# Patient Record
Sex: MALE | Race: WHITE | NOT HISPANIC OR LATINO | Employment: OTHER | URBAN - NONMETROPOLITAN AREA
[De-identification: names, ages, dates, MRNs, and addresses within clinical notes are randomized per-mention and may not be internally consistent; named-entity substitution may affect disease eponyms.]

---

## 2017-01-02 ENCOUNTER — TELEPHONE (OUTPATIENT)
Dept: EMERGENCY DEPT | Facility: HOSPITAL | Age: 62
End: 2017-01-02

## 2017-01-02 ENCOUNTER — OFFICE VISIT (OUTPATIENT)
Dept: RETAIL CLINIC | Facility: CLINIC | Age: 62
End: 2017-01-02

## 2017-01-02 VITALS
HEIGHT: 71 IN | OXYGEN SATURATION: 97 % | DIASTOLIC BLOOD PRESSURE: 74 MMHG | TEMPERATURE: 98 F | HEART RATE: 112 BPM | RESPIRATION RATE: 16 BRPM | SYSTOLIC BLOOD PRESSURE: 118 MMHG | BODY MASS INDEX: 29.9 KG/M2 | WEIGHT: 213.6 LBS

## 2017-01-02 DIAGNOSIS — J06.9 ACUTE URI: Primary | ICD-10-CM

## 2017-01-02 LAB
EXPIRATION DATE: NORMAL
EXPIRATION DATE: NORMAL
FLUAV AG NPH QL: NEGATIVE
FLUBV AG NPH QL: NEGATIVE
INTERNAL CONTROL: NORMAL
INTERNAL CONTROL: NORMAL
Lab: NORMAL
Lab: NORMAL
S PYO AG THROAT QL: NEGATIVE

## 2017-01-02 PROCEDURE — 99203 OFFICE O/P NEW LOW 30 MIN: CPT | Performed by: NURSE PRACTITIONER

## 2017-01-02 PROCEDURE — 87804 INFLUENZA ASSAY W/OPTIC: CPT | Performed by: NURSE PRACTITIONER

## 2017-01-02 PROCEDURE — 87880 STREP A ASSAY W/OPTIC: CPT | Performed by: NURSE PRACTITIONER

## 2017-01-02 RX ORDER — MELOXICAM 15 MG/1
1 TABLET ORAL DAILY
COMMUNITY

## 2017-01-02 RX ORDER — LEVOTHYROXINE SODIUM 0.05 MG/1
1 TABLET ORAL DAILY
COMMUNITY

## 2017-01-02 RX ORDER — INSULIN GLARGINE 100 [IU]/ML
70 INJECTION, SOLUTION SUBCUTANEOUS 2 TIMES DAILY
COMMUNITY
End: 2018-03-07

## 2017-01-02 RX ORDER — FLUTICASONE PROPIONATE 50 MCG
SPRAY, SUSPENSION (ML) NASAL
Qty: 1 BOTTLE | Refills: 0 | Status: SHIPPED | OUTPATIENT
Start: 2017-01-02

## 2017-01-02 RX ORDER — CARBAMAZEPINE 200 MG/1
1 TABLET ORAL DAILY
COMMUNITY
End: 2017-03-24 | Stop reason: SDUPTHER

## 2017-01-02 NOTE — MR AVS SNAPSHOT
Supa Mcclelland   1/2/2017 11:15 AM   Office Visit    Dept Phone:  [phone]   Encounter #:  [id number]    Provider:  NURSE/[facility] [place]   Department:  [facility]                Your Full Care Plan              Today's Medication Changes          These changes are accurate as of: 1/2/17 12:55 PM.  If you have any questions, ask your nurse or doctor.               New Medication(s)Ordered:     fluticasone 50 MCG/ACT nasal spray   Commonly known as:  FLONASE   1 spray each nostril twice a day for three days, then daily.         Stop taking medication(s)listed here:     ciprofloxacin 500 MG tablet   Commonly known as:  CIPRO                Where to Get Your Medications      These medications were sent to [facility] [address] [place] [address] - [address] [place] [address] - [phone] [facility] [phone] [address] [address]     Phone:  [phone]     fluticasone 50 MCG/ACT nasal spray                  Your Updated Medication List          This list is accurate as of: 1/2/17 12:55 PM.  Always use your most recent med list.                * carBAMazepine 200 MG tablet   Commonly known as:  TEGRETOL   Take 1 tablet by mouth Daily.       * carBAMazepine 200 MG tablet   Commonly known as:  TEGretol       citalopram 20 MG tablet   Commonly known as:  CeleXA       famotidine 20 MG tablet   Commonly known as:  PEPCID       fexofenadine 180 MG tablet   Commonly known as:  ALLEGRA       fluticasone 50 MCG/ACT nasal spray   Commonly known as:  FLONASE   1 spray each nostril twice a day for three days, then daily.       gabapentin 800 MG tablet   Commonly known as:  NEURONTIN   Take 1 tablet by mouth 2 (Two) Times a Day.       hydrochlorothiazide 12.5 MG tablet   Commonly known as:  HYDRODIURIL       insulin glargine 100 UNIT/ML injection   Commonly known as:  LANTUS       JANUMET  MG per tablet   Generic drug:  sitaGLIPtin-metFORMIN       levothyroxine 50 MCG tablet    "  Commonly known as:  SYNTHROID, LEVOTHROID       meloxicam 15 MG tablet   Commonly known as:  MOBIC       metFORMIN 500 MG tablet   Commonly known as:  GLUCOPHAGE       metoprolol tartrate 50 MG tablet   Commonly known as:  LOPRESSOR       nitrofurantoin (macrocrystal-monohydrate) 100 MG capsule   Commonly known as:  MACROBID   Take 1 capsule by mouth 2 (Two) Times a Day for 7 days.       rOPINIRole 1 MG tablet   Commonly known as:  REQUIP   Take 1 tablet by mouth Every Night. Take 1 hour before bedtime.       * Notice:  This list has 2 medication(s) that are the same as other medications prescribed for you. Read the directions carefully, and ask your doctor or other care provider to review them with you.            We Performed the Following     POC Influenza A / B     POC Rapid Strep A       You Were Diagnosed With        Codes Comments    Acute URI    -  Primary ICD-10-CM: J06.9  ICD-9-CM: 465.9       Instructions    Upper Respiratory Infection, Adult  Most upper respiratory infections (URIs) are a viral infection of the air passages leading to the lungs. A URI affects the nose, throat, and upper air passages. The most common type of URI is nasopharyngitis and is typically referred to as \"the common cold.\"  URIs run their course and usually go away on their own. Most of the time, a URI does not require medical attention, but sometimes a bacterial infection in the upper airways can follow a viral infection. This is called a secondary infection. Sinus and middle ear infections are common types of secondary upper respiratory infections.  Bacterial pneumonia can also complicate a URI. A URI can worsen asthma and chronic obstructive pulmonary disease (COPD). Sometimes, these complications can require emergency medical care and may be life threatening.   CAUSES  Almost all URIs are caused by viruses. A virus is a type of germ and can spread from one person to another.   RISKS FACTORS  You may be at risk for a URI if: "   · You smoke.    · You have chronic heart or lung disease.  · You have a weakened defense (immune) system.    · You are very young or very old.    · You have nasal allergies or asthma.  · You work in crowded or poorly ventilated areas.  · You work in health care facilities or schools.  SIGNS AND SYMPTOMS   Symptoms typically develop 2-3 days after you come in contact with a cold virus. Most viral URIs last 7-10 days. However, viral URIs from the influenza virus (flu virus) can last 14-18 days and are typically more severe. Symptoms may include:   · Runny or stuffy (congested) nose.    · Sneezing.    · Cough.    · Sore throat.    · Headache.    · Fatigue.    · Fever.    · Loss of appetite.    · Pain in your forehead, behind your eyes, and over your cheekbones (sinus pain).  · Muscle aches.    DIAGNOSIS   Your health care provider may diagnose a URI by:  · Physical exam.  · Tests to check that your symptoms are not due to another condition such as:  ¨ Strep throat.  ¨ Sinusitis.  ¨ Pneumonia.  ¨ Asthma.  TREATMENT   A URI goes away on its own with time. It cannot be cured with medicines, but medicines may be prescribed or recommended to relieve symptoms. Medicines may help:  · Reduce your fever.  · Reduce your cough.  · Relieve nasal congestion.  HOME CARE INSTRUCTIONS   · Take medicines only as directed by your health care provider.    · Gargle warm saltwater or take cough drops to comfort your throat as directed by your health care provider.  · Use a warm mist humidifier or inhale steam from a shower to increase air moisture. This may make it easier to breathe.  · Drink enough fluid to keep your urine clear or pale yellow.    · Eat soups and other clear broths and maintain good nutrition.    · Rest as needed.    · Return to work when your temperature has returned to normal or as your health care provider advises. You may need to stay home longer to avoid infecting others. You can also use a face mask and careful  hand washing to prevent spread of the virus.  · Increase the usage of your inhaler if you have asthma.    · Do not use any tobacco products, including cigarettes, chewing tobacco, or electronic cigarettes. If you need help quitting, ask your health care provider.  PREVENTION   The best way to protect yourself from getting a cold is to practice good hygiene.   · Avoid oral or hand contact with people with cold symptoms.    · Wash your hands often if contact occurs.    There is no clear evidence that vitamin C, vitamin E, echinacea, or exercise reduces the chance of developing a cold. However, it is always recommended to get plenty of rest, exercise, and practice good nutrition.   SEEK MEDICAL CARE IF:   · You are getting worse rather than better.    · Your symptoms are not controlled by medicine.    · You have chills.  · You have worsening shortness of breath.  · You have brown or red mucus.  · You have yellow or brown nasal discharge.  · You have pain in your face, especially when you bend forward.  · You have a fever.  · You have swollen neck glands.  · You have pain while swallowing.  · You have white areas in the back of your throat.  SEEK IMMEDIATE MEDICAL CARE IF:   · You have severe or persistent:    Headache.    Ear pain.    Sinus pain.    Chest pain.  · You have chronic lung disease and any of the following:    Wheezing.    Prolonged cough.    Coughing up blood.    A change in your usual mucus.  · You have a stiff neck.  · You have changes in your:    Vision.    Hearing.    Thinking.    Mood.  MAKE SURE YOU:   · Understand these instructions.  · Will watch your condition.  · Will get help right away if you are not doing well or get worse.     This information is not intended to replace advice given to you by your health care provider. Make sure you discuss any questions you have with your health care provider.     Document Released: 06/13/2002 Document Revised: 05/03/2016 Document Reviewed:  03/25/2015  [employer] Interactive Patient Education ©2016 [employer]      Fluticasone nasal spray  What is this medicine?  FLUTICASONE (floo TIK a sone) is a corticosteroid. This medicine is used to treat the symptoms of allergies like sneezing, itchy red eyes, and itchy, runny, or stuffy nose.  This medicine may be used for other purposes; ask your health care provider or pharmacist if you have questions.  What should I tell my health care provider before I take this medicine?  They need to know if you have any of these conditions:  -infection, like tuberculosis, herpes, or fungal infection  -recent surgery on nose or sinuses  -taking corticosteroid by mouth  -an unusual or allergic reaction to fluticasone, steroids, other medicines, foods, dyes, or preservatives  -pregnant or trying to get pregnant  -breast-feeding  How should I use this medicine?  This medicine is for use in the nose. Follow the directions on your product or prescription label. This medicine works best if used at regular intervals. Do not use more often than directed. Make sure that you are using your nasal spray correctly. After 6 months of daily use without a prescription, talk to your doctor or health care professional before using it for a longer time. Ask your doctor or health care professional if you have any questions.  Talk to your pediatrician regarding the use of this medicine in children. Special care may be needed. This medicine has been used in children as young as 2 years. After two months of daily use without a prescription in a child, talk to your pediatrician before using it for a longer time.  Overdosage: If you think you have taken too much of this medicine contact a poison control center or emergency room at once.  NOTE: This medicine is only for you. Do not share this medicine with others.  What if I miss a dose?  If you miss a dose, use it as soon as you remember. If it is almost time for your next dose, use only that dose  and continue with your regular schedule. Do not use double or extra doses.  What may interact with this medicine?  -ketoconazole  -metyrapone  -some medicines for HIV  -vaccines  This list may not describe all possible interactions. Give your health care provider a list of all the medicines, herbs, non-prescription drugs, or dietary supplements you use. Also tell them if you smoke, drink alcohol, or use illegal drugs. Some items may interact with your medicine.  What should I watch for while using this medicine?  Visit your doctor or health care professional for regular checks on your progress. Some symptoms may improve within 12 hours after starting use. Check with your doctor or health care professional if there is no improvement in your condition after 3 weeks of use.  Do not come in contact with people who have chickenpox or the measles while you are taking this medicine. If you do, call your doctor right away.  What side effects may I notice from receiving this medicine?  Side effects that you should report to your doctor or health care professional as soon as possible:  -allergic reactions like skin rash, itching or hives, swelling of the face, lips, or tongue  -changes in vision  -flu-like symptoms  -white patches or sores in the mouth or nose  Side effects that usually do not require medical attention (report to your doctor or health care professional if they continue or are bothersome):  -burning or irritation inside the nose or throat  -cough  -headache  -nosebleed  -unusual taste or smell  This list may not describe all possible side effects. Call your doctor for medical advice about side effects. You may report side effects to FDA at 8-219-FDA-6366.  Where should I keep my medicine?  Keep out of the reach of children.  Store at room temperature between 15 and 30 degrees C (59 and 86 degrees F). Throw away any unused medicine after the expiration date.  NOTE: This sheet is a summary. It may not cover all  possible information. If you have questions about this medicine, talk to your doctor, pharmacist, or health care provider.     © 2016, [employer]. (2015 09:07:53)      Your strep test and flu test was negative. Increase fluids and rest. Use Flonase as discussed. Please follow up with your primary care provider tomorrow.     Please check with [facility] regarding which antibiotic you should be taking considering your allergies.  You verbalized understanding.      If your symptoms worsen please go immediately to ER.        Patient Instructions History      Upcoming Appointments     Visit Type Date Time Department    NEW PATIENT 2017 11:15 AM [facility] [place]    FOLLOW UP 2017  1:10 PM [facility]    FOLLOW UP 2017 11:20 AM [facility]      MyChart Signup     [facility] allows you to send messages to your doctor, view your test results, renew your prescriptions, schedule appointments, and more. To sign up, go to [employer] and click on the Sign Up Now link in the New User? box. Enter your [facility] Activation Code exactly as it appears below along with the last four digits of your Social Security Number and your Date of Birth () to complete the sign-up process. If you do not sign up before the expiration date, you must request a new code.    [facility] Activation Code: [email or web address]  Expires: 2017  9:54 AM    If you have questions, you can email [email or web address] or call [phone] to talk to our [facility] staff. Remember, [facility] is NOT to be used for urgent needs. For medical emergencies, dial 911.               Other Info from Your Visit           Your Appointments     2017  1:10 PM CST   Follow Up with Abdelrahman Vides MD   [facility] (--)    [address] [place] [id number] [phone]           Arrive 15 minutes prior to appointment.            2017 11:20  "AM CDT   Follow Up with ZACARIAS Alonzo   [facility]UR[facility] (--)    [address]  [facility] [address] [phone]           Arrive 15 minutes prior to appointment.              Allergies     Atacand [Candesartan Cilexetil]      Biaxin [Clarithromycin]      Cefzil [Cefprozil]      Levaquin [Levofloxacin]      Penicillins      Zestril [Lisinopril]        Reason for Visit     Generalized Body Aches     Nasal Congestion Since Friday night    Vomiting This morning    Fever Intermittently     Sore Throat     Ear Fullness           Vital Signs     Blood Pressure Pulse Temperature Respirations Height Weight    118/74 (BP Location: Right arm, Patient Position: Sitting, Cuff Size: Adult) 112 98 °F (36.7 °C) (Oral) 16 71\" (180.3 cm) 213 lb 9.6 oz (96.9 kg)    Oxygen Saturation Body Mass Index Smoking Status             97% 29.79 kg/m2 Never Smoker         Problems and Diagnoses Noted     Acute upper respiratory infection    -  Primary      Results     POC Rapid Strep A      Component Value Standard Range & Units    Rapid Strep A Screen Negative Negative, VALID, INVALID, Not Performed    Internal Control Passed Passed    Lot Number [id number]     Expiration Date 2018/6                 POC Influenza A / B      Component Value Standard Range & Units    Rapid Influenza A Ag negative     Rapid Influenza B Ag negative     Internal Control Passed Passed    Lot Number [id number]     Expiration Date 2017-08-31                     "

## 2017-01-02 NOTE — TELEPHONE ENCOUNTER
----- Message from GREY Becerra sent at 1/2/2017  9:06 AM CST -----  Please call Keflex 500mg PO BID x 7 days.May stop Macrodantin. He will need to have his urine rechecked by his PCP after completion of ABX. Thanks

## 2017-01-02 NOTE — PROGRESS NOTES
Chief Complaint   Patient presents with   • Generalized Body Aches   • Nasal Congestion     Since Friday night   • Vomiting     This morning   • Fever     Intermittently    • Sore Throat   • Ear Fullness     Subjective   Supa Mcclelland is a 61 y.o. male who presents to the clinic today with complaints of generalized body aches, nasal congestion, intermittent fever and sore throat which started Friday night. He also complains of ear fullness.  He was recently seen in the ER and treated for a UTI.  He has been taking Allegra D.  He has also tried some cough medication and Tylenol. He has been drinking more juice.  His temperature has been around 100.  He took one of his wife's Zofran this morning.     He reports his fasting blood sugar has been around 160.     ER records including labs, xrays reviewed. He was placed on Macrobid for UTI, but it is being changed to Keflex.     The following portions of the patient's history were reviewed and updated as appropriate: allergies, past family history, past medical history, past social history, past surgical history and problem list.      Current Outpatient Prescriptions:   •  carBAMazepine (TEGRETOL) 200 MG tablet, Take 1 tablet by mouth Daily., Disp: 30 tablet, Rfl: 6  •  citalopram (CeleXA) 20 MG tablet, Take 20 mg by mouth Daily., Disp: , Rfl:   •  famotidine (PEPCID) 20 MG tablet, Take 20 mg by mouth 2 (Two) Times a Day., Disp: , Rfl:   •  fexofenadine (ALLEGRA) 180 MG tablet, Take 180 mg by mouth As Needed., Disp: , Rfl:   •  gabapentin (NEURONTIN) 800 MG tablet, Take 1 tablet by mouth 2 (Two) Times a Day., Disp: 60 tablet, Rfl: 6  •  hydrochlorothiazide (HYDRODIURIL) 12.5 MG tablet, Take 12.5 mg by mouth Daily., Disp: , Rfl:   •  metoprolol tartrate (LOPRESSOR) 50 MG tablet, Take 50 mg by mouth 2 (Two) Times a Day., Disp: , Rfl:   •  nitrofurantoin, macrocrystal-monohydrate, (MACROBID) 100 MG capsule, Take 1 capsule by mouth 2 (Two) Times a Day for 7 days., Disp: 14  capsule, Rfl: 0  •  rOPINIRole (REQUIP) 1 MG tablet, Take 1 tablet by mouth Every Night. Take 1 hour before bedtime., Disp: 30 tablet, Rfl: 6  •  carBAMazepine (TEGretol) 200 MG tablet, Take 1 tablet by mouth Daily., Disp: , Rfl:   •  insulin glargine (LANTUS) 100 UNIT/ML injection, Inject 60 Units under the skin 2 (Two) Times a Day., Disp: , Rfl:   •  levothyroxine (SYNTHROID, LEVOTHROID) 50 MCG tablet, Take 1 tablet by mouth Daily., Disp: , Rfl:   •  meloxicam (MOBIC) 15 MG tablet, Take 1 tablet by mouth Daily., Disp: , Rfl:   •  metFORMIN (GLUCOPHAGE) 500 MG tablet, Take 1 tablet by mouth 2 (Two) Times a Day., Disp: , Rfl:   •  sitaGLIPtin-metFORMIN (JANUMET)  MG per tablet, Take 1 tablet by mouth 2 (Two) Times a Day With Meals., Disp: , Rfl:     Allergies:  Atacand [candesartan cilexetil]; Biaxin [clarithromycin]; Cefzil [cefprozil]; Levaquin [levofloxacin]; Penicillins; and Zestril [lisinopril]     Past Medical History   Diagnosis Date   • Depression    • Diabetes mellitus    • Disease of thyroid gland      HYPOTHYROIDISM   • GERD (gastroesophageal reflux disease)    • Hyperlipidemia    • Hypertension    • Nephrolithiasis      Past Surgical History   Procedure Laterality Date   • Shoulder surgery     • Kidney stone surgery       Family History   Problem Relation Age of Onset   • Heart disease Mother    • Diabetes Mother    • Alzheimer's disease Father    • Heart disease Father    • Diabetes Sister    • Heart disease Brother    • Diabetes Sister    • Diabetes Sister      Social History   Substance Use Topics   • Smoking status: Never Smoker   • Smokeless tobacco: Never Used   • Alcohol use No       Review of Systems  Review of Systems   Constitutional: Positive for chills and fever.   HENT: Positive for congestion, ear pain, postnasal drip, sinus pressure and sore throat. Negative for sneezing.    Respiratory: Positive for cough.    Gastrointestinal: Positive for vomiting (once this morning after  "coughing, all phlegm). Negative for abdominal pain and diarrhea.   Genitourinary: Negative for dysuria, flank pain, frequency, hematuria and urgency.   Musculoskeletal: Positive for arthralgias.   Neurological: Positive for headaches (frontal).       Objective   Visit Vitals   • /74 (BP Location: Right arm, Patient Position: Sitting, Cuff Size: Adult)   • Pulse 112   • Temp 98 °F (36.7 °C) (Oral)   • Resp 16   • Ht 71\" (180.3 cm)   • Wt 213 lb 9.6 oz (96.9 kg)   • SpO2 97%   • BMI 29.79 kg/m2     Last 2 weights    01/02/17  1204   Weight: 213 lb 9.6 oz (96.9 kg)       Physical Exam   Constitutional: Vital signs are normal. He appears well-developed and well-nourished. He is cooperative. No distress.   HENT:   Head: Normocephalic and atraumatic.   Right Ear: Tympanic membrane, external ear and ear canal normal.   Left Ear: Tympanic membrane, external ear and ear canal normal.   Nose: Mucosal edema (minimal) present. Right sinus exhibits no maxillary sinus tenderness and no frontal sinus tenderness. Left sinus exhibits no maxillary sinus tenderness and no frontal sinus tenderness.   Mouth/Throat: Uvula is midline. Posterior oropharyngeal erythema present. Tonsils are 1+ on the right. Tonsils are 1+ on the left. No tonsillar exudate.   Eyes: Conjunctivae, EOM and lids are normal. Pupils are equal, round, and reactive to light.   Neck: Trachea normal and normal range of motion. Neck supple.   Cardiovascular: Normal rate, regular rhythm, S1 normal, S2 normal and normal heart sounds.    Pulmonary/Chest: Effort normal and breath sounds normal. No respiratory distress. He has no decreased breath sounds. He has no wheezes. He has no rhonchi. He has no rales.   Abdominal: Soft. Bowel sounds are normal. There is generalized tenderness (reports unchanged over the last days, same tenderness he had in ER).   Lymphadenopathy:     He has cervical adenopathy (soft right cervical node palpable).   Neurological: He is alert. " Coordination and gait normal.   Skin: Skin is warm and intact.   Psychiatric: He has a normal mood and affect. His behavior is normal.       Assessment/Plan     Supa was seen today for generalized body aches, nasal congestion, vomiting, fever, sore throat and ear fullness.    Diagnoses and all orders for this visit:    Acute URI  -     POC Rapid Strep A -negative  -     POC Influenza A / B -negative    Other orders  -     fluticasone (FLONASE) 50 MCG/ACT nasal spray; 1 spray each nostril twice a day for three days, then daily.        Your strep test and flu test was negative. Increase fluids and rest. Use Flonase as discussed. Please follow up with your primary care provider tomorrow.     Please check with [facility] regarding which antibiotic you should be taking for your UTI considering your allergies.  You verbalized understanding.    If your symptoms worsen please go immediately to ER.

## 2017-01-02 NOTE — PATIENT INSTRUCTIONS
"Upper Respiratory Infection, Adult  Most upper respiratory infections (URIs) are a viral infection of the air passages leading to the lungs. A URI affects the nose, throat, and upper air passages. The most common type of URI is nasopharyngitis and is typically referred to as \"the common cold.\"  URIs run their course and usually go away on their own. Most of the time, a URI does not require medical attention, but sometimes a bacterial infection in the upper airways can follow a viral infection. This is called a secondary infection. Sinus and middle ear infections are common types of secondary upper respiratory infections.  Bacterial pneumonia can also complicate a URI. A URI can worsen asthma and chronic obstructive pulmonary disease (COPD). Sometimes, these complications can require emergency medical care and may be life threatening.   CAUSES  Almost all URIs are caused by viruses. A virus is a type of germ and can spread from one person to another.   RISKS FACTORS  You may be at risk for a URI if:   · You smoke.    · You have chronic heart or lung disease.  · You have a weakened defense (immune) system.    · You are very young or very old.    · You have nasal allergies or asthma.  · You work in crowded or poorly ventilated areas.  · You work in health care facilities or schools.  SIGNS AND SYMPTOMS   Symptoms typically develop 2-3 days after you come in contact with a cold virus. Most viral URIs last 7-10 days. However, viral URIs from the influenza virus (flu virus) can last 14-18 days and are typically more severe. Symptoms may include:   · Runny or stuffy (congested) nose.    · Sneezing.    · Cough.    · Sore throat.    · Headache.    · Fatigue.    · Fever.    · Loss of appetite.    · Pain in your forehead, behind your eyes, and over your cheekbones (sinus pain).  · Muscle aches.    DIAGNOSIS   Your health care provider may diagnose a URI by:  · Physical exam.  · Tests to check that your symptoms are not due to " another condition such as:  ¨ Strep throat.  ¨ Sinusitis.  ¨ Pneumonia.  ¨ Asthma.  TREATMENT   A URI goes away on its own with time. It cannot be cured with medicines, but medicines may be prescribed or recommended to relieve symptoms. Medicines may help:  · Reduce your fever.  · Reduce your cough.  · Relieve nasal congestion.  HOME CARE INSTRUCTIONS   · Take medicines only as directed by your health care provider.    · Gargle warm saltwater or take cough drops to comfort your throat as directed by your health care provider.  · Use a warm mist humidifier or inhale steam from a shower to increase air moisture. This may make it easier to breathe.  · Drink enough fluid to keep your urine clear or pale yellow.    · Eat soups and other clear broths and maintain good nutrition.    · Rest as needed.    · Return to work when your temperature has returned to normal or as your health care provider advises. You may need to stay home longer to avoid infecting others. You can also use a face mask and careful hand washing to prevent spread of the virus.  · Increase the usage of your inhaler if you have asthma.    · Do not use any tobacco products, including cigarettes, chewing tobacco, or electronic cigarettes. If you need help quitting, ask your health care provider.  PREVENTION   The best way to protect yourself from getting a cold is to practice good hygiene.   · Avoid oral or hand contact with people with cold symptoms.    · Wash your hands often if contact occurs.    There is no clear evidence that vitamin C, vitamin E, echinacea, or exercise reduces the chance of developing a cold. However, it is always recommended to get plenty of rest, exercise, and practice good nutrition.   SEEK MEDICAL CARE IF:   · You are getting worse rather than better.    · Your symptoms are not controlled by medicine.    · You have chills.  · You have worsening shortness of breath.  · You have brown or red mucus.  · You have yellow or brown nasal  discharge.  · You have pain in your face, especially when you bend forward.  · You have a fever.  · You have swollen neck glands.  · You have pain while swallowing.  · You have white areas in the back of your throat.  SEEK IMMEDIATE MEDICAL CARE IF:   · You have severe or persistent:    Headache.    Ear pain.    Sinus pain.    Chest pain.  · You have chronic lung disease and any of the following:    Wheezing.    Prolonged cough.    Coughing up blood.    A change in your usual mucus.  · You have a stiff neck.  · You have changes in your:    Vision.    Hearing.    Thinking.    Mood.  MAKE SURE YOU:   · Understand these instructions.  · Will watch your condition.  · Will get help right away if you are not doing well or get worse.     This information is not intended to replace advice given to you by your health care provider. Make sure you discuss any questions you have with your health care provider.     Document Released: 06/13/2002 Document Revised: 05/03/2016 Document Reviewed: 03/25/2015  [employer] Interactive Patient Education ©2016 [employer]      Fluticasone nasal spray  What is this medicine?  FLUTICASONE (floo TIK a sone) is a corticosteroid. This medicine is used to treat the symptoms of allergies like sneezing, itchy red eyes, and itchy, runny, or stuffy nose.  This medicine may be used for other purposes; ask your health care provider or pharmacist if you have questions.  What should I tell my health care provider before I take this medicine?  They need to know if you have any of these conditions:  -infection, like tuberculosis, herpes, or fungal infection  -recent surgery on nose or sinuses  -taking corticosteroid by mouth  -an unusual or allergic reaction to fluticasone, steroids, other medicines, foods, dyes, or preservatives  -pregnant or trying to get pregnant  -breast-feeding  How should I use this medicine?  This medicine is for use in the nose. Follow the directions on your product or  prescription label. This medicine works best if used at regular intervals. Do not use more often than directed. Make sure that you are using your nasal spray correctly. After 6 months of daily use without a prescription, talk to your doctor or health care professional before using it for a longer time. Ask your doctor or health care professional if you have any questions.  Talk to your pediatrician regarding the use of this medicine in children. Special care may be needed. This medicine has been used in children as young as 2 years. After two months of daily use without a prescription in a child, talk to your pediatrician before using it for a longer time.  Overdosage: If you think you have taken too much of this medicine contact a poison control center or emergency room at once.  NOTE: This medicine is only for you. Do not share this medicine with others.  What if I miss a dose?  If you miss a dose, use it as soon as you remember. If it is almost time for your next dose, use only that dose and continue with your regular schedule. Do not use double or extra doses.  What may interact with this medicine?  -ketoconazole  -metyrapone  -some medicines for HIV  -vaccines  This list may not describe all possible interactions. Give your health care provider a list of all the medicines, herbs, non-prescription drugs, or dietary supplements you use. Also tell them if you smoke, drink alcohol, or use illegal drugs. Some items may interact with your medicine.  What should I watch for while using this medicine?  Visit your doctor or health care professional for regular checks on your progress. Some symptoms may improve within 12 hours after starting use. Check with your doctor or health care professional if there is no improvement in your condition after 3 weeks of use.  Do not come in contact with people who have chickenpox or the measles while you are taking this medicine. If you do, call your doctor right away.  What side  effects may I notice from receiving this medicine?  Side effects that you should report to your doctor or health care professional as soon as possible:  -allergic reactions like skin rash, itching or hives, swelling of the face, lips, or tongue  -changes in vision  -flu-like symptoms  -white patches or sores in the mouth or nose  Side effects that usually do not require medical attention (report to your doctor or health care professional if they continue or are bothersome):  -burning or irritation inside the nose or throat  -cough  -headache  -nosebleed  -unusual taste or smell  This list may not describe all possible side effects. Call your doctor for medical advice about side effects. You may report side effects to FDA at 3-932-FDA-4377.  Where should I keep my medicine?  Keep out of the reach of children.  Store at room temperature between 15 and 30 degrees C (59 and 86 degrees F). Throw away any unused medicine after the expiration date.  NOTE: This sheet is a summary. It may not cover all possible information. If you have questions about this medicine, talk to your doctor, pharmacist, or health care provider.     © 2016, Elsevier/[employer]. (2015-05-13 09:07:53)      Your strep test and flu test was negative. Increase fluids and rest. Use Flonase as discussed. Please follow up with your primary care provider tomorrow.     Please check with [facility] regarding which antibiotic you should be taking considering your allergies.  You verbalized understanding.      If your symptoms worsen please go immediately to ER.

## 2017-01-25 ENCOUNTER — RESULTS ENCOUNTER (OUTPATIENT)
Dept: UROLOGY | Facility: CLINIC | Age: 62
End: 2017-01-25

## 2017-01-25 DIAGNOSIS — N20.0 KIDNEY STONE: ICD-10-CM

## 2017-02-07 LAB
CALCIUM SERPL-MCNC: 9.7 MG/DL (ref 8.4–10.4)
PTH-INTACT SERPL-MCNC: 18 PG/ML (ref 15–65)
URATE SERPL-MCNC: 5.7 MG/DL (ref 3.5–8.5)

## 2017-02-09 ENCOUNTER — OFFICE VISIT (OUTPATIENT)
Dept: UROLOGY | Facility: CLINIC | Age: 62
End: 2017-02-09

## 2017-02-09 VITALS
BODY MASS INDEX: 30.46 KG/M2 | DIASTOLIC BLOOD PRESSURE: 90 MMHG | SYSTOLIC BLOOD PRESSURE: 150 MMHG | TEMPERATURE: 97.1 F | HEIGHT: 71 IN | WEIGHT: 217.6 LBS

## 2017-02-09 DIAGNOSIS — N30.01 ACUTE CYSTITIS WITH HEMATURIA: ICD-10-CM

## 2017-02-09 DIAGNOSIS — N40.0 BENIGN NODULAR PROSTATIC HYPERPLASIA WITHOUT LOWER URINARY TRACT SYMPTOMS: ICD-10-CM

## 2017-02-09 DIAGNOSIS — N20.0 KIDNEY STONE: Primary | ICD-10-CM

## 2017-02-09 LAB
BILIRUB BLD-MCNC: NEGATIVE MG/DL
CLARITY, POC: CLEAR
COLOR UR: YELLOW
GLUCOSE UR STRIP-MCNC: NEGATIVE MG/DL
KETONES UR QL: NEGATIVE
LEUKOCYTE EST, POC: ABNORMAL
NITRITE UR-MCNC: NEGATIVE MG/ML
PH UR: 5 [PH] (ref 5–8)
PROT UR STRIP-MCNC: NEGATIVE MG/DL
RBC # UR STRIP: ABNORMAL /UL
SP GR UR: 1.02 (ref 1–1.03)
UROBILINOGEN UR QL: NORMAL

## 2017-02-09 PROCEDURE — 99214 OFFICE O/P EST MOD 30 MIN: CPT | Performed by: UROLOGY

## 2017-02-09 PROCEDURE — 81003 URINALYSIS AUTO W/O SCOPE: CPT | Performed by: UROLOGY

## 2017-02-09 RX ORDER — POTASSIUM CITRATE 10 MEQ/1
10 TABLET, EXTENDED RELEASE ORAL
Qty: 90 TABLET | Refills: 11 | Status: SHIPPED | OUTPATIENT
Start: 2017-02-09

## 2017-02-09 NOTE — PROGRESS NOTES
Subjective    Mr. Mcclelland is 61 y.o. male    Chief Complaint: Recurrent Nephrolithiasis    History of Present Illness     Recurrent Nephrolithiasis  Patient is her for recurrent nephrolithiasis. Location of stone is right renal. Stones were found for workup of abdominal pain. Pt is currently Asymptomatic. Pt. Has had stone disease for severalyear(s). Risk factors for stone disease include none identified. Previous management of stone disease was Ureteroscopy. Stone analysis was Uric Acid. Metabolic workup revealed low pH, hyperucosuria. Current treatment regimen includes none. Associated symptoms include none.     The following portions of the patient's history were reviewed and updated as appropriate: allergies, current medications, past family history, past medical history, past social history, past surgical history and problem list.    Review of Systems   Constitutional: Negative for chills and fever.   Gastrointestinal: Negative for abdominal pain, anal bleeding and blood in stool.   Genitourinary: Negative for difficulty urinating, flank pain, frequency, hematuria, penile pain, penile swelling and urgency.         Current Outpatient Prescriptions:   •  carBAMazepine (TEGRETOL) 200 MG tablet, Take 1 tablet by mouth Daily., Disp: 30 tablet, Rfl: 6  •  carBAMazepine (TEGretol) 200 MG tablet, Take 1 tablet by mouth Daily., Disp: , Rfl:   •  citalopram (CeleXA) 20 MG tablet, Take 20 mg by mouth Daily., Disp: , Rfl:   •  famotidine (PEPCID) 20 MG tablet, Take 20 mg by mouth 2 (Two) Times a Day., Disp: , Rfl:   •  fexofenadine (ALLEGRA) 180 MG tablet, Take 180 mg by mouth As Needed., Disp: , Rfl:   •  fluticasone (FLONASE) 50 MCG/ACT nasal spray, 1 spray each nostril twice a day for three days, then daily., Disp: 1 bottle, Rfl: 0  •  gabapentin (NEURONTIN) 800 MG tablet, Take 1 tablet by mouth 2 (Two) Times a Day., Disp: 60 tablet, Rfl: 6  •  hydrochlorothiazide (HYDRODIURIL) 12.5 MG tablet, Take 12.5 mg by mouth  Daily., Disp: , Rfl:   •  insulin glargine (LANTUS) 100 UNIT/ML injection, Inject 60 Units under the skin 2 (Two) Times a Day., Disp: , Rfl:   •  levothyroxine (SYNTHROID, LEVOTHROID) 50 MCG tablet, Take 1 tablet by mouth Daily., Disp: , Rfl:   •  meloxicam (MOBIC) 15 MG tablet, Take 1 tablet by mouth Daily., Disp: , Rfl:   •  metFORMIN (GLUCOPHAGE) 500 MG tablet, Take 1 tablet by mouth 2 (Two) Times a Day., Disp: , Rfl:   •  metoprolol tartrate (LOPRESSOR) 50 MG tablet, Take 50 mg by mouth 2 (Two) Times a Day., Disp: , Rfl:   •  potassium citrate (UROCIT-K) 10 MEQ (1080 MG) CR tablet, Take 1 tablet by mouth 3 (Three) Times a Day With Meals., Disp: 90 tablet, Rfl: 11  •  rOPINIRole (REQUIP) 1 MG tablet, Take 1 tablet by mouth Every Night. Take 1 hour before bedtime., Disp: 30 tablet, Rfl: 6  •  sitaGLIPtin-metFORMIN (JANUMET)  MG per tablet, Take 1 tablet by mouth 2 (Two) Times a Day With Meals., Disp: , Rfl:     Past Medical History   Diagnosis Date   • Depression    • Diabetes mellitus    • Disease of thyroid gland      HYPOTHYROIDISM   • GERD (gastroesophageal reflux disease)    • Hyperlipidemia    • Hypertension    • Nephrolithiasis        Past Surgical History   Procedure Laterality Date   • Shoulder surgery     • Kidney stone surgery         Social History     Social History   • Marital status:      Spouse name: N/A   • Number of children: N/A   • Years of education: N/A     Social History Main Topics   • Smoking status: Never Smoker   • Smokeless tobacco: Never Used   • Alcohol use No   • Drug use: No   • Sexual activity: Defer     Other Topics Concern   • None     Social History Narrative       Family History   Problem Relation Age of Onset   • Heart disease Mother    • Diabetes Mother    • Alzheimer's disease Father    • Heart disease Father    • Diabetes Sister    • Heart disease Brother    • Diabetes Sister    • Diabetes Sister        Objective    Visit Vitals   • /90   • Temp 97.1 °F  "(36.2 °C)   • Ht 71\" (180.3 cm)   • Wt 217 lb 9.6 oz (98.7 kg)   • BMI 30.35 kg/m2       Physical Exam   Constitutional: He is oriented to person, place, and time. He appears well-developed and well-nourished. No distress.   Pulmonary/Chest: Effort normal.   Abdominal: Soft. He exhibits no distension and no mass. There is no tenderness. There is no rebound and no guarding. No hernia.   Neurological: He is alert and oriented to person, place, and time.   Skin: Skin is warm and dry. He is not diaphoretic.   Psychiatric: He has a normal mood and affect.   Vitals reviewed.          Results for orders placed or performed in visit on 02/09/17   POC Urinalysis Dipstick, Automated   Result Value Ref Range    Color Yellow Yellow, Straw, Dark Yellow, Octavia    Clarity, UA Clear Clear    Glucose, UA Negative Negative, 1000 mg/dL (3+) mg/dL    Bilirubin Negative Negative    Ketones, UA Negative Negative    Specific Gravity  1.025 1.005 - 1.030    Blood, UA Trace (A) Negative    pH, Urine 5.0 5.0 - 8.0    Protein, POC Negative Negative mg/dL    Urobilinogen, UA Normal Normal    Leukocytes Trace (A) Negative    Nitrite, UA Negative Negative       CT independent review  The CT scan of the abdomen/pelvis done without contrast is available for me to review.  Treatment recommendations require an independent review.  First I scanned the liver, spleen, and bowel pattern.  The retroperitoneum including the major vessels and lymphatic packages are briefly reviewed.  This film as been reviewed by the radiologist to determine any non urologic abnormalities that are present.  The kidneys are closely inspected for size, symmetry, contour, parenchymal thickness, perinephric reaction, presence of calcifications, and intrarenal dilation of the collecting system.  The ureters are inspected for their course, caliber, and any calcifications.  The bladder is inspected for its thickness, size, and presence of any calcifications.  This scan " shows:    The right kidney appears normal on this non-contrasted CT scan.  The renal parenchymal is norml in thickness.  There are no solid masses or cysts.  There is no hydronephrosis.  There are no stones.      The left kidney appears normal on this non-contrasted CT scan.  The renal parenchymal is norml in thickness.  There are no solid masses or cysts.  There is no hydronephrosis.  There are no stones.      The bladder appears normal on this non-contrasted CT scan.  The bladder appears normal in thickness.  There no masses or stones seen on this exam.      Assessment and Plan    Supa was seen today for flank pain.    Diagnoses and all orders for this visit:    Kidney stone  -     POC Urinalysis Dipstick, Automated  -     potassium citrate (UROCIT-K) 10 MEQ (1080 MG) CR tablet; Take 1 tablet by mouth 3 (Three) Times a Day With Meals.  -     PSA; Future    Acute cystitis with hematuria    Benign nodular prostatic hyperplasia without lower urinary tract symptoms          Patient was recently seen in emergency room with a strep UTI.  His first one.  I am going to hold off on any sort of evaluation unless he has another one.  I reviewed his CT scans from that visit with the findings mentioned above.  Also reviewed his metabolic workup including a 24-hour urine on placement on Urocit-K.  He will follow-up in one year we will check a PSA.  He has no stones on his CT scan and therefore not get any imaging in a year.

## 2017-03-09 ENCOUNTER — OFFICE VISIT (OUTPATIENT)
Dept: GASTROENTEROLOGY | Facility: CLINIC | Age: 62
End: 2017-03-09

## 2017-03-09 VITALS
RESPIRATION RATE: 18 BRPM | SYSTOLIC BLOOD PRESSURE: 142 MMHG | DIASTOLIC BLOOD PRESSURE: 90 MMHG | WEIGHT: 215 LBS | OXYGEN SATURATION: 96 % | HEART RATE: 91 BPM | HEIGHT: 71 IN | BODY MASS INDEX: 30.1 KG/M2

## 2017-03-09 DIAGNOSIS — R10.11 RIGHT UPPER QUADRANT ABDOMINAL PAIN: Primary | ICD-10-CM

## 2017-03-09 PROCEDURE — 99213 OFFICE O/P EST LOW 20 MIN: CPT | Performed by: NURSE PRACTITIONER

## 2017-03-09 RX ORDER — LORATADINE 10 MG/1
10 TABLET ORAL DAILY
COMMUNITY

## 2017-03-09 RX ORDER — CARBAMAZEPINE 200 MG/1
TABLET ORAL
COMMUNITY
End: 2017-03-24 | Stop reason: SDUPTHER

## 2017-03-09 NOTE — PROGRESS NOTES
Chief Complaint   Patient presents with   • Abdominal Pain     right side of abdomin       Subjective     HPI     Right sided abdominal pain that has been present since Dec 2016.  In Dec 2016 he was dx with kidney stone.  Pain occasionally felt in LUQ.  Pain described as sharp.  No alleviating factors.  Application of pressure can cause increase in pain.   No N/V.  Weight described as stable.  No BRBPR or melena stools.  No family hx or personal hx of colon polyps.  No heartburn or indigestions.  He has frequent difficulty with diarrhea.    Last CScope by Dr Sanchez Dec 2016, no abnormalities according to pt  Last EGD apx 4 yr ago, hx esophageal dilation    Past Medical History   Diagnosis Date   • Depression    • Diabetes mellitus    • Disease of thyroid gland      HYPOTHYROIDISM   • GERD (gastroesophageal reflux disease)    • Hyperlipidemia    • Hypertension    • Nephrolithiasis        Past Surgical History   Procedure Laterality Date   • Shoulder surgery     • Kidney stone surgery         Outpatient Prescriptions Marked as Taking for the 3/9/17 encounter (Office Visit) with GREY Almaraz   Medication Sig Dispense Refill   • citalopram (CeleXA) 20 MG tablet Take 20 mg by mouth Daily.     • famotidine (PEPCID) 20 MG tablet Take 20 mg by mouth 2 (Two) Times a Day.     • gabapentin (NEURONTIN) 800 MG tablet Take 1 tablet by mouth 2 (Two) Times a Day. 60 tablet 6   • hydrochlorothiazide (HYDRODIURIL) 12.5 MG tablet Take 12.5 mg by mouth Daily.     • insulin glargine (LANTUS) 100 UNIT/ML injection Inject 60 Units under the skin 2 (Two) Times a Day.     • levothyroxine (SYNTHROID, LEVOTHROID) 50 MCG tablet Take 1 tablet by mouth Daily.     • meloxicam (MOBIC) 15 MG tablet Take 1 tablet by mouth Daily.     • metFORMIN (GLUCOPHAGE) 500 MG tablet Take 1 tablet by mouth 2 (Two) Times a Day.     • potassium citrate (UROCIT-K) 10 MEQ (1080 MG) CR tablet Take 1 tablet by mouth 3 (Three) Times a Day With Meals. 90  tablet 11       Allergies   Allergen Reactions   • Atacand [Candesartan Cilexetil]    • Biaxin [Clarithromycin]    • Cefzil [Cefprozil]    • Levaquin [Levofloxacin]    • Penicillins    • Zestril [Lisinopril]        Social History     Social History   • Marital status:      Spouse name: N/A   • Number of children: N/A   • Years of education: N/A     Occupational History   • Not on file.     Social History Main Topics   • Smoking status: Never Smoker   • Smokeless tobacco: Never Used   • Alcohol use No   • Drug use: No   • Sexual activity: Defer     Other Topics Concern   • Not on file     Social History Narrative       Family History   Problem Relation Age of Onset   • Heart disease Mother    • Diabetes Mother    • Alzheimer's disease Father    • Heart disease Father    • Diabetes Sister    • Heart disease Brother    • Diabetes Sister    • Diabetes Sister        Review of Systems   Constitutional: Negative for fatigue, fever and unexpected weight change.   HENT: Negative for hearing loss, sore throat and voice change.    Eyes: Negative for visual disturbance.   Respiratory: Negative for cough, shortness of breath and wheezing.    Cardiovascular: Negative for chest pain and palpitations.   Gastrointestinal: Negative for abdominal pain, blood in stool and vomiting.   Endocrine: Negative for polydipsia and polyuria.   Genitourinary: Negative for difficulty urinating, dysuria, hematuria and urgency.   Musculoskeletal: Negative for joint swelling and myalgias.   Skin: Negative for color change, rash and wound.   Neurological: Negative for dizziness, tremors, seizures and syncope.   Hematological: Does not bruise/bleed easily.   Psychiatric/Behavioral: Negative for agitation and confusion. The patient is not nervous/anxious.        Objective     Vitals:    03/09/17 0921   BP: 142/90   BP Location: Left arm   Patient Position: Sitting   Cuff Size: Adult   Pulse: 91   Resp: 18   SpO2: 96%   Weight: 215 lb (97.5 kg)  "  Height: 71\" (180.3 cm)     Body mass index is 29.99 kg/(m^2).    Physical Exam   Constitutional: He is oriented to person, place, and time. He appears well-developed and well-nourished.   HENT:   Head: Normocephalic and atraumatic.   Eyes:   Pink, Nonicteric   Neck:   Global Assessment- supple. No JVD or lymphadenopathy   Cardiovascular: Normal rate, regular rhythm and normal heart sounds.  Exam reveals no gallop and no friction rub.    No murmur heard.  Pulmonary/Chest: Effort normal and breath sounds normal. No respiratory distress. He has no wheezes. He has no rales.   Inspection: Movements-Symmetrical   Abdominal: Soft. Bowel sounds are normal. He exhibits no distension and no mass. There is tenderness (RUQ). There is no rebound and no guarding.   Neurological: He is alert and oriented to person, place, and time.   General Exam-Deemed a reliable historian, able to converse without difficulty and Able to move all extremities without difficulty       Imaging Results (most recent)     None          Assessment/Plan     Supa was seen today for abdominal pain.    Diagnoses and all orders for this visit:    Right upper quadrant abdominal pain  -     Case request      ESOPHAGOGASTRODUODENOSCOPY WITH ANESTHESIA (N/A)    There are no Patient Instructions on file for this visit.  The risk of the endoscopy were discussed in detail.  We discussed the risk of perforation (one out of 7162-2143, riskier with dilation), bleeding (one out of 500), and the rare risks of infection, adverse reaction to anesthesia, respiratory failure, cardiac failure including MI and adverse reaction to medications, etc.  We discussed consequences that could occur if a risk were to develop such as the need for hospitalization, blood transfusion, surgical intervention, medications, pain and disability and death.  Alternatives include not doing anything, or pursuing an UGI series which only offers a diagnosis with potential less accuracy compared " to egd.  The patient verbalizes understanding and agrees to proceed.      All risks, benefits, alternatives, and indications of colonoscopy procedure have been discussed with the patient. Risks to include perforation of the colon requiring possible surgery or colostomy, risk of bleeding from biopsies or removal of colon tissue, possibility of missing a colon polyp or cancer, or adverse drug reaction.  Benefits to include the diagnosis and management of disease of the colon and rectum. Alternatives to include barium enema, radiographic evaluation, lab testing or no intervention. Pt verbalizes understanding and agrees to proceed with procedure.

## 2017-03-24 DIAGNOSIS — G25.81 RESTLESS LEGS SYNDROME (RLS): ICD-10-CM

## 2017-03-24 DIAGNOSIS — E11.42 DIABETIC POLYNEUROPATHY ASSOCIATED WITH TYPE 2 DIABETES MELLITUS (HCC): ICD-10-CM

## 2017-03-24 RX ORDER — CARBAMAZEPINE 200 MG/1
200 TABLET ORAL DAILY
Qty: 30 TABLET | Refills: 5 | Status: SHIPPED | OUTPATIENT
Start: 2017-03-24 | End: 2017-06-12 | Stop reason: SDUPTHER

## 2017-04-03 ENCOUNTER — ANESTHESIA EVENT (OUTPATIENT)
Dept: GASTROENTEROLOGY | Facility: HOSPITAL | Age: 62
End: 2017-04-03

## 2017-04-04 ENCOUNTER — ANESTHESIA (OUTPATIENT)
Dept: GASTROENTEROLOGY | Facility: HOSPITAL | Age: 62
End: 2017-04-04

## 2017-04-04 ENCOUNTER — HOSPITAL ENCOUNTER (OUTPATIENT)
Facility: HOSPITAL | Age: 62
Setting detail: HOSPITAL OUTPATIENT SURGERY
Discharge: HOME | End: 2017-04-04
Attending: INTERNAL MEDICINE | Admitting: INTERNAL MEDICINE

## 2017-04-04 VITALS
HEIGHT: 71 IN | RESPIRATION RATE: 18 BRPM | HEART RATE: 71 BPM | DIASTOLIC BLOOD PRESSURE: 79 MMHG | BODY MASS INDEX: 29.68 KG/M2 | OXYGEN SATURATION: 99 % | TEMPERATURE: 97.9 F | WEIGHT: 212 LBS | SYSTOLIC BLOOD PRESSURE: 150 MMHG

## 2017-04-04 DIAGNOSIS — R10.11 RIGHT UPPER QUADRANT ABDOMINAL PAIN: ICD-10-CM

## 2017-04-04 LAB — GLUCOSE BLDC GLUCOMTR-MCNC: 135 MG/DL (ref 70–130)

## 2017-04-04 PROCEDURE — 43239 EGD BIOPSY SINGLE/MULTIPLE: CPT | Performed by: INTERNAL MEDICINE

## 2017-04-04 PROCEDURE — 87081 CULTURE SCREEN ONLY: CPT | Performed by: INTERNAL MEDICINE

## 2017-04-04 PROCEDURE — 25010000002 PROPOFOL 10 MG/ML EMULSION: Performed by: NURSE ANESTHETIST, CERTIFIED REGISTERED

## 2017-04-04 PROCEDURE — 82962 GLUCOSE BLOOD TEST: CPT

## 2017-04-04 RX ORDER — PROPOFOL 10 MG/ML
VIAL (ML) INTRAVENOUS AS NEEDED
Status: DISCONTINUED | OUTPATIENT
Start: 2017-04-04 | End: 2017-04-04 | Stop reason: SURG

## 2017-04-04 RX ORDER — SODIUM CHLORIDE 9 MG/ML
100 INJECTION, SOLUTION INTRAVENOUS CONTINUOUS
Status: DISCONTINUED | OUTPATIENT
Start: 2017-04-04 | End: 2017-04-04 | Stop reason: HOSPADM

## 2017-04-04 RX ORDER — LIDOCAINE HYDROCHLORIDE 20 MG/ML
INJECTION, SOLUTION INFILTRATION; PERINEURAL AS NEEDED
Status: DISCONTINUED | OUTPATIENT
Start: 2017-04-04 | End: 2017-04-04 | Stop reason: SURG

## 2017-04-04 RX ORDER — SODIUM CHLORIDE 0.9 % (FLUSH) 0.9 %
1-10 SYRINGE (ML) INJECTION AS NEEDED
Status: DISCONTINUED | OUTPATIENT
Start: 2017-04-04 | End: 2017-04-04 | Stop reason: HOSPADM

## 2017-04-04 RX ORDER — METOPROLOL TARTRATE 5 MG/5ML
1 INJECTION INTRAVENOUS ONCE
Status: DISCONTINUED | OUTPATIENT
Start: 2017-04-04 | End: 2017-04-04 | Stop reason: HOSPADM

## 2017-04-04 RX ORDER — METOPROLOL TARTRATE 5 MG/5ML
INJECTION INTRAVENOUS AS NEEDED
Status: DISCONTINUED | OUTPATIENT
Start: 2017-04-04 | End: 2017-04-04 | Stop reason: SURG

## 2017-04-04 RX ADMIN — PROPOFOL 130 MG: 10 INJECTION, EMULSION INTRAVENOUS at 09:22

## 2017-04-04 RX ADMIN — LIDOCAINE HYDROCHLORIDE 60 MG: 20 INJECTION, SOLUTION INFILTRATION; PERINEURAL at 09:22

## 2017-04-04 RX ADMIN — SODIUM CHLORIDE 100 ML/HR: 9 INJECTION, SOLUTION INTRAVENOUS at 07:43

## 2017-04-04 RX ADMIN — LIDOCAINE HYDROCHLORIDE 0.5 ML: 10 INJECTION, SOLUTION EPIDURAL; INFILTRATION; INTRACAUDAL; PERINEURAL at 07:44

## 2017-04-04 RX ADMIN — METOPROLOL TARTRATE 1 MG: 5 INJECTION, SOLUTION INTRAVENOUS at 09:16

## 2017-04-04 NOTE — PLAN OF CARE
Problem: Patient Care Overview (Adult)  Goal: Plan of Care Review    04/04/17 0927   Outcome Evaluation   Outcome Summary/Follow up Plan tolerated procedure well   Patient Care Overview   Progress improving         Problem: GI Endoscopy (Adult)  Goal: Signs and Symptoms of Listed Potential Problems Will be Absent or Manageable (GI Endoscopy)  Outcome: Ongoing (interventions implemented as appropriate)    04/04/17 0927   GI Endoscopy   Problems Present (GI Endoscopy) none

## 2017-04-04 NOTE — H&P (VIEW-ONLY)
Chief Complaint   Patient presents with   • Abdominal Pain     right side of abdomin       Subjective     HPI     Right sided abdominal pain that has been present since Dec 2016.  In Dec 2016 he was dx with kidney stone.  Pain occasionally felt in LUQ.  Pain described as sharp.  No alleviating factors.  Application of pressure can cause increase in pain.   No N/V.  Weight described as stable.  No BRBPR or melena stools.  No family hx or personal hx of colon polyps.  No heartburn or indigestions.  He has frequent difficulty with diarrhea.    Last CScope by Dr Sanchez Dec 2016, no abnormalities according to pt  Last EGD apx 4 yr ago, hx esophageal dilation    Past Medical History   Diagnosis Date   • Depression    • Diabetes mellitus    • Disease of thyroid gland      HYPOTHYROIDISM   • GERD (gastroesophageal reflux disease)    • Hyperlipidemia    • Hypertension    • Nephrolithiasis        Past Surgical History   Procedure Laterality Date   • Shoulder surgery     • Kidney stone surgery         Outpatient Prescriptions Marked as Taking for the 3/9/17 encounter (Office Visit) with GREY Almaraz   Medication Sig Dispense Refill   • citalopram (CeleXA) 20 MG tablet Take 20 mg by mouth Daily.     • famotidine (PEPCID) 20 MG tablet Take 20 mg by mouth 2 (Two) Times a Day.     • gabapentin (NEURONTIN) 800 MG tablet Take 1 tablet by mouth 2 (Two) Times a Day. 60 tablet 6   • hydrochlorothiazide (HYDRODIURIL) 12.5 MG tablet Take 12.5 mg by mouth Daily.     • insulin glargine (LANTUS) 100 UNIT/ML injection Inject 60 Units under the skin 2 (Two) Times a Day.     • levothyroxine (SYNTHROID, LEVOTHROID) 50 MCG tablet Take 1 tablet by mouth Daily.     • meloxicam (MOBIC) 15 MG tablet Take 1 tablet by mouth Daily.     • metFORMIN (GLUCOPHAGE) 500 MG tablet Take 1 tablet by mouth 2 (Two) Times a Day.     • potassium citrate (UROCIT-K) 10 MEQ (1080 MG) CR tablet Take 1 tablet by mouth 3 (Three) Times a Day With Meals. 90  tablet 11       Allergies   Allergen Reactions   • Atacand [Candesartan Cilexetil]    • Biaxin [Clarithromycin]    • Cefzil [Cefprozil]    • Levaquin [Levofloxacin]    • Penicillins    • Zestril [Lisinopril]        Social History     Social History   • Marital status:      Spouse name: N/A   • Number of children: N/A   • Years of education: N/A     Occupational History   • Not on file.     Social History Main Topics   • Smoking status: Never Smoker   • Smokeless tobacco: Never Used   • Alcohol use No   • Drug use: No   • Sexual activity: Defer     Other Topics Concern   • Not on file     Social History Narrative       Family History   Problem Relation Age of Onset   • Heart disease Mother    • Diabetes Mother    • Alzheimer's disease Father    • Heart disease Father    • Diabetes Sister    • Heart disease Brother    • Diabetes Sister    • Diabetes Sister        Review of Systems   Constitutional: Negative for fatigue, fever and unexpected weight change.   HENT: Negative for hearing loss, sore throat and voice change.    Eyes: Negative for visual disturbance.   Respiratory: Negative for cough, shortness of breath and wheezing.    Cardiovascular: Negative for chest pain and palpitations.   Gastrointestinal: Negative for abdominal pain, blood in stool and vomiting.   Endocrine: Negative for polydipsia and polyuria.   Genitourinary: Negative for difficulty urinating, dysuria, hematuria and urgency.   Musculoskeletal: Negative for joint swelling and myalgias.   Skin: Negative for color change, rash and wound.   Neurological: Negative for dizziness, tremors, seizures and syncope.   Hematological: Does not bruise/bleed easily.   Psychiatric/Behavioral: Negative for agitation and confusion. The patient is not nervous/anxious.        Objective     Vitals:    03/09/17 0921   BP: 142/90   BP Location: Left arm   Patient Position: Sitting   Cuff Size: Adult   Pulse: 91   Resp: 18   SpO2: 96%   Weight: 215 lb (97.5 kg)  "  Height: 71\" (180.3 cm)     Body mass index is 29.99 kg/(m^2).    Physical Exam   Constitutional: He is oriented to person, place, and time. He appears well-developed and well-nourished.   HENT:   Head: Normocephalic and atraumatic.   Eyes:   Pink, Nonicteric   Neck:   Global Assessment- supple. No JVD or lymphadenopathy   Cardiovascular: Normal rate, regular rhythm and normal heart sounds.  Exam reveals no gallop and no friction rub.    No murmur heard.  Pulmonary/Chest: Effort normal and breath sounds normal. No respiratory distress. He has no wheezes. He has no rales.   Inspection: Movements-Symmetrical   Abdominal: Soft. Bowel sounds are normal. He exhibits no distension and no mass. There is tenderness (RUQ). There is no rebound and no guarding.   Neurological: He is alert and oriented to person, place, and time.   General Exam-Deemed a reliable historian, able to converse without difficulty and Able to move all extremities without difficulty       Imaging Results (most recent)     None          Assessment/Plan     Supa was seen today for abdominal pain.    Diagnoses and all orders for this visit:    Right upper quadrant abdominal pain  -     Case request      ESOPHAGOGASTRODUODENOSCOPY WITH ANESTHESIA (N/A)    There are no Patient Instructions on file for this visit.  The risk of the endoscopy were discussed in detail.  We discussed the risk of perforation (one out of 1412-0996, riskier with dilation), bleeding (one out of 500), and the rare risks of infection, adverse reaction to anesthesia, respiratory failure, cardiac failure including MI and adverse reaction to medications, etc.  We discussed consequences that could occur if a risk were to develop such as the need for hospitalization, blood transfusion, surgical intervention, medications, pain and disability and death.  Alternatives include not doing anything, or pursuing an UGI series which only offers a diagnosis with potential less accuracy compared " to egd.  The patient verbalizes understanding and agrees to proceed.      All risks, benefits, alternatives, and indications of colonoscopy procedure have been discussed with the patient. Risks to include perforation of the colon requiring possible surgery or colostomy, risk of bleeding from biopsies or removal of colon tissue, possibility of missing a colon polyp or cancer, or adverse drug reaction.  Benefits to include the diagnosis and management of disease of the colon and rectum. Alternatives to include barium enema, radiographic evaluation, lab testing or no intervention. Pt verbalizes understanding and agrees to proceed with procedure.

## 2017-04-04 NOTE — ANESTHESIA PREPROCEDURE EVALUATION
Anesthesia Evaluation     Patient summary reviewed and Nursing notes reviewed   NPO Status: > 8 hours   Airway   Mallampati: III  TM distance: >3 FB  Neck ROM: full  no difficulty expected  Dental      Pulmonary - negative pulmonary ROS and normal exam   Cardiovascular - normal exam  Exercise tolerance: good (4-7 METS)    Patient on routine beta blocker and Beta blocker not taken-may be given intraoperatively  Rhythm: regular  Rate: normal    (+) hypertension well controlled,       Neuro/Psych  (+) psychiatric history,    GI/Hepatic/Renal/Endo    (+)  GERD poorly controlled, chronic renal disease stones, diabetes mellitus type 1 well controlled,     Musculoskeletal (-) negative ROS    Abdominal    Substance History      OB/GYN          Other                                  Anesthesia Plan    ASA 2     MAC     intravenous induction   Anesthetic plan and risks discussed with patient.

## 2017-04-04 NOTE — ANESTHESIA POSTPROCEDURE EVALUATION
Patient: Supa Mcclelland    Procedure Summary     Date Anesthesia Start Anesthesia Stop Room / Location    04/04/17 0914 0927  [facility] / [facility]       Procedure Diagnosis Surgeon Provider    ESOPHAGOGASTRODUODENOSCOPY WITH ANESTHESIA (N/A Esophagus) Right upper quadrant abdominal pain  (Right upper quadrant abdominal pain [R10.11]) DO Aneta Brown CRNA          Anesthesia Type: MAC  Last vitals  BP      Temp      Pulse     Resp      SpO2        Post Anesthesia Care and Evaluation    Patient location during evaluation: PACU  Patient participation: complete - patient participated  Level of consciousness: sleepy but conscious  Pain score: 0  Pain management: satisfactory to patient  Airway patency: patent  Anesthetic complications: No anesthetic complications  PONV Status: none  Cardiovascular status: acceptable  Respiratory status: acceptable  Hydration status: acceptable

## 2017-04-04 NOTE — PLAN OF CARE
Problem: Patient Care Overview (Adult)  Goal: Plan of Care Review  Outcome: Outcome(s) achieved Date Met:  04/04/17 04/04/17 0943   Outcome Evaluation   Outcome Summary/Follow up Plan discharge criteria met   Patient Care Overview   Progress improving   Coping/Psychosocial Response Interventions   Plan Of Care Reviewed With patient;spouse

## 2017-04-04 NOTE — PLAN OF CARE
Problem: GI Endoscopy (Adult)  Goal: Signs and Symptoms of Listed Potential Problems Will be Absent or Manageable (GI Endoscopy)  Outcome: Outcome(s) achieved Date Met:  04/04/17 04/04/17 0958   GI Endoscopy   Problems Assessed (GI Endoscopy) all   Problems Present (GI Endoscopy) none

## 2017-04-05 LAB — UREASE TISS QL: POSITIVE

## 2017-04-11 ENCOUNTER — TELEPHONE (OUTPATIENT)
Dept: GASTROENTEROLOGY | Facility: CLINIC | Age: 62
End: 2017-04-11

## 2017-04-11 RX ORDER — DOXYCYCLINE HYCLATE 100 MG/1
100 TABLET, DELAYED RELEASE ORAL 2 TIMES DAILY
Qty: 20 TABLET | Refills: 0 | Status: SHIPPED | OUTPATIENT
Start: 2017-04-11 | End: 2017-04-21

## 2017-04-11 RX ORDER — AMOXICILLIN 500 MG/1
1000 TABLET, FILM COATED ORAL 2 TIMES DAILY
Qty: 56 TABLET | Refills: 0 | Status: CANCELLED | OUTPATIENT
Start: 2017-04-11 | End: 2017-04-25

## 2017-04-11 RX ORDER — CLARITHROMYCIN 500 MG/1
500 TABLET, COATED ORAL 2 TIMES DAILY
Qty: 28 TABLET | Refills: 0 | Status: CANCELLED | OUTPATIENT
Start: 2017-04-11 | End: 2017-04-25

## 2017-04-11 RX ORDER — OMEPRAZOLE 20 MG/1
20 CAPSULE, DELAYED RELEASE ORAL 2 TIMES DAILY
Qty: 28 CAPSULE | Refills: 0 | Status: CANCELLED | OUTPATIENT
Start: 2017-04-11 | End: 2017-04-25

## 2017-04-11 RX ORDER — METRONIDAZOLE 500 MG/1
500 TABLET ORAL DAILY
Qty: 14 TABLET | Refills: 0 | Status: CANCELLED | OUTPATIENT
Start: 2017-04-11 | End: 2017-04-25

## 2017-04-11 RX ORDER — METRONIDAZOLE 250 MG/1
250 TABLET ORAL 4 TIMES DAILY
Qty: 40 TABLET | Refills: 0 | Status: SHIPPED | OUTPATIENT
Start: 2017-04-11 | End: 2017-06-12

## 2017-04-11 RX ORDER — OMEPRAZOLE 20 MG/1
20 CAPSULE, DELAYED RELEASE ORAL 2 TIMES DAILY
Qty: 60 CAPSULE | Refills: 0 | Status: SHIPPED | OUTPATIENT
Start: 2017-04-11 | End: 2017-04-25

## 2017-04-11 NOTE — TELEPHONE ENCOUNTER
Attempt to call pt regarding pos h pylori  Message left, will mail letter    Medications sent to pharmacy with addition of pepto bismol four times daily

## 2017-04-11 NOTE — TELEPHONE ENCOUNTER
Mario Alberto wife called returning call  i told her we were calling about pos. h-pylori and i gave her information and told her we had sent meds to pharmacy.

## 2017-05-16 ENCOUNTER — TELEPHONE (OUTPATIENT)
Dept: GASTROENTEROLOGY | Facility: CLINIC | Age: 62
End: 2017-05-16

## 2017-06-12 ENCOUNTER — OFFICE VISIT (OUTPATIENT)
Dept: NEUROLOGY | Facility: CLINIC | Age: 62
End: 2017-06-12

## 2017-06-12 VITALS
HEART RATE: 76 BPM | DIASTOLIC BLOOD PRESSURE: 88 MMHG | HEIGHT: 71 IN | SYSTOLIC BLOOD PRESSURE: 138 MMHG | BODY MASS INDEX: 30.1 KG/M2 | WEIGHT: 215 LBS

## 2017-06-12 DIAGNOSIS — E11.42 DIABETIC POLYNEUROPATHY ASSOCIATED WITH TYPE 2 DIABETES MELLITUS (HCC): ICD-10-CM

## 2017-06-12 DIAGNOSIS — G25.81 RESTLESS LEGS SYNDROME (RLS): ICD-10-CM

## 2017-06-12 PROCEDURE — 99213 OFFICE O/P EST LOW 20 MIN: CPT | Performed by: PHYSICIAN ASSISTANT

## 2017-06-12 RX ORDER — CARBAMAZEPINE 200 MG/1
200 TABLET ORAL DAILY
Qty: 30 TABLET | Refills: 5 | Status: SHIPPED | OUTPATIENT
Start: 2017-06-12 | End: 2017-12-11 | Stop reason: SDUPTHER

## 2017-06-12 NOTE — PROGRESS NOTES
Subjective   Supa Mcclelland is a 61 y.o. male is here today for follow-up.    HPI Comments: Painful peripheral neuropathy symptoms are somewhat helped by his present regimen.  He has had recent issues with recurrent nephrolithiasis is not requiring significant intervention.  His lower extremity symptoms at night are somewhat improved including his RLS type symptoms.    Peripheral Neuropathy   This is a chronic problem. The current episode started more than 1 year ago. The problem occurs daily. The problem has been unchanged. Associated symptoms include numbness and weakness. Pertinent negatives include no chills, fatigue, fever, headaches or neck pain. Associated symptoms comments: Bilateral lower extremity neuropathic pain. The symptoms are aggravated by exertion, stress, walking and standing. Treatments tried: Pharmacotherapy, control underlying disease, neuropathic pain creams. The treatment provided moderate relief.       The following portions of the patient's history were reviewed and updated as appropriate: allergies, current medications, past family history, past medical history, past social history, past surgical history and problem list.    Review of Systems   Constitutional: Negative for chills, fatigue and fever.   HENT: Negative.    Eyes: Negative.    Respiratory: Negative.    Cardiovascular: Negative.    Gastrointestinal: Negative.    Endocrine: Negative.    Genitourinary: Negative.    Musculoskeletal: Positive for back pain. Negative for gait problem and neck pain.   Skin: Negative.  Negative for wound.   Allergic/Immunologic: Negative.    Neurological: Positive for weakness and numbness. Negative for headaches.        Bilateral lower shooting neuropathic pain   Hematological: Negative.    Psychiatric/Behavioral: Negative.        Objective   Physical Exam   Constitutional: He is oriented to person, place, and time. Vital signs are normal. He appears well-developed and well-nourished. No distress.    HENT:   Head: Normocephalic and atraumatic.   Right Ear: Tympanic membrane, external ear and ear canal normal. Decreased hearing is noted.   Left Ear: Tympanic membrane, external ear and ear canal normal. Decreased hearing is noted.   Mouth/Throat: Uvula is midline and oropharynx is clear and moist.   Eyes: Conjunctivae, EOM and lids are normal. Pupils are equal, round, and reactive to light. No scleral icterus.   Neck: Normal range of motion and phonation normal. No spinous process tenderness and no muscular tenderness present. Carotid bruit is not present. Normal range of motion present. No thyroid mass and no thyromegaly present.   Cardiovascular: Normal rate, regular rhythm, S1 normal, S2 normal and normal heart sounds.    No murmur heard.  Pulmonary/Chest: Effort normal and breath sounds normal. No stridor. No respiratory distress. He has no wheezes. He has no rales.   Musculoskeletal: Normal range of motion. He exhibits no edema or tenderness.        Lumbar back: Normal.   Lymphadenopathy:     He has no cervical adenopathy.   Neurological: He is alert and oriented to person, place, and time. He has normal strength. He displays no atrophy and no tremor. A sensory deficit is present. No cranial nerve deficit. He exhibits normal muscle tone. He displays a negative Romberg sign. Coordination and gait normal. GCS eye subscore is 4. GCS verbal subscore is 5. GCS motor subscore is 6.   Reflex Scores:       Tricep reflexes are 2+ on the right side and 2+ on the left side.       Bicep reflexes are 2+ on the right side and 2+ on the left side.       Brachioradialis reflexes are 2+ on the right side and 2+ on the left side.       Patellar reflexes are 1+ on the right side and 1+ on the left side.       Achilles reflexes are 1+ on the right side and 1+ on the left side.  Diminished peripheral sensation in the lower extremities consistent with diabetic peripheral neuropathy   Skin: Skin is warm and dry. No ecchymosis, no  lesion and no rash noted. No cyanosis. Nails show no clubbing.   Psychiatric: He has a normal mood and affect. His speech is normal and behavior is normal. Judgment and thought content normal. He is not actively hallucinating. Cognition and memory are normal. He is attentive.   Nursing note and vitals reviewed.        Assessment/Plan   Supa was seen today for neurologic problem.    Diagnoses and all orders for this visit:    Diabetic polyneuropathy associated with type 2 diabetes mellitus  -     carBAMazepine (TEGRETOL) 200 MG tablet; Take 1 tablet by mouth Daily.    Restless legs syndrome (RLS)  -     carBAMazepine (TEGRETOL) 200 MG tablet; Take 1 tablet by mouth Daily.    The patient is doing well with current medication regimen.    The patient is had lab work done at his family doctor recently.    10 minutes of 15 minute outpatient visit spent in counseling and coordination of care.          EMR Dragon transcription disclaimer:  Much of this encounter note is an electronic transcription/translation of spoken language to printed text.  The electronic translation of spoken language may permit erroneous, or at times, nonsensical words or phrases to be inadvertently transcribed.  The author has reviewed the note for such errors, however some may still exist.

## 2017-07-31 DIAGNOSIS — E11.42 DIABETIC POLYNEUROPATHY ASSOCIATED WITH TYPE 2 DIABETES MELLITUS (HCC): ICD-10-CM

## 2017-07-31 DIAGNOSIS — G25.81 RESTLESS LEGS SYNDROME (RLS): ICD-10-CM

## 2017-07-31 RX ORDER — GABAPENTIN 800 MG/1
TABLET ORAL 2 TIMES DAILY
Qty: 60 TABLET | Refills: 5 | Status: CANCELLED | OUTPATIENT
Start: 2017-07-31

## 2017-08-02 DIAGNOSIS — G25.81 RESTLESS LEGS SYNDROME (RLS): ICD-10-CM

## 2017-08-02 DIAGNOSIS — E11.42 DIABETIC POLYNEUROPATHY ASSOCIATED WITH TYPE 2 DIABETES MELLITUS (HCC): ICD-10-CM

## 2017-08-02 RX ORDER — GABAPENTIN 800 MG/1
800 TABLET ORAL 2 TIMES DAILY
Qty: 60 TABLET | Refills: 1 | OUTPATIENT
Start: 2017-08-02 | End: 2017-09-29 | Stop reason: SDUPTHER

## 2017-08-18 ENCOUNTER — OFFICE VISIT (OUTPATIENT)
Dept: UROLOGY | Facility: CLINIC | Age: 62
End: 2017-08-18

## 2017-08-18 VITALS
DIASTOLIC BLOOD PRESSURE: 86 MMHG | HEART RATE: 98 BPM | WEIGHT: 218 LBS | SYSTOLIC BLOOD PRESSURE: 134 MMHG | BODY MASS INDEX: 30.52 KG/M2 | TEMPERATURE: 97.5 F | HEIGHT: 71 IN

## 2017-08-18 DIAGNOSIS — N20.0 KIDNEY STONE: Primary | ICD-10-CM

## 2017-08-18 DIAGNOSIS — N40.0 BENIGN NODULAR PROSTATIC HYPERPLASIA WITHOUT LOWER URINARY TRACT SYMPTOMS: ICD-10-CM

## 2017-08-18 DIAGNOSIS — N52.9 IMPOTENCE OF ORGANIC ORIGIN: ICD-10-CM

## 2017-08-18 PROCEDURE — 99214 OFFICE O/P EST MOD 30 MIN: CPT | Performed by: UROLOGY

## 2017-08-18 RX ORDER — TADALAFIL 20 MG/1
20 TABLET ORAL DAILY PRN
Qty: 10 TABLET | Refills: 11 | Status: SHIPPED | OUTPATIENT
Start: 2017-08-18 | End: 2018-01-03

## 2017-08-18 NOTE — PROGRESS NOTES
Subjective    Mr. Mcclelland is 62 y.o. male    Chief Complaint: Recurrent Nephrolithiasis    History of Present Illness     Recurrent Nephrolithiasis  Patient is her for recurrent nephrolithiasis. Location of stone is right renal. Stones were found for workup of abdominal pain. Pt is currently Asymptomatic. Pt. Has had stone disease for severalyear(s). Risk factors for stone disease include none identified. Previous management of stone disease was Ureteroscopy. Stone analysis was Uric Acid. Metabolic workup revealed low pH, hyperucosuria. Current treatment regimen includes none. Associated symptoms include none.      Benign Prostatic Hypertrophy  Patient complains of lower urinary tract symptoms. He reports nocturia two times a night. He denies frequency, incomplete emptying, intermittency, straining, urgency and weak stream. Patient states symptoms are of mild severity. Onset of symptoms was several years ago and was gradual in onset. His AUA Symptom Score is, 2/35.He reports a history of no complicating symptoms. He denies flank pain, gross hematuria, kidney stones and recurrent UTI.  Patient has tried Watchful waiting with improvement. Last PSA was na .          Erectile Dysfunction  Patient complains of erectile dysfunction. Onset of dysfunction was 6 months ago and was gradual in onset.  Patient states the nature of difficulty is both attaining and maintaining erection. Full erections occur never. Partial erections occur never. Libido is not affected. Risk factors for ED include cardiovascular disease and diabetes mellitus. Patient denies history of pelvic radiation.  Previous treatment of ED includes none.        The following portions of the patient's history were reviewed and updated as appropriate: allergies, current medications, past family history, past medical history, past social history, past surgical history and problem list.    Review of Systems   Constitutional: Negative for chills and fever.    Gastrointestinal: Negative for abdominal pain, anal bleeding and blood in stool.   Genitourinary: Negative for difficulty urinating, dysuria, flank pain, frequency, hematuria and urgency.         Current Outpatient Prescriptions:   •  carBAMazepine (TEGRETOL) 200 MG tablet, Take 1 tablet by mouth Daily., Disp: 30 tablet, Rfl: 5  •  citalopram (CeleXA) 20 MG tablet, Take 20 mg by mouth Daily., Disp: , Rfl:   •  famotidine (PEPCID) 20 MG tablet, Take 20 mg by mouth 2 (Two) Times a Day., Disp: , Rfl:   •  fexofenadine (ALLEGRA) 180 MG tablet, Take 180 mg by mouth As Needed., Disp: , Rfl:   •  fluticasone (FLONASE) 50 MCG/ACT nasal spray, 1 spray each nostril twice a day for three days, then daily., Disp: 1 bottle, Rfl: 0  •  gabapentin (NEURONTIN) 800 MG tablet, Take 1 tablet by mouth 2 (Two) Times a Day., Disp: 60 tablet, Rfl: 1  •  hydrochlorothiazide (HYDRODIURIL) 12.5 MG tablet, Take 12.5 mg by mouth Daily., Disp: , Rfl:   •  insulin glargine (LANTUS) 100 UNIT/ML injection, Inject 70 Units under the skin 2 (Two) Times a Day., Disp: , Rfl:   •  levothyroxine (SYNTHROID, LEVOTHROID) 50 MCG tablet, Take 1 tablet by mouth Daily., Disp: , Rfl:   •  loratadine (CLARITIN) 10 MG tablet, Take 10 mg by mouth Daily., Disp: , Rfl:   •  meloxicam (MOBIC) 15 MG tablet, Take 1 tablet by mouth Daily., Disp: , Rfl:   •  metoprolol tartrate (LOPRESSOR) 50 MG tablet, Take 50 mg by mouth 2 (Two) Times a Day., Disp: , Rfl:   •  omeprazole (priLOSEC) 20 MG capsule, Take 20 mg by mouth 2 (Two) Times a Day., Disp: , Rfl:   •  potassium citrate (UROCIT-K) 10 MEQ (1080 MG) CR tablet, Take 1 tablet by mouth 3 (Three) Times a Day With Meals., Disp: 90 tablet, Rfl: 11  •  rOPINIRole (REQUIP) 1 MG tablet, Take 1 tablet by mouth Every Night. Take 1 hour before bedtime., Disp: 30 tablet, Rfl: 6  •  tadalafil (CIALIS) 20 MG tablet, Take 1 tablet by mouth Daily As Needed for erectile dysfunction., Disp: 10 tablet, Rfl: 11    Past Medical History:  "  Diagnosis Date   • Depression    • Diabetes mellitus    • Disease of thyroid gland     HYPOTHYROIDISM   • GERD (gastroesophageal reflux disease)    • Hyperlipidemia    • Hypertension    • Nephrolithiasis        Past Surgical History:   Procedure Laterality Date   • ENDOSCOPY N/A 4/4/2017    Procedure: ESOPHAGOGASTRODUODENOSCOPY WITH ANESTHESIA;  Surgeon: Rambo Padilla DO;  Location: [facility] ENDOSCOPY;  Service:    • KIDNEY STONE SURGERY     • SHOULDER SURGERY         Social History     Social History   • Marital status:      Spouse name: N/A   • Number of children: N/A   • Years of education: N/A     Social History Main Topics   • Smoking status: Never Smoker   • Smokeless tobacco: Never Used   • Alcohol use No   • Drug use: No   • Sexual activity: Yes     Partners: Female     Other Topics Concern   • None     Social History Narrative       Family History   Problem Relation Age of Onset   • Heart disease Mother    • Diabetes Mother    • Alzheimer's disease Father    • Heart disease Father    • Diabetes Sister    • Heart disease Brother    • Diabetes Sister    • Diabetes Sister        Objective    /86  Pulse 98  Temp 97.5 °F (36.4 °C)  Ht 71\" (180.3 cm)  Wt 218 lb (98.9 kg)  BMI 30.4 kg/m2    Physical Exam   Constitutional: He is oriented to person, place, and time. He appears well-developed and well-nourished. No distress.   Pulmonary/Chest: Effort normal.   Abdominal: Soft. He exhibits no distension and no mass. There is no tenderness. There is no rebound and no guarding. No hernia.   Neurological: He is alert and oriented to person, place, and time.   Skin: Skin is warm and dry. He is not diaphoretic.   Psychiatric: He has a normal mood and affect.   Vitals reviewed.          Results for orders placed or performed during the hospital encounter of 04/04/17   Urease For H Pylori   Result Value Ref Range    Urease Positive (A) Negative   POC Glucose Fingerstick   Result Value Ref Range    " Glucose 135 (H) 70 - 130 mg/dL     Assessment and Plan    Supa was seen today for erectile dysfunction.    Diagnoses and all orders for this visit:    Kidney stone  -     XR Abdomen KUB; Future    Benign nodular prostatic hyperplasia without lower urinary tract symptoms  -     PSA; Future    Impotence of organic origin  -     tadalafil (CIALIS) 20 MG tablet; Take 1 tablet by mouth Daily As Needed for erectile dysfunction.              I will start patient on Cialis he will follow-up in 6 months with PSA and KUB.

## 2017-08-25 ENCOUNTER — TELEPHONE (OUTPATIENT)
Dept: GASTROENTEROLOGY | Facility: CLINIC | Age: 62
End: 2017-08-25

## 2017-08-25 NOTE — TELEPHONE ENCOUNTER
Called insurance [phone] to get proauth on omeprazole 20mg bid proauth # [id number] from 8-25-17 thru 8-25-18 per aaron called [facility] in [place] [phone].

## 2017-09-29 DIAGNOSIS — E11.42 DIABETIC POLYNEUROPATHY ASSOCIATED WITH TYPE 2 DIABETES MELLITUS (HCC): ICD-10-CM

## 2017-09-29 DIAGNOSIS — G25.81 RESTLESS LEGS SYNDROME (RLS): ICD-10-CM

## 2017-09-29 RX ORDER — GABAPENTIN 800 MG/1
800 TABLET ORAL 2 TIMES DAILY
Qty: 60 TABLET | Refills: 2 | OUTPATIENT
Start: 2017-09-29 | End: 2017-12-11 | Stop reason: SDUPTHER

## 2017-11-14 ENCOUNTER — OFFICE VISIT (OUTPATIENT)
Dept: GASTROENTEROLOGY | Facility: CLINIC | Age: 62
End: 2017-11-14

## 2017-11-14 ENCOUNTER — APPOINTMENT (OUTPATIENT)
Dept: LAB | Facility: HOSPITAL | Age: 62
End: 2017-11-14

## 2017-11-14 VITALS
HEIGHT: 71 IN | BODY MASS INDEX: 31.22 KG/M2 | TEMPERATURE: 98.1 F | DIASTOLIC BLOOD PRESSURE: 84 MMHG | SYSTOLIC BLOOD PRESSURE: 140 MMHG | WEIGHT: 223 LBS | OXYGEN SATURATION: 98 % | HEART RATE: 89 BPM

## 2017-11-14 DIAGNOSIS — R79.89 ELEVATED LFTS: Primary | ICD-10-CM

## 2017-11-14 LAB
ALBUMIN SERPL-MCNC: 4.4 G/DL (ref 3.5–5)
ALBUMIN/GLOB SERPL: 1.3 G/DL (ref 1.1–2.5)
ALP SERPL-CCNC: 119 U/L (ref 24–120)
ALT SERPL W P-5'-P-CCNC: 80 U/L (ref 0–54)
ANION GAP SERPL CALCULATED.3IONS-SCNC: 10 MMOL/L (ref 4–13)
ARTICHOKE IGE QN: 88 MG/DL (ref 0–99)
AST SERPL-CCNC: 89 U/L (ref 7–45)
BILIRUB SERPL-MCNC: 0.5 MG/DL (ref 0.1–1)
BUN BLD-MCNC: 12 MG/DL (ref 5–21)
BUN/CREAT SERPL: 11.3 (ref 7–25)
CALCIUM SPEC-SCNC: 9.1 MG/DL (ref 8.4–10.4)
CHLORIDE SERPL-SCNC: 103 MMOL/L (ref 98–110)
CHOLEST SERPL-MCNC: 150 MG/DL (ref 130–200)
CO2 SERPL-SCNC: 29 MMOL/L (ref 24–31)
CREAT BLD-MCNC: 1.06 MG/DL (ref 0.5–1.4)
FERRITIN SERPL-MCNC: 98.3 NG/ML (ref 17.9–464)
GFR SERPL CREATININE-BSD FRML MDRD: 71 ML/MIN/1.73
GLOBULIN UR ELPH-MCNC: 3.3 GM/DL
GLUCOSE BLD-MCNC: 240 MG/DL (ref 70–100)
HDLC SERPL-MCNC: 46 MG/DL
INR PPP: 0.94 (ref 0.91–1.09)
IRON 24H UR-MRATE: 102 MCG/DL (ref 42–180)
IRON SATN MFR SERPL: 28 % (ref 20–45)
LDLC/HDLC SERPL: 1.27 {RATIO}
POTASSIUM BLD-SCNC: 4 MMOL/L (ref 3.5–5.3)
PROT SERPL-MCNC: 7.7 G/DL (ref 6.3–8.7)
PROTHROMBIN TIME: 12.9 SECONDS (ref 11.9–14.6)
SODIUM BLD-SCNC: 142 MMOL/L (ref 135–145)
TIBC SERPL-MCNC: 359 MCG/DL (ref 225–420)
TRIGL SERPL-MCNC: 227 MG/DL (ref 0–149)

## 2017-11-14 PROCEDURE — 86235 NUCLEAR ANTIGEN ANTIBODY: CPT | Performed by: NURSE PRACTITIONER

## 2017-11-14 PROCEDURE — 86705 HEP B CORE ANTIBODY IGM: CPT | Performed by: NURSE PRACTITIONER

## 2017-11-14 PROCEDURE — 80053 COMPREHEN METABOLIC PANEL: CPT | Performed by: NURSE PRACTITIONER

## 2017-11-14 PROCEDURE — 87340 HEPATITIS B SURFACE AG IA: CPT | Performed by: NURSE PRACTITIONER

## 2017-11-14 PROCEDURE — 86225 DNA ANTIBODY NATIVE: CPT | Performed by: NURSE PRACTITIONER

## 2017-11-14 PROCEDURE — 36415 COLL VENOUS BLD VENIPUNCTURE: CPT | Performed by: NURSE PRACTITIONER

## 2017-11-14 PROCEDURE — 99212 OFFICE O/P EST SF 10 MIN: CPT | Performed by: NURSE PRACTITIONER

## 2017-11-14 PROCEDURE — 85610 PROTHROMBIN TIME: CPT | Performed by: NURSE PRACTITIONER

## 2017-11-14 PROCEDURE — 83540 ASSAY OF IRON: CPT | Performed by: NURSE PRACTITIONER

## 2017-11-14 PROCEDURE — 86709 HEPATITIS A IGM ANTIBODY: CPT | Performed by: NURSE PRACTITIONER

## 2017-11-14 PROCEDURE — 82728 ASSAY OF FERRITIN: CPT | Performed by: NURSE PRACTITIONER

## 2017-11-14 PROCEDURE — 82390 ASSAY OF CERULOPLASMIN: CPT | Performed by: NURSE PRACTITIONER

## 2017-11-14 PROCEDURE — 83550 IRON BINDING TEST: CPT | Performed by: NURSE PRACTITIONER

## 2017-11-14 PROCEDURE — 80061 LIPID PANEL: CPT | Performed by: NURSE PRACTITIONER

## 2017-11-14 PROCEDURE — 83516 IMMUNOASSAY NONANTIBODY: CPT | Performed by: NURSE PRACTITIONER

## 2017-11-14 PROCEDURE — 82103 ALPHA-1-ANTITRYPSIN TOTAL: CPT | Performed by: NURSE PRACTITIONER

## 2017-11-14 NOTE — PROGRESS NOTES
Chief Complaint   Patient presents with   • Abnormal Lab     elevated liver enzymes       Subjective     HPI    Pt first told LFT elevated few years ago.  He denies new medication/antibiotic use at time of lab work.  No family hx of liver disease.  He denies etoh consumption.  No tattoo, no blood transfusion.  No abdominal pain, no BRBPR or melena stool.  No heartburn.  No dysphagia.  He has experienced weight gain of 7-8 lbs over past 6 months.    Review of LFT:     5/6/2017- AST 41, ALT 44 - both within normal range  10/20/17  AST 60, ALT 61- both elevated, Total Bili 0.5   Plt 175, Cholesterol 221 (elevated), Triglycerides 312 (elevated), Hgb A1C 9.0    CT from 3/2014 reported liver was unremarkable  CT from 12/2016 showed hepatic steatosis    Endoscopy (Dr Padilla) 2017-normal  Colonosocpy (Dr Alcala) 12/2016, no abnormalities per patient    Past Medical History:   Diagnosis Date   • Depression    • Diabetes mellitus    • Disease of thyroid gland     HYPOTHYROIDISM   • GERD (gastroesophageal reflux disease)    • Hyperlipidemia    • Hypertension    • Nephrolithiasis        Past Surgical History:   Procedure Laterality Date   • ENDOSCOPY N/A 4/4/2017    Procedure: ESOPHAGOGASTRODUODENOSCOPY WITH ANESTHESIA;  Surgeon: Rambo Padilla DO;  Location: [facility] ENDOSCOPY;  Service:    • KIDNEY STONE SURGERY     • SHOULDER SURGERY         Outpatient Prescriptions Marked as Taking for the 11/14/17 encounter (Office Visit) with GREY Almaraz   Medication Sig Dispense Refill   • carBAMazepine (TEGRETOL) 200 MG tablet Take 1 tablet by mouth Daily. 30 tablet 5   • citalopram (CeleXA) 20 MG tablet Take 20 mg by mouth Daily.     • famotidine (PEPCID) 20 MG tablet Take 20 mg by mouth 2 (Two) Times a Day.     • fexofenadine (ALLEGRA) 180 MG tablet Take 180 mg by mouth As Needed.     • fluticasone (FLONASE) 50 MCG/ACT nasal spray 1 spray each nostril twice a day for three days, then daily. 1 bottle 0   • gabapentin  (NEURONTIN) 800 MG tablet Take 1 tablet by mouth 2 (Two) Times a Day. 60 tablet 2   • hydrochlorothiazide (HYDRODIURIL) 12.5 MG tablet Take 12.5 mg by mouth Daily.     • insulin glargine (LANTUS) 100 UNIT/ML injection Inject 70 Units under the skin 2 (Two) Times a Day.     • levothyroxine (SYNTHROID, LEVOTHROID) 50 MCG tablet Take 1 tablet by mouth Daily.     • loratadine (CLARITIN) 10 MG tablet Take 10 mg by mouth Daily.     • meloxicam (MOBIC) 15 MG tablet Take 1 tablet by mouth Daily.     • metoprolol tartrate (LOPRESSOR) 50 MG tablet Take 50 mg by mouth 2 (Two) Times a Day.     • omeprazole (priLOSEC) 20 MG capsule Take 20 mg by mouth 2 (Two) Times a Day.     • potassium citrate (UROCIT-K) 10 MEQ (1080 MG) CR tablet Take 1 tablet by mouth 3 (Three) Times a Day With Meals. 90 tablet 11   • rOPINIRole (REQUIP) 1 MG tablet Take 1 tablet by mouth Every Night. Take 1 hour before bedtime. 30 tablet 6   • tadalafil (CIALIS) 20 MG tablet Take 1 tablet by mouth Daily As Needed for erectile dysfunction. 10 tablet 11       Allergies   Allergen Reactions   • Atacand [Candesartan Cilexetil] Rash   • Biaxin [Clarithromycin] Rash   • Cefzil [Cefprozil] Rash   • Levaquin [Levofloxacin] Rash   • Penicillins Rash   • Zestril [Lisinopril] Rash       Social History     Social History   • Marital status:      Spouse name: N/A   • Number of children: N/A   • Years of education: N/A     Occupational History   • Not on file.     Social History Main Topics   • Smoking status: Never Smoker   • Smokeless tobacco: Never Used   • Alcohol use No   • Drug use: No   • Sexual activity: Yes     Partners: Female     Other Topics Concern   • Not on file     Social History Narrative       Family History   Problem Relation Age of Onset   • Heart disease Mother    • Diabetes Mother    • Alzheimer's disease Father    • Heart disease Father    • Diabetes Sister    • Heart disease Brother    • Diabetes Sister    • Diabetes Sister    • Colon  "cancer Neg Hx    • Esophageal cancer Neg Hx        Review of Systems   Constitutional: Negative for fatigue, fever and unexpected weight change.   HENT: Negative for hearing loss, sore throat and voice change.    Eyes: Negative for visual disturbance.   Respiratory: Negative for cough, shortness of breath and wheezing.    Cardiovascular: Negative for chest pain and palpitations.   Gastrointestinal: Negative for abdominal pain, blood in stool and vomiting.   Endocrine: Negative for polydipsia and polyuria.   Genitourinary: Negative for difficulty urinating, dysuria, hematuria and urgency.   Musculoskeletal: Negative for joint swelling and myalgias.   Skin: Negative for color change, rash and wound.   Neurological: Negative for dizziness, tremors, seizures and syncope.   Hematological: Does not bruise/bleed easily.   Psychiatric/Behavioral: Negative for agitation and confusion. The patient is not nervous/anxious.        Objective     Vitals:    11/14/17 0940   BP: 140/84   Pulse: 89   Temp: 98.1 °F (36.7 °C)   SpO2: 98%   Weight: 223 lb (101 kg)   Height: 71\" (180.3 cm)     Body mass index is 31.1 kg/(m^2).    Physical Exam   Constitutional: He is oriented to person, place, and time. He appears well-developed and well-nourished.   HENT:   Head: Normocephalic and atraumatic.   Eyes:   Pink, Nonicteric   Neck:   Global Assessment- supple. No JVD or lymphadenopathy   Cardiovascular: Normal rate, regular rhythm and normal heart sounds.  Exam reveals no gallop and no friction rub.    No murmur heard.  Pulmonary/Chest: Effort normal and breath sounds normal. No respiratory distress. He has no wheezes. He has no rales.   Inspection: Movements-Symmetrical   Abdominal: Soft. Bowel sounds are normal. He exhibits no distension and no mass. There is no tenderness. There is no rebound and no guarding.   Neurological: He is alert and oriented to person, place, and time.   General Exam-Deemed a reliable historian, able to converse " without difficulty and Able to move all extremities without difficulty       Imaging Results (most recent)     None          Assessment/Plan     Supa was seen today for abnormal lab.    Diagnoses and all orders for this visit:    Elevated LFTs  -     US Liver  -     Alpha - 1 - Antitrypsin  -     MICHELLE Comprehensive Panel  -     Anti-Smooth Muscle Antibody Titer  -     Ceruloplasmin  -     Comprehensive Metabolic Panel  -     Ferritin  -     Hepatitis Diagnostic Profile  -     Iron Profile  -     Lipid Panel  -     Protime-INR  -     Mitochondrial Antibodies, M2        * Surgery not found *    There are no Patient Instructions on file for this visit.  I discussed how fatty liver can lead to cirrhosis, disability and pre-mature death.  How it also can be a sign of increased risk for cardiovascular disease.  I discussed the importance of getting rid of fat in the liver by controlling lipids and glucose, avoiding etoh helps, and gradual weight loss to ideal body weight is very important.

## 2017-11-15 LAB
A1AT SERPL-MCNC: 138 MG/DL (ref 90–200)
ACTIN IGG SERPL-ACNC: 6 UNITS (ref 0–19)
CENTROMERE B AB SER-ACNC: <0.2 AI (ref 0–0.9)
CERULOPLASMIN SERPL-MCNC: 20.5 MG/DL (ref 16–31)
CHROMATIN AB SERPL-ACNC: <0.2 AI (ref 0–0.9)
DEPRECATED MITOCHONDRIA M2 IGG SER-ACNC: <20 UNITS (ref 0–20)
DSDNA AB SER-ACNC: <1 IU/ML (ref 0–9)
ENA JO1 AB SER-ACNC: <0.2 AI (ref 0–0.9)
ENA RNP AB SER-ACNC: <0.2 AI (ref 0–0.9)
ENA SCL70 AB SER-ACNC: <0.2 AI (ref 0–0.9)
ENA SM AB SER-ACNC: <0.2 AI (ref 0–0.9)
ENA SS-A AB SER-ACNC: <0.2 AI (ref 0–0.9)
ENA SS-B AB SER-ACNC: <0.2 AI (ref 0–0.9)
HAV IGM SERPL QL IA: NEGATIVE
HBV CORE IGM SERPL QL IA: NEGATIVE
HBV SURFACE AG SERPL QL IA: NEGATIVE
Lab: NORMAL

## 2017-11-20 ENCOUNTER — APPOINTMENT (OUTPATIENT)
Dept: ULTRASOUND IMAGING | Facility: HOSPITAL | Age: 62
End: 2017-11-20

## 2017-11-27 ENCOUNTER — HOSPITAL ENCOUNTER (OUTPATIENT)
Dept: ULTRASOUND IMAGING | Facility: HOSPITAL | Age: 62
Discharge: HOME | End: 2017-11-27
Admitting: NURSE PRACTITIONER

## 2017-11-27 ENCOUNTER — TELEPHONE (OUTPATIENT)
Dept: GASTROENTEROLOGY | Facility: CLINIC | Age: 62
End: 2017-11-27

## 2017-11-27 PROCEDURE — 76705 ECHO EXAM OF ABDOMEN: CPT

## 2017-12-11 ENCOUNTER — OFFICE VISIT (OUTPATIENT)
Dept: NEUROLOGY | Facility: CLINIC | Age: 62
End: 2017-12-11

## 2017-12-11 ENCOUNTER — TELEPHONE (OUTPATIENT)
Dept: NEUROLOGY | Facility: CLINIC | Age: 62
End: 2017-12-11

## 2017-12-11 VITALS
HEIGHT: 71 IN | SYSTOLIC BLOOD PRESSURE: 138 MMHG | WEIGHT: 220 LBS | DIASTOLIC BLOOD PRESSURE: 80 MMHG | BODY MASS INDEX: 30.8 KG/M2 | HEART RATE: 82 BPM

## 2017-12-11 DIAGNOSIS — G25.81 RESTLESS LEGS SYNDROME (RLS): ICD-10-CM

## 2017-12-11 DIAGNOSIS — E11.42 DIABETIC POLYNEUROPATHY ASSOCIATED WITH TYPE 2 DIABETES MELLITUS (HCC): ICD-10-CM

## 2017-12-11 PROCEDURE — 99213 OFFICE O/P EST LOW 20 MIN: CPT | Performed by: PHYSICIAN ASSISTANT

## 2017-12-11 RX ORDER — CARBAMAZEPINE 200 MG/1
200 TABLET ORAL DAILY
Qty: 30 TABLET | Refills: 6 | Status: SHIPPED | OUTPATIENT
Start: 2017-12-11 | End: 2018-06-12 | Stop reason: SDUPTHER

## 2017-12-11 RX ORDER — ROPINIROLE 1 MG/1
1 TABLET, FILM COATED ORAL NIGHTLY
Qty: 30 TABLET | Refills: 6 | Status: SHIPPED | OUTPATIENT
Start: 2017-12-11 | End: 2020-09-29 | Stop reason: SDUPTHER

## 2017-12-11 NOTE — PROGRESS NOTES
Subjective   Supa Mcclelland is a 62 y.o. male is here today for follow-up.    HPI Comments: Painful peripheral neuropathy symptoms are somewhat helped by his present regimen.  He has had recent issues with recurrent nephrolithiasis is not requiring significant intervention.  His lower extremity symptoms at night are somewhat improved including his RLS type symptoms.    Peripheral Neuropathy   This is a chronic problem. The current episode started more than 1 year ago. The problem occurs daily. The problem has been unchanged. Associated symptoms include numbness and weakness. Pertinent negatives include no chills, fever, headaches or neck pain. Associated symptoms comments: Bilateral lower extremity neuropathic pain. The symptoms are aggravated by exertion, stress, walking and standing. Treatments tried: Pharmacotherapy, control underlying disease, neuropathic pain creams. The treatment provided moderate relief.       The following portions of the patient's history were reviewed and updated as appropriate: allergies, current medications, past family history, past medical history, past social history, past surgical history and problem list.    Review of Systems   Constitutional: Negative for chills and fever.   HENT: Negative.    Eyes: Negative.    Respiratory: Negative.    Cardiovascular: Negative.    Gastrointestinal: Negative.    Endocrine: Negative.    Genitourinary: Negative.    Musculoskeletal: Positive for back pain. Negative for gait problem and neck pain.   Skin: Negative.  Negative for wound.   Allergic/Immunologic: Negative.    Neurological: Positive for weakness and numbness. Negative for headaches.        Bilateral lower shooting neuropathic pain   Hematological: Negative.    Psychiatric/Behavioral: Negative.        Objective   Physical Exam   Constitutional: He is oriented to person, place, and time. Vital signs are normal. He appears well-developed and well-nourished. No distress.   HENT:   Head:  Normocephalic and atraumatic.   Right Ear: Tympanic membrane, external ear and ear canal normal. Decreased hearing is noted.   Left Ear: Tympanic membrane, external ear and ear canal normal. Decreased hearing is noted.   Mouth/Throat: Uvula is midline and oropharynx is clear and moist.   Eyes: Conjunctivae, EOM and lids are normal. Pupils are equal, round, and reactive to light. No scleral icterus.   Neck: Normal range of motion and phonation normal. No spinous process tenderness and no muscular tenderness present. Carotid bruit is not present. Normal range of motion present. No thyroid mass and no thyromegaly present.   Cardiovascular: Normal rate, regular rhythm, S1 normal, S2 normal and normal heart sounds.    No murmur heard.  Pulmonary/Chest: Effort normal and breath sounds normal. No stridor. No respiratory distress. He has no wheezes. He has no rales.   Musculoskeletal: Normal range of motion. He exhibits no edema or tenderness.        Lumbar back: Normal.   Lymphadenopathy:     He has no cervical adenopathy.   Neurological: He is alert and oriented to person, place, and time. He has normal strength. He displays no atrophy and no tremor. A sensory deficit is present. No cranial nerve deficit. He exhibits normal muscle tone. He displays a negative Romberg sign. Coordination and gait normal. GCS eye subscore is 4. GCS verbal subscore is 5. GCS motor subscore is 6.   Reflex Scores:       Tricep reflexes are 2+ on the right side and 2+ on the left side.       Bicep reflexes are 2+ on the right side and 2+ on the left side.       Brachioradialis reflexes are 2+ on the right side and 2+ on the left side.       Patellar reflexes are 1+ on the right side and 1+ on the left side.       Achilles reflexes are 1+ on the right side and 1+ on the left side.  Diminished peripheral sensation in the lower extremities consistent with diabetic peripheral neuropathy   Skin: Skin is warm and dry. No ecchymosis, no lesion and no  rash noted. No cyanosis. Nails show no clubbing.   Psychiatric: He has a normal mood and affect. His speech is normal and behavior is normal. Judgment and thought content normal. He is not actively hallucinating. Cognition and memory are normal. He is attentive.   Nursing note and vitals reviewed.        Assessment/Plan   Supa was seen today for peripheral neuropathy.    Diagnoses and all orders for this visit:    Diabetic polyneuropathy associated with type 2 diabetes mellitus  -     carBAMazepine (TEGRETOL) 200 MG tablet; Take 1 tablet by mouth Daily.  -     rOPINIRole (REQUIP) 1 MG tablet; Take 1 tablet by mouth Every Night. Take 1 hour before bedtime.    Restless legs syndrome (RLS)  -     carBAMazepine (TEGRETOL) 200 MG tablet; Take 1 tablet by mouth Daily.  -     rOPINIRole (REQUIP) 1 MG tablet; Take 1 tablet by mouth Every Night. Take 1 hour before bedtime.    The patient is neurologically stable no changes were made in his medication regimen today.    10 minutes of a 15 minute outpatient visit was spent in counseling and coordination of care.        EMR Dragon transcription disclaimer:  Much of this encounter note is an electronic transcription/translation of spoken language to printed text.  The electronic translation of spoken language may permit erroneous, or at times, nonsensical words or phrases to be inadvertently transcribed.  The author has reviewed the note for such errors, however some may still exist.

## 2017-12-11 NOTE — TELEPHONE ENCOUNTER
----- Message from ZACARIAS Alonzo sent at 12/11/2017 11:45 AM CST -----  Needs Gabapentin refills

## 2017-12-14 RX ORDER — GABAPENTIN 800 MG/1
800 TABLET ORAL 2 TIMES DAILY
Qty: 60 TABLET | Refills: 2 | OUTPATIENT
Start: 2017-12-14 | End: 2018-06-12 | Stop reason: SDUPTHER

## 2017-12-25 ENCOUNTER — HOSPITAL ENCOUNTER (EMERGENCY)
Facility: HOSPITAL | Age: 62
Discharge: HOME | End: 2017-12-25
Admitting: EMERGENCY MEDICINE

## 2017-12-25 ENCOUNTER — APPOINTMENT (OUTPATIENT)
Dept: CT IMAGING | Facility: HOSPITAL | Age: 62
End: 2017-12-25

## 2017-12-25 VITALS
WEIGHT: 216 LBS | DIASTOLIC BLOOD PRESSURE: 83 MMHG | OXYGEN SATURATION: 98 % | HEART RATE: 83 BPM | RESPIRATION RATE: 16 BRPM | BODY MASS INDEX: 30.24 KG/M2 | HEIGHT: 71 IN | TEMPERATURE: 98.4 F | SYSTOLIC BLOOD PRESSURE: 156 MMHG

## 2017-12-25 DIAGNOSIS — N20.0 STONE, KIDNEY: Primary | ICD-10-CM

## 2017-12-25 LAB
ALBUMIN SERPL-MCNC: 4.4 G/DL (ref 3.5–5)
ALBUMIN/GLOB SERPL: 1.2 G/DL (ref 1.1–2.5)
ALP SERPL-CCNC: 153 U/L (ref 24–120)
ALT SERPL W P-5'-P-CCNC: 98 U/L (ref 0–54)
AMYLASE SERPL-CCNC: 73 U/L (ref 30–110)
ANION GAP SERPL CALCULATED.3IONS-SCNC: 13 MMOL/L (ref 4–13)
AST SERPL-CCNC: 72 U/L (ref 7–45)
BACTERIA UR QL AUTO: ABNORMAL /HPF
BASOPHILS # BLD AUTO: 0.02 10*3/MM3 (ref 0–0.2)
BASOPHILS NFR BLD AUTO: 0.2 % (ref 0–2)
BILIRUB SERPL-MCNC: 0.5 MG/DL (ref 0.1–1)
BILIRUB UR QL STRIP: NEGATIVE
BUN BLD-MCNC: 14 MG/DL (ref 5–21)
BUN/CREAT SERPL: 12.8 (ref 7–25)
CALCIUM SPEC-SCNC: 9.4 MG/DL (ref 8.4–10.4)
CHLORIDE SERPL-SCNC: 104 MMOL/L (ref 98–110)
CLARITY UR: CLEAR
CO2 SERPL-SCNC: 26 MMOL/L (ref 24–31)
COLOR UR: YELLOW
CREAT BLD-MCNC: 1.09 MG/DL (ref 0.5–1.4)
CRP SERPL-MCNC: <0.5 MG/DL (ref 0–0.99)
D-LACTATE SERPL-SCNC: 1.8 MMOL/L (ref 0.5–2)
DEPRECATED RDW RBC AUTO: 39.5 FL (ref 40–54)
EOSINOPHIL # BLD AUTO: 0.66 10*3/MM3 (ref 0–0.7)
EOSINOPHIL NFR BLD AUTO: 8.1 % (ref 0–4)
ERYTHROCYTE [DISTWIDTH] IN BLOOD BY AUTOMATED COUNT: 13 % (ref 12–15)
GFR SERPL CREATININE-BSD FRML MDRD: 68 ML/MIN/1.73
GLOBULIN UR ELPH-MCNC: 3.8 GM/DL
GLUCOSE BLD-MCNC: 208 MG/DL (ref 70–100)
GLUCOSE UR STRIP-MCNC: ABNORMAL MG/DL
HCT VFR BLD AUTO: 44.3 % (ref 40–52)
HGB BLD-MCNC: 15.2 G/DL (ref 14–18)
HGB UR QL STRIP.AUTO: ABNORMAL
HOLD SPECIMEN: NORMAL
HOLD SPECIMEN: NORMAL
HYALINE CASTS UR QL AUTO: ABNORMAL /LPF
IMM GRANULOCYTES # BLD: 0.1 10*3/MM3 (ref 0–0.03)
IMM GRANULOCYTES NFR BLD: 1.2 % (ref 0–5)
KETONES UR QL STRIP: ABNORMAL
LEUKOCYTE ESTERASE UR QL STRIP.AUTO: ABNORMAL
LIPASE SERPL-CCNC: 249 U/L (ref 23–203)
LYMPHOCYTES # BLD AUTO: 2.41 10*3/MM3 (ref 0.72–4.86)
LYMPHOCYTES NFR BLD AUTO: 29.6 % (ref 15–45)
MCH RBC QN AUTO: 28.9 PG (ref 28–32)
MCHC RBC AUTO-ENTMCNC: 34.3 G/DL (ref 33–36)
MCV RBC AUTO: 84.2 FL (ref 82–95)
MONOCYTES # BLD AUTO: 0.55 10*3/MM3 (ref 0.19–1.3)
MONOCYTES NFR BLD AUTO: 6.8 % (ref 4–12)
NEUTROPHILS # BLD AUTO: 4.4 10*3/MM3 (ref 1.87–8.4)
NEUTROPHILS NFR BLD AUTO: 54.1 % (ref 39–78)
NITRITE UR QL STRIP: NEGATIVE
NRBC BLD MANUAL-RTO: 0 /100 WBC (ref 0–0)
PH UR STRIP.AUTO: <=5 [PH] (ref 5–8)
PLATELET # BLD AUTO: 197 10*3/MM3 (ref 130–400)
PMV BLD AUTO: 10.7 FL (ref 6–12)
POTASSIUM BLD-SCNC: 3.7 MMOL/L (ref 3.5–5.3)
PROT SERPL-MCNC: 8.2 G/DL (ref 6.3–8.7)
PROT UR QL STRIP: NEGATIVE
RBC # BLD AUTO: 5.26 10*6/MM3 (ref 4.8–5.9)
RBC # UR: ABNORMAL /HPF
REF LAB TEST METHOD: ABNORMAL
SODIUM BLD-SCNC: 143 MMOL/L (ref 135–145)
SP GR UR STRIP: 1.03 (ref 1–1.03)
SQUAMOUS #/AREA URNS HPF: ABNORMAL /HPF
UROBILINOGEN UR QL STRIP: ABNORMAL
WBC NRBC COR # BLD: 8.14 10*3/MM3 (ref 4.8–10.8)
WBC UR QL AUTO: ABNORMAL /HPF
WHOLE BLOOD HOLD SPECIMEN: NORMAL
WHOLE BLOOD HOLD SPECIMEN: NORMAL

## 2017-12-25 PROCEDURE — 25010000002 ONDANSETRON PER 1 MG: Performed by: NURSE PRACTITIONER

## 2017-12-25 PROCEDURE — 99283 EMERGENCY DEPT VISIT LOW MDM: CPT

## 2017-12-25 PROCEDURE — 81001 URINALYSIS AUTO W/SCOPE: CPT | Performed by: NURSE PRACTITIONER

## 2017-12-25 PROCEDURE — 82150 ASSAY OF AMYLASE: CPT | Performed by: NURSE PRACTITIONER

## 2017-12-25 PROCEDURE — 85025 COMPLETE CBC W/AUTO DIFF WBC: CPT | Performed by: NURSE PRACTITIONER

## 2017-12-25 PROCEDURE — 87086 URINE CULTURE/COLONY COUNT: CPT | Performed by: NURSE PRACTITIONER

## 2017-12-25 PROCEDURE — 0 IOPAMIDOL 61 % SOLUTION: Performed by: NURSE PRACTITIONER

## 2017-12-25 PROCEDURE — 87147 CULTURE TYPE IMMUNOLOGIC: CPT | Performed by: NURSE PRACTITIONER

## 2017-12-25 PROCEDURE — 83690 ASSAY OF LIPASE: CPT | Performed by: NURSE PRACTITIONER

## 2017-12-25 PROCEDURE — 80053 COMPREHEN METABOLIC PANEL: CPT | Performed by: NURSE PRACTITIONER

## 2017-12-25 PROCEDURE — 74177 CT ABD & PELVIS W/CONTRAST: CPT

## 2017-12-25 PROCEDURE — 93010 ELECTROCARDIOGRAM REPORT: CPT | Performed by: INTERNAL MEDICINE

## 2017-12-25 PROCEDURE — 83605 ASSAY OF LACTIC ACID: CPT | Performed by: NURSE PRACTITIONER

## 2017-12-25 PROCEDURE — 93005 ELECTROCARDIOGRAM TRACING: CPT | Performed by: NURSE PRACTITIONER

## 2017-12-25 PROCEDURE — 25010000002 KETOROLAC TROMETHAMINE PER 15 MG: Performed by: NURSE PRACTITIONER

## 2017-12-25 PROCEDURE — 86140 C-REACTIVE PROTEIN: CPT | Performed by: NURSE PRACTITIONER

## 2017-12-25 RX ORDER — SODIUM CHLORIDE 0.9 % (FLUSH) 0.9 %
10 SYRINGE (ML) INJECTION AS NEEDED
Status: DISCONTINUED | OUTPATIENT
Start: 2017-12-25 | End: 2017-12-25 | Stop reason: HOSPADM

## 2017-12-25 RX ORDER — TAMSULOSIN HYDROCHLORIDE 0.4 MG/1
1 CAPSULE ORAL DAILY
Qty: 5 CAPSULE | Refills: 0 | Status: SHIPPED | OUTPATIENT
Start: 2017-12-25 | End: 2017-12-30

## 2017-12-25 RX ORDER — KETOROLAC TROMETHAMINE 10 MG/1
10 TABLET, FILM COATED ORAL EVERY 6 HOURS PRN
Qty: 8 TABLET | Refills: 0 | Status: SHIPPED | OUTPATIENT
Start: 2017-12-25 | End: 2017-12-27

## 2017-12-25 RX ORDER — HYDROCODONE BITARTRATE AND ACETAMINOPHEN 5; 325 MG/1; MG/1
1 TABLET ORAL EVERY 6 HOURS PRN
Qty: 8 TABLET | Refills: 0 | Status: SHIPPED | OUTPATIENT
Start: 2017-12-25 | End: 2017-12-27

## 2017-12-25 RX ORDER — KETOROLAC TROMETHAMINE 15 MG/ML
15 INJECTION, SOLUTION INTRAMUSCULAR; INTRAVENOUS ONCE
Status: COMPLETED | OUTPATIENT
Start: 2017-12-25 | End: 2017-12-25

## 2017-12-25 RX ORDER — ONDANSETRON 4 MG/1
4 TABLET, ORALLY DISINTEGRATING ORAL EVERY 6 HOURS PRN
Qty: 12 TABLET | Refills: 0 | Status: SHIPPED | OUTPATIENT
Start: 2017-12-25 | End: 2017-12-28

## 2017-12-25 RX ORDER — ONDANSETRON 2 MG/ML
4 INJECTION INTRAMUSCULAR; INTRAVENOUS ONCE
Status: COMPLETED | OUTPATIENT
Start: 2017-12-25 | End: 2017-12-25

## 2017-12-25 RX ADMIN — IOPAMIDOL 100 ML: 612 INJECTION, SOLUTION INTRAVENOUS at 11:07

## 2017-12-25 RX ADMIN — ONDANSETRON 4 MG: 2 INJECTION, SOLUTION INTRAMUSCULAR; INTRAVENOUS at 10:18

## 2017-12-25 RX ADMIN — KETOROLAC TROMETHAMINE 15 MG: 15 INJECTION, SOLUTION INTRAMUSCULAR; INTRAVENOUS at 10:21

## 2017-12-25 RX ADMIN — SODIUM CHLORIDE 1000 ML: 9 INJECTION, SOLUTION INTRAVENOUS at 10:15

## 2017-12-27 DIAGNOSIS — N20.0 KIDNEY STONE: Primary | ICD-10-CM

## 2017-12-27 LAB
BACTERIA SPEC AEROBE CULT: ABNORMAL
BACTERIA SPEC AEROBE CULT: ABNORMAL

## 2017-12-29 ENCOUNTER — TELEPHONE (OUTPATIENT)
Dept: EMERGENCY DEPT | Facility: HOSPITAL | Age: 62
End: 2017-12-29

## 2017-12-29 NOTE — TELEPHONE ENCOUNTER
----- Message from GREY Ware sent at 12/28/2017  8:29 PM CST -----  omnicef 300mg bid x 10 days   ----- Message -----     From: Lab, Background User     Sent: 12/27/2017   8:20 AM       To: Bh Pad Asap In Basket Results Pool

## 2018-01-02 NOTE — ED NOTES
"ED Call Back Questions    1. How are you doing since leaving the Emergency Department?    Doing better  2. Do you have any questions about your discharge instructions? No     3. Have you filled your new prescriptions yet? Yes   a. Do you have any questions about those medications? No     4. Were you able to make a follow-up appointment with the physician? Yes     5. Do you have a primary care physician? Yes   a. If No, would you like for me to set you up with one? N/A  i. If Yes, “I will have our ED  give you a call right back at this number to work with you on the best time for an appointment.”    6. We are always looking to get better at what we do. Do you have any suggestions for what we can do to be even better? N/A  a. If Yes, \"Thank you for sharing your concerns. I apologize. I will follow up with our manager and patient . Would you like someone to call you back?\" N/A    7. Is there anything else I can do for you? No   Visit was good     Cesar Rueda  01/02/18 1057    "

## 2018-01-03 ENCOUNTER — HOSPITAL ENCOUNTER (OUTPATIENT)
Dept: GENERAL RADIOLOGY | Facility: HOSPITAL | Age: 63
Discharge: HOME | End: 2018-01-03
Attending: UROLOGY | Admitting: UROLOGY

## 2018-01-03 ENCOUNTER — OFFICE VISIT (OUTPATIENT)
Dept: UROLOGY | Facility: CLINIC | Age: 63
End: 2018-01-03

## 2018-01-03 VITALS
WEIGHT: 220.2 LBS | HEIGHT: 71 IN | TEMPERATURE: 97.3 F | DIASTOLIC BLOOD PRESSURE: 64 MMHG | BODY MASS INDEX: 30.83 KG/M2 | SYSTOLIC BLOOD PRESSURE: 150 MMHG

## 2018-01-03 DIAGNOSIS — N20.1 RIGHT URETERAL STONE: Primary | ICD-10-CM

## 2018-01-03 DIAGNOSIS — N20.0 KIDNEY STONE: ICD-10-CM

## 2018-01-03 DIAGNOSIS — N40.0 BENIGN NODULAR PROSTATIC HYPERPLASIA WITHOUT LOWER URINARY TRACT SYMPTOMS: ICD-10-CM

## 2018-01-03 DIAGNOSIS — N52.9 IMPOTENCE OF ORGANIC ORIGIN: ICD-10-CM

## 2018-01-03 LAB
BILIRUB BLD-MCNC: NEGATIVE MG/DL
CLARITY, POC: CLEAR
COLOR UR: YELLOW
GLUCOSE UR STRIP-MCNC: NEGATIVE MG/DL
KETONES UR QL: ABNORMAL
LEUKOCYTE EST, POC: ABNORMAL
NITRITE UR-MCNC: NEGATIVE MG/ML
PH UR: 5 [PH] (ref 5–8)
PROT UR STRIP-MCNC: ABNORMAL MG/DL
RBC # UR STRIP: ABNORMAL /UL
SP GR UR: 1.02 (ref 1–1.03)
UROBILINOGEN UR QL: NORMAL

## 2018-01-03 PROCEDURE — 81003 URINALYSIS AUTO W/O SCOPE: CPT | Performed by: UROLOGY

## 2018-01-03 PROCEDURE — 74018 RADEX ABDOMEN 1 VIEW: CPT

## 2018-01-03 PROCEDURE — 99214 OFFICE O/P EST MOD 30 MIN: CPT | Performed by: UROLOGY

## 2018-01-03 NOTE — PROGRESS NOTES
Subjective    Mr. Mcclelland is 62 y.o. male    Chief Complaint: Right Flank pain    History of Present Illness     Recurrent Nephrolithiasis  Patient is her for recurrent nephrolithiasis. Location of stone is right renal. Stones were found for workup of abdominal pain. Pt is currently Asymptomatic. Pt. Has had stone disease for severalyear(s). Risk factors for stone disease include none identified. Previous management of stone disease was Ureteroscopy. Stone analysis was Uric Acid. Metabolic workup revealed low pH, hyperucosuria. Current treatment regimen includes none. Associated symptoms include none.       Benign Prostatic Hypertrophy  Patient complains of lower urinary tract symptoms. He reports nocturia two times a night. He denies frequency, incomplete emptying, intermittency, straining, urgency and weak stream. Patient states symptoms are of mild severity. Onset of symptoms was several years ago and was gradual in onset. His AUA Symptom Score is, 2/35.He reports a history of no complicating symptoms. He denies flank pain, gross hematuria, kidney stones and recurrent UTI.  Patient has tried Watchful waiting with improvement. Last PSA was na .              Erectile Dysfunction  Patient complains of erectile dysfunction. Onset of dysfunction was 12 months ago and was gradual in onset.  Patient states the nature of difficulty is both attaining and maintaining erection. Full erections occur never. Partial erections occur never. Libido is not affected. Risk factors for ED include cardiovascular disease and diabetes mellitus. Patient denies history of pelvic radiation.  Previous treatment of ED includes none.    The following portions of the patient's history were reviewed and updated as appropriate: allergies, current medications, past family history, past medical history, past social history, past surgical history and problem list.    Review of Systems   Constitutional: Negative for chills and fever.    Gastrointestinal: Negative for abdominal pain, anal bleeding and blood in stool.   Genitourinary: Negative for difficulty urinating, flank pain, frequency, hematuria and urgency.         Current Outpatient Prescriptions:   •  carBAMazepine (TEGRETOL) 200 MG tablet, Take 1 tablet by mouth Daily., Disp: 30 tablet, Rfl: 6  •  citalopram (CeleXA) 20 MG tablet, Take 20 mg by mouth Daily., Disp: , Rfl:   •  famotidine (PEPCID) 20 MG tablet, Take 20 mg by mouth 2 (Two) Times a Day., Disp: , Rfl:   •  fexofenadine (ALLEGRA) 180 MG tablet, Take 180 mg by mouth As Needed., Disp: , Rfl:   •  fluticasone (FLONASE) 50 MCG/ACT nasal spray, 1 spray each nostril twice a day for three days, then daily., Disp: 1 bottle, Rfl: 0  •  gabapentin (NEURONTIN) 800 MG tablet, Take 1 tablet by mouth 2 (Two) Times a Day., Disp: 60 tablet, Rfl: 2  •  hydrochlorothiazide (HYDRODIURIL) 12.5 MG tablet, Take 12.5 mg by mouth Daily., Disp: , Rfl:   •  insulin glargine (LANTUS) 100 UNIT/ML injection, Inject 70 Units under the skin 2 (Two) Times a Day., Disp: , Rfl:   •  levothyroxine (SYNTHROID, LEVOTHROID) 50 MCG tablet, Take 1 tablet by mouth Daily., Disp: , Rfl:   •  loratadine (CLARITIN) 10 MG tablet, Take 10 mg by mouth Daily., Disp: , Rfl:   •  meloxicam (MOBIC) 15 MG tablet, Take 1 tablet by mouth Daily., Disp: , Rfl:   •  metoprolol tartrate (LOPRESSOR) 50 MG tablet, Take 50 mg by mouth 2 (Two) Times a Day., Disp: , Rfl:   •  omeprazole (priLOSEC) 20 MG capsule, Take 20 mg by mouth 2 (Two) Times a Day., Disp: , Rfl:   •  potassium citrate (UROCIT-K) 10 MEQ (1080 MG) CR tablet, Take 1 tablet by mouth 3 (Three) Times a Day With Meals., Disp: 90 tablet, Rfl: 11  •  rOPINIRole (REQUIP) 1 MG tablet, Take 1 tablet by mouth Every Night. Take 1 hour before bedtime., Disp: 30 tablet, Rfl: 6  •  tadalafil (CIALIS) 20 MG tablet, Take 1 tablet by mouth Daily As Needed for erectile dysfunction., Disp: 10 tablet, Rfl: 11    Past Medical History:  "  Diagnosis Date   • Depression    • Diabetes mellitus    • Disease of thyroid gland     HYPOTHYROIDISM   • GERD (gastroesophageal reflux disease)    • Hyperlipidemia    • Hypertension    • Kidney stone        Past Surgical History:   Procedure Laterality Date   • CHOLECYSTECTOMY     • ENDOSCOPY N/A 4/4/2017    Procedure: ESOPHAGOGASTRODUODENOSCOPY WITH ANESTHESIA;  Surgeon: Rambo Padilla DO;  Location: [facility] ENDOSCOPY;  Service:    • KIDNEY STONE SURGERY     • SHOULDER SURGERY         Social History     Social History   • Marital status:      Spouse name: N/A   • Number of children: N/A   • Years of education: N/A     Social History Main Topics   • Smoking status: Never Smoker   • Smokeless tobacco: Never Used   • Alcohol use No   • Drug use: No   • Sexual activity: Yes     Partners: Female     Other Topics Concern   • None     Social History Narrative       Family History   Problem Relation Age of Onset   • Heart disease Mother    • Diabetes Mother    • Alzheimer's disease Father    • Heart disease Father    • Diabetes Sister    • Heart disease Brother    • Diabetes Sister    • Diabetes Sister    • Colon cancer Neg Hx    • Esophageal cancer Neg Hx        Objective    /64  Temp 97.3 °F (36.3 °C)  Ht 180.3 cm (71\")  Wt 99.9 kg (220 lb 3.2 oz)  BMI 30.71 kg/m2    Physical Exam   Constitutional: He is oriented to person, place, and time. He appears well-developed and well-nourished. No distress.   Pulmonary/Chest: Effort normal.   Abdominal: Soft. He exhibits no distension and no mass. There is no tenderness. There is no rebound and no guarding. No hernia.   Neurological: He is alert and oriented to person, place, and time.   Skin: Skin is warm and dry. He is not diaphoretic.   Psychiatric: He has a normal mood and affect.   Vitals reviewed.          Results for orders placed or performed in visit on 01/03/18   POC Urinalysis Dipstick, Automated   Result Value Ref Range    Color Yellow Yellow, " Straw, Dark Yellow, Octavia    Clarity, UA Clear Clear    Glucose, UA Negative Negative, 1000 mg/dL (3+) mg/dL    Bilirubin Negative Negative    Ketones, UA Trace (A) Negative    Specific Gravity  1.025 1.005 - 1.030    Blood, UA Trace (A) Negative    pH, Urine 5.0 5.0 - 8.0    Protein, POC 30 mg/dL (A) Negative mg/dL    Urobilinogen, UA Normal Normal    Leukocytes Trace (A) Negative    Nitrite, UA Negative Negative   KUB independent review    A KUB is available for me to review today.  The image is inspected for a bowel gas pattern and the general bone structure of the spine and pelvis. The kidneys are then inspected closely.  Renal outline is noted if identifiable. The kidney, collecting system, and anticipated path of the ureter are examined for calcifications including those in the true pelvis.  This film reveals:    On the right there is a single distal ureteral stone measuring 8 mm.    On the left there are no calcificaitons seen in the kidney or the expected course of the ureter.     CT independent review  The CT scan of the abdomen/pelvis done without contrast is available for me to review.  Treatment recommendations require an independent review.  First I scanned the liver, spleen, and bowel pattern.  The retroperitoneum including the major vessels and lymphatic packages are briefly reviewed.  This film as been reviewed by the radiologist to determine any non urologic abnormalities that are present.  The kidneys are closely inspected for size, symmetry, contour, parenchymal thickness, perinephric reaction, presence of calcifications, and intrarenal dilation of the collecting system.  The ureters are inspected for their course, caliber, and any calcifications.  The bladder is inspected for its thickness, size, and presence of any calcifications.  This scan shows:    The right kidney appears hydronephrosis with 6mm distal stone    The left kidney appears normal on this non-contrasted CT scan.  The renal  parenchymal is norml in thickness.  There are no solid masses or cysts.  There is no hydronephrosis.  There are no stones.      The bladder appears normal on this non-contrasted CT scan.  The bladder appears normal in thickness.  There no masses or stones seen on this exam.    .      Assessment and Plan    Diagnoses and all orders for this visit:    Right ureteral stone  -     POC Urinalysis Dipstick, Automated    Benign nodular prostatic hyperplasia without lower urinary tract symptoms    Impotence of organic origin          Patient presented with renal colic on 12/25/2017.  He had a 6 mm stone in his distal right ureter.  He has been asymptomatic for approximately 6 days.  His KUB shows a calcification in the region of the right distal ureter however this is measured at 8 mm.  His urinalysis shows no blood in the urine.  I believe he has passes I do not think is calcification represents a stone.  Then I have not follow up with me in 1 week with a renal ultrasound to ensure that hydronephrosis has resolved.

## 2018-01-10 ENCOUNTER — APPOINTMENT (OUTPATIENT)
Dept: ULTRASOUND IMAGING | Facility: HOSPITAL | Age: 63
End: 2018-01-10
Attending: UROLOGY

## 2018-02-09 ENCOUNTER — RESULTS ENCOUNTER (OUTPATIENT)
Dept: UROLOGY | Facility: CLINIC | Age: 63
End: 2018-02-09

## 2018-02-09 DIAGNOSIS — N20.0 KIDNEY STONE: ICD-10-CM

## 2018-02-14 ENCOUNTER — RESULTS ENCOUNTER (OUTPATIENT)
Dept: UROLOGY | Facility: CLINIC | Age: 63
End: 2018-02-14

## 2018-02-14 DIAGNOSIS — N40.0 BENIGN NODULAR PROSTATIC HYPERPLASIA WITHOUT LOWER URINARY TRACT SYMPTOMS: ICD-10-CM

## 2018-02-20 LAB — PSA SERPL-MCNC: 1.01 NG/ML (ref 0–4)

## 2018-02-22 ENCOUNTER — HOSPITAL ENCOUNTER (OUTPATIENT)
Dept: GENERAL RADIOLOGY | Facility: HOSPITAL | Age: 63
Discharge: HOME | End: 2018-02-22
Attending: UROLOGY | Admitting: UROLOGY

## 2018-02-22 ENCOUNTER — OFFICE VISIT (OUTPATIENT)
Dept: UROLOGY | Facility: CLINIC | Age: 63
End: 2018-02-22

## 2018-02-22 VITALS
DIASTOLIC BLOOD PRESSURE: 80 MMHG | HEIGHT: 71 IN | BODY MASS INDEX: 30.52 KG/M2 | TEMPERATURE: 97.8 F | SYSTOLIC BLOOD PRESSURE: 144 MMHG | WEIGHT: 218 LBS

## 2018-02-22 DIAGNOSIS — N13.8 BPH WITH URINARY OBSTRUCTION: ICD-10-CM

## 2018-02-22 DIAGNOSIS — N52.9 IMPOTENCE OF ORGANIC ORIGIN: ICD-10-CM

## 2018-02-22 DIAGNOSIS — N20.0 NEPHROLITHIASIS: Primary | ICD-10-CM

## 2018-02-22 DIAGNOSIS — N20.0 KIDNEY STONE: ICD-10-CM

## 2018-02-22 DIAGNOSIS — N40.1 BPH WITH URINARY OBSTRUCTION: ICD-10-CM

## 2018-02-22 LAB
BILIRUB BLD-MCNC: NEGATIVE MG/DL
CLARITY, POC: CLEAR
COLOR UR: YELLOW
GLUCOSE UR STRIP-MCNC: NEGATIVE MG/DL
KETONES UR QL: NEGATIVE
LEUKOCYTE EST, POC: NEGATIVE
NITRITE UR-MCNC: NEGATIVE MG/ML
PH UR: 5.5 [PH] (ref 5–8)
PROT UR STRIP-MCNC: NEGATIVE MG/DL
RBC # UR STRIP: NEGATIVE /UL
SP GR UR: 1.02 (ref 1–1.03)
UROBILINOGEN UR QL: NORMAL

## 2018-02-22 PROCEDURE — 81001 URINALYSIS AUTO W/SCOPE: CPT | Performed by: UROLOGY

## 2018-02-22 PROCEDURE — 74018 RADEX ABDOMEN 1 VIEW: CPT

## 2018-02-22 PROCEDURE — 99214 OFFICE O/P EST MOD 30 MIN: CPT | Performed by: UROLOGY

## 2018-02-22 NOTE — PROGRESS NOTES
Subjective    Mr. Mcclelland is 62 y.o. male    Chief Complaint: Nephrolithiasis    History of Present Illness     Recurrent Nephrolithiasis  Patient is her for recurrent nephrolithiasis. Location of stone is right renal. Stones were found for workup of abdominal pain. Pt is currently Asymptomatic. Pt. Has had stone disease for severalyear(s). Risk factors for stone disease include none identified. Previous management of stone disease was Ureteroscopy. Stone analysis was Uric Acid. Metabolic workup revealed low pH, hyperucosuria. Current treatment regimen includes none. Associated symptoms include none.       Benign Prostatic Hypertrophy  Patient complains of lower urinary tract symptoms. He reports nocturia two times a night. He denies frequency, incomplete emptying, intermittency, straining, urgency and weak stream. Patient states symptoms are of mild severity. Onset of symptoms was several years ago and was gradual in onset. His AUA Symptom Score is, 2/35.He reports a history of no complicating symptoms. He denies flank pain, gross hematuria, kidney stones and recurrent UTI.  Patient has tried Watchful waiting with improvement. Last PSA was na .                  Erectile Dysfunction  Patient complains of erectile dysfunction. Onset of dysfunction was 12 months ago and was gradual in onset.  Patient states the nature of difficulty is both attaining and maintaining erection. Full erections occur never. Partial erections occur never. Libido is not affected. Risk factors for ED include cardiovascular disease and diabetes mellitus. Patient denies history of pelvic radiation.  Previous treatment of ED includes none.       The following portions of the patient's history were reviewed and updated as appropriate: allergies, current medications, past family history, past medical history, past social history, past surgical history and problem list.    Review of Systems   Constitutional: Negative for chills and fever.    Gastrointestinal: Negative for abdominal pain, anal bleeding and blood in stool.   Genitourinary: Negative for flank pain, frequency, hematuria and urgency.         Current Outpatient Prescriptions:   •  carBAMazepine (TEGRETOL) 200 MG tablet, Take 1 tablet by mouth Daily., Disp: 30 tablet, Rfl: 6  •  citalopram (CeleXA) 20 MG tablet, Take 20 mg by mouth Daily., Disp: , Rfl:   •  famotidine (PEPCID) 20 MG tablet, Take 20 mg by mouth 2 (Two) Times a Day., Disp: , Rfl:   •  fexofenadine (ALLEGRA) 180 MG tablet, Take 180 mg by mouth As Needed., Disp: , Rfl:   •  fluticasone (FLONASE) 50 MCG/ACT nasal spray, 1 spray each nostril twice a day for three days, then daily., Disp: 1 bottle, Rfl: 0  •  gabapentin (NEURONTIN) 800 MG tablet, Take 1 tablet by mouth 2 (Two) Times a Day., Disp: 60 tablet, Rfl: 2  •  hydrochlorothiazide (HYDRODIURIL) 12.5 MG tablet, Take 12.5 mg by mouth Daily., Disp: , Rfl:   •  insulin glargine (LANTUS) 100 UNIT/ML injection, Inject 70 Units under the skin 2 (Two) Times a Day., Disp: , Rfl:   •  levothyroxine (SYNTHROID, LEVOTHROID) 50 MCG tablet, Take 1 tablet by mouth Daily., Disp: , Rfl:   •  loratadine (CLARITIN) 10 MG tablet, Take 10 mg by mouth Daily., Disp: , Rfl:   •  meloxicam (MOBIC) 15 MG tablet, Take 1 tablet by mouth Daily., Disp: , Rfl:   •  metoprolol tartrate (LOPRESSOR) 50 MG tablet, Take 50 mg by mouth 2 (Two) Times a Day., Disp: , Rfl:   •  omeprazole (priLOSEC) 20 MG capsule, Take 20 mg by mouth 2 (Two) Times a Day., Disp: , Rfl:   •  potassium citrate (UROCIT-K) 10 MEQ (1080 MG) CR tablet, Take 1 tablet by mouth 3 (Three) Times a Day With Meals., Disp: 90 tablet, Rfl: 11  •  rOPINIRole (REQUIP) 1 MG tablet, Take 1 tablet by mouth Every Night. Take 1 hour before bedtime., Disp: 30 tablet, Rfl: 6    Past Medical History:   Diagnosis Date   • Depression    • Diabetes mellitus    • Disease of thyroid gland     HYPOTHYROIDISM   • GERD (gastroesophageal reflux disease)    •  "Hyperlipidemia    • Hypertension    • Kidney stone        Past Surgical History:   Procedure Laterality Date   • CHOLECYSTECTOMY     • ENDOSCOPY N/A 4/4/2017    Procedure: ESOPHAGOGASTRODUODENOSCOPY WITH ANESTHESIA;  Surgeon: Rambo Padilla DO;  Location: [facility] ENDOSCOPY;  Service:    • KIDNEY STONE SURGERY     • SHOULDER SURGERY         Social History     Social History   • Marital status:      Spouse name: N/A   • Number of children: N/A   • Years of education: N/A     Social History Main Topics   • Smoking status: Never Smoker   • Smokeless tobacco: Never Used   • Alcohol use No   • Drug use: No   • Sexual activity: Yes     Partners: Female     Other Topics Concern   • None     Social History Narrative       Family History   Problem Relation Age of Onset   • Heart disease Mother    • Diabetes Mother    • Alzheimer's disease Father    • Heart disease Father    • Diabetes Sister    • Heart disease Brother    • Diabetes Sister    • Diabetes Sister    • Colon cancer Neg Hx    • Esophageal cancer Neg Hx        Objective    /80  Temp 97.8 °F (36.6 °C)  Ht 180.3 cm (71\")  Wt 98.9 kg (218 lb)  BMI 30.4 kg/m2    Physical Exam   Constitutional: He is oriented to person, place, and time. He appears well-developed and well-nourished. No distress.   Pulmonary/Chest: Effort normal.   Abdominal: Soft. He exhibits no distension and no mass. There is no tenderness. There is no rebound and no guarding. No hernia.   Neurological: He is alert and oriented to person, place, and time.   Skin: Skin is warm and dry. He is not diaphoretic.   Psychiatric: He has a normal mood and affect.   Vitals reviewed.        KUB independent review    A KUB is available for me to review today.  The image is inspected for a bowel gas pattern and the general bone structure of the spine and pelvis. The kidneys are then inspected closely.  Renal outline is noted if identifiable. The kidney, collecting system, and anticipated path of " the ureter are examined for calcifications including those in the true pelvis.  This film reveals:    On the right there are no calcificaitons seen in the kidney or the expected course of the ureter. .    On the left there are no calcificaitons seen in the kidney or the expected course of the ureter. .      Results for orders placed or performed in visit on 02/22/18   POC Urinalysis Dipstick, Automated   Result Value Ref Range    Color Yellow Yellow, Straw, Dark Yellow, Octavia    Clarity, UA Clear Clear    Glucose, UA Negative Negative, 1000 mg/dL (3+) mg/dL    Bilirubin Negative Negative    Ketones, UA Negative Negative    Specific Gravity  1.020 1.005 - 1.030    Blood, UA Negative Negative    pH, Urine 5.5 5.0 - 8.0    Protein, POC Negative Negative mg/dL    Urobilinogen, UA Normal Normal    Leukocytes Negative Negative    Nitrite, UA Negative Negative     Assessment and Plan    Supa was seen today for flank pain.    Diagnoses and all orders for this visit:    Nephrolithiasis  -     POC Urinalysis Dipstick, Automated  -     XR Abdomen KUB; Future    Impotence of organic origin    BPH with urinary obstruction  -     PSA DIAGNOSTIC; Future          KUB personally reviewed.  I do not see any evidence of stone disease.  We are going to continue Urocit-K.    I will start him on generic Viagra for his erectile dysfunction.    Follow-up in 1 year with PSA as well as KUB.  I did review his PSA today which is well within normal limits.

## 2018-03-07 ENCOUNTER — TELEPHONE (OUTPATIENT)
Dept: ENDOCRINOLOGY | Facility: CLINIC | Age: 63
End: 2018-03-07

## 2018-03-07 ENCOUNTER — OFFICE VISIT (OUTPATIENT)
Dept: ENDOCRINOLOGY | Facility: CLINIC | Age: 63
End: 2018-03-07

## 2018-03-07 VITALS
DIASTOLIC BLOOD PRESSURE: 96 MMHG | WEIGHT: 221 LBS | BODY MASS INDEX: 30.94 KG/M2 | SYSTOLIC BLOOD PRESSURE: 148 MMHG | OXYGEN SATURATION: 97 % | HEART RATE: 90 BPM | HEIGHT: 71 IN

## 2018-03-07 DIAGNOSIS — E11.65 TYPE 2 DIABETES MELLITUS WITH HYPERGLYCEMIA, WITH LONG-TERM CURRENT USE OF INSULIN (HCC): Primary | ICD-10-CM

## 2018-03-07 DIAGNOSIS — E11.42 DIABETIC POLYNEUROPATHY ASSOCIATED WITH TYPE 2 DIABETES MELLITUS (HCC): ICD-10-CM

## 2018-03-07 DIAGNOSIS — Z79.4 TYPE 2 DIABETES MELLITUS WITH COMPLICATION, WITH LONG-TERM CURRENT USE OF INSULIN (HCC): Primary | ICD-10-CM

## 2018-03-07 DIAGNOSIS — Z79.4 TYPE 2 DIABETES MELLITUS WITH HYPERGLYCEMIA, WITH LONG-TERM CURRENT USE OF INSULIN (HCC): Primary | ICD-10-CM

## 2018-03-07 DIAGNOSIS — I15.2 HYPERTENSION ASSOCIATED WITH DIABETES (HCC): ICD-10-CM

## 2018-03-07 DIAGNOSIS — E78.2 MIXED DIABETIC HYPERLIPIDEMIA ASSOCIATED WITH TYPE 2 DIABETES MELLITUS (HCC): ICD-10-CM

## 2018-03-07 DIAGNOSIS — E11.8 TYPE 2 DIABETES MELLITUS WITH COMPLICATION, WITH LONG-TERM CURRENT USE OF INSULIN (HCC): Primary | ICD-10-CM

## 2018-03-07 DIAGNOSIS — E11.69 MIXED DIABETIC HYPERLIPIDEMIA ASSOCIATED WITH TYPE 2 DIABETES MELLITUS (HCC): ICD-10-CM

## 2018-03-07 DIAGNOSIS — E11.59 HYPERTENSION ASSOCIATED WITH DIABETES (HCC): ICD-10-CM

## 2018-03-07 PROCEDURE — 99204 OFFICE O/P NEW MOD 45 MIN: CPT | Performed by: INTERNAL MEDICINE

## 2018-03-07 PROCEDURE — PTNOCHG PR CUSTOM PT NO CHARGE VISIT: Performed by: INTERNAL MEDICINE

## 2018-03-07 NOTE — PROGRESS NOTES
Supa Mcclelland is a 62 y.o. male seen by diabetes educator 03/07/2018 for review of medications and carbohydrate counting education.     1. Carbohydrate Counting   I. Reviewed carbohydrate-containing foods, standard serving sizes, how to measure foods, and nutrition label reading   II. Provided patient with carbohydrate counting booklet   III. Patient demonstrated understanding by correctly calculating carbohydrate amounts for various meals    2. Humalog Dosing   I. 4 units for every 15 grams of carbohydrate eaten + 2:50 sliding scale   II. Patient demonstrated understanding by calculating correct dosage for example meals and blood sugars.     3. Tresiba Dosing   I. 60 units in the morning. Skip Lantus tonight and start Tresiba tomorrow.    II Reviewed titration: may increase by 5 units every 5 days until fasting sugars are between  mg/dl.     4. Xigduo   I. 1 pill with breakfast for 1 month, then if tolerating, increase to 2 pills with breakfast    II. Explained mechanism of action--encouraged patient to drink plenty of water due to increased risk of dehydration.     5. Bydureon BCise   I. Reviewed skin prep, appropriate injection sites, and site rotation.    II. Discussed side effects (decreased appetite, nausea, vomiting, knot at injection site). Instructed patient to stop and call office if vomiting occurs.    III. Discussed storage and disposal    IV. Instructed patient to take once weekly. Explained single-dose pen.    V. Practiced with demo pen in office.     Provided contact information.     Annette Nunes MS, RD, LD, CDE

## 2018-03-07 NOTE — TELEPHONE ENCOUNTER
Status   Sent to [facility]   DrugXigduo XR 5-500MG er tablets   [employer] Electronic PA Form    Outcome   Approvedtoday   CaseId:[id number];Status:Approved;Review Type:Prior Auth;Coverage Start Date:03/07/2018;Coverage End Date:03/07/2019;   DrugBydureon BCise 2MG/0.85ML auto-injectors   [employer] Electronic PA Form   Original Claim Info75       Outcome   Approvedtoday   CaseId:[id number];Status:Approved;Review Type:Prior Auth;Coverage Start Date:03/07/2018;Coverage End Date:03/07/2019;   DrugTresiba FlexTouch (insulin degludec injection) 200 Units/mL solution   [employer] Electronic PA Form   Original Claim InfoMR

## 2018-03-07 NOTE — PROGRESS NOTES
Supa Mcclelland is a 62 y.o. male who presents for  evaluation of   Chief Complaint   Patient presents with   • [facility]     diabetes bs/243       Referring provider    Libby Ku, APRQUINCY  [address] [place] [address]    Primary Care Provider    Libby Ku, APRN    Duration 10 years    Timing - Diabetes is Constant    Quality -  poorly controlled    Severity -  moderate    Complications - peripheral neuropathy    Current symptoms/problems  none     Alleviating Factors: Compliance      Side Effects  none    Current diet  High carb    Current exercise walking    Current monitoring regimen: home blood tests - checking 4 x daily   Home blood sugar records: 200s    Hypoglycemia none    Past Medical History:   Diagnosis Date   • Depression    • Diabetes mellitus    • Disease of thyroid gland     HYPOTHYROIDISM   • GERD (gastroesophageal reflux disease)    • Hyperlipidemia    • Hypertension    • Kidney stone      Family History   Problem Relation Age of Onset   • Heart disease Mother    • Diabetes Mother    • Alzheimer's disease Father    • Heart disease Father    • Diabetes Sister    • Heart disease Brother    • Diabetes Sister    • Diabetes Sister    • Colon cancer Neg Hx    • Esophageal cancer Neg Hx      Social History   Substance Use Topics   • Smoking status: Never Smoker   • Smokeless tobacco: Never Used   • Alcohol use No         Current Outpatient Prescriptions:   •  carBAMazepine (TEGRETOL) 200 MG tablet, Take 1 tablet by mouth Daily., Disp: 30 tablet, Rfl: 6  •  citalopram (CeleXA) 20 MG tablet, Take 20 mg by mouth Daily., Disp: , Rfl:   •  famotidine (PEPCID) 20 MG tablet, Take 20 mg by mouth 2 (Two) Times a Day., Disp: , Rfl:   •  fexofenadine (ALLEGRA) 180 MG tablet, Take 180 mg by mouth As Needed., Disp: , Rfl:   •  fluticasone (FLONASE) 50 MCG/ACT nasal spray, 1 spray each nostril twice a day for three days, then daily., Disp: 1 bottle, Rfl: 0  •  gabapentin (NEURONTIN) 800 MG  tablet, Take 1 tablet by mouth 2 (Two) Times a Day., Disp: 60 tablet, Rfl: 2  •  hydrochlorothiazide (HYDRODIURIL) 12.5 MG tablet, Take 12.5 mg by mouth Daily., Disp: , Rfl:   •  levothyroxine (SYNTHROID, LEVOTHROID) 50 MCG tablet, Take 1 tablet by mouth Daily., Disp: , Rfl:   •  loratadine (CLARITIN) 10 MG tablet, Take 10 mg by mouth Daily., Disp: , Rfl:   •  meloxicam (MOBIC) 15 MG tablet, Take 1 tablet by mouth Daily., Disp: , Rfl:   •  metoprolol tartrate (LOPRESSOR) 50 MG tablet, Take 50 mg by mouth 2 (Two) Times a Day., Disp: , Rfl:   •  omeprazole (priLOSEC) 20 MG capsule, Take 20 mg by mouth 2 (Two) Times a Day., Disp: , Rfl:   •  potassium citrate (UROCIT-K) 10 MEQ (1080 MG) CR tablet, Take 1 tablet by mouth 3 (Three) Times a Day With Meals., Disp: 90 tablet, Rfl: 11  •  rOPINIRole (REQUIP) 1 MG tablet, Take 1 tablet by mouth Every Night. Take 1 hour before bedtime., Disp: 30 tablet, Rfl: 6  •  dapagliflozin-metformin HCl ER (XIGDUO XR) 5-500 MG tablet, Take 2 tablets by mouth Daily., Disp: 60 tablet, Rfl: 11  •  exenatide er (BYDUREON BCISE) 2 MG/0.85ML auto-injector injection, Inject 0.85 mL under the skin 1 (One) Time Per Week., Disp: 4 pen, Rfl: 11  •  Insulin Degludec (TRESIBA FLEXTOUCH) 200 UNIT/ML solution pen-injector, Inject 100 Units under the skin Every Night., Disp: 5 pen, Rfl: 11  •  Insulin Lispro (HUMALOG KWIKPEN) 200 UNIT/ML solution pen-injector, Inject 20 Units under the skin 3 (Three) Times a Day With Meals., Disp: 3 pen, Rfl: 11  •  Insulin Pen Needle (B-D UF III MINI PEN NEEDLES) 31G X 5 MM misc, Use 4 times daily, Disp: 120 each, Rfl: 11    Review of Systems    Review of Systems   Constitutional: Negative for activity change, appetite change, chills, diaphoresis, fatigue, fever and unexpected weight change.   HENT: Negative for congestion, dental problem, drooling, ear discharge, ear pain, facial swelling, mouth sores, postnasal drip, rhinorrhea, sinus pressure, sore throat, tinnitus,  "trouble swallowing and voice change.    Eyes: Negative for photophobia, pain, discharge, redness, itching and visual disturbance.   Respiratory: Negative for apnea, cough, choking, chest tightness, shortness of breath, wheezing and stridor.    Cardiovascular: Negative for chest pain, palpitations and leg swelling.   Gastrointestinal: Negative for abdominal distention, abdominal pain, constipation, diarrhea, nausea and vomiting.   Endocrine: Negative for cold intolerance, heat intolerance, polydipsia, polyphagia and polyuria.   Genitourinary: Negative for decreased urine volume, difficulty urinating, dysuria, flank pain, frequency, hematuria and urgency.   Musculoskeletal: Negative for arthralgias, back pain, gait problem, joint swelling, myalgias, neck pain and neck stiffness.   Skin: Negative for color change, pallor, rash and wound.   Allergic/Immunologic: Negative for immunocompromised state.   Neurological: Negative for dizziness, tremors, seizures, syncope, facial asymmetry, speech difficulty, weakness, light-headedness, numbness and headaches.   Hematological: Negative for adenopathy.   Psychiatric/Behavioral: Negative for agitation, behavioral problems, confusion, decreased concentration, dysphoric mood, hallucinations, self-injury, sleep disturbance and suicidal ideas. The patient is not nervous/anxious and is not hyperactive.         Objective:   /96 (BP Location: Left arm, Patient Position: Sitting, Cuff Size: Large Adult)  Pulse 90  Ht 180.3 cm (71\")  Wt 100 kg (221 lb)  SpO2 97%  BMI 30.82 kg/m2    Physical Exam   Constitutional: He is oriented to person, place, and time. He appears well-developed and well-nourished. He is cooperative.   HENT:   Head: Normocephalic and atraumatic.   Right Ear: External ear normal.   Left Ear: External ear normal.   Nose: Nose normal.   Mouth/Throat: Oropharynx is clear and moist. No oropharyngeal exudate.   Eyes: Conjunctivae and EOM are normal. Pupils are " equal, round, and reactive to light. No scleral icterus. Right eye exhibits normal extraocular motion. Left eye exhibits normal extraocular motion.   Neck: Neck supple. No JVD present. No muscular tenderness present. No tracheal deviation, no edema and no erythema present. No thyromegaly present.   Cardiovascular: Normal rate, regular rhythm, normal heart sounds and intact distal pulses.  Exam reveals no gallop and no friction rub.    No murmur heard.  Pulmonary/Chest: Effort normal and breath sounds normal. No stridor. No respiratory distress. He has no decreased breath sounds. He has no wheezes. He has no rhonchi. He has no rales. He exhibits no tenderness.   Abdominal: Soft. Bowel sounds are normal. He exhibits no distension and no mass. There is no hepatomegaly. There is no tenderness. There is no rebound and no guarding. No hernia.   Musculoskeletal: Normal range of motion. He exhibits no edema, tenderness or deformity.   Lymphadenopathy:     He has no cervical adenopathy.   Neurological: He is alert and oriented to person, place, and time. He has normal reflexes. No cranial nerve deficit. He exhibits normal muscle tone. Coordination normal.   Skin: Skin is warm. No rash noted. No erythema. No pallor.   Psychiatric: He has a normal mood and affect. His behavior is normal. Judgment and thought content normal.   Nursing note and vitals reviewed.      Lab Review          Assessment/Plan       ICD-10-CM ICD-9-CM   1. Type 2 diabetes mellitus with hyperglycemia, with long-term current use of insulin E11.65 250.00    Z79.4 790.29     V58.67   2. Hypertension associated with diabetes E11.59 250.80    I10 401.9   3. Mixed diabetic hyperlipidemia associated with type 2 diabetes mellitus E11.69 250.80    E78.2 272.2   4. Diabetic polyneuropathy associated with type 2 diabetes mellitus E11.42 250.60     357.2       Glycemic Management:   Lab Results   Component Value Date    HGBA1C 7.9 (H) 08/03/2015     Lab Results    Component Value Date    GLUCOSE 208 (H) 12/25/2017    BUN 14 12/25/2017    CREATININE 1.09 12/25/2017    EGFRIFNONA 68 12/25/2017    BCR 12.8 12/25/2017    K 3.7 12/25/2017    CO2 26.0 12/25/2017    CALCIUM 9.4 12/25/2017    ALBUMIN 4.40 12/25/2017    LABIL2 1.2 12/25/2017    AST 72 (H) 12/25/2017    ALT 98 (H) 12/25/2017    ANIONGAP 13.0 12/25/2017     Lab Results   Component Value Date    WBC 8.14 12/25/2017    HGB 15.2 12/25/2017    HCT 44.3 12/25/2017    MCV 84.2 12/25/2017     12/25/2017       lantus 70 twice daily , change to tresiba  70 once daily     Add humalog u200 4 units per 15 grams  And 2 per 50    Add xigduo xr 5/500, take 1 tab w bkfast for 1 month then increase to 2 tabs w bkfast    Add bcise 2 mg weekly     Lipid Management  Lab Results   Component Value Date    CHOL 150 11/14/2017     Lab Results   Component Value Date    TRIG 227 (H) 11/14/2017     Lab Results   Component Value Date    HDL 46 11/14/2017     No components found for: LDLCALC  Lab Results   Component Value Date    LDL 88 11/14/2017     No results found for: LDLDIRECT    Not on statin ? Verify next appt    Blood Pressure Management:    Vitals:    03/07/18 0932   BP: 148/96   Pulse: 90   SpO2: 97%     Lab Results   Component Value Date    GLUCOSE 208 (H) 12/25/2017    CALCIUM 9.4 12/25/2017     12/25/2017    K 3.7 12/25/2017    CO2 26.0 12/25/2017     12/25/2017    BUN 14 12/25/2017    CREATININE 1.09 12/25/2017    EGFRIFNONA 68 12/25/2017    BCR 12.8 12/25/2017    ANIONGAP 13.0 12/25/2017       BP elevated today, first appt , verify regimen         Microvascular Complication Monitoring:      Eye Exam Evaluation  Within 1 year   -----------    Last Microalbumin-Proteinuria Assessment    No results found for: MALBCRERATIO    No results found for: UTPCR    -----------      Neuropathy, yes on neurontin, antidepressants  Also on requip          Weight Related:   Wt Readings from Last 3 Encounters:   03/07/18 100 kg  (221 lb)   02/22/18 98.9 kg (218 lb)   01/03/18 99.9 kg (220 lb 3.2 oz)     Body mass index is 30.82 kg/(m^2).        Diet interventions: moderate (500 kCal/d) deficit diet.      Bone Health    Lab Results   Component Value Date    PTH 18 02/06/2017    CALCIUM 9.4 12/25/2017    [id number] 26.4 (L) 08/03/2015       Thyroid Health    Lab Results   Component Value Date    TSH 1.46 08/03/2015      on levothyroxine 50 mcgs daily         Other Diabetes Related Aspects       Lab Results   Component Value Date    [id number] 311 08/03/2015          I reviewed and summarized records from [facility] from 2017 and I reviewed / ordered labs.   Records reveal uncontrolled type 2 diabetes complicated by neuropathy and lack of prandial control     No orders of the defined types were placed in this encounter.        A copy of my note was sent to [facility]    Please see my above opinion and suggestions.

## 2018-03-12 RX ORDER — METFORMIN HYDROCHLORIDE 500 MG/1
500 TABLET, EXTENDED RELEASE ORAL
Qty: 60 TABLET | Refills: 5 | Status: SHIPPED | OUTPATIENT
Start: 2018-03-12 | End: 2018-03-22

## 2018-03-21 ENCOUNTER — TELEPHONE (OUTPATIENT)
Dept: FAMILY MEDICINE CLINIC | Facility: CLINIC | Age: 63
End: 2018-03-21

## 2018-03-22 ENCOUNTER — TELEPHONE (OUTPATIENT)
Dept: ENDOCRINOLOGY | Facility: CLINIC | Age: 63
End: 2018-03-22

## 2018-03-22 RX ORDER — METFORMIN HYDROCHLORIDE 500 MG/1
TABLET, EXTENDED RELEASE ORAL
Qty: 60 TABLET | Refills: 11 | Status: SHIPPED | OUTPATIENT
Start: 2018-03-22 | End: 2019-05-03 | Stop reason: SDUPTHER

## 2018-03-22 NOTE — TELEPHONE ENCOUNTER
Same as always, sorry     Stop xigudo     Take      A. Metformin 500 mg xr po bid with meals, 4$ per month  And  B. Steglatro 5 mg daily , come and  card       Spoke to patient's wife and the steglato card will not go through with the insurance they have. It somes across as state funded. [facility] is working on a PA now.

## 2018-03-22 NOTE — TELEPHONE ENCOUNTER
Status   Sent to [facility]   DrugSteglatro 5MG tablets   [employer] Electronic PA Form   Original Claim InfoMR

## 2018-03-23 ENCOUNTER — TELEPHONE (OUTPATIENT)
Dept: ENDOCRINOLOGY | Facility: CLINIC | Age: 63
End: 2018-03-23

## 2018-04-10 ENCOUNTER — OFFICE VISIT (OUTPATIENT)
Dept: ENDOCRINOLOGY | Facility: CLINIC | Age: 63
End: 2018-04-10

## 2018-04-10 VITALS
HEART RATE: 101 BPM | HEIGHT: 71 IN | WEIGHT: 216 LBS | BODY MASS INDEX: 30.24 KG/M2 | DIASTOLIC BLOOD PRESSURE: 70 MMHG | SYSTOLIC BLOOD PRESSURE: 128 MMHG

## 2018-04-10 DIAGNOSIS — E11.8 TYPE 2 DIABETES MELLITUS WITH COMPLICATION, WITH LONG-TERM CURRENT USE OF INSULIN (HCC): Primary | ICD-10-CM

## 2018-04-10 DIAGNOSIS — E11.42 DIABETIC POLYNEUROPATHY ASSOCIATED WITH TYPE 2 DIABETES MELLITUS (HCC): ICD-10-CM

## 2018-04-10 DIAGNOSIS — Z79.4 TYPE 2 DIABETES MELLITUS WITH COMPLICATION, WITH LONG-TERM CURRENT USE OF INSULIN (HCC): Primary | ICD-10-CM

## 2018-04-10 DIAGNOSIS — I10 ESSENTIAL HYPERTENSION: ICD-10-CM

## 2018-04-10 DIAGNOSIS — E78.49 OTHER HYPERLIPIDEMIA: ICD-10-CM

## 2018-04-10 PROCEDURE — 99214 OFFICE O/P EST MOD 30 MIN: CPT | Performed by: NURSE PRACTITIONER

## 2018-04-10 NOTE — PROGRESS NOTES
Subjective    Supa Mcclelland is a 62 y.o. male. he is here today for follow-up.    History of Present Illness         Primary Care Provider     Libby Ku, GREY     Duration 10 years     Timing - Diabetes is Constant     Quality -  poorly controlled     Severity -  moderate     Complications - peripheral neuropathy     Current symptoms/problems  none      Alleviating Factors: Compliance       Side Effects  none     Current diet  High carb     Current exercise walking     Current monitoring regimen: home blood tests - checking 4 x daily   Home blood sugar records: 200s     Hypoglycemia none         The following portions of the patient's history were reviewed and updated as appropriate:   Past Medical History:   Diagnosis Date   • Depression    • Diabetes mellitus    • Disease of thyroid gland     HYPOTHYROIDISM   • GERD (gastroesophageal reflux disease)    • Hyperlipidemia    • Hypertension    • Kidney stone      Past Surgical History:   Procedure Laterality Date   • CHOLECYSTECTOMY     • ENDOSCOPY N/A 4/4/2017    Procedure: ESOPHAGOGASTRODUODENOSCOPY WITH ANESTHESIA;  Surgeon: Rambo Padilla DO;  Location: [facility] ENDOSCOPY;  Service:    • KIDNEY STONE SURGERY     • SHOULDER SURGERY       Family History   Problem Relation Age of Onset   • Heart disease Mother    • Diabetes Mother    • Alzheimer's disease Father    • Heart disease Father    • Diabetes Sister    • Heart disease Brother    • Diabetes Sister    • Diabetes Sister    • Colon cancer Neg Hx    • Esophageal cancer Neg Hx        Current Outpatient Prescriptions   Medication Sig Dispense Refill   • carBAMazepine (TEGRETOL) 200 MG tablet Take 1 tablet by mouth Daily. 30 tablet 6   • citalopram (CeleXA) 20 MG tablet Take 20 mg by mouth Daily.     • dapagliflozin-metformin HCl ER (XIGDUO XR) 5-500 MG tablet Take 1 tablet by mouth Daily. 60 tablet 11   • ertugliflozin (STEGLATRO) 5 mg tablet Take 1 tablet by mouth Every Morning. 30 tablet 5   •  Ertugliflozin L-PyroglutamicAc (STEGLATRO) 5 MG tablet Take 1 tablet by mouth Every Morning. 30 tablet 11   • exenatide er (BYDUREON BCISE) 2 MG/0.85ML auto-injector injection Inject 0.85 mL under the skin 1 (One) Time Per Week. 4 pen 11   • famotidine (PEPCID) 20 MG tablet Take 20 mg by mouth 2 (Two) Times a Day.     • fexofenadine (ALLEGRA) 180 MG tablet Take 180 mg by mouth As Needed.     • fluticasone (FLONASE) 50 MCG/ACT nasal spray 1 spray each nostril twice a day for three days, then daily. 1 bottle 0   • gabapentin (NEURONTIN) 800 MG tablet Take 1 tablet by mouth 2 (Two) Times a Day. 60 tablet 2   • hydrochlorothiazide (HYDRODIURIL) 12.5 MG tablet Take 12.5 mg by mouth Daily.     • Insulin Degludec (TRESIBA FLEXTOUCH) 200 UNIT/ML solution pen-injector Inject 100 Units under the skin Every Night. 5 pen 11   • Insulin Lispro (HUMALOG KWIKPEN) 200 UNIT/ML solution pen-injector Inject 20 Units under the skin 3 (Three) Times a Day With Meals. 3 pen 11   • Insulin Pen Needle (B-D UF III MINI PEN NEEDLES) 31G X 5 MM misc Use 4 times daily 120 each 11   • levothyroxine (SYNTHROID, LEVOTHROID) 50 MCG tablet Take 1 tablet by mouth Daily.     • loratadine (CLARITIN) 10 MG tablet Take 10 mg by mouth Daily.     • meloxicam (MOBIC) 15 MG tablet Take 1 tablet by mouth Daily.     • metFORMIN ER (GLUCOPHAGE XR) 500 MG 24 hr tablet 1 tab twice daily with meals 60 tablet 11   • metoprolol tartrate (LOPRESSOR) 50 MG tablet Take 50 mg by mouth 2 (Two) Times a Day.     • omeprazole (priLOSEC) 20 MG capsule Take 20 mg by mouth 2 (Two) Times a Day.     • potassium citrate (UROCIT-K) 10 MEQ (1080 MG) CR tablet Take 1 tablet by mouth 3 (Three) Times a Day With Meals. 90 tablet 11   • rOPINIRole (REQUIP) 1 MG tablet Take 1 tablet by mouth Every Night. Take 1 hour before bedtime. 30 tablet 6     No current facility-administered medications for this visit.      Allergies   Allergen Reactions   • Atacand [Candesartan Cilexetil] Rash   •  "Biaxin [Clarithromycin] Rash   • Cefzil [Cefprozil] Rash   • Levaquin [Levofloxacin] Rash   • Penicillins Rash   • Zestril [Lisinopril] Rash     Social History     Social History   • Marital status:      Social History Main Topics   • Smoking status: Never Smoker   • Smokeless tobacco: Never Used   • Alcohol use No   • Drug use: No   • Sexual activity: Yes     Partners: Female     Other Topics Concern   • Not on file       Review of Systems  Review of Systems   Constitutional: Negative for activity change, appetite change, diaphoresis and fatigue.   HENT: Negative for facial swelling, sneezing, sore throat, tinnitus, trouble swallowing and voice change.    Eyes: Negative for photophobia, pain, discharge, redness, itching and visual disturbance.   Respiratory: Negative for apnea, cough, choking, chest tightness and shortness of breath.    Cardiovascular: Negative for chest pain, palpitations and leg swelling.   Gastrointestinal: Negative for abdominal distention, abdominal pain, constipation, diarrhea, nausea and vomiting.   Endocrine: Negative for cold intolerance, heat intolerance, polydipsia, polyphagia and polyuria.   Genitourinary: Negative for difficulty urinating, dysuria, frequency, hematuria and urgency.   Musculoskeletal: Negative for arthralgias, back pain, gait problem, joint swelling, myalgias, neck pain and neck stiffness.   Skin: Negative for color change, pallor, rash and wound.   Neurological: Negative for dizziness, tremors, weakness, light-headedness, numbness and headaches.   Hematological: Negative for adenopathy. Does not bruise/bleed easily.   Psychiatric/Behavioral: Negative for behavioral problems, confusion and sleep disturbance.        Objective    /70 (BP Location: Left arm, Patient Position: Sitting, Cuff Size: Adult)   Pulse 101   Ht 180.3 cm (71\")   Wt 98 kg (216 lb)   BMI 30.13 kg/m²   Physical Exam   Constitutional: He is oriented to person, place, and time. He " appears well-developed and well-nourished. No distress.   HENT:   Head: Normocephalic and atraumatic.   Right Ear: External ear normal.   Left Ear: External ear normal.   Nose: Nose normal.   Eyes: Conjunctivae and EOM are normal. Pupils are equal, round, and reactive to light.   Neck: Normal range of motion. Neck supple. No tracheal deviation present. No thyromegaly present.   Cardiovascular: Normal rate, regular rhythm and normal heart sounds.    No murmur heard.  Pulmonary/Chest: Effort normal and breath sounds normal. No respiratory distress. He has no wheezes.   Abdominal: Soft. Bowel sounds are normal. There is no tenderness. There is no rebound and no guarding.   Musculoskeletal: Normal range of motion. He exhibits no edema, tenderness or deformity.   Neurological: He is alert and oriented to person, place, and time. No cranial nerve deficit.   Skin: Skin is warm and dry. No rash noted.   Psychiatric: He has a normal mood and affect. His behavior is normal. Judgment and thought content normal.       Lab Review  Glucose (mg/dL)   Date Value   12/25/2017 208 (H)   11/14/2017 240 (H)   12/30/2016 259 (H)     Sodium (mmol/L)   Date Value   12/25/2017 143   11/14/2017 142   12/30/2016 142     Potassium (mmol/L)   Date Value   12/25/2017 3.7   11/14/2017 4.0   12/30/2016 4.0     Chloride (mmol/L)   Date Value   12/25/2017 104   11/14/2017 103   12/30/2016 101     CO2 (mmol/L)   Date Value   12/25/2017 26.0   11/14/2017 29.0   12/30/2016 27.0     BUN (mg/dL)   Date Value   12/25/2017 14   11/14/2017 12   12/30/2016 16     Creatinine (mg/dL)   Date Value   12/25/2017 1.09   11/14/2017 1.06   12/30/2016 0.98     Hemoglobin A1C (%)   Date Value   08/03/2015 7.9 (H)     Triglycerides (mg/dL)   Date Value   11/14/2017 227 (H)     LDL Cholesterol  (mg/dL)   Date Value   11/14/2017 88       Assessment/Plan      1. Type 2 diabetes mellitus with complication, with long-term current use of insulin    2. Diabetic  polyneuropathy associated with type 2 diabetes mellitus    3. Other hyperlipidemia    4. Essential hypertension    .    Medications prescribed:  Outpatient Encounter Prescriptions as of 4/10/2018   Medication Sig Dispense Refill   • carBAMazepine (TEGRETOL) 200 MG tablet Take 1 tablet by mouth Daily. 30 tablet 6   • citalopram (CeleXA) 20 MG tablet Take 20 mg by mouth Daily.     • dapagliflozin-metformin HCl ER (XIGDUO XR) 5-500 MG tablet Take 1 tablet by mouth Daily. 60 tablet 11   • ertugliflozin (STEGLATRO) 5 mg tablet Take 1 tablet by mouth Every Morning. 30 tablet 5   • Ertugliflozin L-PyroglutamicAc (STEGLATRO) 5 MG tablet Take 1 tablet by mouth Every Morning. 30 tablet 11   • exenatide er (BYDUREON BCISE) 2 MG/0.85ML auto-injector injection Inject 0.85 mL under the skin 1 (One) Time Per Week. 4 pen 11   • famotidine (PEPCID) 20 MG tablet Take 20 mg by mouth 2 (Two) Times a Day.     • fexofenadine (ALLEGRA) 180 MG tablet Take 180 mg by mouth As Needed.     • fluticasone (FLONASE) 50 MCG/ACT nasal spray 1 spray each nostril twice a day for three days, then daily. 1 bottle 0   • gabapentin (NEURONTIN) 800 MG tablet Take 1 tablet by mouth 2 (Two) Times a Day. 60 tablet 2   • hydrochlorothiazide (HYDRODIURIL) 12.5 MG tablet Take 12.5 mg by mouth Daily.     • Insulin Degludec (TRESIBA FLEXTOUCH) 200 UNIT/ML solution pen-injector Inject 100 Units under the skin Every Night. 5 pen 11   • Insulin Lispro (HUMALOG KWIKPEN) 200 UNIT/ML solution pen-injector Inject 20 Units under the skin 3 (Three) Times a Day With Meals. 3 pen 11   • Insulin Pen Needle (B-D UF III MINI PEN NEEDLES) 31G X 5 MM misc Use 4 times daily 120 each 11   • levothyroxine (SYNTHROID, LEVOTHROID) 50 MCG tablet Take 1 tablet by mouth Daily.     • loratadine (CLARITIN) 10 MG tablet Take 10 mg by mouth Daily.     • meloxicam (MOBIC) 15 MG tablet Take 1 tablet by mouth Daily.     • metFORMIN ER (GLUCOPHAGE XR) 500 MG 24 hr tablet 1 tab twice daily with  meals 60 tablet 11   • metoprolol tartrate (LOPRESSOR) 50 MG tablet Take 50 mg by mouth 2 (Two) Times a Day.     • omeprazole (priLOSEC) 20 MG capsule Take 20 mg by mouth 2 (Two) Times a Day.     • potassium citrate (UROCIT-K) 10 MEQ (1080 MG) CR tablet Take 1 tablet by mouth 3 (Three) Times a Day With Meals. 90 tablet 11   • rOPINIRole (REQUIP) 1 MG tablet Take 1 tablet by mouth Every Night. Take 1 hour before bedtime. 30 tablet 6     No facility-administered encounter medications on file as of 4/10/2018.        Orders placed during this encounter include:  No orders of the defined types were placed in this encounter.    Glycemic Management:      last Hgba1c 9.3% from March 2018      lantus 70 twice daily , change to tresiba  70 once daily -- increased to 75 units      Add humalog u200 4 units per 15 grams-- increase to 5 units per 15 grams of CHO   And 2 per 50     Add xigduo xr 5/500, take 1 tab w bkfast for 1 month then increase to 2 tabs w bkfast--- steglatro 5 mg daily     Metformin xr 500 mg po BID       bcise 2 mg weekly      Lipid Management       is taking a statin but does not know the name      Blood Pressure Management:          BP elevated today, first appt , verify regimen            Microvascular Complication Monitoring:       Eye Exam Evaluation  Within 1 year   -----------     Last Microalbumin-Proteinuria Assessment     No results found for: MALBCRERATIO     No results found for: UTPCR     -----------        Neuropathy, yes on neurontin, antidepressants  Also on requip            Weight Related:       BMI - 30.1     Has lost 6 lbs since last visit     Decreased caloric intake            Diet interventions: moderate (500 kCal/d) deficit diet.        Bone Health           Lab Results   Component Value Date     PTH 18 02/06/2017     CALCIUM 9.4 12/25/2017     QJMH18HS 26.4 (L) 08/03/2015         Thyroid Health           Lab Results   Component Value Date     TSH 1.46 08/03/2015       on levothyroxine  50 mcgs daily       march 30, 2018     TSH - 2.59      Other Diabetes Related Aspects               Lab Results   Component Value Date     [id number] 311 08/03/2015            4. Follow-up: Return in about 3 months (around 7/10/2018) for Recheck.

## 2018-04-13 DIAGNOSIS — G25.81 RESTLESS LEGS SYNDROME (RLS): ICD-10-CM

## 2018-04-13 DIAGNOSIS — E11.42 DIABETIC POLYNEUROPATHY ASSOCIATED WITH TYPE 2 DIABETES MELLITUS (HCC): ICD-10-CM

## 2018-04-13 RX ORDER — GABAPENTIN 800 MG/1
800 TABLET ORAL
Qty: 60 TABLET | Refills: 2 | OUTPATIENT
Start: 2018-04-13

## 2018-05-09 ENCOUNTER — TELEPHONE (OUTPATIENT)
Dept: GASTROENTEROLOGY | Facility: CLINIC | Age: 63
End: 2018-05-09

## 2018-05-09 RX ORDER — OMEPRAZOLE 20 MG/1
CAPSULE, DELAYED RELEASE ORAL
Qty: 54 CAPSULE | Refills: 13 | OUTPATIENT
Start: 2018-05-09

## 2018-05-09 RX ORDER — OMEPRAZOLE 20 MG/1
20 CAPSULE, DELAYED RELEASE ORAL 2 TIMES DAILY
Qty: 60 CAPSULE | Refills: 6 | Status: SHIPPED | OUTPATIENT
Start: 2018-05-09 | End: 2019-02-27 | Stop reason: SDUPTHER

## 2018-05-09 NOTE — TELEPHONE ENCOUNTER
patient needs his omeprazole 20mg bid refilled he was seen 6 months ago. He gets it filled at [facility].

## 2018-06-12 ENCOUNTER — OFFICE VISIT (OUTPATIENT)
Dept: NEUROLOGY | Facility: CLINIC | Age: 63
End: 2018-06-12

## 2018-06-12 VITALS
SYSTOLIC BLOOD PRESSURE: 108 MMHG | WEIGHT: 209 LBS | BODY MASS INDEX: 29.26 KG/M2 | HEART RATE: 88 BPM | DIASTOLIC BLOOD PRESSURE: 70 MMHG | HEIGHT: 71 IN

## 2018-06-12 DIAGNOSIS — G25.81 RESTLESS LEGS SYNDROME (RLS): ICD-10-CM

## 2018-06-12 DIAGNOSIS — E11.42 DIABETIC POLYNEUROPATHY ASSOCIATED WITH TYPE 2 DIABETES MELLITUS (HCC): Primary | ICD-10-CM

## 2018-06-12 DIAGNOSIS — E11.42 DIABETIC POLYNEUROPATHY ASSOCIATED WITH TYPE 2 DIABETES MELLITUS (HCC): ICD-10-CM

## 2018-06-12 PROCEDURE — 99213 OFFICE O/P EST LOW 20 MIN: CPT | Performed by: PHYSICIAN ASSISTANT

## 2018-06-12 RX ORDER — CARBAMAZEPINE 200 MG/1
200 TABLET ORAL 2 TIMES DAILY
Qty: 60 TABLET | Refills: 6 | Status: SHIPPED | OUTPATIENT
Start: 2018-06-12 | End: 2019-08-12 | Stop reason: SDUPTHER

## 2018-06-13 RX ORDER — GABAPENTIN 800 MG/1
800 TABLET ORAL 2 TIMES DAILY
Qty: 60 TABLET | Refills: 2 | OUTPATIENT
Start: 2018-06-13 | End: 2018-09-10 | Stop reason: SDUPTHER

## 2018-06-15 NOTE — PROGRESS NOTES
Subjective   Supa Mcclelland is a 62 y.o. male is here today for follow-up.    Painful peripheral neuropathy symptoms are somewhat helped by his present regimen.      Peripheral Neuropathy   This is a chronic problem. The current episode started more than 1 year ago. The problem occurs daily. The problem has been unchanged. Associated symptoms include numbness and weakness. Pertinent negatives include no chills, fever, headaches or neck pain. Associated symptoms comments: Bilateral lower extremity neuropathic pain. The symptoms are aggravated by exertion, stress, walking and standing. Treatments tried: Pharmacotherapy, control underlying disease, neuropathic pain creams. The treatment provided moderate relief.       The following portions of the patient's history were reviewed and updated as appropriate: allergies, current medications, past family history, past medical history, past social history, past surgical history and problem list.    Review of Systems   Constitutional: Negative for chills and fever.   HENT: Negative.    Eyes: Negative.    Respiratory: Negative.    Cardiovascular: Negative.    Gastrointestinal: Negative.    Endocrine: Negative.    Genitourinary: Negative.    Musculoskeletal: Positive for back pain. Negative for gait problem and neck pain.   Skin: Negative.    Allergic/Immunologic: Negative.    Neurological: Positive for weakness and numbness. Negative for headaches.        Bilateral lower shooting neuropathic pain   Hematological: Negative.    Psychiatric/Behavioral: Negative.        Objective   Physical Exam   Constitutional: He is oriented to person, place, and time. Vital signs are normal. He appears well-developed and well-nourished. No distress.   HENT:   Head: Normocephalic and atraumatic.   Right Ear: Tympanic membrane, external ear and ear canal normal. Decreased hearing is noted.   Left Ear: Tympanic membrane, external ear and ear canal normal. Decreased hearing is noted.    Mouth/Throat: Uvula is midline and oropharynx is clear and moist.   Eyes: Conjunctivae, EOM and lids are normal. Pupils are equal, round, and reactive to light. No scleral icterus.   Neck: Normal range of motion and phonation normal. No spinous process tenderness and no muscular tenderness present. Carotid bruit is not present. Normal range of motion present. No thyroid mass and no thyromegaly present.   Cardiovascular: Normal rate, regular rhythm, S1 normal, S2 normal and normal heart sounds.    No murmur heard.  Pulmonary/Chest: Effort normal and breath sounds normal. No stridor. No respiratory distress. He has no wheezes. He has no rales.   Musculoskeletal: Normal range of motion. He exhibits no edema or tenderness.        Lumbar back: Normal.   Lymphadenopathy:     He has no cervical adenopathy.   Neurological: He is alert and oriented to person, place, and time. He has normal strength. He displays no atrophy and no tremor. A sensory deficit is present. No cranial nerve deficit. He exhibits normal muscle tone. He displays a negative Romberg sign. Coordination and gait normal. GCS eye subscore is 4. GCS verbal subscore is 5. GCS motor subscore is 6.   Reflex Scores:       Tricep reflexes are 2+ on the right side and 2+ on the left side.       Bicep reflexes are 2+ on the right side and 2+ on the left side.       Brachioradialis reflexes are 2+ on the right side and 2+ on the left side.       Patellar reflexes are 1+ on the right side and 1+ on the left side.       Achilles reflexes are 1+ on the right side and 1+ on the left side.  Diminished peripheral sensation in the lower extremities consistent with diabetic peripheral neuropathy   Skin: Skin is warm and dry. No ecchymosis, no lesion and no rash noted. No cyanosis. Nails show no clubbing.   Psychiatric: He has a normal mood and affect. His speech is normal and behavior is normal. Judgment and thought content normal. He is not actively hallucinating.  Cognition and memory are normal. He is attentive.   Nursing note and vitals reviewed.        Assessment/Plan   Supa was seen today for peripheral neuropathy.    Diagnoses and all orders for this visit:    Diabetic polyneuropathy associated with type 2 diabetes mellitus  -     carBAMazepine (TEGRETOL) 200 MG tablet; Take 1 tablet by mouth 2 (Two) Times a Day.    Restless legs syndrome (RLS)    The patient is neurologically stable.  No changes were made in his medication regimen today.    10 minutes of a 15 minute outpatient visit was spent in counseling and coordination of care today.    Patient's Body mass index is 29.15 kg/m². BMI is above normal parameters. Recommendations include: Deferred to his family doctor.    Dictated utilizing Dragon dictation.

## 2018-07-27 ENCOUNTER — OFFICE VISIT (OUTPATIENT)
Dept: ENDOCRINOLOGY | Facility: CLINIC | Age: 63
End: 2018-07-27

## 2018-07-27 VITALS
BODY MASS INDEX: 29.55 KG/M2 | WEIGHT: 211.1 LBS | SYSTOLIC BLOOD PRESSURE: 136 MMHG | OXYGEN SATURATION: 96 % | DIASTOLIC BLOOD PRESSURE: 78 MMHG | HEART RATE: 80 BPM | HEIGHT: 71 IN

## 2018-07-27 DIAGNOSIS — E11.69 MIXED DIABETIC HYPERLIPIDEMIA ASSOCIATED WITH TYPE 2 DIABETES MELLITUS (HCC): ICD-10-CM

## 2018-07-27 DIAGNOSIS — E78.2 MIXED DIABETIC HYPERLIPIDEMIA ASSOCIATED WITH TYPE 2 DIABETES MELLITUS (HCC): ICD-10-CM

## 2018-07-27 DIAGNOSIS — E11.42 DIABETIC POLYNEUROPATHY ASSOCIATED WITH TYPE 2 DIABETES MELLITUS (HCC): ICD-10-CM

## 2018-07-27 DIAGNOSIS — E11.8 TYPE 2 DIABETES MELLITUS WITH COMPLICATION, WITH LONG-TERM CURRENT USE OF INSULIN (HCC): Primary | ICD-10-CM

## 2018-07-27 DIAGNOSIS — E11.59 HYPERTENSION ASSOCIATED WITH DIABETES (HCC): ICD-10-CM

## 2018-07-27 DIAGNOSIS — Z79.4 TYPE 2 DIABETES MELLITUS WITH COMPLICATION, WITH LONG-TERM CURRENT USE OF INSULIN (HCC): Primary | ICD-10-CM

## 2018-07-27 DIAGNOSIS — I15.2 HYPERTENSION ASSOCIATED WITH DIABETES (HCC): ICD-10-CM

## 2018-07-27 DIAGNOSIS — E55.9 VITAMIN D DEFICIENCY: ICD-10-CM

## 2018-07-27 LAB
GLUCOSE BLDC GLUCOMTR-MCNC: 166 MG/DL (ref 70–130)
HBA1C MFR BLD: 7.2 %

## 2018-07-27 PROCEDURE — 99214 OFFICE O/P EST MOD 30 MIN: CPT | Performed by: INTERNAL MEDICINE

## 2018-07-27 PROCEDURE — 82962 GLUCOSE BLOOD TEST: CPT | Performed by: INTERNAL MEDICINE

## 2018-07-27 PROCEDURE — 83036 HEMOGLOBIN GLYCOSYLATED A1C: CPT | Performed by: INTERNAL MEDICINE

## 2018-07-27 NOTE — PROGRESS NOTES
Subjective    Supa Mcclelland is a 63 y.o. male. he is here today for follow-up.    Diabetes   Pertinent negatives for hypoglycemia include no confusion, dizziness, headaches, pallor or tremors. Pertinent negatives for diabetes include no chest pain, no fatigue, no polydipsia, no polyphagia, no polyuria and no weakness.            Primary Care Provider     GREY Rizvi     Duration 10 years     Timing - Diabetes is Constant     Quality -  now improved      Severity -  moderate     Complications - peripheral neuropathy     Current symptoms/problems  none      Alleviating Factors: Compliance       Side Effects  none     Current diet  High carb     Current exercise walking     Current monitoring regimen: home blood tests - checking 4 x daily   Home blood sugar records: 100 200s     Hypoglycemia none         The following portions of the patient's history were reviewed and updated as appropriate:   Past Medical History:   Diagnosis Date   • Depression    • Diabetes mellitus (CMS/HCC)    • Disease of thyroid gland     HYPOTHYROIDISM   • GERD (gastroesophageal reflux disease)    • Hyperlipidemia    • Hypertension    • Kidney stone      Past Surgical History:   Procedure Laterality Date   • CHOLECYSTECTOMY     • ENDOSCOPY N/A 4/4/2017    Procedure: ESOPHAGOGASTRODUODENOSCOPY WITH ANESTHESIA;  Surgeon: Rambo Padilla DO;  Location: [facility] ENDOSCOPY;  Service:    • KIDNEY STONE SURGERY     • SHOULDER SURGERY       Family History   Problem Relation Age of Onset   • Heart disease Mother    • Diabetes Mother    • Alzheimer's disease Father    • Heart disease Father    • Diabetes Sister    • Heart disease Brother    • Diabetes Sister    • Diabetes Sister    • Colon cancer Neg Hx    • Esophageal cancer Neg Hx        Current Outpatient Prescriptions   Medication Sig Dispense Refill   • carBAMazepine (TEGRETOL) 200 MG tablet Take 1 tablet by mouth 2 (Two) Times a Day. 60 tablet 6   • citalopram (CeleXA) 20 MG tablet  Take 20 mg by mouth Daily.     • dapagliflozin-metformin HCl ER (XIGDUO XR) 5-500 MG tablet Take 1 tablet by mouth Daily. 60 tablet 11   • ertugliflozin (STEGLATRO) 5 mg tablet Take 1 tablet by mouth Every Morning. 30 tablet 5   • Ertugliflozin L-PyroglutamicAc (STEGLATRO) 5 MG tablet Take 1 tablet by mouth Every Morning. 30 tablet 11   • exenatide er (BYDUREON BCISE) 2 MG/0.85ML auto-injector injection Inject 0.85 mL under the skin 1 (One) Time Per Week. 4 pen 11   • famotidine (PEPCID) 20 MG tablet Take 20 mg by mouth 2 (Two) Times a Day.     • fexofenadine (ALLEGRA) 180 MG tablet Take 180 mg by mouth As Needed.     • fluticasone (FLONASE) 50 MCG/ACT nasal spray 1 spray each nostril twice a day for three days, then daily. 1 bottle 0   • gabapentin (NEURONTIN) 800 MG tablet Take 1 tablet by mouth 2 (Two) Times a Day. 60 tablet 2   • hydrochlorothiazide (HYDRODIURIL) 12.5 MG tablet Take 12.5 mg by mouth Daily.     • Insulin Degludec (TRESIBA FLEXTOUCH) 200 UNIT/ML solution pen-injector Inject 100 Units under the skin Every Night. 5 pen 11   • Insulin Lispro (HUMALOG KWIKPEN) 200 UNIT/ML solution pen-injector Inject 20 Units under the skin 3 (Three) Times a Day With Meals. 3 pen 11   • Insulin Pen Needle (B-D UF III MINI PEN NEEDLES) 31G X 5 MM misc Use 4 times daily 120 each 11   • levothyroxine (SYNTHROID, LEVOTHROID) 50 MCG tablet Take 1 tablet by mouth Daily.     • loratadine (CLARITIN) 10 MG tablet Take 10 mg by mouth Daily.     • meloxicam (MOBIC) 15 MG tablet Take 1 tablet by mouth Daily.     • metFORMIN ER (GLUCOPHAGE XR) 500 MG 24 hr tablet 1 tab twice daily with meals 60 tablet 11   • metoprolol tartrate (LOPRESSOR) 50 MG tablet Take 50 mg by mouth 2 (Two) Times a Day.     • omeprazole (priLOSEC) 20 MG capsule Take 1 capsule by mouth 2 (Two) Times a Day. 60 capsule 6   • potassium citrate (UROCIT-K) 10 MEQ (1080 MG) CR tablet Take 1 tablet by mouth 3 (Three) Times a Day With Meals. 90 tablet 11   •  rOPINIRole (REQUIP) 1 MG tablet Take 1 tablet by mouth Every Night. Take 1 hour before bedtime. 30 tablet 6     No current facility-administered medications for this visit.      Allergies   Allergen Reactions   • Atacand [Candesartan Cilexetil] Rash   • Biaxin [Clarithromycin] Rash   • Cefzil [Cefprozil] Rash   • Levaquin [Levofloxacin] Rash   • Penicillins Rash   • Zestril [Lisinopril] Rash     Social History     Social History   • Marital status:      Social History Main Topics   • Smoking status: Never Smoker   • Smokeless tobacco: Never Used   • Alcohol use No   • Drug use: No   • Sexual activity: Yes     Partners: Female     Other Topics Concern   • Not on file       Review of Systems  Review of Systems   Constitutional: Negative for activity change, appetite change, diaphoresis and fatigue.   HENT: Negative for facial swelling, sneezing, sore throat, tinnitus, trouble swallowing and voice change.    Eyes: Negative for photophobia, pain, discharge, redness, itching and visual disturbance.   Respiratory: Negative for apnea, cough, choking, chest tightness and shortness of breath.    Cardiovascular: Negative for chest pain, palpitations and leg swelling.   Gastrointestinal: Negative for abdominal distention, abdominal pain, constipation, diarrhea, nausea and vomiting.   Endocrine: Negative for cold intolerance, heat intolerance, polydipsia, polyphagia and polyuria.   Genitourinary: Negative for difficulty urinating, dysuria, frequency, hematuria and urgency.   Musculoskeletal: Negative for arthralgias, back pain, gait problem, joint swelling, myalgias, neck pain and neck stiffness.   Skin: Negative for color change, pallor, rash and wound.   Neurological: Negative for dizziness, tremors, weakness, light-headedness, numbness and headaches.   Hematological: Negative for adenopathy. Does not bruise/bleed easily.   Psychiatric/Behavioral: Negative for behavioral problems, confusion and sleep disturbance.       "  Objective    /78 (BP Location: Left arm, Patient Position: Sitting, Cuff Size: Large Adult)   Pulse 80   Ht 180.3 cm (71\")   Wt 95.8 kg (211 lb 1.6 oz)   SpO2 96%   BMI 29.44 kg/m²   Physical Exam   Constitutional: He is oriented to person, place, and time. He appears well-developed and well-nourished. No distress.   HENT:   Head: Normocephalic and atraumatic.   Right Ear: External ear normal.   Left Ear: External ear normal.   Nose: Nose normal.   Eyes: Pupils are equal, round, and reactive to light. Conjunctivae and EOM are normal.   Neck: Normal range of motion. Neck supple. No tracheal deviation present. No thyromegaly present.   Cardiovascular: Normal rate, regular rhythm and normal heart sounds.    No murmur heard.  Pulmonary/Chest: Effort normal and breath sounds normal. No respiratory distress. He has no wheezes.   Abdominal: Soft. Bowel sounds are normal. There is no tenderness. There is no rebound and no guarding.   Musculoskeletal: Normal range of motion. He exhibits no edema, tenderness or deformity.   Neurological: He is alert and oriented to person, place, and time. No cranial nerve deficit.   Skin: Skin is warm and dry. No rash noted.   Psychiatric: He has a normal mood and affect. His behavior is normal. Judgment and thought content normal.       Lab Review  Glucose (mg/dL)   Date Value   12/25/2017 208 (H)   11/14/2017 240 (H)   12/30/2016 259 (H)     Sodium (mmol/L)   Date Value   12/25/2017 143   11/14/2017 142   12/30/2016 142     Potassium (mmol/L)   Date Value   12/25/2017 3.7   11/14/2017 4.0   12/30/2016 4.0     Chloride (mmol/L)   Date Value   12/25/2017 104   11/14/2017 103   12/30/2016 101     CO2 (mmol/L)   Date Value   12/25/2017 26.0   11/14/2017 29.0   12/30/2016 27.0     BUN (mg/dL)   Date Value   12/25/2017 14   11/14/2017 12   12/30/2016 16     Creatinine (mg/dL)   Date Value   12/25/2017 1.09   11/14/2017 1.06   12/30/2016 0.98     Hemoglobin A1C (%)   Date Value "   08/03/2015 7.9 (H)     Triglycerides (mg/dL)   Date Value   11/14/2017 227 (H)     LDL Cholesterol  (mg/dL)   Date Value   11/14/2017 88       Assessment/Plan      1. Type 2 diabetes mellitus with complication, with long-term current use of insulin (CMS/[facility])    2. Hypertension associated with diabetes (CMS/[facility])    3. Mixed diabetic hyperlipidemia associated with type 2 diabetes mellitus (CMS/[facility])    4. Diabetic polyneuropathy associated with type 2 diabetes mellitus (CMS/[facility])    .    Medications prescribed:  Outpatient Encounter Prescriptions as of 7/27/2018   Medication Sig Dispense Refill   • carBAMazepine (TEGRETOL) 200 MG tablet Take 1 tablet by mouth 2 (Two) Times a Day. 60 tablet 6   • citalopram (CeleXA) 20 MG tablet Take 20 mg by mouth Daily.     • dapagliflozin-metformin HCl ER (XIGDUO XR) 5-500 MG tablet Take 1 tablet by mouth Daily. 60 tablet 11   • ertugliflozin (STEGLATRO) 5 mg tablet Take 1 tablet by mouth Every Morning. 30 tablet 5   • Ertugliflozin L-PyroglutamicAc (STEGLATRO) 5 MG tablet Take 1 tablet by mouth Every Morning. 30 tablet 11   • exenatide er (BYDUREON BCISE) 2 MG/0.85ML auto-injector injection Inject 0.85 mL under the skin 1 (One) Time Per Week. 4 pen 11   • famotidine (PEPCID) 20 MG tablet Take 20 mg by mouth 2 (Two) Times a Day.     • fexofenadine (ALLEGRA) 180 MG tablet Take 180 mg by mouth As Needed.     • fluticasone (FLONASE) 50 MCG/ACT nasal spray 1 spray each nostril twice a day for three days, then daily. 1 bottle 0   • gabapentin (NEURONTIN) 800 MG tablet Take 1 tablet by mouth 2 (Two) Times a Day. 60 tablet 2   • hydrochlorothiazide (HYDRODIURIL) 12.5 MG tablet Take 12.5 mg by mouth Daily.     • Insulin Degludec (TRESIBA FLEXTOUCH) 200 UNIT/ML solution pen-injector Inject 100 Units under the skin Every Night. 5 pen 11   • Insulin Lispro (HUMALOG KWIKPEN) 200 UNIT/ML solution pen-injector Inject 20 Units under the skin 3 (Three) Times a Day With Meals. 3 pen 11   • Insulin  Pen Needle (B-D UF III MINI PEN NEEDLES) 31G X 5 MM misc Use 4 times daily 120 each 11   • levothyroxine (SYNTHROID, LEVOTHROID) 50 MCG tablet Take 1 tablet by mouth Daily.     • loratadine (CLARITIN) 10 MG tablet Take 10 mg by mouth Daily.     • meloxicam (MOBIC) 15 MG tablet Take 1 tablet by mouth Daily.     • metFORMIN ER (GLUCOPHAGE XR) 500 MG 24 hr tablet 1 tab twice daily with meals 60 tablet 11   • metoprolol tartrate (LOPRESSOR) 50 MG tablet Take 50 mg by mouth 2 (Two) Times a Day.     • omeprazole (priLOSEC) 20 MG capsule Take 1 capsule by mouth 2 (Two) Times a Day. 60 capsule 6   • potassium citrate (UROCIT-K) 10 MEQ (1080 MG) CR tablet Take 1 tablet by mouth 3 (Three) Times a Day With Meals. 90 tablet 11   • rOPINIRole (REQUIP) 1 MG tablet Take 1 tablet by mouth Every Night. Take 1 hour before bedtime. 30 tablet 6     No facility-administered encounter medications on file as of 7/27/2018.        Orders placed during this encounter include:  No orders of the defined types were placed in this encounter.    Glycemic Management:      Lab Results   Component Value Date    HGBA1C 7.9 (H) 08/03/2015          lantus 70 twice daily , change to tresiba  70 once daily -- increased to 75 units am     Add humalog u200 4 units per 15 grams-- increase to 5 units per 15 grams of CHO   And 2 per 50    Taking about 12 units w bkfast    Add 6 units for lunch and supper        Add xigduo xr 5/500, take 1 tab w bkfast for 1 month then increase to 2 tabs w bkfast--- steglatro 5 mg daily     Metformin xr 500 mg po BID       bcise 2 mg weekly      Lipid Management       is taking a statin but does not know the name      Blood Pressure Management:          BP elevated today, first appt , verify regimen            Microvascular Complication Monitoring:       Eye Exam Evaluation  Within 1 year   -----------     Last Microalbumin-Proteinuria Assessment     No results found for: MALBCRERATIO     No results found for: UTPCR      -----------        Neuropathy, yes on neurontin, antidepressants  Also on requip            Weight Related:       BMI - 30.1     Has lost 6 lbs since last visit     Decreased caloric intake            Diet interventions: moderate (500 kCal/d) deficit diet.        Bone Health     Lab Results   Component Value Date    RQNX30HK 26.4 (L) 08/03/2015          Thyroid Health     Lab Results   Component Value Date    TSH 1.46 08/03/2015        on levothyroxine 50 mcgs daily         Lab Results   Component Value Date    [id number] 311 08/03/2015                4. Follow-up: No Follow-up on file.

## 2018-09-10 ENCOUNTER — TELEPHONE (OUTPATIENT)
Dept: GASTROENTEROLOGY | Facility: CLINIC | Age: 63
End: 2018-09-10

## 2018-09-10 DIAGNOSIS — E11.42 DIABETIC POLYNEUROPATHY ASSOCIATED WITH TYPE 2 DIABETES MELLITUS (HCC): ICD-10-CM

## 2018-09-10 DIAGNOSIS — G25.81 RESTLESS LEGS SYNDROME (RLS): ICD-10-CM

## 2018-09-10 NOTE — TELEPHONE ENCOUNTER
Patient called and stated he is out of his omeprazole.i called into [facility] [phone] omeprazole 20mg bid #60 1 refill.

## 2018-09-13 RX ORDER — GABAPENTIN 800 MG/1
800 TABLET ORAL 2 TIMES DAILY
Qty: 60 TABLET | Refills: 2 | OUTPATIENT
Start: 2018-09-13 | End: 2018-12-11 | Stop reason: SDUPTHER

## 2018-09-14 ENCOUNTER — TELEPHONE (OUTPATIENT)
Dept: GASTROENTEROLOGY | Facility: CLINIC | Age: 63
End: 2018-09-14

## 2018-09-14 NOTE — TELEPHONE ENCOUNTER
Called insurance for Cleo [phone]. Got pro auth for omeprazole 20mg bid #60 9-14-18 thru 9- auth number [id number].

## 2018-11-15 RX ORDER — OMEPRAZOLE 20 MG/1
CAPSULE, DELAYED RELEASE ORAL
Qty: 60 CAPSULE | Refills: 1 | OUTPATIENT
Start: 2018-11-15

## 2018-12-11 DIAGNOSIS — E11.42 DIABETIC POLYNEUROPATHY ASSOCIATED WITH TYPE 2 DIABETES MELLITUS (HCC): ICD-10-CM

## 2018-12-11 DIAGNOSIS — G25.81 RESTLESS LEGS SYNDROME (RLS): ICD-10-CM

## 2018-12-11 RX ORDER — GABAPENTIN 800 MG/1
800 TABLET ORAL 2 TIMES DAILY
Qty: 60 TABLET | Refills: 2 | OUTPATIENT
Start: 2018-12-11 | End: 2019-03-21 | Stop reason: SDUPTHER

## 2018-12-13 ENCOUNTER — OFFICE VISIT (OUTPATIENT)
Dept: NEUROLOGY | Facility: CLINIC | Age: 63
End: 2018-12-13

## 2018-12-13 VITALS
SYSTOLIC BLOOD PRESSURE: 140 MMHG | HEIGHT: 71 IN | BODY MASS INDEX: 29.12 KG/M2 | DIASTOLIC BLOOD PRESSURE: 70 MMHG | WEIGHT: 208 LBS | HEART RATE: 82 BPM

## 2018-12-13 DIAGNOSIS — E11.42 DIABETIC POLYNEUROPATHY ASSOCIATED WITH TYPE 2 DIABETES MELLITUS (HCC): Primary | ICD-10-CM

## 2018-12-13 PROCEDURE — 99213 OFFICE O/P EST LOW 20 MIN: CPT | Performed by: PHYSICIAN ASSISTANT

## 2018-12-14 NOTE — PROGRESS NOTES
Subjective   Supa Mcclelland is a 63 y.o. male is here today for follow-up.    Painful peripheral neuropathy symptoms are somewhat helped by his present regimen.  Recent right foot accidental thermal burn healing as expected per patient report.      Peripheral Neuropathy   This is a chronic problem. The current episode started more than 1 year ago. The problem occurs daily. The problem has been unchanged. Associated symptoms include fatigue, numbness and weakness. Associated symptoms comments: Bilateral lower extremity neuropathic pain. The symptoms are aggravated by exertion, stress, walking and standing. Treatments tried: Pharmacotherapy, control underlying disease, neuropathic pain creams. The treatment provided moderate relief.       The following portions of the patient's history were reviewed and updated as appropriate: allergies, current medications, past family history, past medical history, past social history, past surgical history and problem list.    Review of Systems   Constitutional: Positive for fatigue.   HENT: Negative.    Eyes: Negative.    Respiratory: Negative.    Cardiovascular: Negative.    Gastrointestinal: Negative.    Endocrine: Negative.    Genitourinary: Negative.    Musculoskeletal: Positive for back pain and gait problem.   Skin: Negative.    Allergic/Immunologic: Negative.    Neurological: Positive for weakness and numbness.        Bilateral lower shooting neuropathic pain   Hematological: Negative.    Psychiatric/Behavioral: Negative.        Objective   Physical Exam   Constitutional: He is oriented to person, place, and time. Vital signs are normal. He appears well-developed and well-nourished. No distress.   HENT:   Head: Normocephalic and atraumatic.   Right Ear: Tympanic membrane, external ear and ear canal normal. Decreased hearing is noted.   Left Ear: Tympanic membrane, external ear and ear canal normal. Decreased hearing is noted.   Mouth/Throat: Uvula is midline and oropharynx  is clear and moist.   Eyes: Conjunctivae, EOM and lids are normal. Pupils are equal, round, and reactive to light. No scleral icterus.   Neck: Normal range of motion and phonation normal. No spinous process tenderness and no muscular tenderness present. Carotid bruit is not present. Normal range of motion present. No thyroid mass and no thyromegaly present.   Cardiovascular: Normal rate, regular rhythm, S1 normal, S2 normal and normal heart sounds.   No murmur heard.  Pulmonary/Chest: Effort normal and breath sounds normal. No stridor. No respiratory distress. He has no wheezes. He has no rales.   Musculoskeletal: Normal range of motion. He exhibits no edema or tenderness.        Lumbar back: Normal.   Lymphadenopathy:     He has no cervical adenopathy.   Neurological: He is alert and oriented to person, place, and time. He has normal strength. He displays no atrophy and no tremor. A sensory deficit is present. No cranial nerve deficit. He exhibits normal muscle tone. He displays a negative Romberg sign. Coordination and gait normal. GCS eye subscore is 4. GCS verbal subscore is 5. GCS motor subscore is 6.   Reflex Scores:       Tricep reflexes are 2+ on the right side and 2+ on the left side.       Bicep reflexes are 2+ on the right side and 2+ on the left side.       Brachioradialis reflexes are 2+ on the right side and 2+ on the left side.       Patellar reflexes are 1+ on the right side and 1+ on the left side.       Achilles reflexes are 1+ on the right side and 1+ on the left side.  Diminished peripheral sensation in the lower extremities consistent with diabetic peripheral neuropathy   Skin: Skin is warm and dry. No ecchymosis, no lesion and no rash noted. No cyanosis. Nails show no clubbing.   Psychiatric: He has a normal mood and affect. His speech is normal and behavior is normal. Judgment and thought content normal. Cognition and memory are normal.   Nursing note and vitals  reviewed.        Assessment/Plan   Supa was seen today for peripheral neuropathy.    Diagnoses and all orders for this visit:    Diabetic polyneuropathy associated with type 2 diabetes mellitus (CMS/HCC)    Neurologically stable, will have labs at his follow up with endocrinology, no changes are recommended today.    10 minutes of a 15 minute outpatient visit was spent in counseling and coordination of care today.

## 2018-12-27 ENCOUNTER — HOSPITAL ENCOUNTER (OUTPATIENT)
Dept: GENERAL RADIOLOGY | Age: 63
Discharge: HOME | End: 2018-12-27
Payer: COMMERCIAL

## 2018-12-27 ENCOUNTER — HOSPITAL ENCOUNTER (OUTPATIENT)
Dept: WOUND CARE | Age: 63
Discharge: HOME | End: 2018-12-27
Payer: COMMERCIAL

## 2018-12-27 VITALS
BODY MASS INDEX: 30.1 KG/M2 | RESPIRATION RATE: 18 BRPM | DIASTOLIC BLOOD PRESSURE: 82 MMHG | HEIGHT: 71 IN | HEART RATE: 85 BPM | TEMPERATURE: 98.3 F | SYSTOLIC BLOOD PRESSURE: 150 MMHG | WEIGHT: 215 LBS

## 2018-12-27 DIAGNOSIS — E11.622 DIABETIC ULCER OF ANKLE ASSOCIATED WITH TYPE 2 DIABETES MELLITUS, WITH FAT LAYER EXPOSED (HCC): ICD-10-CM

## 2018-12-27 DIAGNOSIS — L97.312 NON-PRESSURE CHRONIC ULCER OF RIGHT ANKLE WITH FAT LAYER EXPOSED (HCC): ICD-10-CM

## 2018-12-27 DIAGNOSIS — L97.302 DIABETIC ULCER OF ANKLE ASSOCIATED WITH TYPE 2 DIABETES MELLITUS, WITH FAT LAYER EXPOSED (HCC): ICD-10-CM

## 2018-12-27 LAB
ALBUMIN SERPL-MCNC: 4.2 G/DL (ref 3.5–5.2)
ALP BLD-CCNC: 110 U/L (ref 40–130)
ALT SERPL-CCNC: 29 U/L (ref 5–41)
ANION GAP SERPL CALCULATED.3IONS-SCNC: 18 MMOL/L (ref 7–19)
AST SERPL-CCNC: 30 U/L (ref 5–40)
BASOPHILS ABSOLUTE: 0.1 K/UL (ref 0–0.2)
BASOPHILS RELATIVE PERCENT: 0.6 % (ref 0–1)
BILIRUB SERPL-MCNC: 0.4 MG/DL (ref 0.2–1.2)
BUN BLDV-MCNC: 19 MG/DL (ref 8–23)
C-REACTIVE PROTEIN: 0.12 MG/DL (ref 0–0.5)
CALCIUM SERPL-MCNC: 9.5 MG/DL (ref 8.8–10.2)
CHLORIDE BLD-SCNC: 101 MMOL/L (ref 98–111)
CO2: 24 MMOL/L (ref 22–29)
CREAT SERPL-MCNC: 1.3 MG/DL (ref 0.5–1.2)
EOSINOPHILS ABSOLUTE: 1 K/UL (ref 0–0.6)
EOSINOPHILS RELATIVE PERCENT: 9 % (ref 0–5)
GFR NON-AFRICAN AMERICAN: 55
GLUCOSE BLD-MCNC: 160 MG/DL (ref 74–109)
HBA1C MFR BLD: 8.5 % (ref 4–6)
HCT VFR BLD CALC: 50.5 % (ref 42–52)
HEMOGLOBIN: 16.4 G/DL (ref 14–18)
LYMPHOCYTES ABSOLUTE: 3.1 K/UL (ref 1.1–4.5)
LYMPHOCYTES RELATIVE PERCENT: 28.5 % (ref 20–40)
MCH RBC QN AUTO: 28.7 PG (ref 27–31)
MCHC RBC AUTO-ENTMCNC: 32.5 G/DL (ref 33–37)
MCV RBC AUTO: 88.4 FL (ref 80–94)
MONOCYTES ABSOLUTE: 0.8 K/UL (ref 0–0.9)
MONOCYTES RELATIVE PERCENT: 7.7 % (ref 0–10)
NEUTROPHILS ABSOLUTE: 5.7 K/UL (ref 1.5–7.5)
NEUTROPHILS RELATIVE PERCENT: 52.5 % (ref 50–65)
PDW BLD-RTO: 13.7 % (ref 11.5–14.5)
PLATELET # BLD: 222 K/UL (ref 130–400)
PMV BLD AUTO: 10.2 FL (ref 9.4–12.4)
POTASSIUM SERPL-SCNC: 4.2 MMOL/L (ref 3.5–5)
PREALBUMIN: 37 MG/DL (ref 20–40)
RBC # BLD: 5.71 M/UL (ref 4.7–6.1)
SEDIMENTATION RATE, ERYTHROCYTE: 19 MM/HR (ref 0–15)
SODIUM BLD-SCNC: 143 MMOL/L (ref 136–145)
TOTAL PROTEIN: 8.1 G/DL (ref 6.6–8.7)
WBC # BLD: 10.8 K/UL (ref 4.8–10.8)

## 2018-12-27 PROCEDURE — 83036 HEMOGLOBIN GLYCOSYLATED A1C: CPT

## 2018-12-27 PROCEDURE — 97597 DBRDMT OPN WND 1ST 20 CM/<: CPT

## 2018-12-27 PROCEDURE — 86140 C-REACTIVE PROTEIN: CPT

## 2018-12-27 PROCEDURE — 36415 COLL VENOUS BLD VENIPUNCTURE: CPT

## 2018-12-27 PROCEDURE — 85652 RBC SED RATE AUTOMATED: CPT

## 2018-12-27 PROCEDURE — 85025 COMPLETE CBC W/AUTO DIFF WBC: CPT

## 2018-12-27 PROCEDURE — 80053 COMPREHEN METABOLIC PANEL: CPT

## 2018-12-27 PROCEDURE — 97597 DBRDMT OPN WND 1ST 20 CM/<: CPT | Performed by: NURSE PRACTITIONER

## 2018-12-27 PROCEDURE — 73610 X-RAY EXAM OF ANKLE: CPT

## 2018-12-27 PROCEDURE — 99214 OFFICE O/P EST MOD 30 MIN: CPT | Performed by: NURSE PRACTITIONER

## 2018-12-27 PROCEDURE — 99213 OFFICE O/P EST LOW 20 MIN: CPT

## 2018-12-27 PROCEDURE — 84134 ASSAY OF PREALBUMIN: CPT

## 2018-12-27 RX ORDER — HYDROCODONE BITARTRATE AND ACETAMINOPHEN 5; 325 MG/1; MG/1
1 TABLET ORAL EVERY 6 HOURS PRN
COMMUNITY
End: 2021-01-18 | Stop reason: SDUPTHER

## 2018-12-27 ASSESSMENT — PAIN DESCRIPTION - PAIN TYPE: TYPE: ACUTE PAIN

## 2018-12-27 ASSESSMENT — PAIN DESCRIPTION - FREQUENCY: FREQUENCY: INTERMITTENT

## 2018-12-27 ASSESSMENT — PAIN DESCRIPTION - ONSET: ONSET: ON-GOING

## 2018-12-27 ASSESSMENT — PAIN DESCRIPTION - PROGRESSION: CLINICAL_PROGRESSION: NOT CHANGED

## 2018-12-27 ASSESSMENT — PAIN SCALES - GENERAL: PAINLEVEL_OUTOF10: 4

## 2018-12-27 ASSESSMENT — PAIN DESCRIPTION - ORIENTATION: ORIENTATION: RIGHT

## 2018-12-27 ASSESSMENT — PAIN DESCRIPTION - DESCRIPTORS: DESCRIPTORS: ACHING;SORE;TENDER

## 2018-12-27 ASSESSMENT — PAIN DESCRIPTION - LOCATION: LOCATION: FOOT

## 2018-12-27 NOTE — PROGRESS NOTES
12/27/2018  9:39 AM   Black%Wound Bed 5 12/27/2018  9:39 AM   Number of days: 0       Please refer to nursing measurements and assessment regarding wound pre and post debridement. Procedure Note: wound 1     Debridement: Non-excisional Debridement    Anesthetic:  Lidocaine 2%    Using curette and #15 blade scalpel the wound was sharply debrided    down through and including the removal of epidermis and dermis. Devitalized Tissue Debrided:  fibrin and biofilme. Percent of Wound Debrided: 100%    Total Surface Area Debrided:  34.44 sq cm    Bleeding: Minimal    Hemostasis:   by pressure    Response to treatment:  Well tolerated by patient. Risk factors for atherosclerosis of all vascular beds have been reviewed with the patient including:  Family history, tobacco abuse in all forms, elevated cholesterol, hyperlipidemia, and diabetes. Assessment    1. Non-pressure chronic ulcer of right ankle with fat layer exposed ([address]    2. Diabetic ulcer of ankle associated with type 2 diabetes mellitus, with fat layer exposed ([address]        Plan for wound - Dress per physician order  Treatment:     Compression : No   Offloading : Yes   Dressing : SEE AVS Discharge Instructions    Plan  Patient educated on proper dressing changes and wound care. RX given for Norco 5mg/ 325 mg for dressing changes for next 3 days. 1. XRAY today   2. Labs today   3. LEAs prior to next visit   4. Follow up Dr. Kitty Feldman I spent a total of 45 minutes face to face with the patient. Over 50% of that time was spent on counseling and care coordination. Patient was told that if symptoms worsen or new symptoms develop they are to go to the emergency department immediately. Patient was educated on diagnosis and treatment plan. All of patient's questions were answered, and the patient understands the discharge plan. Discussed appropriate home care of this wound. Wound redressed. Patient instructions were given.   Recommend no

## 2019-01-08 ENCOUNTER — TELEPHONE (OUTPATIENT)
Dept: FAMILY MEDICINE CLINIC | Facility: CLINIC | Age: 64
End: 2019-01-08

## 2019-01-10 ENCOUNTER — HOSPITAL ENCOUNTER (OUTPATIENT)
Dept: WOUND CARE | Age: 64
Discharge: HOME | End: 2019-01-10
Payer: COMMERCIAL

## 2019-01-10 ENCOUNTER — HOSPITAL ENCOUNTER (OUTPATIENT)
Dept: NON INVASIVE DIAGNOSTICS | Age: 64
Discharge: HOME | End: 2019-01-10
Payer: COMMERCIAL

## 2019-01-10 VITALS
RESPIRATION RATE: 20 BRPM | HEART RATE: 78 BPM | HEIGHT: 71 IN | WEIGHT: 215 LBS | DIASTOLIC BLOOD PRESSURE: 83 MMHG | BODY MASS INDEX: 30.1 KG/M2 | SYSTOLIC BLOOD PRESSURE: 133 MMHG | TEMPERATURE: 98.5 F

## 2019-01-10 DIAGNOSIS — E11.622 DIABETIC ULCER OF ANKLE ASSOCIATED WITH TYPE 2 DIABETES MELLITUS, WITH FAT LAYER EXPOSED (HCC): ICD-10-CM

## 2019-01-10 DIAGNOSIS — T30.0 THIRD DEGREE BURN: Chronic | ICD-10-CM

## 2019-01-10 DIAGNOSIS — L97.302 DIABETIC ULCER OF ANKLE ASSOCIATED WITH TYPE 2 DIABETES MELLITUS, WITH FAT LAYER EXPOSED (HCC): ICD-10-CM

## 2019-01-10 DIAGNOSIS — L97.312 NON-PRESSURE CHRONIC ULCER OF RIGHT ANKLE WITH FAT LAYER EXPOSED (HCC): ICD-10-CM

## 2019-01-10 PROCEDURE — 16020 DRESS/DEBRID P-THICK BURN S: CPT | Performed by: SURGERY

## 2019-01-10 PROCEDURE — 93923 UPR/LXTR ART STDY 3+ LVLS: CPT

## 2019-01-10 PROCEDURE — 97597 DBRDMT OPN WND 1ST 20 CM/<: CPT

## 2019-01-10 RX ORDER — METFORMIN HYDROCHLORIDE 500 MG/1
500 TABLET, EXTENDED RELEASE ORAL 2 TIMES DAILY
COMMUNITY

## 2019-01-10 RX ORDER — OMEPRAZOLE 20 MG/1
20 CAPSULE, DELAYED RELEASE ORAL 2 TIMES DAILY
COMMUNITY
End: 2021-10-27

## 2019-01-10 RX ORDER — FLUTICASONE PROPIONATE 50 MCG
1 SPRAY, SUSPENSION (ML) NASAL 2 TIMES DAILY PRN
COMMUNITY

## 2019-01-10 RX ORDER — LORATADINE 10 MG/1
10 TABLET ORAL DAILY PRN
COMMUNITY

## 2019-01-10 RX ORDER — CARBAMAZEPINE 200 MG/1
200 CAPSULE, EXTENDED RELEASE ORAL 2 TIMES DAILY
COMMUNITY

## 2019-01-10 RX ORDER — ERTUGLIFLOZIN 5 MG/1
5 TABLET, FILM COATED ORAL DAILY
COMMUNITY
End: 2021-10-27

## 2019-01-10 ASSESSMENT — PAIN DESCRIPTION - FREQUENCY: FREQUENCY: INTERMITTENT

## 2019-01-10 ASSESSMENT — PAIN DESCRIPTION - ORIENTATION: ORIENTATION: RIGHT

## 2019-01-10 ASSESSMENT — PAIN DESCRIPTION - DESCRIPTORS: DESCRIPTORS: SHARP

## 2019-01-10 ASSESSMENT — PAIN DESCRIPTION - PROGRESSION: CLINICAL_PROGRESSION: NOT CHANGED

## 2019-01-10 ASSESSMENT — PAIN SCALES - GENERAL: PAINLEVEL_OUTOF10: 4

## 2019-01-10 ASSESSMENT — PAIN DESCRIPTION - ONSET: ONSET: ON-GOING

## 2019-01-10 ASSESSMENT — PAIN DESCRIPTION - PAIN TYPE: TYPE: ACUTE PAIN

## 2019-01-22 ENCOUNTER — HOSPITAL ENCOUNTER (OUTPATIENT)
Dept: WOUND CARE | Age: 64
Discharge: HOME | End: 2019-01-22
Payer: COMMERCIAL

## 2019-01-22 VITALS
HEART RATE: 96 BPM | RESPIRATION RATE: 20 BRPM | HEIGHT: 71 IN | BODY MASS INDEX: 30.1 KG/M2 | DIASTOLIC BLOOD PRESSURE: 82 MMHG | SYSTOLIC BLOOD PRESSURE: 128 MMHG | TEMPERATURE: 98.1 F | WEIGHT: 215 LBS

## 2019-01-22 DIAGNOSIS — L97.312 NON-PRESSURE CHRONIC ULCER OF RIGHT ANKLE WITH FAT LAYER EXPOSED (HCC): ICD-10-CM

## 2019-01-22 DIAGNOSIS — L97.302 DIABETIC ULCER OF ANKLE ASSOCIATED WITH TYPE 2 DIABETES MELLITUS, WITH FAT LAYER EXPOSED (HCC): ICD-10-CM

## 2019-01-22 DIAGNOSIS — E11.622 DIABETIC ULCER OF ANKLE ASSOCIATED WITH TYPE 2 DIABETES MELLITUS, WITH FAT LAYER EXPOSED (HCC): ICD-10-CM

## 2019-01-22 PROCEDURE — 16020 DRESS/DEBRID P-THICK BURN S: CPT

## 2019-01-22 PROCEDURE — 16020 DRESS/DEBRID P-THICK BURN S: CPT | Performed by: SURGERY

## 2019-01-22 ASSESSMENT — PAIN SCALES - GENERAL: PAINLEVEL_OUTOF10: 0

## 2019-01-25 ENCOUNTER — LAB (OUTPATIENT)
Dept: LAB | Facility: HOSPITAL | Age: 64
End: 2019-01-25

## 2019-01-25 DIAGNOSIS — E11.69 MIXED DIABETIC HYPERLIPIDEMIA ASSOCIATED WITH TYPE 2 DIABETES MELLITUS (HCC): ICD-10-CM

## 2019-01-25 DIAGNOSIS — I15.2 HYPERTENSION ASSOCIATED WITH DIABETES (HCC): ICD-10-CM

## 2019-01-25 DIAGNOSIS — E78.2 MIXED DIABETIC HYPERLIPIDEMIA ASSOCIATED WITH TYPE 2 DIABETES MELLITUS (HCC): ICD-10-CM

## 2019-01-25 DIAGNOSIS — E11.8 TYPE 2 DIABETES MELLITUS WITH COMPLICATION, WITH LONG-TERM CURRENT USE OF INSULIN (HCC): ICD-10-CM

## 2019-01-25 DIAGNOSIS — N13.8 BPH WITH URINARY OBSTRUCTION: ICD-10-CM

## 2019-01-25 DIAGNOSIS — Z79.4 TYPE 2 DIABETES MELLITUS WITH COMPLICATION, WITH LONG-TERM CURRENT USE OF INSULIN (HCC): ICD-10-CM

## 2019-01-25 DIAGNOSIS — N40.1 BPH WITH URINARY OBSTRUCTION: ICD-10-CM

## 2019-01-25 DIAGNOSIS — E11.42 DIABETIC POLYNEUROPATHY ASSOCIATED WITH TYPE 2 DIABETES MELLITUS (HCC): ICD-10-CM

## 2019-01-25 DIAGNOSIS — E55.9 VITAMIN D DEFICIENCY: ICD-10-CM

## 2019-01-25 DIAGNOSIS — E11.59 HYPERTENSION ASSOCIATED WITH DIABETES (HCC): ICD-10-CM

## 2019-01-25 LAB
25(OH)D3 SERPL-MCNC: 20.8 NG/ML (ref 30–100)
ALBUMIN SERPL-MCNC: 4.9 G/DL (ref 3.5–5)
ALBUMIN/GLOB SERPL: 1.4 G/DL (ref 1.1–2.5)
ALP SERPL-CCNC: 141 U/L (ref 24–120)
ALT SERPL W P-5'-P-CCNC: 38 U/L (ref 0–54)
ANION GAP SERPL CALCULATED.3IONS-SCNC: 13 MMOL/L (ref 4–13)
ARTICHOKE IGE QN: 81 MG/DL (ref 0–99)
AST SERPL-CCNC: 42 U/L (ref 7–45)
BASOPHILS # BLD AUTO: 0.06 10*3/MM3 (ref 0–0.2)
BASOPHILS NFR BLD AUTO: 0.8 % (ref 0–2)
BILIRUB SERPL-MCNC: 0.6 MG/DL (ref 0.1–1)
BUN BLD-MCNC: 18 MG/DL (ref 5–21)
BUN/CREAT SERPL: 14.6 (ref 7–25)
CALCIUM SPEC-SCNC: 9.5 MG/DL (ref 8.4–10.4)
CHLORIDE SERPL-SCNC: 100 MMOL/L (ref 98–110)
CHOLEST SERPL-MCNC: 165 MG/DL (ref 130–200)
CO2 SERPL-SCNC: 29 MMOL/L (ref 24–31)
CREAT BLD-MCNC: 1.23 MG/DL (ref 0.5–1.4)
DEPRECATED RDW RBC AUTO: 42.9 FL (ref 40–54)
EOSINOPHIL # BLD AUTO: 0.52 10*3/MM3 (ref 0–0.7)
EOSINOPHIL NFR BLD AUTO: 6.7 % (ref 0–4)
ERYTHROCYTE [DISTWIDTH] IN BLOOD BY AUTOMATED COUNT: 13.8 % (ref 12–15)
GFR SERPL CREATININE-BSD FRML MDRD: 59 ML/MIN/1.73
GLOBULIN UR ELPH-MCNC: 3.5 GM/DL
GLUCOSE BLD-MCNC: 195 MG/DL (ref 70–100)
HBA1C MFR BLD: 7.8 %
HCT VFR BLD AUTO: 49.4 % (ref 40–52)
HDLC SERPL-MCNC: 43 MG/DL
HGB BLD-MCNC: 16.7 G/DL (ref 14–18)
IMM GRANULOCYTES # BLD AUTO: 0.12 10*3/MM3 (ref 0–0.03)
IMM GRANULOCYTES NFR BLD AUTO: 1.5 % (ref 0–5)
LDLC/HDLC SERPL: 1.75 {RATIO}
LYMPHOCYTES # BLD AUTO: 2.34 10*3/MM3 (ref 0.72–4.86)
LYMPHOCYTES NFR BLD AUTO: 30.1 % (ref 15–45)
MCH RBC QN AUTO: 28.9 PG (ref 28–32)
MCHC RBC AUTO-ENTMCNC: 33.8 G/DL (ref 33–36)
MCV RBC AUTO: 85.6 FL (ref 82–95)
MONOCYTES # BLD AUTO: 0.6 10*3/MM3 (ref 0.19–1.3)
MONOCYTES NFR BLD AUTO: 7.7 % (ref 4–12)
NEUTROPHILS # BLD AUTO: 4.14 10*3/MM3 (ref 1.87–8.4)
NEUTROPHILS NFR BLD AUTO: 53.2 % (ref 39–78)
NRBC BLD AUTO-RTO: 0 /100 WBC (ref 0–0)
PLATELET # BLD AUTO: 208 10*3/MM3 (ref 130–400)
PMV BLD AUTO: 10.6 FL (ref 6–12)
POTASSIUM BLD-SCNC: 4 MMOL/L (ref 3.5–5.3)
PROT SERPL-MCNC: 8.4 G/DL (ref 6.3–8.7)
RBC # BLD AUTO: 5.77 10*6/MM3 (ref 4.8–5.9)
SODIUM BLD-SCNC: 142 MMOL/L (ref 135–145)
TRIGL SERPL-MCNC: 233 MG/DL (ref 0–149)
TSH SERPL DL<=0.05 MIU/L-ACNC: 1.85 MIU/ML (ref 0.47–4.68)
VIT B12 BLD-MCNC: 262 PG/ML (ref 239–931)
WBC NRBC COR # BLD: 7.78 10*3/MM3 (ref 4.8–10.8)

## 2019-01-25 PROCEDURE — 80061 LIPID PANEL: CPT | Performed by: INTERNAL MEDICINE

## 2019-01-25 PROCEDURE — 84443 ASSAY THYROID STIM HORMONE: CPT | Performed by: INTERNAL MEDICINE

## 2019-01-25 PROCEDURE — 82570 ASSAY OF URINE CREATININE: CPT | Performed by: INTERNAL MEDICINE

## 2019-01-25 PROCEDURE — 82043 UR ALBUMIN QUANTITATIVE: CPT | Performed by: INTERNAL MEDICINE

## 2019-01-25 PROCEDURE — 80053 COMPREHEN METABOLIC PANEL: CPT | Performed by: INTERNAL MEDICINE

## 2019-01-25 PROCEDURE — 36415 COLL VENOUS BLD VENIPUNCTURE: CPT | Performed by: UROLOGY

## 2019-01-25 PROCEDURE — 85025 COMPLETE CBC W/AUTO DIFF WBC: CPT | Performed by: INTERNAL MEDICINE

## 2019-01-25 PROCEDURE — 83036 HEMOGLOBIN GLYCOSYLATED A1C: CPT | Performed by: INTERNAL MEDICINE

## 2019-01-25 PROCEDURE — 82306 VITAMIN D 25 HYDROXY: CPT | Performed by: INTERNAL MEDICINE

## 2019-01-25 PROCEDURE — 84153 ASSAY OF PSA TOTAL: CPT | Performed by: UROLOGY

## 2019-01-25 PROCEDURE — 82607 VITAMIN B-12: CPT | Performed by: INTERNAL MEDICINE

## 2019-01-26 LAB
CREAT 24H UR-MCNC: 91.8 MG/DL
MICROALBUMIN UR-MCNC: 13.2 UG/ML
MICROALBUMIN/CREAT UR: 14.4 MG/G CREAT (ref 0–30)
PSA SERPL-MCNC: 1.2 NG/ML (ref 0–4)

## 2019-01-28 ENCOUNTER — OFFICE VISIT (OUTPATIENT)
Dept: ENDOCRINOLOGY | Facility: CLINIC | Age: 64
End: 2019-01-28

## 2019-01-28 VITALS
BODY MASS INDEX: 29.54 KG/M2 | HEIGHT: 71 IN | WEIGHT: 211 LBS | SYSTOLIC BLOOD PRESSURE: 128 MMHG | DIASTOLIC BLOOD PRESSURE: 74 MMHG | HEART RATE: 90 BPM | OXYGEN SATURATION: 98 %

## 2019-01-28 DIAGNOSIS — Z79.4 TYPE 2 DIABETES MELLITUS WITH COMPLICATION, WITH LONG-TERM CURRENT USE OF INSULIN (HCC): ICD-10-CM

## 2019-01-28 DIAGNOSIS — E78.2 MIXED DIABETIC HYPERLIPIDEMIA ASSOCIATED WITH TYPE 2 DIABETES MELLITUS (HCC): ICD-10-CM

## 2019-01-28 DIAGNOSIS — E11.8 TYPE 2 DIABETES MELLITUS WITH COMPLICATION, WITH LONG-TERM CURRENT USE OF INSULIN (HCC): ICD-10-CM

## 2019-01-28 DIAGNOSIS — E11.42 DIABETIC POLYNEUROPATHY ASSOCIATED WITH TYPE 2 DIABETES MELLITUS (HCC): Primary | ICD-10-CM

## 2019-01-28 DIAGNOSIS — I15.2 HYPERTENSION ASSOCIATED WITH DIABETES (HCC): ICD-10-CM

## 2019-01-28 DIAGNOSIS — E11.69 MIXED DIABETIC HYPERLIPIDEMIA ASSOCIATED WITH TYPE 2 DIABETES MELLITUS (HCC): ICD-10-CM

## 2019-01-28 DIAGNOSIS — E55.9 VITAMIN D DEFICIENCY: ICD-10-CM

## 2019-01-28 DIAGNOSIS — E11.59 HYPERTENSION ASSOCIATED WITH DIABETES (HCC): ICD-10-CM

## 2019-01-28 LAB — GLUCOSE BLDC GLUCOMTR-MCNC: 158 MG/DL (ref 70–130)

## 2019-01-28 PROCEDURE — 99214 OFFICE O/P EST MOD 30 MIN: CPT | Performed by: INTERNAL MEDICINE

## 2019-01-28 PROCEDURE — 82962 GLUCOSE BLOOD TEST: CPT | Performed by: INTERNAL MEDICINE

## 2019-01-28 NOTE — PROGRESS NOTES
Subjective    Supa Mcclelland is a 63 y.o. male. he is here today for follow-up.    Diabetes   Pertinent negatives for hypoglycemia include no confusion, dizziness, headaches, pallor or tremors. Pertinent negatives for diabetes include no chest pain, no fatigue, no polydipsia, no polyphagia, no polyuria and no weakness.            Primary Care Provider     GREY Rizvi     Duration 10 years     Timing - Diabetes is Constant     Quality -  now improved      Severity -  moderate     Complications - peripheral neuropathy and nephropathy ckd Stage III     Current symptoms/problems  none      Alleviating Factors: Compliance       Side Effects  none     Current diet  High carb     Current exercise walking     Current monitoring regimen: home blood tests - checking 4 x daily   Home blood sugar records: 100 200s     Hypoglycemia none         The following portions of the patient's history were reviewed and updated as appropriate:   Past Medical History:   Diagnosis Date   • Depression    • Diabetes mellitus (CMS/HCC)    • Disease of thyroid gland     HYPOTHYROIDISM   • GERD (gastroesophageal reflux disease)    • Hyperlipidemia    • Hypertension    • Kidney stone      Past Surgical History:   Procedure Laterality Date   • CHOLECYSTECTOMY     • ENDOSCOPY N/A 4/4/2017    Procedure: ESOPHAGOGASTRODUODENOSCOPY WITH ANESTHESIA;  Surgeon: Rambo Padilla DO;  Location: [facility] ENDOSCOPY;  Service:    • KIDNEY STONE SURGERY     • SHOULDER SURGERY       Family History   Problem Relation Age of Onset   • Heart disease Mother    • Diabetes Mother    • Alzheimer's disease Father    • Heart disease Father    • Diabetes Sister    • Heart disease Brother    • Diabetes Sister    • Diabetes Sister    • Colon cancer Neg Hx    • Esophageal cancer Neg Hx        Current Outpatient Medications   Medication Sig Dispense Refill   • carBAMazepine (TEGRETOL) 200 MG tablet Take 1 tablet by mouth 2 (Two) Times a Day. 60 tablet 6   •  citalopram (CeleXA) 20 MG tablet Take 20 mg by mouth Daily.     • dapagliflozin-metformin HCl ER (XIGDUO XR) 5-500 MG tablet Take 1 tablet by mouth Daily. 60 tablet 11   • Dulaglutide 0.75 MG/0.5ML solution pen-injector Inject 0.75 mg under the skin into the appropriate area as directed 1 (One) Time Per Week. 4 pen 11   • Ertugliflozin L-PyroglutamicAc (STEGLATRO) 5 MG tablet Take 1 tablet by mouth Every Morning. 30 tablet 11   • famotidine (PEPCID) 20 MG tablet Take 20 mg by mouth 2 (Two) Times a Day.     • fexofenadine (ALLEGRA) 180 MG tablet Take 180 mg by mouth As Needed.     • fluticasone (FLONASE) 50 MCG/ACT nasal spray 1 spray each nostril twice a day for three days, then daily. 1 bottle 0   • gabapentin (NEURONTIN) 800 MG tablet Take 1 tablet by mouth 2 (Two) Times a Day. 60 tablet 2   • hydrochlorothiazide (HYDRODIURIL) 12.5 MG tablet Take 12.5 mg by mouth Daily.     • Insulin Degludec (TRESIBA FLEXTOUCH) 200 UNIT/ML solution pen-injector Inject 100 Units under the skin Every Night. 5 pen 11   • Insulin Lispro (HUMALOG KWIKPEN) 200 UNIT/ML solution pen-injector Inject 20 Units under the skin 3 (Three) Times a Day With Meals. 3 pen 11   • Insulin Pen Needle (B-D UF III MINI PEN NEEDLES) 31G X 5 MM misc Use 4 times daily 120 each 11   • levothyroxine (SYNTHROID, LEVOTHROID) 50 MCG tablet Take 1 tablet by mouth Daily.     • loratadine (CLARITIN) 10 MG tablet Take 10 mg by mouth Daily.     • meloxicam (MOBIC) 15 MG tablet Take 1 tablet by mouth Daily.     • metFORMIN ER (GLUCOPHAGE XR) 500 MG 24 hr tablet 1 tab twice daily with meals 60 tablet 11   • metoprolol tartrate (LOPRESSOR) 50 MG tablet Take 50 mg by mouth 2 (Two) Times a Day.     • omeprazole (priLOSEC) 20 MG capsule Take 1 capsule by mouth 2 (Two) Times a Day. 60 capsule 6   • potassium citrate (UROCIT-K) 10 MEQ (1080 MG) CR tablet Take 1 tablet by mouth 3 (Three) Times a Day With Meals. 90 tablet 11   • rOPINIRole (REQUIP) 1 MG tablet Take 1 tablet  by mouth Every Night. Take 1 hour before bedtime. 30 tablet 6     No current facility-administered medications for this visit.      Allergies   Allergen Reactions   • Atacand [Candesartan Cilexetil] Rash   • Biaxin [Clarithromycin] Rash   • Cefzil [Cefprozil] Rash   • Levaquin [Levofloxacin] Rash   • Penicillins Rash   • Zestril [Lisinopril] Rash     Social History     Socioeconomic History   • Marital status:      Spouse name: Not on file   • Number of children: Not on file   • Years of education: Not on file   • Highest education level: Not on file   Tobacco Use   • Smoking status: Never Smoker   • Smokeless tobacco: Never Used   Substance and Sexual Activity   • Alcohol use: No   • Drug use: No   • Sexual activity: Yes     Partners: Female       Review of Systems  Review of Systems   Constitutional: Negative for activity change, appetite change, diaphoresis and fatigue.   HENT: Negative for facial swelling, sneezing, sore throat, tinnitus, trouble swallowing and voice change.    Eyes: Negative for photophobia, pain, discharge, redness, itching and visual disturbance.   Respiratory: Negative for apnea, cough, choking, chest tightness and shortness of breath.    Cardiovascular: Negative for chest pain, palpitations and leg swelling.   Gastrointestinal: Negative for abdominal distention, abdominal pain, constipation, diarrhea, nausea and vomiting.   Endocrine: Negative for cold intolerance, heat intolerance, polydipsia, polyphagia and polyuria.   Genitourinary: Negative for difficulty urinating, dysuria, frequency, hematuria and urgency.   Musculoskeletal: Negative for arthralgias, back pain, gait problem, joint swelling, myalgias, neck pain and neck stiffness.   Skin: Negative for color change, pallor, rash and wound.   Neurological: Negative for dizziness, tremors, weakness, light-headedness, numbness and headaches.   Hematological: Negative for adenopathy. Does not bruise/bleed easily.  "  Psychiatric/Behavioral: Negative for behavioral problems, confusion and sleep disturbance.        Objective    /74   Pulse 90   Ht 180.3 cm (71\")   Wt 95.7 kg (211 lb)   SpO2 98%   BMI 29.43 kg/m²   Physical Exam   Constitutional: He is oriented to person, place, and time. He appears well-developed and well-nourished. No distress.   HENT:   Head: Normocephalic and atraumatic.   Right Ear: External ear normal.   Left Ear: External ear normal.   Nose: Nose normal.   Eyes: Conjunctivae and EOM are normal. Pupils are equal, round, and reactive to light.   Neck: Normal range of motion. Neck supple. No tracheal deviation present. No thyromegaly present.   Cardiovascular: Normal rate, regular rhythm and normal heart sounds.   No murmur heard.  Pulmonary/Chest: Effort normal and breath sounds normal. No respiratory distress. He has no wheezes.   Abdominal: Soft. Bowel sounds are normal. There is no tenderness. There is no rebound and no guarding.   Musculoskeletal: Normal range of motion. He exhibits no edema, tenderness or deformity.   Neurological: He is alert and oriented to person, place, and time. No cranial nerve deficit.   Skin: Skin is warm and dry. No rash noted.   Psychiatric: He has a normal mood and affect. His behavior is normal. Judgment and thought content normal.       Lab Review  Glucose (mg/dL)   Date Value   01/25/2019 195 (H)   12/25/2017 208 (H)   11/14/2017 240 (H)     Sodium (mmol/L)   Date Value   01/25/2019 142   12/25/2017 143   11/14/2017 142     Potassium (mmol/L)   Date Value   01/25/2019 4.0   12/25/2017 3.7   11/14/2017 4.0     Chloride (mmol/L)   Date Value   01/25/2019 100   12/25/2017 104   11/14/2017 103     CO2 (mmol/L)   Date Value   01/25/2019 29.0   12/25/2017 26.0   11/14/2017 29.0     BUN (mg/dL)   Date Value   01/25/2019 18   12/25/2017 14   11/14/2017 12     Creatinine (mg/dL)   Date Value   01/25/2019 1.23   12/25/2017 1.09   11/14/2017 1.06     Hemoglobin A1C (%) "   Date Value   01/25/2019 7.8   07/27/2018 7.2   08/03/2015 7.9 (H)     Triglycerides (mg/dL)   Date Value   01/25/2019 233 (H)   11/14/2017 227 (H)     LDL Cholesterol  (mg/dL)   Date Value   01/25/2019 81   11/14/2017 88       Assessment/Plan      1. Diabetic polyneuropathy associated with type 2 diabetes mellitus (CMS/[facility])    2. Type 2 diabetes mellitus with complication, with long-term current use of insulin (CMS/[facility])    3. Hypertension associated with diabetes (CMS/[facility])    4. Mixed diabetic hyperlipidemia associated with type 2 diabetes mellitus (CMS/[facility])    5. Vitamin D deficiency    .    Medications prescribed:  Outpatient Encounter Medications as of 1/28/2019   Medication Sig Dispense Refill   • carBAMazepine (TEGRETOL) 200 MG tablet Take 1 tablet by mouth 2 (Two) Times a Day. 60 tablet 6   • citalopram (CeleXA) 20 MG tablet Take 20 mg by mouth Daily.     • dapagliflozin-metformin HCl ER (XIGDUO XR) 5-500 MG tablet Take 1 tablet by mouth Daily. 60 tablet 11   • Dulaglutide 0.75 MG/0.5ML solution pen-injector Inject 0.75 mg under the skin into the appropriate area as directed 1 (One) Time Per Week. 4 pen 11   • Ertugliflozin L-PyroglutamicAc (STEGLATRO) 5 MG tablet Take 1 tablet by mouth Every Morning. 30 tablet 11   • famotidine (PEPCID) 20 MG tablet Take 20 mg by mouth 2 (Two) Times a Day.     • fexofenadine (ALLEGRA) 180 MG tablet Take 180 mg by mouth As Needed.     • fluticasone (FLONASE) 50 MCG/ACT nasal spray 1 spray each nostril twice a day for three days, then daily. 1 bottle 0   • gabapentin (NEURONTIN) 800 MG tablet Take 1 tablet by mouth 2 (Two) Times a Day. 60 tablet 2   • hydrochlorothiazide (HYDRODIURIL) 12.5 MG tablet Take 12.5 mg by mouth Daily.     • Insulin Degludec (TRESIBA FLEXTOUCH) 200 UNIT/ML solution pen-injector Inject 100 Units under the skin Every Night. 5 pen 11   • Insulin Lispro (HUMALOG KWIKPEN) 200 UNIT/ML solution pen-injector Inject 20 Units under the skin 3 (Three) Times a  Day With Meals. 3 pen 11   • Insulin Pen Needle (B-D UF III MINI PEN NEEDLES) 31G X 5 MM misc Use 4 times daily 120 each 11   • levothyroxine (SYNTHROID, LEVOTHROID) 50 MCG tablet Take 1 tablet by mouth Daily.     • loratadine (CLARITIN) 10 MG tablet Take 10 mg by mouth Daily.     • meloxicam (MOBIC) 15 MG tablet Take 1 tablet by mouth Daily.     • metFORMIN ER (GLUCOPHAGE XR) 500 MG 24 hr tablet 1 tab twice daily with meals 60 tablet 11   • metoprolol tartrate (LOPRESSOR) 50 MG tablet Take 50 mg by mouth 2 (Two) Times a Day.     • omeprazole (priLOSEC) 20 MG capsule Take 1 capsule by mouth 2 (Two) Times a Day. 60 capsule 6   • potassium citrate (UROCIT-K) 10 MEQ (1080 MG) CR tablet Take 1 tablet by mouth 3 (Three) Times a Day With Meals. 90 tablet 11   • rOPINIRole (REQUIP) 1 MG tablet Take 1 tablet by mouth Every Night. Take 1 hour before bedtime. 30 tablet 6   • [DISCONTINUED] ertugliflozin (STEGLATRO) 5 mg tablet Take 1 tablet by mouth Every Morning. 30 tablet 5   • [DISCONTINUED] exenatide er (BYDUREON BCISE) 2 MG/0.85ML auto-injector injection Inject 0.85 mL under the skin 1 (One) Time Per Week. 4 pen 11     No facility-administered encounter medications on file as of 1/28/2019.        Orders placed during this encounter include:  Orders Placed This Encounter   Procedures   • POC Glucose     Glycemic Management:      Lab Results   Component Value Date    HGBA1C 7.8 01/25/2019          lantus 70 twice daily , change to tresiba  70 once daily -- increased to 75 units am -- decrease to 65 if he starts novolog for lunch and supper      Add humalog u200 4 units per 15 grams-- increase to 5 units per 15 grams of CHO   And 2 per 50    Taking about 12 units w bkfast - 10    Add 6 units for lunch and supper         steglatro 5 mg daily     Metformin xr 500 mg po BID      Trulicity 0.75 mg weekly      Lipid Management       is taking a statin but does not know the name    Lab Results   Component Value Date    CHOL 165  01/25/2019    TRIG 233 (H) 01/25/2019    HDL 43 01/25/2019    LDL 81 01/25/2019           Blood Pressure Management:          Normal            Microvascular Complication Monitoring:       Eye Exam Evaluation  Needs one   -----------     Last Microalbumin-Proteinuria Assessment     Lab Results   Component Value Date    LISSATIO 14.4 01/25/2019       -----------        Neuropathy, yes on neurontin, antidepressants  Also on requip            Weight Related:       BMI - 30.1     Has lost 6 lbs since last visit     Decreased caloric intake            Diet interventions: moderate (500 kCal/d) deficit diet.        Bone Health     Lab Results   Component Value Date    VLJI27YD 20.8 (L) 01/25/2019    JUHC82UP 26.4 (L) 08/03/2015     Add vit D 50 th u every 2 weeks        Thyroid Health     Lab Results   Component Value Date    TSH 1.850 01/25/2019        on levothyroxine 50 mcgs daily         Lab Results   Component Value Date    HTXGHFPN52 262 01/25/2019                4. Follow-up: No Follow-up on file.

## 2019-02-21 RX ORDER — OMEPRAZOLE 20 MG/1
CAPSULE, DELAYED RELEASE ORAL
Qty: 60 CAPSULE | Refills: 6 | OUTPATIENT
Start: 2019-02-21

## 2019-02-27 ENCOUNTER — OFFICE VISIT (OUTPATIENT)
Dept: GASTROENTEROLOGY | Facility: CLINIC | Age: 64
End: 2019-02-27

## 2019-02-27 VITALS
OXYGEN SATURATION: 98 % | SYSTOLIC BLOOD PRESSURE: 130 MMHG | BODY MASS INDEX: 41.43 KG/M2 | HEART RATE: 72 BPM | WEIGHT: 211 LBS | DIASTOLIC BLOOD PRESSURE: 80 MMHG | TEMPERATURE: 98 F | HEIGHT: 60 IN

## 2019-02-27 DIAGNOSIS — K21.9 GASTROESOPHAGEAL REFLUX DISEASE, ESOPHAGITIS PRESENCE NOT SPECIFIED: Primary | ICD-10-CM

## 2019-02-27 PROCEDURE — 99212 OFFICE O/P EST SF 10 MIN: CPT | Performed by: NURSE PRACTITIONER

## 2019-02-27 RX ORDER — OMEPRAZOLE 20 MG/1
20 CAPSULE, DELAYED RELEASE ORAL 2 TIMES DAILY
Qty: 60 CAPSULE | Refills: 11 | Status: SHIPPED | OUTPATIENT
Start: 2019-02-27

## 2019-02-27 NOTE — PROGRESS NOTES
Chief Complaint   Patient presents with   • Heartburn     is having no problems needs omeprazole reilled 20mg bid       PCP: Libby Ku APRN      Subjective   HPI    Pt presents to office with greater than 5 yr history of GERD related symptoms.  GERD currently described as stable  Pt is maintained on Prilosec bid.  Pt denies heartburn, indigestion, N/V, abdominal pain, dysphagia.  Pt is compliant with medication instruction.  Last EGD: 2017.  This procedure reviewed with patient.  No abdominal pain, no weight loss.    Endoscopy (Dr Padilla) 2017-normal  Colonosocpy (Dr Alcala) 12/2016, no abnormalities per patient       Past Medical History:   Diagnosis Date   • Depression    • Diabetes mellitus (CMS/HCC)    • Disease of thyroid gland     HYPOTHYROIDISM   • GERD (gastroesophageal reflux disease)    • Hyperlipidemia    • Hypertension    • Kidney stone      Outpatient Medications Marked as Taking for the 2/27/19 encounter (Office Visit) with Jared Josue APRN   Medication Sig Dispense Refill   • carBAMazepine (TEGRETOL) 200 MG tablet Take 1 tablet by mouth 2 (Two) Times a Day. 60 tablet 6   • Cholecalciferol 25184 units tablet 50 thousand units po q 2 weeks 2 tablet 11   • citalopram (CeleXA) 20 MG tablet Take 20 mg by mouth Daily.     • Dulaglutide 0.75 MG/0.5ML solution pen-injector Inject 0.75 mg under the skin into the appropriate area as directed 1 (One) Time Per Week. 4 pen 11   • Ertugliflozin L-PyroglutamicAc (STEGLATRO) 5 MG tablet Take 1 tablet by mouth Every Morning. 30 tablet 11   • famotidine (PEPCID) 20 MG tablet Take 20 mg by mouth 2 (Two) Times a Day.     • fexofenadine (ALLEGRA) 180 MG tablet Take 180 mg by mouth As Needed.     • fluticasone (FLONASE) 50 MCG/ACT nasal spray 1 spray each nostril twice a day for three days, then daily. 1 bottle 0   • gabapentin (NEURONTIN) 800 MG tablet Take 1 tablet by mouth 2 (Two) Times a Day. 60 tablet 2   • hydrochlorothiazide (HYDRODIURIL) 12.5 MG  tablet Take 12.5 mg by mouth Daily.     • Insulin Degludec (TRESIBA FLEXTOUCH) 200 UNIT/ML solution pen-injector Inject 100 Units under the skin Every Night. 5 pen 11   • Insulin Lispro (HUMALOG KWIKPEN) 200 UNIT/ML solution pen-injector Inject 20 Units under the skin 3 (Three) Times a Day With Meals. 3 pen 11   • Insulin Pen Needle (B-D UF III MINI PEN NEEDLES) 31G X 5 MM misc Use 4 times daily 120 each 11   • levothyroxine (SYNTHROID, LEVOTHROID) 50 MCG tablet Take 1 tablet by mouth Daily.     • loratadine (CLARITIN) 10 MG tablet Take 10 mg by mouth Daily.     • meloxicam (MOBIC) 15 MG tablet Take 1 tablet by mouth Daily.     • metFORMIN ER (GLUCOPHAGE XR) 500 MG 24 hr tablet 1 tab twice daily with meals 60 tablet 11   • metoprolol tartrate (LOPRESSOR) 50 MG tablet Take 50 mg by mouth 2 (Two) Times a Day.     • omeprazole (priLOSEC) 20 MG capsule Take 1 capsule by mouth 2 (Two) Times a Day. 60 capsule 11   • potassium citrate (UROCIT-K) 10 MEQ (1080 MG) CR tablet Take 1 tablet by mouth 3 (Three) Times a Day With Meals. 90 tablet 11   • rOPINIRole (REQUIP) 1 MG tablet Take 1 tablet by mouth Every Night. Take 1 hour before bedtime. 30 tablet 6   • [DISCONTINUED] omeprazole (priLOSEC) 20 MG capsule Take 1 capsule by mouth 2 (Two) Times a Day. 60 capsule 6     Allergies   Allergen Reactions   • Atacand [Candesartan Cilexetil] Rash   • Biaxin [Clarithromycin] Rash   • Cefzil [Cefprozil] Rash   • Levaquin [Levofloxacin] Rash   • Penicillins Rash   • Zestril [Lisinopril] Rash     Social History     Socioeconomic History   • Marital status:      Spouse name: Not on file   • Number of children: Not on file   • Years of education: Not on file   • Highest education level: Not on file   Social Needs   • Financial resource strain: Not on file   • Food insecurity - worry: Not on file   • Food insecurity - inability: Not on file   • Transportation needs - medical: Not on file   • Transportation needs - non-medical: Not on  file   Occupational History   • Not on file   Tobacco Use   • Smoking status: Never Smoker   • Smokeless tobacco: Never Used   Substance and Sexual Activity   • Alcohol use: No   • Drug use: No   • Sexual activity: Yes     Partners: Female   Other Topics Concern   • Not on file   Social History Narrative   • Not on file     Family History   Problem Relation Age of Onset   • Heart disease Mother    • Diabetes Mother    • Alzheimer's disease Father    • Heart disease Father    • Diabetes Sister    • Heart disease Brother    • Diabetes Sister    • Diabetes Sister    • Colon cancer Neg Hx    • Esophageal cancer Neg Hx      Review of Systems   Constitutional: Negative for fatigue, fever and unexpected weight change.   HENT: Negative for hearing loss, sore throat and voice change.    Eyes: Negative for visual disturbance.   Respiratory: Negative for cough, shortness of breath and wheezing.    Cardiovascular: Negative for chest pain and palpitations.   Gastrointestinal: Negative for abdominal pain, blood in stool and vomiting.   Endocrine: Negative for polydipsia and polyuria.   Genitourinary: Negative for difficulty urinating, dysuria, hematuria and urgency.   Musculoskeletal: Negative for joint swelling and myalgias.   Skin: Negative for color change, rash and wound.   Neurological: Negative for dizziness, tremors, seizures and syncope.   Hematological: Does not bruise/bleed easily.   Psychiatric/Behavioral: Negative for agitation and confusion. The patient is not nervous/anxious.      Objective   Vitals:    02/27/19 0814   BP: 130/80   Pulse: 72   Temp: 98 °F (36.7 °C)   SpO2: 98%     Physical Exam   Constitutional: He is oriented to person, place, and time. He appears well-developed and well-nourished. He is cooperative.   HENT:   Head: Normocephalic and atraumatic.   Eyes: Conjunctivae are normal. Pupils are equal, round, and reactive to light. No scleral icterus.   Neck: Normal range of motion. Neck supple. No JVD  present. No thyroid mass and no thyromegaly present.   Cardiovascular: Normal rate, regular rhythm and normal heart sounds. Exam reveals no gallop and no friction rub.   No murmur heard.  Pulmonary/Chest: Effort normal and breath sounds normal. No accessory muscle usage. No respiratory distress. He has no wheezes. He has no rales.   Abdominal: Soft. Normal appearance and bowel sounds are normal. He exhibits no distension, no ascites and no mass. There is no hepatosplenomegaly. There is no tenderness. There is no rebound and no guarding.   Musculoskeletal: Normal range of motion. He exhibits no edema or tenderness.     Vascular Status -  His right foot exhibits normal foot vasculature  and no edema. His left foot exhibits normal foot vasculature  and no edema.  Lymphadenopathy:     He has no cervical adenopathy.   Neurological: He is alert and oriented to person, place, and time. He has normal strength. Gait normal.   Skin: Skin is warm, dry and intact. No rash noted.     Imaging Results (most recent)     None          Body mass index is 42.62 kg/m².    Assessment/Plan   Supa was seen today for heartburn.    Diagnoses and all orders for this visit:    Gastroesophageal reflux disease, esophagitis presence not specified    Other orders  -     omeprazole (priLOSEC) 20 MG capsule; Take 1 capsule by mouth 2 (Two) Times a Day.    He has a hx of elevated LFT in past.  Currently LFT are normal.  Alk Phos elevated from Dec 2018 to Jan 2019.  Per pt he is scheduled for repeat labs.  If Alk Phos remains elevated will order GGT for further evaluation.  Pt in agreement.  * Surgery not found *    There are no Patient Instructions on file for this visit.

## 2019-03-13 RX ORDER — INSULIN DEGLUDEC 200 U/ML
INJECTION, SOLUTION SUBCUTANEOUS
Qty: 5 PEN | Refills: 5 | Status: SHIPPED | OUTPATIENT
Start: 2019-03-13 | End: 2019-08-13

## 2019-03-14 ENCOUNTER — TELEPHONE (OUTPATIENT)
Dept: ENDOCRINOLOGY | Facility: CLINIC | Age: 64
End: 2019-03-14

## 2019-03-14 NOTE — TELEPHONE ENCOUNTER
Supa Burns (Key: [id number])   Rx #: [id number]   Tresiba FlexTouch (insulin degludec injection) 200 Units/mL solution   Form  [place] [employer] Electronic PA Form   Created   1 day ago   Sent to Plan   1 minute ago   Plan Response   1 minute ago   Submit Clinical Questions   now   Determination   Favorable   now   Message from Plan  Questionnaire submitted. PA Case [id number] Status: Approved. Authorization and Notifications Completed.

## 2019-03-15 RX ORDER — INSULIN LISPRO 200 [IU]/ML
INJECTION, SOLUTION SUBCUTANEOUS
Qty: 5 PEN | Refills: 5 | Status: SHIPPED | OUTPATIENT
Start: 2019-03-15 | End: 2019-08-20 | Stop reason: SDUPTHER

## 2019-03-21 DIAGNOSIS — E11.42 DIABETIC POLYNEUROPATHY ASSOCIATED WITH TYPE 2 DIABETES MELLITUS (HCC): ICD-10-CM

## 2019-03-21 DIAGNOSIS — G25.81 RESTLESS LEGS SYNDROME (RLS): ICD-10-CM

## 2019-03-29 ENCOUNTER — TELEPHONE (OUTPATIENT)
Dept: GASTROENTEROLOGY | Facility: CLINIC | Age: 64
End: 2019-03-29

## 2019-03-29 RX ORDER — GABAPENTIN 800 MG/1
800 TABLET ORAL 2 TIMES DAILY
Qty: 60 TABLET | Refills: 2 | OUTPATIENT
Start: 2019-03-29 | End: 2019-06-18 | Stop reason: SDUPTHER

## 2019-03-29 NOTE — TELEPHONE ENCOUNTER
Can you please call pt to remind him needs labs drawn for evaluation of previous elevated in LFT    Thank you

## 2019-04-22 RX ORDER — PEN NEEDLE, DIABETIC 32GX 5/32"
NEEDLE, DISPOSABLE MISCELLANEOUS
Qty: 150 EACH | Refills: 11 | Status: SHIPPED | OUTPATIENT
Start: 2019-04-22 | End: 2022-11-21

## 2019-04-23 ENCOUNTER — TELEPHONE (OUTPATIENT)
Dept: ENDOCRINOLOGY | Facility: CLINIC | Age: 64
End: 2019-04-23

## 2019-04-23 NOTE — TELEPHONE ENCOUNTER
Supa Mcclelland Key: [id number] - PA Case ID: [id number] - Rx #: [id number] Need help? Call us at [phone]   Status   Sent to [employer]   DrugSteglatro 5MG OR TABS   [employer] Electronic PA Form   Original Claim InfoMR

## 2019-04-30 ENCOUNTER — TELEPHONE (OUTPATIENT)
Dept: ENDOCRINOLOGY | Facility: CLINIC | Age: 64
End: 2019-04-30

## 2019-05-06 RX ORDER — METFORMIN HYDROCHLORIDE 500 MG/1
TABLET, EXTENDED RELEASE ORAL
Qty: 60 TABLET | Refills: 11 | Status: SHIPPED | OUTPATIENT
Start: 2019-05-06 | End: 2020-08-28

## 2019-06-18 DIAGNOSIS — E11.42 DIABETIC POLYNEUROPATHY ASSOCIATED WITH TYPE 2 DIABETES MELLITUS (HCC): ICD-10-CM

## 2019-06-18 DIAGNOSIS — G25.81 RESTLESS LEGS SYNDROME (RLS): ICD-10-CM

## 2019-06-24 RX ORDER — GABAPENTIN 800 MG/1
TABLET ORAL
Qty: 60 TABLET | Refills: 2 | OUTPATIENT
Start: 2019-06-24 | End: 2019-09-27 | Stop reason: SDUPTHER

## 2019-08-12 DIAGNOSIS — E11.42 DIABETIC POLYNEUROPATHY ASSOCIATED WITH TYPE 2 DIABETES MELLITUS (HCC): ICD-10-CM

## 2019-08-12 RX ORDER — CARBAMAZEPINE 200 MG/1
200 TABLET ORAL 2 TIMES DAILY
Qty: 60 TABLET | Refills: 1 | Status: SHIPPED | OUTPATIENT
Start: 2019-08-12 | End: 2019-12-13 | Stop reason: SDUPTHER

## 2019-08-13 ENCOUNTER — OFFICE VISIT (OUTPATIENT)
Dept: ENDOCRINOLOGY | Facility: CLINIC | Age: 64
End: 2019-08-13

## 2019-08-13 VITALS
DIASTOLIC BLOOD PRESSURE: 78 MMHG | WEIGHT: 211.6 LBS | OXYGEN SATURATION: 99 % | BODY MASS INDEX: 41.54 KG/M2 | HEART RATE: 94 BPM | SYSTOLIC BLOOD PRESSURE: 126 MMHG | HEIGHT: 60 IN

## 2019-08-13 DIAGNOSIS — E55.9 VITAMIN D DEFICIENCY: ICD-10-CM

## 2019-08-13 DIAGNOSIS — Z79.4 TYPE 2 DIABETES MELLITUS WITH COMPLICATION, WITH LONG-TERM CURRENT USE OF INSULIN (HCC): ICD-10-CM

## 2019-08-13 DIAGNOSIS — E78.2 MIXED DIABETIC HYPERLIPIDEMIA ASSOCIATED WITH TYPE 2 DIABETES MELLITUS (HCC): ICD-10-CM

## 2019-08-13 DIAGNOSIS — E11.59 HYPERTENSION ASSOCIATED WITH DIABETES (HCC): ICD-10-CM

## 2019-08-13 DIAGNOSIS — I15.2 HYPERTENSION ASSOCIATED WITH DIABETES (HCC): ICD-10-CM

## 2019-08-13 DIAGNOSIS — E11.42 DIABETIC POLYNEUROPATHY ASSOCIATED WITH TYPE 2 DIABETES MELLITUS (HCC): Primary | ICD-10-CM

## 2019-08-13 DIAGNOSIS — E11.69 MIXED DIABETIC HYPERLIPIDEMIA ASSOCIATED WITH TYPE 2 DIABETES MELLITUS (HCC): ICD-10-CM

## 2019-08-13 DIAGNOSIS — E11.8 TYPE 2 DIABETES MELLITUS WITH COMPLICATION, WITH LONG-TERM CURRENT USE OF INSULIN (HCC): ICD-10-CM

## 2019-08-13 LAB
GLUCOSE BLDC GLUCOMTR-MCNC: 250 MG/DL (ref 70–130)
HBA1C MFR BLD: 10.6 %

## 2019-08-13 PROCEDURE — 83036 HEMOGLOBIN GLYCOSYLATED A1C: CPT | Performed by: INTERNAL MEDICINE

## 2019-08-13 PROCEDURE — 82962 GLUCOSE BLOOD TEST: CPT | Performed by: INTERNAL MEDICINE

## 2019-08-13 PROCEDURE — 99214 OFFICE O/P EST MOD 30 MIN: CPT | Performed by: INTERNAL MEDICINE

## 2019-08-13 RX ORDER — INSULIN GLARGINE 100 [IU]/ML
INJECTION, SOLUTION SUBCUTANEOUS
Qty: 8 PEN | Refills: 11 | Status: SHIPPED | OUTPATIENT
Start: 2019-08-13 | End: 2020-02-21 | Stop reason: SDUPTHER

## 2019-08-13 NOTE — PROGRESS NOTES
Subjective    Supa Mcclelland is a 64 y.o. male. he is here today for follow-up.    Diabetes   Pertinent negatives for hypoglycemia include no confusion, dizziness, headaches, pallor or tremors. Pertinent negatives for diabetes include no chest pain, no fatigue, no polydipsia, no polyphagia, no polyuria and no weakness.            Primary Care Provider     GREY Rizvi     Duration 10 years     Timing - Diabetes is Constant     Quality -  now improved      Severity -  moderate     Complications - peripheral neuropathy and nephropathy ckd Stage III     Current symptoms/problems  none      Alleviating Factors: Compliance       Side Effects  none     Current diet  High carb     Current exercise walking     Current monitoring regimen: home blood tests - checking 4 x daily   Home blood sugar records:    Hypoglycemia with exertion          The following portions of the patient's history were reviewed and updated as appropriate:   Past Medical History:   Diagnosis Date   • Depression    • Diabetes mellitus (CMS/HCC)    • Disease of thyroid gland     HYPOTHYROIDISM   • GERD (gastroesophageal reflux disease)    • Hyperlipidemia    • Hypertension    • Kidney stone      Past Surgical History:   Procedure Laterality Date   • CHOLECYSTECTOMY     • ENDOSCOPY N/A 4/4/2017    Procedure: ESOPHAGOGASTRODUODENOSCOPY WITH ANESTHESIA;  Surgeon: Rambo Padilla DO;  Location: [facility] ENDOSCOPY;  Service:    • KIDNEY STONE SURGERY     • SHOULDER SURGERY       Family History   Problem Relation Age of Onset   • Heart disease Mother    • Diabetes Mother    • Alzheimer's disease Father    • Heart disease Father    • Diabetes Sister    • Heart disease Brother    • Diabetes Sister    • Diabetes Sister    • Colon cancer Neg Hx    • Esophageal cancer Neg Hx        Current Outpatient Medications   Medication Sig Dispense Refill   • carBAMazepine (TEGRETOL) 200 MG tablet Take 1 tablet by mouth 2 (Two) Times a Day. 60 tablet 1   •  Cholecalciferol 21837 units tablet 50 thousand units po q 2 weeks 2 tablet 11   • citalopram (CeleXA) 20 MG tablet Take 20 mg by mouth Daily.     • Dulaglutide 0.75 MG/0.5ML solution pen-injector Inject 0.75 mg under the skin into the appropriate area as directed 1 (One) Time Per Week. 4 pen 11   • Empagliflozin (JARDIANCE) 10 MG tablet Take 1 tablet by mouth Daily. 30 tablet 5   • famotidine (PEPCID) 20 MG tablet Take 20 mg by mouth 2 (Two) Times a Day.     • fexofenadine (ALLEGRA) 180 MG tablet Take 180 mg by mouth As Needed.     • fluticasone (FLONASE) 50 MCG/ACT nasal spray 1 spray each nostril twice a day for three days, then daily. 1 bottle 0   • gabapentin (NEURONTIN) 800 MG tablet TAKE 1 TABLET BY MOUTH TWICE DAILY 60 tablet 2   • HUMALOG KWIKPEN 200 UNIT/ML solution pen-injector INJECT 20 UNITS SUB Q THREE TIMES DAILY WITH MEALS. 5 pen 5   • hydrochlorothiazide (HYDRODIURIL) 12.5 MG tablet Take 12.5 mg by mouth Daily.     • levothyroxine (SYNTHROID, LEVOTHROID) 50 MCG tablet Take 1 tablet by mouth Daily.     • loratadine (CLARITIN) 10 MG tablet Take 10 mg by mouth Daily.     • meloxicam (MOBIC) 15 MG tablet Take 1 tablet by mouth Daily.     • metFORMIN ER (GLUCOPHAGE-XR) 500 MG 24 hr tablet TAKE 1 TABLET BY MOUTH TWICE DAILY WITH MEALS 60 tablet 11   • metoprolol tartrate (LOPRESSOR) 50 MG tablet Take 50 mg by mouth 2 (Two) Times a Day.     • omeprazole (priLOSEC) 20 MG capsule Take 1 capsule by mouth 2 (Two) Times a Day. 60 capsule 11   • potassium citrate (UROCIT-K) 10 MEQ (1080 MG) CR tablet Take 1 tablet by mouth 3 (Three) Times a Day With Meals. 90 tablet 11   • RELION PEN NEEDLES 31G X 6 MM misc USE AS DIRECTED 4 TIMES DAILY 150 each 11   • rOPINIRole (REQUIP) 1 MG tablet Take 1 tablet by mouth Every Night. Take 1 hour before bedtime. 30 tablet 6   • TRESIBA FLEXTOUCH 200 UNIT/ML solution pen-injector INJECT 100 UNITS SUB Q ONCE NIGHTLY 5 pen 5     No current facility-administered medications for this  visit.      Allergies   Allergen Reactions   • Atacand [Candesartan Cilexetil] Rash   • Biaxin [Clarithromycin] Rash   • Cefzil [Cefprozil] Rash   • Levaquin [Levofloxacin] Rash   • Penicillins Rash   • Zestril [Lisinopril] Rash     Social History     Socioeconomic History   • Marital status:      Spouse name: Not on file   • Number of children: Not on file   • Years of education: Not on file   • Highest education level: Not on file   Tobacco Use   • Smoking status: Never Smoker   • Smokeless tobacco: Never Used   Substance and Sexual Activity   • Alcohol use: No   • Drug use: No   • Sexual activity: Yes     Partners: Female       Review of Systems  Review of Systems   Constitutional: Negative for activity change, appetite change, diaphoresis and fatigue.   HENT: Negative for facial swelling, sneezing, sore throat, tinnitus, trouble swallowing and voice change.    Eyes: Negative for photophobia, pain, discharge, redness, itching and visual disturbance.   Respiratory: Negative for apnea, cough, choking, chest tightness and shortness of breath.    Cardiovascular: Negative for chest pain, palpitations and leg swelling.   Gastrointestinal: Negative for abdominal distention, abdominal pain, constipation, diarrhea, nausea and vomiting.   Endocrine: Negative for cold intolerance, heat intolerance, polydipsia, polyphagia and polyuria.   Genitourinary: Negative for difficulty urinating, dysuria, frequency, hematuria and urgency.   Musculoskeletal: Negative for arthralgias, back pain, gait problem, joint swelling, myalgias, neck pain and neck stiffness.   Skin: Negative for color change, pallor, rash and wound.   Neurological: Negative for dizziness, tremors, weakness, light-headedness, numbness and headaches.   Hematological: Negative for adenopathy. Does not bruise/bleed easily.   Psychiatric/Behavioral: Negative for behavioral problems, confusion and sleep disturbance.        Objective    /78   Pulse 94   Ht  "149.9 cm (59\")   Wt 96 kg (211 lb 9.6 oz)   SpO2 99%   BMI 42.74 kg/m²   Physical Exam   Constitutional: He is oriented to person, place, and time. He appears well-developed and well-nourished. No distress.   HENT:   Head: Normocephalic and atraumatic.   Right Ear: External ear normal.   Left Ear: External ear normal.   Nose: Nose normal.   Eyes: Conjunctivae and EOM are normal. Pupils are equal, round, and reactive to light.   Neck: Normal range of motion. Neck supple. No tracheal deviation present. No thyromegaly present.   Cardiovascular: Normal rate, regular rhythm and normal heart sounds.   No murmur heard.  Pulmonary/Chest: Effort normal and breath sounds normal. No respiratory distress. He has no wheezes.   Abdominal: Soft. Bowel sounds are normal. There is no tenderness. There is no rebound and no guarding.   Musculoskeletal: Normal range of motion. He exhibits no edema, tenderness or deformity.   Neurological: He is alert and oriented to person, place, and time. No cranial nerve deficit.   Skin: Skin is warm and dry. No rash noted.   Psychiatric: He has a normal mood and affect. His behavior is normal. Judgment and thought content normal.       Lab Review  Glucose (mg/dL)   Date Value   01/25/2019 195 (H)   12/27/2018 160 (H)   12/25/2017 208 (H)   11/14/2017 240 (H)     Sodium (mmol/L)   Date Value   01/25/2019 142   12/27/2018 143   12/25/2017 143   11/14/2017 142     Potassium (mmol/L)   Date Value   01/25/2019 4.0   12/27/2018 4.2   12/25/2017 3.7   11/14/2017 4.0     Chloride (mmol/L)   Date Value   01/25/2019 100   12/27/2018 101   12/25/2017 104   11/14/2017 103     CO2 (mmol/L)   Date Value   01/25/2019 29.0   12/27/2018 24   12/25/2017 26.0   11/14/2017 29.0     BUN (mg/dL)   Date Value   01/25/2019 18   12/27/2018 19   12/25/2017 14   11/14/2017 12     Creatinine (mg/dL)   Date Value   01/25/2019 1.23   12/27/2018 1.3 (H)   12/25/2017 1.09   11/14/2017 1.06     Hemoglobin A1C (%)   Date Value "   01/25/2019 7.8   12/27/2018 8.5 (H)   07/27/2018 7.2   08/03/2015 7.9 (H)     Triglycerides (mg/dL)   Date Value   01/25/2019 233 (H)   11/14/2017 227 (H)     LDL Cholesterol  (mg/dL)   Date Value   01/25/2019 81   11/14/2017 88       Assessment/Plan      1. Diabetic polyneuropathy associated with type 2 diabetes mellitus (CMS/[facility])    2. Type 2 diabetes mellitus with complication, with long-term current use of insulin (CMS/[facility])    3. Hypertension associated with diabetes (CMS/[facility])    4. Mixed diabetic hyperlipidemia associated with type 2 diabetes mellitus (CMS/[facility])    5. Vitamin D deficiency    .    Medications prescribed:  Outpatient Encounter Medications as of 8/13/2019   Medication Sig Dispense Refill   • carBAMazepine (TEGRETOL) 200 MG tablet Take 1 tablet by mouth 2 (Two) Times a Day. 60 tablet 1   • Cholecalciferol 69326 units tablet 50 thousand units po q 2 weeks 2 tablet 11   • citalopram (CeleXA) 20 MG tablet Take 20 mg by mouth Daily.     • Dulaglutide 0.75 MG/0.5ML solution pen-injector Inject 0.75 mg under the skin into the appropriate area as directed 1 (One) Time Per Week. 4 pen 11   • Empagliflozin (JARDIANCE) 10 MG tablet Take 1 tablet by mouth Daily. 30 tablet 5   • famotidine (PEPCID) 20 MG tablet Take 20 mg by mouth 2 (Two) Times a Day.     • fexofenadine (ALLEGRA) 180 MG tablet Take 180 mg by mouth As Needed.     • fluticasone (FLONASE) 50 MCG/ACT nasal spray 1 spray each nostril twice a day for three days, then daily. 1 bottle 0   • gabapentin (NEURONTIN) 800 MG tablet TAKE 1 TABLET BY MOUTH TWICE DAILY 60 tablet 2   • HUMALOG KWIKPEN 200 UNIT/ML solution pen-injector INJECT 20 UNITS SUB Q THREE TIMES DAILY WITH MEALS. 5 pen 5   • hydrochlorothiazide (HYDRODIURIL) 12.5 MG tablet Take 12.5 mg by mouth Daily.     • levothyroxine (SYNTHROID, LEVOTHROID) 50 MCG tablet Take 1 tablet by mouth Daily.     • loratadine (CLARITIN) 10 MG tablet Take 10 mg by mouth Daily.     • meloxicam (MOBIC) 15 MG  tablet Take 1 tablet by mouth Daily.     • metFORMIN ER (GLUCOPHAGE-XR) 500 MG 24 hr tablet TAKE 1 TABLET BY MOUTH TWICE DAILY WITH MEALS 60 tablet 11   • metoprolol tartrate (LOPRESSOR) 50 MG tablet Take 50 mg by mouth 2 (Two) Times a Day.     • omeprazole (priLOSEC) 20 MG capsule Take 1 capsule by mouth 2 (Two) Times a Day. 60 capsule 11   • potassium citrate (UROCIT-K) 10 MEQ (1080 MG) CR tablet Take 1 tablet by mouth 3 (Three) Times a Day With Meals. 90 tablet 11   • RELION PEN NEEDLES 31G X 6 MM misc USE AS DIRECTED 4 TIMES DAILY 150 each 11   • rOPINIRole (REQUIP) 1 MG tablet Take 1 tablet by mouth Every Night. Take 1 hour before bedtime. 30 tablet 6   • TRESIBA FLEXTOUCH 200 UNIT/ML solution pen-injector INJECT 100 UNITS SUB Q ONCE NIGHTLY 5 pen 5     No facility-administered encounter medications on file as of 8/13/2019.        Orders placed during this encounter include:  Orders Placed This Encounter   Procedures   • POC Glucose   • POC Glycosylated Hemoglobin (Hb A1C)     Glycemic Management:      Lab Results   Component Value Date    HGBA1C 7.8 01/25/2019          tresiba 75 units in am - change to basaglar 75 units qam      Humalog , no carb counting , 10 units - increase to 12      6 units for lunch     10 units for supper       Jardiance 10 mg daily     Metformin xr 500 mg po BID      Trulicity 0.75 mg weekly     Approve for Insulin Pump and or Continuous Glucose Sensor     #1  Patient has diabetes mellitus, insulin-dependent.    #2 He performs blood glucose testing at least times daily and blood glucose log was brought to office with variability from .    #3  He is requiring  Basal insulin  and Prandial Insulin for a total of at least  4 injections per day and has been doing this for at least 6 months.     #4 Patient tests blood sugars at least 4 times daily and makes frequent self-adjustments and patient is injecting insulin at least 4 times daily. He has been doing this for more than 6 months  . He tests frequently due to hypoglycemia and hyperglycemia.     #5 I have personally seen patient within the past 6 months    #6 We plan on seeing her every 2-3 months for continuous adjustment of her diabetes regimen     #7 patient has hypoglycemia with episodes of unawareness.    #8 patient has day-to-day variation in her mealtime which confounds the degree of insulin dosing with multiple daily injections.    #9 patient has completed diabetes education program with us.    #10 He has demonstrated the ability to self monitor her glucose.        #11 Patient is motivated in improving  diabetes control          Lipid Management       is taking a statin but does not know the name    Lab Results   Component Value Date    CHOL 165 01/25/2019    TRIG 233 (H) 01/25/2019    HDL 43 01/25/2019    LDL 81 01/25/2019           Blood Pressure Management:          Normal            Microvascular Complication Monitoring:       Eye Exam Evaluation  Needs one   -----------     Last Microalbumin-Proteinuria Assessment     Lab Results   Component Value Date    MALBCRERATIO 14.4 01/25/2019       -----------        Neuropathy, yes on neurontin, antidepressants  Also on requip            Weight Related:       BMI - 30.1     Has lost 6 lbs since last visit     Decreased caloric intake            Diet interventions: moderate (500 kCal/d) deficit diet.        Bone Health     Lab Results   Component Value Date    VIEZ62CG 20.8 (L) 01/25/2019    IRCH26AY 26.4 (L) 08/03/2015     vit D 50 th u every 2 weeks        Thyroid Health     Lab Results   Component Value Date    TSH 1.850 01/25/2019        on levothyroxine 50 mcgs daily         Lab Results   Component Value Date    [id number] 262 01/25/2019                4. Follow-up: No Follow-up on file.

## 2019-08-14 ENCOUNTER — TELEPHONE (OUTPATIENT)
Dept: ENDOCRINOLOGY | Facility: CLINIC | Age: 64
End: 2019-08-14

## 2019-08-19 ENCOUNTER — TELEPHONE (OUTPATIENT)
Dept: FAMILY MEDICINE CLINIC | Facility: CLINIC | Age: 64
End: 2019-08-19

## 2019-08-19 NOTE — TELEPHONE ENCOUNTER
Wife Rossana called and states she called last week and had some RX sent to her home. She needs two more. She needs Trulicity and the Humalog Kwikpen. She states that she needs the written RX mailed to her home. Please call her at [phone] with any questions

## 2019-08-20 ENCOUNTER — TELEPHONE (OUTPATIENT)
Dept: ENDOCRINOLOGY | Facility: CLINIC | Age: 64
End: 2019-08-20

## 2019-09-24 ENCOUNTER — TELEPHONE (OUTPATIENT)
Dept: ENDOCRINOLOGY | Facility: CLINIC | Age: 64
End: 2019-09-24

## 2019-09-27 ENCOUNTER — OFFICE VISIT (OUTPATIENT)
Dept: NEUROLOGY | Facility: CLINIC | Age: 64
End: 2019-09-27

## 2019-09-27 ENCOUNTER — LAB (OUTPATIENT)
Dept: LAB | Facility: HOSPITAL | Age: 64
End: 2019-09-27

## 2019-09-27 VITALS
BODY MASS INDEX: 40.25 KG/M2 | WEIGHT: 205 LBS | DIASTOLIC BLOOD PRESSURE: 80 MMHG | HEART RATE: 80 BPM | SYSTOLIC BLOOD PRESSURE: 130 MMHG | HEIGHT: 60 IN

## 2019-09-27 DIAGNOSIS — E11.42 DIABETIC POLYNEUROPATHY ASSOCIATED WITH TYPE 2 DIABETES MELLITUS (HCC): Primary | ICD-10-CM

## 2019-09-27 DIAGNOSIS — E11.42 DIABETIC POLYNEUROPATHY ASSOCIATED WITH TYPE 2 DIABETES MELLITUS (HCC): ICD-10-CM

## 2019-09-27 DIAGNOSIS — G25.81 RESTLESS LEGS SYNDROME (RLS): ICD-10-CM

## 2019-09-27 LAB
ALBUMIN SERPL-MCNC: 4.3 G/DL (ref 3.5–5.2)
ALBUMIN/GLOB SERPL: 1.3 G/DL
ALP SERPL-CCNC: 93 U/L (ref 39–117)
ALT SERPL W P-5'-P-CCNC: 34 U/L (ref 1–41)
ANION GAP SERPL CALCULATED.3IONS-SCNC: 14.2 MMOL/L (ref 5–15)
AST SERPL-CCNC: 37 U/L (ref 1–40)
BASOPHILS # BLD AUTO: 0.04 10*3/MM3 (ref 0–0.2)
BASOPHILS NFR BLD AUTO: 0.5 % (ref 0–1.5)
BILIRUB SERPL-MCNC: 0.5 MG/DL (ref 0.2–1.2)
BUN BLD-MCNC: 19 MG/DL (ref 8–23)
BUN/CREAT SERPL: 13.7 (ref 7–25)
CALCIUM SPEC-SCNC: 9.9 MG/DL (ref 8.6–10.5)
CARBAMAZEPINE SERPL-MCNC: 2.2 MCG/ML (ref 4–12)
CHLORIDE SERPL-SCNC: 95 MMOL/L (ref 98–107)
CO2 SERPL-SCNC: 24.8 MMOL/L (ref 22–29)
CREAT BLD-MCNC: 1.39 MG/DL (ref 0.76–1.27)
DEPRECATED RDW RBC AUTO: 41.6 FL (ref 37–54)
EOSINOPHIL # BLD AUTO: 0.58 10*3/MM3 (ref 0–0.4)
EOSINOPHIL NFR BLD AUTO: 6.8 % (ref 0.3–6.2)
ERYTHROCYTE [DISTWIDTH] IN BLOOD BY AUTOMATED COUNT: 13.1 % (ref 12.3–15.4)
GFR SERPL CREATININE-BSD FRML MDRD: 51 ML/MIN/1.73
GLOBULIN UR ELPH-MCNC: 3.4 GM/DL
GLUCOSE BLD-MCNC: 389 MG/DL (ref 65–99)
HCT VFR BLD AUTO: 44.9 % (ref 37.5–51)
HGB BLD-MCNC: 15.3 G/DL (ref 13–17.7)
IMM GRANULOCYTES # BLD AUTO: 0.18 10*3/MM3 (ref 0–0.05)
IMM GRANULOCYTES NFR BLD AUTO: 2.1 % (ref 0–0.5)
LYMPHOCYTES # BLD AUTO: 2.57 10*3/MM3 (ref 0.7–3.1)
LYMPHOCYTES NFR BLD AUTO: 30 % (ref 19.6–45.3)
MCH RBC QN AUTO: 29.5 PG (ref 26.6–33)
MCHC RBC AUTO-ENTMCNC: 34.1 G/DL (ref 31.5–35.7)
MCV RBC AUTO: 86.7 FL (ref 79–97)
MONOCYTES # BLD AUTO: 0.62 10*3/MM3 (ref 0.1–0.9)
MONOCYTES NFR BLD AUTO: 7.2 % (ref 5–12)
NEUTROPHILS # BLD AUTO: 4.59 10*3/MM3 (ref 1.7–7)
NEUTROPHILS NFR BLD AUTO: 53.4 % (ref 42.7–76)
NRBC BLD AUTO-RTO: 0 /100 WBC (ref 0–0.2)
PLATELET # BLD AUTO: 174 10*3/MM3 (ref 140–450)
PMV BLD AUTO: 12 FL (ref 6–12)
POTASSIUM BLD-SCNC: 5 MMOL/L (ref 3.5–5.2)
PROT SERPL-MCNC: 7.7 G/DL (ref 6–8.5)
RBC # BLD AUTO: 5.18 10*6/MM3 (ref 4.14–5.8)
SODIUM BLD-SCNC: 134 MMOL/L (ref 136–145)
WBC NRBC COR # BLD: 8.58 10*3/MM3 (ref 3.4–10.8)

## 2019-09-27 PROCEDURE — 85025 COMPLETE CBC W/AUTO DIFF WBC: CPT | Performed by: PHYSICIAN ASSISTANT

## 2019-09-27 PROCEDURE — 80053 COMPREHEN METABOLIC PANEL: CPT | Performed by: PHYSICIAN ASSISTANT

## 2019-09-27 PROCEDURE — 80156 ASSAY CARBAMAZEPINE TOTAL: CPT | Performed by: PHYSICIAN ASSISTANT

## 2019-09-27 PROCEDURE — 36415 COLL VENOUS BLD VENIPUNCTURE: CPT

## 2019-09-27 PROCEDURE — 99213 OFFICE O/P EST LOW 20 MIN: CPT | Performed by: PHYSICIAN ASSISTANT

## 2019-09-27 RX ORDER — GABAPENTIN 800 MG/1
800 TABLET ORAL 3 TIMES DAILY
Qty: 90 TABLET | Refills: 2 | OUTPATIENT
Start: 2019-09-27 | End: 2020-02-03 | Stop reason: SDUPTHER

## 2019-10-01 ENCOUNTER — TELEPHONE (OUTPATIENT)
Dept: FAMILY MEDICINE CLINIC | Facility: CLINIC | Age: 64
End: 2019-10-01

## 2019-10-04 ENCOUNTER — TELEPHONE (OUTPATIENT)
Dept: ENDOCRINOLOGY | Facility: CLINIC | Age: 64
End: 2019-10-04

## 2019-10-07 NOTE — PROGRESS NOTES
Subjective   Supa Mcclelland is a 64 y.o. male is here today for follow-up.    Painful peripheral neuropathy symptoms are somewhat helped by his present regimen.      Peripheral Neuropathy   This is a chronic problem. The current episode started more than 1 year ago. The problem occurs daily. The problem has been unchanged. Associated symptoms include fatigue, numbness and weakness. Associated symptoms comments: Bilateral lower extremity neuropathic pain. The symptoms are aggravated by exertion, stress, walking and standing. Treatments tried: Pharmacotherapy, control underlying disease, neuropathic pain creams. The treatment provided moderate relief.       The following portions of the patient's history were reviewed and updated as appropriate: allergies, current medications, past family history, past medical history, past social history, past surgical history and problem list.    Review of Systems   Constitutional: Positive for fatigue.   HENT: Negative.    Eyes: Negative.    Respiratory: Negative.    Cardiovascular: Negative.    Gastrointestinal: Negative.    Endocrine: Negative.    Genitourinary: Negative.    Musculoskeletal: Positive for back pain and gait problem.   Skin: Negative.    Allergic/Immunologic: Negative.    Neurological: Positive for weakness and numbness.        Bilateral lower shooting neuropathic pain   Hematological: Negative.    Psychiatric/Behavioral: Negative.        Objective   Physical Exam   Constitutional: He is oriented to person, place, and time. Vital signs are normal. He appears well-developed and well-nourished. No distress.   HENT:   Head: Normocephalic and atraumatic.   Right Ear: External ear normal. Decreased hearing is noted.   Left Ear: External ear normal. Decreased hearing is noted.   Nose: Nose normal.   Mouth/Throat: Uvula is midline, oropharynx is clear and moist and mucous membranes are normal.   Eyes: Conjunctivae, EOM and lids are normal. Pupils are equal, round, and  reactive to light. No scleral icterus.   Neck: Normal range of motion and phonation normal. No JVD present. Carotid bruit is not present.   Cardiovascular: Normal rate, regular rhythm and normal heart sounds.   No murmur heard.  Pulmonary/Chest: Effort normal and breath sounds normal.   Musculoskeletal: Normal range of motion. He exhibits no edema or tenderness.        Lumbar back: Normal.   Lymphadenopathy:     He has no cervical adenopathy.   Neurological: He is alert and oriented to person, place, and time. He has normal strength. He displays no atrophy and no tremor. A sensory deficit is present. No cranial nerve deficit. He exhibits normal muscle tone. He displays a negative Romberg sign. Coordination and gait normal. GCS eye subscore is 4. GCS verbal subscore is 5. GCS motor subscore is 6.   Reflex Scores:       Tricep reflexes are 2+ on the right side and 2+ on the left side.       Bicep reflexes are 2+ on the right side and 2+ on the left side.       Brachioradialis reflexes are 2+ on the right side and 2+ on the left side.       Patellar reflexes are 1+ on the right side and 1+ on the left side.       Achilles reflexes are 1+ on the right side and 1+ on the left side.  Diminished peripheral sensation in the lower extremities consistent with diabetic peripheral neuropathy   Skin: Skin is warm, dry and intact.   Psychiatric: He has a normal mood and affect. His speech is normal and behavior is normal. Judgment and thought content normal. Cognition and memory are normal.   Nursing note and vitals reviewed.        Assessment/Plan   Supa was seen today for diabetic polyneuropathy associated with type 2 diabetes stewart.    Diagnoses and all orders for this visit:    Diabetic polyneuropathy associated with type 2 diabetes mellitus (CMS/[facility])  -     Comprehensive Metabolic Panel; Future  -     Carbamazepine Level, Total; Future  -     CBC & Differential  -     CBC Auto Differential    Neurologically stable.  No  changes recommended in his present regimen.  Follow-up lab as above.    Today's findings and recommendations are reviewed with the patient.    10 minutes of a 15-minute outpatient visit was spent in counseling coordination of care today.    Dictated utilizing Dragon dictation.

## 2019-10-10 ENCOUNTER — TELEPHONE (OUTPATIENT)
Dept: ENDOCRINOLOGY | Facility: CLINIC | Age: 64
End: 2019-10-10

## 2019-11-21 ENCOUNTER — OFFICE VISIT (OUTPATIENT)
Dept: ENDOCRINOLOGY | Facility: CLINIC | Age: 64
End: 2019-11-21

## 2019-11-21 VITALS
OXYGEN SATURATION: 98 % | WEIGHT: 213.3 LBS | HEIGHT: 60 IN | SYSTOLIC BLOOD PRESSURE: 132 MMHG | HEART RATE: 86 BPM | DIASTOLIC BLOOD PRESSURE: 78 MMHG | BODY MASS INDEX: 41.88 KG/M2

## 2019-11-21 DIAGNOSIS — Z79.4 TYPE 2 DIABETES MELLITUS WITH COMPLICATION, WITH LONG-TERM CURRENT USE OF INSULIN (HCC): Primary | ICD-10-CM

## 2019-11-21 DIAGNOSIS — E11.8 TYPE 2 DIABETES MELLITUS WITH COMPLICATION, WITH LONG-TERM CURRENT USE OF INSULIN (HCC): Primary | ICD-10-CM

## 2019-11-21 DIAGNOSIS — E11.59 HYPERTENSION ASSOCIATED WITH DIABETES (HCC): ICD-10-CM

## 2019-11-21 DIAGNOSIS — E11.42 DIABETIC POLYNEUROPATHY ASSOCIATED WITH TYPE 2 DIABETES MELLITUS (HCC): ICD-10-CM

## 2019-11-21 DIAGNOSIS — E55.9 VITAMIN D DEFICIENCY: ICD-10-CM

## 2019-11-21 DIAGNOSIS — I15.2 HYPERTENSION ASSOCIATED WITH DIABETES (HCC): ICD-10-CM

## 2019-11-21 PROCEDURE — 99214 OFFICE O/P EST MOD 30 MIN: CPT | Performed by: INTERNAL MEDICINE

## 2019-11-21 PROCEDURE — 95249 CONT GLUC MNTR PT PROV EQP: CPT | Performed by: INTERNAL MEDICINE

## 2019-11-21 NOTE — PROGRESS NOTES
Subjective    Supa Mcclelland is a 64 y.o. male. he is here today for follow-up.    Diabetes   Pertinent negatives for hypoglycemia include no confusion, dizziness, headaches, pallor or tremors. Pertinent negatives for diabetes include no chest pain, no fatigue, no polydipsia, no polyphagia, no polyuria and no weakness.            Primary Care Provider     GREY Rizvi     Duration 10 years     Timing - Diabetes is Constant     Quality -  hyperglycemic      Severity -  moderate     Complications - peripheral neuropathy and nephropathy ckd Stage III     Current symptoms/problems  none      Alleviating Factors: Compliance       Side Effects  none     Current diet  High carb     Current exercise walking     Current monitoring regimen: home blood tests - checking 4 x daily   Home blood sugar records:     [place] from Nov 8 to Nov 21 reviewed    avg glucose 254  In range 3%  No hypoglycemia   Hypoglycemia with exertion          The following portions of the patient's history were reviewed and updated as appropriate:   Past Medical History:   Diagnosis Date   • Depression    • Diabetes mellitus (CMS/HCC)    • Disease of thyroid gland     HYPOTHYROIDISM   • GERD (gastroesophageal reflux disease)    • Hyperlipidemia    • Hypertension    • Kidney stone      Past Surgical History:   Procedure Laterality Date   • CHOLECYSTECTOMY     • ENDOSCOPY N/A 4/4/2017    Procedure: ESOPHAGOGASTRODUODENOSCOPY WITH ANESTHESIA;  Surgeon: Rambo Padilla DO;  Location: [facility] ENDOSCOPY;  Service:    • KIDNEY STONE SURGERY     • SHOULDER SURGERY       Family History   Problem Relation Age of Onset   • Heart disease Mother    • Diabetes Mother    • Alzheimer's disease Father    • Heart disease Father    • Diabetes Sister    • Heart disease Brother    • Diabetes Sister    • Diabetes Sister    • Colon cancer Neg Hx    • Esophageal cancer Neg Hx        Current Outpatient Medications   Medication Sig Dispense Refill   • carBAMazepine  (TEGRETOL) 200 MG tablet Take 1 tablet by mouth 2 (Two) Times a Day. 60 tablet 1   • Cholecalciferol 55441 units tablet 50 thousand units po q 2 weeks 2 tablet 11   • citalopram (CeleXA) 20 MG tablet Take 20 mg by mouth Daily.     • Dulaglutide 0.75 MG/0.5ML solution pen-injector Inject 0.75 mg under the skin into the appropriate area as directed 1 (One) Time Per Week. 4 pen 11   • Empagliflozin (JARDIANCE) 10 MG tablet Take 1 tablet by mouth Daily. 90 tablet 3   • famotidine (PEPCID) 20 MG tablet Take 20 mg by mouth 2 (Two) Times a Day.     • fexofenadine (ALLEGRA) 180 MG tablet Take 180 mg by mouth As Needed.     • fluticasone (FLONASE) 50 MCG/ACT nasal spray 1 spray each nostril twice a day for three days, then daily. 1 bottle 0   • gabapentin (NEURONTIN) 800 MG tablet Take 1 tablet by mouth 3 (Three) Times a Day. 90 tablet 2   • hydrochlorothiazide (HYDRODIURIL) 12.5 MG tablet Take 12.5 mg by mouth Daily.     • Insulin Glargine (BASAGLAR KWIKPEN) 100 UNIT/ML injection pen 75units qam 8 pen 11   • Insulin Lispro (HUMALOG KWIKPEN) 200 UNIT/ML solution pen-injector Inject 0.1 mL under the skin into the appropriate area as directed 3 (Three) Times a Day With Meals. 15 pen 3   • levothyroxine (SYNTHROID, LEVOTHROID) 50 MCG tablet Take 1 tablet by mouth Daily.     • loratadine (CLARITIN) 10 MG tablet Take 10 mg by mouth Daily.     • meloxicam (MOBIC) 15 MG tablet Take 1 tablet by mouth Daily.     • metFORMIN ER (GLUCOPHAGE-XR) 500 MG 24 hr tablet TAKE 1 TABLET BY MOUTH TWICE DAILY WITH MEALS 60 tablet 11   • metoprolol tartrate (LOPRESSOR) 50 MG tablet Take 50 mg by mouth 2 (Two) Times a Day.     • omeprazole (priLOSEC) 20 MG capsule Take 1 capsule by mouth 2 (Two) Times a Day. 60 capsule 11   • potassium citrate (UROCIT-K) 10 MEQ (1080 MG) CR tablet Take 1 tablet by mouth 3 (Three) Times a Day With Meals. 90 tablet 11   • RELION PEN NEEDLES 31G X 6 MM misc USE AS DIRECTED 4 TIMES DAILY 150 each 11   • rOPINIRole  (REQUIP) 1 MG tablet Take 1 tablet by mouth Every Night. Take 1 hour before bedtime. 30 tablet 6     No current facility-administered medications for this visit.      Allergies   Allergen Reactions   • Atacand [Candesartan Cilexetil] Rash   • Biaxin [Clarithromycin] Rash   • Cefzil [Cefprozil] Rash   • Levaquin [Levofloxacin] Rash   • Penicillins Rash   • Zestril [Lisinopril] Rash     Social History     Socioeconomic History   • Marital status:      Spouse name: Not on file   • Number of children: Not on file   • Years of education: Not on file   • Highest education level: Not on file   Tobacco Use   • Smoking status: Never Smoker   • Smokeless tobacco: Never Used   Substance and Sexual Activity   • Alcohol use: No   • Drug use: No   • Sexual activity: Yes     Partners: Female       Review of Systems  Review of Systems   Constitutional: Negative for activity change, appetite change, diaphoresis and fatigue.   HENT: Negative for facial swelling, sneezing, sore throat, tinnitus, trouble swallowing and voice change.    Eyes: Negative for photophobia, pain, discharge, redness, itching and visual disturbance.   Respiratory: Negative for apnea, cough, choking, chest tightness and shortness of breath.    Cardiovascular: Negative for chest pain, palpitations and leg swelling.   Gastrointestinal: Negative for abdominal distention, abdominal pain, constipation, diarrhea, nausea and vomiting.   Endocrine: Negative for cold intolerance, heat intolerance, polydipsia, polyphagia and polyuria.   Genitourinary: Negative for difficulty urinating, dysuria, frequency, hematuria and urgency.   Musculoskeletal: Negative for arthralgias, back pain, gait problem, joint swelling, myalgias, neck pain and neck stiffness.   Skin: Negative for color change, pallor, rash and wound.   Neurological: Negative for dizziness, tremors, weakness, light-headedness, numbness and headaches.   Hematological: Negative for adenopathy. Does not  "bruise/bleed easily.   Psychiatric/Behavioral: Negative for behavioral problems, confusion and sleep disturbance.        Objective    /78   Pulse 86   Ht 149.9 cm (59\")   Wt 96.8 kg (213 lb 4.8 oz)   SpO2 98%   BMI 43.08 kg/m²   Physical Exam   Constitutional: He is oriented to person, place, and time. He appears well-developed and well-nourished. No distress.   HENT:   Head: Normocephalic and atraumatic.   Right Ear: External ear normal.   Left Ear: External ear normal.   Nose: Nose normal.   Eyes: Conjunctivae and EOM are normal. Pupils are equal, round, and reactive to light.   Neck: Normal range of motion. Neck supple. No tracheal deviation present. No thyromegaly present.   Cardiovascular: Normal rate, regular rhythm and normal heart sounds.   No murmur heard.  Pulmonary/Chest: Effort normal and breath sounds normal. No respiratory distress. He has no wheezes.   Abdominal: Soft. Bowel sounds are normal. There is no tenderness. There is no rebound and no guarding.   Musculoskeletal: Normal range of motion. He exhibits no edema, tenderness or deformity.   Neurological: He is alert and oriented to person, place, and time. No cranial nerve deficit.   Skin: Skin is warm and dry. No rash noted.   Psychiatric: He has a normal mood and affect. His behavior is normal. Judgment and thought content normal.       Lab Review  Glucose (mg/dL)   Date Value   09/27/2019 389 (H)   01/25/2019 195 (H)   12/27/2018 160 (H)   12/25/2017 208 (H)     Sodium (mmol/L)   Date Value   09/27/2019 134 (L)   01/25/2019 142   12/27/2018 143   12/25/2017 143     Potassium (mmol/L)   Date Value   09/27/2019 5.0   01/25/2019 4.0   12/27/2018 4.2   12/25/2017 3.7     Chloride (mmol/L)   Date Value   09/27/2019 95 (L)   01/25/2019 100   12/27/2018 101   12/25/2017 104     CO2 (mmol/L)   Date Value   09/27/2019 24.8   01/25/2019 29.0   12/27/2018 24   12/25/2017 26.0     BUN (mg/dL)   Date Value   09/27/2019 19   01/25/2019 18 "   12/27/2018 19   12/25/2017 14     Creatinine (mg/dL)   Date Value   09/27/2019 1.39 (H)   01/25/2019 1.23   12/27/2018 1.3 (H)   12/25/2017 1.09     Hemoglobin A1C (%)   Date Value   08/13/2019 10.6   01/25/2019 7.8   12/27/2018 8.5 (H)   07/27/2018 7.2   08/03/2015 7.9 (H)     Triglycerides (mg/dL)   Date Value   01/25/2019 233 (H)   11/14/2017 227 (H)     LDL Cholesterol  (mg/dL)   Date Value   01/25/2019 81   11/14/2017 88       Assessment/Plan      1. Type 2 diabetes mellitus with complication, with long-term current use of insulin (CMS/[facility])    2. Hypertension associated with diabetes (CMS/[facility])    3. Diabetic polyneuropathy associated with type 2 diabetes mellitus (CMS/[facility])    4. Vitamin D deficiency    .    Medications prescribed:  Outpatient Encounter Medications as of 11/21/2019   Medication Sig Dispense Refill   • carBAMazepine (TEGRETOL) 200 MG tablet Take 1 tablet by mouth 2 (Two) Times a Day. 60 tablet 1   • Cholecalciferol 75674 units tablet 50 thousand units po q 2 weeks 2 tablet 11   • citalopram (CeleXA) 20 MG tablet Take 20 mg by mouth Daily.     • Dulaglutide 0.75 MG/0.5ML solution pen-injector Inject 0.75 mg under the skin into the appropriate area as directed 1 (One) Time Per Week. 4 pen 11   • Empagliflozin (JARDIANCE) 10 MG tablet Take 1 tablet by mouth Daily. 90 tablet 3   • famotidine (PEPCID) 20 MG tablet Take 20 mg by mouth 2 (Two) Times a Day.     • fexofenadine (ALLEGRA) 180 MG tablet Take 180 mg by mouth As Needed.     • fluticasone (FLONASE) 50 MCG/ACT nasal spray 1 spray each nostril twice a day for three days, then daily. 1 bottle 0   • gabapentin (NEURONTIN) 800 MG tablet Take 1 tablet by mouth 3 (Three) Times a Day. 90 tablet 2   • hydrochlorothiazide (HYDRODIURIL) 12.5 MG tablet Take 12.5 mg by mouth Daily.     • Insulin Glargine (BASAGLAR KWIKPEN) 100 UNIT/ML injection pen 75units qam 8 pen 11   • Insulin Lispro (HUMALOG KWIKPEN) 200 UNIT/ML solution pen-injector Inject 0.1 mL  under the skin into the appropriate area as directed 3 (Three) Times a Day With Meals. 15 pen 3   • levothyroxine (SYNTHROID, LEVOTHROID) 50 MCG tablet Take 1 tablet by mouth Daily.     • loratadine (CLARITIN) 10 MG tablet Take 10 mg by mouth Daily.     • meloxicam (MOBIC) 15 MG tablet Take 1 tablet by mouth Daily.     • metFORMIN ER (GLUCOPHAGE-XR) 500 MG 24 hr tablet TAKE 1 TABLET BY MOUTH TWICE DAILY WITH MEALS 60 tablet 11   • metoprolol tartrate (LOPRESSOR) 50 MG tablet Take 50 mg by mouth 2 (Two) Times a Day.     • omeprazole (priLOSEC) 20 MG capsule Take 1 capsule by mouth 2 (Two) Times a Day. 60 capsule 11   • potassium citrate (UROCIT-K) 10 MEQ (1080 MG) CR tablet Take 1 tablet by mouth 3 (Three) Times a Day With Meals. 90 tablet 11   • RELION PEN NEEDLES 31G X 6 MM misc USE AS DIRECTED 4 TIMES DAILY 150 each 11   • rOPINIRole (REQUIP) 1 MG tablet Take 1 tablet by mouth Every Night. Take 1 hour before bedtime. 30 tablet 6     No facility-administered encounter medications on file as of 11/21/2019.        Orders placed during this encounter include:  No orders of the defined types were placed in this encounter.    Glycemic Management:      Lab Results   Component Value Date    HGBA1C 10.6 08/13/2019          [place] from Nov 8 to Nov 21 reviewed    avg glucose 254  In range 3%  No hypoglycemia     Getting insulin through assistance program      tresiba 75 units in am - change to basaglar 75 units qam      Humalog , no carb counting , 10 units - increase to 12      6 units for lunch     10 units for supper     Change to 20 units with all meals ( rx states 20 qid, so he could do 25 tid if needed )      Jardiance 10 mg daily     Metformin xr 500 mg po BID      Trulicity 0.75 mg weekly - increase to 1.5 mg weekly     Using freestyle         Lipid Management       is taking a statin but does not know the name    Lab Results   Component Value Date    CHOL 165 01/25/2019    TRIG 233 (H) 01/25/2019    HDL 43  01/25/2019    LDL 81 01/25/2019           Blood Pressure Management:          Normal            Microvascular Complication Monitoring:       Eye Exam Evaluation  Needs one   -----------     Last Microalbumin-Proteinuria Assessment     Lab Results   Component Value Date    MARA 14.4 01/25/2019       -----------        Neuropathy, yes on neurontin, antidepressants  Also on requip            Weight Related:       BMI - 30.1     Has lost 6 lbs since last visit     Decreased caloric intake            Diet interventions: moderate (500 kCal/d) deficit diet.        Bone Health     Lab Results   Component Value Date    YGSY07KX 20.8 (L) 01/25/2019    [id number] 26.4 (L) 08/03/2015     vit D 50 th u every 2 weeks        Thyroid Health     Lab Results   Component Value Date    TSH 1.850 01/25/2019        on levothyroxine 50 mcgs daily         Lab Results   Component Value Date    [id number] 262 01/25/2019                4. Follow-up: No Follow-up on file.

## 2019-12-13 DIAGNOSIS — E11.42 DIABETIC POLYNEUROPATHY ASSOCIATED WITH TYPE 2 DIABETES MELLITUS (HCC): ICD-10-CM

## 2019-12-13 RX ORDER — CARBAMAZEPINE 200 MG/1
200 TABLET ORAL 2 TIMES DAILY
Qty: 60 TABLET | Refills: 5 | Status: SHIPPED | OUTPATIENT
Start: 2019-12-13 | End: 2020-09-29

## 2020-01-16 ENCOUNTER — TELEPHONE (OUTPATIENT)
Dept: CARDIOLOGY | Age: 65
End: 2020-01-16

## 2020-01-16 NOTE — TELEPHONE ENCOUNTER
Called to find out if patient has had any previous cardiac testing to retrieve for his upcoming new patient appointment with Dr. Jeyson Quick.      I spoke to his wife and she advised he has never had any cardiac testing or seen a cardiologist in the past.

## 2020-01-22 ENCOUNTER — OFFICE VISIT (OUTPATIENT)
Dept: CARDIOLOGY | Age: 65
End: 2020-01-22
Payer: MEDICARE

## 2020-01-22 VITALS
HEIGHT: 71 IN | HEART RATE: 85 BPM | BODY MASS INDEX: 30.38 KG/M2 | WEIGHT: 217 LBS | SYSTOLIC BLOOD PRESSURE: 140 MMHG | DIASTOLIC BLOOD PRESSURE: 86 MMHG

## 2020-01-22 PROCEDURE — G8427 DOCREV CUR MEDS BY ELIG CLIN: HCPCS | Performed by: INTERNAL MEDICINE

## 2020-01-22 PROCEDURE — G8417 CALC BMI ABV UP PARAM F/U: HCPCS | Performed by: INTERNAL MEDICINE

## 2020-01-22 PROCEDURE — 99203 OFFICE O/P NEW LOW 30 MIN: CPT | Performed by: INTERNAL MEDICINE

## 2020-01-22 PROCEDURE — 1036F TOBACCO NON-USER: CPT | Performed by: INTERNAL MEDICINE

## 2020-01-22 PROCEDURE — G8484 FLU IMMUNIZE NO ADMIN: HCPCS | Performed by: INTERNAL MEDICINE

## 2020-01-22 PROCEDURE — 1123F ACP DISCUSS/DSCN MKR DOCD: CPT | Performed by: INTERNAL MEDICINE

## 2020-01-22 PROCEDURE — 93000 ELECTROCARDIOGRAM COMPLETE: CPT | Performed by: INTERNAL MEDICINE

## 2020-01-22 PROCEDURE — 4040F PNEUMOC VAC/ADMIN/RCVD: CPT | Performed by: INTERNAL MEDICINE

## 2020-01-22 PROCEDURE — 3017F COLORECTAL CA SCREEN DOC REV: CPT | Performed by: INTERNAL MEDICINE

## 2020-01-22 RX ORDER — POTASSIUM CITRATE 10 MEQ/1
10 TABLET, EXTENDED RELEASE ORAL
COMMUNITY
End: 2021-04-28

## 2020-01-22 RX ORDER — CHOLECALCIFEROL (VITAMIN D3) 1250 MCG
50000 CAPSULE ORAL
COMMUNITY

## 2020-01-22 RX ORDER — FEXOFENADINE HCL 180 MG/1
180 TABLET ORAL DAILY
COMMUNITY
End: 2021-10-27

## 2020-01-22 NOTE — PATIENT INSTRUCTIONS
[place] at the [facility] and [address] [facility] located on the first floor of [place] [address] through hospital main entrance and turn immediately to your left. Patient's contact number:  [phone] (home)      Lexiscan Stress Test      Lexiscan (regadenoson injection) is a prescription drug given through an IV line that increases blood flow through the arteries of the heart during a cardiac nuclear stress test.     There are two parts to a Lexiscan stress test: the rest portion and the exercise portion. For the rest portion, a radioactive tracer is injected into your arm through the IV. After 30 to 60 minutes, the process of imaging will begin. A nuclear camera will be placed on your chest area and images are taken for the next 15 to 20 minutes. For the exercise portion, a nurse will attach EKG electrodes to your chest to monitor your heart rate. The drug [place] Antonio is administered to simulate stress on the heart. Your heart rhythm will then be monitored for the next few minutes. Your blood pressure will also be monitored throughout the exercise portion. [place] through the exercise portion, a second round of radioactive tracer is injected into your body. Your heart rate and EKG will be monitored for another few minutes after administering the drug. Test Preparation:     Bring a list of your current medications. Do not take any of your medications the morning of the test, but bring all morning medications with you as you will take them after the stress portion of the test is completed.  Do not eat Bananas 24 hours prior to test.     No caffeine 24 hours prior to the testing. This includes: coffee, pop/soda, chocolate, cold medications, etc.  Any product that might contain caffeine.  No nicotine or alcohol 12 hours prior to your test.    Nothing to eat or drink 4-6 hours prior to appointment time.   It is okay to drink small amounts of water during the four hours prior to the test.   Nitroglycerin patches must be taken off 1 hour before testing.  Wear comfortable clothing.  Please refrain from any strenuous exercise or activities the day before your test, or the day of your test.   The Nuclear Lexiscan Stress test takes about 2 ½ to 3 hours to complete. If for any reason you are unable to keep this appointment, please contact Outpatient Scheduling, [phone], as soon as possible to reschedule.

## 2020-01-22 NOTE — PROGRESS NOTES
recurrent episodes of any duration. Will obtain a Lexiscan dual-isotope in expeditious fashion. 2.  Hypertension -likely to require further titration of his medical regimen. Will recheck a second pressure before adjusting therapy. 3.  Dyslipidemia -December 2019 values LDL controlled at 53, HDL mildly depressed at 39, triglycerides elevated at 336. Medical records reviewed prior to today's clinic visit. More than 15 minutes spent face-to-face with patient in evaluating, and carefully explaining their problems and the planned approach.

## 2020-01-31 ENCOUNTER — OFFICE VISIT (OUTPATIENT)
Dept: CARDIOLOGY | Age: 65
End: 2020-01-31
Payer: MEDICARE

## 2020-01-31 VITALS
WEIGHT: 218 LBS | HEART RATE: 100 BPM | SYSTOLIC BLOOD PRESSURE: 134 MMHG | HEIGHT: 71 IN | DIASTOLIC BLOOD PRESSURE: 74 MMHG | BODY MASS INDEX: 30.52 KG/M2

## 2020-01-31 PROCEDURE — G8417 CALC BMI ABV UP PARAM F/U: HCPCS | Performed by: INTERNAL MEDICINE

## 2020-01-31 PROCEDURE — 4040F PNEUMOC VAC/ADMIN/RCVD: CPT | Performed by: INTERNAL MEDICINE

## 2020-01-31 PROCEDURE — 3017F COLORECTAL CA SCREEN DOC REV: CPT | Performed by: INTERNAL MEDICINE

## 2020-01-31 PROCEDURE — 1036F TOBACCO NON-USER: CPT | Performed by: INTERNAL MEDICINE

## 2020-01-31 PROCEDURE — 1123F ACP DISCUSS/DSCN MKR DOCD: CPT | Performed by: INTERNAL MEDICINE

## 2020-01-31 PROCEDURE — G8427 DOCREV CUR MEDS BY ELIG CLIN: HCPCS | Performed by: INTERNAL MEDICINE

## 2020-01-31 PROCEDURE — G8484 FLU IMMUNIZE NO ADMIN: HCPCS | Performed by: INTERNAL MEDICINE

## 2020-01-31 PROCEDURE — 99212 OFFICE O/P EST SF 10 MIN: CPT | Performed by: INTERNAL MEDICINE

## 2020-01-31 RX ORDER — LISINOPRIL 10 MG/1
10 TABLET ORAL DAILY
Qty: 90 TABLET | Refills: 1 | Status: SHIPPED | OUTPATIENT
Start: 2020-01-31 | End: 2020-03-10

## 2020-02-03 DIAGNOSIS — E11.42 DIABETIC POLYNEUROPATHY ASSOCIATED WITH TYPE 2 DIABETES MELLITUS (HCC): ICD-10-CM

## 2020-02-03 DIAGNOSIS — G25.81 RESTLESS LEGS SYNDROME (RLS): ICD-10-CM

## 2020-02-04 ENCOUNTER — HOSPITAL ENCOUNTER (OUTPATIENT)
Dept: NON INVASIVE DIAGNOSTICS | Age: 65
Discharge: HOME | End: 2020-02-04
Payer: MEDICARE

## 2020-02-04 LAB
LV EF: 55 %
LVEF MODALITY: NORMAL

## 2020-02-04 PROCEDURE — 93306 TTE W/DOPPLER COMPLETE: CPT

## 2020-02-04 RX ORDER — GABAPENTIN 800 MG/1
800 TABLET ORAL 3 TIMES DAILY
Qty: 90 TABLET | Refills: 2 | OUTPATIENT
Start: 2020-02-04 | End: 2020-05-28 | Stop reason: SDUPTHER

## 2020-02-21 ENCOUNTER — OFFICE VISIT (OUTPATIENT)
Dept: ENDOCRINOLOGY | Facility: CLINIC | Age: 65
End: 2020-02-21

## 2020-02-21 VITALS
SYSTOLIC BLOOD PRESSURE: 130 MMHG | WEIGHT: 219 LBS | HEART RATE: 93 BPM | OXYGEN SATURATION: 97 % | BODY MASS INDEX: 43 KG/M2 | DIASTOLIC BLOOD PRESSURE: 78 MMHG | HEIGHT: 60 IN

## 2020-02-21 DIAGNOSIS — E11.59 HYPERTENSION ASSOCIATED WITH DIABETES (HCC): ICD-10-CM

## 2020-02-21 DIAGNOSIS — I15.2 HYPERTENSION ASSOCIATED WITH DIABETES (HCC): ICD-10-CM

## 2020-02-21 DIAGNOSIS — E11.69 MIXED DIABETIC HYPERLIPIDEMIA ASSOCIATED WITH TYPE 2 DIABETES MELLITUS (HCC): ICD-10-CM

## 2020-02-21 DIAGNOSIS — I10 ESSENTIAL HYPERTENSION: ICD-10-CM

## 2020-02-21 DIAGNOSIS — E55.9 VITAMIN D DEFICIENCY: ICD-10-CM

## 2020-02-21 DIAGNOSIS — E11.8 TYPE 2 DIABETES MELLITUS WITH COMPLICATION, WITH LONG-TERM CURRENT USE OF INSULIN (HCC): Primary | ICD-10-CM

## 2020-02-21 DIAGNOSIS — Z79.4 TYPE 2 DIABETES MELLITUS WITH COMPLICATION, WITH LONG-TERM CURRENT USE OF INSULIN (HCC): Primary | ICD-10-CM

## 2020-02-21 DIAGNOSIS — E78.2 MIXED DIABETIC HYPERLIPIDEMIA ASSOCIATED WITH TYPE 2 DIABETES MELLITUS (HCC): ICD-10-CM

## 2020-02-21 PROCEDURE — 99214 OFFICE O/P EST MOD 30 MIN: CPT | Performed by: INTERNAL MEDICINE

## 2020-02-21 PROCEDURE — 95249 CONT GLUC MNTR PT PROV EQP: CPT | Performed by: INTERNAL MEDICINE

## 2020-02-21 RX ORDER — INSULIN GLARGINE 100 [IU]/ML
INJECTION, SOLUTION SUBCUTANEOUS
Qty: 36 PEN | Refills: 3 | Status: SHIPPED | OUTPATIENT
Start: 2020-02-21 | End: 2020-02-21 | Stop reason: SDUPTHER

## 2020-02-21 RX ORDER — ZOLPIDEM TARTRATE 10 MG/1
10 TABLET ORAL NIGHTLY PRN
COMMUNITY

## 2020-02-21 RX ORDER — LISINOPRIL 10 MG/1
10 TABLET ORAL DAILY
COMMUNITY
Start: 2020-01-31 | End: 2020-02-21

## 2020-02-21 RX ORDER — INSULIN GLARGINE 100 [IU]/ML
INJECTION, SOLUTION SUBCUTANEOUS
Qty: 36 PEN | Refills: 3 | Status: SHIPPED | OUTPATIENT
Start: 2020-02-21 | End: 2020-04-30 | Stop reason: SDUPTHER

## 2020-02-21 NOTE — PROGRESS NOTES
Subjective    Supa Mcclelland is a 64 y.o. male. he is here today for follow-up.    Diabetes   Pertinent negatives for hypoglycemia include no confusion, dizziness, headaches, pallor or tremors. Pertinent negatives for diabetes include no chest pain, no fatigue, no polydipsia, no polyphagia, no polyuria and no weakness.            Primary Care Provider     GREY Rizvi     Duration 10 years     Timing - Diabetes is Constant     Quality -  hyperglycemic      Severity -  moderate     Complications - peripheral neuropathy and nephropathy ckd Stage III     Current symptoms/problems  none      Alleviating Factors: Compliance       Side Effects  none     Current diet  High carb     Current exercise walking     Current monitoring regimen: home blood tests - checking 4 x daily   Home blood sugar records:     [place] from Nov 8 to Nov 21 reviewed    avg glucose 254  In range 3%  No hypoglycemia   Hypoglycemia with exertion          The following portions of the patient's history were reviewed and updated as appropriate:   Past Medical History:   Diagnosis Date   • Depression    • Diabetes mellitus (CMS/HCC)    • Disease of thyroid gland     HYPOTHYROIDISM   • GERD (gastroesophageal reflux disease)    • Hyperlipidemia    • Hypertension    • Kidney stone      Past Surgical History:   Procedure Laterality Date   • CHOLECYSTECTOMY     • ENDOSCOPY N/A 4/4/2017    Procedure: ESOPHAGOGASTRODUODENOSCOPY WITH ANESTHESIA;  Surgeon: Rambo Padilla DO;  Location: [facility] ENDOSCOPY;  Service:    • KIDNEY STONE SURGERY     • SHOULDER SURGERY       Family History   Problem Relation Age of Onset   • Heart disease Mother    • Diabetes Mother    • Alzheimer's disease Father    • Heart disease Father    • Diabetes Sister    • Heart disease Brother    • Diabetes Sister    • Diabetes Sister    • Colon cancer Neg Hx    • Esophageal cancer Neg Hx        Current Outpatient Medications   Medication Sig Dispense Refill   • carBAMazepine  (TEGRETOL) 200 MG tablet Take 1 tablet by mouth 2 (Two) Times a Day. 60 tablet 5   • Cholecalciferol 26018 units tablet 50 thousand units po q 2 weeks 2 tablet 11   • citalopram (CeleXA) 20 MG tablet Take 20 mg by mouth Daily.     • Dulaglutide 1.5 MG/0.5ML solution pen-injector Inject 1.5 mg under the skin into the appropriate area as directed 1 (One) Time Per Week. 4 pen 11   • Empagliflozin (JARDIANCE) 10 MG tablet Take 1 tablet by mouth Daily. 90 tablet 3   • famotidine (PEPCID) 20 MG tablet Take 20 mg by mouth 2 (Two) Times a Day.     • fexofenadine (ALLEGRA) 180 MG tablet Take 180 mg by mouth As Needed.     • fluticasone (FLONASE) 50 MCG/ACT nasal spray 1 spray each nostril twice a day for three days, then daily. 1 bottle 0   • gabapentin (NEURONTIN) 800 MG tablet Take 1 tablet by mouth 3 (Three) Times a Day. 90 tablet 2   • hydrochlorothiazide (HYDRODIURIL) 12.5 MG tablet Take 12.5 mg by mouth Daily.     • Insulin Glargine (BASAGLAR KWIKPEN) 100 UNIT/ML injection pen 75units qam 8 pen 11   • Insulin Lispro (HUMALOG KWIKPEN) 200 UNIT/ML solution pen-injector Inject 20 Units under the skin into the appropriate area as directed 3 (Three) Times a Day With Meals. 18 pen 3   • levothyroxine (SYNTHROID, LEVOTHROID) 50 MCG tablet Take 1 tablet by mouth Daily.     • loratadine (CLARITIN) 10 MG tablet Take 10 mg by mouth Daily.     • meloxicam (MOBIC) 15 MG tablet Take 1 tablet by mouth Daily.     • metFORMIN ER (GLUCOPHAGE-XR) 500 MG 24 hr tablet TAKE 1 TABLET BY MOUTH TWICE DAILY WITH MEALS 60 tablet 11   • metoprolol tartrate (LOPRESSOR) 50 MG tablet Take 50 mg by mouth 2 (Two) Times a Day.     • omeprazole (priLOSEC) 20 MG capsule Take 1 capsule by mouth 2 (Two) Times a Day. 60 capsule 11   • potassium citrate (UROCIT-K) 10 MEQ (1080 MG) CR tablet Take 1 tablet by mouth 3 (Three) Times a Day With Meals. 90 tablet 11   • RELION PEN NEEDLES 31G X 6 MM misc USE AS DIRECTED 4 TIMES DAILY 150 each 11   • rOPINIRole  (REQUIP) 1 MG tablet Take 1 tablet by mouth Every Night. Take 1 hour before bedtime. 30 tablet 6   • zolpidem (AMBIEN) 10 MG tablet zolpidem     tab 10mgzolpidem tartrate       No current facility-administered medications for this visit.      Allergies   Allergen Reactions   • Atacand [Candesartan Cilexetil] Rash   • Biaxin [Clarithromycin] Rash   • Cefzil [Cefprozil] Rash   • Levaquin [Levofloxacin] Rash   • Penicillins Rash   • Zestril [Lisinopril] Rash     Social History     Socioeconomic History   • Marital status:      Spouse name: Not on file   • Number of children: Not on file   • Years of education: Not on file   • Highest education level: Not on file   Tobacco Use   • Smoking status: Never Smoker   • Smokeless tobacco: Never Used   Substance and Sexual Activity   • Alcohol use: No   • Drug use: No   • Sexual activity: Yes     Partners: Female       Review of Systems  Review of Systems   Constitutional: Negative for activity change, appetite change, diaphoresis and fatigue.   HENT: Negative for facial swelling, sneezing, sore throat, tinnitus, trouble swallowing and voice change.    Eyes: Negative for photophobia, pain, discharge, redness, itching and visual disturbance.   Respiratory: Negative for apnea, cough, choking, chest tightness and shortness of breath.    Cardiovascular: Negative for chest pain, palpitations and leg swelling.   Gastrointestinal: Negative for abdominal distention, abdominal pain, constipation, diarrhea, nausea and vomiting.   Endocrine: Negative for cold intolerance, heat intolerance, polydipsia, polyphagia and polyuria.   Genitourinary: Negative for difficulty urinating, dysuria, frequency, hematuria and urgency.   Musculoskeletal: Negative for arthralgias, back pain, gait problem, joint swelling, myalgias, neck pain and neck stiffness.   Skin: Negative for color change, pallor, rash and wound.   Neurological: Negative for dizziness, tremors, weakness, light-headedness,  "numbness and headaches.   Hematological: Negative for adenopathy. Does not bruise/bleed easily.   Psychiatric/Behavioral: Negative for behavioral problems, confusion and sleep disturbance.        Objective    /78   Pulse 93   Ht 149.9 cm (59\")   Wt 99.3 kg (219 lb)   SpO2 97%   BMI 44.23 kg/m²   Physical Exam   Constitutional: He is oriented to person, place, and time. He appears well-developed and well-nourished. No distress.   HENT:   Head: Normocephalic and atraumatic.   Right Ear: External ear normal.   Left Ear: External ear normal.   Nose: Nose normal.   Eyes: Pupils are equal, round, and reactive to light. Conjunctivae and EOM are normal.   Neck: Normal range of motion. Neck supple. No tracheal deviation present. No thyromegaly present.   Cardiovascular: Normal rate, regular rhythm and normal heart sounds.   No murmur heard.  Pulmonary/Chest: Effort normal and breath sounds normal. No respiratory distress. He has no wheezes.   Abdominal: Soft. Bowel sounds are normal. There is no tenderness. There is no rebound and no guarding.   Musculoskeletal: Normal range of motion. He exhibits no edema, tenderness or deformity.   Neurological: He is alert and oriented to person, place, and time. No cranial nerve deficit.   Skin: Skin is warm and dry. No rash noted.   Psychiatric: He has a normal mood and affect. His behavior is normal. Judgment and thought content normal.       Lab Review  Glucose (mg/dL)   Date Value   09/27/2019 389 (H)   01/25/2019 195 (H)   12/27/2018 160 (H)   12/25/2017 208 (H)     Sodium (mmol/L)   Date Value   09/27/2019 134 (L)   01/25/2019 142   12/27/2018 143   12/25/2017 143     Potassium (mmol/L)   Date Value   09/27/2019 5.0   01/25/2019 4.0   12/27/2018 4.2   12/25/2017 3.7     Chloride (mmol/L)   Date Value   09/27/2019 95 (L)   01/25/2019 100   12/27/2018 101   12/25/2017 104     CO2 (mmol/L)   Date Value   09/27/2019 24.8   01/25/2019 29.0   12/27/2018 24   12/25/2017 26.0 "     BUN (mg/dL)   Date Value   09/27/2019 19   01/25/2019 18   12/27/2018 19   12/25/2017 14     Creatinine (mg/dL)   Date Value   09/27/2019 1.39 (H)   01/25/2019 1.23   12/27/2018 1.3 (H)   12/25/2017 1.09     Hemoglobin A1C (%)   Date Value   08/13/2019 10.6   01/25/2019 7.8   12/27/2018 8.5 (H)   07/27/2018 7.2   08/03/2015 7.9 (H)     Triglycerides (mg/dL)   Date Value   01/25/2019 233 (H)   11/14/2017 227 (H)     LDL Cholesterol  (mg/dL)   Date Value   01/25/2019 81   11/14/2017 88       Assessment/Plan      1. Type 2 diabetes mellitus with complication, with long-term current use of insulin (CMS/[facility])    2. Hypertension associated with diabetes (CMS/[facility])    3. Mixed diabetic hyperlipidemia associated with type 2 diabetes mellitus (CMS/[facility])    4. Vitamin D deficiency    5. Essential hypertension    .    Medications prescribed:  Outpatient Encounter Medications as of 2/21/2020   Medication Sig Dispense Refill   • carBAMazepine (TEGRETOL) 200 MG tablet Take 1 tablet by mouth 2 (Two) Times a Day. 60 tablet 5   • Cholecalciferol 07533 units tablet 50 thousand units po q 2 weeks 2 tablet 11   • citalopram (CeleXA) 20 MG tablet Take 20 mg by mouth Daily.     • Dulaglutide 1.5 MG/0.5ML solution pen-injector Inject 1.5 mg under the skin into the appropriate area as directed 1 (One) Time Per Week. 4 pen 11   • Empagliflozin (JARDIANCE) 10 MG tablet Take 1 tablet by mouth Daily. 90 tablet 3   • famotidine (PEPCID) 20 MG tablet Take 20 mg by mouth 2 (Two) Times a Day.     • fexofenadine (ALLEGRA) 180 MG tablet Take 180 mg by mouth As Needed.     • fluticasone (FLONASE) 50 MCG/ACT nasal spray 1 spray each nostril twice a day for three days, then daily. 1 bottle 0   • gabapentin (NEURONTIN) 800 MG tablet Take 1 tablet by mouth 3 (Three) Times a Day. 90 tablet 2   • hydrochlorothiazide (HYDRODIURIL) 12.5 MG tablet Take 12.5 mg by mouth Daily.     • Insulin Glargine (BASAGLAR KWIKPEN) 100 UNIT/ML injection pen 75units qam 8  pen 11   • Insulin Lispro (HUMALOG KWIKPEN) 200 UNIT/ML solution pen-injector Inject 20 Units under the skin into the appropriate area as directed 3 (Three) Times a Day With Meals. 18 pen 3   • levothyroxine (SYNTHROID, LEVOTHROID) 50 MCG tablet Take 1 tablet by mouth Daily.     • loratadine (CLARITIN) 10 MG tablet Take 10 mg by mouth Daily.     • meloxicam (MOBIC) 15 MG tablet Take 1 tablet by mouth Daily.     • metFORMIN ER (GLUCOPHAGE-XR) 500 MG 24 hr tablet TAKE 1 TABLET BY MOUTH TWICE DAILY WITH MEALS 60 tablet 11   • metoprolol tartrate (LOPRESSOR) 50 MG tablet Take 50 mg by mouth 2 (Two) Times a Day.     • omeprazole (priLOSEC) 20 MG capsule Take 1 capsule by mouth 2 (Two) Times a Day. 60 capsule 11   • potassium citrate (UROCIT-K) 10 MEQ (1080 MG) CR tablet Take 1 tablet by mouth 3 (Three) Times a Day With Meals. 90 tablet 11   • RELION PEN NEEDLES 31G X 6 MM misc USE AS DIRECTED 4 TIMES DAILY 150 each 11   • rOPINIRole (REQUIP) 1 MG tablet Take 1 tablet by mouth Every Night. Take 1 hour before bedtime. 30 tablet 6   • zolpidem (AMBIEN) 10 MG tablet zolpidem     tab 10mgzolpidem tartrate     • [DISCONTINUED] lisinopril (PRINIVIL,ZESTRIL) 10 MG tablet Take 10 mg by mouth Daily.       No facility-administered encounter medications on file as of 2/21/2020.        Orders placed during this encounter include:  No orders of the defined types were placed in this encounter.    Glycemic Management:      Lab Results   Component Value Date    HGBA1C 10.6 08/13/2019          [place] from 2 weeks    In range 2%  Highs 98%        Getting insulin through assistance program      tresiba 75 units in am - change to basaglar 75 units qam -- 90      Humalog , no carb counting   Change to 20 units with all meals ( rx states 20 qid, so he could do 25 tid if needed )    Increase to 30 bid       Jardiance 10 mg daily     Metformin xr 500 mg po BID      Trulicity 0.75 mg weekly - increase to 1.5 mg weekly     Using freestyle          Lipid Management       is taking a statin but does not know the name    Lab Results   Component Value Date    CHOL 165 01/25/2019    TRIG 233 (H) 01/25/2019    HDL 43 01/25/2019    LDL 81 01/25/2019           Blood Pressure Management:          Normal            Microvascular Complication Monitoring:       Eye Exam Evaluation  Needs one   -----------     Last Microalbumin-Proteinuria Assessment     Lab Results   Component Value Date    MALBCRERATIO 14.4 01/25/2019       -----------        Neuropathy, yes on neurontin, antidepressants  Also on requip            Weight Related:       BMI - 30.1     Has lost 6 lbs since last visit     Decreased caloric intake            Diet interventions: moderate (500 kCal/d) deficit diet.        Bone Health     Lab Results   Component Value Date    LLVG10PJ 20.8 (L) 01/25/2019    UAXC15KO 26.4 (L) 08/03/2015     vit D 50 th u every 2 weeks        Thyroid Health     Lab Results   Component Value Date    TSH 1.850 01/25/2019        on levothyroxine 50 mcgs daily         Lab Results   Component Value Date    BHMJATSX17 262 01/25/2019                4. Follow-up: No follow-ups on file.

## 2020-02-24 ENCOUNTER — TELEPHONE (OUTPATIENT)
Dept: GASTROENTEROLOGY | Facility: CLINIC | Age: 65
End: 2020-02-24

## 2020-02-24 NOTE — TELEPHONE ENCOUNTER
CALLED INTO [facility] [phone] OMEPRAZOLE 20 MG QD #30 0 REFILLS. TOLD WIFE PARUL PATIENT NEEDS OFFICE VISIT.

## 2020-03-10 ENCOUNTER — OFFICE VISIT (OUTPATIENT)
Dept: CARDIOLOGY | Age: 65
End: 2020-03-10
Payer: MEDICARE

## 2020-03-10 VITALS
BODY MASS INDEX: 29.96 KG/M2 | HEART RATE: 96 BPM | HEIGHT: 71 IN | WEIGHT: 214 LBS | DIASTOLIC BLOOD PRESSURE: 82 MMHG | SYSTOLIC BLOOD PRESSURE: 130 MMHG

## 2020-03-10 DIAGNOSIS — I10 ESSENTIAL HYPERTENSION: ICD-10-CM

## 2020-03-10 LAB
ANION GAP SERPL CALCULATED.3IONS-SCNC: 15 MMOL/L (ref 7–19)
BUN BLDV-MCNC: 22 MG/DL (ref 8–23)
CALCIUM SERPL-MCNC: 9.4 MG/DL (ref 8.8–10.2)
CHLORIDE BLD-SCNC: 103 MMOL/L (ref 98–111)
CO2: 23 MMOL/L (ref 22–29)
CREAT SERPL-MCNC: 1.1 MG/DL (ref 0.5–1.2)
GFR NON-AFRICAN AMERICAN: >60
GLUCOSE BLD-MCNC: 208 MG/DL (ref 74–109)
POTASSIUM SERPL-SCNC: 4.2 MMOL/L (ref 3.5–5)
SODIUM BLD-SCNC: 141 MMOL/L (ref 136–145)

## 2020-03-10 PROCEDURE — 3017F COLORECTAL CA SCREEN DOC REV: CPT | Performed by: NURSE PRACTITIONER

## 2020-03-10 PROCEDURE — G8427 DOCREV CUR MEDS BY ELIG CLIN: HCPCS | Performed by: NURSE PRACTITIONER

## 2020-03-10 PROCEDURE — 4040F PNEUMOC VAC/ADMIN/RCVD: CPT | Performed by: NURSE PRACTITIONER

## 2020-03-10 PROCEDURE — 1036F TOBACCO NON-USER: CPT | Performed by: NURSE PRACTITIONER

## 2020-03-10 PROCEDURE — G8417 CALC BMI ABV UP PARAM F/U: HCPCS | Performed by: NURSE PRACTITIONER

## 2020-03-10 PROCEDURE — 99213 OFFICE O/P EST LOW 20 MIN: CPT | Performed by: NURSE PRACTITIONER

## 2020-03-10 PROCEDURE — 1123F ACP DISCUSS/DSCN MKR DOCD: CPT | Performed by: NURSE PRACTITIONER

## 2020-03-10 PROCEDURE — G8484 FLU IMMUNIZE NO ADMIN: HCPCS | Performed by: NURSE PRACTITIONER

## 2020-03-10 RX ORDER — LISINOPRIL 20 MG/1
20 TABLET ORAL DAILY
Qty: 90 TABLET | Refills: 3 | Status: SHIPPED | OUTPATIENT
Start: 2020-03-10 | End: 2021-10-27

## 2020-03-10 NOTE — PROGRESS NOTES
CAPS, Take by mouth every 14 days, Disp: , Rfl:     fexofenadine (ALLEGRA) 180 MG tablet, Take 180 mg by mouth daily, Disp: , Rfl:     potassium citrate (UROCIT-K) 10 MEQ (1080 MG) extended release tablet, Take 10 mEq by mouth 3 times daily (with meals), Disp: , Rfl:     carBAMazepine (CARBATROL) 200 MG extended release capsule, Take 200 mg by mouth 2 times daily, Disp: , Rfl:     Dapagliflozin-Metformin HCl ER 5-500 MG TB24, Take 1 tablet by mouth daily, Disp: , Rfl:     Ertugliflozin L-PyroglutamicAc (STEGLATRO) 5 MG TABS, Take 5 mg by mouth daily, Disp: , Rfl:     Exenatide ER 2 MG/0.85ML AUIJ, Inject into the skin once a week Wednesday, Disp: , Rfl:     fluticasone (FLONASE) 50 MCG/ACT nasal spray, 1 spray by Each Nare route 2 times daily as needed for Rhinitis, Disp: , Rfl:     Insulin Degludec (TRESIBA FLEXTOUCH) 200 UNIT/ML SOPN, Inject 100 Units into the skin nightly, Disp: , Rfl:     Insulin Lispro (HUMALOG KWIKPEN SC), Inject 10 Units into the skin every morning (before breakfast) , Disp: , Rfl:     loratadine (CLARITIN) 10 MG tablet, Take 10 mg by mouth daily as needed, Disp: , Rfl:     metFORMIN (GLUCOPHAGE-XR) 500 MG extended release tablet, Take 500 mg by mouth 2 times daily, Disp: , Rfl:     omeprazole (PRILOSEC) 20 MG delayed release capsule, Take 20 mg by mouth 2 times daily, Disp: , Rfl:     HYDROcodone-acetaminophen (LORTAB) 5-325 MG per tablet, Take 1 tablet by mouth every 6 hours as needed for Pain. ., Disp: , Rfl:     gabapentin (NEURONTIN) 800 MG tablet, Take 800 mg by mouth 2 times daily.  ., Disp: , Rfl:     metoprolol tartrate (LOPRESSOR) 50 MG tablet, Take 50 mg by mouth 2 times daily, Disp: , Rfl:     hydrochlorothiazide (HYDRODIURIL) 25 MG tablet, Take 12.5 mg by mouth daily, Disp: , Rfl:     levothyroxine (SYNTHROID) 50 MCG tablet, Take 50 mcg by mouth Daily, Disp: , Rfl:     famotidine (PEPCID) 20 MG tablet, Take 20 mg by mouth daily , Disp: , Rfl:     MELOXICAM PO, Take 15 mg by mouth daily, Disp: , Rfl:     rOPINIRole (REQUIP) 0.5 MG tablet, Take 0.5 mg by mouth nightly, Disp: , Rfl:     citalopram (CELEXA) 20 MG tablet, Take 20 mg by mouth nightly, Disp: , Rfl:     PE:  Vitals:    03/10/20 0936   BP: 130/82   Pulse: 96       Estimated body mass index is 29.85 kg/m² as calculated from the following:    Height as of this encounter: 5' 11\" (1.803 m). Weight as of this encounter: 214 lb (97.1 kg). Constitutional: He is oriented to person, place, and time. He appears well-developed and well-nourished in no acute distress. Neck:  Neck supple without JVD present. No trachea deviation present. No thyromegaly present. Eyes:Conjunctivae and EOM are normal. Pupils equal and reactive to light. ENT:Hearing appears normal.conjunctiva and lids are normal, ears and nose appear normal.  Cardiovascular: Normal rate, Normal rhythm, normal heart sounds. No murmur ascultated. No gallop and no friction rub. No carotid bruits. No peripheral edema. Pulmonary/Chest:  Lungs clear to auscultation bilaterally without evidence of respiratory distress. He without wheezes. He without rales or ronchi. Musculoskeletal: Normal range of motion. Gait is normal. Head is normocephalic and atraumatic. Skin: Skin is warm and dry without rash or pallor. Psychiatric:He is alert and oriented to person, place, and time. He has a normal mood and affect. His behavior is normal. Thought content normal.     Lab Results   Component Value Date    CREATININE 1.3 12/27/2018    CREATININE 1.1 11/19/2016    CREATININE 0.9 08/22/2016    HGB 16.4 12/27/2018    HGB 14.2 11/19/2016    HGB 14.1 08/22/2016       TTE- 2/4/20   Mitral annular calcification is present. Structurally normal mitral valve. Mild mitral regurgitation is present. Mild aortic sclerosis with normal cusp separation. Aortic valve appears to be tricuspid. No evidence of aortic valve regurgitation .    Tricuspid valve was not

## 2020-03-10 NOTE — PATIENT INSTRUCTIONS
pressure medicine. You will need to take it every day. If you do not get treated, there are risks, including:   · Heart disease. · Stroke. · Kidney failure. · See your doctor as told. GET HELP RIGHT AWAY IF:  · You get a very bad headache. · You get blurred or changing vision. · You feel confused. · You feel weak, numb, or faint. · You get chest or belly (abdominal) pain. · You throw up (vomit). · You cannot breathe very well. If you have a blood pressure reading with a top number of 180 or higher, you need to see your doctor right away. This is especially true if you are having any of the problems listed above. Completing a Blood Pressure Log    Your doctor has recommended completing a blood pressure log to see what your blood pressure runs at home. Managing your blood pressure can be very beneficial in your overall health. Listed below are a few tips and instructions on how to check your blood pressure correctly. · Always check your blood pressure AFTER taking all blood pressure medications. Waiting about 2 hours gives the best results on how your medication is working. · Before checking your blood pressure come inside, sit comfortably for 5 to 10 minutes and then check your blood pressure. Your arm should rest comfortably, sit up straight with your back against the chair, legs uncrossed and feet touching the floor. · Your blood pressure will typically run higher when you have recently been excercising, smoking, or drinking caffiene and can give inaccurate readings, try to wait 30 minutes before checking your blood pressure. · Check your blood pressure in the mornings and at night for  3 days a week and write it down. After 2 to 3 weeks of gathering readings you can call, e-mail, or fax in your results. Our office number is [phone], our fax number is [phone], and you can always e-mail us via [employer] to send your results directly to your Doctor.

## 2020-04-15 ENCOUNTER — TELEPHONE (OUTPATIENT)
Dept: ENDOCRINOLOGY | Facility: CLINIC | Age: 65
End: 2020-04-15

## 2020-04-16 ENCOUNTER — TELEPHONE (OUTPATIENT)
Dept: ENDOCRINOLOGY | Facility: CLINIC | Age: 65
End: 2020-04-16

## 2020-04-30 RX ORDER — INSULIN GLARGINE 100 [IU]/ML
INJECTION, SOLUTION SUBCUTANEOUS
Qty: 36 PEN | Refills: 3 | Status: SHIPPED | OUTPATIENT
Start: 2020-04-30 | End: 2020-09-11 | Stop reason: SDUPTHER

## 2020-05-17 NOTE — PROGRESS NOTES
Chief Complaint   Patient presents with   • Diarrhea     having diarrhea every time he eats has diarrhea       PCP: Libby Ku APRN  REFER: Libby Ku APRN    Subjective     HPI    Patient presents to office with complain of daily diarrhea. Bowels move 3-10 times per day.  Diarrhea is new and has been occurring for several months.  Previous bowel habit described as formed stool occurring 1-3 times per day.   Eating exacerbates number of stools and amount of stool.  No bright red blood per rectum, no melena.  No abdominal pain.  No fever or chills.  No mucous.  He does not wake from his sleep to have a BM.   No recent antibiotic use, no medications changes.  No weight loss.  No nausea/vomitting.  Heartburn described as stable.   Describes blood sugars as stable.      Reports pcp is following LFT and was told within past 2 weeks LFT were ok.      Endoscopy (Dr Padilla) 2017-normal  Colonosocpy (Dr Alcala) 12/2016, no abnormalities per patient    Past Medical History:   Diagnosis Date   • Depression    • Diabetes mellitus (CMS/HCC)    • Disease of thyroid gland     HYPOTHYROIDISM   • GERD (gastroesophageal reflux disease)    • Hyperlipidemia    • Hypertension    • Kidney stone        Past Surgical History:   Procedure Laterality Date   • CHOLECYSTECTOMY     • ENDOSCOPY N/A 4/4/2017    Procedure: ESOPHAGOGASTRODUODENOSCOPY WITH ANESTHESIA;  Surgeon: Rambo Padilla DO;  Location: [facility] ENDOSCOPY;  Service:    • KIDNEY STONE SURGERY     • SHOULDER SURGERY         Outpatient Medications Marked as Taking for the 5/18/20 encounter (Office Visit) with Jared Josue APRN   Medication Sig Dispense Refill   • carBAMazepine (TEGRETOL) 200 MG tablet Take 1 tablet by mouth 2 (Two) Times a Day. 60 tablet 5   • cholecalciferol (D3-50) 1.25 MG (41741 UT) capsule TAKE ONE CAPSULE BY MOUTH EVERY 2 WEEKS. 2 capsule 0   • citalopram (CeleXA) 20 MG tablet Take 20 mg by mouth Daily.     • Dulaglutide 1.5 MG/0.5ML  solution pen-injector Inject 1.5 mg under the skin into the appropriate area as directed 1 (One) Time Per Week. 4 pen 11   • Empagliflozin (JARDIANCE) 10 MG tablet Take 1 tablet by mouth Daily. 90 tablet 3   • famotidine (PEPCID) 20 MG tablet Take 20 mg by mouth 2 (Two) Times a Day.     • fexofenadine (ALLEGRA) 180 MG tablet Take 180 mg by mouth As Needed.     • fluticasone (FLONASE) 50 MCG/ACT nasal spray 1 spray each nostril twice a day for three days, then daily. 1 bottle 0   • gabapentin (NEURONTIN) 800 MG tablet Take 1 tablet by mouth 3 (Three) Times a Day. 90 tablet 2   • hydrochlorothiazide (HYDRODIURIL) 12.5 MG tablet Take 12.5 mg by mouth Daily.     • Insulin Glargine (BASAGLAR KWIKPEN) 100 UNIT/ML injection pen 90 units qam  36 pens for 4 months 36 pen 3   • Insulin Lispro (HUMALOG KWIKPEN) 200 UNIT/ML solution pen-injector Inject 30 Units under the skin into the appropriate area as directed 3 (Three) Times a Day With Meals. 36 pen 3   • levothyroxine (SYNTHROID, LEVOTHROID) 50 MCG tablet Take 1 tablet by mouth Daily.     • loratadine (CLARITIN) 10 MG tablet Take 10 mg by mouth Daily.     • meloxicam (MOBIC) 15 MG tablet Take 1 tablet by mouth Daily.     • metFORMIN ER (GLUCOPHAGE-XR) 500 MG 24 hr tablet TAKE 1 TABLET BY MOUTH TWICE DAILY WITH MEALS 60 tablet 11   • metoprolol tartrate (LOPRESSOR) 50 MG tablet Take 50 mg by mouth 2 (Two) Times a Day.     • omeprazole (priLOSEC) 20 MG capsule Take 1 capsule by mouth 2 (Two) Times a Day. 60 capsule 11   • potassium citrate (UROCIT-K) 10 MEQ (1080 MG) CR tablet Take 1 tablet by mouth 3 (Three) Times a Day With Meals. 90 tablet 11   • rOPINIRole (REQUIP) 1 MG tablet Take 1 tablet by mouth Every Night. Take 1 hour before bedtime. 30 tablet 6       Allergies   Allergen Reactions   • Atacand [Candesartan Cilexetil] Rash   • Biaxin [Clarithromycin] Rash   • Cefzil [Cefprozil] Rash   • Levaquin [Levofloxacin] Rash   • Penicillins Rash   • Zestril [Lisinopril] Rash  "      Social History     Socioeconomic History   • Marital status:      Spouse name: Not on file   • Number of children: Not on file   • Years of education: Not on file   • Highest education level: Not on file   Tobacco Use   • Smoking status: Never Smoker   • Smokeless tobacco: Never Used   Substance and Sexual Activity   • Alcohol use: No   • Drug use: No   • Sexual activity: Yes     Partners: Female       Family History   Problem Relation Age of Onset   • Heart disease Mother    • Diabetes Mother    • Alzheimer's disease Father    • Heart disease Father    • Diabetes Sister    • Heart disease Brother    • Diabetes Sister    • Diabetes Sister    • Colon cancer Neg Hx    • Esophageal cancer Neg Hx        Review of Systems   Constitutional: Negative for fatigue, fever and unexpected weight change.   HENT: Negative for hearing loss, sore throat and voice change.    Eyes: Negative for visual disturbance.   Respiratory: Negative for cough, shortness of breath and wheezing.    Cardiovascular: Negative for chest pain and palpitations.   Gastrointestinal: Positive for diarrhea. Negative for abdominal pain, blood in stool and vomiting.   Endocrine: Negative for polydipsia and polyuria.   Genitourinary: Negative for difficulty urinating, dysuria, hematuria and urgency.   Musculoskeletal: Negative for joint swelling and myalgias.   Skin: Negative for color change, rash and wound.   Neurological: Negative for dizziness, tremors, seizures and syncope.   Hematological: Does not bruise/bleed easily.   Psychiatric/Behavioral: Negative for agitation and confusion. The patient is not nervous/anxious.        Objective     Vitals:    05/18/20 0938   BP: 130/76   Pulse: 90   Temp: 97 °F (36.1 °C)   SpO2: 98%   Weight: 95.3 kg (210 lb)   Height: 180.3 cm (71\")     Body mass index is 29.29 kg/m².    Physical Exam   Constitutional: He is oriented to person, place, and time. He appears well-developed and well-nourished. He is " cooperative.   HENT:   Head: Normocephalic and atraumatic.   Eyes: Pupils are equal, round, and reactive to light. Conjunctivae are normal. No scleral icterus.   Neck: Normal range of motion. Neck supple. No JVD present. No thyroid mass and no thyromegaly present.   Cardiovascular: Normal rate, regular rhythm and normal heart sounds. Exam reveals no gallop and no friction rub.   No murmur heard.  Pulmonary/Chest: Effort normal and breath sounds normal. No accessory muscle usage. No respiratory distress. He has no wheezes. He has no rales.   Abdominal: Soft. Normal appearance and bowel sounds are normal. He exhibits no distension, no ascites and no mass. There is no hepatosplenomegaly. There is no tenderness. There is no rebound and no guarding.   Musculoskeletal: Normal range of motion. He exhibits no edema or tenderness.     Vascular Status -  His right foot exhibits normal foot vasculature  and no edema. His left foot exhibits normal foot vasculature  and no edema.  Lymphadenopathy:     He has no cervical adenopathy.   Neurological: He is alert and oriented to person, place, and time. He has normal strength. Gait normal.   Skin: Skin is warm, dry and intact. No rash noted.       Imaging Results (Most Recent)     None          Body mass index is 29.29 kg/m².    Assessment/Plan     Supa was seen today for diarrhea.    Diagnoses and all orders for this visit:    Altered bowel function  -     Case Request; Standing  -     Case Request    Diarrhea, unspecified type    Other orders  -     polyethylene glycol (GoLYTELY) 236 g solution; Take 3,785 mL by mouth 1 (One) Time for 1 dose. Take as directed        COLONOSCOPY WITH ANESTHESIA (N/A)    Will request most recent lab work from pcp    Patient is to continue all blood pressure and cardiac medications prior to procedure and has been advised to take medications morning of procedure   Pt verbalized understanding    Pt to hold diabetes medication/insulin day of procedure  to prevent any risk of complications of hypoglycemia intraprocedure.  If taking insulin 1/2 the PM dose as well    Advised pt to stop ASA, use of NSAIDs, Fish Oil, and MV 5 days prior to procedure, per Dr Padilla protocol.  Tylenol based products are ok to take.  Pt verbalized understanding.      All risks, benefits, alternatives, and indications of colonoscopy procedure have been discussed with the patient. Risks to include perforation of the colon requiring possible surgery or colostomy, risk of bleeding from biopsies or removal of colon tissue, possibility of missing a colon polyp or cancer, or adverse drug reaction.  Benefits to include the diagnosis and management of disease of the colon and rectum. Alternatives to include barium enema, radiographic evaluation, lab testing or no intervention. Pt verbalizes understanding and agrees to proceed with procedure.      Precautions are currently being put in place due to COVID-19.  I have explained to Supa Mcclelland they will be required to undergo COVID testing prior to their procedure.  Supa Mcclelland verbalized understanding and was willing to proceed.          Jared Josue, APRN  05/18/20          There are no Patient Instructions on file for this visit.

## 2020-05-18 ENCOUNTER — TELEPHONE (OUTPATIENT)
Dept: GASTROENTEROLOGY | Facility: CLINIC | Age: 65
End: 2020-05-18

## 2020-05-18 ENCOUNTER — OFFICE VISIT (OUTPATIENT)
Dept: GASTROENTEROLOGY | Facility: CLINIC | Age: 65
End: 2020-05-18

## 2020-05-18 VITALS
SYSTOLIC BLOOD PRESSURE: 130 MMHG | DIASTOLIC BLOOD PRESSURE: 76 MMHG | BODY MASS INDEX: 29.4 KG/M2 | HEART RATE: 90 BPM | OXYGEN SATURATION: 98 % | HEIGHT: 71 IN | TEMPERATURE: 97 F | WEIGHT: 210 LBS

## 2020-05-18 DIAGNOSIS — R19.8 ALTERED BOWEL FUNCTION: Primary | ICD-10-CM

## 2020-05-18 DIAGNOSIS — R19.7 DIARRHEA, UNSPECIFIED TYPE: ICD-10-CM

## 2020-05-18 PROCEDURE — 99214 OFFICE O/P EST MOD 30 MIN: CPT | Performed by: NURSE PRACTITIONER

## 2020-05-20 ENCOUNTER — TRANSCRIBE ORDERS (OUTPATIENT)
Dept: ADMINISTRATIVE | Facility: HOSPITAL | Age: 65
End: 2020-05-20

## 2020-05-20 DIAGNOSIS — Z01.818 PRE-OP TESTING: Primary | ICD-10-CM

## 2020-05-21 NOTE — TELEPHONE ENCOUNTER
"Printed Trulicity and Humalog Again to mail to \"Patient help network\" per their request to have these mailed hard copies  "

## 2020-05-25 ENCOUNTER — APPOINTMENT (OUTPATIENT)
Dept: LAB | Facility: HOSPITAL | Age: 65
End: 2020-05-25

## 2020-05-25 LAB — SARS-COV-2 RNA RESP QL NAA+PROBE: NOT DETECTED

## 2020-05-25 PROCEDURE — 87635 SARS-COV-2 COVID-19 AMP PRB: CPT | Performed by: INTERNAL MEDICINE

## 2020-05-27 ENCOUNTER — ANESTHESIA (OUTPATIENT)
Dept: GASTROENTEROLOGY | Facility: HOSPITAL | Age: 65
End: 2020-05-27

## 2020-05-27 ENCOUNTER — ANESTHESIA EVENT (OUTPATIENT)
Dept: GASTROENTEROLOGY | Facility: HOSPITAL | Age: 65
End: 2020-05-27

## 2020-05-27 ENCOUNTER — HOSPITAL ENCOUNTER (OUTPATIENT)
Facility: HOSPITAL | Age: 65
Setting detail: HOSPITAL OUTPATIENT SURGERY
Discharge: HOME | End: 2020-05-27
Attending: INTERNAL MEDICINE | Admitting: INTERNAL MEDICINE

## 2020-05-27 VITALS
TEMPERATURE: 97.6 F | BODY MASS INDEX: 29.12 KG/M2 | OXYGEN SATURATION: 96 % | DIASTOLIC BLOOD PRESSURE: 88 MMHG | HEIGHT: 71 IN | SYSTOLIC BLOOD PRESSURE: 123 MMHG | RESPIRATION RATE: 21 BRPM | WEIGHT: 208 LBS | HEART RATE: 77 BPM

## 2020-05-27 DIAGNOSIS — R19.8 ALTERED BOWEL FUNCTION: ICD-10-CM

## 2020-05-27 LAB — GLUCOSE BLDC GLUCOMTR-MCNC: 244 MG/DL (ref 70–130)

## 2020-05-27 PROCEDURE — 25010000002 PROPOFOL 10 MG/ML EMULSION: Performed by: NURSE ANESTHETIST, CERTIFIED REGISTERED

## 2020-05-27 PROCEDURE — 82962 GLUCOSE BLOOD TEST: CPT

## 2020-05-27 PROCEDURE — 88305 TISSUE EXAM BY PATHOLOGIST: CPT | Performed by: INTERNAL MEDICINE

## 2020-05-27 PROCEDURE — 45385 COLONOSCOPY W/LESION REMOVAL: CPT | Performed by: INTERNAL MEDICINE

## 2020-05-27 RX ORDER — SODIUM CHLORIDE 0.9 % (FLUSH) 0.9 %
10 SYRINGE (ML) INJECTION AS NEEDED
Status: CANCELLED | OUTPATIENT
Start: 2020-05-27

## 2020-05-27 RX ORDER — SODIUM CHLORIDE 9 MG/ML
500 INJECTION, SOLUTION INTRAVENOUS CONTINUOUS PRN
Status: DISCONTINUED | OUTPATIENT
Start: 2020-05-27 | End: 2020-05-27 | Stop reason: HOSPADM

## 2020-05-27 RX ORDER — SODIUM CHLORIDE 0.9 % (FLUSH) 0.9 %
10 SYRINGE (ML) INJECTION AS NEEDED
Status: DISCONTINUED | OUTPATIENT
Start: 2020-05-27 | End: 2020-05-27 | Stop reason: HOSPADM

## 2020-05-27 RX ORDER — LIDOCAINE HYDROCHLORIDE 10 MG/ML
0.5 INJECTION, SOLUTION EPIDURAL; INFILTRATION; INTRACAUDAL; PERINEURAL ONCE AS NEEDED
Status: DISCONTINUED | OUTPATIENT
Start: 2020-05-27 | End: 2020-05-27 | Stop reason: HOSPADM

## 2020-05-27 RX ORDER — PROPOFOL 10 MG/ML
VIAL (ML) INTRAVENOUS AS NEEDED
Status: DISCONTINUED | OUTPATIENT
Start: 2020-05-27 | End: 2020-05-27 | Stop reason: SURG

## 2020-05-27 RX ORDER — SODIUM CHLORIDE 0.9 % (FLUSH) 0.9 %
10 SYRINGE (ML) INJECTION EVERY 12 HOURS SCHEDULED
Status: CANCELLED | OUTPATIENT
Start: 2020-05-27

## 2020-05-27 RX ORDER — SODIUM CHLORIDE 9 MG/ML
100 INJECTION, SOLUTION INTRAVENOUS CONTINUOUS
Status: CANCELLED | OUTPATIENT
Start: 2020-05-27

## 2020-05-27 RX ADMIN — PROPOFOL 50 MG: 10 INJECTION, EMULSION INTRAVENOUS at 10:26

## 2020-05-27 RX ADMIN — SODIUM CHLORIDE 500 ML: 9 INJECTION, SOLUTION INTRAVENOUS at 09:37

## 2020-05-27 RX ADMIN — PROPOFOL 50 MG: 10 INJECTION, EMULSION INTRAVENOUS at 10:32

## 2020-05-27 RX ADMIN — PROPOFOL 50 MG: 10 INJECTION, EMULSION INTRAVENOUS at 10:29

## 2020-05-27 NOTE — ANESTHESIA POSTPROCEDURE EVALUATION
Patient: Supa Mcclelland    Procedure Summary     Date:  05/27/20 Room / Location:  [facility] ENDOSCOPY 2 / [facility] ENDOSCOPY    Anesthesia Start:  1025 Anesthesia Stop:  1041    Procedure:  COLONOSCOPY WITH ANESTHESIA (N/A ) Diagnosis:       Altered bowel function      (Altered bowel function [R19.8])    Surgeon:  Rambo Padilla DO Provider:  [place] CRNA    Anesthesia Type:  MAC ASA Status:  3          Anesthesia Type: MAC    Vitals  Vitals Value Taken Time   BP     Temp     Pulse 78 5/27/2020 10:45 AM   Resp     SpO2 96 % 5/27/2020 10:45 AM   Vitals shown include unvalidated device data.        Post Anesthesia Care and Evaluation    Patient location during evaluation: PHASE II  Patient participation: complete - patient participated  Level of consciousness: awake and alert  Pain score: 0  Pain management: adequate  Airway patency: patent  Anesthetic complications: No anesthetic complications  PONV Status: none  Cardiovascular status: acceptable and stable  Respiratory status: acceptable  Hydration status: acceptable

## 2020-05-27 NOTE — ANESTHESIA PREPROCEDURE EVALUATION
Anesthesia Evaluation     no history of anesthetic complications:  NPO Solid Status: > 8 hours  NPO Liquid Status: > 4 hours           Airway   Mallampati: III  TM distance: >3 FB  Neck ROM: full  Dental          Pulmonary - normal exam    breath sounds clear to auscultation  (-) asthma, recent URI, sleep apnea, not a smoker  Cardiovascular - normal exam  Exercise tolerance: good (4-7 METS)    Rhythm: regular  Rate: normal    (+) hypertension, hyperlipidemia,   (-) pacemaker, past MI, angina, cardiac stents, CABG      Neuro/Psych  (+) psychiatric history Depression,     (-) seizures, TIA, CVA  GI/Hepatic/Renal/Endo    (+)  GERD,  renal disease stones, diabetes mellitus using insulin,   (-) liver disease, no thyroid disorder    Musculoskeletal     Abdominal    Substance History      OB/GYN          Other                        Anesthesia Plan    ASA 3     MAC     intravenous induction     Anesthetic plan, all risks, benefits, and alternatives have been provided, discussed and informed consent has been obtained with: patient.

## 2020-05-28 DIAGNOSIS — E11.42 DIABETIC POLYNEUROPATHY ASSOCIATED WITH TYPE 2 DIABETES MELLITUS (HCC): ICD-10-CM

## 2020-05-28 DIAGNOSIS — G25.81 RESTLESS LEGS SYNDROME (RLS): ICD-10-CM

## 2020-05-28 LAB
CYTO UR: NORMAL
LAB AP CASE REPORT: NORMAL
LAB AP CLINICAL INFORMATION: NORMAL
PATH REPORT.FINAL DX SPEC: NORMAL
PATH REPORT.GROSS SPEC: NORMAL

## 2020-05-29 ENCOUNTER — TELEPHONE (OUTPATIENT)
Dept: GASTROENTEROLOGY | Facility: CLINIC | Age: 65
End: 2020-05-29

## 2020-06-04 RX ORDER — GABAPENTIN 800 MG/1
800 TABLET ORAL 3 TIMES DAILY
Qty: 270 TABLET | Refills: 0 | OUTPATIENT
Start: 2020-06-04 | End: 2020-11-02

## 2020-08-03 ENCOUNTER — LAB (OUTPATIENT)
Dept: LAB | Facility: HOSPITAL | Age: 65
End: 2020-08-03

## 2020-08-03 ENCOUNTER — OFFICE VISIT (OUTPATIENT)
Dept: ENDOCRINOLOGY | Facility: CLINIC | Age: 65
End: 2020-08-03

## 2020-08-03 VITALS
BODY MASS INDEX: 29.46 KG/M2 | HEIGHT: 71 IN | WEIGHT: 210.4 LBS | OXYGEN SATURATION: 98 % | SYSTOLIC BLOOD PRESSURE: 118 MMHG | HEART RATE: 86 BPM | DIASTOLIC BLOOD PRESSURE: 74 MMHG

## 2020-08-03 DIAGNOSIS — E11.8 TYPE 2 DIABETES MELLITUS WITH COMPLICATION, WITH LONG-TERM CURRENT USE OF INSULIN (HCC): Primary | ICD-10-CM

## 2020-08-03 DIAGNOSIS — I15.2 HYPERTENSION ASSOCIATED WITH DIABETES (HCC): ICD-10-CM

## 2020-08-03 DIAGNOSIS — Z79.4 TYPE 2 DIABETES MELLITUS WITH COMPLICATION, WITH LONG-TERM CURRENT USE OF INSULIN (HCC): Primary | ICD-10-CM

## 2020-08-03 DIAGNOSIS — E11.69 MIXED DIABETIC HYPERLIPIDEMIA ASSOCIATED WITH TYPE 2 DIABETES MELLITUS (HCC): ICD-10-CM

## 2020-08-03 DIAGNOSIS — E55.9 VITAMIN D DEFICIENCY: ICD-10-CM

## 2020-08-03 DIAGNOSIS — E11.59 HYPERTENSION ASSOCIATED WITH DIABETES (HCC): ICD-10-CM

## 2020-08-03 DIAGNOSIS — E78.2 MIXED DIABETIC HYPERLIPIDEMIA ASSOCIATED WITH TYPE 2 DIABETES MELLITUS (HCC): ICD-10-CM

## 2020-08-03 LAB
25(OH)D3 SERPL-MCNC: 27.7 NG/ML (ref 30–100)
ALBUMIN SERPL-MCNC: 4.8 G/DL (ref 3.5–5.2)
ALBUMIN/GLOB SERPL: 1.5 G/DL
ALP SERPL-CCNC: 66 U/L (ref 39–117)
ALT SERPL W P-5'-P-CCNC: 34 U/L (ref 1–41)
ANION GAP SERPL CALCULATED.3IONS-SCNC: 16 MMOL/L (ref 5–15)
AST SERPL-CCNC: 37 U/L (ref 1–40)
BASOPHILS # BLD AUTO: 0.04 10*3/MM3 (ref 0–0.2)
BASOPHILS NFR BLD AUTO: 0.4 % (ref 0–1.5)
BILIRUB SERPL-MCNC: 0.4 MG/DL (ref 0–1.2)
BUN SERPL-MCNC: 24 MG/DL (ref 8–23)
BUN/CREAT SERPL: 18.3 (ref 7–25)
CALCIUM SPEC-SCNC: 9.9 MG/DL (ref 8.6–10.5)
CHLORIDE SERPL-SCNC: 102 MMOL/L (ref 98–107)
CHOLEST SERPL-MCNC: 144 MG/DL (ref 0–200)
CO2 SERPL-SCNC: 21 MMOL/L (ref 22–29)
CREAT SERPL-MCNC: 1.31 MG/DL (ref 0.76–1.27)
DEPRECATED RDW RBC AUTO: 41.7 FL (ref 37–54)
EOSINOPHIL # BLD AUTO: 0.47 10*3/MM3 (ref 0–0.4)
EOSINOPHIL NFR BLD AUTO: 5.2 % (ref 0.3–6.2)
ERYTHROCYTE [DISTWIDTH] IN BLOOD BY AUTOMATED COUNT: 13 % (ref 12.3–15.4)
GFR SERPL CREATININE-BSD FRML MDRD: 55 ML/MIN/1.73
GLOBULIN UR ELPH-MCNC: 3.3 GM/DL
GLUCOSE SERPL-MCNC: 196 MG/DL (ref 65–99)
HBA1C MFR BLD: 9.14 % (ref 4.8–5.6)
HCT VFR BLD AUTO: 47.7 % (ref 37.5–51)
HDLC SERPL-MCNC: 37 MG/DL (ref 40–60)
HGB BLD-MCNC: 16.2 G/DL (ref 13–17.7)
IMM GRANULOCYTES # BLD AUTO: 0.18 10*3/MM3 (ref 0–0.05)
IMM GRANULOCYTES NFR BLD AUTO: 2 % (ref 0–0.5)
LDLC SERPL CALC-MCNC: 50 MG/DL (ref 0–100)
LDLC/HDLC SERPL: 1.34 {RATIO}
LYMPHOCYTES # BLD AUTO: 3.04 10*3/MM3 (ref 0.7–3.1)
LYMPHOCYTES NFR BLD AUTO: 33.7 % (ref 19.6–45.3)
MCH RBC QN AUTO: 29.9 PG (ref 26.6–33)
MCHC RBC AUTO-ENTMCNC: 34 G/DL (ref 31.5–35.7)
MCV RBC AUTO: 88 FL (ref 79–97)
MONOCYTES # BLD AUTO: 0.82 10*3/MM3 (ref 0.1–0.9)
MONOCYTES NFR BLD AUTO: 9.1 % (ref 5–12)
NEUTROPHILS NFR BLD AUTO: 4.48 10*3/MM3 (ref 1.7–7)
NEUTROPHILS NFR BLD AUTO: 49.6 % (ref 42.7–76)
NRBC BLD AUTO-RTO: 0 /100 WBC (ref 0–0.2)
PLATELET # BLD AUTO: 240 10*3/MM3 (ref 140–450)
PMV BLD AUTO: 11.5 FL (ref 6–12)
POTASSIUM SERPL-SCNC: 4 MMOL/L (ref 3.5–5.2)
PROT SERPL-MCNC: 8.1 G/DL (ref 6–8.5)
RBC # BLD AUTO: 5.42 10*6/MM3 (ref 4.14–5.8)
SODIUM SERPL-SCNC: 139 MMOL/L (ref 136–145)
TRIGL SERPL-MCNC: 287 MG/DL (ref 0–150)
TSH SERPL DL<=0.05 MIU/L-ACNC: 2.01 UIU/ML (ref 0.27–4.2)
VIT B12 BLD-MCNC: 218 PG/ML (ref 211–946)
VLDLC SERPL-MCNC: 57.4 MG/DL (ref 5–40)
WBC # BLD AUTO: 9.03 10*3/MM3 (ref 3.4–10.8)

## 2020-08-03 PROCEDURE — 80061 LIPID PANEL: CPT | Performed by: INTERNAL MEDICINE

## 2020-08-03 PROCEDURE — 84443 ASSAY THYROID STIM HORMONE: CPT | Performed by: INTERNAL MEDICINE

## 2020-08-03 PROCEDURE — 82306 VITAMIN D 25 HYDROXY: CPT | Performed by: INTERNAL MEDICINE

## 2020-08-03 PROCEDURE — 85025 COMPLETE CBC W/AUTO DIFF WBC: CPT | Performed by: INTERNAL MEDICINE

## 2020-08-03 PROCEDURE — 36415 COLL VENOUS BLD VENIPUNCTURE: CPT | Performed by: INTERNAL MEDICINE

## 2020-08-03 PROCEDURE — 83036 HEMOGLOBIN GLYCOSYLATED A1C: CPT | Performed by: INTERNAL MEDICINE

## 2020-08-03 PROCEDURE — 99214 OFFICE O/P EST MOD 30 MIN: CPT | Performed by: INTERNAL MEDICINE

## 2020-08-03 PROCEDURE — 82607 VITAMIN B-12: CPT | Performed by: INTERNAL MEDICINE

## 2020-08-03 PROCEDURE — 80053 COMPREHEN METABOLIC PANEL: CPT | Performed by: INTERNAL MEDICINE

## 2020-08-06 ENCOUNTER — TELEPHONE (OUTPATIENT)
Dept: ENDOCRINOLOGY | Facility: CLINIC | Age: 65
End: 2020-08-06

## 2020-08-28 RX ORDER — METFORMIN HYDROCHLORIDE 500 MG/1
TABLET, EXTENDED RELEASE ORAL
Qty: 180 TABLET | Refills: 0 | Status: SHIPPED | OUTPATIENT
Start: 2020-08-28 | End: 2021-05-21

## 2020-09-10 ENCOUNTER — TELEPHONE (OUTPATIENT)
Dept: ENDOCRINOLOGY | Facility: CLINIC | Age: 65
End: 2020-09-10

## 2020-09-10 NOTE — TELEPHONE ENCOUNTER
[facility] [employer] request script for     Basaglar pens 100 units quanity of 8 X 3 months  Fax to [phone]   [phone]    Muriel

## 2020-09-11 RX ORDER — INSULIN GLARGINE 100 [IU]/ML
INJECTION, SOLUTION SUBCUTANEOUS
Qty: 9 PEN | Refills: 3 | Status: SHIPPED | OUTPATIENT
Start: 2020-09-11 | End: 2021-02-03 | Stop reason: SDUPTHER

## 2020-09-24 ENCOUNTER — TELEPHONE (OUTPATIENT)
Dept: ENDOCRINOLOGY | Facility: CLINIC | Age: 65
End: 2020-09-24

## 2020-09-25 ENCOUNTER — TELEPHONE (OUTPATIENT)
Dept: ENDOCRINOLOGY | Facility: CLINIC | Age: 65
End: 2020-09-25

## 2020-09-28 ENCOUNTER — TELEPHONE (OUTPATIENT)
Dept: ENDOCRINOLOGY | Facility: CLINIC | Age: 65
End: 2020-09-28

## 2020-09-28 NOTE — TELEPHONE ENCOUNTER
Patient help  Has been faxing request for refill of Jardience since Sept 3rd , 16th and again today states the patient has been out 2 days now.    Using [employer] assistance program and simply needs a script to fill.    All info listed on fax    If not received [phone] Stefanie or Marisa     Thank You

## 2020-09-29 ENCOUNTER — OFFICE VISIT (OUTPATIENT)
Dept: NEUROLOGY | Facility: CLINIC | Age: 65
End: 2020-09-29

## 2020-09-29 VITALS
BODY MASS INDEX: 29.12 KG/M2 | OXYGEN SATURATION: 99 % | DIASTOLIC BLOOD PRESSURE: 70 MMHG | HEIGHT: 71 IN | HEART RATE: 82 BPM | SYSTOLIC BLOOD PRESSURE: 126 MMHG | WEIGHT: 208 LBS

## 2020-09-29 DIAGNOSIS — G25.81 RESTLESS LEGS SYNDROME (RLS): ICD-10-CM

## 2020-09-29 DIAGNOSIS — E11.42 DIABETIC POLYNEUROPATHY ASSOCIATED WITH TYPE 2 DIABETES MELLITUS (HCC): ICD-10-CM

## 2020-09-29 PROCEDURE — 99213 OFFICE O/P EST LOW 20 MIN: CPT | Performed by: PHYSICIAN ASSISTANT

## 2020-09-29 RX ORDER — ROPINIROLE 1 MG/1
1 TABLET, FILM COATED ORAL NIGHTLY
Qty: 30 TABLET | Refills: 6 | Status: SHIPPED | OUTPATIENT
Start: 2020-09-29 | End: 2021-04-29 | Stop reason: SDUPTHER

## 2020-09-29 RX ORDER — CARBAMAZEPINE 200 MG/1
200 TABLET ORAL 2 TIMES DAILY
Qty: 60 TABLET | Refills: 5 | Status: CANCELLED | OUTPATIENT
Start: 2020-09-29

## 2020-09-29 RX ORDER — GEMFIBROZIL 600 MG/1
600 TABLET, FILM COATED ORAL
COMMUNITY
End: 2022-02-03

## 2020-10-08 ENCOUNTER — HOSPITAL ENCOUNTER (OUTPATIENT)
Dept: INFUSION THERAPY | Age: 65
Discharge: HOME | End: 2020-10-08
Payer: MEDICARE

## 2020-10-08 ENCOUNTER — OFFICE VISIT (OUTPATIENT)
Dept: HEMATOLOGY | Age: 65
End: 2020-10-08
Payer: MEDICARE

## 2020-10-08 VITALS
BODY MASS INDEX: 29.71 KG/M2 | HEIGHT: 71 IN | OXYGEN SATURATION: 96 % | DIASTOLIC BLOOD PRESSURE: 72 MMHG | WEIGHT: 212.2 LBS | SYSTOLIC BLOOD PRESSURE: 130 MMHG | HEART RATE: 90 BPM | TEMPERATURE: 97.7 F

## 2020-10-08 DIAGNOSIS — R76.8 ELEVATED SERUM IMMUNOGLOBULIN FREE LIGHT CHAINS: ICD-10-CM

## 2020-10-08 LAB
BASOPHILS ABSOLUTE: 0.02 K/UL (ref 0.01–0.08)
BASOPHILS RELATIVE PERCENT: 0.2 % (ref 0.1–1.2)
EOSINOPHILS ABSOLUTE: 0.55 K/UL (ref 0.04–0.54)
EOSINOPHILS RELATIVE PERCENT: 6.2 % (ref 0.7–7)
HCT VFR BLD CALC: 50.2 % (ref 40.1–51)
HEMOGLOBIN: 16.3 G/DL (ref 13.7–17.5)
LYMPHOCYTES ABSOLUTE: 3.08 K/UL (ref 1.18–3.74)
LYMPHOCYTES RELATIVE PERCENT: 34.7 % (ref 19.3–53.1)
MCH RBC QN AUTO: 30.1 PG (ref 25.7–32.2)
MCHC RBC AUTO-ENTMCNC: 32.5 G/DL (ref 32.3–36.5)
MCV RBC AUTO: 92.8 FL (ref 79–92.2)
MONOCYTES ABSOLUTE: 0.66 K/UL (ref 0.24–0.82)
MONOCYTES RELATIVE PERCENT: 7.4 % (ref 4.7–12.5)
NEUTROPHILS ABSOLUTE: 4.57 K/UL (ref 1.56–6.13)
NEUTROPHILS RELATIVE PERCENT: 51.5 % (ref 34–71.1)
PDW BLD-RTO: 14 % (ref 11.6–14.4)
PLATELET # BLD: 176 K/UL (ref 163–337)
PMV BLD AUTO: 11 FL (ref 7.4–10.4)
RBC # BLD: 5.41 M/UL (ref 4.63–6.08)
WBC # BLD: 8.88 K/UL (ref 4.23–9.07)

## 2020-10-08 PROCEDURE — G8417 CALC BMI ABV UP PARAM F/U: HCPCS | Performed by: NURSE PRACTITIONER

## 2020-10-08 PROCEDURE — 85025 COMPLETE CBC W/AUTO DIFF WBC: CPT

## 2020-10-08 PROCEDURE — 1036F TOBACCO NON-USER: CPT | Performed by: NURSE PRACTITIONER

## 2020-10-08 PROCEDURE — 99211 OFF/OP EST MAY X REQ PHY/QHP: CPT

## 2020-10-08 PROCEDURE — 1123F ACP DISCUSS/DSCN MKR DOCD: CPT | Performed by: NURSE PRACTITIONER

## 2020-10-08 PROCEDURE — G8484 FLU IMMUNIZE NO ADMIN: HCPCS | Performed by: NURSE PRACTITIONER

## 2020-10-08 PROCEDURE — 4040F PNEUMOC VAC/ADMIN/RCVD: CPT | Performed by: NURSE PRACTITIONER

## 2020-10-08 PROCEDURE — 99213 OFFICE O/P EST LOW 20 MIN: CPT | Performed by: NURSE PRACTITIONER

## 2020-10-08 PROCEDURE — G8427 DOCREV CUR MEDS BY ELIG CLIN: HCPCS | Performed by: NURSE PRACTITIONER

## 2020-10-08 PROCEDURE — 3017F COLORECTAL CA SCREEN DOC REV: CPT | Performed by: NURSE PRACTITIONER

## 2020-10-08 RX ORDER — INSULIN GLARGINE 100 [IU]/ML
80 INJECTION, SOLUTION SUBCUTANEOUS DAILY
COMMUNITY

## 2020-10-08 RX ORDER — EMPAGLIFLOZIN 10 MG/1
10 TABLET, FILM COATED ORAL DAILY
COMMUNITY

## 2020-10-08 RX ORDER — DULAGLUTIDE 1.5 MG/.5ML
1.5 INJECTION, SOLUTION SUBCUTANEOUS WEEKLY
COMMUNITY

## 2020-10-08 RX ORDER — SIMVASTATIN 40 MG
40 TABLET ORAL NIGHTLY
COMMUNITY
End: 2021-10-27

## 2020-10-08 RX ORDER — GEMFIBROZIL 600 MG/1
600 TABLET, FILM COATED ORAL
COMMUNITY

## 2020-10-08 ASSESSMENT — ENCOUNTER SYMPTOMS
EYE ITCHING: 0
EYE DISCHARGE: 0
TROUBLE SWALLOWING: 0
VOMITING: 0
NAUSEA: 0
COUGH: 1
DIARRHEA: 0
CONSTIPATION: 0
ABDOMINAL PAIN: 0
WHEEZING: 0
SORE THROAT: 0
SHORTNESS OF BREATH: 0

## 2020-10-08 NOTE — PROGRESS NOTES
OP HEMATOLOGY/ONCOLOGY CONSULTATION      Pt Name: Tawanda Apple  YOB: 1955  MRN: [id number]  Referring provider: ALISA Bourne  Requesting provider: ALISA Sherman  Reason for consultation: Elevated light chain ratio  Date of evaluation: 10/8/2020    History Obtained From:  patient, electronic medical record    CHIEF COMPLAINT:    Chief Complaint   Patient presents with    New Patient     Elevated Serum Immunoglobulin Free Light Chain     HISTORY OF PRESENT ILLNESS:    Tawanda Apple is a pleasant 76 y.o.  male referred to the clinic by ALISA Sherman for evaluation of an elevated light chain ratio. PMH includes CKD, diabetes mellitus type 2, hypertension, hyperlipidemia, hypothyroidism, GERD etc.  Christina Flmeing was recently referred to ALISA Sherman for abnormal renal function, diagnosed with stage III CKD related to diabetes. Serology for evaluation of CKD 8/3/2020:  · CBC: WBC 9.03, Hgb 16.2, platelets 803,453  · CMP: Creatinine 1.31, GFR 55  · HgbA1c: 9.14  · Vitamin D: 27.7    Unfortunately, additional serology including serum light chains were unavailable for review during Akhil's office visit, despite multiple attempts. Rationale for concerns regarding elevated light chain was discussed with Christina Fleming and his wife. Fortunately CBC is quite stable, calcium and total protein are normal.  He denies localized bony tenderness, though reports chronic neuropathy but is managed by Lillian Hoffman PA-C. He also complains of a chronic cough for the past 6 months or so that is being addressed by his PCP, ALISA Devi. Christina Fleming will return to the clinic 10/9/2020 for additional serology as warranted, following review of labs completed by nephrology.     Past Medical History:   Diagnosis Date    Arm fracture 07/2016    left    Depression     Diabetes mellitus (HCC)     Hypertension     Kidney stones     Neuropathy     Restless leg syndrome      Past Surgical History:   Procedure Laterality Date    CHOLECYSTECTOMY      LITHOTRIPSY      SHOULDER SURGERY Left     Frozen shoulder surgery    UPPER GASTROINTESTINAL ENDOSCOPY         Current Outpatient Medications:     empagliflozin (JARDIANCE) 10 MG tablet, Take 10 mg by mouth daily, Disp: , Rfl:     simvastatin (ZOCOR) 40 MG tablet, Take 40 mg by mouth nightly, Disp: , Rfl:     Dulaglutide (TRULICITY) 1.5 JW/4.2VP SOPN, Inject 1.5 mg into the skin once a week, Disp: , Rfl:     gemfibrozil (LOPID) 600 MG tablet, Take 600 mg by mouth 2 times daily (before meals), Disp: , Rfl:     insulin glargine (BASAGLAR KWIKPEN) 100 UNIT/ML injection pen, Inject into the skin nightly, Disp: , Rfl:     lisinopril (PRINIVIL;ZESTRIL) 20 MG tablet, Take 1 tablet by mouth daily, Disp: 90 tablet, Rfl: 3    Cholecalciferol (VITAMIN D3) 1.25 MG (01685 UT) CAPS, Take by mouth every 14 days, Disp: , Rfl:     fexofenadine (ALLEGRA) 180 MG tablet, Take 180 mg by mouth daily, Disp: , Rfl:     potassium citrate (UROCIT-K) 10 MEQ (1080 MG) extended release tablet, Take 10 mEq by mouth 3 times daily (with meals), Disp: , Rfl:     carBAMazepine (CARBATROL) 200 MG extended release capsule, Take 200 mg by mouth 2 times daily, Disp: , Rfl:     Dapagliflozin-Metformin HCl ER 5-500 MG TB24, Take 1 tablet by mouth daily, Disp: , Rfl:     Ertugliflozin L-PyroglutamicAc (STEGLATRO) 5 MG TABS, Take 5 mg by mouth daily, Disp: , Rfl:     Exenatide ER 2 MG/0.85ML AUIJ, Inject into the skin once a week Wednesday, Disp: , Rfl:     fluticasone (FLONASE) 50 MCG/ACT nasal spray, 1 spray by Each Nare route 2 times daily as needed for Rhinitis, Disp: , Rfl:     Insulin Degludec (TRESIBA FLEXTOUCH) 200 UNIT/ML SOPN, Inject 100 Units into the skin nightly, Disp: , Rfl:     Insulin Lispro (HUMALOG KWIKPEN SC), Inject 10 Units into the skin every morning (before breakfast) , Disp: , Rfl:     loratadine (CLARITIN) 10 MG tablet, Take 10 mg by mouth daily as needed, Disp: , Rfl:     metFORMIN (GLUCOPHAGE-XR) 500 MG extended release tablet, Take 500 mg by mouth 2 times daily, Disp: , Rfl:     omeprazole (PRILOSEC) 20 MG delayed release capsule, Take 20 mg by mouth 2 times daily, Disp: , Rfl:     HYDROcodone-acetaminophen (LORTAB) 5-325 MG per tablet, Take 1 tablet by mouth every 6 hours as needed for Pain. ., Disp: , Rfl:     gabapentin (NEURONTIN) 800 MG tablet, Take 800 mg by mouth 2 times daily. ., Disp: , Rfl:     metoprolol tartrate (LOPRESSOR) 50 MG tablet, Take 50 mg by mouth 2 times daily, Disp: , Rfl:     hydrochlorothiazide (HYDRODIURIL) 25 MG tablet, Take 12.5 mg by mouth daily, Disp: , Rfl:     levothyroxine (SYNTHROID) 50 MCG tablet, Take 50 mcg by mouth Daily, Disp: , Rfl:     famotidine (PEPCID) 20 MG tablet, Take 20 mg by mouth daily , Disp: , Rfl:     MELOXICAM PO, Take 15 mg by mouth daily, Disp: , Rfl:     rOPINIRole (REQUIP) 0.5 MG tablet, Take 0.5 mg by mouth nightly, Disp: , Rfl:     citalopram (CELEXA) 20 MG tablet, Take 20 mg by mouth nightly, Disp: , Rfl:    Allergies: Allergies   Allergen Reactions    Penicillins      Social History     Tobacco Use    Smoking status: Never Smoker    Smokeless tobacco: Never Used   Substance Use Topics    Alcohol use: No    Drug use: No     No family history on file.   Health Maintenance   Topic Date Due    Hepatitis C screen  1955    Diabetic foot exam  06/29/1964    Diabetic retinal exam  06/29/1964    Lipid screen  06/29/1964    Diabetic microalbuminuria test  06/29/1972    DTaP/Tdap/Td vaccine (1 - Tdap) 06/29/1973    Shingles Vaccine (1 of 2) 06/29/2004    Colon cancer screen colonoscopy  06/29/2004    Pneumococcal 65+ years Vaccine (1 of 1 - PPSV23) 06/29/2019    A1C test (Diabetic or Prediabetic)  12/27/2019    Annual Wellness Visit (AWV)  01/22/2020    Flu vaccine (1) 09/01/2020    Potassium monitoring  03/10/2021    Creatinine monitoring  03/10/2021    Hepatitis A vaccine  Aged Out    Hib vaccine  Aged Out    Meningococcal (ACWY) vaccine  Aged Out     Subjective   Review of Systems   Constitutional: Negative for fatigue and fever. No night sweats   HENT: Negative for dental problem, hearing loss, mouth sores, nosebleeds, sore throat and trouble swallowing. Eyes: Negative for discharge and itching. Respiratory: Positive for cough. Negative for shortness of breath and wheezing. No hemoptysis   Cardiovascular: Negative for chest pain, palpitations and leg swelling. Gastrointestinal: Negative for abdominal pain, constipation, diarrhea, nausea and vomiting. Endocrine: Negative for cold intolerance, heat intolerance, polydipsia and polyuria. Genitourinary: Negative for dysuria, frequency, hematuria and urgency. Musculoskeletal: Positive for arthralgias. Negative for joint swelling and myalgias. Skin: Negative for pallor and rash. Allergic/Immunologic: Positive for environmental allergies. Negative for immunocompromised state. Neurological: Negative for seizures, syncope and numbness. Hematological: Negative for adenopathy. Does not bruise/bleed easily. Psychiatric/Behavioral: Positive for behavioral problems. Negative for agitation, confusion and suicidal ideas. The patient is not nervous/anxious. Objective   Physical Exam  Vitals signs reviewed. Constitutional:       General: He is not in acute distress. Appearance: He is well-developed. He is not toxic-appearing or diaphoretic. HENT:      Head: Normocephalic and atraumatic. Right Ear: External ear normal.      Left Ear: External ear normal.      Nose: Nose normal.      Mouth/Throat:      Mouth: Mucous membranes are moist.   Eyes:      General: No scleral icterus. Right eye: No discharge. Left eye: No discharge. Conjunctiva/sclera: Conjunctivae normal.   Neck:      Musculoskeletal: Neck supple. Trachea: No tracheal deviation.    Cardiovascular: serology/urologic studies (to be determined after further review of labs completed per [facility]). Stage 3a chronic kidney disease  Per nephrology    Neuropathy  Managed by Hany Goss PA-C    Colonoscopy last completed 2020  PSA per PCP    No orders of the defined types were placed in this encounter. RTC 10/9/2020 for labs. Return in about 5 weeks (around 2020) for follow up with ALISA Kamara.       [address] [place] [address]  4:13 PM  10/8/2020    Addendum: Labs received after patient had left the clinic and are summarized as follows:  Labs 2020:  · CMP: Creatinine 1.27/GFR 58, AST 56, ALT 53, calcium 9.5  · Total protein: 7.5  · SPEP: No M-spike  · Ig, IgA: 334, IgM: 92-all within normal limits  · Kappa light chain 51.9, lambda light chain 25.2 with K/L ratio mildly elevated at 2.06  · Urinalysis: 3+ glucose, 1+ occult blood, negative nitrite    Recommend SPEP with immunofixation, 24-hour urine for immunoelectrophoresis  Keep scheduled appointment in 6 weeks

## 2020-10-09 ENCOUNTER — HOSPITAL ENCOUNTER (OUTPATIENT)
Dept: INFUSION THERAPY | Age: 65
Discharge: HOME | End: 2020-10-09
Payer: MEDICARE

## 2020-10-09 DIAGNOSIS — R76.8 ELEVATED SERUM IMMUNOGLOBULIN FREE LIGHT CHAINS: ICD-10-CM

## 2020-10-15 ENCOUNTER — DOCUMENTATION (OUTPATIENT)
Dept: ENDOCRINOLOGY | Facility: CLINIC | Age: 65
End: 2020-10-15

## 2020-10-25 NOTE — PROGRESS NOTES
Subjective   Supa Mcclelland is a 65 y.o. male is here today for follow-up.    Long-term painful peripheral diabetic neuropathy patient with improvement to some degree on Neurontin.  He has been placed on carbamazepine but was unable to obtain it because of cost.  He feels that he is doing about the same off of the Tegretol.  RLS symptoms respond fairly well to ropinirole.    Peripheral Neuropathy  This is a chronic problem. The current episode started more than 1 year ago. The problem occurs constantly. The problem has been unchanged. Associated symptoms include congestion, fatigue, numbness and weakness. The symptoms are aggravated by exertion and walking. He has tried position changes and rest (Neurontin, ropinirole) for the symptoms. The treatment provided moderate relief.        The following portions of the patient's history were reviewed and updated as appropriate: allergies, current medications, past family history, past medical history, past social history, past surgical history and problem list.    Review of Systems   Constitutional: Positive for fatigue.   HENT: Positive for congestion, hearing loss and rhinorrhea.    Eyes: Negative.    Respiratory: Positive for shortness of breath.    Gastrointestinal: Positive for GERD and indigestion.   Endocrine: Negative.    Genitourinary: Negative.    Musculoskeletal: Positive for back pain and gait problem.   Skin: Negative.    Allergic/Immunologic: Negative.    Neurological: Positive for weakness and numbness.   Hematological: Negative.    Psychiatric/Behavioral: Positive for dysphoric mood and sleep disturbance. The patient is nervous/anxious.          Current Outpatient Medications:   •  cholecalciferol (D3-50) 1.25 MG (02825 UT) capsule, TAKE ONE CAPSULE BY MOUTH EVERY 2 WEEKS., Disp: 2 capsule, Rfl: 0  •  citalopram (CeleXA) 20 MG tablet, Take 20 mg by mouth Daily., Disp: , Rfl:   •  Dulaglutide 1.5 MG/0.5ML solution pen-injector, Inject 1.5 mg under the skin  into the appropriate area as directed 1 (One) Time Per Week., Disp: 4 pen, Rfl: 11  •  Empagliflozin (JARDIANCE) 10 MG tablet, Take 1 tablet by mouth Daily., Disp: 90 tablet, Rfl: 3  •  famotidine (PEPCID) 20 MG tablet, Take 20 mg by mouth 2 (Two) Times a Day., Disp: , Rfl:   •  fexofenadine (ALLEGRA) 180 MG tablet, Take 180 mg by mouth As Needed., Disp: , Rfl:   •  fluticasone (FLONASE) 50 MCG/ACT nasal spray, 1 spray each nostril twice a day for three days, then daily., Disp: 1 bottle, Rfl: 0  •  gabapentin (NEURONTIN) 800 MG tablet, Take 1 tablet by mouth 3 (Three) Times a Day., Disp: 270 tablet, Rfl: 0  •  gemfibrozil (LOPID) 600 MG tablet, Take 600 mg by mouth 2 (Two) Times a Day Before Meals., Disp: , Rfl:   •  hydrochlorothiazide (HYDRODIURIL) 12.5 MG tablet, Take 12.5 mg by mouth Daily., Disp: , Rfl:   •  Insulin Glargine (BASAGLAR KWIKPEN) 100 UNIT/ML injection pen, 90 units qam, Disp: 9 pen, Rfl: 3  •  Insulin Lispro (HumaLOG KwikPen) 200 UNIT/ML solution pen-injector, Inject 30 Units under the skin into the appropriate area as directed 3 (Three) Times a Day With Meals., Disp: 36 pen, Rfl: 3  •  levothyroxine (SYNTHROID, LEVOTHROID) 50 MCG tablet, Take 1 tablet by mouth Daily., Disp: , Rfl:   •  loratadine (CLARITIN) 10 MG tablet, Take 10 mg by mouth Daily., Disp: , Rfl:   •  meloxicam (MOBIC) 15 MG tablet, Take 1 tablet by mouth Daily., Disp: , Rfl:   •  metFORMIN ER (GLUCOPHAGE-XR) 500 MG 24 hr tablet, TAKE 1 TABLET BY MOUTH TWICE DAILY WITH MEALS, Disp: 180 tablet, Rfl: 0  •  metoprolol tartrate (LOPRESSOR) 50 MG tablet, Take 50 mg by mouth 2 (Two) Times a Day., Disp: , Rfl:   •  omeprazole (priLOSEC) 20 MG capsule, Take 1 capsule by mouth 2 (Two) Times a Day., Disp: 60 capsule, Rfl: 11  •  potassium citrate (UROCIT-K) 10 MEQ (1080 MG) CR tablet, Take 1 tablet by mouth 3 (Three) Times a Day With Meals., Disp: 90 tablet, Rfl: 11  •  RELION PEN NEEDLES 31G X 6 MM misc, USE AS DIRECTED 4 TIMES DAILY, Disp:  150 each, Rfl: 11  •  zolpidem (AMBIEN) 10 MG tablet, zolpidem     tab 10mgzolpidem tartrate, Disp: , Rfl:   •  rOPINIRole (REQUIP) 1 MG tablet, Take 1 tablet by mouth Every Night. Take 1 hour before bedtime., Disp: 30 tablet, Rfl: 6     Objective   Physical Exam  HENT:      Head: Normocephalic.      Right Ear: External ear normal. Decreased hearing noted.      Left Ear: External ear normal. Decreased hearing noted.      Nose: Nose normal.   Eyes:      General: Lids are normal. Vision grossly intact. Gaze aligned appropriately. No scleral icterus.     Extraocular Movements: Extraocular movements intact.      Conjunctiva/sclera: Conjunctivae normal.      Pupils: Pupils are equal, round, and reactive to light.   Neck:      Musculoskeletal: Normal range of motion.      Vascular: No carotid bruit or JVD.      Trachea: Trachea and phonation normal.   Cardiovascular:      Rate and Rhythm: Normal rate.      Heart sounds: Normal heart sounds.   Pulmonary:      Effort: Pulmonary effort is normal.      Breath sounds: Normal breath sounds.   Musculoskeletal:      Lumbar back: He exhibits pain.   Skin:     General: Skin is warm and dry.   Neurological:      Mental Status: He is alert and oriented to person, place, and time.      GCS: GCS eye subscore is 4. GCS verbal subscore is 5. GCS motor subscore is 6.      Cranial Nerves: Cranial nerves are intact.      Sensory: Sensory deficit present.      Motor: Weakness present.      Coordination: Coordination is intact.      Gait: Gait abnormal.      Deep Tendon Reflexes:      Reflex Scores:       Tricep reflexes are 1+ on the right side and 1+ on the left side.       Bicep reflexes are 1+ on the right side and 1+ on the left side.       Brachioradialis reflexes are 1+ on the right side and 1+ on the left side.       Patellar reflexes are 1+ on the right side and 1+ on the left side.       Achilles reflexes are 1+ on the right side and 1+ on the left side.     Comments: Diminished  peripheral sensation in the lower extremities consistent with diabetic peripheral neuropathy    Psychiatric:         Attention and Perception: Attention and perception normal.         Mood and Affect: Mood and affect normal.         Speech: Speech normal.         Behavior: Behavior normal.         Thought Content: Thought content normal.         Cognition and Memory: Cognition and memory normal.         Judgment: Judgment normal.           Assessment/Plan   Diagnoses and all orders for this visit:    1. Diabetic polyneuropathy associated with type 2 diabetes mellitus (CMS/HCC)  -     rOPINIRole (REQUIP) 1 MG tablet; Take 1 tablet by mouth Every Night. Take 1 hour before bedtime.  Dispense: 30 tablet; Refill: 6    2. Restless legs syndrome (RLS)  -     rOPINIRole (REQUIP) 1 MG tablet; Take 1 tablet by mouth Every Night. Take 1 hour before bedtime.  Dispense: 30 tablet; Refill: 6    Continue ropinirole and gabapentin.  The importance of control of his diabetes is reviewed with the patient.    Today's findings and recommendations as well as the patient's questions and concerns are reviewed in detail.        10 minutes of a 15 minute outpatient visit was spent in counseling and coordination of care today.           Dictated utilizing Dragon dictation.

## 2020-10-31 DIAGNOSIS — E11.42 DIABETIC POLYNEUROPATHY ASSOCIATED WITH TYPE 2 DIABETES MELLITUS (HCC): ICD-10-CM

## 2020-10-31 DIAGNOSIS — G25.81 RESTLESS LEGS SYNDROME (RLS): ICD-10-CM

## 2020-11-02 RX ORDER — GABAPENTIN 800 MG/1
TABLET ORAL
Qty: 270 TABLET | Refills: 0 | Status: SHIPPED | OUTPATIENT
Start: 2020-11-02 | End: 2021-03-15

## 2020-11-12 ENCOUNTER — HOSPITAL ENCOUNTER (OUTPATIENT)
Dept: GENERAL RADIOLOGY | Age: 65
Discharge: HOME | End: 2020-11-12
Payer: MEDICARE

## 2020-11-12 ENCOUNTER — HOSPITAL ENCOUNTER (OUTPATIENT)
Dept: INFUSION THERAPY | Age: 65
Discharge: HOME | End: 2020-11-12
Payer: MEDICARE

## 2020-11-12 ENCOUNTER — OFFICE VISIT (OUTPATIENT)
Dept: HEMATOLOGY | Age: 65
End: 2020-11-12
Payer: MEDICARE

## 2020-11-12 VITALS
HEIGHT: 71 IN | BODY MASS INDEX: 29.4 KG/M2 | DIASTOLIC BLOOD PRESSURE: 88 MMHG | HEART RATE: 88 BPM | OXYGEN SATURATION: 95 % | SYSTOLIC BLOOD PRESSURE: 146 MMHG | TEMPERATURE: 97.5 F | WEIGHT: 210 LBS

## 2020-11-12 DIAGNOSIS — R76.8 ELEVATED SERUM IMMUNOGLOBULIN FREE LIGHT CHAINS: ICD-10-CM

## 2020-11-12 LAB
BASOPHILS ABSOLUTE: 0.02 K/UL (ref 0.01–0.08)
BASOPHILS RELATIVE PERCENT: 0.3 % (ref 0.1–1.2)
EOSINOPHILS ABSOLUTE: 0.5 K/UL (ref 0.04–0.54)
EOSINOPHILS RELATIVE PERCENT: 6.4 % (ref 0.7–7)
HCT VFR BLD CALC: 55.7 % (ref 40.1–51)
HEMOGLOBIN: 18.2 G/DL (ref 13.7–17.5)
LYMPHOCYTES ABSOLUTE: 2.59 K/UL (ref 1.18–3.74)
LYMPHOCYTES RELATIVE PERCENT: 33 % (ref 19.3–53.1)
MCH RBC QN AUTO: 30.2 PG (ref 25.7–32.2)
MCHC RBC AUTO-ENTMCNC: 32.7 G/DL (ref 32.3–36.5)
MCV RBC AUTO: 92.4 FL (ref 79–92.2)
MONOCYTES ABSOLUTE: 0.6 K/UL (ref 0.24–0.82)
MONOCYTES RELATIVE PERCENT: 7.6 % (ref 4.7–12.5)
NEUTROPHILS ABSOLUTE: 4.14 K/UL (ref 1.56–6.13)
NEUTROPHILS RELATIVE PERCENT: 52.7 % (ref 34–71.1)
PDW BLD-RTO: 13.9 % (ref 11.6–14.4)
PLATELET # BLD: 171 K/UL (ref 163–337)
PMV BLD AUTO: 11.5 FL (ref 7.4–10.4)
RBC # BLD: 6.03 M/UL (ref 4.63–6.08)
WBC # BLD: 7.85 K/UL (ref 4.23–9.07)

## 2020-11-12 PROCEDURE — 77075 RADEX OSSEOUS SURVEY COMPL: CPT

## 2020-11-12 PROCEDURE — 3017F COLORECTAL CA SCREEN DOC REV: CPT | Performed by: NURSE PRACTITIONER

## 2020-11-12 PROCEDURE — 99213 OFFICE O/P EST LOW 20 MIN: CPT | Performed by: NURSE PRACTITIONER

## 2020-11-12 PROCEDURE — 85025 COMPLETE CBC W/AUTO DIFF WBC: CPT

## 2020-11-12 PROCEDURE — 99212 OFFICE O/P EST SF 10 MIN: CPT

## 2020-11-12 PROCEDURE — G8417 CALC BMI ABV UP PARAM F/U: HCPCS | Performed by: NURSE PRACTITIONER

## 2020-11-12 PROCEDURE — 4040F PNEUMOC VAC/ADMIN/RCVD: CPT | Performed by: NURSE PRACTITIONER

## 2020-11-12 PROCEDURE — 1123F ACP DISCUSS/DSCN MKR DOCD: CPT | Performed by: NURSE PRACTITIONER

## 2020-11-12 PROCEDURE — 1036F TOBACCO NON-USER: CPT | Performed by: NURSE PRACTITIONER

## 2020-11-12 PROCEDURE — G8484 FLU IMMUNIZE NO ADMIN: HCPCS | Performed by: NURSE PRACTITIONER

## 2020-11-12 PROCEDURE — G8427 DOCREV CUR MEDS BY ELIG CLIN: HCPCS | Performed by: NURSE PRACTITIONER

## 2020-11-12 ASSESSMENT — ENCOUNTER SYMPTOMS
CONSTIPATION: 0
EYE DISCHARGE: 0
WHEEZING: 0
DIARRHEA: 0
EYE ITCHING: 0
ABDOMINAL PAIN: 0
SHORTNESS OF BREATH: 0
SORE THROAT: 0
NAUSEA: 0
COUGH: 1
TROUBLE SWALLOWING: 0
VOMITING: 0

## 2020-11-12 NOTE — PROGRESS NOTES
OP HEMATOLOGY/ONCOLOGY PROGRESS NOTE      Pt Name: Bob Haney  YOB: 1955  MRN: [id number]  Referring provider: ALISA Merchant  Date of evaluation: 2020    History Obtained From:  patient, electronic medical record    CHIEF COMPLAINT:    Chief Complaint   Patient presents with    Follow-up     Elevated serum immunoglobulin free light chains      HISTORY OF PRESENT ILLNESS:    Bob Haney is a pleasant 76 y.o.  male returning to the clinic for follow-up regarding elevated kappa light chain. No new complaints since last evaluated 6 weeks ago. Chronic neuropathy is unchanged. Blood pressure is mildly elevated 146/88. Kelsea Carreon is accompanied by his wife, here to discuss additional serology and plans moving forward. HEMATOLOGY HISTORY: Elevated kappa light chain  Aly Gonzales was seen in initial hematology consultation 10/7/2020, referred by ALISA Romero for evaluation of an elevated light chain ratio. PMH includes CKD, diabetes mellitus type 2, hypertension, hyperlipidemia, hypothyroidism, GERD etc.  Kelsea Carreon was recently referred to ALISA Romero for abnormal renal function, diagnosed with stage III CKD related to diabetes. Serology for evaluation of CKD 8/3/2020:  · CBC: WBC 9.03, Hgb 16.2, platelets 551,060  · CMP: Creatinine 1.31, GFR 55  · HgbA1c: 9.14  · Vitamin D: 27.7    Elevated serum immunoglobulin free light chains  Labs 2020:  · CMP: Creatinine 1.27/GFR 58, AST 56, ALT 53, calcium 9.5  · Total protein: 7.5  · SPEP: No M-spike  · Ig, IgA: 334, IgM: 92-all within normal limits  · Kappa light chain 51.9, lambda light chain 25.2 with K/L ratio mildly elevated at 2.06  · Urinalysis: 3+ glucose, 1+ occult blood, negative nitrite    He denies localized bony tenderness, though reports chronic neuropathy but is managed by Susanne Pagan PA-C.     He also complains of a chronic cough for the past 6 months or so that is being addressed by his PCP, ALISA Costa.     Labs 10/9/2020:  SPEP: No M-spike  Immunofixation: No monoclonality detected  Ig, IgA 354, IgM 124    24-hour urine for immunoelectrophoresis:  No M-spike, no monoclonality detected    Bone survey requested and BMAB    Past Medical History:   Diagnosis Date    Arm fracture 2016    left    Depression     Diabetes mellitus ([address]     Hypertension     Kidney stones     Neuropathy     Restless leg syndrome      Past Surgical History:   Procedure Laterality Date    CHOLECYSTECTOMY      LITHOTRIPSY      SHOULDER SURGERY Left     Frozen shoulder surgery    UPPER GASTROINTESTINAL ENDOSCOPY         Current Outpatient Medications:     empagliflozin (JARDIANCE) 10 MG tablet, Take 10 mg by mouth daily, Disp: , Rfl:     simvastatin (ZOCOR) 40 MG tablet, Take 40 mg by mouth nightly, Disp: , Rfl:     Dulaglutide (TRULICITY) 1.5 EJ/0.8UY SOPN, Inject 1.5 mg into the skin once a week, Disp: , Rfl:     gemfibrozil (LOPID) 600 MG tablet, Take 600 mg by mouth 2 times daily (before meals), Disp: , Rfl:     insulin glargine (BASAGLAR KWIKPEN) 100 UNIT/ML injection pen, Inject into the skin nightly, Disp: , Rfl:     lisinopril (PRINIVIL;ZESTRIL) 20 MG tablet, Take 1 tablet by mouth daily, Disp: 90 tablet, Rfl: 3    Cholecalciferol (VITAMIN D3) 1.25 MG (35238 UT) CAPS, Take by mouth every 14 days, Disp: , Rfl:     fexofenadine (ALLEGRA) 180 MG tablet, Take 180 mg by mouth daily, Disp: , Rfl:     potassium citrate (UROCIT-K) 10 MEQ (1080 MG) extended release tablet, Take 10 mEq by mouth 3 times daily (with meals), Disp: , Rfl:     carBAMazepine (CARBATROL) 200 MG extended release capsule, Take 200 mg by mouth 2 times daily, Disp: , Rfl:     Dapagliflozin-Metformin HCl ER 5-500 MG TB24, Take 1 tablet by mouth daily, Disp: , Rfl:     Ertugliflozin L-PyroglutamicAc (STEGLATRO) 5 MG TABS, Take 5 mg by mouth daily , Disp: , Rfl:     Exenatide ER 2 MG/0.85ML AUIJ, Inject into the skin once a week Wednesday, Disp: , Rfl:     fluticasone (FLONASE) 50 MCG/ACT nasal spray, 1 spray by Each Nare route 2 times daily as needed for Rhinitis, Disp: , Rfl:     Insulin Degludec (TRESIBA FLEXTOUCH) 200 UNIT/ML SOPN, Inject 100 Units into the skin nightly, Disp: , Rfl:     Insulin Lispro (HUMALOG KWIKPEN SC), Inject 10 Units into the skin every morning (before breakfast) , Disp: , Rfl:     loratadine (CLARITIN) 10 MG tablet, Take 10 mg by mouth daily as needed, Disp: , Rfl:     metFORMIN (GLUCOPHAGE-XR) 500 MG extended release tablet, Take 500 mg by mouth 2 times daily, Disp: , Rfl:     omeprazole (PRILOSEC) 20 MG delayed release capsule, Take 20 mg by mouth 2 times daily, Disp: , Rfl:     HYDROcodone-acetaminophen (LORTAB) 5-325 MG per tablet, Take 1 tablet by mouth every 6 hours as needed for Pain. ., Disp: , Rfl:     gabapentin (NEURONTIN) 800 MG tablet, Take 800 mg by mouth 2 times daily. ., Disp: , Rfl:     metoprolol tartrate (LOPRESSOR) 50 MG tablet, Take 50 mg by mouth 2 times daily, Disp: , Rfl:     hydrochlorothiazide (HYDRODIURIL) 25 MG tablet, Take 12.5 mg by mouth daily, Disp: , Rfl:     levothyroxine (SYNTHROID) 50 MCG tablet, Take 50 mcg by mouth Daily, Disp: , Rfl:     famotidine (PEPCID) 20 MG tablet, Take 20 mg by mouth daily , Disp: , Rfl:     MELOXICAM PO, Take 15 mg by mouth daily, Disp: , Rfl:     rOPINIRole (REQUIP) 0.5 MG tablet, Take 0.5 mg by mouth nightly, Disp: , Rfl:     citalopram (CELEXA) 20 MG tablet, Take 20 mg by mouth nightly, Disp: , Rfl:    Allergies: Allergies   Allergen Reactions    Penicillins      Social History     Tobacco Use    Smoking status: Never Smoker    Smokeless tobacco: Never Used   Substance Use Topics    Alcohol use: No    Drug use: No     History reviewed. No pertinent family history.   Health Maintenance   Topic Date Due    Hepatitis C screen  1955    Diabetic foot exam  06/29/1964    Diabetic retinal exam  06/29/1964    Lipid screen  06/29/1964    Diabetic microalbuminuria test  06/29/1972    DTaP/Tdap/Td vaccine (1 - Tdap) 06/29/1973    Shingles Vaccine (1 of 2) 06/29/2004    Colon cancer screen colonoscopy  06/29/2004    Pneumococcal 65+ years Vaccine (1 of 1 - PPSV23) 06/29/2019    A1C test (Diabetic or Prediabetic)  12/27/2019    Annual Wellness Visit (AWV)  01/22/2020    Flu vaccine (1) 09/01/2020    Potassium monitoring  03/10/2021    Creatinine monitoring  03/10/2021    Hepatitis A vaccine  Aged Out    Hib vaccine  Aged Out    Meningococcal (ACWY) vaccine  Aged Out     Subjective   Review of Systems   Constitutional: Negative for fatigue and fever. No night sweats   HENT: Negative for dental problem, hearing loss, mouth sores, nosebleeds, sore throat and trouble swallowing. Eyes: Negative for discharge and itching. Respiratory: Positive for cough. Negative for shortness of breath and wheezing. No hemoptysis   Cardiovascular: Negative for chest pain, palpitations and leg swelling. Gastrointestinal: Negative for abdominal pain, constipation, diarrhea, nausea and vomiting. Endocrine: Negative for cold intolerance, heat intolerance, polydipsia and polyuria. Genitourinary: Negative for dysuria, frequency, hematuria and urgency. Musculoskeletal: Positive for arthralgias. Negative for joint swelling and myalgias. Skin: Negative for pallor and rash. Allergic/Immunologic: Positive for environmental allergies. Negative for immunocompromised state. Neurological: Negative for seizures, syncope and numbness. Hematological: Negative for adenopathy. Does not bruise/bleed easily. Psychiatric/Behavioral: Negative for agitation, behavioral problems, confusion and suicidal ideas. The patient is not nervous/anxious. Objective   Physical Exam  Vitals signs reviewed. Constitutional:       General: He is not in acute distress. Appearance: He is well-developed.  He is not toxic-appearing SpO2 95%   BMI 29.29 kg/m²   Wt Readings from Last 3 Encounters:   20 210 lb (95.3 kg)   10/08/20 212 lb 3.2 oz (96.3 kg)   03/10/20 214 lb (97.1 kg)     Labs reviewed by me:  CBC 20: WBC WBC 7.85, Hgb 18.2/55.7, platelets 487,682    ASSESSMENT/PLAN:    1. Elevated serum immunoglobulin free light chains    2. Stage 3a chronic kidney disease    3. Elevated hemoglobin (HCC)    4. Fatigue, unspecified type    5. Erythrocytosis    6.  Chronic kidney disease, unspecified CKD stage       Elevated serum immunoglobulin free light chains  Labs 2020:  · CMP: Creatinine 1.27/GFR 58, AST 56, ALT 53, calcium 9.5  · Total protein: 7.5  · SPEP: No M-spike  · Ig, IgA: 334, IgM: 92-all within normal limits  · Kappa light chain 51.9, lambda light chain 25.2 with K/L ratio mildly elevated at 2.06  · Urinalysis: 3+ glucose, 1+ occult blood, negative nitrite    Labs 10/9/2020:  SPEP: No M-spike  Immunofixation: No monoclonality detected  Ig, IgA 354, IgM 124    24-hour urine for immunoelectrophoresis:  No M-spike, no monoclonality detected    Request bone survey  Will arrange BMAB (in [address] [place] [address] with MAC, per patient request) for further evaluation of elevated kappa light chain with abn K/L ratio and abn renal function    chronic kidney disease  Per nephrology    Elevated H/H  HH 18.2/55.7  Ace Rodriguez denies tobacco use  He does not use exogenous testosterone  He denies any known history of sleep apnea  He reports staying well-hydrated  Labs requested including iron panel, erythropoietin   will request JAK2 with reflexes on bone marrow aspirate and biopsy    Neuropathy  Managed by Kelly Moran PA-C    Fatigue  Supportive care    Colonoscopy last completed 2020  PSA per PCP    Orders Placed This Encounter   Procedures    XR BONE SURVEY COMPLETE    Ferritin    Iron and TIBC    C-Reactive Protein    Beta 2 Microglobulin, Serum    Lactate Dehydrogenase    Erythropoietin       Return in about 2 weeks (around 11/26/2020) for follow up with ALISA Damon - 2 weeks after BMAB to review results.       ALISA Drew  8:09 PM  11/14/2020

## 2020-12-04 ENCOUNTER — DOCUMENTATION (OUTPATIENT)
Dept: ENDOCRINOLOGY | Facility: CLINIC | Age: 65
End: 2020-12-04

## 2020-12-05 ENCOUNTER — OFFICE VISIT (OUTPATIENT)
Age: 65
End: 2020-12-05

## 2020-12-05 VITALS — OXYGEN SATURATION: 98 % | HEART RATE: 87 BPM | TEMPERATURE: 97.4 F

## 2020-12-07 ENCOUNTER — ANESTHESIA EVENT (OUTPATIENT)
Dept: OPERATING ROOM | Age: 65
End: 2020-12-07

## 2020-12-09 ENCOUNTER — ANESTHESIA (OUTPATIENT)
Dept: OPERATING ROOM | Age: 65
End: 2020-12-09

## 2020-12-09 ENCOUNTER — HOSPITAL ENCOUNTER (OUTPATIENT)
Age: 65
Setting detail: OUTPATIENT SURGERY
Discharge: HOME | End: 2020-12-09
Attending: INTERNAL MEDICINE | Admitting: INTERNAL MEDICINE
Payer: MEDICARE

## 2020-12-09 VITALS
OXYGEN SATURATION: 95 % | WEIGHT: 218 LBS | SYSTOLIC BLOOD PRESSURE: 111 MMHG | TEMPERATURE: 97.9 F | BODY MASS INDEX: 30.52 KG/M2 | RESPIRATION RATE: 18 BRPM | HEIGHT: 71 IN | DIASTOLIC BLOOD PRESSURE: 63 MMHG | HEART RATE: 73 BPM

## 2020-12-09 VITALS — DIASTOLIC BLOOD PRESSURE: 70 MMHG | OXYGEN SATURATION: 96 % | SYSTOLIC BLOOD PRESSURE: 114 MMHG

## 2020-12-09 LAB — SARS-COV-2, NAA: NOT DETECTED

## 2020-12-09 PROCEDURE — 38221 DX BONE MARROW BIOPSIES: CPT

## 2020-12-09 PROCEDURE — 38222 DX BONE MARROW BX & ASPIR: CPT | Performed by: NURSE PRACTITIONER

## 2020-12-09 PROCEDURE — 38222 DX BONE MARROW BX & ASPIR: CPT

## 2020-12-09 PROCEDURE — G8918 PT W/O PREOP ORDER IV AB PRO: HCPCS

## 2020-12-09 PROCEDURE — G8907 PT DOC NO EVENTS ON DISCHARG: HCPCS

## 2020-12-09 RX ORDER — LIDOCAINE HYDROCHLORIDE 10 MG/ML
INJECTION, SOLUTION EPIDURAL; INFILTRATION; INTRACAUDAL; PERINEURAL PRN
Status: DISCONTINUED | OUTPATIENT
Start: 2020-12-09 | End: 2020-12-09 | Stop reason: ALTCHOICE

## 2020-12-09 RX ORDER — SODIUM CHLORIDE, SODIUM LACTATE, POTASSIUM CHLORIDE, CALCIUM CHLORIDE 600; 310; 30; 20 MG/100ML; MG/100ML; MG/100ML; MG/100ML
INJECTION, SOLUTION INTRAVENOUS CONTINUOUS
Status: DISCONTINUED | OUTPATIENT
Start: 2020-12-09 | End: 2020-12-09 | Stop reason: HOSPADM

## 2020-12-09 RX ORDER — LIDOCAINE HYDROCHLORIDE 10 MG/ML
1 INJECTION, SOLUTION EPIDURAL; INFILTRATION; INTRACAUDAL; PERINEURAL
Status: DISCONTINUED | OUTPATIENT
Start: 2020-12-09 | End: 2020-12-09 | Stop reason: HOSPADM

## 2020-12-09 RX ORDER — LIDOCAINE HYDROCHLORIDE 10 MG/ML
INJECTION, SOLUTION INFILTRATION; PERINEURAL PRN
Status: DISCONTINUED | OUTPATIENT
Start: 2020-12-09 | End: 2020-12-09 | Stop reason: SDUPTHER

## 2020-12-09 RX ORDER — PROPOFOL 10 MG/ML
INJECTION, EMULSION INTRAVENOUS PRN
Status: DISCONTINUED | OUTPATIENT
Start: 2020-12-09 | End: 2020-12-09 | Stop reason: SDUPTHER

## 2020-12-09 RX ADMIN — LIDOCAINE HYDROCHLORIDE 30 MG: 10 INJECTION, SOLUTION INFILTRATION; PERINEURAL at 13:32

## 2020-12-09 RX ADMIN — SODIUM CHLORIDE, SODIUM LACTATE, POTASSIUM CHLORIDE, CALCIUM CHLORIDE: 600; 310; 30; 20 INJECTION, SOLUTION INTRAVENOUS at 12:22

## 2020-12-09 RX ADMIN — PROPOFOL 50 MG: 10 INJECTION, EMULSION INTRAVENOUS at 13:32

## 2020-12-09 NOTE — ANESTHESIA POSTPROCEDURE EVALUATION
Department of Anesthesiology  Postprocedure Note    Patient: Chetna Moore  MRN: [id number]  YOB: 1955  Date of evaluation: 12/9/2020  Time:  1:34 PM     Procedure Summary     Date:  12/09/20 Room / Location:  [facility] ENDO 02 / [address]    Anesthesia Start:  8171 Anesthesia Stop:      Procedure:  BONE MARROW ASPIRATION BIOPSY (Right Pelvis) Diagnosis:  (ELEVATED SERUM IMMUNOGLOBULIN 3 LIGHT CHAINS)    Surgeon:  ALISA Harding Responsible Provider: ALISA Henry - CRNA    Anesthesia Type:  MAC ASA Status:  2          Anesthesia Type: MAC    Bossman Phase I:      Bossman Phase II:      Last vitals: Reviewed and per EMR flowsheets.        Anesthesia Post Evaluation    Patient location during evaluation: bedside  Patient participation: complete - patient participated  Level of consciousness: sleepy but conscious  Airway patency: patent  Nausea & Vomiting: no nausea and no vomiting  Complications: no  Cardiovascular status: blood pressure returned to baseline  Respiratory status: acceptable, room air and spontaneous ventilation  Hydration status: euvolemic
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
## 2020-12-09 NOTE — OP NOTE
Pt Name: Carri Herrera: 1955  MRN: [id number]    Date of procedure: 12/9/2020    Procedure Note:  Bone Marrow Aspirate and Biopsy    Indication: Kappa light chain disease    Site: Right iliac crest    Informed consent was signed by Brit Infante. Time out was taken. [facility] was placed in the left lateral decubitus position. [facility] was prepped and draped in sterile fashion. 5 mL of 1% Lidocaine was used for local anesthetic of the skin and right periosteum. A bone marrow aspirate and biopsy was obtained and sent for surgical pathology review, flow cytometry, and chromosome analysis. The total blood loss was less then 3 mL. The skin was cleaned and a sterile bandage was placed on the entrance site. The patient tolerated the procedure without complication. Keep scheduled appointment in clinic to review results.     AILSA Champagne    12/09/20  1:44 PM

## 2020-12-09 NOTE — H&P
Patient Name: Heaven Everett  : 1955  MRN: [id number]  DATE: 20    Allergies: Allergies   Allergen Reactions    Penicillins           History and Physical    Procedure:    [x] Bone Marrow Aspiration and Biopsy    [x] Previous office notes/History and Physical reviewed from the patients chart. Please see EMR for further details of HPI. I have examined the patient's status immediately prior to the procedure and there are no changes since last evaluated on 2020. Noelle has been under evaluation for elevated serum immunoglobulin free light chains. Skeletal survey was negative for focal bony lesion. He was also noted to have an elevated H/H at 18.2/55.7 on 2020. Iron studies showed a serum iron of 107, TIBC 443, iron saturation 24%, ferritin 191. Erythropoietin was normal at 9.9. Indications/HPI: Kappa light chain disease, erythrocytosis    Anesthesia:   [x] MAC [] Moderate Sedation   [] General   [] None     ROS: 12 pt Review of Symptoms was negative unless mentioned above    Medications:   Prior to Admission medications    Medication Sig Start Date End Date Taking?  Authorizing Provider   empagliflozin (JARDIANCE) 10 MG tablet Take 10 mg by mouth daily   Yes Historical Provider, MD   simvastatin (ZOCOR) 40 MG tablet Take 40 mg by mouth nightly   Yes Historical Provider, MD   gemfibrozil (LOPID) 600 MG tablet Take 600 mg by mouth 2 times daily (before meals)   Yes Historical Provider, MD   insulin glargine (BASAGLAR KWIKPEN) 100 UNIT/ML injection pen Inject into the skin nightly   Yes Historical Provider, MD   lisinopril (PRINIVIL;ZESTRIL) 20 MG tablet Take 1 tablet by mouth daily 3/10/20  Yes ALISA Edmonds   Cholecalciferol (VITAMIN D3) 1.25 MG (04882 UT) CAPS Take by mouth every 14 days   Yes Historical Provider, MD   fexofenadine (ALLEGRA) 180 MG tablet Take 180 mg by mouth daily   Yes Historical Provider, MD   potassium citrate (UROCIT-K) 10 MEQ (1080 MG) extended release tablet Take 10 mEq by mouth 3 times daily (with meals)   Yes Historical Provider, MD   carBAMazepine (CARBATROL) 200 MG extended release capsule Take 200 mg by mouth 2 times daily   Yes Historical Provider, MD   Dapagliflozin-Metformin HCl ER 5-500 MG TB24 Take 1 tablet by mouth daily   Yes Historical Provider, MD   Ertugliflozin L-PyroglutamicAc (STEGLATRO) 5 MG TABS Take 5 mg by mouth daily    Yes Historical Provider, MD   Exenatide ER 2 MG/0.85ML AUIJ Inject into the skin once a week Wednesday   Yes Historical Provider, MD   fluticasone (FLONASE) 50 MCG/ACT nasal spray 1 spray by Each Nare route 2 times daily as needed for Rhinitis   Yes Historical Provider, MD   Insulin Degludec (TRESIBA FLEXTOUCH) 200 UNIT/ML SOPN Inject 100 Units into the skin nightly   Yes Historical Provider, MD   Insulin Lispro (HUMALOG KWIKPEN SC) Inject 10 Units into the skin every morning (before breakfast)    Yes Historical Provider, MD   loratadine (CLARITIN) 10 MG tablet Take 10 mg by mouth daily as needed   Yes Historical Provider, MD   metFORMIN (GLUCOPHAGE-XR) 500 MG extended release tablet Take 500 mg by mouth 2 times daily   Yes Historical Provider, MD   omeprazole (PRILOSEC) 20 MG delayed release capsule Take 20 mg by mouth 2 times daily   Yes Historical Provider, MD   HYDROcodone-acetaminophen (LORTAB) 5-325 MG per tablet Take 1 tablet by mouth every 6 hours as needed for Pain. .   Yes Historical Provider, MD   gabapentin (NEURONTIN) 800 MG tablet Take 800 mg by mouth 2 times daily.  .   Yes Historical Provider, MD   metoprolol tartrate (LOPRESSOR) 50 MG tablet Take 50 mg by mouth 2 times daily   Yes Historical Provider, MD   hydrochlorothiazide (HYDRODIURIL) 25 MG tablet Take 12.5 mg by mouth daily   Yes Historical Provider, MD   levothyroxine (SYNTHROID) 50 MCG tablet Take 50 mcg by mouth Daily   Yes Historical Provider, MD   famotidine (PEPCID) 20 MG tablet Take 20 mg by mouth daily    Yes Historical Provider, MD   MELOXICAM PO Take 15 mg by mouth daily   Yes Historical Provider, MD   rOPINIRole (REQUIP) 0.5 MG tablet Take 0.5 mg by mouth nightly   Yes Historical Provider, MD   citalopram (CELEXA) 20 MG tablet Take 20 mg by mouth nightly   Yes Historical Provider, MD   Dulaglutide (TRULICITY) 1.5 TT/1.8OH SOPN Inject 1.5 mg into the skin once a week    Historical Provider, MD     Past Medical History:  Past Medical History:   Diagnosis Date    Arm fracture 07/2016    left    Depression     [facility] mellitus [facility]     Hypertension     Kidney stones     Neuropathy     Restless leg syndrome      Past Surgical History:  Past Surgical History:   Procedure Laterality Date    CHOLECYSTECTOMY      LITHOTRIPSY      SHOULDER SURGERY Left     Frozen shoulder surgery    UPPER GASTROINTESTINAL ENDOSCOPY       Social History:  Social History     Tobacco Use    Smoking status: Never Smoker    Smokeless tobacco: Never Used   Substance Use Topics    Alcohol use: No    Drug use: No       Vital Signs:   Vitals:    12/09/20 1222   BP: 112/76   Pulse: 80   Resp: 18   Temp: 97.9 °F (36.6 °C)   SpO2: 95%        Physical Exam:  Cardiac:  [x]WNL []Comments:  Pulmonary:  [x]WNL   []Comments:  Neuro/Mental Status:  [x]WNL  []Comments:  Abdominal:  [x]WNL    []Comments:  Other:   []WNL  []Comments:    Informed Consent:  The risks and benefits of the procedure have been discussed with either the patient or if they cannot consent, their representative. Assessment:  Patient examined and appropriate for planned sedation and procedure. Plan:  Proceed with bone marrow aspirate and biopsy today.         [address] [place] [address]  12/09/20  12:46 PM

## 2020-12-16 ENCOUNTER — TELEPHONE (OUTPATIENT)
Dept: ENDOCRINOLOGY | Facility: CLINIC | Age: 65
End: 2020-12-16

## 2020-12-16 NOTE — TELEPHONE ENCOUNTER
Pt's wife called stating that she is needing to talk to Amara regarding the [facility] paperwork please, [phone].

## 2020-12-17 NOTE — TELEPHONE ENCOUNTER
I called and spoke with the wife and let her know that yest we have filled out the [facility] form and sent it back to [facility]

## 2020-12-23 ENCOUNTER — HOSPITAL ENCOUNTER (OUTPATIENT)
Dept: INFUSION THERAPY | Age: 65
Discharge: HOME | End: 2020-12-23
Payer: MEDICARE

## 2020-12-23 ENCOUNTER — OFFICE VISIT (OUTPATIENT)
Dept: HEMATOLOGY | Age: 65
End: 2020-12-23
Payer: MEDICARE

## 2020-12-23 VITALS
TEMPERATURE: 96.8 F | HEART RATE: 84 BPM | WEIGHT: 211.7 LBS | SYSTOLIC BLOOD PRESSURE: 126 MMHG | BODY MASS INDEX: 29.64 KG/M2 | HEIGHT: 71 IN | DIASTOLIC BLOOD PRESSURE: 88 MMHG | OXYGEN SATURATION: 98 %

## 2020-12-23 DIAGNOSIS — R76.8 ELEVATED SERUM IMMUNOGLOBULIN FREE LIGHT CHAINS: ICD-10-CM

## 2020-12-23 LAB
BASOPHILS ABSOLUTE: 0.03 K/UL (ref 0.01–0.08)
BASOPHILS RELATIVE PERCENT: 0.4 % (ref 0.1–1.2)
EOSINOPHILS ABSOLUTE: 0.38 K/UL (ref 0.04–0.54)
EOSINOPHILS RELATIVE PERCENT: 4.7 % (ref 0.7–7)
HCT VFR BLD CALC: 50.6 % (ref 40.1–51)
HEMOGLOBIN: 16.4 G/DL (ref 13.7–17.5)
LYMPHOCYTES ABSOLUTE: 2.97 K/UL (ref 1.18–3.74)
LYMPHOCYTES RELATIVE PERCENT: 37.1 % (ref 19.3–53.1)
MCH RBC QN AUTO: 30.3 PG (ref 25.7–32.2)
MCHC RBC AUTO-ENTMCNC: 32.4 G/DL (ref 32.3–36.5)
MCV RBC AUTO: 93.5 FL (ref 79–92.2)
MONOCYTES ABSOLUTE: 0.73 K/UL (ref 0.24–0.82)
MONOCYTES RELATIVE PERCENT: 9.1 % (ref 4.7–12.5)
NEUTROPHILS ABSOLUTE: 3.9 K/UL (ref 1.56–6.13)
NEUTROPHILS RELATIVE PERCENT: 48.7 % (ref 34–71.1)
PDW BLD-RTO: 14.2 % (ref 11.6–14.4)
PLATELET # BLD: 182 K/UL (ref 163–337)
PMV BLD AUTO: 11 FL (ref 7.4–10.4)
RBC # BLD: 5.41 M/UL (ref 4.63–6.08)
WBC # BLD: 8.01 K/UL (ref 4.23–9.07)

## 2020-12-23 PROCEDURE — 99213 OFFICE O/P EST LOW 20 MIN: CPT | Performed by: NURSE PRACTITIONER

## 2020-12-23 PROCEDURE — 3017F COLORECTAL CA SCREEN DOC REV: CPT | Performed by: NURSE PRACTITIONER

## 2020-12-23 PROCEDURE — 4040F PNEUMOC VAC/ADMIN/RCVD: CPT | Performed by: NURSE PRACTITIONER

## 2020-12-23 PROCEDURE — 99212 OFFICE O/P EST SF 10 MIN: CPT

## 2020-12-23 PROCEDURE — G8427 DOCREV CUR MEDS BY ELIG CLIN: HCPCS | Performed by: NURSE PRACTITIONER

## 2020-12-23 PROCEDURE — 1123F ACP DISCUSS/DSCN MKR DOCD: CPT | Performed by: NURSE PRACTITIONER

## 2020-12-23 PROCEDURE — G8484 FLU IMMUNIZE NO ADMIN: HCPCS | Performed by: NURSE PRACTITIONER

## 2020-12-23 PROCEDURE — 1036F TOBACCO NON-USER: CPT | Performed by: NURSE PRACTITIONER

## 2020-12-23 PROCEDURE — 85025 COMPLETE CBC W/AUTO DIFF WBC: CPT

## 2020-12-23 PROCEDURE — G8417 CALC BMI ABV UP PARAM F/U: HCPCS | Performed by: NURSE PRACTITIONER

## 2020-12-23 ASSESSMENT — ENCOUNTER SYMPTOMS
WHEEZING: 0
TROUBLE SWALLOWING: 0
CONSTIPATION: 0
SORE THROAT: 0
NAUSEA: 0
COUGH: 1
VOMITING: 0
EYE DISCHARGE: 0
DIARRHEA: 0
ABDOMINAL PAIN: 0
SHORTNESS OF BREATH: 0
EYE ITCHING: 0

## 2020-12-23 NOTE — PROGRESS NOTES
OP HEMATOLOGY/ONCOLOGY PROGRESS NOTE      Pt Name: Kirsten Squires  YOB: 1955  MRN: [id number]  Referring provider: ALISA Kearns  Date of evaluation: 2020    History Obtained From:  patient, electronic medical record    CHIEF COMPLAINT:    Chief Complaint   Patient presents with    Follow-up     Elevated serum immunoglobulin free light chains     HISTORY OF PRESENT ILLNESS:    Kirsten Squires is a pleasant 76 y.o.  male returning to the clinic to discuss results of bone marrow aspirate and biopsy completed for further evaluation regarding an elevated kappa light chain. He is accompanied by his wife. Karlos Ruiz is without new complaint today. Chronic neuropathy is unchanged. Blood pressure is improved, 126/88. Diabetes is managed by Dr. Luis Yeung  Neuropathy managed by Merita Seip, PA-C/Dr. Patricia Quinn    HEMATOLOGY HISTORY: Elevated kappa light chain  Liz Tomas was seen in initial hematology consultation 10/7/2020, referred by ALISA Garnett for evaluation of an elevated light chain ratio. PMH includes CKD, diabetes mellitus type 2, hypertension, hyperlipidemia, hypothyroidism, GERD etc.  Karlos Ruiz was recently referred to ALISA Garnett for abnormal renal function, diagnosed with stage III CKD related to diabetes.     Serology for evaluation of CKD 8/3/2020:  · CBC: WBC 9.03, Hgb 16.2, platelets 369,157  · CMP: Creatinine 1.31, GFR 55  · HgbA1c: 9.14  · Vitamin D: 27.7    Elevated serum immunoglobulin free light chains  Labs 2020:  · CMP: Creatinine 1.27/GFR 58, AST 56, ALT 53, calcium 9.5  · Total protein: 7.5  · SPEP: No M-spike  · Ig, IgA: 334, IgM: 92-all within normal limits  · Kappa light chain 51.9, lambda light chain 25.2 with K/L ratio mildly elevated at 2.06  · Urinalysis: 3+ glucose, 1+ occult blood, negative nitrite    He denies localized bony tenderness, though reports chronic neuropathy but is managed by Merita Seip, DESTINY.    He also complains of a chronic cough for the past 6 months or so that is being addressed by his PCP, Holli ALISA Gray. Labs 10/9/2020:  · SPEP: No M-spike  · Immunofixation: No monoclonality detected  · Ig, IgA 354, IgM 124    24-hour urine for immunoelectrophoresis 10/14/2020: No M-spike, no monoclonality detected    Bone survey 2020: no focal bony lesions    Bone marrow aspirate and biopsy 2020 documented a normocellular marrow (~30-40%) with plasma cell neoplasm involving less than 5%. Congo red was negative for amyloid. FLOW cytometry showed a small clonal kappa restricted plasma cell population (0.2%). Cytogenetics showed a normal male karyotype. Negative molecular studies for JAK2 V617F, MPL, CALR, JAK2 exon 12 and KIT mutations  Differentials include MGUS, involving multiple myeloma (smoldering/indolent). Conservative monitoring warranted.      Past Medical History:   Diagnosis Date    Arm fracture 2016    left    Depression     Diabetes mellitus (HCC)     Hypertension     Kidney stones     Neuropathy     Restless leg syndrome      Past Surgical History:   Procedure Laterality Date    BONE MARROW BIOPSY Right 2020    BONE MARROW ASPIRATION BIOPSY performed by ALISA Diaz at [address] [place] [address] LITHOTRIPSY      SHOULDER SURGERY Left     Frozen shoulder surgery    UPPER GASTROINTESTINAL ENDOSCOPY         Current Outpatient Medications:     empagliflozin (JARDIANCE) 10 MG tablet, Take 10 mg by mouth daily, Disp: , Rfl:     simvastatin (ZOCOR) 40 MG tablet, Take 40 mg by mouth nightly, Disp: , Rfl:     Dulaglutide (TRULICITY) 1.5 MR/6.6TK SOPN, Inject 1.5 mg into the skin once a week, Disp: , Rfl:     gemfibrozil (LOPID) 600 MG tablet, Take 600 mg by mouth 2 times daily (before meals), Disp: , Rfl:     insulin glargine (BASAGLAR KWIKPEN) 100 UNIT/ML injection pen, Inject into the skin nightly, Disp: , Rfl:     lisinopril (PRINIVIL;ZESTRIL) 20 MG tablet, Take 1 tablet by mouth daily, Disp: 90 tablet, Rfl: 3    Cholecalciferol (VITAMIN D3) 1.25 MG (43972 UT) CAPS, Take by mouth every 14 days, Disp: , Rfl:     fexofenadine (ALLEGRA) 180 MG tablet, Take 180 mg by mouth daily, Disp: , Rfl:     potassium citrate (UROCIT-K) 10 MEQ (1080 MG) extended release tablet, Take 10 mEq by mouth 3 times daily (with meals), Disp: , Rfl:     carBAMazepine (CARBATROL) 200 MG extended release capsule, Take 200 mg by mouth 2 times daily, Disp: , Rfl:     Dapagliflozin-Metformin HCl ER 5-500 MG TB24, Take 1 tablet by mouth daily, Disp: , Rfl:     Ertugliflozin L-PyroglutamicAc (STEGLATRO) 5 MG TABS, Take 5 mg by mouth daily , Disp: , Rfl:     Exenatide ER 2 MG/0.85ML AUIJ, Inject into the skin once a week Wednesday, Disp: , Rfl:     fluticasone (FLONASE) 50 MCG/ACT nasal spray, 1 spray by Each Nare route 2 times daily as needed for Rhinitis, Disp: , Rfl:     Insulin Degludec (TRESIBA FLEXTOUCH) 200 UNIT/ML SOPN, Inject 100 Units into the skin nightly, Disp: , Rfl:     Insulin Lispro (HUMALOG KWIKPEN SC), Inject 10 Units into the skin every morning (before breakfast) , Disp: , Rfl:     loratadine (CLARITIN) 10 MG tablet, Take 10 mg by mouth daily as needed, Disp: , Rfl:     metFORMIN (GLUCOPHAGE-XR) 500 MG extended release tablet, Take 500 mg by mouth 2 times daily, Disp: , Rfl:     omeprazole (PRILOSEC) 20 MG delayed release capsule, Take 20 mg by mouth 2 times daily, Disp: , Rfl:     HYDROcodone-acetaminophen (LORTAB) 5-325 MG per tablet, Take 1 tablet by mouth every 6 hours as needed for Pain. ., Disp: , Rfl:     gabapentin (NEURONTIN) 800 MG tablet, Take 800 mg by mouth 2 times daily.  ., Disp: , Rfl:     metoprolol tartrate (LOPRESSOR) 50 MG tablet, Take 50 mg by mouth 2 times daily, Disp: , Rfl:     hydrochlorothiazide (HYDRODIURIL) 25 MG tablet, Take 12.5 mg by mouth daily, Disp: , Rfl:     levothyroxine (SYNTHROID) 50 MCG tablet, Genitourinary: Negative for dysuria, frequency, hematuria and urgency. Musculoskeletal: Positive for arthralgias. Negative for joint swelling and myalgias. Skin: Negative for pallor and rash. Allergic/Immunologic: Positive for environmental allergies. Negative for immunocompromised state. Neurological: Negative for seizures, syncope and numbness. Hematological: Negative for adenopathy. Does not bruise/bleed easily. Psychiatric/Behavioral: Negative for agitation, behavioral problems, confusion and suicidal ideas. The patient is not nervous/anxious. Objective   Physical Exam  Vitals signs reviewed. Constitutional:       General: He is not in acute distress. Appearance: He is well-developed. He is not toxic-appearing or diaphoretic. HENT:      Head: Normocephalic and atraumatic. Right Ear: External ear normal.      Left Ear: External ear normal.      Nose: Nose normal.      Mouth/Throat:      Mouth: Mucous membranes are moist.   Eyes:      General: No scleral icterus. Right eye: No discharge. Left eye: No discharge. Conjunctiva/sclera: Conjunctivae normal.   Neck:      Musculoskeletal: Neck supple. Trachea: No tracheal deviation. Cardiovascular:      Rate and Rhythm: Normal rate and regular rhythm. Pulmonary:      Effort: Pulmonary effort is normal. No respiratory distress. Breath sounds: Normal breath sounds. No wheezing or rales. Abdominal:      General: Bowel sounds are normal. There is no distension. Palpations: Abdomen is soft. Tenderness: There is no abdominal tenderness. There is no guarding. Genitourinary:     Comments: Exam deferred  Musculoskeletal:         General: No tenderness or deformity. Comments: Normal ROM all four extremities   Lymphadenopathy:      Cervical: No cervical adenopathy. Right cervical: No superficial cervical adenopathy. Left cervical: No superficial cervical adenopathy.       Upper Body: Right upper body: No supraclavicular adenopathy. Left upper body: No supraclavicular adenopathy. Comments: No bulky palpable cervical, clavicular, axillary or inguinal adenopathies on the left or right. Skin:     General: Skin is warm and dry. Findings: No rash. Neurological:      Mental Status: He is alert and oriented to person, place, and time. Comments: follows commands, non-focal   Psychiatric:         Behavior: Behavior normal. Behavior is cooperative. Thought Content: Thought content normal.         Judgment: Judgment normal.      Comments: Alert and oriented to person, place and time. /88   Pulse 84   Temp 96.8 °F (36 °C) (Temporal)   Ht 5' 11\" (1.803 m)   Wt 211 lb 11.2 oz (96 kg)   SpO2 98%   BMI 29.53 kg/m²   Wt Readings from Last 3 Encounters:   20 211 lb 11.2 oz (96 kg)   20 218 lb (98.9 kg)   20 210 lb (95.3 kg)     Labs reviewed by me:  CBC 20: WBC 8.01, H/H 16.4/50.6, platelets 946,280    ASSESSMENT/PLAN:    1. MGUS (monoclonal gammopathy of unknown significance)    2. Elevated hemoglobin (HCC)    3. Chronic kidney disease, unspecified CKD stage     4. Neuropathy    5. Encounter for screening colonoscopy    6. History of colon polyps        Kappa light chain MGUS  Labs 2020:  · CMP: Creatinine 1.27/GFR 58, AST 56, ALT 53, calcium 9.5  · Total protein: 7.5  · SPEP: No M-spike  · Ig, IgA: 334, IgM: 92-all within normal limits  · Kappa light chain 51.9, lambda light chain 25.2 with K/L ratio mildly elevated at 2.06  · Urinalysis: 3+ glucose, 1+ occult blood, negative nitrite    Labs 10/9/2020:  SPEP: No M-spike  Immunofixation: No monoclonality detected  Ig, IgA 354, IgM 124    24-hour urine for immunoelectrophoresis 10/14/2020: No M-spike, no monoclonality detected    Bone survey 2020: Negative for focal bony lesion.     Bone marrow aspirate and biopsy 2020 documented a normocellular marrow

## 2020-12-30 ENCOUNTER — TELEPHONE (OUTPATIENT)
Dept: GASTROENTEROLOGY | Facility: CLINIC | Age: 65
End: 2020-12-30

## 2020-12-30 NOTE — TELEPHONE ENCOUNTER
Pt's wife called back stating that she is needing this paperwork to be faxed to [facility] fax: [phone].

## 2021-01-05 ENCOUNTER — TELEPHONE (OUTPATIENT)
Dept: GASTROENTEROLOGY | Facility: CLINIC | Age: 66
End: 2021-01-05

## 2021-01-17 ENCOUNTER — APPOINTMENT (OUTPATIENT)
Dept: CT IMAGING | Age: 66
End: 2021-01-17
Payer: MEDICARE

## 2021-01-17 ENCOUNTER — HOSPITAL ENCOUNTER (EMERGENCY)
Age: 66
Discharge: HOME | End: 2021-01-18
Attending: EMERGENCY MEDICINE
Payer: MEDICARE

## 2021-01-17 DIAGNOSIS — R10.32 LEFT LOWER QUADRANT ABDOMINAL PAIN: Primary | ICD-10-CM

## 2021-01-17 LAB
ALBUMIN SERPL-MCNC: 4.6 G/DL (ref 3.5–5.2)
ALP BLD-CCNC: 83 U/L (ref 40–130)
ALT SERPL-CCNC: 45 U/L (ref 5–41)
ANION GAP SERPL CALCULATED.3IONS-SCNC: 12 MMOL/L (ref 7–19)
AST SERPL-CCNC: 48 U/L (ref 5–40)
BASOPHILS ABSOLUTE: 0.1 K/UL (ref 0–0.2)
BASOPHILS RELATIVE PERCENT: 0.4 % (ref 0–1)
BILIRUB SERPL-MCNC: 0.4 MG/DL (ref 0.2–1.2)
BILIRUBIN URINE: NEGATIVE
BLOOD, URINE: NEGATIVE
BUN BLDV-MCNC: 25 MG/DL (ref 8–23)
CALCIUM SERPL-MCNC: 9.9 MG/DL (ref 8.8–10.2)
CHLORIDE BLD-SCNC: 97 MMOL/L (ref 98–111)
CLARITY: CLEAR
CO2: 24 MMOL/L (ref 22–29)
COLOR: YELLOW
CREAT SERPL-MCNC: 1.4 MG/DL (ref 0.5–1.2)
EOSINOPHILS ABSOLUTE: 0.5 K/UL (ref 0–0.6)
EOSINOPHILS RELATIVE PERCENT: 3.3 % (ref 0–5)
GFR AFRICAN AMERICAN: >59
GFR NON-AFRICAN AMERICAN: 51
GLUCOSE BLD-MCNC: 341 MG/DL (ref 74–109)
GLUCOSE URINE: =>1000 MG/DL
HCT VFR BLD CALC: 57.3 % (ref 42–52)
HEMOGLOBIN: 18.7 G/DL (ref 14–18)
IMMATURE GRANULOCYTES #: 0.3 K/UL
KETONES, URINE: NEGATIVE MG/DL
LACTIC ACID: 0.2 MMOL/L (ref 0.5–1.9)
LEUKOCYTE ESTERASE, URINE: NEGATIVE
LIPASE: 182 U/L (ref 13–60)
LYMPHOCYTES ABSOLUTE: 4 K/UL (ref 1.1–4.5)
LYMPHOCYTES RELATIVE PERCENT: 28.1 % (ref 20–40)
MCH RBC QN AUTO: 29 PG (ref 27–31)
MCHC RBC AUTO-ENTMCNC: 32.6 G/DL (ref 33–37)
MCV RBC AUTO: 88.8 FL (ref 80–94)
MONOCYTES ABSOLUTE: 1.2 K/UL (ref 0–0.9)
MONOCYTES RELATIVE PERCENT: 8.4 % (ref 0–10)
NEUTROPHILS ABSOLUTE: 8.2 K/UL (ref 1.5–7.5)
NEUTROPHILS RELATIVE PERCENT: 58 % (ref 50–65)
NITRITE, URINE: NEGATIVE
PDW BLD-RTO: 15 % (ref 11.5–14.5)
PH UA: 5 (ref 5–8)
PLATELET # BLD: 251 K/UL (ref 130–400)
PMV BLD AUTO: 11.2 FL (ref 9.4–12.4)
POTASSIUM SERPL-SCNC: 4.4 MMOL/L (ref 3.5–5)
PROTEIN UA: NEGATIVE MG/DL
RBC # BLD: 6.45 M/UL (ref 4.7–6.1)
SODIUM BLD-SCNC: 133 MMOL/L (ref 136–145)
SPECIFIC GRAVITY UA: 1.03 (ref 1–1.03)
TOTAL PROTEIN: 8.7 G/DL (ref 6.6–8.7)
UROBILINOGEN, URINE: 0.2 E.U./DL
WBC # BLD: 14.2 K/UL (ref 4.8–10.8)

## 2021-01-17 PROCEDURE — 99284 EMERGENCY DEPT VISIT MOD MDM: CPT

## 2021-01-17 PROCEDURE — 83690 ASSAY OF LIPASE: CPT

## 2021-01-17 PROCEDURE — 6360000004 HC RX CONTRAST MEDICATION: Performed by: EMERGENCY MEDICINE

## 2021-01-17 PROCEDURE — 83605 ASSAY OF LACTIC ACID: CPT

## 2021-01-17 PROCEDURE — 81003 URINALYSIS AUTO W/O SCOPE: CPT

## 2021-01-17 PROCEDURE — 85025 COMPLETE CBC W/AUTO DIFF WBC: CPT

## 2021-01-17 PROCEDURE — 6360000002 HC RX W HCPCS: Performed by: EMERGENCY MEDICINE

## 2021-01-17 PROCEDURE — 36415 COLL VENOUS BLD VENIPUNCTURE: CPT

## 2021-01-17 PROCEDURE — 80053 COMPREHEN METABOLIC PANEL: CPT

## 2021-01-17 PROCEDURE — 2580000003 HC RX 258: Performed by: EMERGENCY MEDICINE

## 2021-01-17 PROCEDURE — 96374 THER/PROPH/DIAG INJ IV PUSH: CPT

## 2021-01-17 PROCEDURE — 74177 CT ABD & PELVIS W/CONTRAST: CPT

## 2021-01-17 PROCEDURE — 96375 TX/PRO/DX INJ NEW DRUG ADDON: CPT

## 2021-01-17 RX ORDER — 0.9 % SODIUM CHLORIDE 0.9 %
1000 INTRAVENOUS SOLUTION INTRAVENOUS ONCE
Status: COMPLETED | OUTPATIENT
Start: 2021-01-17 | End: 2021-01-18

## 2021-01-17 RX ORDER — HYDROMORPHONE HYDROCHLORIDE 1 MG/ML
1 INJECTION, SOLUTION INTRAMUSCULAR; INTRAVENOUS; SUBCUTANEOUS ONCE
Status: COMPLETED | OUTPATIENT
Start: 2021-01-17 | End: 2021-01-17

## 2021-01-17 RX ORDER — ONDANSETRON 2 MG/ML
4 INJECTION INTRAMUSCULAR; INTRAVENOUS ONCE
Status: COMPLETED | OUTPATIENT
Start: 2021-01-17 | End: 2021-01-17

## 2021-01-17 RX ADMIN — ONDANSETRON HYDROCHLORIDE 4 MG: 2 SOLUTION INTRAMUSCULAR; INTRAVENOUS at 23:15

## 2021-01-17 RX ADMIN — IOPAMIDOL 90 ML: 755 INJECTION, SOLUTION INTRAVENOUS at 22:46

## 2021-01-17 RX ADMIN — HYDROMORPHONE HYDROCHLORIDE 1 MG: 1 INJECTION, SOLUTION INTRAMUSCULAR; INTRAVENOUS; SUBCUTANEOUS at 23:15

## 2021-01-17 RX ADMIN — SODIUM CHLORIDE 1000 ML: 9 INJECTION, SOLUTION INTRAVENOUS at 23:15

## 2021-01-17 ASSESSMENT — PAIN SCALES - GENERAL: PAINLEVEL_OUTOF10: 7

## 2021-01-17 ASSESSMENT — ENCOUNTER SYMPTOMS
NAUSEA: 0
SHORTNESS OF BREATH: 0
DIARRHEA: 0
HEMATOCHEZIA: 0
VOMITING: 0
ABDOMINAL PAIN: 1
CONSTIPATION: 0

## 2021-01-17 ASSESSMENT — PAIN DESCRIPTION - LOCATION: LOCATION: ABDOMEN

## 2021-01-18 VITALS
OXYGEN SATURATION: 93 % | HEIGHT: 71 IN | HEART RATE: 94 BPM | DIASTOLIC BLOOD PRESSURE: 83 MMHG | TEMPERATURE: 98.3 F | SYSTOLIC BLOOD PRESSURE: 111 MMHG | BODY MASS INDEX: 30.1 KG/M2 | RESPIRATION RATE: 16 BRPM | WEIGHT: 215 LBS

## 2021-01-18 PROCEDURE — 99284 EMERGENCY DEPT VISIT MOD MDM: CPT

## 2021-01-18 RX ORDER — ONDANSETRON 4 MG/1
4 TABLET, ORALLY DISINTEGRATING ORAL EVERY 8 HOURS PRN
Qty: 9 TABLET | Refills: 0 | Status: SHIPPED | OUTPATIENT
Start: 2021-01-18

## 2021-01-18 RX ORDER — HYDROCODONE BITARTRATE AND ACETAMINOPHEN 7.5; 325 MG/1; MG/1
1 TABLET ORAL EVERY 6 HOURS PRN
Qty: 12 TABLET | Refills: 0 | Status: SHIPPED | OUTPATIENT
Start: 2021-01-18 | End: 2021-01-21

## 2021-01-18 ASSESSMENT — PAIN SCALES - GENERAL: PAINLEVEL_OUTOF10: 0

## 2021-01-18 NOTE — ED PROVIDER NOTES
Surgical History:   Procedure Laterality Date    BONE MARROW BIOPSY Right 12/9/2020    BONE MARROW ASPIRATION BIOPSY performed by ALISA Kunz at [address] Left     Frozen shoulder surgery    UPPER GASTROINTESTINAL ENDOSCOPY           CURRENT MEDICATIONS       Discharge Medication List as of 1/18/2021 12:31 AM      CONTINUE these medications which have NOT CHANGED    Details   empagliflozin (JARDIANCE) 10 MG tablet Take 10 mg by mouth dailyHistorical Med      simvastatin (ZOCOR) 40 MG tablet Take 40 mg by mouth nightlyHistorical Med      Dulaglutide (TRULICITY) 1.5 LA/4.5FX SOPN Inject 1.5 mg into the skin once a weekHistorical Med      gemfibrozil (LOPID) 600 MG tablet Take 600 mg by mouth 2 times daily (before meals)Historical Med      insulin glargine (BASAGLAR KWIKPEN) 100 UNIT/ML injection pen Inject into the skin nightlyHistorical Med      lisinopril (PRINIVIL;ZESTRIL) 20 MG tablet Take 1 tablet by mouth daily, Disp-90 tablet, R-3Normal      Cholecalciferol (VITAMIN D3) 1.25 MG (21899 UT) CAPS Take by mouth every 14 daysHistorical Med      fexofenadine (ALLEGRA) 180 MG tablet Take 180 mg by mouth dailyHistorical Med      potassium citrate (UROCIT-K) 10 MEQ (1080 MG) extended release tablet Take 10 mEq by mouth 3 times daily (with meals)Historical Med      carBAMazepine (CARBATROL) 200 MG extended release capsule Take 200 mg by mouth 2 times dailyHistorical Med      Dapagliflozin-Metformin HCl ER 5-500 MG TB24 Take 1 tablet by mouth dailyHistorical Med      Ertugliflozin L-PyroglutamicAc (STEGLATRO) 5 MG TABS Take 5 mg by mouth daily Historical Med      Exenatide ER 2 MG/0.85ML AUIJ Inject into the skin once a week WednesdayHistorical Med      fluticasone (FLONASE) 50 MCG/ACT nasal spray 1 spray by Each Nare route 2 times daily as needed for RhinitisHistorical Med      Insulin Degludec (TRESIBA FLEXTOUCH) 200 UNIT/ML SOPN Inject 100  Social connections     Talks on phone: Not on file     Gets together: Not on file     Attends Protestant service: Not on file     Active member of club or organization: Not on file     Attends meetings of clubs or organizations: Not on file     Relationship status: Not on file    Intimate partner violence     Fear of current or ex partner: Not on file     Emotionally abused: Not on file     Physically abused: Not on file     Forced sexual activity: Not on file   Other Topics Concern    Not on file   Social History Narrative    Not on file       SCREENINGS    [place] Coma Scale  Eye Opening: Spontaneous  Best Verbal Response: Oriented  Best Motor Response: Obeys commands  [place] Coma Scale Score: 15 @FLOW([id number])@      PHYSICAL EXAM    (up to 7 for level 4, 8 or more for level 5)     ED Triage Vitals [01/17/21 2035]   BP Temp Temp Source Pulse Resp SpO2 Height Weight   129/82 98.3 °F (36.8 °C) Oral 109 22 97 % 5' 11\" (1.803 m) 215 lb (97.5 kg)       Physical Exam  Vitals signs and nursing note reviewed. Constitutional:       General: He is not in acute distress. Appearance: He is obese. He is ill-appearing. He is not diaphoretic. Comments: uncomfortable   HENT:      Mouth/Throat:      Mouth: Mucous membranes are moist.      Pharynx: Oropharynx is clear. Cardiovascular:      Rate and Rhythm: Normal rate and regular rhythm. Heart sounds: Normal heart sounds. Pulmonary:      Effort: Pulmonary effort is normal.      Breath sounds: Normal breath sounds. Abdominal:      General: Bowel sounds are normal.      Palpations: Abdomen is soft. Tenderness: There is abdominal tenderness (Tsv LLQ). There is no guarding or rebound. Skin:     General: Skin is warm and dry. Neurological:      General: No focal deficit present. Mental Status: He is alert and oriented to person, place, and time.          DIAGNOSTIC RESULTS     EKG: All EKG's are interpreted by the Emergency Department Physician

## 2021-01-28 ENCOUNTER — TELEPHONE (OUTPATIENT)
Dept: GASTROENTEROLOGY | Facility: CLINIC | Age: 66
End: 2021-01-28

## 2021-01-28 NOTE — TELEPHONE ENCOUNTER
Spoke with Rossana - patients wife. She said that they are aware that he is needing another colonoscopy but he is not ready to repeat this right now. I instructed her to speak with Supa and talk about it and give us a call soon to schedule this. She voiced understanding and would call back.

## 2021-02-03 ENCOUNTER — OFFICE VISIT (OUTPATIENT)
Dept: ENDOCRINOLOGY | Facility: CLINIC | Age: 66
End: 2021-02-03

## 2021-02-03 VITALS
DIASTOLIC BLOOD PRESSURE: 68 MMHG | SYSTOLIC BLOOD PRESSURE: 110 MMHG | WEIGHT: 209.2 LBS | BODY MASS INDEX: 29.29 KG/M2 | OXYGEN SATURATION: 99 % | HEIGHT: 71 IN | HEART RATE: 86 BPM

## 2021-02-03 DIAGNOSIS — E11.69 MIXED DIABETIC HYPERLIPIDEMIA ASSOCIATED WITH TYPE 2 DIABETES MELLITUS (HCC): ICD-10-CM

## 2021-02-03 DIAGNOSIS — E78.2 MIXED DIABETIC HYPERLIPIDEMIA ASSOCIATED WITH TYPE 2 DIABETES MELLITUS (HCC): ICD-10-CM

## 2021-02-03 DIAGNOSIS — E11.8 TYPE 2 DIABETES MELLITUS WITH COMPLICATION, WITH LONG-TERM CURRENT USE OF INSULIN (HCC): Primary | ICD-10-CM

## 2021-02-03 DIAGNOSIS — N48.89 PENILE BLEEDING: ICD-10-CM

## 2021-02-03 DIAGNOSIS — I15.2 HYPERTENSION ASSOCIATED WITH DIABETES (HCC): ICD-10-CM

## 2021-02-03 DIAGNOSIS — E11.59 HYPERTENSION ASSOCIATED WITH DIABETES (HCC): ICD-10-CM

## 2021-02-03 DIAGNOSIS — E11.42 DIABETIC POLYNEUROPATHY ASSOCIATED WITH TYPE 2 DIABETES MELLITUS (HCC): ICD-10-CM

## 2021-02-03 DIAGNOSIS — Z79.4 TYPE 2 DIABETES MELLITUS WITH COMPLICATION, WITH LONG-TERM CURRENT USE OF INSULIN (HCC): Primary | ICD-10-CM

## 2021-02-03 LAB — HBA1C MFR BLD: 10.9 %

## 2021-02-03 PROCEDURE — 83036 HEMOGLOBIN GLYCOSYLATED A1C: CPT | Performed by: INTERNAL MEDICINE

## 2021-02-03 PROCEDURE — 99214 OFFICE O/P EST MOD 30 MIN: CPT | Performed by: INTERNAL MEDICINE

## 2021-02-03 RX ORDER — INSULIN GLARGINE 100 [IU]/ML
INJECTION, SOLUTION SUBCUTANEOUS
Qty: 8 PEN | Refills: 11 | Status: SHIPPED | OUTPATIENT
Start: 2021-02-03 | End: 2022-05-19 | Stop reason: SDUPTHER

## 2021-02-03 RX ORDER — INSULIN LISPRO 200 [IU]/ML
20 INJECTION, SOLUTION SUBCUTANEOUS
Qty: 18 PEN | Refills: 11 | Status: SHIPPED | OUTPATIENT
Start: 2021-02-03 | End: 2022-11-21 | Stop reason: SDUPTHER

## 2021-02-03 RX ORDER — DULAGLUTIDE 3 MG/.5ML
3 INJECTION, SOLUTION SUBCUTANEOUS WEEKLY
Qty: 12 PEN | Refills: 3 | Status: SHIPPED | OUTPATIENT
Start: 2021-02-03 | End: 2022-02-03

## 2021-02-03 NOTE — PROGRESS NOTES
Subjective    Supa Mcclelland is a 65 y.o. male. he is here today for follow-up.    Diabetes  Pertinent negatives for hypoglycemia include no confusion, dizziness, headaches, pallor or tremors. Pertinent negatives for diabetes include no chest pain, no fatigue, no polydipsia, no polyphagia, no polyuria and no weakness.            Primary Care Provider     GREY Rizvi     Duration 10 years     Timing - Diabetes is Constant     Quality -  hyperglycemic      Severity -  moderate     Complications - peripheral neuropathy and nephropathy ckd Stage III     Current symptoms/problems  none      Alleviating Factors: Compliance       Side Effects  none     Current diet  High carb     Current exercise walking     Current monitoring regimen: home blood tests - using dexcom      Hypoglycemia with exertion          The following portions of the patient's history were reviewed and updated as appropriate:   Past Medical History:   Diagnosis Date   • Depression    • Diabetes mellitus (CMS/HCC)    • Disease of thyroid gland     HYPOTHYROIDISM   • GERD (gastroesophageal reflux disease)    • Hyperlipidemia    • Hypertension    • Kidney stone      Past Surgical History:   Procedure Laterality Date   • CHOLECYSTECTOMY     • COLONOSCOPY     • COLONOSCOPY N/A 5/27/2020    Procedure: COLONOSCOPY WITH ANESTHESIA;  Surgeon: Rambo Padilla DO;  Location: [facility] ENDOSCOPY;  Service: Gastroenterology;  Laterality: N/A;  Pre: Change in Bowels  Post: Colon Polyp, Suboptimal Prep  [facility]  CO2 Inflation Used   • ENDOSCOPY N/A 4/4/2017    Procedure: ESOPHAGOGASTRODUODENOSCOPY WITH ANESTHESIA;  Surgeon: Rambo Padilla DO;  Location: [facility] ENDOSCOPY;  Service:    • KIDNEY STONE SURGERY     • SHOULDER SURGERY       Family History   Problem Relation Age of Onset   • Heart disease Mother    • Diabetes Mother    • Alzheimer's disease Father    • Heart disease Father    • Diabetes Sister    • Heart disease Brother    • Diabetes  Sister    • Diabetes Sister    • Colon cancer Neg Hx    • Esophageal cancer Neg Hx        Current Outpatient Medications   Medication Sig Dispense Refill   • cholecalciferol (D3-50) 1.25 MG (89702 UT) capsule TAKE ONE CAPSULE BY MOUTH EVERY 2 WEEKS. 2 capsule 0   • citalopram (CeleXA) 20 MG tablet Take 20 mg by mouth Daily.     • Dulaglutide 1.5 MG/0.5ML solution pen-injector Inject 1.5 mg under the skin into the appropriate area as directed 1 (One) Time Per Week. 4 pen 11   • Empagliflozin (JARDIANCE) 10 MG tablet Take 1 tablet by mouth Daily. 90 tablet 3   • famotidine (PEPCID) 20 MG tablet Take 20 mg by mouth 2 (Two) Times a Day.     • fexofenadine (ALLEGRA) 180 MG tablet Take 180 mg by mouth As Needed.     • fluticasone (FLONASE) 50 MCG/ACT nasal spray 1 spray each nostril twice a day for three days, then daily. 1 bottle 0   • gabapentin (NEURONTIN) 800 MG tablet TAKE 1 TABLET BY MOUTH THREE TIMES DAILY 270 tablet 0   • gemfibrozil (LOPID) 600 MG tablet Take 600 mg by mouth 2 (Two) Times a Day Before Meals.     • hydrochlorothiazide (HYDRODIURIL) 12.5 MG tablet Take 12.5 mg by mouth Daily.     • Insulin Glargine (BASAGLAR KWIKPEN) 100 UNIT/ML injection pen 90 units qam 9 pen 3   • Insulin Lispro (HumaLOG KwikPen) 200 UNIT/ML solution pen-injector Inject 30 Units under the skin into the appropriate area as directed 3 (Three) Times a Day With Meals. 36 pen 3   • levothyroxine (SYNTHROID, LEVOTHROID) 50 MCG tablet Take 1 tablet by mouth Daily.     • loratadine (CLARITIN) 10 MG tablet Take 10 mg by mouth Daily.     • meloxicam (MOBIC) 15 MG tablet Take 1 tablet by mouth Daily.     • metFORMIN ER (GLUCOPHAGE-XR) 500 MG 24 hr tablet TAKE 1 TABLET BY MOUTH TWICE DAILY WITH MEALS 180 tablet 0   • metoprolol tartrate (LOPRESSOR) 50 MG tablet Take 50 mg by mouth 2 (Two) Times a Day.     • omeprazole (priLOSEC) 20 MG capsule Take 1 capsule by mouth 2 (Two) Times a Day. 60 capsule 11   • potassium citrate (UROCIT-K) 10 MEQ  (1080 MG) CR tablet Take 1 tablet by mouth 3 (Three) Times a Day With Meals. 90 tablet 11   • RELION PEN NEEDLES 31G X 6 MM misc USE AS DIRECTED 4 TIMES DAILY 150 each 11   • rOPINIRole (REQUIP) 1 MG tablet Take 1 tablet by mouth Every Night. Take 1 hour before bedtime. 30 tablet 6   • zolpidem (AMBIEN) 10 MG tablet zolpidem     tab 10mgzolpidem tartrate       No current facility-administered medications for this visit.      Allergies   Allergen Reactions   • Atacand [Candesartan Cilexetil] Rash   • Biaxin [Clarithromycin] Rash   • Cefzil [Cefprozil] Rash   • Levaquin [Levofloxacin] Rash   • Penicillins Rash   • Zestril [Lisinopril] Rash     Social History     Socioeconomic History   • Marital status:      Spouse name: Not on file   • Number of children: Not on file   • Years of education: Not on file   • Highest education level: Not on file   Tobacco Use   • Smoking status: Never Smoker   • Smokeless tobacco: Never Used   Substance and Sexual Activity   • Alcohol use: No   • Drug use: No   • Sexual activity: Yes     Partners: Female       Review of Systems  Review of Systems   Constitutional: Negative for activity change, appetite change, diaphoresis and fatigue.   HENT: Negative for facial swelling, sneezing, sore throat, tinnitus, trouble swallowing and voice change.    Eyes: Negative for photophobia, pain, discharge, redness, itching and visual disturbance.   Respiratory: Negative for apnea, cough, choking, chest tightness and shortness of breath.    Cardiovascular: Negative for chest pain, palpitations and leg swelling.   Gastrointestinal: Negative for abdominal distention, abdominal pain, constipation, diarrhea, nausea and vomiting.   Endocrine: Negative for cold intolerance, heat intolerance, polydipsia, polyphagia and polyuria.   Genitourinary: Negative for difficulty urinating, dysuria, frequency, hematuria and urgency.   Musculoskeletal: Negative for arthralgias, back pain, gait problem, joint  "swelling, myalgias, neck pain and neck stiffness.   Skin: Negative for color change, pallor, rash and wound.   Neurological: Negative for dizziness, tremors, weakness, light-headedness, numbness and headaches.   Hematological: Negative for adenopathy. Does not bruise/bleed easily.   Psychiatric/Behavioral: Negative for behavioral problems, confusion and sleep disturbance.        Objective    /68 (BP Location: Right arm, Patient Position: Sitting, Cuff Size: Large Adult)   Pulse 86   Ht 180.3 cm (71\")   Wt 94.9 kg (209 lb 3.2 oz)   SpO2 99%   BMI 29.18 kg/m²   Physical Exam   Constitutional: He is oriented to person, place, and time. He appears well-developed. No distress.   HENT:   Head: Normocephalic and atraumatic.   Right Ear: External ear normal.   Left Ear: External ear normal.   Nose: Nose normal.   Eyes: Pupils are equal, round, and reactive to light. Conjunctivae are normal.   Neck: Normal range of motion. Neck supple. No tracheal deviation present. No thyromegaly present.   Cardiovascular: Normal rate, regular rhythm and normal heart sounds.   No murmur heard.  Pulmonary/Chest: Effort normal and breath sounds normal. No respiratory distress. He has no wheezes.   Abdominal: Soft. Bowel sounds are normal. There is no abdominal tenderness. There is no rebound and no guarding.   Musculoskeletal: Normal range of motion. No tenderness or deformity.   Neurological: He is alert and oriented to person, place, and time. No cranial nerve deficit.   Skin: Skin is warm and dry. No rash noted.   Psychiatric: His behavior is normal. Judgment and thought content normal.       Lab Review  Glucose (mg/dL)   Date Value   01/17/2021 341 (H)   08/03/2020 196 (H)   03/10/2020 208 (H)   09/27/2019 389 (H)   01/25/2019 195 (H)   12/27/2018 160 (H)     Sodium (mmol/L)   Date Value   01/17/2021 133 (L)   08/03/2020 139   03/10/2020 141   09/27/2019 134 (L)   01/25/2019 142   12/27/2018 143     Potassium (mmol/L)   Date " Value   01/17/2021 4.4   08/03/2020 4.0   03/10/2020 4.2   09/27/2019 5.0   01/25/2019 4.0   12/27/2018 4.2     Chloride (mmol/L)   Date Value   01/17/2021 97 (L)   08/03/2020 102   03/10/2020 103   09/27/2019 95 (L)   01/25/2019 100   12/27/2018 101     CO2 (mmol/L)   Date Value   01/17/2021 24   08/03/2020 21.0 (L)   03/10/2020 23   09/27/2019 24.8   01/25/2019 29.0   12/27/2018 24     BUN (mg/dL)   Date Value   01/17/2021 25 (H)   08/03/2020 24 (H)   03/10/2020 22   09/27/2019 19   01/25/2019 18   12/27/2018 19     Creatinine (mg/dL)   Date Value   01/17/2021 1.4 (H)   08/03/2020 1.31 (H)   03/10/2020 1.1   09/27/2019 1.39 (H)   01/25/2019 1.23   12/27/2018 1.3 (H)     Hemoglobin A1C (%)   Date Value   08/03/2020 9.14 (H)   08/13/2019 10.6   01/25/2019 7.8   12/27/2018 8.5 (H)   07/27/2018 7.2   08/03/2015 7.9 (H)     Triglycerides (mg/dL)   Date Value   08/03/2020 287 (H)   01/25/2019 233 (H)   11/14/2017 227 (H)     LDL Cholesterol  (mg/dL)   Date Value   08/03/2020 50   01/25/2019 81   11/14/2017 88       Assessment/Plan      1. Type 2 diabetes mellitus with complication, with long-term current use of insulin (CMS/[facility])    2. Hypertension associated with diabetes (CMS/[facility])    3. Mixed diabetic hyperlipidemia associated with type 2 diabetes mellitus (CMS/[facility])    4. Diabetic polyneuropathy associated with type 2 diabetes mellitus (CMS/[facility])    .    Medications prescribed:  Outpatient Encounter Medications as of 2/3/2021   Medication Sig Dispense Refill   • cholecalciferol (D3-50) 1.25 MG (52713 UT) capsule TAKE ONE CAPSULE BY MOUTH EVERY 2 WEEKS. 2 capsule 0   • citalopram (CeleXA) 20 MG tablet Take 20 mg by mouth Daily.     • Dulaglutide 1.5 MG/0.5ML solution pen-injector Inject 1.5 mg under the skin into the appropriate area as directed 1 (One) Time Per Week. 4 pen 11   • Empagliflozin (JARDIANCE) 10 MG tablet Take 1 tablet by mouth Daily. 90 tablet 3   • famotidine (PEPCID) 20 MG tablet Take 20 mg by mouth 2  (Two) Times a Day.     • fexofenadine (ALLEGRA) 180 MG tablet Take 180 mg by mouth As Needed.     • fluticasone (FLONASE) 50 MCG/ACT nasal spray 1 spray each nostril twice a day for three days, then daily. 1 bottle 0   • gabapentin (NEURONTIN) 800 MG tablet TAKE 1 TABLET BY MOUTH THREE TIMES DAILY 270 tablet 0   • gemfibrozil (LOPID) 600 MG tablet Take 600 mg by mouth 2 (Two) Times a Day Before Meals.     • hydrochlorothiazide (HYDRODIURIL) 12.5 MG tablet Take 12.5 mg by mouth Daily.     • Insulin Glargine (BASAGLAR KWIKPEN) 100 UNIT/ML injection pen 90 units qam 9 pen 3   • Insulin Lispro (HumaLOG KwikPen) 200 UNIT/ML solution pen-injector Inject 30 Units under the skin into the appropriate area as directed 3 (Three) Times a Day With Meals. 36 pen 3   • levothyroxine (SYNTHROID, LEVOTHROID) 50 MCG tablet Take 1 tablet by mouth Daily.     • loratadine (CLARITIN) 10 MG tablet Take 10 mg by mouth Daily.     • meloxicam (MOBIC) 15 MG tablet Take 1 tablet by mouth Daily.     • metFORMIN ER (GLUCOPHAGE-XR) 500 MG 24 hr tablet TAKE 1 TABLET BY MOUTH TWICE DAILY WITH MEALS 180 tablet 0   • metoprolol tartrate (LOPRESSOR) 50 MG tablet Take 50 mg by mouth 2 (Two) Times a Day.     • omeprazole (priLOSEC) 20 MG capsule Take 1 capsule by mouth 2 (Two) Times a Day. 60 capsule 11   • potassium citrate (UROCIT-K) 10 MEQ (1080 MG) CR tablet Take 1 tablet by mouth 3 (Three) Times a Day With Meals. 90 tablet 11   • RELION PEN NEEDLES 31G X 6 MM misc USE AS DIRECTED 4 TIMES DAILY 150 each 11   • rOPINIRole (REQUIP) 1 MG tablet Take 1 tablet by mouth Every Night. Take 1 hour before bedtime. 30 tablet 6   • zolpidem (AMBIEN) 10 MG tablet zolpidem     tab 10mgzolpidem tartrate       No facility-administered encounter medications on file as of 2/3/2021.        Orders placed during this encounter include:  No orders of the defined types were placed in this encounter.    Glycemic Management:      Lab Results   Component Value Date    HGBA1C  9.14 (H) 08/03/2020      using sensor , checking 4 x daily, 100s most of the time , range 90 to 190     Getting insulin through assistance program      tresiba 75 units in am - change to basaglar 75 units qam -- 90 - 75       Humalog , no carb counting   20 w bkfast , add to lunch and supper    Jardiance 10 mg daily     Metformin xr 500 mg po BID      Trulicity 0.75 mg weekly - increase to 1.5 mg weekly --increase to 3 mg weekly         Lipid Management       is taking a statin but does not know the name    Lab Results   Component Value Date    CHOL 144 08/03/2020    TRIG 287 (H) 08/03/2020    HDL 37 (L) 08/03/2020    LDL 50 08/03/2020           Blood Pressure Management:          Normal            Microvascular Complication Monitoring:       Eye Exam Evaluation  Needs one   -----------     Last Microalbumin-Proteinuria Assessment     Lab Results   Component Value Date    MALBCRERATIO 14.4 01/25/2019       -----------        Neuropathy, yes on neurontin, antidepressants  Also on requip            Weight Related:       BMI - 30.1     Has lost 6 lbs since last visit     Decreased caloric intake            Diet interventions: moderate (500 kCal/d) deficit diet.        Bone Health     Lab Results   Component Value Date    [id number] 27.7 (L) 08/03/2020    [id number] 20.8 (L) 01/25/2019    [id number] 26.4 (L) 08/03/2015     vit D 50 th u every 2 weeks        Thyroid Health     Lab Results   Component Value Date    TSH 2.010 08/03/2020        on levothyroxine 50 mcgs daily         Lab Results   Component Value Date    [id number] 218 08/03/2020     Polycythemia , not smoking ,was when he had infeciton     Retest            4. Follow-up: No follow-ups on file.

## 2021-02-12 ENCOUNTER — DOCUMENTATION (OUTPATIENT)
Dept: ENDOCRINOLOGY | Facility: CLINIC | Age: 66
End: 2021-02-12

## 2021-02-17 NOTE — PROGRESS NOTES
Subjective    Mr. Mcclelland is 65 y.o. male    Chief Complaint: New Patient / Penile Bleeding     History of Present Illness  Patient is a 64-year-old gentleman who is seen blood stains in his underwear in the vicinity where the penis is located.  States has been going on for several months.  Patient denies any burning with urination lower urinary tract symptoms flank pain fever or chills.  Not had any recent stone episodes although he does have a history of kidney stones in the past.  He does not take any blood thinners.  States he is only seen blood in his underwear but is never seen gross hematuria in the commode.    The following portions of the patient's history were reviewed and updated as appropriate: allergies, current medications, past family history, past medical history, past social history, past surgical history and problem list.    Review of Systems   Constitutional: Negative for chills and fever.   Gastrointestinal: Negative for abdominal pain, anal bleeding and blood in stool.   Genitourinary: Negative for dysuria and hematuria.         Current Outpatient Medications:   •  cholecalciferol (D3-50) 1.25 MG (05115 UT) capsule, TAKE ONE CAPSULE BY MOUTH EVERY 2 WEEKS., Disp: 2 capsule, Rfl: 0  •  citalopram (CeleXA) 20 MG tablet, Take 20 mg by mouth Daily., Disp: , Rfl:   •  Dulaglutide (Trulicity) 3 MG/0.5ML solution pen-injector, Inject 3 mg under the skin into the appropriate area as directed 1 (One) Time Per Week., Disp: 12 pen, Rfl: 3  •  Empagliflozin (JARDIANCE) 10 MG tablet, Take 1 tablet by mouth Daily., Disp: 90 tablet, Rfl: 3  •  famotidine (PEPCID) 20 MG tablet, Take 20 mg by mouth 2 (Two) Times a Day., Disp: , Rfl:   •  fexofenadine (ALLEGRA) 180 MG tablet, Take 180 mg by mouth As Needed., Disp: , Rfl:   •  fluticasone (FLONASE) 50 MCG/ACT nasal spray, 1 spray each nostril twice a day for three days, then daily., Disp: 1 bottle, Rfl: 0  •  gabapentin (NEURONTIN) 800 MG tablet, TAKE 1 TABLET BY  MOUTH THREE TIMES DAILY, Disp: 270 tablet, Rfl: 0  •  gemfibrozil (LOPID) 600 MG tablet, Take 600 mg by mouth 2 (Two) Times a Day Before Meals., Disp: , Rfl:   •  hydrochlorothiazide (HYDRODIURIL) 12.5 MG tablet, Take 12.5 mg by mouth Daily., Disp: , Rfl:   •  Insulin Glargine (BASAGLAR KWIKPEN) 100 UNIT/ML injection pen, 75 units qam, Disp: 8 pen, Rfl: 11  •  Insulin Lispro (HumaLOG KwikPen) 200 UNIT/ML solution pen-injector, Inject 20 Units under the skin into the appropriate area as directed 3 (Three) Times a Day With Meals., Disp: 18 pen, Rfl: 11  •  levothyroxine (SYNTHROID, LEVOTHROID) 50 MCG tablet, Take 1 tablet by mouth Daily., Disp: , Rfl:   •  loratadine (CLARITIN) 10 MG tablet, Take 10 mg by mouth Daily., Disp: , Rfl:   •  meloxicam (MOBIC) 15 MG tablet, Take 1 tablet by mouth Daily., Disp: , Rfl:   •  metFORMIN ER (GLUCOPHAGE-XR) 500 MG 24 hr tablet, TAKE 1 TABLET BY MOUTH TWICE DAILY WITH MEALS, Disp: 180 tablet, Rfl: 0  •  metoprolol tartrate (LOPRESSOR) 50 MG tablet, Take 50 mg by mouth 2 (Two) Times a Day., Disp: , Rfl:   •  omeprazole (priLOSEC) 20 MG capsule, Take 1 capsule by mouth 2 (Two) Times a Day., Disp: 60 capsule, Rfl: 11  •  potassium citrate (UROCIT-K) 10 MEQ (1080 MG) CR tablet, Take 1 tablet by mouth 3 (Three) Times a Day With Meals., Disp: 90 tablet, Rfl: 11  •  RELION PEN NEEDLES 31G X 6 MM misc, USE AS DIRECTED 4 TIMES DAILY, Disp: 150 each, Rfl: 11  •  rOPINIRole (REQUIP) 1 MG tablet, Take 1 tablet by mouth Every Night. Take 1 hour before bedtime., Disp: 30 tablet, Rfl: 6  •  zolpidem (AMBIEN) 10 MG tablet, zolpidem     tab 10mgzolpidem tartrate, Disp: , Rfl:   •  clotrimazole-betamethasone (LOTRISONE) 1-0.05 % cream, Apply  topically to the appropriate area as directed 2 (Two) Times a Day for 28 days. After cleaning the affected area, apply twice daily as directed, Disp: 45 g, Rfl: 1    Past Medical History:   Diagnosis Date   • Depression    • Diabetes mellitus (CMS/HCC)    •  Disease of thyroid gland     HYPOTHYROIDISM   • GERD (gastroesophageal reflux disease)    • Hyperlipidemia    • Hypertension    • Kidney stone        Past Surgical History:   Procedure Laterality Date   • CHOLECYSTECTOMY     • COLONOSCOPY     • COLONOSCOPY N/A 5/27/2020    Procedure: COLONOSCOPY WITH ANESTHESIA;  Surgeon: Rambo Padilla DO;  Location: [facility] ENDOSCOPY;  Service: Gastroenterology;  Laterality: N/A;  Pre: Change in Bowels  Post: Colon Polyp, Suboptimal Prep  Kings APRN  CO2 Inflation Used   • ENDOSCOPY N/A 4/4/2017    Procedure: ESOPHAGOGASTRODUODENOSCOPY WITH ANESTHESIA;  Surgeon: Rambo Padilla DO;  Location: [facility] ENDOSCOPY;  Service:    • KIDNEY STONE SURGERY     • SHOULDER SURGERY         Social History     Socioeconomic History   • Marital status:      Spouse name: Not on file   • Number of children: Not on file   • Years of education: Not on file   • Highest education level: Not on file   Tobacco Use   • Smoking status: Never Smoker   • Smokeless tobacco: Never Used   Substance and Sexual Activity   • Alcohol use: No   • Drug use: No   • Sexual activity: Yes     Partners: Female       Family History   Problem Relation Age of Onset   • Heart disease Mother    • Diabetes Mother    • Alzheimer's disease Father    • Heart disease Father    • Diabetes Sister    • Heart disease Brother    • Diabetes Sister    • Diabetes Sister    • Colon cancer Neg Hx    • Esophageal cancer Neg Hx      International Prostate Symptom Score  The following is posted based on patient questionnaire answers:  0 - not at all    1-7 mild symptoms  1- Less than one time in five  8-19 moderate symptoms  2 -Less than half the time  20-35 severe symptoms  3 - About half the time  4 - More than half the time  5 - Almost always     For following sections:  Incomplete Emptying: - How often have you had the sensation  of not emptying your bladder completely after you finished urinating?  0  Frequency: -How often  "have you had to urinate again less than   two hours after you finished urinating?      0  Intermittency: -How often have you found you stopped and started again  Several times when you urinate?       0  Urgency: -How often do you find it difficult to postpone urination?             0  Weak stream: - How often have you had a weak urinary stream?             0  Straining: - How often have you had to push or strain to begin  Urination?          0  Sleeping: -How many times did you most typically get up to urinate   From the time you went to bed at night until the time you got up in the   0  Morning          Total `  0    Quality of Life  How would you feel if you had to live with your urinary condition the way   0  It is now, no better, no worse for the rest of your life?    Where: 0=delighted; 1= pleased, 2= mostly satisfied, 3= mixed, 4 = mostly  Dissatisfied, 5= Unhappy, 6 = terrible    Objective    Temp 96.9 °F (36.1 °C)   Ht 180.3 cm (71\")   Wt 94.3 kg (207 lb 12.8 oz)   BMI 28.98 kg/m²     Physical Exam  Vitals signs reviewed.   Constitutional:       Appearance: Normal appearance.   HENT:      Head: Normocephalic and atraumatic.      Right Ear: External ear normal.      Left Ear: External ear normal.      Nose: No congestion.   Eyes:      Conjunctiva/sclera: Conjunctivae normal.   Neck:      Comments: I observe no obvious neck masses  Pulmonary:      Effort: Pulmonary effort is normal.   Genitourinary:     Penis: Uncircumcised. Erythema present.       Scrotum/Testes: Normal.      Prostate: Enlarged. Not tender and no nodules present.      Rectum: Normal.   Musculoskeletal:      Comments: Patient ambulates without difficulty   Skin:     Coloration: Skin is not pale.      Findings: No rash.      Comments: No obvious facial rash noted.   Neurological:      General: No focal deficit present.      Mental Status: He is alert and oriented to person, place, and time.   Psychiatric:         Mood and Affect: Mood " normal.         Behavior: Behavior normal.             Results for orders placed or performed in visit on 02/22/21   POC Urinalysis Dipstick, Multipro    Specimen: Urine   Result Value Ref Range    Color Yellow Yellow, Straw, Dark Yellow, Octavia    Clarity, UA Clear Clear    Glucose,  mg/dL (A) Negative, 1000 mg/dL (3+) mg/dL    Bilirubin Negative Negative    Ketones, UA Negative Negative    Specific Gravity  1.015 1.005 - 1.030    Blood, UA Large (A) Negative    pH, Urine 5.5 5.0 - 8.0    Protein, POC Negative Negative mg/dL    Urobilinogen, UA Normal Normal    Nitrite, UA Negative Negative    Leukocytes Negative Negative   I reviewed the urinalysis results I also performed a microscopic evaluation of patient's urinary and 8-10 red blood cells per high-power field.  Assessment and Plan    Diagnoses and all orders for this visit:    1. Penile bleeding (Primary)  -     POC Urinalysis Dipstick, Multipro  -     CT Abdomen Pelvis With & Without Contrast; Future  -     clotrimazole-betamethasone (LOTRISONE) 1-0.05 % cream; Apply  topically to the appropriate area as directed 2 (Two) Times a Day for 28 days. After cleaning the affected area, apply twice daily as directed  Dispense: 45 g; Refill: 1    2. Microscopic hematuria  -     CT Abdomen Pelvis With & Without Contrast; Future    3. Balanoposthitis    Patient has had blood stains regularly in his underwear the vicinity of his penis.  This is a new problem the patient presents for today.  He is never seen gross blood in the commode he does not have any urinary tract symptoms although he does have a history of kidney stones did not have any kidney stone symptoms.  His urine was positive for large red blood cells today.  His foreskin he is noncircumcised it is red and inflamed and there is an area of splitting which could be the source of the blood however given his microscopic hematuria I would like to schedule him for CT urogram and cystoscopy to better  evaluate.

## 2021-02-22 ENCOUNTER — OFFICE VISIT (OUTPATIENT)
Dept: UROLOGY | Facility: CLINIC | Age: 66
End: 2021-02-22

## 2021-02-22 VITALS — TEMPERATURE: 96.9 F | WEIGHT: 207.8 LBS | BODY MASS INDEX: 29.09 KG/M2 | HEIGHT: 71 IN

## 2021-02-22 DIAGNOSIS — R31.29 MICROSCOPIC HEMATURIA: ICD-10-CM

## 2021-02-22 DIAGNOSIS — N48.89 PENILE BLEEDING: Primary | ICD-10-CM

## 2021-02-22 DIAGNOSIS — N47.6 BALANOPOSTHITIS: ICD-10-CM

## 2021-02-22 LAB
BILIRUB BLD-MCNC: NEGATIVE MG/DL
CLARITY, POC: CLEAR
COLOR UR: YELLOW
GLUCOSE UR STRIP-MCNC: ABNORMAL MG/DL
KETONES UR QL: NEGATIVE
LEUKOCYTE EST, POC: NEGATIVE
NITRITE UR-MCNC: NEGATIVE MG/ML
PH UR: 5.5 [PH] (ref 5–8)
PROT UR STRIP-MCNC: NEGATIVE MG/DL
RBC # UR STRIP: ABNORMAL /UL
SP GR UR: 1.01 (ref 1–1.03)
UROBILINOGEN UR QL: NORMAL

## 2021-02-22 PROCEDURE — 99214 OFFICE O/P EST MOD 30 MIN: CPT | Performed by: PHYSICIAN ASSISTANT

## 2021-02-22 PROCEDURE — 81003 URINALYSIS AUTO W/O SCOPE: CPT | Performed by: PHYSICIAN ASSISTANT

## 2021-02-22 RX ORDER — CLOTRIMAZOLE AND BETAMETHASONE DIPROPIONATE 10; .64 MG/G; MG/G
CREAM TOPICAL 2 TIMES DAILY
Qty: 45 G | Refills: 1 | Status: SHIPPED | OUTPATIENT
Start: 2021-02-22 | End: 2021-03-22

## 2021-03-15 DIAGNOSIS — G25.81 RESTLESS LEGS SYNDROME (RLS): ICD-10-CM

## 2021-03-15 DIAGNOSIS — E11.42 DIABETIC POLYNEUROPATHY ASSOCIATED WITH TYPE 2 DIABETES MELLITUS (HCC): ICD-10-CM

## 2021-03-15 RX ORDER — GABAPENTIN 800 MG/1
TABLET ORAL
Qty: 270 TABLET | Refills: 0 | Status: SHIPPED | OUTPATIENT
Start: 2021-03-15 | End: 2021-07-15

## 2021-03-29 ENCOUNTER — HOSPITAL ENCOUNTER (OUTPATIENT)
Dept: CT IMAGING | Facility: HOSPITAL | Age: 66
Discharge: HOME | End: 2021-03-29
Admitting: PHYSICIAN ASSISTANT

## 2021-03-29 ENCOUNTER — PROCEDURE VISIT (OUTPATIENT)
Dept: UROLOGY | Facility: CLINIC | Age: 66
End: 2021-03-29

## 2021-03-29 DIAGNOSIS — R31.29 MICROSCOPIC HEMATURIA: Primary | ICD-10-CM

## 2021-03-29 DIAGNOSIS — R31.29 MICROSCOPIC HEMATURIA: ICD-10-CM

## 2021-03-29 DIAGNOSIS — N48.89 PENILE BLEEDING: ICD-10-CM

## 2021-03-29 DIAGNOSIS — N20.0 NEPHROLITHIASIS: ICD-10-CM

## 2021-03-29 LAB — CREAT BLDA-MCNC: 1.4 MG/DL (ref 0.6–1.3)

## 2021-03-29 PROCEDURE — 82565 ASSAY OF CREATININE: CPT

## 2021-03-29 PROCEDURE — 52000 CYSTOURETHROSCOPY: CPT | Performed by: UROLOGY

## 2021-03-29 PROCEDURE — 25010000002 IOPAMIDOL 61 % SOLUTION: Performed by: PHYSICIAN ASSISTANT

## 2021-03-29 PROCEDURE — 74178 CT ABD&PLV WO CNTR FLWD CNTR: CPT

## 2021-03-29 RX ADMIN — IOPAMIDOL 100 ML: 612 INJECTION, SOLUTION INTRAVENOUS at 09:03

## 2021-03-29 NOTE — PROGRESS NOTES
Pre- operative diagnosis:  Hematuria    Post operative diagnosis:  Same    Procedure:  The patient was prepped and draped in a normal sterile fashion.  The urethra was anesthetized with 2% lidocaine jelly.  A flexible cystoscope was introduced per urethra.      Urethra:  Normal    Bladder:  mild trabeculation    Ureteral orifices:  Normal position bilaterally and Clear efflux bilaterally    Prostate:  lateral lobe hypertrophy    Patient tolerated the procedure well    Complications: none    Blood loss: minimal    Follow up:    Routine follow up    Patient with very inflamed foreskin which I think is a source of the bleeding.  Otherwise his hematuria work-up is negative.  I have recommended possible circumcision.  The patient is not interested in this at this point.  He will follow-up with us in 1 year with KUB.

## 2021-03-30 ENCOUNTER — TELEPHONE (OUTPATIENT)
Dept: UROLOGY | Facility: CLINIC | Age: 66
End: 2021-03-30

## 2021-03-30 NOTE — TELEPHONE ENCOUNTER
Advised pt's wife of results     ----- Message from ZACARIAS Carmen sent at 3/30/2021  8:33 AM CDT -----  Regarding: creatinine  His creatinine is unchanged from 2 months ago 1.4.  ----- Message -----  From: Lab, Background User  Sent: 3/29/2021   5:18 PM CDT  To: ZACARIAS Carmen

## 2021-04-19 DIAGNOSIS — D89.89 KAPPA LIGHT CHAIN DISEASE (HCC): Primary | ICD-10-CM

## 2021-04-21 ENCOUNTER — HOSPITAL ENCOUNTER (OUTPATIENT)
Dept: INFUSION THERAPY | Age: 66
Discharge: HOME | End: 2021-04-21
Payer: MEDICARE

## 2021-04-21 DIAGNOSIS — D47.2 MGUS (MONOCLONAL GAMMOPATHY OF UNKNOWN SIGNIFICANCE): ICD-10-CM

## 2021-04-21 LAB
ALBUMIN SERPL-MCNC: 4.7 G/DL (ref 3.5–5.2)
ALP BLD-CCNC: 108 U/L (ref 40–130)
ALT SERPL-CCNC: 90 U/L (ref 21–72)
ANION GAP SERPL CALCULATED.3IONS-SCNC: 15 MMOL/L (ref 7–19)
AST SERPL-CCNC: 115 U/L (ref 17–59)
BILIRUB SERPL-MCNC: 0.7 MG/DL (ref 0.2–1.3)
BUN BLDV-MCNC: 22 MG/DL (ref 9–20)
CALCIUM SERPL-MCNC: 10.1 MG/DL (ref 8.4–10.2)
CHLORIDE BLD-SCNC: 105 MMOL/L (ref 98–111)
CO2: 24 MMOL/L (ref 22–29)
CREAT SERPL-MCNC: 1.3 MG/DL (ref 0.6–1.2)
GFR NON-AFRICAN AMERICAN: 55
GLOBULIN: 3.6 G/DL
GLUCOSE BLD-MCNC: 307 MG/DL (ref 74–106)
HCT VFR BLD CALC: 51.2 % (ref 40.1–51)
HEMOGLOBIN: 17.2 G/DL (ref 13.7–17.5)
MCH RBC QN AUTO: 29.7 PG (ref 25.7–32.2)
MCHC RBC AUTO-ENTMCNC: 33.6 G/DL (ref 32.3–36.5)
MCV RBC AUTO: 88.3 FL (ref 79–92.2)
PDW BLD-RTO: 14 % (ref 11.6–14.4)
PLATELET # BLD: 153 K/UL (ref 163–337)
PMV BLD AUTO: 10.6 FL (ref 7.4–10.4)
POTASSIUM SERPL-SCNC: 4.8 MMOL/L (ref 3.5–5.1)
RBC # BLD: 5.8 M/UL (ref 4.63–6.08)
SODIUM BLD-SCNC: 144 MMOL/L (ref 137–145)
TOTAL PROTEIN: 8.3 G/DL (ref 6.3–8.2)
WBC # BLD: 8.3 K/UL (ref 4.23–9.07)

## 2021-04-21 PROCEDURE — 85027 COMPLETE CBC AUTOMATED: CPT

## 2021-04-21 PROCEDURE — 80053 COMPREHEN METABOLIC PANEL: CPT

## 2021-04-27 ENCOUNTER — TELEPHONE (OUTPATIENT)
Dept: GASTROENTEROLOGY | Facility: CLINIC | Age: 66
End: 2021-04-27

## 2021-04-27 NOTE — PROGRESS NOTES
Nose: Nose normal.      Mouth/Throat:      Mouth: Mucous membranes are moist.   Eyes:      General: No scleral icterus. Right eye: No discharge. Left eye: No discharge. Conjunctiva/sclera: Conjunctivae normal.   Neck:      Musculoskeletal: Neck supple. No muscular tenderness. Trachea: No tracheal deviation. Cardiovascular:      Rate and Rhythm: Normal rate and regular rhythm. Pulmonary:      Effort: Pulmonary effort is normal. No respiratory distress. Breath sounds: Normal breath sounds. No wheezing or rales. Abdominal:      General: Bowel sounds are normal. There is no distension. Palpations: Abdomen is soft. Tenderness: There is no abdominal tenderness. There is no guarding. Genitourinary:     Comments: Exam deferred  Musculoskeletal:         General: No tenderness or deformity. Comments: Normal ROM all four extremities   Lymphadenopathy:      Cervical:      Right cervical: No superficial or deep cervical adenopathy. Left cervical: No superficial or deep cervical adenopathy. Upper Body:      Right upper body: No supraclavicular adenopathy. Left upper body: No supraclavicular adenopathy. Comments:      Skin:     General: Skin is warm and dry. Findings: No rash. Neurological:      Mental Status: He is alert and oriented to person, place, and time. Comments: follows commands, non-focal   Psychiatric:         Behavior: Behavior normal. Behavior is cooperative. Thought Content: Thought content normal.         Judgment: Judgment normal.      Comments: Alert and oriented to person, place and time.        /76   Pulse 93   Temp 97.7 °F (36.5 °C) (Temporal)   Ht 5' 11\" (1.803 m)   Wt 208 lb 9.6 oz (94.6 kg)   SpO2 97%   BMI 29.09 kg/m²   Wt Readings from Last 3 Encounters:   04/28/21 208 lb 9.6 oz (94.6 kg)   01/17/21 215 lb (97.5 kg)   12/23/20 211 lb 11.2 oz (96 kg)     Labs reviewed by me:  CBC 4/28/2021:   Lab Results Component Value Date    WBC 7.78 2021    HGB 16.7 2021    HCT 50.5 2021    MCV 89.9 2021     2021     ASSESSMENT/PLAN:    1. MGUS (monoclonal gammopathy of unknown significance)    2. Stage 3a chronic kidney disease    3. Elevated hemoglobin (HCC)    4. Fatigue, unspecified type    5. Neuropathy      24-hour urine for immunoelectrophoresis 10/14/2020: No M-spike, no monoclonality detected    Bone survey 2020: Negative for focal bony lesion. Bone marrow aspirate and biopsy 2020 documented a normocellular marrow (~30-40%) with plasma cell neoplasm involving less than 5%. Congo red was negative for amyloid. FLOW cytometry showed a small clonal kappa restricted plasma cell population (0.2%). Cytogenetics showed a normal male karyotype. Negative molecular studies for JAK2 V617F, MPL, CALR, JAK2 exon 12 and KIT mutations  Findings represent kappa light chain MGUS    Labs 2021:  · CBC: WBC 8.3, H/H 17.2/51.2, MCV 88.3.   Platelets 571,823  · CMP: Creatinine 1.3/GFR 55, calcium 10.1, total protein 8.3  · SPEP: No M-spike  · Immunofixation: No monoclonality  · Ig, IgA: 1389, IgM: 100  · Kappa light chain: 64.2, lambda light chain: 28.2, K/L ratio: 2.28  · B2M: 4.0    Labs stable  Continue to monitor conservatively  RTC 6 months with labs prior to return    chronic kidney disease  Creatinine 1.3 with GFR 55 on 2021, stable  Monitored by [address] [place], [place] [address]Daniella APRN    Minimally elevated H/H, stable  H/H 16.7/50.5, stable  Subha Martinez denies tobacco use  He does not use exogenous testosterone  He denies any known history of sleep apnea  He reports staying well-hydrated    Labs 2020:  · Iron 107, TIBC 443, iron saturation 24%, ferritin 191  · Erythropoietin 9.9    · Negative JAK2 V617F, MPL, CALR, JAK2 exon 12 and KIT mutations on BMAB 2020    Diabetic polyneuropathy  Managed by Opal Obando PA-C    Fatigue  Supportive care    Primary care needs per ALISA Bishop  Consider sleep study, if not already completed    Tumor Screening:  · Colonoscopy last completed 5/27/2020 by Dr. Nghia Squires documented poor prep, 1 a sending colon polypectomy, 1 sigmoid colon polypectomy (tissue not retrieved). Recall recommended in 6 months  · PSA followed by Dr. Rick Kim This Encounter   Procedures    Comprehensive Metabolic Panel    CBC Auto Differential    [place]/Lambda Free Lt Chains, Serum Quant    Beta 2 Microglobulin, Serum       Return in about 6 months (around 10/28/2021) for follow up with ALISA Mooney (No CBC day of apt). Labs prior to return. Plan of care discussed with Ange Casiano and his wife. Understandings verbalized. IJuana am scribing for ALISA Kunz. Electronically signed by Juana Ramos on 4/27/2021 at 11:21 am     ITessy APRN, personally performed the services described in this documentation as scribed by Henok Alston RN in my presence and it is both accurate and complete.           ALISA Kunz  4:57 PM  4/28/2021

## 2021-04-28 ENCOUNTER — OFFICE VISIT (OUTPATIENT)
Dept: HEMATOLOGY | Age: 66
End: 2021-04-28
Payer: MEDICARE

## 2021-04-28 ENCOUNTER — HOSPITAL ENCOUNTER (OUTPATIENT)
Dept: INFUSION THERAPY | Age: 66
Discharge: HOME | End: 2021-04-28
Payer: MEDICARE

## 2021-04-28 VITALS
HEART RATE: 93 BPM | WEIGHT: 208.6 LBS | OXYGEN SATURATION: 97 % | HEIGHT: 71 IN | SYSTOLIC BLOOD PRESSURE: 118 MMHG | DIASTOLIC BLOOD PRESSURE: 76 MMHG | BODY MASS INDEX: 29.2 KG/M2 | TEMPERATURE: 97.7 F

## 2021-04-28 DIAGNOSIS — D47.2 MGUS (MONOCLONAL GAMMOPATHY OF UNKNOWN SIGNIFICANCE): Primary | ICD-10-CM

## 2021-04-28 DIAGNOSIS — D58.2 ELEVATED HEMOGLOBIN (HCC): ICD-10-CM

## 2021-04-28 DIAGNOSIS — R76.8 ELEVATED SERUM IMMUNOGLOBULIN FREE LIGHT CHAINS: ICD-10-CM

## 2021-04-28 DIAGNOSIS — N18.31 STAGE 3A CHRONIC KIDNEY DISEASE (HCC): ICD-10-CM

## 2021-04-28 DIAGNOSIS — R53.83 FATIGUE, UNSPECIFIED TYPE: ICD-10-CM

## 2021-04-28 DIAGNOSIS — G62.9 NEUROPATHY: ICD-10-CM

## 2021-04-28 LAB
BASOPHILS ABSOLUTE: 0.03 K/UL (ref 0.01–0.08)
BASOPHILS RELATIVE PERCENT: 0.4 % (ref 0.1–1.2)
EOSINOPHILS ABSOLUTE: 0.52 K/UL (ref 0.04–0.54)
EOSINOPHILS RELATIVE PERCENT: 6.7 % (ref 0.7–7)
HCT VFR BLD CALC: 50.5 % (ref 40.1–51)
HEMOGLOBIN: 16.7 G/DL (ref 13.7–17.5)
LYMPHOCYTES ABSOLUTE: 2.83 K/UL (ref 1.18–3.74)
LYMPHOCYTES RELATIVE PERCENT: 36.4 % (ref 19.3–53.1)
MCH RBC QN AUTO: 29.7 PG (ref 25.7–32.2)
MCHC RBC AUTO-ENTMCNC: 33.1 G/DL (ref 32.3–36.5)
MCV RBC AUTO: 89.9 FL (ref 79–92.2)
MONOCYTES ABSOLUTE: 0.71 K/UL (ref 0.24–0.82)
MONOCYTES RELATIVE PERCENT: 9.1 % (ref 4.7–12.5)
NEUTROPHILS ABSOLUTE: 3.69 K/UL (ref 1.56–6.13)
NEUTROPHILS RELATIVE PERCENT: 47.4 % (ref 34–71.1)
PDW BLD-RTO: 14.8 % (ref 11.6–14.4)
PLATELET # BLD: 173 K/UL (ref 163–337)
PMV BLD AUTO: 11.3 FL (ref 7.4–10.4)
RBC # BLD: 5.62 M/UL (ref 4.63–6.08)
WBC # BLD: 7.78 K/UL (ref 4.23–9.07)

## 2021-04-28 PROCEDURE — 99211 OFF/OP EST MAY X REQ PHY/QHP: CPT

## 2021-04-28 PROCEDURE — 3017F COLORECTAL CA SCREEN DOC REV: CPT | Performed by: NURSE PRACTITIONER

## 2021-04-28 PROCEDURE — 85025 COMPLETE CBC W/AUTO DIFF WBC: CPT

## 2021-04-28 PROCEDURE — G8427 DOCREV CUR MEDS BY ELIG CLIN: HCPCS | Performed by: NURSE PRACTITIONER

## 2021-04-28 PROCEDURE — G8417 CALC BMI ABV UP PARAM F/U: HCPCS | Performed by: NURSE PRACTITIONER

## 2021-04-28 PROCEDURE — 1123F ACP DISCUSS/DSCN MKR DOCD: CPT | Performed by: NURSE PRACTITIONER

## 2021-04-28 PROCEDURE — 4040F PNEUMOC VAC/ADMIN/RCVD: CPT | Performed by: NURSE PRACTITIONER

## 2021-04-28 PROCEDURE — 99213 OFFICE O/P EST LOW 20 MIN: CPT | Performed by: NURSE PRACTITIONER

## 2021-04-28 PROCEDURE — 1036F TOBACCO NON-USER: CPT | Performed by: NURSE PRACTITIONER

## 2021-04-28 RX ORDER — MULTIVIT-MIN/IRON/FOLIC ACID/K 18-600-40
CAPSULE ORAL
COMMUNITY
Start: 2021-04-06 | End: 2021-10-27

## 2021-04-28 ASSESSMENT — ENCOUNTER SYMPTOMS
SHORTNESS OF BREATH: 0
EYE DISCHARGE: 0
TROUBLE SWALLOWING: 0
NAUSEA: 0
CONSTIPATION: 0
COUGH: 0
WHEEZING: 0
EYE ITCHING: 0
VOMITING: 0
SORE THROAT: 0
DIARRHEA: 0
ABDOMINAL PAIN: 0

## 2021-04-29 ENCOUNTER — OFFICE VISIT (OUTPATIENT)
Dept: NEUROLOGY | Facility: CLINIC | Age: 66
End: 2021-04-29

## 2021-04-29 VITALS
HEART RATE: 92 BPM | BODY MASS INDEX: 29.68 KG/M2 | SYSTOLIC BLOOD PRESSURE: 108 MMHG | HEIGHT: 71 IN | WEIGHT: 212 LBS | DIASTOLIC BLOOD PRESSURE: 70 MMHG | OXYGEN SATURATION: 98 %

## 2021-04-29 DIAGNOSIS — G25.81 RESTLESS LEGS SYNDROME (RLS): ICD-10-CM

## 2021-04-29 DIAGNOSIS — E11.42 DIABETIC POLYNEUROPATHY ASSOCIATED WITH TYPE 2 DIABETES MELLITUS (HCC): ICD-10-CM

## 2021-04-29 PROCEDURE — 99212 OFFICE O/P EST SF 10 MIN: CPT | Performed by: PHYSICIAN ASSISTANT

## 2021-04-29 RX ORDER — ROPINIROLE 2 MG/1
2 TABLET, FILM COATED ORAL NIGHTLY
Qty: 30 TABLET | Refills: 6 | Status: SHIPPED | OUTPATIENT
Start: 2021-04-29 | End: 2021-11-01 | Stop reason: SDUPTHER

## 2021-04-29 RX ORDER — ROPINIROLE 1 MG/1
1 TABLET, FILM COATED ORAL NIGHTLY
Qty: 30 TABLET | Refills: 6 | Status: CANCELLED | OUTPATIENT
Start: 2021-04-29

## 2021-05-07 DIAGNOSIS — I10 ESSENTIAL HYPERTENSION: ICD-10-CM

## 2021-05-10 RX ORDER — LISINOPRIL 20 MG/1
TABLET ORAL
Qty: 90 TABLET | Refills: 0 | OUTPATIENT
Start: 2021-05-10

## 2021-05-16 NOTE — PROGRESS NOTES
Subjective   Supa Mcclelland is a 65 y.o. male is here today for follow-up.    Long-term painful peripheral diabetic neuropathy patient with improvement to some degree on Neurontin.  He has been placed on carbamazepine but was unable to obtain it because of cost.  He feels that he is doing about the same off of the Tegretol.  RLS symptoms respond fairly well to ropinirole.    Peripheral Neuropathy  This is a chronic problem. The current episode started more than 1 year ago. The problem occurs constantly. The problem has been unchanged. Associated symptoms include congestion, fatigue, numbness and weakness. The symptoms are aggravated by exertion and walking. He has tried position changes and rest (Neurontin, ropinirole) for the symptoms. The treatment provided moderate relief.        The following portions of the patient's history were reviewed and updated as appropriate: allergies, current medications, past family history, past medical history, past social history, past surgical history and problem list.    Review of Systems   Constitutional: Positive for fatigue.   HENT: Positive for congestion, hearing loss and rhinorrhea.    Eyes: Negative.    Respiratory: Positive for shortness of breath.    Gastrointestinal: Positive for GERD and indigestion.   Musculoskeletal: Positive for back pain and gait problem.   Skin: Negative.    Neurological: Positive for weakness and numbness.   Psychiatric/Behavioral: Positive for dysphoric mood and sleep disturbance.         Current Outpatient Medications:   •  cholecalciferol (D3-50) 1.25 MG (17160 UT) capsule, TAKE ONE CAPSULE BY MOUTH EVERY 2 WEEKS. (Patient taking differently: Take 50,000 Units by mouth Every 14 (Fourteen) Days. TAKE ONE CAPSULE BY MOUTH EVERY 2 WEEKS.), Disp: 2 capsule, Rfl: 0  •  citalopram (CeleXA) 20 MG tablet, Take 20 mg by mouth Daily., Disp: , Rfl:   •  Dulaglutide (Trulicity) 3 MG/0.5ML solution pen-injector, Inject 3 mg under the skin into the  appropriate area as directed 1 (One) Time Per Week., Disp: 12 pen, Rfl: 3  •  Empagliflozin (JARDIANCE) 10 MG tablet, Take 1 tablet by mouth Daily., Disp: 90 tablet, Rfl: 3  •  famotidine (PEPCID) 20 MG tablet, Take 20 mg by mouth 2 (Two) Times a Day., Disp: , Rfl:   •  fexofenadine (ALLEGRA) 180 MG tablet, Take 180 mg by mouth As Needed., Disp: , Rfl:   •  fluticasone (FLONASE) 50 MCG/ACT nasal spray, 1 spray each nostril twice a day for three days, then daily., Disp: 1 bottle, Rfl: 0  •  gabapentin (NEURONTIN) 800 MG tablet, TAKE 1 TABLET BY MOUTH THREE TIMES DAILY (Patient taking differently: Take 800 mg by mouth 3 (Three) Times a Day.), Disp: 270 tablet, Rfl: 0  •  gemfibrozil (LOPID) 600 MG tablet, Take 600 mg by mouth 2 (Two) Times a Day Before Meals., Disp: , Rfl:   •  hydrochlorothiazide (HYDRODIURIL) 12.5 MG tablet, Take 12.5 mg by mouth Daily., Disp: , Rfl:   •  Insulin Glargine (BASAGLAR KWIKPEN) 100 UNIT/ML injection pen, 75 units qam, Disp: 8 pen, Rfl: 11  •  Insulin Lispro (HumaLOG KwikPen) 200 UNIT/ML solution pen-injector, Inject 20 Units under the skin into the appropriate area as directed 3 (Three) Times a Day With Meals., Disp: 18 pen, Rfl: 11  •  levothyroxine (SYNTHROID, LEVOTHROID) 50 MCG tablet, Take 1 tablet by mouth Daily., Disp: , Rfl:   •  loratadine (CLARITIN) 10 MG tablet, Take 10 mg by mouth Daily., Disp: , Rfl:   •  meloxicam (MOBIC) 15 MG tablet, Take 1 tablet by mouth Daily., Disp: , Rfl:   •  metFORMIN ER (GLUCOPHAGE-XR) 500 MG 24 hr tablet, TAKE 1 TABLET BY MOUTH TWICE DAILY WITH MEALS (Patient taking differently: Take 500 mg by mouth 2 (two) times a day. TAKE 1 TABLET BY MOUTH TWICE DAILY WITH MEALS), Disp: 180 tablet, Rfl: 0  •  metoprolol tartrate (LOPRESSOR) 50 MG tablet, Take 50 mg by mouth 2 (Two) Times a Day., Disp: , Rfl:   •  omeprazole (priLOSEC) 20 MG capsule, Take 1 capsule by mouth 2 (Two) Times a Day., Disp: 60 capsule, Rfl: 11  •  potassium citrate (UROCIT-K) 10 MEQ  (1080 MG) CR tablet, Take 1 tablet by mouth 3 (Three) Times a Day With Meals., Disp: 90 tablet, Rfl: 11  •  RELION PEN NEEDLES 31G X 6 MM misc, USE AS DIRECTED 4 TIMES DAILY, Disp: 150 each, Rfl: 11  •  rOPINIRole (REQUIP) 2 MG tablet, Take 1 tablet by mouth Every Night. Take 1 hour before bedtime., Disp: 30 tablet, Rfl: 6  •  zolpidem (AMBIEN) 10 MG tablet, Take 10 mg by mouth At Night As Needed., Disp: , Rfl:      Objective   Physical Exam  Vitals and nursing note reviewed.   HENT:      Head: Normocephalic.      Right Ear: External ear normal. Decreased hearing noted.      Left Ear: External ear normal. Decreased hearing noted.   Eyes:      General: Lids are normal. Vision grossly intact. Gaze aligned appropriately. No scleral icterus.     Extraocular Movements: Extraocular movements intact.      Conjunctiva/sclera: Conjunctivae normal.      Pupils: Pupils are equal, round, and reactive to light.   Neck:      Vascular: No JVD.      Trachea: Trachea and phonation normal.   Cardiovascular:      Rate and Rhythm: Normal rate.      Heart sounds: Normal heart sounds.   Pulmonary:      Effort: Pulmonary effort is normal.      Breath sounds: Normal breath sounds.   Skin:     General: Skin is warm and dry.   Neurological:      Mental Status: He is alert and oriented to person, place, and time.      GCS: GCS eye subscore is 4. GCS verbal subscore is 5. GCS motor subscore is 6.      Cranial Nerves: Cranial nerves are intact.      Sensory: Sensory deficit present.      Motor: Weakness present.      Coordination: Coordination is intact.      Gait: Gait abnormal.      Deep Tendon Reflexes:      Reflex Scores:       Tricep reflexes are 1+ on the right side and 1+ on the left side.       Bicep reflexes are 1+ on the right side and 1+ on the left side.       Brachioradialis reflexes are 1+ on the right side and 1+ on the left side.       Patellar reflexes are 1+ on the right side and 1+ on the left side.       Achilles reflexes  are 1+ on the right side and 1+ on the left side.     Comments: Diminished peripheral sensation in the lower extremities consistent with diabetic peripheral neuropathy    Psychiatric:         Attention and Perception: Attention and perception normal.         Mood and Affect: Mood and affect normal.         Speech: Speech normal.         Behavior: Behavior normal.         Thought Content: Thought content normal.         Cognition and Memory: Cognition and memory normal.         Judgment: Judgment normal.           Assessment/Plan   Diagnoses and all orders for this visit:    1. Diabetic polyneuropathy associated with type 2 diabetes mellitus (CMS/[facility])    2. Restless legs syndrome (RLS)  -     rOPINIRole (REQUIP) 2 MG tablet; Take 1 tablet by mouth Every Night. Take 1 hour before bedtime.  Dispense: 30 tablet; Refill: 6    Other orders  -     Cancel: rOPINIRole (REQUIP) 1 MG tablet; Take 1 tablet by mouth Every Night. Take 1 hour before bedtime.  Dispense: 30 tablet; Refill: 6    The patient continues to have difficulty with restless leg symptoms.  We will increase his Requip to 2 mg before bed.  I have counseled him to take this about 1 hour before bedtime.  He will report back to us in 1 month regarding response to this.    Today's findings and recommendations were reviewed with the patient.  The patient's questions and concerns are reviewed in detail.  A follow-up appointment is scheduled.               Dictated utilizing Dragon dictation.

## 2021-05-21 RX ORDER — METFORMIN HYDROCHLORIDE 500 MG/1
500 TABLET, EXTENDED RELEASE ORAL 2 TIMES DAILY
Qty: 180 TABLET | Refills: 3 | Status: SHIPPED | OUTPATIENT
Start: 2021-05-21 | End: 2022-07-05

## 2021-06-02 ENCOUNTER — TELEPHONE (OUTPATIENT)
Dept: GASTROENTEROLOGY | Facility: CLINIC | Age: 66
End: 2021-06-02

## 2021-06-23 LAB
ALBUMIN SERPL-MCNC: 4.6 G/DL (ref 3.5–5.2)
ALP BLD-CCNC: 117 U/L (ref 40–130)
ALT SERPL-CCNC: 58 U/L (ref 5–41)
ANION GAP SERPL CALCULATED.3IONS-SCNC: 14 MMOL/L (ref 7–19)
AST SERPL-CCNC: 76 U/L (ref 5–40)
BASOPHILS ABSOLUTE: 0.1 K/UL (ref 0–0.2)
BASOPHILS RELATIVE PERCENT: 0.6 % (ref 0–1)
BILIRUB SERPL-MCNC: 0.6 MG/DL (ref 0.2–1.2)
BILIRUBIN URINE: NEGATIVE
BLOOD, URINE: NEGATIVE
BUN BLDV-MCNC: 21 MG/DL (ref 8–23)
CALCIUM SERPL-MCNC: 10.2 MG/DL (ref 8.8–10.2)
CHLORIDE BLD-SCNC: 99 MMOL/L (ref 98–111)
CLARITY: CLEAR
CO2: 26 MMOL/L (ref 22–29)
COLOR: YELLOW
CREAT SERPL-MCNC: 1.1 MG/DL (ref 0.5–1.2)
CREATININE URINE: 73.4 MG/DL (ref 4.2–622)
EOSINOPHILS ABSOLUTE: 0.8 K/UL (ref 0–0.6)
EOSINOPHILS RELATIVE PERCENT: 9.9 % (ref 0–5)
GFR AFRICAN AMERICAN: >59
GFR NON-AFRICAN AMERICAN: >60
GLUCOSE BLD-MCNC: 357 MG/DL (ref 74–109)
GLUCOSE URINE: =>1000 MG/DL
HCT VFR BLD CALC: 49.1 % (ref 42–52)
HEMOGLOBIN: 16.1 G/DL (ref 14–18)
IMMATURE GRANULOCYTES #: 0.2 K/UL
KETONES, URINE: NEGATIVE MG/DL
LEUKOCYTE ESTERASE, URINE: NEGATIVE
LYMPHOCYTES ABSOLUTE: 2.8 K/UL (ref 1.1–4.5)
LYMPHOCYTES RELATIVE PERCENT: 35 % (ref 20–40)
MAGNESIUM: 2.1 MG/DL (ref 1.6–2.4)
MCH RBC QN AUTO: 29.4 PG (ref 27–31)
MCHC RBC AUTO-ENTMCNC: 32.8 G/DL (ref 33–37)
MCV RBC AUTO: 89.8 FL (ref 80–94)
MONOCYTES ABSOLUTE: 0.6 K/UL (ref 0–0.9)
MONOCYTES RELATIVE PERCENT: 7.8 % (ref 0–10)
NEUTROPHILS ABSOLUTE: 3.5 K/UL (ref 1.5–7.5)
NEUTROPHILS RELATIVE PERCENT: 44.4 % (ref 50–65)
NITRITE, URINE: NEGATIVE
PARATHYROID HORMONE INTACT: 20.7 PG/ML (ref 15–65)
PDW BLD-RTO: 13.3 % (ref 11.5–14.5)
PH UA: 5 (ref 5–8)
PLATELET # BLD: 169 K/UL (ref 130–400)
PMV BLD AUTO: 11.5 FL (ref 9.4–12.4)
POTASSIUM SERPL-SCNC: 4.8 MMOL/L (ref 3.5–5)
PROTEIN PROTEIN: 21 MG/DL (ref 15–45)
PROTEIN UA: ABNORMAL MG/DL
RBC # BLD: 5.47 M/UL (ref 4.7–6.1)
SODIUM BLD-SCNC: 139 MMOL/L (ref 136–145)
SPECIFIC GRAVITY UA: 1.03 (ref 1–1.03)
TOTAL PROTEIN: 8.5 G/DL (ref 6.6–8.7)
URIC ACID, SERUM: 7.7 MG/DL (ref 3.4–7)
UROBILINOGEN, URINE: 0.2 E.U./DL
VITAMIN D 25-HYDROXY: 32.6 NG/ML
WBC # BLD: 8 K/UL (ref 4.8–10.8)

## 2021-07-13 DIAGNOSIS — I10 ESSENTIAL HYPERTENSION: ICD-10-CM

## 2021-07-13 RX ORDER — LISINOPRIL 20 MG/1
TABLET ORAL
Qty: 90 TABLET | Refills: 0 | OUTPATIENT
Start: 2021-07-13

## 2021-07-14 DIAGNOSIS — E11.42 DIABETIC POLYNEUROPATHY ASSOCIATED WITH TYPE 2 DIABETES MELLITUS (HCC): ICD-10-CM

## 2021-07-14 DIAGNOSIS — G25.81 RESTLESS LEGS SYNDROME (RLS): ICD-10-CM

## 2021-07-15 RX ORDER — GABAPENTIN 800 MG/1
TABLET ORAL
Qty: 270 TABLET | Refills: 0 | Status: SHIPPED | OUTPATIENT
Start: 2021-07-15 | End: 2021-11-29

## 2021-08-03 ENCOUNTER — OFFICE VISIT (OUTPATIENT)
Dept: ENDOCRINOLOGY | Facility: CLINIC | Age: 66
End: 2021-08-03

## 2021-08-03 VITALS
BODY MASS INDEX: 28.84 KG/M2 | SYSTOLIC BLOOD PRESSURE: 120 MMHG | HEART RATE: 92 BPM | DIASTOLIC BLOOD PRESSURE: 80 MMHG | OXYGEN SATURATION: 96 % | WEIGHT: 206 LBS | HEIGHT: 71 IN

## 2021-08-03 DIAGNOSIS — E11.69 MIXED DIABETIC HYPERLIPIDEMIA ASSOCIATED WITH TYPE 2 DIABETES MELLITUS (HCC): ICD-10-CM

## 2021-08-03 DIAGNOSIS — I15.2 HYPERTENSION ASSOCIATED WITH DIABETES (HCC): ICD-10-CM

## 2021-08-03 DIAGNOSIS — E11.59 HYPERTENSION ASSOCIATED WITH DIABETES (HCC): ICD-10-CM

## 2021-08-03 DIAGNOSIS — E11.42 DIABETIC POLYNEUROPATHY ASSOCIATED WITH TYPE 2 DIABETES MELLITUS (HCC): ICD-10-CM

## 2021-08-03 DIAGNOSIS — E11.8 TYPE 2 DIABETES MELLITUS WITH COMPLICATION, WITH LONG-TERM CURRENT USE OF INSULIN (HCC): Primary | ICD-10-CM

## 2021-08-03 DIAGNOSIS — E78.2 MIXED DIABETIC HYPERLIPIDEMIA ASSOCIATED WITH TYPE 2 DIABETES MELLITUS (HCC): ICD-10-CM

## 2021-08-03 DIAGNOSIS — I10 ESSENTIAL HYPERTENSION: ICD-10-CM

## 2021-08-03 DIAGNOSIS — Z79.4 TYPE 2 DIABETES MELLITUS WITH COMPLICATION, WITH LONG-TERM CURRENT USE OF INSULIN (HCC): Primary | ICD-10-CM

## 2021-08-03 PROCEDURE — 99214 OFFICE O/P EST MOD 30 MIN: CPT | Performed by: INTERNAL MEDICINE

## 2021-08-03 NOTE — PROGRESS NOTES
"  Subjective    uSpa Mcclelland is a 66 y.o. male. he is here today for follow-up of diabetes      HPI      Duration 10 years     Timing - Diabetes is Constant     Quality -  hyperglycemic      Severity -  moderate     Complications - peripheral neuropathy and nephropathy ckd Stage III     Current symptoms/problems  none      Alleviating Factors: Compliance       Side Effects  none     Current diet  High carb     Current exercise walking     Current monitoring regimen: home blood tests - using dexcom before      Hypoglycemia with exertion     PE    /80   Pulse 92   Ht 180.3 cm (71\")   Wt 93.4 kg (206 lb)   SpO2 96%   BMI 28.73 kg/m²   AOx3  No visible goiter  Normal Respiratory Effort , Lung CTA  RRR  No Edema    Labs    Lab Results   Component Value Date    WBC 8.0 06/23/2021    HGB 16.1 06/23/2021    HCT 49.1 06/23/2021    MCV 89.8 06/23/2021     06/23/2021     Lab Results   Component Value Date    GLUCOSE 357 (H) 06/23/2021    BUN 21 06/23/2021    CREATININE 1.1 06/23/2021    EGFRIFNONA >60 06/23/2021    EGFRIFAFRI >59 06/23/2021    BCR 18.3 08/03/2020    K 4.8 06/23/2021    CO2 26 06/23/2021    CALCIUM 10.2 06/23/2021    ALBUMIN 4.6 06/23/2021    AST 76 (H) 06/23/2021    ALT 58 (H) 06/23/2021              Assessment/Plan      1. Type 2 diabetes mellitus with complication, with long-term current use of insulin (CMS/[facility])    2. Hypertension associated with diabetes (CMS/[facility])    3. Mixed diabetic hyperlipidemia associated with type 2 diabetes mellitus (CMS/[facility])    4. Diabetic polyneuropathy associated with type 2 diabetes mellitus (CMS/[facility])    5. Essential hypertension    .    Medications prescribed:    Glycemic Management:      Lab Results   Component Value Date    HGBA1C 10.9 02/03/2021      was using sensor , checking 4 x daily, 100s most of the time , range 90 to 190     Getting insulin through assistance program      tresiba 75 units in am - change to basaglar 75 units qam -- 90 - 75     " "  Humalog , no carb counting   20 w bkfast , add to lunch and supper    Jardiance 10 mg daily     Metformin xr 500 mg po BID      Trulicity 0.75 mg weekly - increase to 1.5 mg weekly --increase to 3 mg weekly     Criteria for Approval of CGM and/or Insulin Pump     The patient has diabetes mellitus, insulin-dependent.    Our Diabetes Department has evaluated the patient in the last six months and will continue counseling on insulin adjustment.     The patient performs blood glucose testing at least four times daily with proven glucose variability from 50 to 300 mg per dl.    The patient is administering basal insulin and prandial insulin four times per day for more than six months.    The patient uses a home blood glucose monitor to assess blood glucose at least four times daily for more than six months.    The patient requires frequent adjustment of insulin treatment regimen based on blood glucose readings.    The patient has frequent variability in blood glucose readings due to activity and variability in meal content and time.     The patient has completed a diabetes education program with us.    The patient has demonstrated the ability to self-monitor her glucose.     The patient is motivated in improving diabetes control           Lipid Management       is taking a statin but does not know the name    Lab Results   Component Value Date    CHOL 144 08/03/2020    TRIG 287 (H) 08/03/2020    HDL 37 (L) 08/03/2020    LDL 50 08/03/2020           Blood Pressure Management:          /80   Pulse 92   Ht 180.3 cm (71\")   Wt 93.4 kg (206 lb)   SpO2 96%   BMI 28.73 kg/m²           Microvascular Complication Monitoring:       Eye Exam Evaluation  Needs one   -----------     Last Microalbumin-Proteinuria Assessment     Lab Results   Component Value Date    MARA 14.4 01/25/2019       -----------        Neuropathy, yes on neurontin, antidepressants  Also on requip            Weight Related:       BMI - 30.1 "     Has lost 6 lbs since last visit     Decreased caloric intake            Diet interventions: moderate (500 kCal/d) deficit diet.        Bone Health     Lab Results   Component Value Date    THOU94YT 27.7 (L) 08/03/2020    KAQA41JM 20.8 (L) 01/25/2019    QAZZ85JA 26.4 (L) 08/03/2015     vit D 50 th u every 2 weeks        Thyroid Health     Lab Results   Component Value Date    TSH 2.010 08/03/2020        on levothyroxine 50 mcgs daily         Lab Results   Component Value Date    [id number] 218 08/03/2020     Polycythemia , not smoking ,was when he had infeciton     Retest            4. Follow-up: No follow-ups on file.

## 2021-08-05 ENCOUNTER — DOCUMENTATION (OUTPATIENT)
Dept: ENDOCRINOLOGY | Facility: CLINIC | Age: 66
End: 2021-08-05

## 2021-10-04 NOTE — TELEPHONE ENCOUNTER
Pts wife needs to talk to Pretty about husbands insulin was all the VM said.  [phone]    Thank You   Post-Care Instructions: I reviewed with the patient in detail post-care instructions. The patient understands that the treated areas should be washed off 3-4 hours after application. Cantharone Forte Duration Text (Please Remove Duration From Postcare): The patient was instructed to leave the Cantharone Forte on for 6-8 hours and then wash the area well with soap and water. Strength: Taylor Curette Text: Prior to application of cantharidin the lesions were lightly pared with a curette. Add 52 Modifier (Optional): no Medical Necessity Information: It is in your best interest to select a reason for this procedure from the list below. All of these items fulfill various CMS LCD requirements except the new and changing color options. Canthacur Ps Duration Text (Please Remove Duration From Postcare): The patient was instructed to leave the Canthacur PS on for 6-8 hours and then wash the area well with soap and water. Detail Level: Detailed Cantharone Plus Duration Text (Please Remove Duration From Postcare): The patient was instructed to leave the Cantharone Plus on for 6-8 hours and then wash the area well with soap and water. Canthacur Duration Text (Please Remove Duration From Postcare): The patient was instructed to leave the Canthacur on for 6-8 hours and then wash the area well with soap and water. Medical Necessity Clause: This procedure was medically necessary because the lesions that were treated were: Consent: The patient's consent was obtained including but not limited to risks of crusting, scabbing, scarring, blistering, darker or lighter pigmentary change, recurrence, incomplete removal and infection. Cantharone Duration Text (Please Remove Duration From Postcare): The patient was instructed to leave the Cantharone on for 6-8 hours and then wash the area well with soap and water.

## 2021-10-20 ENCOUNTER — HOSPITAL ENCOUNTER (OUTPATIENT)
Dept: INFUSION THERAPY | Age: 66
Discharge: HOME | End: 2021-10-20
Payer: MEDICARE

## 2021-10-20 DIAGNOSIS — D47.2 MGUS (MONOCLONAL GAMMOPATHY OF UNKNOWN SIGNIFICANCE): ICD-10-CM

## 2021-10-20 LAB
ALBUMIN SERPL-MCNC: 4.8 G/DL (ref 3.5–5.2)
ALP BLD-CCNC: 104 U/L (ref 40–130)
ALT SERPL-CCNC: 89 U/L (ref 21–72)
ANION GAP SERPL CALCULATED.3IONS-SCNC: 16 MMOL/L (ref 7–19)
AST SERPL-CCNC: 153 U/L (ref 17–59)
BASOPHILS ABSOLUTE: 0.02 K/UL (ref 0.01–0.08)
BASOPHILS RELATIVE PERCENT: 0.3 % (ref 0.1–1.2)
BILIRUB SERPL-MCNC: 0.6 MG/DL (ref 0.2–1.3)
BUN BLDV-MCNC: 25 MG/DL (ref 9–20)
CALCIUM SERPL-MCNC: 9.6 MG/DL (ref 8.4–10.2)
CHLORIDE BLD-SCNC: 103 MMOL/L (ref 98–111)
CO2: 22 MMOL/L (ref 22–29)
CREAT SERPL-MCNC: 1.4 MG/DL (ref 0.6–1.2)
EOSINOPHILS ABSOLUTE: 0.36 K/UL (ref 0.04–0.54)
EOSINOPHILS RELATIVE PERCENT: 4.7 % (ref 0.7–7)
GFR NON-AFRICAN AMERICAN: 51
GLOBULIN: 3.5 G/DL
GLUCOSE BLD-MCNC: 292 MG/DL (ref 74–106)
HCT VFR BLD CALC: 48.7 % (ref 40.1–51)
HEMOGLOBIN: 16.4 G/DL (ref 13.7–17.5)
LYMPHOCYTES ABSOLUTE: 2.15 K/UL (ref 1.18–3.74)
LYMPHOCYTES RELATIVE PERCENT: 28.4 % (ref 19.3–53.1)
MCH RBC QN AUTO: 29.8 PG (ref 25.7–32.2)
MCHC RBC AUTO-ENTMCNC: 33.7 G/DL (ref 32.3–36.5)
MCV RBC AUTO: 88.5 FL (ref 79–92.2)
MONOCYTES ABSOLUTE: 0.97 K/UL (ref 0.24–0.82)
MONOCYTES RELATIVE PERCENT: 12.8 % (ref 4.7–12.5)
NEUTROPHILS ABSOLUTE: 4.08 K/UL (ref 1.56–6.13)
NEUTROPHILS RELATIVE PERCENT: 53.8 % (ref 34–71.1)
PDW BLD-RTO: 13.4 % (ref 11.6–14.4)
PLATELET # BLD: 190 K/UL (ref 163–337)
PMV BLD AUTO: 11.3 FL (ref 7.4–10.4)
POTASSIUM SERPL-SCNC: 4.2 MMOL/L (ref 3.5–5.1)
RBC # BLD: 5.5 M/UL (ref 4.63–6.08)
SODIUM BLD-SCNC: 141 MMOL/L (ref 137–145)
TOTAL PROTEIN: 8.3 G/DL (ref 6.3–8.2)
WBC # BLD: 7.58 K/UL (ref 4.23–9.07)

## 2021-10-20 PROCEDURE — 85025 COMPLETE CBC W/AUTO DIFF WBC: CPT

## 2021-10-20 PROCEDURE — 80053 COMPREHEN METABOLIC PANEL: CPT

## 2021-10-26 ENCOUNTER — OFFICE VISIT (OUTPATIENT)
Dept: CARDIOLOGY CLINIC | Age: 66
End: 2021-10-26
Payer: MEDICARE

## 2021-10-26 VITALS
HEIGHT: 71 IN | WEIGHT: 207 LBS | DIASTOLIC BLOOD PRESSURE: 80 MMHG | BODY MASS INDEX: 28.98 KG/M2 | HEART RATE: 94 BPM | OXYGEN SATURATION: 97 % | SYSTOLIC BLOOD PRESSURE: 132 MMHG

## 2021-10-26 DIAGNOSIS — I51.89 DIASTOLIC DYSFUNCTION: ICD-10-CM

## 2021-10-26 DIAGNOSIS — D47.2 MGUS (MONOCLONAL GAMMOPATHY OF UNKNOWN SIGNIFICANCE): Primary | ICD-10-CM

## 2021-10-26 DIAGNOSIS — D75.1 ERYTHROCYTOSIS: ICD-10-CM

## 2021-10-26 DIAGNOSIS — I10 ESSENTIAL HYPERTENSION: Primary | ICD-10-CM

## 2021-10-26 DIAGNOSIS — D58.2 ELEVATED HEMOGLOBIN (HCC): ICD-10-CM

## 2021-10-26 DIAGNOSIS — R05.9 COUGH: ICD-10-CM

## 2021-10-26 DIAGNOSIS — D89.89 KAPPA LIGHT CHAIN DISEASE (HCC): ICD-10-CM

## 2021-10-26 DIAGNOSIS — I51.9 LV DYSFUNCTION: ICD-10-CM

## 2021-10-26 DIAGNOSIS — E78.5 HYPERLIPIDEMIA, UNSPECIFIED HYPERLIPIDEMIA TYPE: ICD-10-CM

## 2021-10-26 PROCEDURE — 1036F TOBACCO NON-USER: CPT | Performed by: NURSE PRACTITIONER

## 2021-10-26 PROCEDURE — 93000 ELECTROCARDIOGRAM COMPLETE: CPT | Performed by: NURSE PRACTITIONER

## 2021-10-26 PROCEDURE — 4040F PNEUMOC VAC/ADMIN/RCVD: CPT | Performed by: NURSE PRACTITIONER

## 2021-10-26 PROCEDURE — G8417 CALC BMI ABV UP PARAM F/U: HCPCS | Performed by: NURSE PRACTITIONER

## 2021-10-26 PROCEDURE — G8427 DOCREV CUR MEDS BY ELIG CLIN: HCPCS | Performed by: NURSE PRACTITIONER

## 2021-10-26 PROCEDURE — 3017F COLORECTAL CA SCREEN DOC REV: CPT | Performed by: NURSE PRACTITIONER

## 2021-10-26 PROCEDURE — 99214 OFFICE O/P EST MOD 30 MIN: CPT | Performed by: NURSE PRACTITIONER

## 2021-10-26 PROCEDURE — G8484 FLU IMMUNIZE NO ADMIN: HCPCS | Performed by: NURSE PRACTITIONER

## 2021-10-26 PROCEDURE — 1123F ACP DISCUSS/DSCN MKR DOCD: CPT | Performed by: NURSE PRACTITIONER

## 2021-10-26 NOTE — PATIENT INSTRUCTIONS
Stop Lisinopril to see if cough improves     Hypertension  (High Blood Pressure)  Most people with high blood pressure (hypertension) have no symptoms. High blood pressure can be a dangerous problem. Hypertension is dangerous because you may have it and not know it. High blood pressure may mean that your heart needs to work harder to pump blood. Your blood pressure is measured with 2 numbers. The first number is when your heart flexes (contracts), and the second number is when your heart relaxes. The higher the numbers are, the more you are at risk for problems. Write down your blood pressure today. The best blood pressure for adults is 120/80 (mmHg) or lower. It is likely that your blood pressure was recorded at least 2 times today. It is important for you to give these numbers to your doctor. If you do not have a doctor, try to get follow-up care at a hospital or community clinic. You may need to start high blood pressure medicine. You may also need to adjust your medicines as told by your doctor. Even mild high blood pressure increases long-term health risks. One high reading does not mean you have hypertension. · Your blood pressure should be taken when you are relaxed. It is also important to sit for about 10 minutes before being tested. · Things that can increase your blood pressure are:  Injury, Illness, Stress, Caffeine, and some medicines (like decongestants). · High blood pressure does not usually need emergency treatment. HOME CARE  · Lifestyle and medicine changes may be needed, including:   · Weight loss. · Exercise. · Limit the use of salt. · Stop smoking. · If using decongestants or birth control pills, talk to your doctor. These medicines might make blood pressure higher. · Do not use street drugs. · Females should not drink more than 1 alcoholic drink per day. Males should not drink more than 2 alcoholic drinks per day. · Take your blood pressure medicine.  You will need to take it every day. If you do not get treated, there are risks, including:   · Heart disease. · Stroke. · Kidney failure. · See your doctor as told. GET HELP RIGHT AWAY IF:  · You get a very bad headache. · You get blurred or changing vision. · You feel confused. · You feel weak, numb, or faint. · You get chest or belly (abdominal) pain. · You throw up (vomit). · You cannot breathe very well. If you have a blood pressure reading with a top number of 180 or higher, you need to see your doctor right away. This is especially true if you are having any of the problems listed above. Hyperlipidemia   (Dyslipidemia; High Triglycerides; Triglycerides, High)     Definition   Hyperlipidemia is a high level of fats in the blood. These fats, called lipids, include cholesterol and triglycerides. There are five types of hyperlipidemia. The type depends on which lipid in the blood is high. Causes   Causes may include:   A family history of hyperlipidemia   A diet high in total fat, saturated fat, or cholesterol   Obesity   Excess alcohol intake   Certain conditions, including:   Diabetes   Low thyroid   Kidney problems   Liver disease   Cushing's syndrome   Certain drugs, such as:   Hormones or birth control pills   Beta-blockers   Some diuretics   Cortisone drugs   Isotretinoin (for acne )   Some anti- HIV drugs   Risk Factors   These factors increase your chance of developing this condition. Tell your doctor if you have any of these:   Advancing age   Sex: male   Postmenopause   Lack of exercise   Smoking   Stress   Overuse of alcohol   Symptoms   Hyperlipidemia usually does not cause symptoms.  Very high levels of lipids or triglycerides can cause:   Fat deposits in the skin or tendons ( xanthomas )   Pain, enlargement, or swelling of organs such as the liver, spleen, or pancreas ( pancreatitis )   Obstruction of blood vessels in heart and brain   Hyperlipidemia can increase your risk of atherosclerosis . This is a dangerous hardening of the arteries. It can end up blocking blood flow. In some cases, this may result in:   Angina   Heart attack   Stroke   Other serious complications   Blood Vessel with Atherosclerosis       2011 [facility].   Diagnosis   This condition is diagnosed with blood tests. These tests measure the levels of lipids in the blood. The National Cholesterol Education Program advises that you have your lipids checked at least once every five years, starting at age 21. Also, the American Academy of Pediatrics recommends lipid screening for children at risk (eg, a family history of hyperlipidemia). Testing may consist of a fasting blood test for:   Total cholesterol   LDL (bad cholesterol)   HDL (good cholesterol)   Triglycerides   Your doctor may recommend more frequent or earlier testing if you have:   Family history of hyperlipidemia   Risk factor or disease that may cause hyperlipidemia   Complication that may result from hyperlipidemia   Treatment   Treatment is not only aimed at correcting your cholesterol levels, but also at lowering your overall risk for heart disease and strokes. Diet Changes   Eat a diet low in total fat, saturated fat, and cholesterol . Reduce or eliminate the amount of alcohol you drink. Eat more high-fiber foods. Lifestyle Changes   If you are overweight, lose weight . If you smoke, quit . Exercise regularly . Talk to you doctor before starting an exercise program. Nanci Cruz may already have hardening of the arteries or heart disease. These conditions increase your risk of having a heart attack while exercising. Make sure other medical conditions such as high blood pressure and diabetes are being treated and controlled. Medications   There are a number of drugs available, such as statins , to treat this condition and help lower your risk for heart disease. Talk to your doctor.  Statins have been shown to reduce mortality

## 2021-10-26 NOTE — Clinical Note
Can you see if we can get approval for Repatha? Multiple risk factors for CAD including diabetes and high blood pressure. Unable to do a statin due to elevated liver enzymes.

## 2021-10-26 NOTE — PROGRESS NOTES
MG/0.5ML SOPN, Inject 1.5 mg into the skin once a week, Disp: , Rfl:     gemfibrozil (LOPID) 600 MG tablet, Take 600 mg by mouth 2 times daily (before meals), Disp: , Rfl:     insulin glargine (BASAGLAR KWIKPEN) 100 UNIT/ML injection pen, Inject 80 Units into the skin daily , Disp: , Rfl:     Cholecalciferol (VITAMIN D3) 1.25 MG (88478 UT) CAPS, Take 50,000 Units by mouth every 7 days , Disp: , Rfl:     carBAMazepine (CARBATROL) 200 MG extended release capsule, Take 200 mg by mouth 2 times daily, Disp: , Rfl:     fluticasone (FLONASE) 50 MCG/ACT nasal spray, 1 spray by Each Nare route 2 times daily as needed for Rhinitis, Disp: , Rfl:     Insulin Lispro (HUMALOG KWIKPEN SC), Inject 20 Units into the skin every morning (before breakfast) , Disp: , Rfl:     loratadine (CLARITIN) 10 MG tablet, Take 10 mg by mouth daily as needed, Disp: , Rfl:     metFORMIN (GLUCOPHAGE-XR) 500 MG extended release tablet, Take 500 mg by mouth 2 times daily, Disp: , Rfl:     gabapentin (NEURONTIN) 800 MG tablet, Take 800 mg by mouth 2 times daily. ., Disp: , Rfl:     hydrochlorothiazide (HYDRODIURIL) 25 MG tablet, Take 12.5 mg by mouth daily, Disp: , Rfl:     levothyroxine (SYNTHROID) 50 MCG tablet, Take 50 mcg by mouth Daily, Disp: , Rfl:     famotidine (PEPCID) 20 MG tablet, Take 20 mg by mouth daily , Disp: , Rfl:     citalopram (CELEXA) 20 MG tablet, Take 20 mg by mouth nightly, Disp: , Rfl:    Allergies: Allergies   Allergen Reactions    Penicillins      Social History     Tobacco Use    Smoking status: Never Smoker    Smokeless tobacco: Never Used   Vaping Use    Vaping Use: Never assessed   Substance Use Topics    Alcohol use: No    Drug use: No     No family history on file.   Health Maintenance   Topic Date Due    Hepatitis C screen  Never done    Diabetic foot exam  Never done    Diabetic retinal exam  Never done    Lipid screen  Never done    COVID-19 Vaccine (1) Never done    Diabetic microalbuminuria test  Never done    DTaP/Tdap/Td vaccine (1 - Tdap) Never done    Colon cancer screen colonoscopy  Never done    Shingles Vaccine (1 of 2) Never done    Pneumococcal 65+ years Vaccine (1 of 1 - PPSV23) Never done    A1C test (Diabetic or Prediabetic)  12/27/2019    Annual Wellness Visit (AWV)  Never done    Flu vaccine (1) 09/01/2021    Potassium monitoring  10/20/2022    Creatinine monitoring  10/20/2022    Hepatitis A vaccine  Aged Out    Hib vaccine  Aged Out    Meningococcal (ACWY) vaccine  Aged Out     Subjective   Review of Systems   Constitutional: Negative for fatigue and fever. HENT: Negative for dental problem, hearing loss, mouth sores, nosebleeds, sore throat and trouble swallowing. Eyes: Negative for discharge and itching. Respiratory: Negative for cough, shortness of breath and wheezing. Cardiovascular: Negative for chest pain, palpitations and leg swelling. Gastrointestinal: Negative for abdominal pain, constipation, diarrhea, nausea and vomiting. Endocrine: Negative for cold intolerance and heat intolerance. Diabetic   Genitourinary: Negative for dysuria, frequency, hematuria and urgency. Musculoskeletal: Negative for arthralgias, joint swelling and myalgias. Skin: Negative for pallor and rash. Allergic/Immunologic: Negative for environmental allergies and immunocompromised state. Neurological: Positive for numbness. Negative for seizures and syncope. Hematological: Negative for adenopathy. Does not bruise/bleed easily. Psychiatric/Behavioral: Negative for agitation, behavioral problems and confusion. Objective   Physical Exam  Vitals reviewed. Constitutional:       General: He is not in acute distress. Appearance: He is well-developed. He is not toxic-appearing or diaphoretic. Comments: Wearing a facial mask. overweight   HENT:      Head: Normocephalic and atraumatic.       Right Ear: External ear normal.      Left Ear: External ear normal. Nose: Nose normal.      Mouth/Throat:      Mouth: Mucous membranes are moist.   Eyes:      General: No scleral icterus. Right eye: No discharge. Left eye: No discharge. Conjunctiva/sclera: Conjunctivae normal.   Neck:      Trachea: No tracheal deviation. Cardiovascular:      Rate and Rhythm: Normal rate and regular rhythm. Pulmonary:      Effort: Pulmonary effort is normal. No respiratory distress. Breath sounds: Normal breath sounds. No wheezing or rales. Abdominal:      General: Bowel sounds are normal. There is no distension. Palpations: Abdomen is soft. Tenderness: There is no abdominal tenderness. There is no guarding. Genitourinary:     Comments: Exam deferred  Musculoskeletal:         General: No tenderness or deformity. Cervical back: Neck supple. No muscular tenderness. Comments: Normal ROM all four extremities   Lymphadenopathy:      Cervical:      Right cervical: No superficial or deep cervical adenopathy. Left cervical: No superficial or deep cervical adenopathy. Upper Body:      Right upper body: No supraclavicular adenopathy. Left upper body: No supraclavicular adenopathy. Comments:      Skin:     General: Skin is warm and dry. Findings: No rash. Neurological:      Mental Status: He is alert and oriented to person, place, and time. Comments: follows commands, non-focal   Psychiatric:         Behavior: Behavior normal. Behavior is cooperative. Thought Content: Thought content normal.         Judgment: Judgment normal.      Comments: Alert and oriented to person, place and time. /62   Pulse 91   Ht 5' 11\" (1.803 m)   Wt 205 lb (93 kg)   SpO2 97%   BMI 28.59 kg/m²   Wt Readings from Last 3 Encounters:   10/27/21 205 lb (93 kg)   10/26/21 207 lb (93.9 kg)   04/28/21 208 lb 9.6 oz (94.6 kg)       ASSESSMENT/PLAN:    1. MGUS (monoclonal gammopathy of unknown significance)    2.  Erythrocytosis MGUS  Bone marrow aspirate and biopsy 12/9/2020 documented a normocellular marrow (~30-40%) with plasma cell neoplasm involving less than 5%. CMP 10/20/2021: Creatinine 1.4, GFR: 51, Total protein: 8.3, ALT, 89,     Stable, continue observation  RTC 6 months with labs prior to return    Minimally elevated H/H, stable  H/H 17.5/52.1  Yazmin Argueta denies tobacco use  He does not use exogenous testosterone  He denies any known history of sleep apnea  He reports staying well-hydrated    Labs 11/12/2020:  · Iron 107, TIBC 443, iron saturation 24%, ferritin 191  · Erythropoietin 9.9    · Negative JAK2 V617F, MPL, CALR, JAK2 exon 12 and KIT mutations on BMAB 12/9/2020    Consider overnight pulse ox/sleep study to evaluate secondary pulmonary cause      Tumor Screening:  · Colonoscopy last completed 5/27/2020 by Dr. Nabila Barnes documented poor prep, 1 asending colon polypectomy, 1 sigmoid colon polypectomy (tissue not retrieved). Recall recommended in 6 months  · PSA followed by Dr. Buckley Hallmark This Encounter   Procedures    CBC Auto Differential    Comprehensive Metabolic Panel    Beta 2 Microglobulin, Serum    Electrophoresis Protein, Serum with Reflex to Immunofixation    [place]/Lambda Free Lt Chains, Serum Quant       Return in about 6 months (around 4/27/2022) for follow up with ALISA Lo (NO CBC DAY OF APT). Labs prior to return. Plan of care discussed with Yazmin Argueta and his wife. Understandings verbalized.       ALISA Gutiérrez  8:45 AM  10/28/2021

## 2021-10-26 NOTE — PROGRESS NOTES
easily. Psychiatric/Behavioral: Negative. Past Medical History:   Diagnosis Date    Arm fracture 07/2016    left    Depression     Diabetes mellitus ([facility] [address]     Hypertension     Kidney stones     Neuropathy     Restless leg syndrome        Past Surgical History:   Procedure Laterality Date    BONE MARROW BIOPSY Right 12/9/2020    BONE MARROW ASPIRATION BIOPSY performed by ALISA Robles at [address] LITHOTRIPSY      SHOULDER SURGERY Left     Frozen shoulder surgery    UPPER GASTROINTESTINAL ENDOSCOPY         History reviewed. No pertinent family history. Social History     Socioeconomic History    Marital status:      Spouse name: Not on file    Number of children: Not on file    Years of education: Not on file    Highest education level: Not on file   Occupational History    Not on file   Tobacco Use    Smoking status: Never Smoker    Smokeless tobacco: Never Used   Vaping Use    Vaping Use: Never assessed   Substance and Sexual Activity    Alcohol use: No    Drug use: No    Sexual activity: Not on file   Other Topics Concern    Not on file   Social History Narrative    Not on file     Social Determinants of Health     Financial Resource Strain:     Difficulty of Paying Living Expenses:    Food Insecurity:     Worried About Running Out of Food in the Last Year:     [address] in the Last Year:    Transportation Needs:     Lack of Transportation (Medical):      Lack of Transportation (Non-Medical):    Physical Activity:     Days of Exercise per Week:     Minutes of Exercise per Session:    Stress:     Feeling of Stress :    Social Connections:     Frequency of Communication with Friends and Family:     Frequency of Social Gatherings with Friends and Family:     Attends Jainism Services:     Active Member of Clubs or Organizations:     Attends Club or Organization Meetings:     Marital Status:    Intimate Partner Violence:  Fear of Current or Ex-Partner:     Emotionally Abused:     Physically Abused:     Sexually Abused:         Allergies   Allergen Reactions    Penicillins          Current Outpatient Medications:     Vitamin D, Cholecalciferol, 25 MCG (1000 UT) TABS, TAKE 1 TABLET BY MOUTH ONCE DAILY FOR 30 DAYS, Disp: , Rfl:     ondansetron (ZOFRAN ODT) 4 MG disintegrating tablet, Take 1 tablet by mouth every 8 hours as needed for Nausea or Vomiting, Disp: 9 tablet, Rfl: 0    empagliflozin (JARDIANCE) 10 MG tablet, Take 10 mg by mouth daily, Disp: , Rfl:     simvastatin (ZOCOR) 40 MG tablet, Take 40 mg by mouth nightly, Disp: , Rfl:     Dulaglutide (TRULICITY) 1.5 GG/3.6TI SOPN, Inject 1.5 mg into the skin once a week, Disp: , Rfl:     gemfibrozil (LOPID) 600 MG tablet, Take 600 mg by mouth 2 times daily (before meals), Disp: , Rfl:     insulin glargine (BASAGLAR KWIKPEN) 100 UNIT/ML injection pen, Inject into the skin nightly, Disp: , Rfl:     lisinopril (PRINIVIL;ZESTRIL) 20 MG tablet, Take 1 tablet by mouth daily (Patient taking differently: Take 10 mg by mouth daily ), Disp: 90 tablet, Rfl: 3    Cholecalciferol (VITAMIN D3) 1.25 MG (21682 UT) CAPS, Take by mouth every 14 days, Disp: , Rfl:     fexofenadine (ALLEGRA) 180 MG tablet, Take 180 mg by mouth daily, Disp: , Rfl:     carBAMazepine (CARBATROL) 200 MG extended release capsule, Take 200 mg by mouth 2 times daily, Disp: , Rfl:     Dapagliflozin-Metformin HCl ER 5-500 MG TB24, Take 1 tablet by mouth daily, Disp: , Rfl:     Ertugliflozin L-PyroglutamicAc (STEGLATRO) 5 MG TABS, Take 5 mg by mouth daily , Disp: , Rfl:     Exenatide ER 2 MG/0.85ML AUIJ, Inject into the skin once a week Wednesday, Disp: , Rfl:     fluticasone (FLONASE) 50 MCG/ACT nasal spray, 1 spray by Each Nare route 2 times daily as needed for Rhinitis, Disp: , Rfl:     Insulin Degludec (TRESIBA FLEXTOUCH) 200 UNIT/ML SOPN, Inject 100 Units into the skin nightly, Disp: , Rfl:     Insulin Lispro (HUMALOG KWIKPEN SC), Inject 10 Units into the skin every morning (before breakfast) , Disp: , Rfl:     loratadine (CLARITIN) 10 MG tablet, Take 10 mg by mouth daily as needed, Disp: , Rfl:     metFORMIN (GLUCOPHAGE-XR) 500 MG extended release tablet, Take 500 mg by mouth 2 times daily, Disp: , Rfl:     omeprazole (PRILOSEC) 20 MG delayed release capsule, Take 20 mg by mouth 2 times daily, Disp: , Rfl:     gabapentin (NEURONTIN) 800 MG tablet, Take 800 mg by mouth 2 times daily. ., Disp: , Rfl:     metoprolol tartrate (LOPRESSOR) 50 MG tablet, Take 50 mg by mouth 2 times daily, Disp: , Rfl:     hydrochlorothiazide (HYDRODIURIL) 25 MG tablet, Take 12.5 mg by mouth daily, Disp: , Rfl:     levothyroxine (SYNTHROID) 50 MCG tablet, Take 50 mcg by mouth Daily, Disp: , Rfl:     famotidine (PEPCID) 20 MG tablet, Take 20 mg by mouth daily , Disp: , Rfl:     MELOXICAM PO, Take 15 mg by mouth daily, Disp: , Rfl:     citalopram (CELEXA) 20 MG tablet, Take 20 mg by mouth nightly, Disp: , Rfl:     PE:  Vitals:    10/26/21 1319   BP: 132/80   Pulse: 94   SpO2: 97%       Estimated body mass index is 28.87 kg/m² as calculated from the following:    Height as of this encounter: 5' 11\" (1.803 m). Weight as of this encounter: 207 lb (93.9 kg). Constitutional: He is oriented to person, place, and time. He appears well-developed and well-nourished in no acute distress. Neck:  Neck supple without JVD present. No trachea deviation present. No thyromegaly present. Eyes:Conjunctivae and EOM are normal. Pupils equal and reactive to light. ENT:Hearing appears normal.conjunctiva and lids are normal, ears and nose appear normal.  Cardiovascular: Normal rate, Normal rhythm, normal heart sounds. No murmur ascultated. No gallop and no friction rub. No carotid bruits. No peripheral edema. Pulmonary/Chest:  Lungs clear to auscultation bilaterally without evidence of respiratory distress. He without wheezes.  He without rales or ronchi. Musculoskeletal: Normal range of motion. Gait is normal. Head is normocephalic and atraumatic. Skin: Skin is warm and dry without rash or pallor. Psychiatric:He is alert and oriented to person, place, and time. He has a normal mood and affect. His behavior is normal. Thought content normal.     Lab Results   Component Value Date    CREATININE 1.4 10/20/2021    CREATININE 1.1 06/23/2021    CREATININE 1.3 04/21/2021    HGB 16.4 10/20/2021    HGB 16.1 06/23/2021    HGB 16.7 04/28/2021          Assessment, Recommendations, & Plan:  78 y.o. male with HTN, Diastolic dysfunction, cough, hyperlipidemia, & LV dysfunction    HTN-controlled on lisinopril, hydrochlorothiazide, & Lopressor. Monitor with stopping lisinopril    Diastolic dysfunction-controlled on lisinopril, Lopressor, & hydrochlorothiazide. Monitor with stopping lisinopril    LV dysfunction-last echo showed EF of 55%. Monitor    Cough-stop lisinopril. Monitor for improvement of cough    Hyperlipidemia-lipid panel from 10/4/2021 reviewed  Total cholesterol-226  Triglycerides-223  HDL-41  LDL-140  Unable to start a statin due to elevated liver enzymes. PCP has referred to GI. We will see if we can get approval for Repatha. Disposition - RTC in 1 month or sooner if needed      Please do not hesitate to contact me for any questions or concerns.     Sincerely yours,    ALISA Phillips

## 2021-10-27 ENCOUNTER — OFFICE VISIT (OUTPATIENT)
Dept: HEMATOLOGY | Age: 66
End: 2021-10-27
Payer: MEDICARE

## 2021-10-27 ENCOUNTER — HOSPITAL ENCOUNTER (OUTPATIENT)
Dept: INFUSION THERAPY | Age: 66
Discharge: HOME | End: 2021-10-27
Payer: MEDICARE

## 2021-10-27 VITALS
OXYGEN SATURATION: 97 % | BODY MASS INDEX: 28.7 KG/M2 | SYSTOLIC BLOOD PRESSURE: 130 MMHG | HEART RATE: 91 BPM | WEIGHT: 205 LBS | HEIGHT: 71 IN | DIASTOLIC BLOOD PRESSURE: 62 MMHG

## 2021-10-27 DIAGNOSIS — D75.1 ERYTHROCYTOSIS: ICD-10-CM

## 2021-10-27 DIAGNOSIS — D47.2 MGUS (MONOCLONAL GAMMOPATHY OF UNKNOWN SIGNIFICANCE): Primary | ICD-10-CM

## 2021-10-27 DIAGNOSIS — D47.2 MGUS (MONOCLONAL GAMMOPATHY OF UNKNOWN SIGNIFICANCE): ICD-10-CM

## 2021-10-27 DIAGNOSIS — D89.89 KAPPA LIGHT CHAIN DISEASE (HCC): ICD-10-CM

## 2021-10-27 DIAGNOSIS — D58.2 ELEVATED HEMOGLOBIN (HCC): ICD-10-CM

## 2021-10-27 LAB
BASOPHILS ABSOLUTE: 0.04 K/UL (ref 0.01–0.08)
BASOPHILS RELATIVE PERCENT: 0.5 % (ref 0.1–1.2)
EOSINOPHILS ABSOLUTE: 0.49 K/UL (ref 0.04–0.54)
EOSINOPHILS RELATIVE PERCENT: 5.8 % (ref 0.7–7)
HCT VFR BLD CALC: 52.1 % (ref 40.1–51)
HEMOGLOBIN: 17.5 G/DL (ref 13.7–17.5)
LYMPHOCYTES ABSOLUTE: 3.09 K/UL (ref 1.18–3.74)
LYMPHOCYTES RELATIVE PERCENT: 36.4 % (ref 19.3–53.1)
MCH RBC QN AUTO: 29.7 PG (ref 25.7–32.2)
MCHC RBC AUTO-ENTMCNC: 33.6 G/DL (ref 32.3–36.5)
MCV RBC AUTO: 88.5 FL (ref 79–92.2)
MONOCYTES ABSOLUTE: 0.66 K/UL (ref 0.24–0.82)
MONOCYTES RELATIVE PERCENT: 7.8 % (ref 4.7–12.5)
NEUTROPHILS ABSOLUTE: 4.22 K/UL (ref 1.56–6.13)
NEUTROPHILS RELATIVE PERCENT: 49.5 % (ref 34–71.1)
PDW BLD-RTO: 13.6 % (ref 11.6–14.4)
PLATELET # BLD: 214 K/UL (ref 163–337)
PMV BLD AUTO: 11.3 FL (ref 7.4–10.4)
RBC # BLD: 5.89 M/UL (ref 4.63–6.08)
WBC # BLD: 8.5 K/UL (ref 4.23–9.07)

## 2021-10-27 PROCEDURE — G8427 DOCREV CUR MEDS BY ELIG CLIN: HCPCS | Performed by: NURSE PRACTITIONER

## 2021-10-27 PROCEDURE — 1036F TOBACCO NON-USER: CPT | Performed by: NURSE PRACTITIONER

## 2021-10-27 PROCEDURE — 85025 COMPLETE CBC W/AUTO DIFF WBC: CPT

## 2021-10-27 PROCEDURE — 3017F COLORECTAL CA SCREEN DOC REV: CPT | Performed by: NURSE PRACTITIONER

## 2021-10-27 PROCEDURE — G8484 FLU IMMUNIZE NO ADMIN: HCPCS | Performed by: NURSE PRACTITIONER

## 2021-10-27 PROCEDURE — G8417 CALC BMI ABV UP PARAM F/U: HCPCS | Performed by: NURSE PRACTITIONER

## 2021-10-27 PROCEDURE — 4040F PNEUMOC VAC/ADMIN/RCVD: CPT | Performed by: NURSE PRACTITIONER

## 2021-10-27 PROCEDURE — 1123F ACP DISCUSS/DSCN MKR DOCD: CPT | Performed by: NURSE PRACTITIONER

## 2021-10-27 PROCEDURE — 99213 OFFICE O/P EST LOW 20 MIN: CPT | Performed by: NURSE PRACTITIONER

## 2021-10-27 PROCEDURE — 99212 OFFICE O/P EST SF 10 MIN: CPT

## 2021-10-28 ASSESSMENT — ENCOUNTER SYMPTOMS
COUGH: 0
TROUBLE SWALLOWING: 0
EYE DISCHARGE: 0
SHORTNESS OF BREATH: 0
CONSTIPATION: 0
SORE THROAT: 0
VOMITING: 0
NAUSEA: 0
ABDOMINAL PAIN: 0
WHEEZING: 0
EYE ITCHING: 0
DIARRHEA: 0

## 2021-11-01 ENCOUNTER — OFFICE VISIT (OUTPATIENT)
Dept: NEUROLOGY | Facility: CLINIC | Age: 66
End: 2021-11-01

## 2021-11-01 VITALS
OXYGEN SATURATION: 99 % | DIASTOLIC BLOOD PRESSURE: 82 MMHG | HEIGHT: 71 IN | SYSTOLIC BLOOD PRESSURE: 132 MMHG | WEIGHT: 204 LBS | HEART RATE: 87 BPM | BODY MASS INDEX: 28.56 KG/M2

## 2021-11-01 DIAGNOSIS — G25.81 RESTLESS LEGS SYNDROME (RLS): ICD-10-CM

## 2021-11-01 DIAGNOSIS — E11.42 DIABETIC POLYNEUROPATHY ASSOCIATED WITH TYPE 2 DIABETES MELLITUS (HCC): Primary | ICD-10-CM

## 2021-11-01 PROCEDURE — 99214 OFFICE O/P EST MOD 30 MIN: CPT | Performed by: PHYSICIAN ASSISTANT

## 2021-11-01 RX ORDER — ROPINIROLE 2 MG/1
2 TABLET, FILM COATED ORAL NIGHTLY
Qty: 30 TABLET | Refills: 6 | Status: SHIPPED | OUTPATIENT
Start: 2021-11-01 | End: 2022-03-03 | Stop reason: SDUPTHER

## 2021-11-01 RX ORDER — MEXILETINE HYDROCHLORIDE 150 MG/1
CAPSULE ORAL
Qty: 57 CAPSULE | Refills: 0 | Status: SHIPPED | OUTPATIENT
Start: 2021-11-01 | End: 2022-03-03

## 2021-11-01 RX ORDER — MEXILETINE HYDROCHLORIDE 150 MG/1
300 CAPSULE ORAL DAILY
Qty: 60 CAPSULE | Refills: 6 | Status: SHIPPED | OUTPATIENT
Start: 2021-11-30 | End: 2022-03-03 | Stop reason: SDUPTHER

## 2021-11-01 NOTE — PROGRESS NOTES
Subjective   Supa Mcclelland is a 66 y.o. male is here today for follow-up.    Long-term painful peripheral diabetic neuropathy patient with improvement to some degree on Neurontin.  RLS symptoms respond fairly well to ropinirole.  He continues with burning dysesthetic sensations in the feet and toes bilaterally.    Peripheral Neuropathy  This is a chronic problem. The current episode started more than 1 year ago. The problem occurs constantly. The problem has been unchanged. Associated symptoms include congestion, fatigue, numbness and weakness. The symptoms are aggravated by exertion and walking. He has tried position changes and rest (Neurontin, ropinirole) for the symptoms. The treatment provided moderate relief.        The following portions of the patient's history were reviewed and updated as appropriate: allergies, current medications, past family history, past medical history, past social history, past surgical history and problem list.    Review of Systems   Constitutional: Positive for fatigue.   HENT: Positive for congestion, hearing loss and rhinorrhea.    Eyes: Negative.    Respiratory: Positive for shortness of breath.    Gastrointestinal: Positive for GERD and indigestion.   Musculoskeletal: Positive for back pain and gait problem.   Skin: Negative.    Neurological: Positive for weakness and numbness.   Psychiatric/Behavioral: Positive for dysphoric mood and sleep disturbance.         Current Outpatient Medications:   •  cholecalciferol (D3-50) 1.25 MG (05698 UT) capsule, TAKE ONE CAPSULE BY MOUTH EVERY 2 WEEKS. (Patient taking differently: Take 50,000 Units by mouth Every 14 (Fourteen) Days. TAKE ONE CAPSULE BY MOUTH EVERY 2 WEEKS.), Disp: 2 capsule, Rfl: 0  •  citalopram (CeleXA) 20 MG tablet, Take 20 mg by mouth Daily., Disp: , Rfl:   •  Dulaglutide (Trulicity) 3 MG/0.5ML solution pen-injector, Inject 3 mg under the skin into the appropriate area as directed 1 (One) Time Per Week., Disp: 12 pen,  Rfl: 3  •  Empagliflozin (JARDIANCE) 10 MG tablet, Take 1 tablet by mouth Daily., Disp: 90 tablet, Rfl: 3  •  famotidine (PEPCID) 20 MG tablet, Take 20 mg by mouth 2 (Two) Times a Day., Disp: , Rfl:   •  fexofenadine (ALLEGRA) 180 MG tablet, Take 180 mg by mouth As Needed., Disp: , Rfl:   •  fluticasone (FLONASE) 50 MCG/ACT nasal spray, 1 spray each nostril twice a day for three days, then daily., Disp: 1 bottle, Rfl: 0  •  gabapentin (NEURONTIN) 800 MG tablet, TAKE 1 TABLET BY MOUTH THREE TIMES DAILY (Patient taking differently: Take 800 mg by mouth 3 (Three) Times a Day.), Disp: 270 tablet, Rfl: 0  •  gemfibrozil (LOPID) 600 MG tablet, Take 600 mg by mouth 2 (Two) Times a Day Before Meals., Disp: , Rfl:   •  hydrochlorothiazide (HYDRODIURIL) 12.5 MG tablet, Take 12.5 mg by mouth Daily., Disp: , Rfl:   •  Insulin Glargine (BASAGLAR KWIKPEN) 100 UNIT/ML injection pen, 75 units qam, Disp: 8 pen, Rfl: 11  •  Insulin Lispro (HumaLOG KwikPen) 200 UNIT/ML solution pen-injector, Inject 20 Units under the skin into the appropriate area as directed 3 (Three) Times a Day With Meals., Disp: 18 pen, Rfl: 11  •  levothyroxine (SYNTHROID, LEVOTHROID) 50 MCG tablet, Take 1 tablet by mouth Daily., Disp: , Rfl:   •  loratadine (CLARITIN) 10 MG tablet, Take 10 mg by mouth Daily., Disp: , Rfl:   •  meloxicam (MOBIC) 15 MG tablet, Take 1 tablet by mouth Daily., Disp: , Rfl:   •  metFORMIN ER (GLUCOPHAGE-XR) 500 MG 24 hr tablet, Take 1 tablet by mouth 2 (two) times a day. TAKE 1 TABLET BY MOUTH TWICE DAILY WITH MEALS, Disp: 180 tablet, Rfl: 3  •  metoprolol tartrate (LOPRESSOR) 50 MG tablet, Take 50 mg by mouth 2 (Two) Times a Day., Disp: , Rfl:   •  omeprazole (priLOSEC) 20 MG capsule, Take 1 capsule by mouth 2 (Two) Times a Day., Disp: 60 capsule, Rfl: 11  •  potassium citrate (UROCIT-K) 10 MEQ (1080 MG) CR tablet, Take 1 tablet by mouth 3 (Three) Times a Day With Meals., Disp: 90 tablet, Rfl: 11  •  RELION PEN NEEDLES 31G X 6 MM  misc, USE AS DIRECTED 4 TIMES DAILY, Disp: 150 each, Rfl: 11  •  zolpidem (AMBIEN) 10 MG tablet, Take 10 mg by mouth At Night As Needed., Disp: , Rfl:   •  mexiletine (MEXITIL) 150 MG capsule, Take 1 capsule by mouth Daily for 3 days, THEN 2 capsules Daily for 27 days., Disp: 57 capsule, Rfl: 0  •  [START ON 11/30/2021] mexiletine (MEXITIL) 150 MG capsule, Take 2 capsules by mouth Daily., Disp: 60 capsule, Rfl: 6  •  rOPINIRole (REQUIP) 2 MG tablet, Take 1 tablet by mouth Every Night. Take 1 hour before bedtime., Disp: 30 tablet, Rfl: 6     Objective   Physical Exam  Vitals and nursing note reviewed.   HENT:      Head: Normocephalic.      Right Ear: External ear normal. Decreased hearing noted.      Left Ear: External ear normal. Decreased hearing noted.   Eyes:      General: Lids are normal. Vision grossly intact. Gaze aligned appropriately. No scleral icterus.     Extraocular Movements: Extraocular movements intact.      Conjunctiva/sclera: Conjunctivae normal.      Pupils: Pupils are equal, round, and reactive to light.   Neck:      Vascular: No JVD.      Trachea: Trachea and phonation normal.   Cardiovascular:      Rate and Rhythm: Normal rate.      Heart sounds: Normal heart sounds.   Pulmonary:      Effort: Pulmonary effort is normal.      Breath sounds: Normal breath sounds.   Skin:     General: Skin is warm and dry.   Neurological:      Mental Status: He is alert and oriented to person, place, and time.      GCS: GCS eye subscore is 4. GCS verbal subscore is 5. GCS motor subscore is 6.      Cranial Nerves: Cranial nerves are intact.      Sensory: Sensory deficit present.      Motor: Weakness present.      Coordination: Coordination is intact.      Gait: Gait abnormal.      Deep Tendon Reflexes:      Reflex Scores:       Tricep reflexes are 1+ on the right side and 1+ on the left side.       Bicep reflexes are 1+ on the right side and 1+ on the left side.       Brachioradialis reflexes are 1+ on the right  side and 1+ on the left side.       Patellar reflexes are 1+ on the right side and 1+ on the left side.       Achilles reflexes are 1+ on the right side and 1+ on the left side.     Comments: Diminished peripheral sensation in the lower extremities consistent with diabetic peripheral neuropathy    Psychiatric:         Attention and Perception: Attention and perception normal.         Mood and Affect: Mood and affect normal.         Speech: Speech normal.         Behavior: Behavior normal.         Thought Content: Thought content normal.         Cognition and Memory: Cognition and memory normal.         Judgment: Judgment normal.           Assessment/Plan   Diagnoses and all orders for this visit:    1. Diabetic polyneuropathy associated with type 2 diabetes mellitus (HCC) (Primary)  -     mexiletine (MEXITIL) 150 MG capsule; Take 1 capsule by mouth Daily for 3 days, THEN 2 capsules Daily for 27 days.  Dispense: 57 capsule; Refill: 0  -     mexiletine (MEXITIL) 150 MG capsule; Take 2 capsules by mouth Daily.  Dispense: 60 capsule; Refill: 6    2. Restless legs syndrome (RLS)  -     rOPINIRole (REQUIP) 2 MG tablet; Take 1 tablet by mouth Every Night. Take 1 hour before bedtime.  Dispense: 30 tablet; Refill: 6      Will add mexiletine to his regimen beginning at 150 mg daily then increasing to 300 mg daily and then proceeding depending on his response.  He will contact us in 2 weeks with a status report.    Otherwise no change medications today.    The patient's questions and concerns as well as expectations regarding therapeutic measures are reviewed in detail with the patient and a follow-up appointment is scheduled.                 Dictated utilizing Dragon dictation.

## 2021-11-16 DIAGNOSIS — R74.8 LIVER ENZYME ELEVATION: ICD-10-CM

## 2021-11-16 DIAGNOSIS — E78.5 HYPERLIPIDEMIA, UNSPECIFIED HYPERLIPIDEMIA TYPE: Primary | ICD-10-CM

## 2021-11-16 RX ORDER — EVOLOCUMAB 140 MG/ML
1 INJECTION, SOLUTION SUBCUTANEOUS
Qty: 2 PEN | Refills: 11 | Status: SHIPPED | OUTPATIENT
Start: 2021-11-16

## 2021-11-19 ENCOUNTER — TELEPHONE (OUTPATIENT)
Dept: CARDIOLOGY CLINIC | Age: 66
End: 2021-11-19

## 2021-11-26 DIAGNOSIS — E11.42 DIABETIC POLYNEUROPATHY ASSOCIATED WITH TYPE 2 DIABETES MELLITUS (HCC): ICD-10-CM

## 2021-11-26 DIAGNOSIS — G25.81 RESTLESS LEGS SYNDROME (RLS): ICD-10-CM

## 2021-11-29 RX ORDER — GABAPENTIN 800 MG/1
TABLET ORAL
Qty: 270 TABLET | Refills: 0 | Status: SHIPPED | OUTPATIENT
Start: 2021-11-29 | End: 2022-03-03 | Stop reason: SDUPTHER

## 2021-11-30 ENCOUNTER — OFFICE VISIT (OUTPATIENT)
Dept: CARDIOLOGY CLINIC | Age: 66
End: 2021-11-30
Payer: MEDICARE

## 2021-11-30 VITALS
WEIGHT: 205 LBS | OXYGEN SATURATION: 98 % | SYSTOLIC BLOOD PRESSURE: 134 MMHG | HEIGHT: 71 IN | HEART RATE: 83 BPM | BODY MASS INDEX: 28.7 KG/M2 | DIASTOLIC BLOOD PRESSURE: 80 MMHG

## 2021-11-30 DIAGNOSIS — I10 ESSENTIAL HYPERTENSION: Primary | ICD-10-CM

## 2021-11-30 DIAGNOSIS — I51.9 LV DYSFUNCTION: ICD-10-CM

## 2021-11-30 DIAGNOSIS — E78.5 HYPERLIPIDEMIA, UNSPECIFIED HYPERLIPIDEMIA TYPE: ICD-10-CM

## 2021-11-30 DIAGNOSIS — I51.89 DIASTOLIC DYSFUNCTION: ICD-10-CM

## 2021-11-30 PROCEDURE — G8484 FLU IMMUNIZE NO ADMIN: HCPCS | Performed by: NURSE PRACTITIONER

## 2021-11-30 PROCEDURE — G8417 CALC BMI ABV UP PARAM F/U: HCPCS | Performed by: NURSE PRACTITIONER

## 2021-11-30 PROCEDURE — 1123F ACP DISCUSS/DSCN MKR DOCD: CPT | Performed by: NURSE PRACTITIONER

## 2021-11-30 PROCEDURE — G8427 DOCREV CUR MEDS BY ELIG CLIN: HCPCS | Performed by: NURSE PRACTITIONER

## 2021-11-30 PROCEDURE — 99214 OFFICE O/P EST MOD 30 MIN: CPT | Performed by: NURSE PRACTITIONER

## 2021-11-30 PROCEDURE — 1036F TOBACCO NON-USER: CPT | Performed by: NURSE PRACTITIONER

## 2021-11-30 PROCEDURE — 3017F COLORECTAL CA SCREEN DOC REV: CPT | Performed by: NURSE PRACTITIONER

## 2021-11-30 PROCEDURE — 4040F PNEUMOC VAC/ADMIN/RCVD: CPT | Performed by: NURSE PRACTITIONER

## 2021-11-30 NOTE — PROGRESS NOTES
Dear  Juanjose Ventura, APRN - CNP,    Thank you for allowing me to participate in the care of Mr. Dandre Pena. He presents today at [facility] As you know, Mr. Armida Kumar is a 78 y.o. male with history of hypertension, hyperlipidemia, diabetes, hypothyroidism,  and neuropathy, who presents with the chief complaint of follow-up for chronic cardiac conditions. He is a patient of Dr. Ina Jiménez. Last visit, lisinopril was stopped due to a cough. His cough is 50% improved. His blood pressure stays stable off the lisinopril. He denies any exertional chest pain or shortness of breath. He denies any fast or slow heart rhythms. he is sleeping on 1 pillow at night. he is not waking gasping for air. he denies any swelling. he has been compliant with his medications. his BP has been controlled. PCP follows labs and lipids. He otherwise denies chest pain, SOA, WEBB, PND, orthopnea, syncope or near syncope. He has no other complaints. Review of Systems   Constitutional: Negative for fever, chills, diaphoresis, activity change, appetite change, fatigue and unexpected weight change. Eyes: Negative for photophobia, pain, redness and visual disturbance. Respiratory: Positive for cough (improved). Negative for apnea, chest tightness, shortness of breath, wheezing and stridor. Cardiovascular: Negative for chest pain, palpitations and leg swelling. Gastrointestinal: Negative for abdominal distention. Genitourinary: Negative for dysuria, urgency and frequency. Musculoskeletal: Negative for myalgias, arthralgias and gait problem. Skin: Negative for color change, pallor, rash and wound. Neurological: Negative for dizziness, tremors, speech difficulty, weakness and numbness. Hematological: Does not bruise/bleed easily. Psychiatric/Behavioral: Negative.         Past Medical History:   Diagnosis Date    Arm fracture 07/2016    left    Depression     Diabetes mellitus ([facility] [address]     Hypertension     Kidney stones     Emotionally Abused: Not on file    Physically Abused: Not on file    Sexually Abused: Not on file   Housing Stability:     Unable to Pay for Housing in the Last Year: Not on file    Number of Places Lived in the Last Year: Not on file    Unstable Housing in the Last Year: Not on file       Allergies   Allergen Reactions    Penicillins          Current Outpatient Medications:     ondansetron (ZOFRAN ODT) 4 MG disintegrating tablet, Take 1 tablet by mouth every 8 hours as needed for Nausea or Vomiting, Disp: 9 tablet, Rfl: 0    empagliflozin (JARDIANCE) 10 MG tablet, Take 10 mg by mouth daily, Disp: , Rfl:     Dulaglutide (TRULICITY) 1.5 AD/0.5PN SOPN, Inject 1.5 mg into the skin once a week, Disp: , Rfl:     gemfibrozil (LOPID) 600 MG tablet, Take 600 mg by mouth 2 times daily (before meals), Disp: , Rfl:     insulin glargine (BASAGLAR KWIKPEN) 100 UNIT/ML injection pen, Inject 80 Units into the skin daily , Disp: , Rfl:     Cholecalciferol (VITAMIN D3) 1.25 MG (13778 UT) CAPS, Take 50,000 Units by mouth every 7 days , Disp: , Rfl:     carBAMazepine (CARBATROL) 200 MG extended release capsule, Take 200 mg by mouth 2 times daily, Disp: , Rfl:     fluticasone (FLONASE) 50 MCG/ACT nasal spray, 1 spray by Each Nare route 2 times daily as needed for Rhinitis, Disp: , Rfl:     Insulin Lispro (HUMALOG KWIKPEN SC), Inject 20 Units into the skin every morning (before breakfast) , Disp: , Rfl:     loratadine (CLARITIN) 10 MG tablet, Take 10 mg by mouth daily as needed, Disp: , Rfl:     metFORMIN (GLUCOPHAGE-XR) 500 MG extended release tablet, Take 500 mg by mouth 2 times daily, Disp: , Rfl:     gabapentin (NEURONTIN) 800 MG tablet, Take 800 mg by mouth 2 times daily.  ., Disp: , Rfl:     hydrochlorothiazide (HYDRODIURIL) 25 MG tablet, Take 12.5 mg by mouth daily, Disp: , Rfl:     levothyroxine (SYNTHROID) 50 MCG tablet, Take 50 mcg by mouth Daily, Disp: , Rfl:     famotidine (PEPCID) 20 MG tablet, Take 20 mg by mouth daily , Disp: , Rfl:     citalopram (CELEXA) 20 MG tablet, Take 20 mg by mouth nightly, Disp: , Rfl:     Evolocumab (REPATHA SURECLICK) 738 MG/ML SOAJ, Inject 1 mL into the skin every 14 days (Patient not taking: Reported on 11/30/2021), Disp: 2 pen, Rfl: 11    PE:  Vitals:    11/30/21 1357   BP: 134/80   Pulse: 83   SpO2: 98%       Estimated body mass index is 28.59 kg/m² as calculated from the following:    Height as of this encounter: 5' 11\" (1.803 m). Weight as of this encounter: 205 lb (93 kg). Constitutional: He is oriented to person, place, and time. He appears well-developed and well-nourished in no acute distress. Neck:  Neck supple without JVD present. No trachea deviation present. No thyromegaly present. Eyes:Conjunctivae and EOM are normal. Pupils equal and reactive to light. ENT:Hearing appears normal.conjunctiva and lids are normal, ears and nose appear normal.  Cardiovascular: Normal rate, Normal rhythm, normal heart sounds. No murmur ascultated. No gallop and no friction rub. No carotid bruits. No peripheral edema. Pulmonary/Chest:  Lungs clear to auscultation bilaterally without evidence of respiratory distress. He without wheezes. He without rales or ronchi. Musculoskeletal: Normal range of motion. Gait is normal. Head is normocephalic and atraumatic. Skin: Skin is warm and dry without rash or pallor. Psychiatric:He is alert and oriented to person, place, and time. He has a normal mood and affect. His behavior is normal. Thought content normal.     Lab Results   Component Value Date    CREATININE 1.4 10/20/2021    CREATININE 1.1 06/23/2021    CREATININE 1.3 04/21/2021    HGB 17.5 10/27/2021    HGB 16.4 10/20/2021    HGB 16.1 06/23/2021          Assessment, Recommendations, & Plan:  78 y.o. male with HTN, Diastolic dysfunction, cough, hyperlipidemia, & LV dysfunction    HTN-controlled on hydrochlorothiazide, & Lopressor.   Continue current regimen    Diastolic dysfunction-controlled on  Lopressor, & hydrochlorothiazide. Continue current regimen    LV dysfunction-last echo showed EF of 55%. Monitor    Cough-improved since stopping lisinopril. Continue to monitor    Hyperlipidemia-lipid panel from 10/4/2021 reviewed  Total cholesterol-226  Triglycerides-223  HDL-41  LDL-140  Unable to start a statin due to elevated liver enzymes. He is still waiting to see if insurance will approve and what his co-pays will be. Disposition - RTC in 6 months with Dr. Nickie Eduardo or sooner if needed      Please do not hesitate to contact me for any questions or concerns.     Sincerely yours,    ALISA Quintana

## 2021-11-30 NOTE — PATIENT INSTRUCTIONS
Hypertension  (High Blood Pressure)  Most people with high blood pressure (hypertension) have no symptoms. High blood pressure can be a dangerous problem. Hypertension is dangerous because you may have it and not know it. High blood pressure may mean that your heart needs to work harder to pump blood. Your blood pressure is measured with 2 numbers. The first number is when your heart flexes (contracts), and the second number is when your heart relaxes. The higher the numbers are, the more you are at risk for problems. Write down your blood pressure today. The best blood pressure for adults is 120/80 (mmHg) or lower. It is likely that your blood pressure was recorded at least 2 times today. It is important for you to give these numbers to your doctor. If you do not have a doctor, try to get follow-up care at a hospital or community clinic. You may need to start high blood pressure medicine. You may also need to adjust your medicines as told by your doctor. Even mild high blood pressure increases long-term health risks. One high reading does not mean you have hypertension. · Your blood pressure should be taken when you are relaxed. It is also important to sit for about 10 minutes before being tested. · Things that can increase your blood pressure are:  Injury, Illness, Stress, Caffeine, and some medicines (like decongestants). · High blood pressure does not usually need emergency treatment. HOME CARE  · Lifestyle and medicine changes may be needed, including:   · Weight loss. · Exercise. · Limit the use of salt. · Stop smoking. · If using decongestants or birth control pills, talk to your doctor. These medicines might make blood pressure higher. · Do not use street drugs. · Females should not drink more than 1 alcoholic drink per day. Males should not drink more than 2 alcoholic drinks per day. · Take your blood pressure medicine. You will need to take it every day.  If you do not get treated, there are risks, including:   · Heart disease. · Stroke. · Kidney failure. · See your doctor as told. GET HELP RIGHT AWAY IF:  · You get a very bad headache. · You get blurred or changing vision. · You feel confused. · You feel weak, numb, or faint. · You get chest or belly (abdominal) pain. · You throw up (vomit). · You cannot breathe very well. If you have a blood pressure reading with a top number of 180 or higher, you need to see your doctor right away. This is especially true if you are having any of the problems listed above. Hyperlipidemia   (Dyslipidemia; High Triglycerides; Triglycerides, High)     Definition   Hyperlipidemia is a high level of fats in the blood. These fats, called lipids, include cholesterol and triglycerides. There are five types of hyperlipidemia. The type depends on which lipid in the blood is high. Causes   Causes may include:   A family history of hyperlipidemia   A diet high in total fat, saturated fat, or cholesterol   Obesity   Excess alcohol intake   Certain conditions, including:   Diabetes   Low thyroid   Kidney problems   Liver disease   Cushing's syndrome   Certain drugs, such as:   Hormones or birth control pills   Beta-blockers   Some diuretics   Cortisone drugs   Isotretinoin (for acne )   Some anti- HIV drugs   Risk Factors   These factors increase your chance of developing this condition. Tell your doctor if you have any of these:   Advancing age   Sex: male   Postmenopause   Lack of exercise   Smoking   Stress   Overuse of alcohol   Symptoms   Hyperlipidemia usually does not cause symptoms. Very high levels of lipids or triglycerides can cause:   Fat deposits in the skin or tendons ( xanthomas )   Pain, enlargement, or swelling of organs such as the liver, spleen, or pancreas ( pancreatitis )   Obstruction of blood vessels in heart and brain   Hyperlipidemia can increase your risk of atherosclerosis .  This is a dangerous hardening of the arteries. It can end up blocking blood flow. In some cases, this may result in:   Angina   Heart attack   Stroke   Other serious complications   Blood Vessel with Atherosclerosis       2011 [facility].   Diagnosis   This condition is diagnosed with blood tests. These tests measure the levels of lipids in the blood. The National Cholesterol Education Program advises that you have your lipids checked at least once every five years, starting at age 21. Also, the American Academy of Pediatrics recommends lipid screening for children at risk (eg, a family history of hyperlipidemia). Testing may consist of a fasting blood test for:   Total cholesterol   LDL (bad cholesterol)   HDL (good cholesterol)   Triglycerides   Your doctor may recommend more frequent or earlier testing if you have:   Family history of hyperlipidemia   Risk factor or disease that may cause hyperlipidemia   Complication that may result from hyperlipidemia   Treatment   Treatment is not only aimed at correcting your cholesterol levels, but also at lowering your overall risk for heart disease and strokes. Diet Changes   Eat a diet low in total fat, saturated fat, and cholesterol . Reduce or eliminate the amount of alcohol you drink. Eat more high-fiber foods. Lifestyle Changes   If you are overweight, lose weight . If you smoke, quit . Exercise regularly . Talk to you doctor before starting an exercise program. Lyndsay Finely may already have hardening of the arteries or heart disease. These conditions increase your risk of having a heart attack while exercising. Make sure other medical conditions such as high blood pressure and diabetes are being treated and controlled. Medications   There are a number of drugs available, such as statins , to treat this condition and help lower your risk for heart disease. Talk to your doctor. Statins have been shown to reduce mortality (death), heart attacks , and stroke. These medicines are best used as additions to diet and exercise and should not replace healthy lifestyle changes. Prevention   To help reduce your chance of getting hyperlipidemia, take the following steps:   Starting at age 21, get cholesterol tests. Eat a diet low in total fat, saturated fat, and cholesterol. If you smoke, quit. Drink alcohol in moderation (two drinks per day for men, one drink per day for women). If you are overweight, lose weight. Exercise regularly. Talk with your doctor first.   If you have diabetes , control your blood sugar. Talk to your doctor about medications you are taking. They may have side effects that cause hyperlipidemia.      Last Reviewed: September 2010 Hortencia Rose DO   Updated: 11/9/2010

## 2021-12-27 ENCOUNTER — DOCUMENTATION (OUTPATIENT)
Dept: ENDOCRINOLOGY | Facility: CLINIC | Age: 66
End: 2021-12-27

## 2022-02-03 ENCOUNTER — TELEMEDICINE (OUTPATIENT)
Dept: ENDOCRINOLOGY | Facility: CLINIC | Age: 67
End: 2022-02-03

## 2022-02-03 DIAGNOSIS — E78.2 MIXED DIABETIC HYPERLIPIDEMIA ASSOCIATED WITH TYPE 2 DIABETES MELLITUS: ICD-10-CM

## 2022-02-03 DIAGNOSIS — E11.69 MIXED DIABETIC HYPERLIPIDEMIA ASSOCIATED WITH TYPE 2 DIABETES MELLITUS: ICD-10-CM

## 2022-02-03 DIAGNOSIS — E11.42 DIABETIC POLYNEUROPATHY ASSOCIATED WITH TYPE 2 DIABETES MELLITUS: ICD-10-CM

## 2022-02-03 DIAGNOSIS — Z79.4 TYPE 2 DIABETES MELLITUS WITH COMPLICATION, WITH LONG-TERM CURRENT USE OF INSULIN: Primary | ICD-10-CM

## 2022-02-03 DIAGNOSIS — E11.8 TYPE 2 DIABETES MELLITUS WITH COMPLICATION, WITH LONG-TERM CURRENT USE OF INSULIN: Primary | ICD-10-CM

## 2022-02-03 DIAGNOSIS — E55.9 VITAMIN D DEFICIENCY: ICD-10-CM

## 2022-02-03 DIAGNOSIS — E11.59 HYPERTENSION ASSOCIATED WITH DIABETES: ICD-10-CM

## 2022-02-03 DIAGNOSIS — I15.2 HYPERTENSION ASSOCIATED WITH DIABETES: ICD-10-CM

## 2022-02-03 PROCEDURE — 99214 OFFICE O/P EST MOD 30 MIN: CPT | Performed by: INTERNAL MEDICINE

## 2022-02-03 RX ORDER — ROSUVASTATIN CALCIUM 10 MG/1
10 TABLET, COATED ORAL NIGHTLY
Qty: 30 TABLET | Refills: 11 | Status: SHIPPED | OUTPATIENT
Start: 2022-02-03 | End: 2023-02-13

## 2022-02-03 RX ORDER — DULAGLUTIDE 4.5 MG/.5ML
4.5 INJECTION, SOLUTION SUBCUTANEOUS WEEKLY
Qty: 4 PEN | Refills: 11 | Status: SHIPPED | OUTPATIENT
Start: 2022-02-03 | End: 2022-10-19 | Stop reason: HOSPADM

## 2022-02-03 NOTE — PROGRESS NOTES
Subjective    Supa Mcclelland is a 66 y.o. male. he is here today for follow-up of diabetes                                      This was a Telehealth Encounter. Benefits and Disadvantages of a Telehealth Visit were discussed and accepted by patient. .  Patient agreed to receive service through Telehealth visit as patient is being compliant with social distancing recommendations imparted by CDC.     You have chosen to receive care through a telehealth visit.  Do you consent to use a video/audio connection for your medical care today? Yes          HPI      66 y o male w Type 2 DM for more than 10 years.     Has dorys but not using.     States things are running well.      PE    There were no vitals taken for this visit.  AOx3  No visible goiter  Normal Respiratory Effort , Lung CTA  RRR  No Edema    Labs    Lab Results   Component Value Date    WBC 8.50 10/27/2021    HGB 17.5 10/27/2021    HCT 52.1 (C) 10/27/2021    MCV 88.5 10/27/2021     10/27/2021     Lab Results   Component Value Date    GLUCOSE 292 (H) 10/20/2021    BUN 25 (H) 10/20/2021    CREATININE 1.4 (H) 10/20/2021    EGFRIFNONA 51 (A) 10/20/2021    EGFRIFAFRI >59 06/23/2021    BCR 18.3 08/03/2020    K 4.2 10/20/2021    CO2 22 10/20/2021    CALCIUM 9.6 10/20/2021    ALBUMIN 4.8 10/20/2021     (H) 10/20/2021    ALT 89 (H) 10/20/2021              Assessment/Plan      1. Type 2 diabetes mellitus with complication, with long-term current use of insulin (HCC)    2. Hypertension associated with diabetes (HCC)    3. Mixed diabetic hyperlipidemia associated with type 2 diabetes mellitus (HCC)    4. Diabetic polyneuropathy associated with type 2 diabetes mellitus (HCC)    5. Vitamin D deficiency    .    Medications prescribed:    Glycemic Management:      Lab Results   Component Value Date    HGBA1C 10.9 02/03/2021     Needs to restart dorys 2     Getting insulin through assistance program      tresiba 75 units in am - change to basaglar 75 units qam  -- 90 - 75       Humalog , no carb counting   20 w bkfast , add to lunch and supper    Jardiance 10 mg daily     Metformin xr 500 mg po BID      Trulicity 0.75 mg weekly - increase to 1.5 mg weekly --increase to 3 mg weekly - 4.5 mg weekly     Use dorys , he will contact me once he has started so I can see data        Lipid Management       Gemfibrozil - stop     Start crestor 10 mg qhs    Lab Results   Component Value Date    CHOL 144 08/03/2020    TRIG 287 (H) 08/03/2020    HDL 37 (L) 08/03/2020    LDL 50 08/03/2020           Blood Pressure Management:          There were no vitals taken for this visit.          Microvascular Complication Monitoring:       Eye Exam Evaluation  Needs one   -----------     Last Microalbumin-Proteinuria Assessment     Lab Results   Component Value Date    MALBCRERATIO 14.4 01/25/2019       -----------        Neuropathy, yes on neurontin, antidepressants  Also on requip            Weight Related:       BMI - 30.1     Has lost j10 lbs     Decreased caloric intake            Diet interventions: moderate (500 kCal/d) deficit diet.        Bone Health     Lab Results   Component Value Date    BUSC23XE 32.6 06/23/2021    LNUD11JH 27.7 (L) 08/03/2020    CLOL11XK 20.8 (L) 01/25/2019     vit D 50 th u every 2 weeks        Thyroid Health     Lab Results   Component Value Date    TSH 2.010 08/03/2020        on levothyroxine 50 mcgs daily         Lab Results   Component Value Date    LTPATFHI94 218 08/03/2020     Polycythemia , not smoking ,was when he had infeciton     Retest

## 2022-03-03 ENCOUNTER — OFFICE VISIT (OUTPATIENT)
Dept: NEUROLOGY | Facility: CLINIC | Age: 67
End: 2022-03-03

## 2022-03-03 VITALS
SYSTOLIC BLOOD PRESSURE: 130 MMHG | HEIGHT: 71 IN | WEIGHT: 200 LBS | HEART RATE: 84 BPM | DIASTOLIC BLOOD PRESSURE: 80 MMHG | BODY MASS INDEX: 28 KG/M2 | OXYGEN SATURATION: 99 %

## 2022-03-03 DIAGNOSIS — E11.42 DIABETIC POLYNEUROPATHY ASSOCIATED WITH TYPE 2 DIABETES MELLITUS: Primary | ICD-10-CM

## 2022-03-03 DIAGNOSIS — E11.42 DIABETIC POLYNEUROPATHY ASSOCIATED WITH TYPE 2 DIABETES MELLITUS: ICD-10-CM

## 2022-03-03 DIAGNOSIS — G25.81 RESTLESS LEGS SYNDROME (RLS): ICD-10-CM

## 2022-03-03 PROCEDURE — 99214 OFFICE O/P EST MOD 30 MIN: CPT | Performed by: PHYSICIAN ASSISTANT

## 2022-03-03 RX ORDER — ROPINIROLE 2 MG/1
2 TABLET, FILM COATED ORAL NIGHTLY
Qty: 30 TABLET | Refills: 6 | Status: SHIPPED | OUTPATIENT
Start: 2022-03-03 | End: 2022-11-18 | Stop reason: SDUPTHER

## 2022-03-03 RX ORDER — MEXILETINE HYDROCHLORIDE 150 MG/1
300 CAPSULE ORAL DAILY
Qty: 60 CAPSULE | Refills: 6 | Status: SHIPPED | OUTPATIENT
Start: 2022-03-03 | End: 2023-02-20 | Stop reason: SDUPTHER

## 2022-03-03 RX ORDER — GABAPENTIN 800 MG/1
800 TABLET ORAL 3 TIMES DAILY
Qty: 270 TABLET | Refills: 0 | Status: SHIPPED | OUTPATIENT
Start: 2022-03-03 | End: 2022-08-23

## 2022-03-03 NOTE — PROGRESS NOTES
Subjective   Supa Mcclelland is a 66 y.o. male Follow-up painful diabetic peripheral neuropathy.    History of Present Illness     Medications  The patient states that he has felt very relieved since adding the mexiletine to the gabapentin.     The following portions of the patient's history were reviewed and updated as appropriate: allergies, current medications, past family history, past medical history, past social history, past surgical history and problem list.    Review of Systems     A review of systems was performed, and the pertinent positives are noted in the HPI.      Current Outpatient Medications:   •  cholecalciferol (D3-50) 1.25 MG (55927 UT) capsule, TAKE ONE CAPSULE BY MOUTH EVERY 2 WEEKS. (Patient taking differently: Take 50,000 Units by mouth Every 14 (Fourteen) Days. TAKE ONE CAPSULE BY MOUTH EVERY 2 WEEKS.), Disp: 2 capsule, Rfl: 0  •  citalopram (CeleXA) 20 MG tablet, Take 20 mg by mouth Daily., Disp: , Rfl:   •  Dulaglutide (Trulicity) 4.5 MG/0.5ML solution pen-injector, Inject 4.5 mg under the skin into the appropriate area as directed 1 (One) Time Per Week., Disp: 4 pen, Rfl: 11  •  Empagliflozin (JARDIANCE) 10 MG tablet, Take 1 tablet by mouth Daily., Disp: 90 tablet, Rfl: 3  •  famotidine (PEPCID) 20 MG tablet, Take 20 mg by mouth 2 (Two) Times a Day., Disp: , Rfl:   •  fexofenadine (ALLEGRA) 180 MG tablet, Take 180 mg by mouth As Needed., Disp: , Rfl:   •  fluticasone (FLONASE) 50 MCG/ACT nasal spray, 1 spray each nostril twice a day for three days, then daily., Disp: 1 bottle, Rfl: 0  •  hydrochlorothiazide (HYDRODIURIL) 12.5 MG tablet, Take 12.5 mg by mouth Daily., Disp: , Rfl:   •  Insulin Glargine (BASAGLAR KWIKPEN) 100 UNIT/ML injection pen, 75 units qam, Disp: 8 pen, Rfl: 11  •  Insulin Lispro (HumaLOG KwikPen) 200 UNIT/ML solution pen-injector, Inject 20 Units under the skin into the appropriate area as directed 3 (Three) Times a Day With Meals., Disp: 18 pen, Rfl: 11  •   levothyroxine (SYNTHROID, LEVOTHROID) 50 MCG tablet, Take 1 tablet by mouth Daily., Disp: , Rfl:   •  loratadine (CLARITIN) 10 MG tablet, Take 10 mg by mouth Daily., Disp: , Rfl:   •  meloxicam (MOBIC) 15 MG tablet, Take 1 tablet by mouth Daily., Disp: , Rfl:   •  metFORMIN ER (GLUCOPHAGE-XR) 500 MG 24 hr tablet, Take 1 tablet by mouth 2 (two) times a day. TAKE 1 TABLET BY MOUTH TWICE DAILY WITH MEALS (Patient taking differently: Take 500 mg by mouth 2 (two) times a day.), Disp: 180 tablet, Rfl: 3  •  metoprolol tartrate (LOPRESSOR) 50 MG tablet, Take 50 mg by mouth 2 (Two) Times a Day., Disp: , Rfl:   •  mexiletine (MEXITIL) 150 MG capsule, Take 2 capsules by mouth Daily., Disp: 60 capsule, Rfl: 6  •  omeprazole (priLOSEC) 20 MG capsule, Take 1 capsule by mouth 2 (Two) Times a Day., Disp: 60 capsule, Rfl: 11  •  potassium citrate (UROCIT-K) 10 MEQ (1080 MG) CR tablet, Take 1 tablet by mouth 3 (Three) Times a Day With Meals., Disp: 90 tablet, Rfl: 11  •  RELION PEN NEEDLES 31G X 6 MM misc, USE AS DIRECTED 4 TIMES DAILY, Disp: 150 each, Rfl: 11  •  rOPINIRole (REQUIP) 2 MG tablet, Take 1 tablet by mouth Every Night. Take 1 hour before bedtime., Disp: 30 tablet, Rfl: 6  •  rosuvastatin (Crestor) 10 MG tablet, Take 1 tablet by mouth Every Night., Disp: 30 tablet, Rfl: 11  •  zolpidem (AMBIEN) 10 MG tablet, Take 10 mg by mouth At Night As Needed., Disp: , Rfl:   •  gabapentin (NEURONTIN) 800 MG tablet, Take 1 tablet by mouth 3 (Three) Times a Day., Disp: 270 tablet, Rfl: 0     Objective   Physical Exam  Vitals and nursing note reviewed.   HENT:      Head: Normocephalic.      Right Ear: External ear normal. Decreased hearing noted.      Left Ear: External ear normal. Decreased hearing noted.      Nose: Nose normal.      Mouth/Throat:      Pharynx: Oropharynx is clear.   Eyes:      General: Lids are normal. Vision grossly intact. Gaze aligned appropriately. No scleral icterus.     Extraocular Movements: Extraocular  movements intact.      Conjunctiva/sclera: Conjunctivae normal.      Pupils: Pupils are equal, round, and reactive to light.      Visual Fields: Right eye visual fields normal and left eye visual fields normal.   Neck:      Vascular: No carotid bruit or JVD.      Trachea: Trachea and phonation normal.   Cardiovascular:      Rate and Rhythm: Normal rate.      Heart sounds: Normal heart sounds.   Pulmonary:      Effort: Pulmonary effort is normal.      Breath sounds: Normal breath sounds.   Musculoskeletal:      Cervical back: Normal range of motion.   Skin:     General: Skin is warm and dry.   Neurological:      Mental Status: He is alert and oriented to person, place, and time.      GCS: GCS eye subscore is 4. GCS verbal subscore is 5. GCS motor subscore is 6.      Cranial Nerves: Cranial nerves are intact.      Sensory: Sensory deficit present.      Motor: Weakness present.      Coordination: Coordination is intact.      Gait: Gait abnormal.      Deep Tendon Reflexes: Reflexes are normal and symmetric.      Comments: Diminished peripheral sensation in the lower extremities consistent with diabetic peripheral neuropathy.    Psychiatric:         Attention and Perception: Attention and perception normal.         Mood and Affect: Mood and affect normal.         Speech: Speech normal.         Behavior: Behavior normal.         Thought Content: Thought content normal.         Cognition and Memory: Cognition and memory normal.         Judgment: Judgment normal.       Assessment/Plan   Diagnoses and all orders for this visit:    1. Diabetic polyneuropathy associated with type 2 diabetes mellitus (HCC) (Primary)  -     mexiletine (MEXITIL) 150 MG capsule; Take 2 capsules by mouth Daily.  Dispense: 60 capsule; Refill: 6    2. Restless legs syndrome (RLS)  -     rOPINIRole (REQUIP) 2 MG tablet; Take 1 tablet by mouth Every Night. Take 1 hour before bedtime.  Dispense: 30 tablet; Refill: 6      1. Diabetic Peripheral  Neuropathy    -I have recommended that the patient continue with gabapentin as previously prescribed. We will also continue with mexiletine 300 mg daily as this has represented a significant benefit to him. For restless leg, we will continue with ropinirole 2 mg nightly. Questions and concerns were reviewed in detail.       Dictated utilizing Dragon dictation.    Transcribed from ambient dictation for ZACARIAS Alonzo by Taylor Humes.  03/03/22   11:30 CST    Patient verbalized consent to the visit recording.  I have personally performed the services described in this document as transcribed by the above individual, and it is both accurate and complete.  ZACARIAS Alonzo  3/16/2022  16:54 CDT

## 2022-04-20 ENCOUNTER — HOSPITAL ENCOUNTER (OUTPATIENT)
Dept: INFUSION THERAPY | Age: 67
Discharge: HOME | End: 2022-04-20
Payer: MEDICARE

## 2022-04-20 DIAGNOSIS — D47.2 MGUS (MONOCLONAL GAMMOPATHY OF UNKNOWN SIGNIFICANCE): ICD-10-CM

## 2022-04-20 DIAGNOSIS — D89.89 KAPPA LIGHT CHAIN DISEASE (HCC): ICD-10-CM

## 2022-04-20 DIAGNOSIS — D58.2 ELEVATED HEMOGLOBIN (HCC): ICD-10-CM

## 2022-04-20 DIAGNOSIS — D75.1 ERYTHROCYTOSIS: ICD-10-CM

## 2022-04-20 LAB
ALBUMIN SERPL-MCNC: 4.3 G/DL (ref 3.5–5.2)
ALP BLD-CCNC: 123 U/L (ref 40–130)
ALT SERPL-CCNC: 68 U/L (ref 21–72)
ANION GAP SERPL CALCULATED.3IONS-SCNC: 6 MMOL/L (ref 7–19)
AST SERPL-CCNC: 97 U/L (ref 17–59)
BILIRUB SERPL-MCNC: 0.6 MG/DL (ref 0.2–1.3)
BUN BLDV-MCNC: 15 MG/DL (ref 9–20)
CALCIUM SERPL-MCNC: 9.6 MG/DL (ref 8.4–10.2)
CHLORIDE BLD-SCNC: 110 MMOL/L (ref 98–111)
CO2: 26 MMOL/L (ref 22–29)
CREAT SERPL-MCNC: 1 MG/DL (ref 0.6–1.2)
GFR NON-AFRICAN AMERICAN: >60
GLOBULIN: 3.7 G/DL
GLUCOSE BLD-MCNC: 177 MG/DL (ref 74–106)
HCT VFR BLD CALC: 50.4 % (ref 40.1–51)
HEMOGLOBIN: 16.1 G/DL (ref 13.7–17.5)
MCH RBC QN AUTO: 28.2 PG (ref 25.7–32.2)
MCHC RBC AUTO-ENTMCNC: 31.9 G/DL (ref 32.3–36.5)
MCV RBC AUTO: 88.3 FL (ref 79–92.2)
PDW BLD-RTO: 14.2 % (ref 11.6–14.4)
PLATELET # BLD: 167 K/UL (ref 163–337)
PMV BLD AUTO: 10.4 FL (ref 7.4–10.4)
POTASSIUM SERPL-SCNC: 4.1 MMOL/L (ref 3.5–5.1)
RBC # BLD: 5.71 M/UL (ref 4.63–6.08)
SODIUM BLD-SCNC: 142 MMOL/L (ref 137–145)
TOTAL PROTEIN: 8 G/DL (ref 6.3–8.2)
WBC # BLD: 7.24 K/UL (ref 4.23–9.07)

## 2022-04-20 PROCEDURE — 85027 COMPLETE CBC AUTOMATED: CPT

## 2022-04-20 PROCEDURE — 36415 COLL VENOUS BLD VENIPUNCTURE: CPT

## 2022-04-20 PROCEDURE — 80053 COMPREHEN METABOLIC PANEL: CPT

## 2022-04-23 LAB — BETA-2 MICROGLOBULIN: 3.3 MG/L

## 2022-04-24 LAB
+IMM: ABNORMAL
ALBUMIN SERPL-MCNC: 3.9 G/DL (ref 3.75–5.01)
ALPHA-1-GLOBULIN: 0.32 G/DL (ref 0.19–0.46)
ALPHA-2-GLOBULIN: 1.07 G/DL (ref 0.48–1.05)
BETA GLOBULIN: 1.09 G/DL (ref 0.48–1.1)
GAMMA GLOBULIN: 1.32 G/DL (ref 0.62–1.51)
IGA: 384 MG/DL (ref 68–408)
IGG: 1271 MG/DL (ref 768–1632)
IGM: 104 MG/DL (ref 35–263)
KAPPA FREE LIGHT CHAINS QNT: 58.18 MG/L (ref 3.3–19.4)
KAPPA/LAMBDA FREE LIGHT CHAIN RATIO: 2.05 (ref 0.26–1.65)
LAMBDA FREE LIGHT CHAINS QNT: 28.44 MG/L (ref 5.71–26.3)
SPE/IFE INTERPRETATION: ABNORMAL
TOTAL PROTEIN: 7.7 G/DL (ref 6.3–8.2)

## 2022-04-25 DIAGNOSIS — D47.2 MGUS (MONOCLONAL GAMMOPATHY OF UNKNOWN SIGNIFICANCE): Primary | ICD-10-CM

## 2022-04-26 NOTE — PROGRESS NOTES
OP HEMATOLOGY/ONCOLOGY PROGRESS NOTE      Pt Name: Tawanda Apple  YOB: 1955  MRN: [id number]  Referring provider: ALISA Bourne  Date of evaluation: 4/27/22    History Obtained From:  patient, electronic medical record    CHIEF COMPLAINT:    Chief Complaint   Patient presents with    Follow-up     MGUS (monoclonal gammopathy of unknown significance)     HISTORY OF PRESENT ILLNESS:    Tawanda Apple is a pleasant 78 y.o.  male monitored from a hematology standpoint for a history of kappa light chain MGUS. Bone marrow aspirate and biopsy 12/9/2020 documented plasma cell neoplasm involving less than 5%. He has chronic neuropathy. He denies new or localized bony discomfort. CBC is monitored with history of H/H upper limit of normal.  HTN is stable, blood pressure currently 118/62. Christina Fleming states he is feeling well. He denies any complaints at this time. He returns to the clinic, accompanied by his wife for continued surveillance regarding kappa light chain MGUS. Diabetes is managed by Dr. Christy Arenas. Neuropathy is managed by Lillian Hoffman PA-C/Dr. Raymundo Danielle. CKD is monitored by [facility], ALISA Sherman. Creatinine 1.4 with GFR 51 on 10/20/2021, stable. Primary care needs per ALISA Devi. HEMATOLOGY HISTORY: [place] light chain MGUS 12/9/2020  Steve Fowler was seen in initial hematology consultation 10/7/2020, referred by ALISA Sherman for evaluation of an elevated light chain ratio. PMH includes CKD, diabetes mellitus type 2, hypertension, hyperlipidemia, hypothyroidism, GERD etc.  Christina Fleming was recently referred to ALISA Sherman for abnormal renal function, diagnosed with stage III CKD related to diabetes.     Serology for evaluation of CKD 8/3/2020:  · CBC: WBC 9.03, Hgb 16.2, platelets 913,455  · CMP: Creatinine 1.31, GFR 55  · HgbA1c: 9.14  · Vitamin D: 27.7    Elevated serum immunoglobulin free light chains  Labs 2020:  · CMP: Creatinine 1.27/GFR 58, AST 56, ALT 53, calcium 9.5  · Total protein: 7.5  · SPEP: No M-spike  · Ig, IgA: 334, IgM: 92-all within normal limits  · Kappa light chain 51.9, lambda light chain 25.2 with K/L ratio mildly elevated at 2.06  · Urinalysis: 3+ glucose, 1+ occult blood, negative nitrite    He denies localized bony tenderness, though reports chronic neuropathy but is managed by Tung Márquez PA-C. He also complains of a chronic cough for the past 6 months or so that is being addressed by his PCP, ALISA Cline. Labs 10/9/2020:  · SPEP: No M-spike  · Immunofixation: No monoclonality detected  · Ig, IgA 354, IgM 124    24-hour urine for immunoelectrophoresis 10/14/2020: No M-spike, no monoclonality detected    Bone survey 2020: no focal bony lesions    Bone marrow aspirate and biopsy 2020 documented a normocellular marrow (~30-40%) with plasma cell neoplasm involving less than 5%. Congo red was negative for amyloid. FLOW cytometry showed a small clonal kappa restricted plasma cell population (0.2%). Cytogenetics showed a normal male karyotype. Negative molecular studies for JAK2 V617F, MPL, CALR, JAK2 exon 12 and KIT mutations  Findings likely represent kappa light chain MGUS. Conservative monitoring warranted.      Past Medical History:   Diagnosis Date    Arm fracture 2016    left    Depression     Diabetes mellitus ([address]     Hypertension     Kidney stones     Neuropathy     Restless leg syndrome      Past Surgical History:   Procedure Laterality Date    BONE MARROW BIOPSY Right 2020    BONE MARROW ASPIRATION BIOPSY performed by ALISA Farmer at [address] [place] [address] LITHOTRIPSY      SHOULDER SURGERY Left     Frozen shoulder surgery    UPPER GASTROINTESTINAL ENDOSCOPY         Current Outpatient Medications:     Evolocumab (REPATHA SURECLICK) 708 MG/ML SOAJ, Inject 1 mL into the skin every 14 days, Disp: 2 pen, Rfl: 11    ondansetron (ZOFRAN ODT) 4 MG disintegrating tablet, Take 1 tablet by mouth every 8 hours as needed for Nausea or Vomiting, Disp: 9 tablet, Rfl: 0    empagliflozin (JARDIANCE) 10 MG tablet, Take 10 mg by mouth daily, Disp: , Rfl:     Dulaglutide (TRULICITY) 1.5 FG/7.0EU SOPN, Inject 1.5 mg into the skin once a week, Disp: , Rfl:     gemfibrozil (LOPID) 600 MG tablet, Take 600 mg by mouth 2 times daily (before meals), Disp: , Rfl:     insulin glargine (BASAGLAR KWIKPEN) 100 UNIT/ML injection pen, Inject 80 Units into the skin daily , Disp: , Rfl:     Cholecalciferol (VITAMIN D3) 1.25 MG (71355 UT) CAPS, Take 50,000 Units by mouth every 7 days , Disp: , Rfl:     carBAMazepine (CARBATROL) 200 MG extended release capsule, Take 200 mg by mouth 2 times daily, Disp: , Rfl:     fluticasone (FLONASE) 50 MCG/ACT nasal spray, 1 spray by Each Nare route 2 times daily as needed for Rhinitis, Disp: , Rfl:     Insulin Lispro (HUMALOG KWIKPEN SC), Inject 20 Units into the skin every morning (before breakfast) , Disp: , Rfl:     loratadine (CLARITIN) 10 MG tablet, Take 10 mg by mouth daily as needed, Disp: , Rfl:     metFORMIN (GLUCOPHAGE-XR) 500 MG extended release tablet, Take 500 mg by mouth 2 times daily, Disp: , Rfl:     gabapentin (NEURONTIN) 800 MG tablet, Take 800 mg by mouth 2 times daily. ., Disp: , Rfl:     hydrochlorothiazide (HYDRODIURIL) 25 MG tablet, Take 12.5 mg by mouth daily, Disp: , Rfl:     levothyroxine (SYNTHROID) 50 MCG tablet, Take 50 mcg by mouth Daily, Disp: , Rfl:     famotidine (PEPCID) 20 MG tablet, Take 20 mg by mouth daily , Disp: , Rfl:     citalopram (CELEXA) 20 MG tablet, Take 20 mg by mouth nightly, Disp: , Rfl:    Allergies: Allergies   Allergen Reactions    Penicillins      Social History     Tobacco Use    Smoking status: Never Smoker    Smokeless tobacco: Never Used   Vaping Use    Vaping Use: Not on file   Substance Use Topics    Alcohol use:  No  Drug use: No     No family history on file. Health Maintenance   Topic Date Due    Diabetic foot exam  Never done    Depression Screen  Never done    Diabetic microalbuminuria test  Never done    Diabetic retinal exam  Never done    Hepatitis C screen  Never done    Colorectal Cancer Screen  Never done    Shingles Vaccine (1 of 2) Never done   Floydene Ada Annual Wellness Visit (AWV)  Never done    COVID-19 Vaccine (2 - Booster for Availigent series) 05/06/2021    Lipids  08/03/2021    A1C test (Diabetic or Prediabetic)  02/03/2022    Potassium  04/20/2023    Creatinine  04/20/2023    DTaP/Tdap/Td vaccine (2 - Td or Tdap) 07/15/2026    Flu vaccine  Completed    Pneumococcal 65+ years Vaccine  Completed    Hepatitis A vaccine  Aged Out    Hib vaccine  Aged Out    Meningococcal (ACWY) vaccine  Aged Out     Subjective   Review of Systems   Constitutional: Negative for fatigue and fever. HENT: Negative for dental problem, hearing loss, mouth sores, nosebleeds, sore throat and trouble swallowing. Eyes: Negative for discharge and itching. Respiratory: Negative for cough, shortness of breath and wheezing. Cardiovascular: Negative for chest pain, palpitations and leg swelling. Gastrointestinal: Negative for abdominal pain, constipation, diarrhea, nausea and vomiting. Endocrine: Negative for cold intolerance and heat intolerance. Diabetic   Genitourinary: Negative for dysuria, frequency, hematuria and urgency. Musculoskeletal: Negative for arthralgias, joint swelling and myalgias. Skin: Negative for pallor and rash. Allergic/Immunologic: Negative for environmental allergies and immunocompromised state. Neurological: Positive for numbness. Negative for seizures and syncope. Hematological: Negative for adenopathy. Does not bruise/bleed easily. Psychiatric/Behavioral: Negative for agitation, behavioral problems and confusion. Objective   Physical Exam  Vitals reviewed. Constitutional:       General: He is not in acute distress. Appearance: He is well-developed. He is not toxic-appearing or diaphoretic. Comments: Wearing a facial mask. overweight   HENT:      Head: Normocephalic and atraumatic. Right Ear: External ear normal.      Left Ear: External ear normal.      Nose: Nose normal.      Mouth/Throat:      Mouth: Mucous membranes are moist.   Eyes:      General: No scleral icterus. Right eye: No discharge. Left eye: No discharge. Conjunctiva/sclera: Conjunctivae normal.   Neck:      Trachea: No tracheal deviation. Cardiovascular:      Rate and Rhythm: Normal rate and regular rhythm. Pulmonary:      Effort: Pulmonary effort is normal. No respiratory distress. Breath sounds: Normal breath sounds. No wheezing or rales. Chest:   Breasts:      Right: No supraclavicular adenopathy. Left: No supraclavicular adenopathy. Abdominal:      General: Bowel sounds are normal. There is no distension. Palpations: Abdomen is soft. Tenderness: There is no abdominal tenderness. There is no guarding. Genitourinary:     Comments: Exam deferred  Musculoskeletal:         General: No tenderness or deformity. Cervical back: Neck supple. No muscular tenderness. Comments: Normal ROM all four extremities   Lymphadenopathy:      Cervical:      Right cervical: No superficial or deep cervical adenopathy. Left cervical: No superficial or deep cervical adenopathy. Upper Body:      Right upper body: No supraclavicular adenopathy. Left upper body: No supraclavicular adenopathy. Comments:      Skin:     General: Skin is warm and dry. Findings: No rash. Neurological:      Mental Status: He is alert and oriented to person, place, and time. Comments: follows commands, non-focal   Psychiatric:         Behavior: Behavior normal. Behavior is cooperative. Thought Content:  Thought content normal. Judgment: Judgment normal.      Comments: Alert and oriented to person, place and time. /62   Pulse 87   Wt 209 lb 8 oz (95 kg)   SpO2 98%   BMI 29.22 kg/m²   Wt Readings from Last 3 Encounters:   22 209 lb 8 oz (95 kg)   21 205 lb (93 kg)   10/27/21 205 lb (93 kg)     ASSESSMENT/PLAN:    1. MGUS (monoclonal gammopathy of unknown significance)    2. Erythrocytosis      MGUS  Bone marrow aspirate and biopsy 2020 documented a normocellular marrow (~30-40%) with plasma cell neoplasm involving less than 5%. Labs 2022  · CBC: WBC: 7.24, Hgb: 16.1/MCV: 88.3, Hct: 50.4, platelets: 387  · CMP: AST: 97  · SPEP: normal spep pattern  · Immunofixation: no monoclonal proteins seen. · Ig, IgA: 384, IgM: 104  · Kappa light chain: 58.18, Lambda light chain: 28.44, K/L ratio: 2.05  · B2M: 3.3    Kappa light chains decreased, continue observation  RTC 6 months with labs prior to return    Minimally elevated H/H, improved  H/H 16.1/50.4, previously 17.5/52.1 on 10/27/2021    Gissel Beal denies tobacco use  He does not use exogenous testosterone  He denies any known history of sleep apnea  He reports staying well-hydrated    Labs 2020:  · Iron 107, TIBC 443, iron saturation 24%, ferritin 191  · Erythropoietin 9.9    · Negative JAK2 V617F, MPL, CALR, JAK2 exon 12 and KIT mutations on BMAB 2020    Consider overnight pulse ox/sleep study to evaluate secondary pulmonary cause  Continue to monitor    Tumor Screening:  · Colonoscopy last completed 2020 by Dr. Dimitrios Mendoza documented poor prep, 1 asending colon polypectomy, 1 sigmoid colon polypectomy (tissue not retrieved).   Declines recall (discussed 2022)  · PSA followed by Dr. Enrique Schwartz This Encounter   Procedures    CBC with Auto Differential    Comprehensive Metabolic Panel    MONOCLONAL PROTEIN AND FLC, SERUM    Beta 2 Microglobulin, Serum     Return in about 6 months (around 10/27/2022) for follow up with ALISA Larry (no cbc day of appt). Labs prior to return. I have seen, examined and reviewed this patient medication list, appropriate labs and imaging studies. I reviewed relevant medical records and others physicians notes. I discussed the plans of care with the patient. I answered all the questions to the patients satisfaction. I have also reviewed the chief complaint (CC) and part of the history (History of Present Illness (HPI), Past Family Social History [facility]), or Review of Systems (ROS) and made changes when appropriated. (Please note that portions of this note were completed with a voice recognition program. Efforts were made to edit the dictations but occasionally words are mis-transcribed.)    The total time (20 min) I spent to see the patient today includes at least one or more of the following: preparing to see the patient by reviewing prior tests, prior notes or other relevant information, performing appropriate independent examination and evaluation, counseling, ordering of medications, tests or procedures, referrals, communicating with other healthcare professionals when appropriated to coordinate care, documenting clinic information in the electronic medical record or other health records, independently interpreting results of tests, managing test results and communicating the results to the patient/family or caregiver.         ALISA Queen  8:32 AM  4/28/2022

## 2022-04-27 ENCOUNTER — OFFICE VISIT (OUTPATIENT)
Dept: HEMATOLOGY | Age: 67
End: 2022-04-27
Payer: MEDICARE

## 2022-04-27 ENCOUNTER — HOSPITAL ENCOUNTER (OUTPATIENT)
Dept: INFUSION THERAPY | Age: 67
Discharge: HOME | End: 2022-04-27
Payer: MEDICARE

## 2022-04-27 VITALS
OXYGEN SATURATION: 98 % | WEIGHT: 209.5 LBS | HEART RATE: 87 BPM | SYSTOLIC BLOOD PRESSURE: 118 MMHG | DIASTOLIC BLOOD PRESSURE: 62 MMHG | BODY MASS INDEX: 29.22 KG/M2

## 2022-04-27 DIAGNOSIS — D47.2 MGUS (MONOCLONAL GAMMOPATHY OF UNKNOWN SIGNIFICANCE): ICD-10-CM

## 2022-04-27 DIAGNOSIS — D75.1 ERYTHROCYTOSIS: ICD-10-CM

## 2022-04-27 DIAGNOSIS — D47.2 MGUS (MONOCLONAL GAMMOPATHY OF UNKNOWN SIGNIFICANCE): Primary | ICD-10-CM

## 2022-04-27 PROCEDURE — 99212 OFFICE O/P EST SF 10 MIN: CPT

## 2022-04-27 PROCEDURE — G8427 DOCREV CUR MEDS BY ELIG CLIN: HCPCS | Performed by: NURSE PRACTITIONER

## 2022-04-27 PROCEDURE — G8417 CALC BMI ABV UP PARAM F/U: HCPCS | Performed by: NURSE PRACTITIONER

## 2022-04-27 PROCEDURE — 1123F ACP DISCUSS/DSCN MKR DOCD: CPT | Performed by: NURSE PRACTITIONER

## 2022-04-27 PROCEDURE — 4040F PNEUMOC VAC/ADMIN/RCVD: CPT | Performed by: NURSE PRACTITIONER

## 2022-04-27 PROCEDURE — 1036F TOBACCO NON-USER: CPT | Performed by: NURSE PRACTITIONER

## 2022-04-27 PROCEDURE — 99213 OFFICE O/P EST LOW 20 MIN: CPT | Performed by: NURSE PRACTITIONER

## 2022-04-27 PROCEDURE — 3017F COLORECTAL CA SCREEN DOC REV: CPT | Performed by: NURSE PRACTITIONER

## 2022-04-28 ASSESSMENT — ENCOUNTER SYMPTOMS
WHEEZING: 0
ABDOMINAL PAIN: 0
COUGH: 0
VOMITING: 0
NAUSEA: 0
SORE THROAT: 0
SHORTNESS OF BREATH: 0
EYE DISCHARGE: 0
DIARRHEA: 0
TROUBLE SWALLOWING: 0
CONSTIPATION: 0
EYE ITCHING: 0

## 2022-05-19 ENCOUNTER — LAB (OUTPATIENT)
Dept: LAB | Facility: HOSPITAL | Age: 67
End: 2022-05-19

## 2022-05-19 ENCOUNTER — OFFICE VISIT (OUTPATIENT)
Dept: ENDOCRINOLOGY | Facility: CLINIC | Age: 67
End: 2022-05-19

## 2022-05-19 VITALS
HEART RATE: 85 BPM | HEIGHT: 71 IN | OXYGEN SATURATION: 96 % | WEIGHT: 203 LBS | DIASTOLIC BLOOD PRESSURE: 60 MMHG | BODY MASS INDEX: 28.42 KG/M2 | SYSTOLIC BLOOD PRESSURE: 110 MMHG

## 2022-05-19 DIAGNOSIS — E11.69 MIXED DIABETIC HYPERLIPIDEMIA ASSOCIATED WITH TYPE 2 DIABETES MELLITUS: ICD-10-CM

## 2022-05-19 DIAGNOSIS — Z79.4 TYPE 2 DIABETES MELLITUS WITH HYPERGLYCEMIA, WITH LONG-TERM CURRENT USE OF INSULIN: Primary | ICD-10-CM

## 2022-05-19 DIAGNOSIS — E11.9 WELL CONTROLLED TYPE 2 DIABETES MELLITUS: Primary | ICD-10-CM

## 2022-05-19 DIAGNOSIS — I10 ESSENTIAL HYPERTENSION: ICD-10-CM

## 2022-05-19 DIAGNOSIS — E78.2 MIXED DIABETIC HYPERLIPIDEMIA ASSOCIATED WITH TYPE 2 DIABETES MELLITUS: ICD-10-CM

## 2022-05-19 DIAGNOSIS — E11.42 DIABETIC POLYNEUROPATHY ASSOCIATED WITH TYPE 2 DIABETES MELLITUS: ICD-10-CM

## 2022-05-19 DIAGNOSIS — I15.2 HYPERTENSION ASSOCIATED WITH DIABETES: ICD-10-CM

## 2022-05-19 DIAGNOSIS — E11.59 HYPERTENSION ASSOCIATED WITH DIABETES: ICD-10-CM

## 2022-05-19 DIAGNOSIS — E11.65 TYPE 2 DIABETES MELLITUS WITH HYPERGLYCEMIA, WITH LONG-TERM CURRENT USE OF INSULIN: Primary | ICD-10-CM

## 2022-05-19 DIAGNOSIS — E55.9 VITAMIN D DEFICIENCY: ICD-10-CM

## 2022-05-19 LAB
25(OH)D3 SERPL-MCNC: 20.8 NG/ML (ref 30–100)
ALBUMIN SERPL-MCNC: 4.3 G/DL (ref 3.5–5.2)
ALBUMIN UR-MCNC: 2.6 MG/DL
ALBUMIN/GLOB SERPL: 1.2 G/DL
ALP SERPL-CCNC: 110 U/L (ref 39–117)
ALT SERPL W P-5'-P-CCNC: 56 U/L (ref 1–41)
ANION GAP SERPL CALCULATED.3IONS-SCNC: 13 MMOL/L (ref 5–15)
AST SERPL-CCNC: 63 U/L (ref 1–40)
BASOPHILS # BLD AUTO: 0.07 10*3/MM3 (ref 0–0.2)
BASOPHILS NFR BLD AUTO: 0.7 % (ref 0–1.5)
BILIRUB SERPL-MCNC: 0.5 MG/DL (ref 0–1.2)
BUN SERPL-MCNC: 16 MG/DL (ref 8–23)
BUN/CREAT SERPL: 13.7 (ref 7–25)
CALCIUM SPEC-SCNC: 9.5 MG/DL (ref 8.6–10.5)
CHLORIDE SERPL-SCNC: 104 MMOL/L (ref 98–107)
CHOLEST SERPL-MCNC: 149 MG/DL (ref 0–200)
CO2 SERPL-SCNC: 22 MMOL/L (ref 22–29)
CREAT SERPL-MCNC: 1.17 MG/DL (ref 0.76–1.27)
CREAT UR-MCNC: 70.9 MG/DL
DEPRECATED RDW RBC AUTO: 41.5 FL (ref 37–54)
EGFRCR SERPLBLD CKD-EPI 2021: 68.3 ML/MIN/1.73
EOSINOPHIL # BLD AUTO: 0.57 10*3/MM3 (ref 0–0.4)
EOSINOPHIL NFR BLD AUTO: 5.4 % (ref 0.3–6.2)
ERYTHROCYTE [DISTWIDTH] IN BLOOD BY AUTOMATED COUNT: 13.8 % (ref 12.3–15.4)
GLOBULIN UR ELPH-MCNC: 3.7 GM/DL
GLUCOSE BLDC GLUCOMTR-MCNC: 172 MG/DL (ref 70–130)
GLUCOSE SERPL-MCNC: 171 MG/DL (ref 65–99)
HBA1C MFR BLD: 9.7 % (ref 4.8–5.6)
HCT VFR BLD AUTO: 49.5 % (ref 37.5–51)
HDLC SERPL-MCNC: 40 MG/DL (ref 40–60)
HGB BLD-MCNC: 17.2 G/DL (ref 13–17.7)
IMM GRANULOCYTES # BLD AUTO: 0.18 10*3/MM3 (ref 0–0.05)
IMM GRANULOCYTES NFR BLD AUTO: 1.7 % (ref 0–0.5)
LDLC SERPL CALC-MCNC: 67 MG/DL (ref 0–100)
LDLC/HDLC SERPL: 1.41 {RATIO}
LYMPHOCYTES # BLD AUTO: 3.48 10*3/MM3 (ref 0.7–3.1)
LYMPHOCYTES NFR BLD AUTO: 32.7 % (ref 19.6–45.3)
MCH RBC QN AUTO: 29.1 PG (ref 26.6–33)
MCHC RBC AUTO-ENTMCNC: 34.7 G/DL (ref 31.5–35.7)
MCV RBC AUTO: 83.8 FL (ref 79–97)
MICROALBUMIN/CREAT UR: 36.7 MG/G
MONOCYTES # BLD AUTO: 0.7 10*3/MM3 (ref 0.1–0.9)
MONOCYTES NFR BLD AUTO: 6.6 % (ref 5–12)
NEUTROPHILS NFR BLD AUTO: 5.64 10*3/MM3 (ref 1.7–7)
NEUTROPHILS NFR BLD AUTO: 52.9 % (ref 42.7–76)
NRBC BLD AUTO-RTO: 0 /100 WBC (ref 0–0.2)
PLATELET # BLD AUTO: 218 10*3/MM3 (ref 140–450)
PMV BLD AUTO: 10.7 FL (ref 6–12)
POTASSIUM SERPL-SCNC: 3.9 MMOL/L (ref 3.5–5.2)
PROT SERPL-MCNC: 8 G/DL (ref 6–8.5)
RBC # BLD AUTO: 5.91 10*6/MM3 (ref 4.14–5.8)
SODIUM SERPL-SCNC: 139 MMOL/L (ref 136–145)
TRIGL SERPL-MCNC: 263 MG/DL (ref 0–150)
TSH SERPL DL<=0.05 MIU/L-ACNC: 2.04 UIU/ML (ref 0.27–4.2)
VIT B12 BLD-MCNC: 336 PG/ML (ref 211–946)
VLDLC SERPL-MCNC: 42 MG/DL (ref 5–40)
WBC NRBC COR # BLD: 10.64 10*3/MM3 (ref 3.4–10.8)

## 2022-05-19 PROCEDURE — 82607 VITAMIN B-12: CPT | Performed by: INTERNAL MEDICINE

## 2022-05-19 PROCEDURE — 82962 GLUCOSE BLOOD TEST: CPT | Performed by: INTERNAL MEDICINE

## 2022-05-19 PROCEDURE — 80061 LIPID PANEL: CPT | Performed by: INTERNAL MEDICINE

## 2022-05-19 PROCEDURE — 82570 ASSAY OF URINE CREATININE: CPT | Performed by: INTERNAL MEDICINE

## 2022-05-19 PROCEDURE — 85025 COMPLETE CBC W/AUTO DIFF WBC: CPT | Performed by: INTERNAL MEDICINE

## 2022-05-19 PROCEDURE — 80053 COMPREHEN METABOLIC PANEL: CPT | Performed by: INTERNAL MEDICINE

## 2022-05-19 PROCEDURE — 83036 HEMOGLOBIN GLYCOSYLATED A1C: CPT | Performed by: INTERNAL MEDICINE

## 2022-05-19 PROCEDURE — 84443 ASSAY THYROID STIM HORMONE: CPT | Performed by: INTERNAL MEDICINE

## 2022-05-19 PROCEDURE — 82043 UR ALBUMIN QUANTITATIVE: CPT | Performed by: INTERNAL MEDICINE

## 2022-05-19 PROCEDURE — 99214 OFFICE O/P EST MOD 30 MIN: CPT | Performed by: INTERNAL MEDICINE

## 2022-05-19 PROCEDURE — 82306 VITAMIN D 25 HYDROXY: CPT | Performed by: INTERNAL MEDICINE

## 2022-05-19 PROCEDURE — 36415 COLL VENOUS BLD VENIPUNCTURE: CPT | Performed by: INTERNAL MEDICINE

## 2022-05-19 RX ORDER — INSULIN GLARGINE 100 [IU]/ML
INJECTION, SOLUTION SUBCUTANEOUS
Qty: 24 PEN | Refills: 3 | Status: SHIPPED | OUTPATIENT
Start: 2022-05-19 | End: 2022-11-21 | Stop reason: SDUPTHER

## 2022-05-19 NOTE — PROGRESS NOTES
"  Subjective    Supa Mcclelland is a 66 y.o. male. he is here today for follow-up of diabetes                                          HPI      66 y o male w Type 2 DM for more than 10 years.     Has dorys but not using.     States things are running well but Aic is elevated.       PE    /60   Pulse 85   Ht 180.3 cm (71\")   Wt 92.1 kg (203 lb)   SpO2 96%   BMI 28.31 kg/m²   AOx3  No visible goiter  Normal Respiratory Effort , Lung CTA  RRR  No Edema    Labs    Lab Results   Component Value Date    WBC 10.64 05/19/2022    HGB 17.2 05/19/2022    HCT 49.5 05/19/2022    MCV 83.8 05/19/2022     05/19/2022     Lab Results   Component Value Date    GLUCOSE 171 (H) 05/19/2022    BUN 16 05/19/2022    CREATININE 1.17 05/19/2022    EGFRIFNONA >60 04/20/2022    EGFRIFAFRI >59 06/23/2021    BCR 13.7 05/19/2022    K 3.9 05/19/2022    CO2 22.0 05/19/2022    CALCIUM 9.5 05/19/2022    ALBUMIN 4.30 05/19/2022    AST 63 (H) 05/19/2022    ALT 56 (H) 05/19/2022              Assessment & Plan      1. Type 2 diabetes mellitus with hyperglycemia, with long-term current use of insulin (HCC)    2. Hypertension associated with diabetes (HCC)    3. Mixed diabetic hyperlipidemia associated with type 2 diabetes mellitus (HCC)    4. Diabetic polyneuropathy associated with type 2 diabetes mellitus (HCC)    5. Vitamin D deficiency    6. Essential hypertension    .    Medications prescribed:    Glycemic Management:      Lab Results   Component Value Date    HGBA1C 9.70 (H) 05/19/2022     Needs to restart dorys 2     Getting insulin through assistance program      tresiba 75 units in am - change to basaglar 75 units qam -- 90 - 75       Humalog , no carb counting   20 w bkfast , add to lunch and supper    Jardiance 10 mg daily     Metformin xr 500 mg po BID      Trulicity 0.75 mg weekly - increase to 1.5 mg weekly --increase to 3 mg weekly - 4.5 mg weekly     Use dorys , he will contact me once he has started so I can see data      " "  Lipid Management       Gemfibrozil - stop      crestor 10 mg qhs    Lab Results   Component Value Date    CHOL 149 05/19/2022    TRIG 263 (H) 05/19/2022    HDL 40 05/19/2022    LDL 67 05/19/2022           Blood Pressure Management:          /60   Pulse 85   Ht 180.3 cm (71\")   Wt 92.1 kg (203 lb)   SpO2 96%   BMI 28.31 kg/m²           Microvascular Complication Monitoring:       Eye Exam Evaluation  Needs one   -----------     Last Microalbumin-Proteinuria Assessment     Lab Results   Component Value Date    MALBCRERATIO 36.7 05/19/2022    MALBCRERATIO 14.4 01/25/2019       -----------        Neuropathy, yes on neurontin, antidepressants  Also on requip            Weight Related:       BMI - 30.1     Has lost j10 lbs     Decreased caloric intake            Diet interventions: moderate (500 kCal/d) deficit diet.        Bone Health     Lab Results   Component Value Date    HXDQ96GU 20.8 (L) 05/19/2022    GAAE17PV 32.6 06/23/2021    MAKW23OP 27.7 (L) 08/03/2020     vit D 50 th u every 2 weeks        Thyroid Health     Lab Results   Component Value Date    TSH 2.040 05/19/2022        on levothyroxine 50 mcgs daily         Lab Results   Component Value Date    YHQSZIZX21 336 05/19/2022     Polycythemia , not smoking ,was when he had infeciton     Retest             "

## 2022-07-02 DIAGNOSIS — Z79.4 TYPE 2 DIABETES MELLITUS WITH HYPERGLYCEMIA, WITH LONG-TERM CURRENT USE OF INSULIN: Primary | ICD-10-CM

## 2022-07-02 DIAGNOSIS — E11.65 TYPE 2 DIABETES MELLITUS WITH HYPERGLYCEMIA, WITH LONG-TERM CURRENT USE OF INSULIN: Primary | ICD-10-CM

## 2022-07-05 RX ORDER — METFORMIN HYDROCHLORIDE 500 MG/1
TABLET, EXTENDED RELEASE ORAL
Qty: 180 TABLET | Refills: 0 | Status: ON HOLD | OUTPATIENT
Start: 2022-07-05 | End: 2022-10-19 | Stop reason: SDUPTHER

## 2022-07-08 ENCOUNTER — OFFICE VISIT (OUTPATIENT)
Dept: NEUROLOGY | Facility: CLINIC | Age: 67
End: 2022-07-08

## 2022-07-08 VITALS
OXYGEN SATURATION: 97 % | WEIGHT: 204 LBS | HEIGHT: 71 IN | SYSTOLIC BLOOD PRESSURE: 130 MMHG | HEART RATE: 90 BPM | DIASTOLIC BLOOD PRESSURE: 80 MMHG | BODY MASS INDEX: 28.56 KG/M2

## 2022-07-08 DIAGNOSIS — G25.81 RESTLESS LEGS SYNDROME (RLS): ICD-10-CM

## 2022-07-08 DIAGNOSIS — E11.42 DIABETIC POLYNEUROPATHY ASSOCIATED WITH TYPE 2 DIABETES MELLITUS: Primary | ICD-10-CM

## 2022-07-08 DIAGNOSIS — R05.3 PERSISTENT DRY COUGH: ICD-10-CM

## 2022-07-08 PROCEDURE — 99214 OFFICE O/P EST MOD 30 MIN: CPT | Performed by: PHYSICIAN ASSISTANT

## 2022-07-08 RX ORDER — BENZONATATE 200 MG/1
200 CAPSULE ORAL 3 TIMES DAILY PRN
Qty: 30 CAPSULE | Refills: 3 | Status: SHIPPED | OUTPATIENT
Start: 2022-07-08

## 2022-07-08 NOTE — PROGRESS NOTES
Subjective   Supa Mcclelland is a 67 y.o. male who presents for follow-up of painful diabetic neuropathy and restless leg syndrome.    History of Present Illness     Memory problems  The patient states that he has been having trouble remembering people's names for the last few months. He states that this is not normal for him. The patient has been sleeping well and is taking Ambien every night.    Diabetes  The patient states that his blood sugar has been stable. He had an appointment with his primary care physician this week and had lab works done which was said to be normal.     Vision  The patient states that he needs to see the ophthalmologist due to blurry vision. He was previously told that he might have a cataract.    Cough  The patient states that he has an intermittent cough occurring mostly at night. He would like to take a cough medicine for it.     The following portions of the patient's history were reviewed and updated as appropriate: allergies, current medications, past family history, past medical history, past social history, past surgical history and problem list.    Review of Systems:  A review of systems was performed, and positive findings are noted in the HPI.      Current Outpatient Medications:   •  cholecalciferol (D3-50) 1.25 MG (12796 UT) capsule, Take 1 capsule by mouth Every 14 (Fourteen) Days. TAKE ONE CAPSULE BY MOUTH EVERY 2 WEEKS., Disp: 6 capsule, Rfl: 3  •  Dulaglutide (Trulicity) 4.5 MG/0.5ML solution pen-injector, Inject 4.5 mg under the skin into the appropriate area as directed 1 (One) Time Per Week., Disp: 4 pen, Rfl: 11  •  empagliflozin (Jardiance) 10 MG tablet tablet, Take 1 tablet by mouth Daily., Disp: 90 tablet, Rfl: 3  •  famotidine (PEPCID) 20 MG tablet, Take 20 mg by mouth 2 (Two) Times a Day., Disp: , Rfl:   •  fexofenadine (ALLEGRA) 180 MG tablet, Take 180 mg by mouth As Needed., Disp: , Rfl:   •  fluticasone (FLONASE) 50 MCG/ACT nasal spray, 1 spray each nostril  twice a day for three days, then daily., Disp: 1 bottle, Rfl: 0  •  gabapentin (NEURONTIN) 800 MG tablet, Take 1 tablet by mouth 3 (Three) Times a Day., Disp: 270 tablet, Rfl: 0  •  hydrochlorothiazide (HYDRODIURIL) 12.5 MG tablet, Take 12.5 mg by mouth Daily., Disp: , Rfl:   •  Insulin Glargine (BASAGLAR KWIKPEN) 100 UNIT/ML injection pen, 75 units qam, Disp: 24 pen, Rfl: 3  •  Insulin Lispro (HumaLOG KwikPen) 200 UNIT/ML solution pen-injector, Inject 20 Units under the skin into the appropriate area as directed 3 (Three) Times a Day With Meals., Disp: 18 pen, Rfl: 11  •  levothyroxine (SYNTHROID, LEVOTHROID) 50 MCG tablet, Take 1 tablet by mouth Daily., Disp: , Rfl:   •  loratadine (CLARITIN) 10 MG tablet, Take 10 mg by mouth Daily., Disp: , Rfl:   •  meloxicam (MOBIC) 15 MG tablet, Take 1 tablet by mouth Daily., Disp: , Rfl:   •  metFORMIN ER (GLUCOPHAGE-XR) 500 MG 24 hr tablet, TAKE 1 TABLET BY MOUTH TWICE DAILY WITH MEALS (Patient taking differently: Take 500 mg by mouth 2 (Two) Times a Day.), Disp: 180 tablet, Rfl: 0  •  metoprolol tartrate (LOPRESSOR) 50 MG tablet, Take 50 mg by mouth 2 (Two) Times a Day., Disp: , Rfl:   •  mexiletine (MEXITIL) 150 MG capsule, Take 2 capsules by mouth Daily., Disp: 60 capsule, Rfl: 6  •  omeprazole (priLOSEC) 20 MG capsule, Take 1 capsule by mouth 2 (Two) Times a Day., Disp: 60 capsule, Rfl: 11  •  potassium citrate (UROCIT-K) 10 MEQ (1080 MG) CR tablet, Take 1 tablet by mouth 3 (Three) Times a Day With Meals., Disp: 90 tablet, Rfl: 11  •  RELION PEN NEEDLES 31G X 6 MM misc, USE AS DIRECTED 4 TIMES DAILY, Disp: 150 each, Rfl: 11  •  rOPINIRole (REQUIP) 2 MG tablet, Take 1 tablet by mouth Every Night. Take 1 hour before bedtime., Disp: 30 tablet, Rfl: 6  •  rosuvastatin (Crestor) 10 MG tablet, Take 1 tablet by mouth Every Night., Disp: 30 tablet, Rfl: 11  •  zolpidem (AMBIEN) 10 MG tablet, Take 10 mg by mouth At Night As Needed., Disp: , Rfl:   •  benzonatate (TESSALON) 200 MG  capsule, Take 1 capsule by mouth 3 (Three) Times a Day As Needed for Cough., Disp: 30 capsule, Rfl: 3     Objective   Physical Exam  Vitals and nursing note reviewed.   Constitutional:       General: He is not in acute distress.     Appearance: Normal appearance. He is well-developed. He is not ill-appearing, toxic-appearing or diaphoretic.   HENT:      Head: Normocephalic and atraumatic.      Right Ear: External ear normal.      Left Ear: External ear normal.   Eyes:      Conjunctiva/sclera: Conjunctivae normal.      Pupils: Pupils are equal, round, and reactive to light.   Neck:      Thyroid: No thyromegaly.      Vascular: No carotid bruit.   Cardiovascular:      Rate and Rhythm: Normal rate and regular rhythm.      Pulses: Normal pulses.      Heart sounds: Normal heart sounds. No murmur heard.  Pulmonary:      Effort: Pulmonary effort is normal. No respiratory distress.      Breath sounds: Normal breath sounds.   Abdominal:      General: Bowel sounds are normal.      Palpations: Abdomen is soft. There is no mass.      Tenderness: There is no abdominal tenderness.   Musculoskeletal:         General: No swelling. Normal range of motion.      Cervical back: Normal range of motion and neck supple.   Lymphadenopathy:      Cervical: No cervical adenopathy.   Skin:     General: Skin is warm and dry.      Findings: No lesion or rash.   Neurological:      Mental Status: He is alert and oriented to person, place, and time.      Cranial Nerves: No cranial nerve deficit.      Motor: No weakness.      Coordination: Coordination normal.      Gait: Gait normal.      Deep Tendon Reflexes: Reflexes are normal and symmetric.      Comments: peripheral sensory decreased below the knee bilaterally consistent with previously diagnosed peripheral neuropathy. Deep tendon reflexes are diminished bilaterally in the lower extremities.   Psychiatric:         Mood and Affect: Mood normal.         Behavior: Behavior normal.         Thought  Content: Thought content normal.         Judgment: Judgment normal.       Imaging  No results were reviewed or obtained today.    Assessment & Plan   Diagnoses and all orders for this visit:    1. Diabetic polyneuropathy associated with type 2 diabetes mellitus (HCC) (Primary)    2. Restless legs syndrome (RLS)    3. Persistent dry cough  -     benzonatate (TESSALON) 200 MG capsule; Take 1 capsule by mouth 3 (Three) Times a Day As Needed for Cough.  Dispense: 30 capsule; Refill: 3      1. Painful diabetic peripheral neuropathy  - Currently improved with the use of his present medication regimen. No changes were made with regard to this today. Had a good discussion about his complaints of memory and he will keep track of this with regard to any patterns. Today, his mental status was very normal and do not feel that formal memory evaluation would have been very productive. Today, he had no focal findings that would be indicative of need for imaging. I did discuss with him with regard to his chronic diseases and multiple medications including the use of Ambien every night for sleep that can be implicated in subjective memory problems. He will review this with his primary care provider. Follow up as scheduled.         Transcribed from ambient dictation for ZACARIAS Alonzo by Paresh Kelley.  07/08/22   11:06 CDT    Patient verbalized consent to the visit recording.

## 2022-07-11 ENCOUNTER — TELEPHONE (OUTPATIENT)
Dept: CARDIOLOGY CLINIC | Age: 67
End: 2022-07-11

## 2022-07-19 ENCOUNTER — TELEPHONE (OUTPATIENT)
Dept: CARDIOLOGY CLINIC | Age: 67
End: 2022-07-19

## 2022-07-19 ENCOUNTER — OFFICE VISIT (OUTPATIENT)
Dept: CARDIOLOGY CLINIC | Age: 67
End: 2022-07-19
Payer: MEDICARE

## 2022-07-19 VITALS
DIASTOLIC BLOOD PRESSURE: 84 MMHG | HEIGHT: 71 IN | WEIGHT: 209 LBS | SYSTOLIC BLOOD PRESSURE: 140 MMHG | HEART RATE: 82 BPM | BODY MASS INDEX: 29.26 KG/M2

## 2022-07-19 DIAGNOSIS — I10 ESSENTIAL HYPERTENSION: ICD-10-CM

## 2022-07-19 DIAGNOSIS — E78.5 HYPERLIPIDEMIA, UNSPECIFIED HYPERLIPIDEMIA TYPE: ICD-10-CM

## 2022-07-19 DIAGNOSIS — R42 DIZZINESS: ICD-10-CM

## 2022-07-19 DIAGNOSIS — H53.8 BLURRED VISION, BILATERAL: ICD-10-CM

## 2022-07-19 DIAGNOSIS — I51.89 DIASTOLIC DYSFUNCTION: ICD-10-CM

## 2022-07-19 DIAGNOSIS — H53.8 OTHER VISUAL DISTURBANCES: Primary | ICD-10-CM

## 2022-07-19 DIAGNOSIS — I51.9 LV DYSFUNCTION: ICD-10-CM

## 2022-07-19 PROCEDURE — 1036F TOBACCO NON-USER: CPT | Performed by: NURSE PRACTITIONER

## 2022-07-19 PROCEDURE — 3017F COLORECTAL CA SCREEN DOC REV: CPT | Performed by: NURSE PRACTITIONER

## 2022-07-19 PROCEDURE — G8417 CALC BMI ABV UP PARAM F/U: HCPCS | Performed by: NURSE PRACTITIONER

## 2022-07-19 PROCEDURE — G8427 DOCREV CUR MEDS BY ELIG CLIN: HCPCS | Performed by: NURSE PRACTITIONER

## 2022-07-19 PROCEDURE — 1123F ACP DISCUSS/DSCN MKR DOCD: CPT | Performed by: NURSE PRACTITIONER

## 2022-07-19 PROCEDURE — 99214 OFFICE O/P EST MOD 30 MIN: CPT | Performed by: NURSE PRACTITIONER

## 2022-07-19 RX ORDER — LOSARTAN POTASSIUM 25 MG/1
25 TABLET ORAL DAILY
Qty: 30 TABLET | Refills: 5 | Status: SHIPPED | OUTPATIENT
Start: 2022-07-19

## 2022-07-19 NOTE — PATIENT INSTRUCTIONS
Please start Cozaar 25 mg daily     Hypertension  (High Blood Pressure)  Most people with high blood pressure (hypertension) have no symptoms. High blood pressure can be a dangerous problem. Hypertension is dangerous because you may have it and not know it. High blood pressure may mean that your heart needs to work harder to pump blood. Your blood pressure is measured with 2 numbers. The first number is when your heart flexes (contracts), and the second number is when your heart relaxes. The higher the numbers are, the more you are at risk for problems. Write down your blood pressure today. The best blood pressure for adults is 120/80 (mmHg) or lower. It is likely that your blood pressure was recorded at least 2 times today. It is important for you to give these numbers to your doctor. If you do not have a doctor, try to get follow-up care at a hospital or community clinic. You may need to start high blood pressure medicine. You may also need to adjust your medicines as told by your doctor. Even mild high blood pressure increases long-term health risks. One high reading does not mean you have hypertension. Your blood pressure should be taken when you are relaxed. It is also important to sit for about 10 minutes before being tested. Things that can increase your blood pressure are:  Injury, Illness, Stress, Caffeine, and some medicines (like decongestants). High blood pressure does not usually need emergency treatment. HOME CARE  Lifestyle and medicine changes may be needed, including:   Weight loss. Exercise. Limit the use of salt. Stop smoking. If using decongestants or birth control pills, talk to your doctor. These medicines might make blood pressure higher. Do not use street drugs. Females should not drink more than 1 alcoholic drink per day. Males should not drink more than 2 alcoholic drinks per day. Take your blood pressure medicine. You will need to take it every day.  If you do not get treated, there are risks, including:   Heart disease. Stroke. Kidney failure. See your doctor as told. GET HELP RIGHT AWAY IF:  You get a very bad headache. You get blurred or changing vision. You feel confused. You feel weak, numb, or faint. You get chest or belly (abdominal) pain. You throw up (vomit). You cannot breathe very well. If you have a blood pressure reading with a top number of 180 or higher, you need to see your doctor right away. This is especially true if you are having any of the problems listed above. Hyperlipidemia   (Dyslipidemia; High Triglycerides; Triglycerides, High)     Definition   Hyperlipidemia is a high level of fats in the blood. These fats, called lipids, include cholesterol and triglycerides. There are five types of hyperlipidemia. The type depends on which lipid in the blood is high. Causes   Causes may include:   A family history of hyperlipidemia   A diet high in total fat, saturated fat, or cholesterol   Obesity   Excess alcohol intake   Certain conditions, including:   Diabetes   Low thyroid   Kidney problems   Liver disease   Cushing's syndrome   Certain drugs, such as:   Hormones or birth control pills   Beta-blockers   Some diuretics   Cortisone drugs   Isotretinoin (for acne )   Some anti- HIV drugs   Risk Factors   These factors increase your chance of developing this condition. Tell your doctor if you have any of these:   Advancing age   Sex: male   Postmenopause   Lack of exercise   Smoking   Stress   Overuse of alcohol   Symptoms   Hyperlipidemia usually does not cause symptoms. Very high levels of lipids or triglycerides can cause:   Fat deposits in the skin or tendons ( xanthomas )   Pain, enlargement, or swelling of organs such as the liver, spleen, or pancreas ( pancreatitis )   Obstruction of blood vessels in heart and brain   Hyperlipidemia can increase your risk of atherosclerosis .  This is a dangerous hardening of the arteries. It can end up blocking blood flow. In some cases, this may result in:   Angina   Heart attack   Stroke   Other serious complications   Blood Vessel with Atherosclerosis       2011 [address] [facility].   Diagnosis   This condition is diagnosed with blood tests. These tests measure the levels of lipids in the blood. The National Cholesterol Education Program advises that you have your lipids checked at least once every five years, starting at age 21. Also, the American Academy of Pediatrics recommends lipid screening for children at risk (eg, a family history of hyperlipidemia). Testing may consist of a fasting blood test for:   Total cholesterol   LDL (bad cholesterol)   HDL (good cholesterol)   Triglycerides   Your doctor may recommend more frequent or earlier testing if you have:   Family history of hyperlipidemia   Risk factor or disease that may cause hyperlipidemia   Complication that may result from hyperlipidemia   Treatment   Treatment is not only aimed at correcting your cholesterol levels, but also at lowering your overall risk for heart disease and strokes. Diet Changes   Eat a diet low in total fat, saturated fat, and cholesterol . Reduce or eliminate the amount of alcohol you drink. Eat more high-fiber foods. Lifestyle Changes   If you are overweight, lose weight . If you smoke, quit . Exercise regularly . Talk to you doctor before starting an exercise program. Anuj Munoz may already have hardening of the arteries or heart disease. These conditions increase your risk of having a heart attack while exercising. Make sure other medical conditions such as high blood pressure and diabetes are being treated and controlled. Medications   There are a number of drugs available, such as statins , to treat this condition and help lower your risk for heart disease. Talk to your doctor. Statins have been shown to reduce mortality (death), heart attacks , and stroke.    These medicines are best used as additions to diet and exercise and should not replace healthy lifestyle changes. Prevention   To help reduce your chance of getting hyperlipidemia, take the following steps:   Starting at age 21, get cholesterol tests. Eat a diet low in total fat, saturated fat, and cholesterol. If you smoke, quit. Drink alcohol in moderation (two drinks per day for men, one drink per day for women). If you are overweight, lose weight. Exercise regularly. Talk with your doctor first.   If you have diabetes , control your blood sugar. Talk to your doctor about medications you are taking. They may have side effects that cause hyperlipidemia.      Last Reviewed: September 2010 Suzi Juarez DO   Updated: 11/9/2010

## 2022-07-19 NOTE — PROGRESS NOTES
Dear  Hudson Patel, APRN - CNP,    Thank you for allowing me to participate in the care of Mr. Herlinda Severance. [address] [facility] [address] As you know, Mr. Yo Hartman is a 75 y.o. male with history of hypertension, hyperlipidemia, diabetes, hypothyroidism,  and neuropathy, who presents with the chief complaint of follow-up for chronic cardiac conditions. He is a patient of Dr. Skyler More. Seen, he has been stable from a cardiac standpoint. For the past couple months he has had some blurred vision. He has slight dizziness. The lower vision is constant. He does have an appointment with an eye doctor in August.  He denies any exertional chest pain or shortness of breath. He denies any fast or slow heart rhythms. They cannot afford Repatha and are waiting to see if they can get assistance from a program.he is sleeping on 1 pillow at night. he is not waking gasping for air. he denies any swelling. he has been compliant with his medications. his BP has been controlled. PCP follows labs and lipids. He otherwise denies chest pain, SOA, WEBB, PND, orthopnea, syncope or near syncope. He has no other complaints. Review of Systems   Constitutional: Negative for fever, chills, diaphoresis, activity change, appetite change, fatigue and unexpected weight change. Eyes: Negative for photophobia, pain, redness and visual disturbance. Respiratory:  Negative for apnea, chest tightness, shortness of breath, wheezing and stridor. Cardiovascular: Negative for chest pain, palpitations and leg swelling. Gastrointestinal: Negative for abdominal distention. Genitourinary: Negative for dysuria, urgency and frequency. Musculoskeletal: Negative for myalgias, arthralgias and gait problem. Skin: Negative for color change, pallor, rash and wound. Neurological: Positive for blurred vision and slight dizziness. Negative for tremors, speech difficulty, weakness and numbness. Hematological: Does not bruise/bleed easily. Psychiatric/Behavioral: Negative. Past Medical History:   Diagnosis Date    Arm fracture 07/2016    left    Depression     Diabetes mellitus ([facility] [address]     Hypertension     Kidney stones     Neuropathy     Restless leg syndrome        Past Surgical History:   Procedure Laterality Date    BONE MARROW BIOPSY Right 12/9/2020    BONE MARROW ASPIRATION BIOPSY performed by ALISA Long at [facility] [address] Left     Frozen shoulder surgery    UPPER GASTROINTESTINAL ENDOSCOPY         No family history on file.     Social History     Socioeconomic History    Marital status:      Spouse name: Not on file    Number of children: Not on file    Years of education: Not on file    Highest education level: Not on file   Occupational History    Not on file   Tobacco Use    Smoking status: Never    Smokeless tobacco: Never   Vaping Use    Vaping Use: Not on file   Substance and Sexual Activity    Alcohol use: No    Drug use: No    Sexual activity: Not on file   Other Topics Concern    Not on file   Social History Narrative    Not on file     Social Determinants of Health     Financial Resource Strain: Not on file   Food Insecurity: Not on file   Transportation Needs: Not on file   Physical Activity: Not on file   Stress: Not on file   Social Connections: Not on file   Intimate Partner Violence: Not on file   Housing Stability: Not on file       Allergies   Allergen Reactions    Penicillins          Current Outpatient Medications:     ondansetron (ZOFRAN ODT) 4 MG disintegrating tablet, Take 1 tablet by mouth every 8 hours as needed for Nausea or Vomiting, Disp: 9 tablet, Rfl: 0    empagliflozin (JARDIANCE) 10 MG tablet, Take 10 mg by mouth daily, Disp: , Rfl:     Dulaglutide (TRULICITY) 1.5 TJ/4.2DM SOPN, Inject 1.5 mg into the skin once a week, Disp: , Rfl:     gemfibrozil (LOPID) 600 MG tablet, Take 600 mg by mouth 2 times daily (before meals), Disp: , Rfl: insulin glargine (BASAGLAR KWIKPEN) 100 UNIT/ML injection pen, Inject 80 Units into the skin daily , Disp: , Rfl:     Cholecalciferol (VITAMIN D3) 1.25 MG (20151 UT) CAPS, Take 50,000 Units by mouth every 7 days , Disp: , Rfl:     carBAMazepine (CARBATROL) 200 MG extended release capsule, Take 200 mg by mouth 2 times daily, Disp: , Rfl:     fluticasone (FLONASE) 50 MCG/ACT nasal spray, 1 spray by Each Nare route 2 times daily as needed for Rhinitis, Disp: , Rfl:     Insulin Lispro (HUMALOG KWIKPEN SC), Inject 20 Units into the skin every morning (before breakfast) , Disp: , Rfl:     loratadine (CLARITIN) 10 MG tablet, Take 10 mg by mouth daily as needed, Disp: , Rfl:     metFORMIN (GLUCOPHAGE-XR) 500 MG extended release tablet, Take 500 mg by mouth 2 times daily, Disp: , Rfl:     gabapentin (NEURONTIN) 800 MG tablet, Take 800 mg by mouth 2 times daily. ., Disp: , Rfl:     hydrochlorothiazide (HYDRODIURIL) 25 MG tablet, Take 12.5 mg by mouth daily, Disp: , Rfl:     levothyroxine (SYNTHROID) 50 MCG tablet, Take 50 mcg by mouth Daily, Disp: , Rfl:     famotidine (PEPCID) 20 MG tablet, Take 20 mg by mouth daily , Disp: , Rfl:     citalopram (CELEXA) 20 MG tablet, Take 20 mg by mouth nightly, Disp: , Rfl:     Evolocumab (REPATHA SURECLICK) 233 MG/ML SOAJ, Inject 1 mL into the skin every 14 days (Patient not taking: Reported on 7/19/2022), Disp: 2 pen, Rfl: 11    PE:  Vitals:    07/19/22 0857   BP: (!) 140/84   Pulse: 82         Estimated body mass index is 29.15 kg/m² as calculated from the following:    Height as of this encounter: 5' 11\" (1.803 m). Weight as of this encounter: 209 lb (94.8 kg). Constitutional: He is oriented to person, place, and time. He appears well-developed and well-nourished in no acute distress. Neck:  Neck supple without JVD present. No trachea deviation present. No thyromegaly present. Eyes:Conjunctivae and EOM are normal. Pupils equal and reactive to light.    ENT:Hearing appears normal.conjunctiva and lids are normal, ears and nose appear normal.  Cardiovascular: Normal rate, Normal rhythm, normal heart sounds. No murmur ascultated. No gallop and no friction rub. No carotid bruits. No peripheral edema. Pulmonary/Chest:  Lungs clear to auscultation bilaterally without evidence of respiratory distress. He without wheezes. He without rales or ronchi. Musculoskeletal: Normal range of motion. Gait is normal. Head is normocephalic and atraumatic. Skin: Skin is warm and dry without rash or pallor. Psychiatric:He is alert and oriented to person, place, and time. He has a normal mood and affect. His behavior is normal. Thought content normal.     Lab Results   Component Value Date/Time    CREATININE 1.0 04/20/2022 11:30 AM    CREATININE 1.4 10/20/2021 10:16 AM    CREATININE 1.1 06/23/2021 07:58 AM    HGB 16.1 04/20/2022 11:10 AM    HGB 17.5 10/27/2021 11:12 AM    HGB 16.4 10/20/2021 10:23 AM          Assessment, Recommendations, & Plan:  75 y.o. male with HTN, Diastolic dysfunction, hyperlipidemia, LV dysfunction, Dizziness, & Blurred Vision    HTN-Elevated on hydrochlorothiazide. He had a cough with Lisinopril. Start Cozaar 25 mg daily. Diastolic dysfunction-controlled on hydrochlorothiazide. Continue current regimen    LV dysfunction-last echo showed EF of 55%. Monitor    Hyperlipidemia-lipid panel from 5/19/22 reviewed  Total cholesterol-1449  Triglycerides-262  HDL-40  LDL-60    Dizziness/Blurred Vision- Carotid Ultrasound    Disposition - RTC in 1 month or sooner if needed      Please do not hesitate to contact me for any questions or concerns.     Sincerely yours,    ALISA Casarez

## 2022-07-22 ENCOUNTER — HOSPITAL ENCOUNTER (OUTPATIENT)
Dept: GENERAL RADIOLOGY | Age: 67
Discharge: HOME | End: 2022-07-22
Payer: MEDICARE

## 2022-07-22 DIAGNOSIS — H53.8 OTHER VISUAL DISTURBANCES: ICD-10-CM

## 2022-07-22 DIAGNOSIS — H53.8 BLURRED VISION, BILATERAL: ICD-10-CM

## 2022-07-22 DIAGNOSIS — R42 DIZZINESS: ICD-10-CM

## 2022-07-22 DIAGNOSIS — R42 DIZZINESS: Primary | ICD-10-CM

## 2022-07-22 PROCEDURE — 93880 EXTRACRANIAL BILAT STUDY: CPT | Performed by: RADIOLOGY

## 2022-07-22 PROCEDURE — 93880 EXTRACRANIAL BILAT STUDY: CPT

## 2022-07-26 ENCOUNTER — TELEPHONE (OUTPATIENT)
Dept: CARDIOLOGY CLINIC | Age: 67
End: 2022-07-26

## 2022-07-26 NOTE — TELEPHONE ENCOUNTER
----- Message from ALISA Leon sent at 7/26/2022  8:45 AM CDT -----  Please call let him know that there is bilateral stenosis of the carotid arteries however they are less than 50%. Continue the Lopid and Repatha.

## 2022-08-22 DIAGNOSIS — E11.42 DIABETIC POLYNEUROPATHY ASSOCIATED WITH TYPE 2 DIABETES MELLITUS: ICD-10-CM

## 2022-08-22 DIAGNOSIS — G25.81 RESTLESS LEGS SYNDROME (RLS): ICD-10-CM

## 2022-08-23 ENCOUNTER — OFFICE VISIT (OUTPATIENT)
Dept: CARDIOLOGY CLINIC | Age: 67
End: 2022-08-23
Payer: MEDICARE

## 2022-08-23 VITALS
DIASTOLIC BLOOD PRESSURE: 60 MMHG | SYSTOLIC BLOOD PRESSURE: 108 MMHG | BODY MASS INDEX: 28.14 KG/M2 | OXYGEN SATURATION: 96 % | HEIGHT: 71 IN | HEART RATE: 65 BPM | WEIGHT: 201 LBS

## 2022-08-23 DIAGNOSIS — I10 ESSENTIAL HYPERTENSION: Primary | ICD-10-CM

## 2022-08-23 DIAGNOSIS — E78.5 HYPERLIPIDEMIA, UNSPECIFIED HYPERLIPIDEMIA TYPE: ICD-10-CM

## 2022-08-23 DIAGNOSIS — I51.9 LV DYSFUNCTION: ICD-10-CM

## 2022-08-23 DIAGNOSIS — I51.89 DIASTOLIC DYSFUNCTION: ICD-10-CM

## 2022-08-23 DIAGNOSIS — R11.2 NAUSEA AND VOMITING, INTRACTABILITY OF VOMITING NOT SPECIFIED, UNSPECIFIED VOMITING TYPE: ICD-10-CM

## 2022-08-23 DIAGNOSIS — R11.0 NAUSEA: ICD-10-CM

## 2022-08-23 PROCEDURE — G8417 CALC BMI ABV UP PARAM F/U: HCPCS | Performed by: NURSE PRACTITIONER

## 2022-08-23 PROCEDURE — 99214 OFFICE O/P EST MOD 30 MIN: CPT | Performed by: NURSE PRACTITIONER

## 2022-08-23 PROCEDURE — 3017F COLORECTAL CA SCREEN DOC REV: CPT | Performed by: NURSE PRACTITIONER

## 2022-08-23 PROCEDURE — 1036F TOBACCO NON-USER: CPT | Performed by: NURSE PRACTITIONER

## 2022-08-23 PROCEDURE — 1123F ACP DISCUSS/DSCN MKR DOCD: CPT | Performed by: NURSE PRACTITIONER

## 2022-08-23 PROCEDURE — G8427 DOCREV CUR MEDS BY ELIG CLIN: HCPCS | Performed by: NURSE PRACTITIONER

## 2022-08-23 RX ORDER — GABAPENTIN 800 MG/1
TABLET ORAL
Qty: 270 TABLET | Refills: 0 | Status: SHIPPED | OUTPATIENT
Start: 2022-08-23 | End: 2022-11-21

## 2022-08-23 RX ORDER — SIMVASTATIN 10 MG
10 TABLET ORAL NIGHTLY
COMMUNITY

## 2022-08-23 NOTE — PROGRESS NOTES
Dear  Ritu Crenshaw, APRN - CNP,    Thank you for allowing me to participate in the care of Mr. Jordan Cook. He presents today at [facility] As you know, Mr. Mary Perez is a 75 y.o. male with history of hypertension, hyperlipidemia, diabetes, hypothyroidism,  and neuropathy, who presents with the chief complaint of follow-up for BP check and medication adjustment. He is a patient of Dr. Ant Reyes. Started on Cozaar at last visit. Blood pressure is much improved here in office today. He is had no side effects of the Cozaar. He started with nausea out in the waiting room. He started vomiting in the exam room. Patient states he ate breakfast fine. We called the wife who is coming to get him however she states she is just getting over the stomach bug from last week. He denies any chest pain or shortness of breath. He denies any fast or slow heart rhythms. he is sleeping on 1 pillow at night. he is not waking gasping for air. he denies any swelling. he has been compliant with his medications. his BP has been controlled. PCP follows labs and lipids. He otherwise denies chest pain, SOA, WEBB, PND, orthopnea, syncope or near syncope. He has no other complaints. Review of Systems   Constitutional: Negative for fever, chills, diaphoresis, activity change, appetite change, fatigue and unexpected weight change. Eyes: Negative for photophobia, pain, redness and visual disturbance. Respiratory:  Negative for apnea, chest tightness, shortness of breath, wheezing and stridor. Cardiovascular: Negative for chest pain, palpitations and leg swelling. Gastrointestinal: Positive for nausea and vomiting Negative for abdominal distention. Genitourinary: Negative for dysuria, urgency and frequency. Musculoskeletal: Negative for myalgias, arthralgias and gait problem. Skin: Negative for color change, pallor, rash and wound. Neurological:  Negative for tremors, speech difficulty, weakness and numbness.    Hematological: Does not bruise/bleed easily. Psychiatric/Behavioral: Negative. Past Medical History:   Diagnosis Date    Arm fracture 07/2016    left    Depression     Diabetes mellitus ([facility] [address]     Hypertension     Kidney stones     Neuropathy     Restless leg syndrome        Past Surgical History:   Procedure Laterality Date    BONE MARROW BIOPSY Right 12/9/2020    BONE MARROW ASPIRATION BIOPSY performed by ALISA Long at [facility] [address] Left     Frozen shoulder surgery    UPPER GASTROINTESTINAL ENDOSCOPY         No family history on file.     Social History     Socioeconomic History    Marital status:      Spouse name: Not on file    Number of children: Not on file    Years of education: Not on file    Highest education level: Not on file   Occupational History    Not on file   Tobacco Use    Smoking status: Never    Smokeless tobacco: Never   Vaping Use    Vaping Use: Not on file   Substance and Sexual Activity    Alcohol use: No    Drug use: No    Sexual activity: Not on file   Other Topics Concern    Not on file   Social History Narrative    Not on file     Social Determinants of Health     Financial Resource Strain: Not on file   Food Insecurity: Not on file   Transportation Needs: Not on file   Physical Activity: Not on file   Stress: Not on file   Social Connections: Not on file   Intimate Partner Violence: Not on file   Housing Stability: Not on file       Allergies   Allergen Reactions    Penicillins          Current Outpatient Medications:     simvastatin (ZOCOR) 10 MG tablet, Take 10 mg by mouth nightly, Disp: , Rfl:     losartan (COZAAR) 25 MG tablet, Take 1 tablet by mouth in the morning., Disp: 30 tablet, Rfl: 5    ondansetron (ZOFRAN ODT) 4 MG disintegrating tablet, Take 1 tablet by mouth every 8 hours as needed for Nausea or Vomiting, Disp: 9 tablet, Rfl: 0    empagliflozin (JARDIANCE) 10 MG tablet, Take 10 mg by mouth daily, Disp: , Rfl:     Dulaglutide (TRULICITY) 1.5 BQ/9.5OH SOPN, Inject 1.5 mg into the skin once a week, Disp: , Rfl:     gemfibrozil (LOPID) 600 MG tablet, Take 600 mg by mouth 2 times daily (before meals), Disp: , Rfl:     insulin glargine (BASAGLAR KWIKPEN) 100 UNIT/ML injection pen, Inject 80 Units into the skin daily , Disp: , Rfl:     Cholecalciferol (VITAMIN D3) 1.25 MG (33349 UT) CAPS, Take 50,000 Units by mouth every 7 days , Disp: , Rfl:     carBAMazepine (CARBATROL) 200 MG extended release capsule, Take 200 mg by mouth 2 times daily, Disp: , Rfl:     fluticasone (FLONASE) 50 MCG/ACT nasal spray, 1 spray by Each Nare route 2 times daily as needed for Rhinitis, Disp: , Rfl:     Insulin Lispro (HUMALOG KWIKPEN SC), Inject 20 Units into the skin every morning (before breakfast) , Disp: , Rfl:     loratadine (CLARITIN) 10 MG tablet, Take 10 mg by mouth daily as needed, Disp: , Rfl:     metFORMIN (GLUCOPHAGE-XR) 500 MG extended release tablet, Take 500 mg by mouth 2 times daily, Disp: , Rfl:     gabapentin (NEURONTIN) 800 MG tablet, Take 800 mg by mouth 2 times daily. ., Disp: , Rfl:     hydrochlorothiazide (HYDRODIURIL) 25 MG tablet, Take 12.5 mg by mouth daily, Disp: , Rfl:     levothyroxine (SYNTHROID) 50 MCG tablet, Take 50 mcg by mouth Daily, Disp: , Rfl:     famotidine (PEPCID) 20 MG tablet, Take 20 mg by mouth daily , Disp: , Rfl:     citalopram (CELEXA) 20 MG tablet, Take 20 mg by mouth nightly, Disp: , Rfl:     Evolocumab (REPATHA SURECLICK) 628 MG/ML SOAJ, Inject 1 mL into the skin every 14 days (Patient not taking: No sig reported), Disp: 2 pen, Rfl: 11    PE:  Vitals:    08/23/22 0757   BP: 108/60   Pulse: 65   SpO2: 96%         Estimated body mass index is 28.03 kg/m² as calculated from the following:    Height as of this encounter: 5' 11\" (1.803 m). Weight as of this encounter: 201 lb (91.2 kg). Constitutional: He is oriented to person, place, and time.  He appears well-developed and well-nourished in no acute distress. Neck:  Neck supple without JVD present. No trachea deviation present. No thyromegaly present. Eyes:Conjunctivae and EOM are normal. Pupils equal and reactive to light. ENT:Hearing appears normal.conjunctiva and lids are normal, ears and nose appear normal.  Cardiovascular: Normal rate, Normal rhythm, normal heart sounds. No murmur ascultated. No gallop and no friction rub. No carotid bruits. No peripheral edema. Pulmonary/Chest:  Lungs clear to auscultation bilaterally without evidence of respiratory distress. He without wheezes. He without rales or ronchi. Musculoskeletal: Normal range of motion. Gait is normal. Head is normocephalic and atraumatic. Skin: Skin is warm and dry without rash or pallor. Psychiatric:He is alert and oriented to person, place, and time. He has a normal mood and affect. His behavior is normal. Thought content normal.     Lab Results   Component Value Date/Time    CREATININE 1.0 04/20/2022 11:30 AM    CREATININE 1.4 10/20/2021 10:16 AM    CREATININE 1.1 06/23/2021 07:58 AM    HGB 16.1 04/20/2022 11:10 AM    HGB 17.5 10/27/2021 11:12 AM    HGB 16.4 10/20/2021 10:23 AM          Assessment, Recommendations, & Plan:  75 y.o. male with HTN, Diastolic dysfunction, hyperlipidemia, LV dysfunction, Nausea, & Vomiting    HTN-Elevated on hydrochlorothiazide & Cozaar. He has had no side effects with the Cozaar. Continue current regimen    Diastolic dysfunction-controlled on hydrochlorothiazide & Cozaar. Continue current regimen    LV dysfunction-last echo showed EF of 55%. Monitor    Hyperlipidemia-Controlled on Zocor and Lopid. Continue current regimen  lipid panel from 5/19/22 reviewed  Total cholesterol-149  Triglycerides-263  HDL-40  LDL-67    Nausea/Vomiting-patient started with nausea in the lobby and started vomiting in the exam room. He is still nauseous. We called his wife to come get him. Apparently, she is just recovering from a stomach bug.   We will keep him quarantined in the exam room until she comes to pick him up. Disposition - RTC in 1 month or sooner if needed      Please do not hesitate to contact me for any questions or concerns.     Sincerely yours,    ALISA Beaver

## 2022-10-03 ENCOUNTER — APPOINTMENT (OUTPATIENT)
Dept: CT IMAGING | Facility: HOSPITAL | Age: 67
End: 2022-10-03

## 2022-10-03 ENCOUNTER — HOSPITAL ENCOUNTER (EMERGENCY)
Facility: HOSPITAL | Age: 67
Discharge: HOME | End: 2022-10-04
Attending: EMERGENCY MEDICINE | Admitting: EMERGENCY MEDICINE

## 2022-10-03 DIAGNOSIS — R10.9 RIGHT FLANK PAIN: Primary | ICD-10-CM

## 2022-10-03 DIAGNOSIS — R82.81 PYURIA: ICD-10-CM

## 2022-10-03 LAB
ALBUMIN SERPL-MCNC: 4.2 G/DL (ref 3.5–5.2)
ALBUMIN/GLOB SERPL: 1.3 G/DL
ALP SERPL-CCNC: 114 U/L (ref 39–117)
ALT SERPL W P-5'-P-CCNC: 31 U/L (ref 1–41)
ANION GAP SERPL CALCULATED.3IONS-SCNC: 9 MMOL/L (ref 5–15)
AST SERPL-CCNC: 36 U/L (ref 1–40)
BACTERIA UR QL AUTO: ABNORMAL /HPF
BASOPHILS # BLD AUTO: 0.04 10*3/MM3 (ref 0–0.2)
BASOPHILS NFR BLD AUTO: 0.5 % (ref 0–1.5)
BILIRUB SERPL-MCNC: 0.4 MG/DL (ref 0–1.2)
BILIRUB UR QL STRIP: NEGATIVE
BUN SERPL-MCNC: 22 MG/DL (ref 8–23)
BUN/CREAT SERPL: 18.6 (ref 7–25)
CALCIUM SPEC-SCNC: 9.1 MG/DL (ref 8.6–10.5)
CHLORIDE SERPL-SCNC: 102 MMOL/L (ref 98–107)
CLARITY UR: CLEAR
CO2 SERPL-SCNC: 25 MMOL/L (ref 22–29)
COLOR UR: YELLOW
CREAT SERPL-MCNC: 1.18 MG/DL (ref 0.76–1.27)
DEPRECATED RDW RBC AUTO: 44.4 FL (ref 37–54)
EGFRCR SERPLBLD CKD-EPI 2021: 67.2 ML/MIN/1.73
EOSINOPHIL # BLD AUTO: 0.44 10*3/MM3 (ref 0–0.4)
EOSINOPHIL NFR BLD AUTO: 5.3 % (ref 0.3–6.2)
ERYTHROCYTE [DISTWIDTH] IN BLOOD BY AUTOMATED COUNT: 14.1 % (ref 12.3–15.4)
GLOBULIN UR ELPH-MCNC: 3.3 GM/DL
GLUCOSE SERPL-MCNC: 389 MG/DL (ref 65–99)
GLUCOSE UR STRIP-MCNC: ABNORMAL MG/DL
HCT VFR BLD AUTO: 45.9 % (ref 37.5–51)
HGB BLD-MCNC: 15.7 G/DL (ref 13–17.7)
HGB UR QL STRIP.AUTO: NEGATIVE
HYALINE CASTS UR QL AUTO: ABNORMAL /LPF
IMM GRANULOCYTES # BLD AUTO: 0.13 10*3/MM3 (ref 0–0.05)
IMM GRANULOCYTES NFR BLD AUTO: 1.6 % (ref 0–0.5)
KETONES UR QL STRIP: NEGATIVE
LEUKOCYTE ESTERASE UR QL STRIP.AUTO: ABNORMAL
LYMPHOCYTES # BLD AUTO: 3.36 10*3/MM3 (ref 0.7–3.1)
LYMPHOCYTES NFR BLD AUTO: 40.4 % (ref 19.6–45.3)
MCH RBC QN AUTO: 29.5 PG (ref 26.6–33)
MCHC RBC AUTO-ENTMCNC: 34.2 G/DL (ref 31.5–35.7)
MCV RBC AUTO: 86.3 FL (ref 79–97)
MONOCYTES # BLD AUTO: 0.73 10*3/MM3 (ref 0.1–0.9)
MONOCYTES NFR BLD AUTO: 8.8 % (ref 5–12)
NEUTROPHILS NFR BLD AUTO: 3.62 10*3/MM3 (ref 1.7–7)
NEUTROPHILS NFR BLD AUTO: 43.4 % (ref 42.7–76)
NITRITE UR QL STRIP: NEGATIVE
NRBC BLD AUTO-RTO: 0 /100 WBC (ref 0–0.2)
PH UR STRIP.AUTO: 5.5 [PH] (ref 5–8)
PLATELET # BLD AUTO: 158 10*3/MM3 (ref 140–450)
PMV BLD AUTO: 10.5 FL (ref 6–12)
POTASSIUM SERPL-SCNC: 3.9 MMOL/L (ref 3.5–5.2)
PROT SERPL-MCNC: 7.5 G/DL (ref 6–8.5)
PROT UR QL STRIP: NEGATIVE
RBC # BLD AUTO: 5.32 10*6/MM3 (ref 4.14–5.8)
RBC # UR STRIP: ABNORMAL /HPF
REF LAB TEST METHOD: ABNORMAL
SODIUM SERPL-SCNC: 136 MMOL/L (ref 136–145)
SP GR UR STRIP: 1.03 (ref 1–1.03)
SQUAMOUS #/AREA URNS HPF: ABNORMAL /HPF
UROBILINOGEN UR QL STRIP: ABNORMAL
WBC # UR STRIP: ABNORMAL /HPF
WBC NRBC COR # BLD: 8.32 10*3/MM3 (ref 3.4–10.8)

## 2022-10-03 PROCEDURE — 25010000002 KETOROLAC TROMETHAMINE PER 15 MG: Performed by: EMERGENCY MEDICINE

## 2022-10-03 PROCEDURE — 81001 URINALYSIS AUTO W/SCOPE: CPT | Performed by: EMERGENCY MEDICINE

## 2022-10-03 PROCEDURE — 80053 COMPREHEN METABOLIC PANEL: CPT | Performed by: EMERGENCY MEDICINE

## 2022-10-03 PROCEDURE — 96374 THER/PROPH/DIAG INJ IV PUSH: CPT

## 2022-10-03 PROCEDURE — 85025 COMPLETE CBC W/AUTO DIFF WBC: CPT | Performed by: EMERGENCY MEDICINE

## 2022-10-03 PROCEDURE — 99283 EMERGENCY DEPT VISIT LOW MDM: CPT

## 2022-10-03 PROCEDURE — 74176 CT ABD & PELVIS W/O CONTRAST: CPT

## 2022-10-03 PROCEDURE — 87086 URINE CULTURE/COLONY COUNT: CPT | Performed by: EMERGENCY MEDICINE

## 2022-10-03 RX ORDER — KETOROLAC TROMETHAMINE 15 MG/ML
15 INJECTION, SOLUTION INTRAMUSCULAR; INTRAVENOUS ONCE
Status: COMPLETED | OUTPATIENT
Start: 2022-10-03 | End: 2022-10-03

## 2022-10-03 RX ORDER — SODIUM CHLORIDE 0.9 % (FLUSH) 0.9 %
10 SYRINGE (ML) INJECTION AS NEEDED
Status: DISCONTINUED | OUTPATIENT
Start: 2022-10-03 | End: 2022-10-04 | Stop reason: HOSPADM

## 2022-10-03 RX ADMIN — KETOROLAC TROMETHAMINE 15 MG: 15 INJECTION, SOLUTION INTRAMUSCULAR; INTRAVENOUS at 22:41

## 2022-10-04 VITALS
WEIGHT: 208 LBS | HEART RATE: 78 BPM | SYSTOLIC BLOOD PRESSURE: 155 MMHG | DIASTOLIC BLOOD PRESSURE: 78 MMHG | OXYGEN SATURATION: 99 % | HEIGHT: 71 IN | TEMPERATURE: 98 F | RESPIRATION RATE: 20 BRPM | BODY MASS INDEX: 29.12 KG/M2

## 2022-10-04 RX ORDER — KETOROLAC TROMETHAMINE 10 MG/1
10 TABLET, FILM COATED ORAL EVERY 6 HOURS PRN
Qty: 15 TABLET | Refills: 0 | Status: SHIPPED | OUTPATIENT
Start: 2022-10-04

## 2022-10-04 RX ORDER — SULFAMETHOXAZOLE AND TRIMETHOPRIM 800; 160 MG/1; MG/1
1 TABLET ORAL 2 TIMES DAILY
Qty: 14 TABLET | Refills: 0 | Status: SHIPPED | OUTPATIENT
Start: 2022-10-04

## 2022-10-04 NOTE — ED PROVIDER NOTES
Subjective   History of Present Illness  Patient complains of pain in his right flank.  He says he was seen in the [facility] emergency room and diagnosed with a kidney stone but when he questioned him further turns out I did not see a stone but noticed some blood in his urine and thought he had passed 1.  They gave him some Toradol and the pain went away but it has come back today and he is worried there may still be something there that has not been found.  He denies any nausea or vomiting or diarrhea or dysuria.  He has had multiple stones in the past.    History provided by:  Patient and spouse   used: No    Flank Pain  Pain location:  R flank  Pain quality: aching    Pain radiates to:  Does not radiate  Pain severity:  Moderate  Onset quality:  Sudden  Duration:  3 days  Timing:  Intermittent  Progression:  Worsening  Chronicity:  New  Context: not alcohol use, not diet changes, not eating, not medication withdrawal, not recent illness, not recent sexual activity, not retching, not sick contacts and not suspicious food intake    Relieved by:  Nothing  Worsened by:  Nothing  Ineffective treatments:  None tried  Associated symptoms: no anorexia, no belching, no chest pain, no chills, no dysuria, no fever, no melena, no nausea and no sore throat        Review of Systems   Constitutional: Negative.  Negative for chills and fever.   HENT: Negative.  Negative for sore throat.    Respiratory: Negative.    Cardiovascular: Negative.  Negative for chest pain.   Gastrointestinal: Negative.  Negative for anorexia, melena and nausea.   Genitourinary: Positive for flank pain. Negative for dysuria.   Skin: Negative.    Neurological: Negative.    Psychiatric/Behavioral: Negative.    All other systems reviewed and are negative.      Past Medical History:   Diagnosis Date   • Depression    • Diabetes mellitus (HCC)    • Disease of thyroid gland     HYPOTHYROIDISM   • GERD (gastroesophageal reflux  disease)    • Hyperlipidemia    • Hypertension    • Kidney stone        Allergies   Allergen Reactions   • Atacand [Candesartan Cilexetil] Rash   • Biaxin [Clarithromycin] Rash   • Cefzil [Cefprozil] Rash   • Levaquin [Levofloxacin] Rash   • Penicillins Rash   • Zestril [Lisinopril] Rash       Past Surgical History:   Procedure Laterality Date   • CHOLECYSTECTOMY     • COLONOSCOPY     • COLONOSCOPY N/A 5/27/2020    Procedure: COLONOSCOPY WITH ANESTHESIA;  Surgeon: Rambo Padilla DO;  Location: [facility] ENDOSCOPY;  Service: Gastroenterology;  Laterality: N/A;  Pre: Change in Bowels  Post: Colon Polyp, Suboptimal Prep  Kings APRN  CO2 Inflation Used   • ENDOSCOPY N/A 4/4/2017    Procedure: ESOPHAGOGASTRODUODENOSCOPY WITH ANESTHESIA;  Surgeon: Rambo Padilla DO;  Location: [facility] ENDOSCOPY;  Service:    • KIDNEY STONE SURGERY     • SHOULDER SURGERY         Family History   Problem Relation Age of Onset   • Heart disease Mother    • Diabetes Mother    • Alzheimer's disease Father    • Heart disease Father    • Diabetes Sister    • Heart disease Brother    • Diabetes Sister    • Diabetes Sister    • Colon cancer Neg Hx    • Esophageal cancer Neg Hx        Social History     Socioeconomic History   • Marital status:    Tobacco Use   • Smoking status: Never Smoker   • Smokeless tobacco: Never Used   Vaping Use   • Vaping Use: Never used   Substance and Sexual Activity   • Alcohol use: No   • Drug use: No   • Sexual activity: Yes     Partners: Female           Objective   Physical Exam  Vitals and nursing note reviewed.   Constitutional:       Appearance: Normal appearance.   HENT:      Head: Normocephalic and atraumatic.   Eyes:      Extraocular Movements: Extraocular movements intact.      Pupils: Pupils are equal, round, and reactive to light.   Cardiovascular:      Rate and Rhythm: Normal rate and regular rhythm.   Pulmonary:      Effort: Pulmonary effort is normal.      Breath sounds: Normal breath  sounds.   Abdominal:      General: Bowel sounds are normal.      Palpations: Abdomen is soft.      Tenderness: There is no abdominal tenderness.   Musculoskeletal:         General: Normal range of motion.      Cervical back: Normal range of motion and neck supple.   Skin:     General: Skin is warm and dry.   Neurological:      General: No focal deficit present.      Mental Status: He is alert and oriented to person, place, and time.   Psychiatric:         Mood and Affect: Mood normal.         Behavior: Behavior normal.         Procedures           ED Course  ED Course as of 10/04/22 0034   Tue Oct 04, 2022   0031 The patient CT scan does not show a stone.  He does have a trace of blood in his urine and a little bit of pyuria but no definite bacteria.  I am not sure if he is just having some type of renal colic or some other type of pain.  We will get him some for the colic and we will cover him with antibiotics.  He is discharged in stable condition. [TR]      ED Course User Index  [TR] Kirill Ricks Jr., MD                                   [facility] reviewed by Kirill Ricks Jr., MD       MDM  Number of Diagnoses or Management Options  Pyuria: new and requires workup  Right flank pain: new and requires workup     Amount and/or Complexity of Data Reviewed  Clinical lab tests: ordered and reviewed  Tests in the radiology section of CPT®: ordered and reviewed    Risk of Complications, Morbidity, and/or Mortality  Presenting problems: moderate  Diagnostic procedures: moderate  Management options: moderate    Patient Progress  Patient progress: stable      Final diagnoses:   Right flank pain   Pyuria       ED Disposition  ED Disposition     ED Disposition   Discharge    Condition   Stable    Comment   --             Libby Ku, APRN  [address] [facility] [address] [phone]      If symptoms worsen         Medication List      New Prescriptions    ketorolac 10 MG tablet  Commonly known as:  TORADOL  Take 1 tablet by mouth Every 6 (Six) Hours As Needed for Moderate Pain.     sulfamethoxazole-trimethoprim 800-160 MG per tablet  Commonly known as: BACTRIM DS,SEPTRA DS  Take 1 tablet by mouth 2 (Two) Times a Day.        Changed    metFORMIN  MG 24 hr tablet  Commonly known as: GLUCOPHAGE-XR  TAKE 1 TABLET BY MOUTH TWICE DAILY WITH MEALS  What changed: when to take this           Where to Get Your Medications      These medications were sent to [facility] [address] [place] [address] - [address] [place] [address] [phone]  - [phone] [address] [address]    Phone: [phone]   · ketorolac 10 MG tablet  · sulfamethoxazole-trimethoprim 800-160 MG per tablet          Kirill Ricks Jr., MD  10/04/22 0034

## 2022-10-05 LAB — BACTERIA SPEC AEROBE CULT: NORMAL

## 2022-10-12 DIAGNOSIS — E11.65 TYPE 2 DIABETES MELLITUS WITH HYPERGLYCEMIA, WITH LONG-TERM CURRENT USE OF INSULIN: ICD-10-CM

## 2022-10-12 DIAGNOSIS — E78.2 MIXED DIABETIC HYPERLIPIDEMIA ASSOCIATED WITH TYPE 2 DIABETES MELLITUS: ICD-10-CM

## 2022-10-12 DIAGNOSIS — I15.2 HYPERTENSION ASSOCIATED WITH DIABETES: ICD-10-CM

## 2022-10-12 DIAGNOSIS — E11.42 DIABETIC POLYNEUROPATHY ASSOCIATED WITH TYPE 2 DIABETES MELLITUS: ICD-10-CM

## 2022-10-12 DIAGNOSIS — Z79.4 TYPE 2 DIABETES MELLITUS WITH HYPERGLYCEMIA, WITH LONG-TERM CURRENT USE OF INSULIN: ICD-10-CM

## 2022-10-12 DIAGNOSIS — E11.59 HYPERTENSION ASSOCIATED WITH DIABETES: ICD-10-CM

## 2022-10-12 DIAGNOSIS — E11.69 MIXED DIABETIC HYPERLIPIDEMIA ASSOCIATED WITH TYPE 2 DIABETES MELLITUS: ICD-10-CM

## 2022-10-12 DIAGNOSIS — E55.9 VITAMIN D DEFICIENCY: ICD-10-CM

## 2022-10-12 DIAGNOSIS — I10 ESSENTIAL HYPERTENSION: ICD-10-CM

## 2022-10-17 ENCOUNTER — HOSPITAL ENCOUNTER (EMERGENCY)
Facility: HOSPITAL | Age: 67
Discharge: HOME | End: 2022-10-17
Attending: STUDENT IN AN ORGANIZED HEALTH CARE EDUCATION/TRAINING PROGRAM | Admitting: STUDENT IN AN ORGANIZED HEALTH CARE EDUCATION/TRAINING PROGRAM

## 2022-10-17 ENCOUNTER — APPOINTMENT (OUTPATIENT)
Dept: CT IMAGING | Facility: HOSPITAL | Age: 67
End: 2022-10-17

## 2022-10-17 VITALS
TEMPERATURE: 97.8 F | HEART RATE: 68 BPM | HEIGHT: 71 IN | WEIGHT: 204 LBS | RESPIRATION RATE: 18 BRPM | OXYGEN SATURATION: 100 % | DIASTOLIC BLOOD PRESSURE: 95 MMHG | BODY MASS INDEX: 28.56 KG/M2 | SYSTOLIC BLOOD PRESSURE: 169 MMHG

## 2022-10-17 DIAGNOSIS — R74.8 ELEVATED LIPASE: Primary | ICD-10-CM

## 2022-10-17 DIAGNOSIS — K59.00 CONSTIPATION, UNSPECIFIED CONSTIPATION TYPE: ICD-10-CM

## 2022-10-17 DIAGNOSIS — I77.1 STENOSIS OF CELIAC ARTERY: ICD-10-CM

## 2022-10-17 LAB
ALBUMIN SERPL-MCNC: 4.6 G/DL (ref 3.5–5.2)
ALBUMIN/GLOB SERPL: 1.2 G/DL
ALP SERPL-CCNC: 99 U/L (ref 39–117)
ALT SERPL W P-5'-P-CCNC: 33 U/L (ref 1–41)
ANION GAP SERPL CALCULATED.3IONS-SCNC: 10 MMOL/L (ref 5–15)
AST SERPL-CCNC: 37 U/L (ref 1–40)
BASOPHILS # BLD AUTO: 0.05 10*3/MM3 (ref 0–0.2)
BASOPHILS NFR BLD AUTO: 0.5 % (ref 0–1.5)
BILIRUB SERPL-MCNC: 0.5 MG/DL (ref 0–1.2)
BILIRUB UR QL STRIP: NEGATIVE
BUN SERPL-MCNC: 20 MG/DL (ref 8–23)
BUN/CREAT SERPL: 15.7 (ref 7–25)
CALCIUM SPEC-SCNC: 9.8 MG/DL (ref 8.6–10.5)
CHLORIDE SERPL-SCNC: 102 MMOL/L (ref 98–107)
CLARITY UR: CLEAR
CO2 SERPL-SCNC: 26 MMOL/L (ref 22–29)
COLOR UR: YELLOW
CREAT SERPL-MCNC: 1.27 MG/DL (ref 0.76–1.27)
DEPRECATED RDW RBC AUTO: 45 FL (ref 37–54)
EGFRCR SERPLBLD CKD-EPI 2021: 61.5 ML/MIN/1.73
EOSINOPHIL # BLD AUTO: 0.69 10*3/MM3 (ref 0–0.4)
EOSINOPHIL NFR BLD AUTO: 7.2 % (ref 0.3–6.2)
ERYTHROCYTE [DISTWIDTH] IN BLOOD BY AUTOMATED COUNT: 14 % (ref 12.3–15.4)
GLOBULIN UR ELPH-MCNC: 3.8 GM/DL
GLUCOSE SERPL-MCNC: 215 MG/DL (ref 65–99)
GLUCOSE UR STRIP-MCNC: ABNORMAL MG/DL
HCT VFR BLD AUTO: 50.8 % (ref 37.5–51)
HGB BLD-MCNC: 16.8 G/DL (ref 13–17.7)
HGB UR QL STRIP.AUTO: NEGATIVE
IMM GRANULOCYTES # BLD AUTO: 0.15 10*3/MM3 (ref 0–0.05)
IMM GRANULOCYTES NFR BLD AUTO: 1.6 % (ref 0–0.5)
KETONES UR QL STRIP: NEGATIVE
LEUKOCYTE ESTERASE UR QL STRIP.AUTO: NEGATIVE
LIPASE SERPL-CCNC: 172 U/L (ref 13–60)
LYMPHOCYTES # BLD AUTO: 3.48 10*3/MM3 (ref 0.7–3.1)
LYMPHOCYTES NFR BLD AUTO: 36.1 % (ref 19.6–45.3)
MCH RBC QN AUTO: 29 PG (ref 26.6–33)
MCHC RBC AUTO-ENTMCNC: 33.1 G/DL (ref 31.5–35.7)
MCV RBC AUTO: 87.7 FL (ref 79–97)
MONOCYTES # BLD AUTO: 0.74 10*3/MM3 (ref 0.1–0.9)
MONOCYTES NFR BLD AUTO: 7.7 % (ref 5–12)
NEUTROPHILS NFR BLD AUTO: 4.52 10*3/MM3 (ref 1.7–7)
NEUTROPHILS NFR BLD AUTO: 46.9 % (ref 42.7–76)
NITRITE UR QL STRIP: NEGATIVE
NRBC BLD AUTO-RTO: 0 /100 WBC (ref 0–0.2)
PH UR STRIP.AUTO: 5.5 [PH] (ref 5–8)
PLATELET # BLD AUTO: 166 10*3/MM3 (ref 140–450)
PMV BLD AUTO: 10.4 FL (ref 6–12)
POTASSIUM SERPL-SCNC: 4.4 MMOL/L (ref 3.5–5.2)
PROT SERPL-MCNC: 8.4 G/DL (ref 6–8.5)
PROT UR QL STRIP: NEGATIVE
RBC # BLD AUTO: 5.79 10*6/MM3 (ref 4.14–5.8)
SODIUM SERPL-SCNC: 138 MMOL/L (ref 136–145)
SP GR UR STRIP: 1.03 (ref 1–1.03)
UROBILINOGEN UR QL STRIP: ABNORMAL
WBC NRBC COR # BLD: 9.63 10*3/MM3 (ref 3.4–10.8)

## 2022-10-17 PROCEDURE — 0 HYDROMORPHONE 1 MG/ML SOLUTION: Performed by: STUDENT IN AN ORGANIZED HEALTH CARE EDUCATION/TRAINING PROGRAM

## 2022-10-17 PROCEDURE — 83690 ASSAY OF LIPASE: CPT | Performed by: STUDENT IN AN ORGANIZED HEALTH CARE EDUCATION/TRAINING PROGRAM

## 2022-10-17 PROCEDURE — 80053 COMPREHEN METABOLIC PANEL: CPT | Performed by: STUDENT IN AN ORGANIZED HEALTH CARE EDUCATION/TRAINING PROGRAM

## 2022-10-17 PROCEDURE — 99283 EMERGENCY DEPT VISIT LOW MDM: CPT

## 2022-10-17 PROCEDURE — 25010000002 IOPAMIDOL 61 % SOLUTION: Performed by: STUDENT IN AN ORGANIZED HEALTH CARE EDUCATION/TRAINING PROGRAM

## 2022-10-17 PROCEDURE — 25010000002 ONDANSETRON PER 1 MG: Performed by: STUDENT IN AN ORGANIZED HEALTH CARE EDUCATION/TRAINING PROGRAM

## 2022-10-17 PROCEDURE — 85025 COMPLETE CBC W/AUTO DIFF WBC: CPT | Performed by: STUDENT IN AN ORGANIZED HEALTH CARE EDUCATION/TRAINING PROGRAM

## 2022-10-17 PROCEDURE — 81003 URINALYSIS AUTO W/O SCOPE: CPT | Performed by: STUDENT IN AN ORGANIZED HEALTH CARE EDUCATION/TRAINING PROGRAM

## 2022-10-17 PROCEDURE — 96374 THER/PROPH/DIAG INJ IV PUSH: CPT

## 2022-10-17 PROCEDURE — 74177 CT ABD & PELVIS W/CONTRAST: CPT

## 2022-10-17 PROCEDURE — 96375 TX/PRO/DX INJ NEW DRUG ADDON: CPT

## 2022-10-17 RX ORDER — ONDANSETRON 2 MG/ML
4 INJECTION INTRAMUSCULAR; INTRAVENOUS ONCE
Status: COMPLETED | OUTPATIENT
Start: 2022-10-17 | End: 2022-10-17

## 2022-10-17 RX ORDER — HYDROCODONE BITARTRATE AND ACETAMINOPHEN 5; 325 MG/1; MG/1
1 TABLET ORAL 4 TIMES DAILY PRN
Qty: 9 TABLET | Refills: 0 | Status: SHIPPED | OUTPATIENT
Start: 2022-10-17 | End: 2022-10-20

## 2022-10-17 RX ADMIN — HYDROMORPHONE HYDROCHLORIDE 0.5 MG: 1 INJECTION, SOLUTION INTRAMUSCULAR; INTRAVENOUS; SUBCUTANEOUS at 03:59

## 2022-10-17 RX ADMIN — ONDANSETRON 4 MG: 2 INJECTION INTRAMUSCULAR; INTRAVENOUS at 03:58

## 2022-10-17 RX ADMIN — SODIUM CHLORIDE, POTASSIUM CHLORIDE, SODIUM LACTATE AND CALCIUM CHLORIDE 1000 ML: 600; 310; 30; 20 INJECTION, SOLUTION INTRAVENOUS at 04:00

## 2022-10-17 RX ADMIN — IOPAMIDOL 100 ML: 612 INJECTION, SOLUTION INTRAVENOUS at 04:48

## 2022-10-17 NOTE — ED PROVIDER NOTES
Subjective   History of Present Illness  Patient states that he was here about 10 days ago with right-sided abdominal pain and flank pain nausea and vomiting.  States he was told he has a UTI and sent home with antibiotics.  States that he has continued to have the same pain with no improvement and so came back in for further evaluation tonight.  Denies any fevers or chills.  States that he is also having some nausea.  States that he is not having any chest pain or shortness of breath or saddle paresthesias or difficulty walking.        Review of Systems   All other systems reviewed and are negative.      Past Medical History:   Diagnosis Date   • Depression    • Diabetes mellitus (HCC)    • Disease of thyroid gland     HYPOTHYROIDISM   • GERD (gastroesophageal reflux disease)    • Hyperlipidemia    • Hypertension    • Kidney stone        Allergies   Allergen Reactions   • Atacand [Candesartan Cilexetil] Rash   • Biaxin [Clarithromycin] Rash   • Cefzil [Cefprozil] Rash   • Levaquin [Levofloxacin] Rash   • Penicillins Rash   • Zestril [Lisinopril] Rash       Past Surgical History:   Procedure Laterality Date   • CHOLECYSTECTOMY     • COLONOSCOPY     • COLONOSCOPY N/A 5/27/2020    Procedure: COLONOSCOPY WITH ANESTHESIA;  Surgeon: Rambo Padilla DO;  Location: [facility] ENDOSCOPY;  Service: Gastroenterology;  Laterality: N/A;  Pre: Change in Bowels  Post: Colon Polyp, Suboptimal Prep  Kings APRN  CO2 Inflation Used   • ENDOSCOPY N/A 4/4/2017    Procedure: ESOPHAGOGASTRODUODENOSCOPY WITH ANESTHESIA;  Surgeon: Rambo Padilla DO;  Location: [facility] ENDOSCOPY;  Service:    • KIDNEY STONE SURGERY     • SHOULDER SURGERY         Family History   Problem Relation Age of Onset   • Heart disease Mother    • Diabetes Mother    • Alzheimer's disease Father    • Heart disease Father    • Diabetes Sister    • Heart disease Brother    • Diabetes Sister    • Diabetes Sister    • Colon cancer Neg Hx    • Esophageal cancer Neg Hx         Social History     Socioeconomic History   • Marital status:    Tobacco Use   • Smoking status: Never   • Smokeless tobacco: Never   Vaping Use   • Vaping Use: Never used   Substance and Sexual Activity   • Alcohol use: No   • Drug use: No   • Sexual activity: Yes     Partners: Female           Objective   Physical Exam  Vitals and nursing note reviewed.   Constitutional:       General: He is not in acute distress.     Appearance: Normal appearance. He is not toxic-appearing or diaphoretic.   HENT:      Head: Normocephalic and atraumatic.      Nose: Nose normal.   Eyes:      General:         Right eye: No discharge.         Left eye: No discharge.      Extraocular Movements: Extraocular movements intact.      Conjunctiva/sclera: Conjunctivae normal.   Cardiovascular:      Rate and Rhythm: Normal rate.      Pulses: Normal pulses.   Pulmonary:      Effort: Pulmonary effort is normal. No respiratory distress.   Abdominal:      General: Abdomen is flat.      Tenderness: There is abdominal tenderness. There is no guarding or rebound.   Musculoskeletal:         General: Normal range of motion.      Cervical back: Normal range of motion.   Skin:     General: Skin is warm.   Neurological:      General: No focal deficit present.      Mental Status: He is alert and oriented to person, place, and time. Mental status is at baseline.   Psychiatric:         Mood and Affect: Mood normal.         Behavior: Behavior normal.         Thought Content: Thought content normal.         Judgment: Judgment normal.         Procedures           ED Course  ED Course as of 10/19/22 0256   Mon Oct 17, 2022   0658 Calcification with mild stenosis at the origin of the celiac and  SMA..  [NP]      ED Course User Index  [NP] Tammy Avelar MD                                           MDM  Number of Diagnoses or Management Options  Constipation, unspecified constipation type  Elevated lipase  Stenosis of celiac artery (HCC)  Diagnosis  management comments: This is a 67yoM presenting with abdominal pain. Patient arrived hemodynamically stable and was afebrile. Patient was placed on the monitor and IV access was established.     Differentials include, but are not limited to GERD, constipation, appendicitis, pancreatitis, nephrolithiasis.    Plan to obtain cbc, cmp, lipase, ua, CT abdomen/pelvis with contrast, control pain, and reassess.   Presentation not consistent with other acute, emergent causes of abdominal pain at this time. Low suspicion for dissection there is no widened mediastinum, hypotension, pulse deficits, and no pain tearing through to the back. Low suspicion for tamponade as there is no JVD, muffled heart sounds, electrical alternans on EKG, and no hypotension. Low suspicion for nephrolithiasis without flank pain radiating to groin, hematuria, or any history of kidney stones. Low suspicion for viscous perforation without evidence of guarding, rebound, or rigidity that would be concerning for an acute abdomen. Low suspicion for appendicitis as pain did not radiate from umbilicus to right lower quadrant, negative ROVSINGs sign, no severe constipation on exam. Low suspicion for cholecystitis with negative murphys sign, no history of gall stones, and no recent pale stools or pain after eating. Low suspicion for ulcerative disease as pain is not consistent with foods and is not relieved when patient refrains from eating. Low suspicion for gastroenteritis without evidence of diarrhea, fevers, or recent uncooked food exposure.     The workup was reviewed and found to have an elevated lipase and some celiac artery stenosis. I reassessed the patient and discussed the findings of the work up so far. I told him that if his symptoms worsen he has to come back. I answered all his questions regarding the emergency department evaluation, diagnosis, and treatment plan in plain and simple language that he was able to understand.     He voiced  agreement with the plan of care so far and had no further questions. I told him that there is always some diagnostic uncertainty in the ER and that his work up, physical exam, and even his current presentation may not always reveal other underlying conditions. I also went over the fact that his condition may change or show itself after being discharged. He expressed understanding and agreed that there are reasonable limitations with the practice of emergency medicine.    I gave him return precautions and told him to return to the emergency department within 24 - 48hrs if he has any new, worsening, or concerning symptoms.     I told him that it is VERY IMPORTANT that he follows up (by calling to set up an appointment) with his primary care doctor within the next few days or as soon as reasonably possible so that he can be re-evaluated for improvement in his symptoms or for any other questions. He verbalized understanding of these instructions.     He was discharged in stable condition and was observed ambulating out of the ER.              Amount and/or Complexity of Data Reviewed  Clinical lab tests: reviewed and ordered  Tests in the radiology section of CPT®: ordered and reviewed  Decide to obtain previous medical records or to obtain history from someone other than the patient: yes        Final diagnoses:   Elevated lipase   Constipation, unspecified constipation type   Stenosis of celiac artery (HCC)       ED Disposition  ED Disposition     ED Disposition   Discharge    Condition   Stable    Comment   --             Libby Ku, APRN  [address] [facility] [address] [phone]    Call in 1 day  As needed, If symptoms worsen         Medication List      New Prescriptions    HYDROcodone-acetaminophen 5-325 MG per tablet  Commonly known as: NORCO  Take 1 tablet by mouth 4 (Four) Times a Day As Needed for Mild Pain for up to 3 days.        Changed    metFORMIN  MG 24 hr tablet  Commonly known  as: GLUCOPHAGE-XR  TAKE 1 TABLET BY MOUTH TWICE DAILY WITH MEALS  What changed: when to take this           Where to Get Your Medications      These medications were sent to [facility] - [place], [place] - [address] - [phone]  - [phone] [address] [place] [address]    Phone: [phone]   · HYDROcodone-acetaminophen 5-325 MG per tablet          Tammy Avelar MD  10/19/22 0256

## 2022-10-17 NOTE — DISCHARGE INSTRUCTIONS
It was very nice to meet you, Supa. Thank you for allowing us to take care of you today at [facility].    Your work-up today did not show any emergent findings or emergent indications for admission to the hospital. Please understand that an ER evaluation is considered to be just the start of your evaluation. We will do what we can in one visit, but we are often unable to fully figure out what is causing your symptoms from one evaluation. Thus our primary goal is to determine whether you need to be evaluated in the hospital or if it is safe for you to go home and see other doctors such as a primary care physician or a specialist on an outpatient basis. A copy of your results should be included in your paperwork.     It is VERY IMPORTANT that you follow up (call them to set up an appointment) with your primary care doctor* within the next few days or as soon as possible so that you can be re-evaluated for improvement in your symptoms or for any other questions. If you were prescribed any medications, please take them as directed or call us back with any questions.     Please return to the emergency room within 12-48 hours if you experience fever, chills, chest pain or shortness of breath, pain with inspiration/expiration, pain that travels to your arms, neck or back, nausea, vomiting, severe headache, tearing pain in your chest, dizziness, feel as though you are about to pass out, have any worsening symptoms, or any other concerns.

## 2022-10-18 ENCOUNTER — APPOINTMENT (OUTPATIENT)
Dept: CT IMAGING | Facility: HOSPITAL | Age: 67
End: 2022-10-18

## 2022-10-18 ENCOUNTER — HOSPITAL ENCOUNTER (INPATIENT)
Facility: HOSPITAL | Age: 67
LOS: 1 days | Discharge: HOME | End: 2022-10-19
Attending: EMERGENCY MEDICINE | Admitting: INTERNAL MEDICINE

## 2022-10-18 DIAGNOSIS — E11.65 TYPE 2 DIABETES MELLITUS WITH HYPERGLYCEMIA, WITH LONG-TERM CURRENT USE OF INSULIN: ICD-10-CM

## 2022-10-18 DIAGNOSIS — K85.90 ACUTE PANCREATITIS, UNSPECIFIED COMPLICATION STATUS, UNSPECIFIED PANCREATITIS TYPE: Primary | ICD-10-CM

## 2022-10-18 DIAGNOSIS — Z79.4 TYPE 2 DIABETES MELLITUS WITH HYPERGLYCEMIA, WITH LONG-TERM CURRENT USE OF INSULIN: ICD-10-CM

## 2022-10-18 LAB
ALBUMIN SERPL-MCNC: 3.9 G/DL (ref 3.5–5.2)
ALBUMIN/GLOB SERPL: 1.1 G/DL
ALP SERPL-CCNC: 94 U/L (ref 39–117)
ALT SERPL W P-5'-P-CCNC: 29 U/L (ref 1–41)
AMYLASE SERPL-CCNC: 229 U/L (ref 28–100)
ANION GAP SERPL CALCULATED.3IONS-SCNC: 12 MMOL/L (ref 5–15)
AST SERPL-CCNC: 35 U/L (ref 1–40)
BASOPHILS # BLD AUTO: 0.07 10*3/MM3 (ref 0–0.2)
BASOPHILS NFR BLD AUTO: 0.8 % (ref 0–1.5)
BILIRUB SERPL-MCNC: 0.4 MG/DL (ref 0–1.2)
BILIRUB UR QL STRIP: NEGATIVE
BUN SERPL-MCNC: 25 MG/DL (ref 8–23)
BUN/CREAT SERPL: 16.1 (ref 7–25)
CALCIUM SPEC-SCNC: 9.3 MG/DL (ref 8.6–10.5)
CHLORIDE SERPL-SCNC: 103 MMOL/L (ref 98–107)
CLARITY UR: CLEAR
CO2 SERPL-SCNC: 25 MMOL/L (ref 22–29)
COLOR UR: YELLOW
CREAT SERPL-MCNC: 1.55 MG/DL (ref 0.76–1.27)
D-LACTATE SERPL-SCNC: 1.2 MMOL/L (ref 0.5–2)
DEPRECATED RDW RBC AUTO: 43.9 FL (ref 37–54)
EGFRCR SERPLBLD CKD-EPI 2021: 48.5 ML/MIN/1.73
EOSINOPHIL # BLD AUTO: 0.53 10*3/MM3 (ref 0–0.4)
EOSINOPHIL NFR BLD AUTO: 6 % (ref 0.3–6.2)
ERYTHROCYTE [DISTWIDTH] IN BLOOD BY AUTOMATED COUNT: 13.7 % (ref 12.3–15.4)
GLOBULIN UR ELPH-MCNC: 3.7 GM/DL
GLUCOSE BLDC GLUCOMTR-MCNC: 147 MG/DL (ref 70–130)
GLUCOSE SERPL-MCNC: 164 MG/DL (ref 65–99)
GLUCOSE UR STRIP-MCNC: ABNORMAL MG/DL
HCT VFR BLD AUTO: 47.8 % (ref 37.5–51)
HGB BLD-MCNC: 15.8 G/DL (ref 13–17.7)
HGB UR QL STRIP.AUTO: NEGATIVE
IMM GRANULOCYTES # BLD AUTO: 0.17 10*3/MM3 (ref 0–0.05)
IMM GRANULOCYTES NFR BLD AUTO: 1.9 % (ref 0–0.5)
KETONES UR QL STRIP: NEGATIVE
LEUKOCYTE ESTERASE UR QL STRIP.AUTO: NEGATIVE
LIPASE SERPL-CCNC: 524 U/L (ref 13–60)
LYMPHOCYTES # BLD AUTO: 3.11 10*3/MM3 (ref 0.7–3.1)
LYMPHOCYTES NFR BLD AUTO: 35.5 % (ref 19.6–45.3)
MCH RBC QN AUTO: 29 PG (ref 26.6–33)
MCHC RBC AUTO-ENTMCNC: 33.1 G/DL (ref 31.5–35.7)
MCV RBC AUTO: 87.9 FL (ref 79–97)
MONOCYTES # BLD AUTO: 0.72 10*3/MM3 (ref 0.1–0.9)
MONOCYTES NFR BLD AUTO: 8.2 % (ref 5–12)
NEUTROPHILS NFR BLD AUTO: 4.17 10*3/MM3 (ref 1.7–7)
NEUTROPHILS NFR BLD AUTO: 47.6 % (ref 42.7–76)
NITRITE UR QL STRIP: NEGATIVE
NRBC BLD AUTO-RTO: 0 /100 WBC (ref 0–0.2)
PH UR STRIP.AUTO: 5.5 [PH] (ref 5–8)
PLATELET # BLD AUTO: 188 10*3/MM3 (ref 140–450)
PMV BLD AUTO: 10.7 FL (ref 6–12)
POTASSIUM SERPL-SCNC: 4.1 MMOL/L (ref 3.5–5.2)
PROT SERPL-MCNC: 7.6 G/DL (ref 6–8.5)
PROT UR QL STRIP: NEGATIVE
RBC # BLD AUTO: 5.44 10*6/MM3 (ref 4.14–5.8)
SODIUM SERPL-SCNC: 140 MMOL/L (ref 136–145)
SP GR UR STRIP: 1.03 (ref 1–1.03)
TROPONIN T SERPL-MCNC: <0.01 NG/ML (ref 0–0.03)
UROBILINOGEN UR QL STRIP: ABNORMAL
WBC NRBC COR # BLD: 8.77 10*3/MM3 (ref 3.4–10.8)

## 2022-10-18 PROCEDURE — 84484 ASSAY OF TROPONIN QUANT: CPT | Performed by: NURSE PRACTITIONER

## 2022-10-18 PROCEDURE — G0378 HOSPITAL OBSERVATION PER HR: HCPCS

## 2022-10-18 PROCEDURE — 93010 ELECTROCARDIOGRAM REPORT: CPT | Performed by: INTERNAL MEDICINE

## 2022-10-18 PROCEDURE — 82150 ASSAY OF AMYLASE: CPT | Performed by: NURSE PRACTITIONER

## 2022-10-18 PROCEDURE — 81003 URINALYSIS AUTO W/O SCOPE: CPT | Performed by: NURSE PRACTITIONER

## 2022-10-18 PROCEDURE — 25010000002 IOPAMIDOL 61 % SOLUTION: Performed by: NURSE PRACTITIONER

## 2022-10-18 PROCEDURE — 93005 ELECTROCARDIOGRAM TRACING: CPT | Performed by: NURSE PRACTITIONER

## 2022-10-18 PROCEDURE — 83690 ASSAY OF LIPASE: CPT | Performed by: NURSE PRACTITIONER

## 2022-10-18 PROCEDURE — 80053 COMPREHEN METABOLIC PANEL: CPT | Performed by: NURSE PRACTITIONER

## 2022-10-18 PROCEDURE — 25010000002 MORPHINE PER 10 MG: Performed by: INTERNAL MEDICINE

## 2022-10-18 PROCEDURE — 85025 COMPLETE CBC W/AUTO DIFF WBC: CPT | Performed by: NURSE PRACTITIONER

## 2022-10-18 PROCEDURE — 63710000001 INSULIN DETEMIR PER 5 UNITS: Performed by: INTERNAL MEDICINE

## 2022-10-18 PROCEDURE — 74177 CT ABD & PELVIS W/CONTRAST: CPT

## 2022-10-18 PROCEDURE — 83605 ASSAY OF LACTIC ACID: CPT | Performed by: NURSE PRACTITIONER

## 2022-10-18 PROCEDURE — 99284 EMERGENCY DEPT VISIT MOD MDM: CPT

## 2022-10-18 PROCEDURE — 82962 GLUCOSE BLOOD TEST: CPT

## 2022-10-18 RX ORDER — SODIUM CHLORIDE 0.9 % (FLUSH) 0.9 %
10 SYRINGE (ML) INJECTION EVERY 12 HOURS SCHEDULED
Status: DISCONTINUED | OUTPATIENT
Start: 2022-10-18 | End: 2022-10-19 | Stop reason: HOSPADM

## 2022-10-18 RX ORDER — MEXILETINE HYDROCHLORIDE 150 MG/1
300 CAPSULE ORAL DAILY
Status: DISCONTINUED | OUTPATIENT
Start: 2022-10-18 | End: 2022-10-19 | Stop reason: HOSPADM

## 2022-10-18 RX ORDER — PANTOPRAZOLE SODIUM 40 MG/1
40 TABLET, DELAYED RELEASE ORAL
Status: DISCONTINUED | OUTPATIENT
Start: 2022-10-19 | End: 2022-10-19 | Stop reason: HOSPADM

## 2022-10-18 RX ORDER — SODIUM CHLORIDE, SODIUM LACTATE, POTASSIUM CHLORIDE, CALCIUM CHLORIDE 600; 310; 30; 20 MG/100ML; MG/100ML; MG/100ML; MG/100ML
150 INJECTION, SOLUTION INTRAVENOUS CONTINUOUS
Status: DISCONTINUED | OUTPATIENT
Start: 2022-10-18 | End: 2022-10-19 | Stop reason: HOSPADM

## 2022-10-18 RX ORDER — ROPINIROLE 1 MG/1
2 TABLET, FILM COATED ORAL NIGHTLY
Status: DISCONTINUED | OUTPATIENT
Start: 2022-10-18 | End: 2022-10-19 | Stop reason: HOSPADM

## 2022-10-18 RX ORDER — NICOTINE POLACRILEX 4 MG
15 LOZENGE BUCCAL
Status: DISCONTINUED | OUTPATIENT
Start: 2022-10-18 | End: 2022-10-19 | Stop reason: HOSPADM

## 2022-10-18 RX ORDER — FLUTICASONE PROPIONATE 50 MCG
1 SPRAY, SUSPENSION (ML) NASAL DAILY
Status: DISCONTINUED | OUTPATIENT
Start: 2022-10-19 | End: 2022-10-19 | Stop reason: HOSPADM

## 2022-10-18 RX ORDER — SODIUM CHLORIDE 0.9 % (FLUSH) 0.9 %
10 SYRINGE (ML) INJECTION AS NEEDED
Status: DISCONTINUED | OUTPATIENT
Start: 2022-10-18 | End: 2022-10-19 | Stop reason: HOSPADM

## 2022-10-18 RX ORDER — INSULIN LISPRO 100 [IU]/ML
20 INJECTION, SOLUTION INTRAVENOUS; SUBCUTANEOUS
Status: DISCONTINUED | OUTPATIENT
Start: 2022-10-18 | End: 2022-10-19

## 2022-10-18 RX ORDER — LEVOTHYROXINE SODIUM 0.05 MG/1
50 TABLET ORAL
Status: DISCONTINUED | OUTPATIENT
Start: 2022-10-19 | End: 2022-10-19 | Stop reason: HOSPADM

## 2022-10-18 RX ORDER — SODIUM BICARBONATE 650 MG/1
650 TABLET ORAL 2 TIMES DAILY
Status: DISCONTINUED | OUTPATIENT
Start: 2022-10-18 | End: 2022-10-19 | Stop reason: HOSPADM

## 2022-10-18 RX ORDER — METFORMIN HYDROCHLORIDE 500 MG/1
500 TABLET, EXTENDED RELEASE ORAL 2 TIMES DAILY WITH MEALS
Status: DISCONTINUED | OUTPATIENT
Start: 2022-10-18 | End: 2022-10-19

## 2022-10-18 RX ORDER — INSULIN LISPRO 100 [IU]/ML
0-14 INJECTION, SOLUTION INTRAVENOUS; SUBCUTANEOUS
Status: DISCONTINUED | OUTPATIENT
Start: 2022-10-18 | End: 2022-10-19 | Stop reason: HOSPADM

## 2022-10-18 RX ORDER — CETIRIZINE HYDROCHLORIDE 10 MG/1
10 TABLET ORAL DAILY PRN
Status: DISCONTINUED | OUTPATIENT
Start: 2022-10-18 | End: 2022-10-19 | Stop reason: HOSPADM

## 2022-10-18 RX ORDER — ZOLPIDEM TARTRATE 5 MG/1
10 TABLET ORAL NIGHTLY PRN
Status: DISCONTINUED | OUTPATIENT
Start: 2022-10-18 | End: 2022-10-19 | Stop reason: HOSPADM

## 2022-10-18 RX ORDER — MORPHINE SULFATE 2 MG/ML
2 INJECTION, SOLUTION INTRAMUSCULAR; INTRAVENOUS
Status: DISCONTINUED | OUTPATIENT
Start: 2022-10-18 | End: 2022-10-19 | Stop reason: HOSPADM

## 2022-10-18 RX ORDER — ROSUVASTATIN CALCIUM 10 MG/1
10 TABLET, COATED ORAL NIGHTLY
Status: DISCONTINUED | OUTPATIENT
Start: 2022-10-18 | End: 2022-10-19 | Stop reason: HOSPADM

## 2022-10-18 RX ORDER — GABAPENTIN 300 MG/1
600 CAPSULE ORAL EVERY 8 HOURS SCHEDULED
Status: DISCONTINUED | OUTPATIENT
Start: 2022-10-18 | End: 2022-10-19 | Stop reason: HOSPADM

## 2022-10-18 RX ORDER — BENZONATATE 100 MG/1
200 CAPSULE ORAL 3 TIMES DAILY PRN
Status: DISCONTINUED | OUTPATIENT
Start: 2022-10-18 | End: 2022-10-19 | Stop reason: HOSPADM

## 2022-10-18 RX ORDER — ONDANSETRON 2 MG/ML
4 INJECTION INTRAMUSCULAR; INTRAVENOUS EVERY 6 HOURS PRN
Status: DISCONTINUED | OUTPATIENT
Start: 2022-10-18 | End: 2022-10-19 | Stop reason: HOSPADM

## 2022-10-18 RX ORDER — HYDROCHLOROTHIAZIDE 25 MG/1
12.5 TABLET ORAL DAILY
Status: DISCONTINUED | OUTPATIENT
Start: 2022-10-19 | End: 2022-10-19 | Stop reason: HOSPADM

## 2022-10-18 RX ORDER — DEXTROSE MONOHYDRATE 25 G/50ML
25 INJECTION, SOLUTION INTRAVENOUS
Status: DISCONTINUED | OUTPATIENT
Start: 2022-10-18 | End: 2022-10-19 | Stop reason: HOSPADM

## 2022-10-18 RX ORDER — METOPROLOL TARTRATE 50 MG/1
50 TABLET, FILM COATED ORAL EVERY 12 HOURS SCHEDULED
Status: DISCONTINUED | OUTPATIENT
Start: 2022-10-18 | End: 2022-10-19 | Stop reason: HOSPADM

## 2022-10-18 RX ORDER — HYDROCODONE BITARTRATE AND ACETAMINOPHEN 5; 325 MG/1; MG/1
1 TABLET ORAL 4 TIMES DAILY PRN
Status: DISCONTINUED | OUTPATIENT
Start: 2022-10-18 | End: 2022-10-19 | Stop reason: HOSPADM

## 2022-10-18 RX ORDER — ACETAMINOPHEN 325 MG/1
650 TABLET ORAL EVERY 4 HOURS PRN
Status: DISCONTINUED | OUTPATIENT
Start: 2022-10-18 | End: 2022-10-19 | Stop reason: HOSPADM

## 2022-10-18 RX ORDER — MELOXICAM 7.5 MG/1
15 TABLET ORAL DAILY
Status: DISCONTINUED | OUTPATIENT
Start: 2022-10-19 | End: 2022-10-19 | Stop reason: HOSPADM

## 2022-10-18 RX ADMIN — ROPINIROLE HYDROCHLORIDE 2 MG: 1 TABLET, FILM COATED ORAL at 21:58

## 2022-10-18 RX ADMIN — SODIUM CHLORIDE, POTASSIUM CHLORIDE, SODIUM LACTATE AND CALCIUM CHLORIDE 150 ML/HR: 600; 310; 30; 20 INJECTION, SOLUTION INTRAVENOUS at 19:52

## 2022-10-18 RX ADMIN — MORPHINE SULFATE 4 MG: 4 INJECTION, SOLUTION INTRAMUSCULAR; INTRAVENOUS at 22:43

## 2022-10-18 RX ADMIN — ROSUVASTATIN CALCIUM 10 MG: 10 TABLET, FILM COATED ORAL at 21:58

## 2022-10-18 RX ADMIN — METOPROLOL TARTRATE 50 MG: 50 TABLET, FILM COATED ORAL at 21:57

## 2022-10-18 RX ADMIN — INSULIN DETEMIR 30 UNITS: 100 INJECTION, SOLUTION SUBCUTANEOUS at 22:03

## 2022-10-18 RX ADMIN — GABAPENTIN 600 MG: 300 CAPSULE ORAL at 21:57

## 2022-10-18 RX ADMIN — SODIUM BICARBONATE 650 MG: 650 TABLET ORAL at 21:57

## 2022-10-18 RX ADMIN — SODIUM CHLORIDE, POTASSIUM CHLORIDE, SODIUM LACTATE AND CALCIUM CHLORIDE 500 ML: 600; 310; 30; 20 INJECTION, SOLUTION INTRAVENOUS at 14:14

## 2022-10-18 RX ADMIN — IOPAMIDOL 100 ML: 612 INJECTION, SOLUTION INTRAVENOUS at 16:27

## 2022-10-18 RX ADMIN — MORPHINE SULFATE 4 MG: 4 INJECTION, SOLUTION INTRAMUSCULAR; INTRAVENOUS at 18:07

## 2022-10-19 ENCOUNTER — DOCUMENTATION (OUTPATIENT)
Dept: ENDOCRINOLOGY | Facility: CLINIC | Age: 67
End: 2022-10-19

## 2022-10-19 VITALS
HEIGHT: 71 IN | SYSTOLIC BLOOD PRESSURE: 128 MMHG | WEIGHT: 204 LBS | HEART RATE: 58 BPM | TEMPERATURE: 97.8 F | BODY MASS INDEX: 28.56 KG/M2 | RESPIRATION RATE: 18 BRPM | OXYGEN SATURATION: 96 % | DIASTOLIC BLOOD PRESSURE: 71 MMHG

## 2022-10-19 LAB
ANION GAP SERPL CALCULATED.3IONS-SCNC: 8 MMOL/L (ref 5–15)
BASOPHILS # BLD AUTO: 0.1 10*3/MM3 (ref 0–0.2)
BASOPHILS NFR BLD AUTO: 1.1 % (ref 0–1.5)
BUN SERPL-MCNC: 21 MG/DL (ref 8–23)
BUN/CREAT SERPL: 17.6 (ref 7–25)
CALCIUM SPEC-SCNC: 9.2 MG/DL (ref 8.6–10.5)
CHLORIDE SERPL-SCNC: 105 MMOL/L (ref 98–107)
CO2 SERPL-SCNC: 28 MMOL/L (ref 22–29)
CREAT SERPL-MCNC: 1.19 MG/DL (ref 0.76–1.27)
DEPRECATED RDW RBC AUTO: 45.4 FL (ref 37–54)
EGFRCR SERPLBLD CKD-EPI 2021: 66.5 ML/MIN/1.73
EOSINOPHIL # BLD AUTO: 0.64 10*3/MM3 (ref 0–0.4)
EOSINOPHIL NFR BLD AUTO: 6.9 % (ref 0.3–6.2)
ERYTHROCYTE [DISTWIDTH] IN BLOOD BY AUTOMATED COUNT: 13.8 % (ref 12.3–15.4)
GLUCOSE BLDC GLUCOMTR-MCNC: 154 MG/DL (ref 70–130)
GLUCOSE BLDC GLUCOMTR-MCNC: 97 MG/DL (ref 70–130)
GLUCOSE SERPL-MCNC: 103 MG/DL (ref 65–99)
HCT VFR BLD AUTO: 48.3 % (ref 37.5–51)
HGB BLD-MCNC: 15.3 G/DL (ref 13–17.7)
IMM GRANULOCYTES # BLD AUTO: 0.19 10*3/MM3 (ref 0–0.05)
IMM GRANULOCYTES NFR BLD AUTO: 2 % (ref 0–0.5)
LIPASE SERPL-CCNC: 116 U/L (ref 13–60)
LYMPHOCYTES # BLD AUTO: 3.51 10*3/MM3 (ref 0.7–3.1)
LYMPHOCYTES NFR BLD AUTO: 37.7 % (ref 19.6–45.3)
MCH RBC QN AUTO: 28.5 PG (ref 26.6–33)
MCHC RBC AUTO-ENTMCNC: 31.7 G/DL (ref 31.5–35.7)
MCV RBC AUTO: 89.9 FL (ref 79–97)
MONOCYTES # BLD AUTO: 0.76 10*3/MM3 (ref 0.1–0.9)
MONOCYTES NFR BLD AUTO: 8.2 % (ref 5–12)
NEUTROPHILS NFR BLD AUTO: 4.1 10*3/MM3 (ref 1.7–7)
NEUTROPHILS NFR BLD AUTO: 44.1 % (ref 42.7–76)
NRBC BLD AUTO-RTO: 0 /100 WBC (ref 0–0.2)
PLATELET # BLD AUTO: 175 10*3/MM3 (ref 140–450)
PMV BLD AUTO: 10.6 FL (ref 6–12)
POTASSIUM SERPL-SCNC: 3.6 MMOL/L (ref 3.5–5.2)
QT INTERVAL: 410 MS
QTC INTERVAL: 461 MS
RBC # BLD AUTO: 5.37 10*6/MM3 (ref 4.14–5.8)
SODIUM SERPL-SCNC: 141 MMOL/L (ref 136–145)
WBC NRBC COR # BLD: 9.3 10*3/MM3 (ref 3.4–10.8)

## 2022-10-19 PROCEDURE — 82962 GLUCOSE BLOOD TEST: CPT

## 2022-10-19 PROCEDURE — 85025 COMPLETE CBC W/AUTO DIFF WBC: CPT | Performed by: INTERNAL MEDICINE

## 2022-10-19 PROCEDURE — 80048 BASIC METABOLIC PNL TOTAL CA: CPT | Performed by: INTERNAL MEDICINE

## 2022-10-19 PROCEDURE — 63710000001 INSULIN LISPRO (HUMAN) PER 5 UNITS: Performed by: INTERNAL MEDICINE

## 2022-10-19 PROCEDURE — 94761 N-INVAS EAR/PLS OXIMETRY MLT: CPT

## 2022-10-19 PROCEDURE — 83690 ASSAY OF LIPASE: CPT | Performed by: INTERNAL MEDICINE

## 2022-10-19 PROCEDURE — 94799 UNLISTED PULMONARY SVC/PX: CPT

## 2022-10-19 RX ORDER — INSULIN LISPRO 100 [IU]/ML
10 INJECTION, SOLUTION INTRAVENOUS; SUBCUTANEOUS
Status: DISCONTINUED | OUTPATIENT
Start: 2022-10-19 | End: 2022-10-19 | Stop reason: HOSPADM

## 2022-10-19 RX ORDER — METFORMIN HYDROCHLORIDE 500 MG/1
500 TABLET, EXTENDED RELEASE ORAL 2 TIMES DAILY WITH MEALS
Qty: 180 TABLET | Refills: 0 | Status: SHIPPED | OUTPATIENT
Start: 2022-10-21 | End: 2023-03-17 | Stop reason: SDUPTHER

## 2022-10-19 RX ADMIN — SODIUM BICARBONATE 650 MG: 650 TABLET ORAL at 09:10

## 2022-10-19 RX ADMIN — HYDROCHLOROTHIAZIDE 12.5 MG: 25 TABLET ORAL at 09:10

## 2022-10-19 RX ADMIN — MELOXICAM 15 MG: 7.5 TABLET ORAL at 09:10

## 2022-10-19 RX ADMIN — MEXILETINE HYDROCHLORIDE 300 MG: 150 CAPSULE ORAL at 13:02

## 2022-10-19 RX ADMIN — GABAPENTIN 600 MG: 300 CAPSULE ORAL at 05:53

## 2022-10-19 RX ADMIN — EMPAGLIFLOZIN 10 MG: 10 TABLET, FILM COATED ORAL at 09:10

## 2022-10-19 RX ADMIN — METOPROLOL TARTRATE 50 MG: 50 TABLET, FILM COATED ORAL at 09:10

## 2022-10-19 RX ADMIN — PANTOPRAZOLE SODIUM 40 MG: 40 TABLET, DELAYED RELEASE ORAL at 05:53

## 2022-10-19 RX ADMIN — GABAPENTIN 600 MG: 300 CAPSULE ORAL at 13:02

## 2022-10-19 RX ADMIN — SODIUM CHLORIDE, POTASSIUM CHLORIDE, SODIUM LACTATE AND CALCIUM CHLORIDE 150 ML/HR: 600; 310; 30; 20 INJECTION, SOLUTION INTRAVENOUS at 02:50

## 2022-10-19 RX ADMIN — LEVOTHYROXINE SODIUM 50 MCG: 50 TABLET ORAL at 05:53

## 2022-10-19 RX ADMIN — INSULIN LISPRO 10 UNITS: 100 INJECTION, SOLUTION INTRAVENOUS; SUBCUTANEOUS at 13:02

## 2022-10-19 RX ADMIN — FLUTICASONE PROPIONATE 1 SPRAY: 50 SPRAY, METERED NASAL at 09:11

## 2022-10-19 NOTE — PROGRESS NOTES
PATIENT IS ENROLLED IN [facility] UNTIL 12/31/2022.    LETTER SENT TO MEDICAL RECORDS.

## 2022-11-06 ENCOUNTER — APPOINTMENT (OUTPATIENT)
Dept: CT IMAGING | Facility: HOSPITAL | Age: 67
End: 2022-11-06

## 2022-11-06 ENCOUNTER — HOSPITAL ENCOUNTER (EMERGENCY)
Facility: HOSPITAL | Age: 67
Discharge: HOME | End: 2022-11-07
Attending: EMERGENCY MEDICINE | Admitting: EMERGENCY MEDICINE

## 2022-11-06 DIAGNOSIS — R10.9 ABDOMINAL PAIN, UNSPECIFIED ABDOMINAL LOCATION: Primary | ICD-10-CM

## 2022-11-06 DIAGNOSIS — R10.9 FLANK PAIN: ICD-10-CM

## 2022-11-06 LAB
ALBUMIN SERPL-MCNC: 4 G/DL (ref 3.5–5.2)
ALBUMIN/GLOB SERPL: 1.1 G/DL
ALP SERPL-CCNC: 115 U/L (ref 39–117)
ALT SERPL W P-5'-P-CCNC: 23 U/L (ref 1–41)
ANION GAP SERPL CALCULATED.3IONS-SCNC: 8 MMOL/L (ref 5–15)
AST SERPL-CCNC: 27 U/L (ref 1–40)
BASOPHILS # BLD AUTO: 0.04 10*3/MM3 (ref 0–0.2)
BASOPHILS NFR BLD AUTO: 0.6 % (ref 0–1.5)
BILIRUB SERPL-MCNC: 0.2 MG/DL (ref 0–1.2)
BILIRUB UR QL STRIP: NEGATIVE
BUN SERPL-MCNC: 21 MG/DL (ref 8–23)
BUN/CREAT SERPL: 20.8 (ref 7–25)
CALCIUM SPEC-SCNC: 9.3 MG/DL (ref 8.6–10.5)
CHLORIDE SERPL-SCNC: 107 MMOL/L (ref 98–107)
CLARITY UR: CLEAR
CO2 SERPL-SCNC: 25 MMOL/L (ref 22–29)
COLOR UR: YELLOW
CREAT SERPL-MCNC: 1.01 MG/DL (ref 0.76–1.27)
DEPRECATED RDW RBC AUTO: 44.3 FL (ref 37–54)
EGFRCR SERPLBLD CKD-EPI 2021: 81 ML/MIN/1.73
EOSINOPHIL # BLD AUTO: 0.72 10*3/MM3 (ref 0–0.4)
EOSINOPHIL NFR BLD AUTO: 10 % (ref 0.3–6.2)
ERYTHROCYTE [DISTWIDTH] IN BLOOD BY AUTOMATED COUNT: 13.5 % (ref 12.3–15.4)
GLOBULIN UR ELPH-MCNC: 3.7 GM/DL
GLUCOSE SERPL-MCNC: 267 MG/DL (ref 65–99)
GLUCOSE UR STRIP-MCNC: ABNORMAL MG/DL
HCT VFR BLD AUTO: 49 % (ref 37.5–51)
HGB BLD-MCNC: 15.6 G/DL (ref 13–17.7)
HGB UR QL STRIP.AUTO: NEGATIVE
IMM GRANULOCYTES # BLD AUTO: 0.08 10*3/MM3 (ref 0–0.05)
IMM GRANULOCYTES NFR BLD AUTO: 1.1 % (ref 0–0.5)
INR PPP: 1.02 (ref 0.91–1.09)
KETONES UR QL STRIP: NEGATIVE
LEUKOCYTE ESTERASE UR QL STRIP.AUTO: NEGATIVE
LIPASE SERPL-CCNC: 424 U/L (ref 13–60)
LYMPHOCYTES # BLD AUTO: 2.54 10*3/MM3 (ref 0.7–3.1)
LYMPHOCYTES NFR BLD AUTO: 35.2 % (ref 19.6–45.3)
MCH RBC QN AUTO: 28.3 PG (ref 26.6–33)
MCHC RBC AUTO-ENTMCNC: 31.8 G/DL (ref 31.5–35.7)
MCV RBC AUTO: 88.8 FL (ref 79–97)
MONOCYTES # BLD AUTO: 0.58 10*3/MM3 (ref 0.1–0.9)
MONOCYTES NFR BLD AUTO: 8 % (ref 5–12)
NEUTROPHILS NFR BLD AUTO: 3.26 10*3/MM3 (ref 1.7–7)
NEUTROPHILS NFR BLD AUTO: 45.1 % (ref 42.7–76)
NITRITE UR QL STRIP: NEGATIVE
NRBC BLD AUTO-RTO: 0 /100 WBC (ref 0–0.2)
PH UR STRIP.AUTO: 5.5 [PH] (ref 5–8)
PLATELET # BLD AUTO: 176 10*3/MM3 (ref 140–450)
PMV BLD AUTO: 10.4 FL (ref 6–12)
POTASSIUM SERPL-SCNC: 4.1 MMOL/L (ref 3.5–5.2)
PROT SERPL-MCNC: 7.7 G/DL (ref 6–8.5)
PROT UR QL STRIP: NEGATIVE
PROTHROMBIN TIME: 13 SECONDS (ref 11.9–14.6)
RBC # BLD AUTO: 5.52 10*6/MM3 (ref 4.14–5.8)
SODIUM SERPL-SCNC: 140 MMOL/L (ref 136–145)
SP GR UR STRIP: >1.03 (ref 1–1.03)
UROBILINOGEN UR QL STRIP: ABNORMAL
WBC NRBC COR # BLD: 7.22 10*3/MM3 (ref 3.4–10.8)

## 2022-11-06 PROCEDURE — 81003 URINALYSIS AUTO W/O SCOPE: CPT | Performed by: EMERGENCY MEDICINE

## 2022-11-06 PROCEDURE — 25010000002 ONDANSETRON PER 1 MG: Performed by: EMERGENCY MEDICINE

## 2022-11-06 PROCEDURE — 36415 COLL VENOUS BLD VENIPUNCTURE: CPT

## 2022-11-06 PROCEDURE — 96375 TX/PRO/DX INJ NEW DRUG ADDON: CPT

## 2022-11-06 PROCEDURE — 85610 PROTHROMBIN TIME: CPT | Performed by: EMERGENCY MEDICINE

## 2022-11-06 PROCEDURE — 83690 ASSAY OF LIPASE: CPT | Performed by: EMERGENCY MEDICINE

## 2022-11-06 PROCEDURE — 80053 COMPREHEN METABOLIC PANEL: CPT | Performed by: EMERGENCY MEDICINE

## 2022-11-06 PROCEDURE — 0 HYDROMORPHONE 1 MG/ML SOLUTION: Performed by: EMERGENCY MEDICINE

## 2022-11-06 PROCEDURE — 96374 THER/PROPH/DIAG INJ IV PUSH: CPT

## 2022-11-06 PROCEDURE — 74176 CT ABD & PELVIS W/O CONTRAST: CPT

## 2022-11-06 PROCEDURE — 99284 EMERGENCY DEPT VISIT MOD MDM: CPT

## 2022-11-06 PROCEDURE — 85025 COMPLETE CBC W/AUTO DIFF WBC: CPT | Performed by: EMERGENCY MEDICINE

## 2022-11-06 RX ORDER — ONDANSETRON 2 MG/ML
4 INJECTION INTRAMUSCULAR; INTRAVENOUS ONCE
Status: COMPLETED | OUTPATIENT
Start: 2022-11-06 | End: 2022-11-06

## 2022-11-06 RX ORDER — SODIUM CHLORIDE 0.9 % (FLUSH) 0.9 %
10 SYRINGE (ML) INJECTION AS NEEDED
Status: DISCONTINUED | OUTPATIENT
Start: 2022-11-06 | End: 2022-11-07 | Stop reason: HOSPADM

## 2022-11-06 RX ADMIN — ONDANSETRON 4 MG: 2 INJECTION INTRAMUSCULAR; INTRAVENOUS at 23:10

## 2022-11-06 RX ADMIN — HYDROMORPHONE HYDROCHLORIDE 1 MG: 1 INJECTION, SOLUTION INTRAMUSCULAR; INTRAVENOUS; SUBCUTANEOUS at 23:11

## 2022-11-06 RX ADMIN — SODIUM CHLORIDE 1000 ML: 9 INJECTION, SOLUTION INTRAVENOUS at 23:29

## 2022-11-07 VITALS
RESPIRATION RATE: 18 BRPM | DIASTOLIC BLOOD PRESSURE: 95 MMHG | WEIGHT: 204 LBS | TEMPERATURE: 98.9 F | HEART RATE: 64 BPM | OXYGEN SATURATION: 94 % | SYSTOLIC BLOOD PRESSURE: 165 MMHG | HEIGHT: 71 IN | BODY MASS INDEX: 28.56 KG/M2

## 2022-11-07 NOTE — ED PROVIDER NOTES
Subjective   History of Present Illness  Patient with right flank pain came to the ER complaining of pain and discomfort.    Flank Pain  Pain location:  R flank  Pain quality: aching and sharp    Pain radiates to:  Does not radiate  Pain severity:  Moderate  Onset quality:  Gradual  Timing:  Constant  Progression:  Unchanged  Chronicity:  Recurrent  Context: not alcohol use, not awakening from sleep, not diet changes, not eating, not previous surgeries, not recent illness, not sick contacts and not suspicious food intake    Relieved by:  Nothing  Worsened by:  Nothing  Ineffective treatments:  None tried  Associated symptoms: no anorexia, no belching, no chills, no constipation, no cough, no fever, no flatus, no hematemesis, no hematochezia, no melena, no nausea, no shortness of breath and no vomiting    Risk factors: no NSAID use, not obese and no recent hospitalization    Abdominal Pain  Associated symptoms: no anorexia, no belching, no chills, no constipation, no cough, no fever, no flatus, no hematemesis, no hematochezia, no melena, no nausea, no shortness of breath and no vomiting        Review of Systems   Constitutional: Negative.  Negative for chills and fever.   HENT: Negative.    Eyes: Negative.    Respiratory: Negative.  Negative for cough and shortness of breath.    Cardiovascular: Negative.    Gastrointestinal: Positive for abdominal pain. Negative for anorexia, constipation, flatus, hematemesis, hematochezia, melena, nausea and vomiting.   Endocrine: Negative.    Genitourinary: Positive for flank pain.   Skin: Negative.    Neurological: Negative.    Hematological: Negative.    All other systems reviewed and are negative.      Past Medical History:   Diagnosis Date   • Depression    • Diabetes mellitus (HCC)    • Disease of thyroid gland     HYPOTHYROIDISM   • GERD (gastroesophageal reflux disease)    • Hyperlipidemia    • Hypertension    • Kidney stone        Allergies   Allergen Reactions   • Atacand  [Candesartan Cilexetil] Rash   • Biaxin [Clarithromycin] Rash   • Cefzil [Cefprozil] Rash   • Levaquin [Levofloxacin] Rash   • Penicillins Rash   • Zestril [Lisinopril] Rash       Past Surgical History:   Procedure Laterality Date   • CHOLECYSTECTOMY     • COLONOSCOPY     • COLONOSCOPY N/A 5/27/2020    Procedure: COLONOSCOPY WITH ANESTHESIA;  Surgeon: Rambo Padilla DO;  Location: [facility] ENDOSCOPY;  Service: Gastroenterology;  Laterality: N/A;  Pre: Change in Bowels  Post: Colon Polyp, Suboptimal Prep  Kings APRN  CO2 Inflation Used   • ENDOSCOPY N/A 4/4/2017    Procedure: ESOPHAGOGASTRODUODENOSCOPY WITH ANESTHESIA;  Surgeon: Rambo Padilla DO;  Location: [facility] ENDOSCOPY;  Service:    • KIDNEY STONE SURGERY     • SHOULDER SURGERY         Family History   Problem Relation Age of Onset   • Heart disease Mother    • Diabetes Mother    • Alzheimer's disease Father    • Heart disease Father    • Diabetes Sister    • Heart disease Brother    • Diabetes Sister    • Diabetes Sister    • Colon cancer Neg Hx    • Esophageal cancer Neg Hx        Social History     Socioeconomic History   • Marital status:    Tobacco Use   • Smoking status: Never   • Smokeless tobacco: Never   Vaping Use   • Vaping Use: Never used   Substance and Sexual Activity   • Alcohol use: No   • Drug use: No   • Sexual activity: Yes     Partners: Female           Objective   Physical Exam  Vitals and nursing note reviewed. Exam conducted with a chaperone present.   Constitutional:       General: He is awake. He is not in acute distress.     Appearance: Normal appearance. He is well-developed. He is not toxic-appearing.   HENT:      Head: Normocephalic and atraumatic.      Nose:      Right Nostril: No epistaxis.      Left Nostril: No epistaxis.   Eyes:      General: Lids are normal. No scleral icterus.     Conjunctiva/sclera: Conjunctivae normal.      Pupils: Pupils are equal, round, and reactive to light.   Neck:      Vascular: No  hepatojugular reflux or JVD.   Cardiovascular:      Rate and Rhythm: Normal rate and regular rhythm.      Chest Wall: PMI is not displaced.      Pulses: Normal pulses. No decreased pulses.      Heart sounds: Normal heart sounds. No murmur heard.  Pulmonary:      Effort: Pulmonary effort is normal. No accessory muscle usage or respiratory distress.      Breath sounds: Normal breath sounds. No decreased breath sounds or wheezing.   Abdominal:      General: Abdomen is flat. Bowel sounds are normal. There is no distension or abdominal bruit.      Palpations: Abdomen is soft. There is no shifting dullness, fluid wave, mass or pulsatile mass.      Tenderness: There is no abdominal tenderness. There is right CVA tenderness. There is no left CVA tenderness, guarding or rebound.      Hernia: No hernia is present.   Musculoskeletal:         General: Normal range of motion.      Cervical back: Normal range of motion and neck supple. No rigidity.      Right lower leg: No edema.      Left lower leg: No edema.   Skin:     General: Skin is warm and dry.      Capillary Refill: Capillary refill takes less than 2 seconds.      Coloration: Skin is not cyanotic, jaundiced, mottled or pale.      Findings: No rash.   Neurological:      General: No focal deficit present.      Mental Status: He is alert and oriented to person, place, and time. Mental status is at baseline.      GCS: GCS eye subscore is 4. GCS verbal subscore is 5. GCS motor subscore is 6.      Cranial Nerves: Cranial nerves are intact. No cranial nerve deficit.      Sensory: Sensation is intact.      Motor: Motor function is intact.      Deep Tendon Reflexes: Reflexes are normal and symmetric.   Psychiatric:         Behavior: Behavior normal. Behavior is cooperative.         Procedures           ED Course  ED Course as of 11/07/22 0058   Mon Nov 07, 2022   0055 CT read by stat read negative no stones no pancreatitis [TS]   0055 This patient was evaluated during a time of  global shortage of iodinated contrast media. Based on guidance from the American College of [employer], best practices, and local institutional approaches an alternative path for evaluating and managing the patient may have been employed in order to provide optimal care during this shortage. The current situation has been discussed with the patient.  [TS]   0056 Patient was given pain medication in the ED is not having any pain at this time.  CT scan is negative. [TS]   0056 This patient presents with abdominal pain arrived hemodynamically stable.  Presentation not consistent with other acute, emergent causes of abdominal pain at this time.  Low suspicion for dissection there is no widened mediastinum, hypotension, pulses deficits, and no pain tearing through to the back.  Low suspicion for nephrolithiasis without flank pain radiating to the groin, hematuria, or any history of kidney stones.  Low suspicion for viscus perforation without evidence of guarding, rebound, or rigidity that would be concerning for an acute abdomen.  Low concern for appendicitis as pain did not radiate from the umbilicus to the right lower quadrant, negative Rovsing's sign, no severe constipation on exam.  Low concern for cholecystitis with negative Nagy sign, no history of gallstones, and no recent pale stools or pain after eating.  Low concern for ulcerative disease as pain is not consistent with eating  foods and is not relieved with patient refraining from eatingand there is no hematemesis or melena. Low suspicion for GI bleeding as there is no melena hematemesis or hematochezia and patient's hemodynamics are stable and hemoglobin is at its baseline.  Low suspicion for gastroenteritis without evidence of diarrhea, fevers, or recent uncooked food exposure.  There is low concern for ischemic bowel with no significant abdominal pain normal vitals and no acidosis no history of peripheral vascular disease or cardiac dysrhythmias.           [TS]   0056 Risks and benefits of treatments given and alternative treatment options discussed with patient/family. I answered all the questions in simple, plain language, and there was voiced understanding and agreement with plan of care. There were no further questions. Differential diagnosis discussed. Patient/family was advised that the practice of medicine is not always an exact science, and sometimes tests, physical exam, or history may not show the underlying conditions with certainty. Additionally, the condition may change or show itself later after initial presentation. There was also expressed understanding and agreement with this limitation of emergency medicine practice. Patient/family was asked to return to ED if any problem or issues or if condition worsens or does not improved. Patient/family agreed to follow up with PCP/specialist as advised, or return to ED if unable to see a provider in a timely fashion for continued symptoms.  [TS]      ED Course User Index  [TS] Romero Costa MD                                           MDM  Number of Diagnoses or Management Options  Diagnosis management comments: DD   mesenteric lymphadenitis, diverticulitis, intussusception, small bowel obstruction, adhesions, bowel ischemia,intraabdominal abscess, spontaneous bacterial peritonitis, hernia, urinary tract infection, ureterolithiasis,acute appendicitis, abdominal aortic aneurysm, pneumonia, porphyria and diabetic ketoacidosis as a possible cause of abdominal pain in this patient. This is a partial list of diagnoses considered.         Amount and/or Complexity of Data Reviewed  Clinical lab tests: ordered and reviewed  Tests in the radiology section of CPT®: reviewed and ordered  Tests in the medicine section of CPT®: reviewed and ordered    Risk of Complications, Morbidity, and/or Mortality  Presenting problems: moderate  Diagnostic procedures: low  Management options: low        Final diagnoses:   Abdominal  pain, unspecified abdominal location   Flank pain       ED Disposition  ED Disposition     ED Disposition   Discharge    Condition   Stable    Comment   --             Libby Ku, APRN  [address] [facility] [address] [phone]    In 2 days           Medication List      No changes were made to your prescriptions during this visit.          Romero Costa MD  11/06/22 5628       Romero Costa MD  11/07/22 8067

## 2022-11-18 ENCOUNTER — OFFICE VISIT (OUTPATIENT)
Dept: NEUROLOGY | Facility: CLINIC | Age: 67
End: 2022-11-18

## 2022-11-18 ENCOUNTER — HOSPITAL ENCOUNTER (OUTPATIENT)
Dept: GENERAL RADIOLOGY | Facility: HOSPITAL | Age: 67
Discharge: HOME | End: 2022-11-18

## 2022-11-18 VITALS
HEART RATE: 92 BPM | HEIGHT: 71 IN | SYSTOLIC BLOOD PRESSURE: 138 MMHG | BODY MASS INDEX: 28 KG/M2 | WEIGHT: 200 LBS | OXYGEN SATURATION: 96 % | DIASTOLIC BLOOD PRESSURE: 76 MMHG

## 2022-11-18 DIAGNOSIS — L97.519 DIABETIC ULCER OF RIGHT FOOT ASSOCIATED WITH TYPE 2 DIABETES MELLITUS, UNSPECIFIED PART OF FOOT, UNSPECIFIED ULCER STAGE: ICD-10-CM

## 2022-11-18 DIAGNOSIS — E11.42 DIABETIC POLYNEUROPATHY ASSOCIATED WITH TYPE 2 DIABETES MELLITUS: Primary | ICD-10-CM

## 2022-11-18 DIAGNOSIS — G25.81 RESTLESS LEGS SYNDROME (RLS): ICD-10-CM

## 2022-11-18 DIAGNOSIS — E11.621 DIABETIC ULCER OF RIGHT FOOT ASSOCIATED WITH TYPE 2 DIABETES MELLITUS, UNSPECIFIED PART OF FOOT, UNSPECIFIED ULCER STAGE: ICD-10-CM

## 2022-11-18 PROCEDURE — 73620 X-RAY EXAM OF FOOT: CPT

## 2022-11-18 PROCEDURE — 99214 OFFICE O/P EST MOD 30 MIN: CPT | Performed by: PHYSICIAN ASSISTANT

## 2022-11-18 PROCEDURE — 73600 X-RAY EXAM OF ANKLE: CPT

## 2022-11-18 RX ORDER — ROPINIROLE 2 MG/1
2 TABLET, FILM COATED ORAL NIGHTLY
Qty: 90 TABLET | Refills: 3 | Status: SHIPPED | OUTPATIENT
Start: 2022-11-18

## 2022-11-18 NOTE — PROGRESS NOTES
"Subjective   Supa Mcclelland is a 67 y.o. male is here today for follow-up.    History of Present Illness     Peripheral neuropathy  The patient complains of pain in his right foot that radiates down to his knee and ankle. He was seen by his primary care physician and was given antibiotics. He has not had an x-ray of his right foot. The patient's wife states that the patient right foot was red and warm to touch.    Restless leg syndrome  The patient is currently taking ropinirole for his restless leg syndrome. His insurance does not want to cover the ropinirole. His wife states that she can tell a difference because his feet are more \" steel \" when he is in bed.    The following portions of the patient's history were reviewed and updated as appropriate: allergies, current medications, past family history, past medical history, past social history, past surgical history and problem list.    Review of Systems:  A review of systems was performed, and positive findings are noted in the HPI.      Current Outpatient Medications:   •  benzonatate (TESSALON) 200 MG capsule, Take 1 capsule by mouth 3 (Three) Times a Day As Needed for Cough., Disp: 30 capsule, Rfl: 3  •  cholecalciferol (D3-50) 1.25 MG (19129 UT) capsule, Take 1 capsule by mouth Every 14 (Fourteen) Days. TAKE ONE CAPSULE BY MOUTH EVERY 2 WEEKS., Disp: 6 capsule, Rfl: 3  •  empagliflozin (Jardiance) 10 MG tablet tablet, Take 1 tablet by mouth Daily., Disp: 90 tablet, Rfl: 3  •  famotidine (PEPCID) 20 MG tablet, Take 20 mg by mouth 2 (Two) Times a Day., Disp: , Rfl:   •  fexofenadine (ALLEGRA) 180 MG tablet, Take 180 mg by mouth As Needed., Disp: , Rfl:   •  fluticasone (FLONASE) 50 MCG/ACT nasal spray, 1 spray each nostril twice a day for three days, then daily., Disp: 1 bottle, Rfl: 0  •  gabapentin (NEURONTIN) 800 MG tablet, TAKE 1 TABLET BY MOUTH THREE TIMES DAILY, Disp: 270 tablet, Rfl: 0  •  hydrochlorothiazide (HYDRODIURIL) 12.5 MG tablet, Take 12.5 mg by " mouth Daily., Disp: , Rfl:   •  Insulin Glargine (BASAGLAR KWIKPEN) 100 UNIT/ML injection pen, 75 units qam, Disp: 24 pen, Rfl: 3  •  Insulin Lispro (HumaLOG KwikPen) 200 UNIT/ML solution pen-injector, Inject 20 Units under the skin into the appropriate area as directed 3 (Three) Times a Day With Meals., Disp: 18 pen, Rfl: 11  •  ketorolac (TORADOL) 10 MG tablet, Take 1 tablet by mouth Every 6 (Six) Hours As Needed for Moderate Pain., Disp: 15 tablet, Rfl: 0  •  levothyroxine (SYNTHROID, LEVOTHROID) 50 MCG tablet, Take 1 tablet by mouth Daily., Disp: , Rfl:   •  loratadine (CLARITIN) 10 MG tablet, Take 10 mg by mouth Daily., Disp: , Rfl:   •  meloxicam (MOBIC) 15 MG tablet, Take 1 tablet by mouth Daily., Disp: , Rfl:   •  metFORMIN ER (GLUCOPHAGE-XR) 500 MG 24 hr tablet, Take 1 tablet by mouth 2 (Two) Times a Day With Meals., Disp: 180 tablet, Rfl: 0  •  metoprolol tartrate (LOPRESSOR) 50 MG tablet, Take 50 mg by mouth 2 (Two) Times a Day., Disp: , Rfl:   •  mexiletine (MEXITIL) 150 MG capsule, Take 2 capsules by mouth Daily., Disp: 60 capsule, Rfl: 6  •  omeprazole (priLOSEC) 20 MG capsule, Take 1 capsule by mouth 2 (Two) Times a Day., Disp: 60 capsule, Rfl: 11  •  potassium citrate (UROCIT-K) 10 MEQ (1080 MG) CR tablet, Take 1 tablet by mouth 3 (Three) Times a Day With Meals., Disp: 90 tablet, Rfl: 11  •  RELION PEN NEEDLES 31G X 6 MM misc, USE AS DIRECTED 4 TIMES DAILY, Disp: 150 each, Rfl: 11  •  rOPINIRole (REQUIP) 2 MG tablet, Take 1 tablet by mouth Every Night. Take 1 hour before bedtime., Disp: 90 tablet, Rfl: 3  •  rosuvastatin (Crestor) 10 MG tablet, Take 1 tablet by mouth Every Night., Disp: 30 tablet, Rfl: 11  •  sulfamethoxazole-trimethoprim (BACTRIM DS,SEPTRA DS) 800-160 MG per tablet, Take 1 tablet by mouth 2 (Two) Times a Day., Disp: 14 tablet, Rfl: 0  •  zolpidem (AMBIEN) 10 MG tablet, Take 10 mg by mouth At Night As Needed., Disp: , Rfl:      Objective   Physical Exam  Vitals and nursing note  reviewed.   HENT:      Head: Normocephalic.      Right Ear: Hearing and external ear normal.      Left Ear: Hearing and external ear normal.      Nose: Nose normal.      Mouth/Throat:      Pharynx: Oropharynx is clear.   Eyes:      General: Lids are normal. Vision grossly intact. Gaze aligned appropriately. No scleral icterus.     Extraocular Movements: Extraocular movements intact.      Conjunctiva/sclera: Conjunctivae normal.      Pupils: Pupils are equal, round, and reactive to light.      Visual Fields: Right eye visual fields normal and left eye visual fields normal.   Neck:      Vascular: No carotid bruit or JVD.      Trachea: Trachea and phonation normal.   Cardiovascular:      Rate and Rhythm: Normal rate.      Heart sounds: Normal heart sounds.   Pulmonary:      Effort: Pulmonary effort is normal.      Breath sounds: Normal breath sounds.   Musculoskeletal:      Cervical back: Normal range of motion.      Comments: Right lower extremity distally shows some typical diabetic neuropathy changes with diminished sensation, but also he has healing ulcers along the lateral aspect and has evidence of previously wounded skin there. They indicate that he has been on antibiotics, but he still has a significant amount of pain with weightbearing and he has some tenderness with bony palpation. I feel that this area should be evaluated radiographically and he will obtain this today.     Skin:     General: Skin is warm and dry.   Neurological:      Mental Status: He is alert and oriented to person, place, and time.      GCS: GCS eye subscore is 4. GCS verbal subscore is 5. GCS motor subscore is 6.      Cranial Nerves: Cranial nerves are intact.      Sensory: Sensation is intact.      Motor: Motor function is intact.      Coordination: Coordination is intact.      Gait: Gait is intact.      Deep Tendon Reflexes: Reflexes are normal and symmetric.      Comments: Typical diabetic neuropathy changes in the lower extremities  with distal greater than proximal lower extremity sensory changes, diminished reflexes bilaterally and previously noted changes in the right lower extremity.     Psychiatric:         Attention and Perception: Attention and perception normal.         Mood and Affect: Mood and affect normal.         Speech: Speech normal.         Behavior: Behavior normal.         Thought Content: Thought content normal.         Cognition and Memory: Cognition and memory normal.         Judgment: Judgment normal.           Assessment & Plan   Diagnoses and all orders for this visit:    1. Diabetic polyneuropathy associated with type 2 diabetes mellitus (HCC) (Primary)  -     XR foot 2 vw right  -     XR ankle 2 vw right  -     rOPINIRole (REQUIP) 2 MG tablet; Take 1 tablet by mouth Every Night. Take 1 hour before bedtime.  Dispense: 90 tablet; Refill: 3    2. Restless legs syndrome (RLS)  -     rOPINIRole (REQUIP) 2 MG tablet; Take 1 tablet by mouth Every Night. Take 1 hour before bedtime.  Dispense: 90 tablet; Refill: 3    3. Diabetic ulcer of right foot associated with type 2 diabetes mellitus, unspecified part of foot, unspecified ulcer stage ([facility])  -     XR foot 2 vw right  -     XR ankle 2 vw right          1. Diabetic neuropathy and restless leg syndrome  - Continue ropinirole as this has been very successful in the treatment of his restless leg symptoms. Continue gabapentin and other measures for his painful diabetic neuropathy.    Follow up in 6 months.             Transcribed from ambient dictation for ZACARIAS Alonzo by Baylee Bauer.  11/18/22   12:41 CST    Patient or patient representative verbalized consent to the visit recording.  I have personally performed the services described in this document as transcribed by the above individual, and it is both accurate and complete.

## 2022-11-21 ENCOUNTER — OFFICE VISIT (OUTPATIENT)
Dept: ENDOCRINOLOGY | Facility: CLINIC | Age: 67
End: 2022-11-21

## 2022-11-21 ENCOUNTER — DOCUMENTATION (OUTPATIENT)
Dept: ENDOCRINOLOGY | Facility: CLINIC | Age: 67
End: 2022-11-21

## 2022-11-21 ENCOUNTER — TELEPHONE (OUTPATIENT)
Dept: NEUROLOGY | Facility: CLINIC | Age: 67
End: 2022-11-21

## 2022-11-21 VITALS
OXYGEN SATURATION: 96 % | HEART RATE: 91 BPM | DIASTOLIC BLOOD PRESSURE: 60 MMHG | SYSTOLIC BLOOD PRESSURE: 110 MMHG | BODY MASS INDEX: 28.35 KG/M2 | WEIGHT: 198 LBS | HEIGHT: 70 IN

## 2022-11-21 DIAGNOSIS — E78.2 MIXED DIABETIC HYPERLIPIDEMIA ASSOCIATED WITH TYPE 2 DIABETES MELLITUS: ICD-10-CM

## 2022-11-21 DIAGNOSIS — E11.65 TYPE 2 DIABETES MELLITUS WITH HYPERGLYCEMIA, WITH LONG-TERM CURRENT USE OF INSULIN: Primary | ICD-10-CM

## 2022-11-21 DIAGNOSIS — I15.2 HYPERTENSION ASSOCIATED WITH DIABETES: ICD-10-CM

## 2022-11-21 DIAGNOSIS — Z79.4 TYPE 2 DIABETES MELLITUS WITH HYPERGLYCEMIA, WITH LONG-TERM CURRENT USE OF INSULIN: Primary | ICD-10-CM

## 2022-11-21 DIAGNOSIS — E11.59 HYPERTENSION ASSOCIATED WITH DIABETES: ICD-10-CM

## 2022-11-21 DIAGNOSIS — E11.42 DIABETIC POLYNEUROPATHY ASSOCIATED WITH TYPE 2 DIABETES MELLITUS: ICD-10-CM

## 2022-11-21 DIAGNOSIS — E11.69 MIXED DIABETIC HYPERLIPIDEMIA ASSOCIATED WITH TYPE 2 DIABETES MELLITUS: ICD-10-CM

## 2022-11-21 PROCEDURE — 99214 OFFICE O/P EST MOD 30 MIN: CPT | Performed by: INTERNAL MEDICINE

## 2022-11-21 RX ORDER — INSULIN GLARGINE 100 [IU]/ML
INJECTION, SOLUTION SUBCUTANEOUS
Qty: 24 ML | Refills: 11 | Status: SHIPPED | OUTPATIENT
Start: 2022-11-21 | End: 2023-03-17 | Stop reason: SDUPTHER

## 2022-11-21 RX ORDER — INSULIN LISPRO 200 [IU]/ML
20 INJECTION, SOLUTION SUBCUTANEOUS
Qty: 18 ML | Refills: 11 | Status: SHIPPED | OUTPATIENT
Start: 2022-11-21 | End: 2023-03-17 | Stop reason: SDUPTHER

## 2022-11-21 RX ORDER — GABAPENTIN 300 MG/1
300 CAPSULE ORAL 3 TIMES DAILY
Qty: 90 CAPSULE | Refills: 5 | Status: SHIPPED | OUTPATIENT
Start: 2022-11-21 | End: 2023-11-21

## 2022-11-21 RX ORDER — PEN NEEDLE, DIABETIC 30 GX3/16"
1 NEEDLE, DISPOSABLE MISCELLANEOUS 4 TIMES DAILY
Qty: 120 EACH | Refills: 11 | Status: SHIPPED | OUTPATIENT
Start: 2022-11-21

## 2022-11-21 NOTE — TELEPHONE ENCOUNTER
Caller: Rossana Mcclelland    Relationship: Emergency Contact    Best call back number: [phone]    Caller requesting test results: WIFE, ON  VERBAL BUT NOT FOR RESULTS    What test was performed: X-RAY    When was the test performed: 11/18/2022    Where was the test performed:  [facility]    Additional notes: N/A

## 2022-11-21 NOTE — PROGRESS NOTES
"  Subjective    Supa Mcclelland is a 67 y.o. male. he is here today for follow-up of diabetes                                          HPI      67 y o male w Type 2 DM for more than 10 years.     Has dorys but not using. Restart     States things are running well but Aic is elevated.       PE    /60   Pulse 91   Ht 177.8 cm (70\")   Wt 89.8 kg (198 lb)   SpO2 96%   BMI 28.41 kg/m²   AOx3  No visible goiter  Normal Respiratory Effort , Lung CTA  RRR  No Edema    Labs    Lab Results   Component Value Date    WBC 7.22 11/06/2022    HGB 15.6 11/06/2022    HCT 49.0 11/06/2022    MCV 88.8 11/06/2022     11/06/2022     Lab Results   Component Value Date    GLUCOSE 267 (H) 11/06/2022    BUN 21 11/06/2022    CREATININE 1.01 11/06/2022    EGFRIFNONA >60 04/20/2022    EGFRIFAFRI >59 06/23/2021    BCR 20.8 11/06/2022    K 4.1 11/06/2022    CO2 25.0 11/06/2022    CALCIUM 9.3 11/06/2022    ALBUMIN 4.00 11/06/2022    AST 27 11/06/2022    ALT 23 11/06/2022              Assessment & Plan      1. Type 2 diabetes mellitus with hyperglycemia, with long-term current use of insulin (HCC)    2. Hypertension associated with diabetes (HCC)    3. Mixed diabetic hyperlipidemia associated with type 2 diabetes mellitus (HCC)    .    Medications prescribed:    Glycemic Management:      Lab Results   Component Value Date    HGBA1C 9.70 (H) 05/19/2022     Needs to restart dorys 2     Getting insulin through assistance program      tresiba 75 units in am - change to basaglar 75 units qam -- 90 - 75       Humalog , no carb counting   20 w bkfast , add to lunch and supper    Jardiance 10 mg daily     Metformin xr 500 mg po BID      Trulicity 0.75 mg weekly - increase to 1.5 mg weekly --increase to 3 mg weekly - 4.5 mg weekly stopped due to pancreatitis     Use dorys , he will contact me once he has started so I can see data        Lipid Management       Gemfibrozil - stop      crestor 10 mg qhs    Lab Results   Component Value Date    " "CHOL 149 05/19/2022    TRIG 263 (H) 05/19/2022    HDL 40 05/19/2022    LDL 67 05/19/2022           Blood Pressure Management:          /60   Pulse 91   Ht 177.8 cm (70\")   Wt 89.8 kg (198 lb)   SpO2 96%   BMI 28.41 kg/m²           Microvascular Complication Monitoring:       Eye Exam Evaluation  Needs one   -----------     Last Microalbumin-Proteinuria Assessment     Lab Results   Component Value Date    MALBCRERATIO 36.7 05/19/2022    MALBCRERATIO 14.4 01/25/2019       -----------        Neuropathy, yes on neurontin, antidepressants  Also on requip            Weight Related:       BMI - 30.1     Has lost j10 lbs     Decreased caloric intake            Diet interventions: moderate (500 kCal/d) deficit diet.        Bone Health     Lab Results   Component Value Date    YUUY11XC 20.8 (L) 05/19/2022    XRKR21NL 32.6 06/23/2021    SRCZ48VU 27.7 (L) 08/03/2020     vit D 50 th u every 2 weeks        Thyroid Health     Lab Results   Component Value Date    TSH 2.040 05/19/2022        on levothyroxine 50 mcgs daily         Lab Results   Component Value Date    FAGACQCL45 336 05/19/2022     Polycythemia , not smoking ,was when he had infeciton             This document has been electronically signed by Ross Gomez MD on November 21, 2022 10:47 CST                  "

## 2022-11-21 NOTE — PROGRESS NOTES
OP HEMATOLOGY/ONCOLOGY PROGRESS NOTE      Pt Name: Amira Little  YOB: 1955  MRN: [id number]  Referring provider: AILSA Piedra  Date of evaluation: 11/23/22    History Obtained From:  patient, electronic medical record    CHIEF COMPLAINT:    Chief Complaint   Patient presents with    Follow-up     MGUS (monoclonal gammopathy of unknown significance)     HISTORY OF PRESENT ILLNESS:    Amira Little is a pleasant 75 y.o.  male returning to the clinic for hematology surveillance regarding a history of kappa light chain MGUS. Serology has remained stable. Prior Bone marrow aspirate and biopsy 12/9/2020 documented plasma cell neoplasm involving less than 5%. He has chronic neuropathy. He denies new or localized bony discomfort. Mary Lou Isbell was recently treated for pancreatitis. He has right sided abdominal discomfort. Noncontrast CT abdomen/pelvis 11/6/2022 at [facility]:   1. A mild-to-moderate dilatation of proximal small bowel in the left   lower abdomen without a clear zone of transition. This may represent   ileus or early small bowel obstruction? . Further follow-up may be   obtained. 2. Large volume stool in the colon. No finding to suggest obstruction. 3. Diverticulosis of the colon. No evidence for diverticulitis. 4. No evidence of renal or ureteral calculi are obstructive uropathy. He also shows me some healing scabs to his right foot reporting suspected unknown injury. Mary Lou Isbell has had mildly elevated H/H in the past, currently stable, 16.5/49. 7. CKD is managed by ALISA Motley. Primary care needs per ALISA Isaac. HEMATOLOGY HISTORY: [place] light chain MGUS 12/9/2020  Roseanna Joe was seen in initial hematology consultation 10/7/2020, referred by ALISA Motley for evaluation of an elevated light chain ratio.     PMH includes CKD, diabetes mellitus type 2, hypertension, hyperlipidemia, hypothyroidism, GERD etc.  Mary Lou Isbell was recently referred to [facility] Veloz for abnormal renal function, diagnosed with stage III CKD related to diabetes. Serology for evaluation of CKD 8/3/2020:  CBC: WBC 9.03, Hgb 16.2, platelets 478,471  CMP: Creatinine 1.31, GFR 55  HgbA1c: 9.14  Vitamin D: 27.7    Elevated serum immunoglobulin free light chains  Labs 2020:  CMP: Creatinine 1.27/GFR 58, AST 56, ALT 53, calcium 9.5  Total protein: 7.5  SPEP: No M-spike  Ig, IgA: 334, IgM: 92-all within normal limits  Kappa light chain 51.9, lambda light chain 25.2 with K/L ratio mildly elevated at 2.06  Urinalysis: 3+ glucose, 1+ occult blood, negative nitrite    He denies localized bony tenderness, though reports chronic neuropathy but is managed by Lior Jordan PA-C. He also complains of a chronic cough for the past 6 months or so that is being addressed by his PCP, ALISA Hunter. Labs 10/9/2020:  SPEP: No M-spike  Immunofixation: No monoclonality detected  Ig, IgA 354, IgM 124    24-hour urine for immunoelectrophoresis 10/14/2020: No M-spike, no monoclonality detected    Bone survey 2020: no focal bony lesions    Bone marrow aspirate and biopsy 2020 documented a normocellular marrow (~30-40%) with plasma cell neoplasm involving less than 5%. Congo red was negative for amyloid. FLOW cytometry showed a small clonal kappa restricted plasma cell population (0.2%). Cytogenetics showed a normal male karyotype. Negative molecular studies for JAK2 V617F, MPL, CALR, JAK2 exon 12 and KIT mutations  Findings likely represent kappa light chain MGUS. Conservative monitoring warranted.      Past Medical History:   Diagnosis Date    Arm fracture 2016    left    Depression     Diabetes mellitus ([address]     Hypertension     Kidney stones     Neuropathy     Restless leg syndrome      Past Surgical History:   Procedure Laterality Date    BONE MARROW BIOPSY Right 2020    BONE MARROW ASPIRATION BIOPSY performed by ALISA Aragon at [facility] OR    CHOLECYSTECTOMY      LITHOTRIPSY      SHOULDER SURGERY Left     Frozen shoulder surgery    UPPER GASTROINTESTINAL ENDOSCOPY         Current Outpatient Medications:     simvastatin (ZOCOR) 10 MG tablet, Take 10 mg by mouth nightly, Disp: , Rfl:     losartan (COZAAR) 25 MG tablet, Take 1 tablet by mouth in the morning., Disp: 30 tablet, Rfl: 5    ondansetron (ZOFRAN ODT) 4 MG disintegrating tablet, Take 1 tablet by mouth every 8 hours as needed for Nausea or Vomiting, Disp: 9 tablet, Rfl: 0    empagliflozin (JARDIANCE) 10 MG tablet, Take 10 mg by mouth daily, Disp: , Rfl:     Dulaglutide (TRULICITY) 1.5 RW/2.5IL SOPN, Inject 1.5 mg into the skin once a week, Disp: , Rfl:     gemfibrozil (LOPID) 600 MG tablet, Take 600 mg by mouth 2 times daily (before meals), Disp: , Rfl:     insulin glargine (BASAGLAR KWIKPEN) 100 UNIT/ML injection pen, Inject 80 Units into the skin daily , Disp: , Rfl:     Cholecalciferol (VITAMIN D3) 1.25 MG (82334 UT) CAPS, Take 50,000 Units by mouth every 7 days , Disp: , Rfl:     carBAMazepine (CARBATROL) 200 MG extended release capsule, Take 200 mg by mouth 2 times daily, Disp: , Rfl:     fluticasone (FLONASE) 50 MCG/ACT nasal spray, 1 spray by Each Nare route 2 times daily as needed for Rhinitis, Disp: , Rfl:     Insulin Lispro (HUMALOG KWIKPEN SC), Inject 20 Units into the skin every morning (before breakfast) , Disp: , Rfl:     loratadine (CLARITIN) 10 MG tablet, Take 10 mg by mouth daily as needed, Disp: , Rfl:     metFORMIN (GLUCOPHAGE-XR) 500 MG extended release tablet, Take 500 mg by mouth 2 times daily, Disp: , Rfl:     gabapentin (NEURONTIN) 800 MG tablet, Take 800 mg by mouth 2 times daily.  ., Disp: , Rfl:     hydrochlorothiazide (HYDRODIURIL) 25 MG tablet, Take 12.5 mg by mouth daily, Disp: , Rfl:     levothyroxine (SYNTHROID) 50 MCG tablet, Take 50 mcg by mouth Daily, Disp: , Rfl:     famotidine (PEPCID) 20 MG tablet, Take 20 mg by mouth daily , Disp: , Rfl:     citalopram (CELEXA) 20 MG tablet, Take 20 mg by mouth nightly, Disp: , Rfl:    Allergies: Allergies   Allergen Reactions    Penicillins      Social History     Tobacco Use    Smoking status: Never    Smokeless tobacco: Never   Substance Use Topics    Alcohol use: No    Drug use: No     History reviewed. No pertinent family history. Health Maintenance   Topic Date Due    COVID-19 Vaccine (1) Never done    Pneumococcal 65+ years Vaccine (1 - PCV) Never done    Diabetic foot exam  Never done    Depression Screen  Never done    Diabetic retinal exam  Never done    Hepatitis C screen  Never done    DTaP/Tdap/Td vaccine (1 - Tdap) Never done    Colorectal Cancer Screen  Never done    Shingles vaccine (1 of 2) Never done    Annual Wellness Visit (AWV)  Never done    Flu vaccine (1) 08/01/2022    A1C test (Diabetic or Prediabetic)  05/19/2023    Diabetic microalbuminuria test  05/19/2023    Lipids  05/19/2023    Hepatitis A vaccine  Aged Out    Hib vaccine  Aged Out    Meningococcal (ACWY) vaccine  Aged Out     Subjective   Review of Systems   Constitutional:  Positive for fatigue. Negative for fever. HENT:  Negative for dental problem, hearing loss, mouth sores, nosebleeds, sore throat and trouble swallowing. Eyes:  Negative for discharge and itching. Respiratory:  Negative for cough, shortness of breath and wheezing. Cardiovascular:  Negative for chest pain, palpitations and leg swelling. Gastrointestinal:  Negative for abdominal pain, constipation, diarrhea, nausea and vomiting. Endocrine: Negative for cold intolerance and heat intolerance. Diabetic   Genitourinary:  Negative for dysuria, frequency, hematuria and urgency. Musculoskeletal:  Negative for arthralgias, joint swelling and myalgias. Skin:  Negative for pallor and rash. Small scabs to top of right foot   Allergic/Immunologic: Negative for environmental allergies and immunocompromised state. Neurological:  Positive for numbness. Negative for seizures and syncope. Hematological:  Negative for adenopathy. Does not bruise/bleed easily. H/o MGUS   Psychiatric/Behavioral:  Negative for agitation, behavioral problems and confusion. Objective   Physical Exam  Vitals reviewed. Constitutional:       General: He is not in acute distress. Appearance: He is well-developed. He is not toxic-appearing or diaphoretic. Comments: Wearing a facial mask. overweight   HENT:      Head: Normocephalic and atraumatic. Right Ear: External ear normal.      Left Ear: External ear normal.      Nose: Nose normal.      Mouth/Throat:      Mouth: Mucous membranes are moist.   Eyes:      General: No scleral icterus. Right eye: No discharge. Left eye: No discharge. Conjunctiva/sclera: Conjunctivae normal.   Neck:      Trachea: No tracheal deviation. Cardiovascular:      Rate and Rhythm: Normal rate and regular rhythm. Pulmonary:      Effort: Pulmonary effort is normal. No respiratory distress. Breath sounds: Normal breath sounds. No wheezing or rales. Abdominal:      General: Bowel sounds are normal. There is no distension. Palpations: Abdomen is soft. Tenderness: There is abdominal tenderness (RUQ, RLQ). There is no guarding. Genitourinary:     Comments: Exam deferred  Musculoskeletal:         General: No tenderness or deformity. Cervical back: Neck supple. No muscular tenderness. Comments: Normal ROM all four extremities   Lymphadenopathy:      Cervical:      Right cervical: No superficial or deep cervical adenopathy. Left cervical: No superficial or deep cervical adenopathy. Upper Body:      Right upper body: No supraclavicular adenopathy. Left upper body: No supraclavicular adenopathy. Comments:      Skin:     General: Skin is warm and dry. Findings: No rash.       Comments: Healing scabs (about 4) to top of right foot, 3-4 mm each   Neurological:      Mental Status: He is alert and oriented to person, place, and time. Comments: follows commands, non-focal   Psychiatric:         Behavior: Behavior normal. Behavior is cooperative. Thought Content: Thought content normal.         Judgment: Judgment normal.      Comments: Alert and oriented to person, place and time. /64   Pulse 100   Wt 197 lb (89.4 kg)   SpO2 97%   BMI 27.48 kg/m²   Wt Readings from Last 3 Encounters:   22 197 lb (89.4 kg)   22 201 lb (91.2 kg)   22 209 lb (94.8 kg)      Carotid Artery Ace 2022 at [facility]: Bilateral carotid stenoses of less than 50%    ASSESSMENT/PLAN:    1. MGUS (monoclonal gammopathy of unknown significance)    2. Elevated hemoglobin (HCC)    3. Care plan discussed with patient      MGUS  Bone marrow aspirate and biopsy 2020 documented a normocellular marrow (~30-40%) with plasma cell neoplasm involving less than 5%. Labs 2022  CBC: WBC: 7.24, Hgb: 16.1/MCV: 88.3, Hct: 50.4, platelets: 160  CMP: AST: 97  SPEP: normal spep pattern  Immunofixation: no monoclonal proteins seen. Ig, IgA: 384, IgM: 104  Kappa light chain: 58.18, Lambda light chain: 28.44, K/L ratio: 2.05  B2M: 3.3    Kappa light chains decreased, continue observation  Patient was unable to complete labs prior to today's office visit as ordered due to recent illness.   Repeat serology today    Minimally elevated H/H, improved  H/H improved, 16.5/49.7, previously 17.5/52.1 on 10/27/2021    Justin Oris denies tobacco use  He does not use exogenous testosterone  He denies any known history of sleep apnea  He reports staying well-hydrated    Labs 2020:  Iron 107, TIBC 443, iron saturation 24%, ferritin 191  Erythropoietin 9.9    Negative JAK2 V617F, MPL, CALR, JAK2 exon 12 and KIT mutations on BMAB 2020    Continue to monitor    Tumor Screening:  Colonoscopy last completed 2020 by Dr. Ceci Amos documented poor prep, 1 ascending colon polypectomy, 1 sigmoid colon polypectomy (tissue not retrieved). Declines recall (discussed 4/27/2022)  PSA followed by Dr. Rowe Eugenio This Encounter   Procedures    MONOCLONAL PROTEIN AND FLC, SERUM    Beta 2 Microglobulin, Serum       Return in about 6 months (around 5/23/2023) for follow up with ALISA Schmitz. I have seen, examined and reviewed this patient medication list, appropriate labs and imaging studies. I reviewed relevant medical records and others physicians notes. I discussed the plans of care with the patient. I answered all the questions to the patients satisfaction. I have also reviewed the chief complaint (CC) and part of the history (History of Present Illness (HPI), Past Family Social History [facility]), or Review of Systems (ROS) and made changes when appropriated.         (Please note that portions of this note were completed with a voice recognition program. Efforts were made to edit the dictations but occasionally words are mis-transcribed.)        ALISA Horvath  7:11 PM  11/25/2022

## 2022-11-22 ENCOUNTER — TELEPHONE (OUTPATIENT)
Dept: NEUROLOGY | Facility: CLINIC | Age: 67
End: 2022-11-22

## 2022-11-22 NOTE — TELEPHONE ENCOUNTER
----- Message from ZACARIAS Alonzo sent at 11/22/2022  1:15 PM CST -----  I tried to call more than once - they are OK, has some arthritis and heel spurs      ----- Message -----  From: Camilla Erickson LPN  Sent: 11/22/2022   9:47 AM CST  To: ZACARIAS Alonzo    Received a message from the hub, Rossana (wife) would like the x-ray results.

## 2022-11-22 NOTE — TELEPHONE ENCOUNTER
Caller: Rossana Mcclelland    Relationship: Emergency Contact    Best call back number: [phone]    Who are you requesting to speak with (clinical staff, provider,  specific staff member):   DADA    What was the call regarding:   PT'S WIFE RETUNING DADA.     Do you require a callback:   YES, PLEASE.

## 2022-11-23 ENCOUNTER — OFFICE VISIT (OUTPATIENT)
Dept: HEMATOLOGY | Age: 67
End: 2022-11-23
Payer: MEDICARE

## 2022-11-23 ENCOUNTER — HOSPITAL ENCOUNTER (OUTPATIENT)
Dept: INFUSION THERAPY | Age: 67
Discharge: HOME | End: 2022-11-23
Payer: MEDICARE

## 2022-11-23 VITALS
BODY MASS INDEX: 27.48 KG/M2 | WEIGHT: 197 LBS | OXYGEN SATURATION: 97 % | SYSTOLIC BLOOD PRESSURE: 122 MMHG | HEART RATE: 100 BPM | DIASTOLIC BLOOD PRESSURE: 64 MMHG

## 2022-11-23 DIAGNOSIS — D47.2 MGUS (MONOCLONAL GAMMOPATHY OF UNKNOWN SIGNIFICANCE): Primary | ICD-10-CM

## 2022-11-23 DIAGNOSIS — D75.1 ERYTHROCYTOSIS: ICD-10-CM

## 2022-11-23 DIAGNOSIS — Z71.89 CARE PLAN DISCUSSED WITH PATIENT: ICD-10-CM

## 2022-11-23 DIAGNOSIS — D58.2 ELEVATED HEMOGLOBIN (HCC): ICD-10-CM

## 2022-11-23 DIAGNOSIS — D47.2 MGUS (MONOCLONAL GAMMOPATHY OF UNKNOWN SIGNIFICANCE): ICD-10-CM

## 2022-11-23 LAB
HCT VFR BLD CALC: 49.7 % (ref 40.1–51)
HEMOGLOBIN: 16.5 G/DL (ref 13.7–17.5)
LYMPHOCYTES ABSOLUTE: 3.08 K/UL (ref 1.18–3.74)
LYMPHOCYTES RELATIVE PERCENT: 35.4 % (ref 19.3–53.1)
MCH RBC QN AUTO: 28.7 PG (ref 25.7–32.2)
MCHC RBC AUTO-ENTMCNC: 33.2 G/DL (ref 32.3–36.5)
MCV RBC AUTO: 86.6 FL (ref 79–92.2)
MONOCYTES ABSOLUTE: 0.66 K/UL (ref 0.24–0.82)
MONOCYTES RELATIVE PERCENT: 7.6 % (ref 4.7–12.5)
NEUTROPHILS ABSOLUTE: 4.38 K/UL (ref 1.56–6.13)
NEUTROPHILS RELATIVE PERCENT: 50.3 % (ref 34–71.1)
PDW BLD-RTO: 13.8 % (ref 11.6–14.4)
PLATELET # BLD: 217 K/UL (ref 163–337)
PMV BLD AUTO: 10.2 FL (ref 7.4–10.4)
RBC # BLD: 5.74 M/UL (ref 4.63–6.08)
WBC # BLD: 8.71 K/UL (ref 4.23–9.07)

## 2022-11-23 PROCEDURE — 1036F TOBACCO NON-USER: CPT | Performed by: NURSE PRACTITIONER

## 2022-11-23 PROCEDURE — 85025 COMPLETE CBC W/AUTO DIFF WBC: CPT

## 2022-11-23 PROCEDURE — 3017F COLORECTAL CA SCREEN DOC REV: CPT | Performed by: NURSE PRACTITIONER

## 2022-11-23 PROCEDURE — 99213 OFFICE O/P EST LOW 20 MIN: CPT | Performed by: NURSE PRACTITIONER

## 2022-11-23 PROCEDURE — 36415 COLL VENOUS BLD VENIPUNCTURE: CPT

## 2022-11-23 PROCEDURE — 1123F ACP DISCUSS/DSCN MKR DOCD: CPT | Performed by: NURSE PRACTITIONER

## 2022-11-23 PROCEDURE — G8484 FLU IMMUNIZE NO ADMIN: HCPCS | Performed by: NURSE PRACTITIONER

## 2022-11-23 PROCEDURE — 99212 OFFICE O/P EST SF 10 MIN: CPT

## 2022-11-23 PROCEDURE — G8417 CALC BMI ABV UP PARAM F/U: HCPCS | Performed by: NURSE PRACTITIONER

## 2022-11-23 PROCEDURE — G8427 DOCREV CUR MEDS BY ELIG CLIN: HCPCS | Performed by: NURSE PRACTITIONER

## 2022-11-23 ASSESSMENT — ENCOUNTER SYMPTOMS
EYE DISCHARGE: 0
VOMITING: 0
SORE THROAT: 0
TROUBLE SWALLOWING: 0
NAUSEA: 0
EYE ITCHING: 0
WHEEZING: 0
DIARRHEA: 0
ABDOMINAL PAIN: 0
COUGH: 0
SHORTNESS OF BREATH: 0
CONSTIPATION: 0

## 2022-11-23 NOTE — TELEPHONE ENCOUNTER
Caller: Rossana Mcclelland     Relationship: Emergency Contact     Best call back number: [phone]     Who are you requesting to speak with (clinical staff, provider,  specific staff member):   DADA     What was the call regarding:   PT'S WIFE RETUNING DADA.      Do you require a callback:   YES, PLEASE.

## 2023-01-09 DIAGNOSIS — G25.81 RESTLESS LEGS SYNDROME (RLS): ICD-10-CM

## 2023-01-09 DIAGNOSIS — E11.42 DIABETIC POLYNEUROPATHY ASSOCIATED WITH TYPE 2 DIABETES MELLITUS: ICD-10-CM

## 2023-01-09 NOTE — TELEPHONE ENCOUNTER
[facility] said the script sent on 11-21 was for 300 mg and has been cancelled, he never filled it.  JOSÉ ANTONIO printed and reviewed.

## 2023-01-10 RX ORDER — GABAPENTIN 800 MG/1
TABLET ORAL
Qty: 270 TABLET | Refills: 0 | Status: SHIPPED | OUTPATIENT
Start: 2023-01-10

## 2023-01-13 ENCOUNTER — DOCUMENTATION (OUTPATIENT)
Dept: ENDOCRINOLOGY | Facility: CLINIC | Age: 68
End: 2023-01-13
Payer: MEDICARE

## 2023-01-13 NOTE — PROGRESS NOTES
[facility] PAE#[id number], PAE#[id number] AND PAE#[id number] PATIENT MEETS PROGRAM ELIGIBILITY REQUIREMENTS AND IS ENROLLED IN [facility] UNTIL THE END OF THE 2023 CALENDAR YEAR.    SENT TO [facility] REC

## 2023-01-30 RX ORDER — LOSARTAN POTASSIUM 25 MG/1
25 TABLET ORAL DAILY
Qty: 90 TABLET | Refills: 2 | Status: SHIPPED | OUTPATIENT
Start: 2023-01-30

## 2023-02-13 RX ORDER — ROSUVASTATIN CALCIUM 10 MG/1
TABLET, COATED ORAL
Qty: 30 TABLET | Refills: 0 | Status: SHIPPED | OUTPATIENT
Start: 2023-02-13 | End: 2023-03-17 | Stop reason: SDUPTHER

## 2023-02-20 ENCOUNTER — OFFICE VISIT (OUTPATIENT)
Dept: NEUROLOGY | Facility: CLINIC | Age: 68
End: 2023-02-20
Payer: MEDICARE

## 2023-02-20 VITALS
WEIGHT: 208 LBS | DIASTOLIC BLOOD PRESSURE: 78 MMHG | OXYGEN SATURATION: 97 % | SYSTOLIC BLOOD PRESSURE: 124 MMHG | HEART RATE: 82 BPM | HEIGHT: 70 IN | BODY MASS INDEX: 29.78 KG/M2

## 2023-02-20 DIAGNOSIS — E11.42 DIABETIC POLYNEUROPATHY ASSOCIATED WITH TYPE 2 DIABETES MELLITUS: Primary | ICD-10-CM

## 2023-02-20 PROCEDURE — 99214 OFFICE O/P EST MOD 30 MIN: CPT | Performed by: PHYSICIAN ASSISTANT

## 2023-02-20 RX ORDER — CITALOPRAM 20 MG/1
20 TABLET ORAL
COMMUNITY
Start: 2023-02-17

## 2023-02-20 RX ORDER — MEXILETINE HYDROCHLORIDE 250 MG/1
500 CAPSULE ORAL DAILY
Qty: 60 CAPSULE | Refills: 6 | Status: SHIPPED | OUTPATIENT
Start: 2023-02-20

## 2023-02-20 RX ORDER — LOSARTAN POTASSIUM 25 MG/1
25 TABLET ORAL EVERY MORNING
COMMUNITY
Start: 2023-01-30

## 2023-02-20 NOTE — PROGRESS NOTES
Subjective   Supa Mcclelland is a 67 y.o. male is here today for follow-up.    History of Present Illness     Mr. Mcclelland has a painful peripheral neuropathy that has been largely resistant to medical therapy. This neuropathy is associated with diabetes. Current medications include gabapentin and mexiletine, presently dosed at 300 mg a day.    Peripheral neuropathy  The patient states that he still has pain in his legs. He states that he is trying to lose weight.    The following portions of the patient's history were reviewed and updated as appropriate: allergies, current medications, past family history, past medical history, past social history, past surgical history and problem list.    Review of Systems:  A review of systems was performed, and positive findings are noted in the HPI.      Current Outpatient Medications:   •  benzonatate (TESSALON) 200 MG capsule, Take 1 capsule by mouth 3 (Three) Times a Day As Needed for Cough., Disp: 30 capsule, Rfl: 3  •  cholecalciferol (D3-50) 1.25 MG (55219 UT) capsule, Take 1 capsule by mouth Every 14 (Fourteen) Days. TAKE ONE CAPSULE BY MOUTH EVERY 2 WEEKS., Disp: 6 capsule, Rfl: 3  •  citalopram (CeleXA) 20 MG tablet, Take 20 mg by mouth every night at bedtime., Disp: , Rfl:   •  empagliflozin (Jardiance) 10 MG tablet tablet, Take 1 tablet by mouth Daily., Disp: 90 tablet, Rfl: 3  •  famotidine (PEPCID) 20 MG tablet, Take 20 mg by mouth 2 (Two) Times a Day., Disp: , Rfl:   •  fexofenadine (ALLEGRA) 180 MG tablet, Take 180 mg by mouth As Needed., Disp: , Rfl:   •  fluticasone (FLONASE) 50 MCG/ACT nasal spray, 1 spray each nostril twice a day for three days, then daily., Disp: 1 bottle, Rfl: 0  •  gabapentin (Neurontin) 300 MG capsule, Take 1 capsule by mouth 3 (Three) Times a Day., Disp: 90 capsule, Rfl: 5  •  gabapentin (NEURONTIN) 800 MG tablet, TAKE 1 TABLET BY MOUTH THREE TIMES DAILY (Patient taking differently: Take 800 mg by mouth 3 (Three) Times a Day.), Disp: 270  tablet, Rfl: 0  •  hydrochlorothiazide (HYDRODIURIL) 12.5 MG tablet, Take 12.5 mg by mouth Daily., Disp: , Rfl:   •  Insulin Glargine (BASAGLAR KWIKPEN) 100 UNIT/ML injection pen, 75 units qam, Disp: 24 mL, Rfl: 11  •  Insulin Lispro (HumaLOG KwikPen) 200 UNIT/ML solution pen-injector, Inject 20 Units under the skin into the appropriate area as directed 3 (Three) Times a Day With Meals., Disp: 18 mL, Rfl: 11  •  Insulin Pen Needle (Pen Needles) 32G X 4 MM misc, 1 each 4 (Four) Times a Day. Use 4 x daily, Dx code E11.65, Disp: 120 each, Rfl: 11  •  ketorolac (TORADOL) 10 MG tablet, Take 1 tablet by mouth Every 6 (Six) Hours As Needed for Moderate Pain., Disp: 15 tablet, Rfl: 0  •  levothyroxine (SYNTHROID, LEVOTHROID) 50 MCG tablet, Take 1 tablet by mouth Daily., Disp: , Rfl:   •  loratadine (CLARITIN) 10 MG tablet, Take 10 mg by mouth Daily., Disp: , Rfl:   •  losartan (COZAAR) 25 MG tablet, Take 25 mg by mouth Every Morning., Disp: , Rfl:   •  meloxicam (MOBIC) 15 MG tablet, Take 1 tablet by mouth Daily., Disp: , Rfl:   •  metFORMIN ER (GLUCOPHAGE-XR) 500 MG 24 hr tablet, Take 1 tablet by mouth 2 (Two) Times a Day With Meals., Disp: 180 tablet, Rfl: 0  •  metoprolol tartrate (LOPRESSOR) 50 MG tablet, Take 50 mg by mouth 2 (Two) Times a Day., Disp: , Rfl:   •  mexiletine (MEXITIL) 250 MG capsule, Take 2 capsules by mouth Daily., Disp: 60 capsule, Rfl: 6  •  omeprazole (priLOSEC) 20 MG capsule, Take 1 capsule by mouth 2 (Two) Times a Day., Disp: 60 capsule, Rfl: 11  •  potassium citrate (UROCIT-K) 10 MEQ (1080 MG) CR tablet, Take 1 tablet by mouth 3 (Three) Times a Day With Meals., Disp: 90 tablet, Rfl: 11  •  rOPINIRole (REQUIP) 2 MG tablet, Take 1 tablet by mouth Every Night. Take 1 hour before bedtime., Disp: 90 tablet, Rfl: 3  •  rosuvastatin (CRESTOR) 10 MG tablet, TAKE 1 TABLET BY MOUTH ONCE DAILY AT NIGHT (Patient taking differently: Take 10 mg by mouth Daily.), Disp: 30 tablet, Rfl: 0  •   sulfamethoxazole-trimethoprim (BACTRIM DS,SEPTRA DS) 800-160 MG per tablet, Take 1 tablet by mouth 2 (Two) Times a Day., Disp: 14 tablet, Rfl: 0  •  zolpidem (AMBIEN) 10 MG tablet, Take 10 mg by mouth At Night As Needed., Disp: , Rfl:      Objective   Physical Exam  Vitals and nursing note reviewed.   HENT:      Head: Normocephalic.      Right Ear: Hearing and external ear normal.      Left Ear: Hearing and external ear normal.      Nose: Nose normal.      Mouth/Throat:      Pharynx: Oropharynx is clear.   Eyes:      General: Lids are normal. Vision grossly intact. Gaze aligned appropriately. No scleral icterus.     Extraocular Movements: Extraocular movements intact.      Conjunctiva/sclera: Conjunctivae normal.      Pupils: Pupils are equal, round, and reactive to light.      Visual Fields: Right eye visual fields normal and left eye visual fields normal.   Neck:      Vascular: No carotid bruit or JVD.      Trachea: Trachea and phonation normal.   Cardiovascular:      Rate and Rhythm: Normal rate.      Heart sounds: Normal heart sounds.   Pulmonary:      Effort: Pulmonary effort is normal.      Breath sounds: Normal breath sounds.   Musculoskeletal:      Cervical back: Normal range of motion.   Skin:     General: Skin is warm and dry.   Neurological:      Mental Status: He is alert and oriented to person, place, and time.      GCS: GCS eye subscore is 4. GCS verbal subscore is 5. GCS motor subscore is 6.      Sensory: Sensation is intact.      Motor: Motor function is intact.      Coordination: Coordination is intact.      Gait: Gait is intact.      Deep Tendon Reflexes: Reflexes are normal and symmetric.      Comments: showing peripheral decreased sensation and allodynia at times with palpation in the lower extremities. Diminished reflexes, diminished vibratory sense all noted and consistent with diabetic neuropathy as previously documented.   Psychiatric:         Attention and Perception: Attention and  perception normal.         Mood and Affect: Mood and affect normal.         Speech: Speech normal.         Behavior: Behavior normal.         Thought Content: Thought content normal.         Cognition and Memory: Cognition and memory normal.         Judgment: Judgment normal.           Assessment & Plan   Diagnoses and all orders for this visit:    1. Diabetic polyneuropathy associated with type 2 diabetes mellitus (HCC) (Primary)  -     mexiletine (MEXITIL) 250 MG capsule; Take 2 capsules by mouth Daily.  Dispense: 60 capsule; Refill: 6      1. Diabetic peripheral neuropathy  - Continue gabapentin.  - Continue mexiletine, but we will increase this to 500 mg a day.  - Follow-up in 4 months.               Transcribed from ambient dictation for ZACARIAS Alonzo by Veronica Bran.  02/20/23   13:55 CST    Patient or patient representative verbalized consent to the visit recording.  I have personally performed the services described in this document as transcribed by the above individual, and it is both accurate and complete.

## 2023-03-06 ENCOUNTER — APPOINTMENT (OUTPATIENT)
Dept: CT IMAGING | Facility: HOSPITAL | Age: 68
End: 2023-03-06
Payer: MEDICARE

## 2023-03-06 ENCOUNTER — HOSPITAL ENCOUNTER (EMERGENCY)
Facility: HOSPITAL | Age: 68
Discharge: HOME | End: 2023-03-07
Attending: STUDENT IN AN ORGANIZED HEALTH CARE EDUCATION/TRAINING PROGRAM | Admitting: STUDENT IN AN ORGANIZED HEALTH CARE EDUCATION/TRAINING PROGRAM
Payer: MEDICARE

## 2023-03-06 DIAGNOSIS — R10.84 GENERALIZED ABDOMINAL PAIN: Primary | ICD-10-CM

## 2023-03-06 LAB
ALBUMIN SERPL-MCNC: 4.3 G/DL (ref 3.5–5.2)
ALBUMIN/GLOB SERPL: 1.2 G/DL
ALP SERPL-CCNC: 121 U/L (ref 39–117)
ALT SERPL W P-5'-P-CCNC: 17 U/L (ref 1–41)
ANION GAP SERPL CALCULATED.3IONS-SCNC: 12 MMOL/L (ref 5–15)
AST SERPL-CCNC: 21 U/L (ref 1–40)
BACTERIA UR QL AUTO: ABNORMAL /HPF
BILIRUB SERPL-MCNC: 0.4 MG/DL (ref 0–1.2)
BILIRUB UR QL STRIP: NEGATIVE
BUN SERPL-MCNC: 26 MG/DL (ref 8–23)
BUN/CREAT SERPL: 20.3 (ref 7–25)
CALCIUM SPEC-SCNC: 8.9 MG/DL (ref 8.6–10.5)
CHLORIDE SERPL-SCNC: 101 MMOL/L (ref 98–107)
CLARITY UR: CLEAR
CO2 SERPL-SCNC: 24 MMOL/L (ref 22–29)
COLOR UR: YELLOW
CREAT SERPL-MCNC: 1.28 MG/DL (ref 0.76–1.27)
D-LACTATE SERPL-SCNC: 1.6 MMOL/L (ref 0.5–2)
DEPRECATED RDW RBC AUTO: 43.8 FL (ref 37–54)
EGFRCR SERPLBLD CKD-EPI 2021: 61 ML/MIN/1.73
EOSINOPHIL # BLD MANUAL: 0.65 10*3/MM3 (ref 0–0.4)
EOSINOPHIL NFR BLD MANUAL: 6.1 % (ref 0.3–6.2)
ERYTHROCYTE [DISTWIDTH] IN BLOOD BY AUTOMATED COUNT: 14.2 % (ref 12.3–15.4)
GLOBULIN UR ELPH-MCNC: 3.5 GM/DL
GLUCOSE SERPL-MCNC: 338 MG/DL (ref 65–99)
GLUCOSE UR STRIP-MCNC: ABNORMAL MG/DL
HCT VFR BLD AUTO: 47.4 % (ref 37.5–51)
HGB BLD-MCNC: 15.7 G/DL (ref 13–17.7)
HGB UR QL STRIP.AUTO: NEGATIVE
HYALINE CASTS UR QL AUTO: ABNORMAL /LPF
KETONES UR QL STRIP: NEGATIVE
LDH SERPL-CCNC: 199 U/L (ref 135–225)
LEUKOCYTE ESTERASE UR QL STRIP.AUTO: ABNORMAL
LIPASE SERPL-CCNC: 157 U/L (ref 13–60)
LYMPHOCYTES # BLD MANUAL: 4.92 10*3/MM3 (ref 0.7–3.1)
LYMPHOCYTES NFR BLD MANUAL: 4 % (ref 5–12)
MAGNESIUM SERPL-MCNC: 2.4 MG/DL (ref 1.6–2.4)
MCH RBC QN AUTO: 28.2 PG (ref 26.6–33)
MCHC RBC AUTO-ENTMCNC: 33.1 G/DL (ref 31.5–35.7)
MCV RBC AUTO: 85.1 FL (ref 79–97)
MONOCYTES # BLD: 0.42 10*3/MM3 (ref 0.1–0.9)
NEUTROPHILS # BLD AUTO: 4.6 10*3/MM3 (ref 1.7–7)
NEUTROPHILS NFR BLD MANUAL: 43.4 % (ref 42.7–76)
NITRITE UR QL STRIP: NEGATIVE
NRBC BLD AUTO-RTO: 0 /100 WBC (ref 0–0.2)
PH UR STRIP.AUTO: 5.5 [PH] (ref 5–8)
PLAT MORPH BLD: NORMAL
PLATELET # BLD AUTO: 163 10*3/MM3 (ref 140–450)
PMV BLD AUTO: 10.3 FL (ref 6–12)
POLYCHROMASIA BLD QL SMEAR: ABNORMAL
POTASSIUM SERPL-SCNC: 3.8 MMOL/L (ref 3.5–5.2)
PROT SERPL-MCNC: 7.8 G/DL (ref 6–8.5)
PROT UR QL STRIP: NEGATIVE
RBC # BLD AUTO: 5.57 10*6/MM3 (ref 4.14–5.8)
RBC # UR STRIP: ABNORMAL /HPF
REF LAB TEST METHOD: ABNORMAL
SODIUM SERPL-SCNC: 137 MMOL/L (ref 136–145)
SP GR UR STRIP: 1.03 (ref 1–1.03)
SQUAMOUS #/AREA URNS HPF: ABNORMAL /HPF
UROBILINOGEN UR QL STRIP: ABNORMAL
VARIANT LYMPHS NFR BLD MANUAL: 38.4 % (ref 19.6–45.3)
VARIANT LYMPHS NFR BLD MANUAL: 8.1 % (ref 0–5)
WBC # UR STRIP: ABNORMAL /HPF
WBC MORPH BLD: NORMAL
WBC NRBC COR # BLD: 10.59 10*3/MM3 (ref 3.4–10.8)

## 2023-03-06 PROCEDURE — 83735 ASSAY OF MAGNESIUM: CPT | Performed by: STUDENT IN AN ORGANIZED HEALTH CARE EDUCATION/TRAINING PROGRAM

## 2023-03-06 PROCEDURE — 87086 URINE CULTURE/COLONY COUNT: CPT | Performed by: STUDENT IN AN ORGANIZED HEALTH CARE EDUCATION/TRAINING PROGRAM

## 2023-03-06 PROCEDURE — 87186 SC STD MICRODIL/AGAR DIL: CPT | Performed by: STUDENT IN AN ORGANIZED HEALTH CARE EDUCATION/TRAINING PROGRAM

## 2023-03-06 PROCEDURE — 74177 CT ABD & PELVIS W/CONTRAST: CPT

## 2023-03-06 PROCEDURE — 81001 URINALYSIS AUTO W/SCOPE: CPT | Performed by: STUDENT IN AN ORGANIZED HEALTH CARE EDUCATION/TRAINING PROGRAM

## 2023-03-06 PROCEDURE — 80053 COMPREHEN METABOLIC PANEL: CPT | Performed by: STUDENT IN AN ORGANIZED HEALTH CARE EDUCATION/TRAINING PROGRAM

## 2023-03-06 PROCEDURE — 83690 ASSAY OF LIPASE: CPT | Performed by: STUDENT IN AN ORGANIZED HEALTH CARE EDUCATION/TRAINING PROGRAM

## 2023-03-06 PROCEDURE — 25510000001 IOPAMIDOL 61 % SOLUTION: Performed by: STUDENT IN AN ORGANIZED HEALTH CARE EDUCATION/TRAINING PROGRAM

## 2023-03-06 PROCEDURE — 85007 BL SMEAR W/DIFF WBC COUNT: CPT | Performed by: STUDENT IN AN ORGANIZED HEALTH CARE EDUCATION/TRAINING PROGRAM

## 2023-03-06 PROCEDURE — 83605 ASSAY OF LACTIC ACID: CPT | Performed by: STUDENT IN AN ORGANIZED HEALTH CARE EDUCATION/TRAINING PROGRAM

## 2023-03-06 PROCEDURE — 85025 COMPLETE CBC W/AUTO DIFF WBC: CPT | Performed by: STUDENT IN AN ORGANIZED HEALTH CARE EDUCATION/TRAINING PROGRAM

## 2023-03-06 PROCEDURE — 87147 CULTURE TYPE IMMUNOLOGIC: CPT | Performed by: STUDENT IN AN ORGANIZED HEALTH CARE EDUCATION/TRAINING PROGRAM

## 2023-03-06 PROCEDURE — 96374 THER/PROPH/DIAG INJ IV PUSH: CPT

## 2023-03-06 PROCEDURE — 25010000002 MORPHINE PER 10 MG: Performed by: STUDENT IN AN ORGANIZED HEALTH CARE EDUCATION/TRAINING PROGRAM

## 2023-03-06 PROCEDURE — 83615 LACTATE (LD) (LDH) ENZYME: CPT | Performed by: STUDENT IN AN ORGANIZED HEALTH CARE EDUCATION/TRAINING PROGRAM

## 2023-03-06 PROCEDURE — 99283 EMERGENCY DEPT VISIT LOW MDM: CPT

## 2023-03-06 RX ADMIN — IOPAMIDOL 100 ML: 612 INJECTION, SOLUTION INTRAVENOUS at 23:39

## 2023-03-06 RX ADMIN — MORPHINE SULFATE 4 MG: 4 INJECTION, SOLUTION INTRAMUSCULAR; INTRAVENOUS at 22:56

## 2023-03-07 VITALS
RESPIRATION RATE: 16 BRPM | BODY MASS INDEX: 27.72 KG/M2 | TEMPERATURE: 97.7 F | OXYGEN SATURATION: 100 % | HEIGHT: 71 IN | WEIGHT: 198 LBS | DIASTOLIC BLOOD PRESSURE: 95 MMHG | HEART RATE: 64 BPM | SYSTOLIC BLOOD PRESSURE: 170 MMHG

## 2023-03-07 RX ADMIN — SODIUM CHLORIDE, POTASSIUM CHLORIDE, SODIUM LACTATE AND CALCIUM CHLORIDE 1000 ML: 600; 310; 30; 20 INJECTION, SOLUTION INTRAVENOUS at 00:03

## 2023-03-07 NOTE — DISCHARGE INSTRUCTIONS
Please return if you have worsening abdominal pain, vomiting, lightheadedness, passing out, inability to tolerate food or water, or other emergent concerns. Otherwise please follow-up with your primary care doctor regarding your ER visit today; also please plan to follow-up with Dr. Padilla given your chronically elevated lipase level.  You may try a 12 or 24-hour fast to see if it improves your abdominal pain but clinically your symptoms were not consistent with pancreatitis.  If you have worsening pain or vomiting that would suggest pancreatitis please come back for hospitalization.

## 2023-03-07 NOTE — ED PROVIDER NOTES
Subjective   History of Present Illness  Patient presents due to abdominal pain.  Rates it an 8 out of 10.  Comes and goes.  Present on the left side.  Feels like an ache.  It is not affected by anything; he eats and drinks without difficulty, has bowel movements without difficulty, none of these things increase or decrease his pain.  No infectious symptoms, no fevers or chills.  No nausea or vomiting.  No chest pain or trouble breathing.  No lightheadedness or passing out.  No cough congestion or sore throat.  No burning when he pees or difficulty urinating.  Has a history of idiopathic pancreatitis and it looks like when he was admitted previously had a worsening lipase and was sent here by his doctor but had a normal CT.  Does not drink alcohol, did not have biliary tree dilation previously, and says that it is unclear why his lipase is elevated and they did not find a cause.  Does have a history of kidney stones and says that this does feel somewhat similar.    Review of Systems   Constitutional: Negative for chills and fever.   Respiratory: Negative for cough and shortness of breath.    Cardiovascular: Negative for chest pain and palpitations.   Gastrointestinal: Positive for abdominal pain. Negative for vomiting.   Genitourinary: Negative for difficulty urinating and dysuria.   Neurological: Negative for syncope and light-headedness.       Past Medical History:   Diagnosis Date   • Depression    • Diabetes mellitus (HCC)    • Disease of thyroid gland     HYPOTHYROIDISM   • GERD (gastroesophageal reflux disease)    • Hyperlipidemia    • Hypertension    • Kidney stone        Allergies   Allergen Reactions   • Atacand [Candesartan Cilexetil] Rash   • Biaxin [Clarithromycin] Rash   • Cefzil [Cefprozil] Rash   • Levaquin [Levofloxacin] Rash   • Penicillins Rash   • Zestril [Lisinopril] Rash       Past Surgical History:   Procedure Laterality Date   • CHOLECYSTECTOMY     • COLONOSCOPY     • COLONOSCOPY N/A 5/27/2020     Procedure: COLONOSCOPY WITH ANESTHESIA;  Surgeon: Rambo Padilla DO;  Location: [facility] ENDOSCOPY;  Service: Gastroenterology;  Laterality: N/A;  Pre: Change in Bowels  Post: Colon Polyp, Suboptimal Prep  Kings APRN  CO2 Inflation Used   • ENDOSCOPY N/A 4/4/2017    Procedure: ESOPHAGOGASTRODUODENOSCOPY WITH ANESTHESIA;  Surgeon: Rambo Padilla DO;  Location: [facility] ENDOSCOPY;  Service:    • KIDNEY STONE SURGERY     • SHOULDER SURGERY         Family History   Problem Relation Age of Onset   • Heart disease Mother    • Diabetes Mother    • Alzheimer's disease Father    • Heart disease Father    • Diabetes Sister    • Heart disease Brother    • Diabetes Sister    • Diabetes Sister    • Colon cancer Neg Hx    • Esophageal cancer Neg Hx        Social History     Socioeconomic History   • Marital status:    Tobacco Use   • Smoking status: Never   • Smokeless tobacco: Never   Vaping Use   • Vaping Use: Never used   Substance and Sexual Activity   • Alcohol use: No   • Drug use: No   • Sexual activity: Yes     Partners: Female           Objective   Physical Exam  Vitals reviewed.   Constitutional:       General: He is not in acute distress.  HENT:      Head: Normocephalic and atraumatic.   Eyes:      Extraocular Movements: Extraocular movements intact.      Conjunctiva/sclera: Conjunctivae normal.   Cardiovascular:      Pulses: Normal pulses.      Heart sounds: Normal heart sounds.   Pulmonary:      Effort: Pulmonary effort is normal. No respiratory distress.      Breath sounds: No wheezing.   Abdominal:      General: Abdomen is flat. There is no distension.      Tenderness: There is abdominal tenderness in the right lower quadrant, left upper quadrant and left lower quadrant. There is no guarding or rebound.   Musculoskeletal:         General: No swelling or tenderness.      Cervical back: Normal range of motion and neck supple.      Right lower leg: No edema.      Left lower leg: No edema.   Skin:      General: Skin is warm and dry.   Neurological:      General: No focal deficit present.      Mental Status: He is alert. Mental status is at baseline.   Psychiatric:         Behavior: Behavior normal.         Thought Content: Thought content normal.         Procedures           ED Course  ED Course as of 03/07/23 0448   Mon Mar 06, 2023   2320 UA not infectious [AS]   2344 Ransons criteria is a 2; severe pancreatitis is unlikely [AS]   2347 Labs do have some renal dysfunction however this does look like it is around his baseline and it does not meet DEX criteria.  will give IV fluids. [AS]      ED Course User Index  [AS] Wesley Andrade MD                                           [facility]   Supa Mcclelland is a 67 y.o. male who presented to the ED with abdominal pain.  No nausea vomiting diarrhea or fever.  Differential diagnoses include appendicitis given the location of his pain and tenderness, ureteric lithiasis given the colicky nature of pain that is not affected by eating drinking or bowel movements.  Pancreatitis less likely in the absence of nausea and vomiting.    Intussusception unlikely given lack of brief, intense episodes of pain, lack of hematochezia.  Mesenteric ischemia unlikely given abdominal tenderness on exam, no distention, pain is not out of proportion with abdominal exam.  Volvulus unlikely given VSS, no peritonitic signs, no distention.  Bowel obstruction unlikely given pt reports BM, no recent surgical history.  Cholecystitis or ascending cholangitis unlikely given ttp is not focally in RUQ, pain not worse with eating, pt afebrile.  Pancreatitis unlikely as the patient is tolerating PO.  No hernia palpated on exam.  Inflammatory Bowel Disease unlikely given pain is not episodic, no history of autoimmune disease, no recurrent diarrhea or bloody diarrhea on history.  Abdominal aortic aneurysm unlikely as VSS, no hypotension, no palpable abdominal mass.  Diverticulitis is less likely given  the colicky nature of pain without changes in bowel habits.    Pyelonephritis or UTI unlikely given lack of fever, no dysuria, urgency, or other UTI symptoms. Urinalysis without signs of acute UTI.      ED Course:  --Lab studies reviewed by me and are significant for elevated lipase, around his baseline of chronic elevation.  Clinically, he does not have pancreatitis.  Will await for CT abdomen pelvis results to look for any inflammation around the pancreas or biliary tree dilation to raise suspicion for this, however he is tolerating p.o. without difficulty and has a chronically elevated lipase for several years and this is the second lowest it has been in the past 5 years. Lipase is not 3x ULN.  -CT scan without acute finding  -On re-evaluation, the patient had resolution of his symptoms.  He feels well.  He is agreeable to outpatient GI follow-up regarding his persistently elevated lipase and primary care follow-up for repeat assessment.  Given that he is pain-free, no further workup indicated at this time. Patient is stable and appropriate for discharge. Patient received strict return precautions including worsening abdominal pain, lightheadedness, passing out, inability to tolerate food or water, and was instructed to follow-up with their primary care provider regarding their ER visit.    Final diagnoses:   Generalized abdominal pain       ED Disposition  ED Disposition     ED Disposition   Discharge    Condition   Stable    Comment   --             Libby Ku, APRN  [address] [facility] [id number] [phone]          Rambo Padilla, DO  [address]  [address]  [facility] [address]  [phone]          Rambo Padilla, DO  [address]  [facility] [address]  [phone]               Medication List      Changed    rosuvastatin 10 MG tablet  Commonly known as: CRESTOR  TAKE 1 TABLET BY MOUTH ONCE DAILY AT NIGHT  What changed: when to take this             Wesley Andrade  MD [place]  03/07/23 [id number]

## 2023-03-09 LAB — BACTERIA SPEC AEROBE CULT: ABNORMAL

## 2023-03-10 ENCOUNTER — TELEPHONE (OUTPATIENT)
Dept: EMERGENCY DEPT | Facility: HOSPITAL | Age: 68
End: 2023-03-10
Payer: MEDICARE

## 2023-03-10 RX ORDER — CEFDINIR 300 MG/1
300 CAPSULE ORAL 2 TIMES DAILY
Qty: 14 CAPSULE | Refills: 0 | Status: SHIPPED | OUTPATIENT
Start: 2023-03-10 | End: 2023-03-17

## 2023-03-10 NOTE — TELEPHONE ENCOUNTER
Returned voicemail.  Discussed positive urine culture and need for antibiotics for which patient and wife states cefdinir has no known allergies.  Request medication be sent to [facility] in [place].  Advise starting the medication today and following up with primary care provider within the next 48 hours.  Advised return precautions.  Wife appreciative with no further questions, concerns, or needs.   positive S2/positive S1/tachycardia

## 2023-03-17 ENCOUNTER — OFFICE VISIT (OUTPATIENT)
Dept: ENDOCRINOLOGY | Facility: CLINIC | Age: 68
End: 2023-03-17
Payer: MEDICARE

## 2023-03-17 VITALS
DIASTOLIC BLOOD PRESSURE: 70 MMHG | WEIGHT: 200 LBS | HEIGHT: 71 IN | SYSTOLIC BLOOD PRESSURE: 120 MMHG | OXYGEN SATURATION: 97 % | BODY MASS INDEX: 28 KG/M2 | HEART RATE: 80 BPM

## 2023-03-17 DIAGNOSIS — E11.65 TYPE 2 DIABETES MELLITUS WITH HYPERGLYCEMIA, WITH LONG-TERM CURRENT USE OF INSULIN: Primary | ICD-10-CM

## 2023-03-17 DIAGNOSIS — Z79.4 TYPE 2 DIABETES MELLITUS WITH HYPERGLYCEMIA, WITH LONG-TERM CURRENT USE OF INSULIN: Primary | ICD-10-CM

## 2023-03-17 DIAGNOSIS — E03.8 HYPOTHYROIDISM DUE TO HASHIMOTO'S THYROIDITIS: ICD-10-CM

## 2023-03-17 DIAGNOSIS — E06.3 HYPOTHYROIDISM DUE TO HASHIMOTO'S THYROIDITIS: ICD-10-CM

## 2023-03-17 DIAGNOSIS — E11.59 HYPERTENSION ASSOCIATED WITH DIABETES: ICD-10-CM

## 2023-03-17 DIAGNOSIS — E55.9 VITAMIN D DEFICIENCY: ICD-10-CM

## 2023-03-17 DIAGNOSIS — E11.42 DIABETIC POLYNEUROPATHY ASSOCIATED WITH TYPE 2 DIABETES MELLITUS: ICD-10-CM

## 2023-03-17 DIAGNOSIS — E78.2 MIXED DIABETIC HYPERLIPIDEMIA ASSOCIATED WITH TYPE 2 DIABETES MELLITUS: ICD-10-CM

## 2023-03-17 DIAGNOSIS — I15.2 HYPERTENSION ASSOCIATED WITH DIABETES: ICD-10-CM

## 2023-03-17 DIAGNOSIS — E11.69 MIXED DIABETIC HYPERLIPIDEMIA ASSOCIATED WITH TYPE 2 DIABETES MELLITUS: ICD-10-CM

## 2023-03-17 PROCEDURE — 3078F DIAST BP <80 MM HG: CPT | Performed by: INTERNAL MEDICINE

## 2023-03-17 PROCEDURE — 3074F SYST BP LT 130 MM HG: CPT | Performed by: INTERNAL MEDICINE

## 2023-03-17 PROCEDURE — 99214 OFFICE O/P EST MOD 30 MIN: CPT | Performed by: INTERNAL MEDICINE

## 2023-03-17 RX ORDER — (INSULIN DEGLUDEC AND LIRAGLUTIDE) 100; 3.6 [IU]/ML; MG/ML
50 INJECTION, SOLUTION SUBCUTANEOUS DAILY
Qty: 15 ML | Refills: 11 | Status: SHIPPED | OUTPATIENT
Start: 2023-03-17

## 2023-03-17 RX ORDER — METFORMIN HYDROCHLORIDE 500 MG/1
500 TABLET, EXTENDED RELEASE ORAL 2 TIMES DAILY WITH MEALS
Qty: 180 TABLET | Refills: 0 | Status: SHIPPED | OUTPATIENT
Start: 2023-03-17

## 2023-03-17 RX ORDER — ROSUVASTATIN CALCIUM 10 MG/1
10 TABLET, COATED ORAL DAILY
Qty: 30 TABLET | Refills: 11 | Status: SHIPPED | OUTPATIENT
Start: 2023-03-17

## 2023-03-17 RX ORDER — INSULIN GLARGINE 100 [IU]/ML
INJECTION, SOLUTION SUBCUTANEOUS
Qty: 24 ML | Refills: 11 | Status: SHIPPED | OUTPATIENT
Start: 2023-03-17 | End: 2023-03-17

## 2023-03-17 RX ORDER — INSULIN LISPRO 200 [IU]/ML
20 INJECTION, SOLUTION SUBCUTANEOUS
Qty: 18 ML | Refills: 11 | Status: SHIPPED | OUTPATIENT
Start: 2023-03-17

## 2023-03-17 NOTE — PROGRESS NOTES
"  Subjective    Supa Mcclelland is a 67 y.o. male. he is here today for follow-up of diabetes                                          HPI      67 y o male w Type 2 DM for more than 10 years.     Has dorys but not using. Restart     States things are running well but Aic is elevated.       PE    /70   Pulse 80   Ht 180.3 cm (71\")   Wt 90.7 kg (200 lb)   SpO2 97%   BMI 27.89 kg/m²   AOx3  No visible goiter  Normal Respiratory Effort , Lung CTA  RRR  No Edema    Labs    Lab Results   Component Value Date    WBC 10.59 03/06/2023    HGB 15.7 03/06/2023    HCT 47.4 03/06/2023    MCV 85.1 03/06/2023     03/06/2023     Lab Results   Component Value Date    GLUCOSE 338 (H) 03/06/2023    BUN 26 (H) 03/06/2023    CREATININE 1.28 (H) 03/06/2023    EGFRIFNONA >60 04/20/2022    EGFRIFAFRI >59 06/23/2021    BCR 20.3 03/06/2023    K 3.8 03/06/2023    CO2 24.0 03/06/2023    CALCIUM 8.9 03/06/2023    ALBUMIN 4.3 03/06/2023    AST 21 03/06/2023    ALT 17 03/06/2023              Assessment & Plan      1. Type 2 diabetes mellitus with hyperglycemia, with long-term current use of insulin (HCC)    2. Hypertension associated with diabetes (HCC)    3. Mixed diabetic hyperlipidemia associated with type 2 diabetes mellitus (HCC)    4. Diabetic polyneuropathy associated with type 2 diabetes mellitus (HCC)    5. Vitamin D deficiency    6. Hypothyroidism due to Hashimoto's thyroiditis    .    Medications prescribed:    Glycemic Management:      Lab Results   Component Value Date    HGBA1C 9.70 (H) 05/19/2022     Needs to restart dorys 2     Getting insulin through assistance program      Xultophy 16 units every morning along with 34 units of basaglar    Every day , increase xultophy by 1 unit and decrease basaglar by 1 unit so eventually you will be on xultophy 50 units am and no basaglar    ===    Humalog please do 8 units w meals    ====    Restart jardiance when sugars are under 200    =====    Keep metformin 500 mg twice a " "day     Use dorys , he will contact me once he has started so I can see data        Lipid Management       Gemfibrozil - stop      crestor 10 mg qhs    Lab Results   Component Value Date    CHOL 149 05/19/2022    TRIG 263 (H) 05/19/2022    HDL 40 05/19/2022    LDL 67 05/19/2022           Blood Pressure Management:          /70   Pulse 80   Ht 180.3 cm (71\")   Wt 90.7 kg (200 lb)   SpO2 97%   BMI 27.89 kg/m²           Microvascular Complication Monitoring:       Eye Exam Evaluation  Needs one   -----------     Last Microalbumin-Proteinuria Assessment     Lab Results   Component Value Date    MALBCRERATIO 36.7 05/19/2022    MALBCRERATIO 14.4 01/25/2019       -----------        Neuropathy, yes on neurontin, antidepressants  Also on requip            Weight Related:       BMI - 30.1     Has lost j10 lbs     Decreased caloric intake            Diet interventions: moderate (500 kCal/d) deficit diet.        Bone Health     Lab Results   Component Value Date    UVCL57GQ 20.8 (L) 05/19/2022    FEKW63FD 32.6 06/23/2021    QNRU03KX 27.7 (L) 08/03/2020     vit D 50 th u every 2 weeks        Thyroid Health     Lab Results   Component Value Date    TSH 2.040 05/19/2022        on levothyroxine 50 mcgs daily         Lab Results   Component Value Date    DAMQQUSV50 336 05/19/2022     Polycythemia , not smoking ,was when he had infeciton             This document has been electronically signed by Ross Gomez MD on March 17, 2023 10:05 CDT                  "

## 2023-03-17 NOTE — PATIENT INSTRUCTIONS
Xultophy 16 units every morning along with 34 units of basaglar    Every day , increase xultophy by 1 unit and decrease basaglar by 1 unit so eventually you will be on xultophy 50 units am and no basaglar    ===    Humalog please do 8 units w meals    ====    Restart jardiance when sugars are under 200    =====    Keep metformin 500 mg twice a day

## 2023-03-20 DIAGNOSIS — E11.65 TYPE 2 DIABETES MELLITUS WITH HYPERGLYCEMIA, WITH LONG-TERM CURRENT USE OF INSULIN: Primary | ICD-10-CM

## 2023-03-20 DIAGNOSIS — Z79.4 TYPE 2 DIABETES MELLITUS WITH HYPERGLYCEMIA, WITH LONG-TERM CURRENT USE OF INSULIN: Primary | ICD-10-CM

## 2023-03-20 LAB
EXPIRATION DATE: ABNORMAL
GLUCOSE BLDC GLUCOMTR-MCNC: 231 MG/DL (ref 70–130)
HBA1C MFR BLD: 12.3 %
Lab: 995

## 2023-03-20 PROCEDURE — 3046F HEMOGLOBIN A1C LEVEL >9.0%: CPT | Performed by: INTERNAL MEDICINE

## 2023-03-20 PROCEDURE — 82962 GLUCOSE BLOOD TEST: CPT | Performed by: INTERNAL MEDICINE

## 2023-03-20 PROCEDURE — 83036 HEMOGLOBIN GLYCOSYLATED A1C: CPT | Performed by: INTERNAL MEDICINE

## 2023-05-02 ENCOUNTER — OFFICE VISIT (OUTPATIENT)
Dept: CARDIOLOGY CLINIC | Age: 68
End: 2023-05-02
Payer: MEDICARE

## 2023-05-02 VITALS
WEIGHT: 197 LBS | BODY MASS INDEX: 27.58 KG/M2 | HEIGHT: 71 IN | SYSTOLIC BLOOD PRESSURE: 102 MMHG | HEART RATE: 83 BPM | DIASTOLIC BLOOD PRESSURE: 60 MMHG

## 2023-05-02 DIAGNOSIS — I51.89 DIASTOLIC DYSFUNCTION: Primary | ICD-10-CM

## 2023-05-02 PROCEDURE — G8417 CALC BMI ABV UP PARAM F/U: HCPCS | Performed by: INTERNAL MEDICINE

## 2023-05-02 PROCEDURE — G8427 DOCREV CUR MEDS BY ELIG CLIN: HCPCS | Performed by: INTERNAL MEDICINE

## 2023-05-02 PROCEDURE — 1123F ACP DISCUSS/DSCN MKR DOCD: CPT | Performed by: INTERNAL MEDICINE

## 2023-05-02 PROCEDURE — 1036F TOBACCO NON-USER: CPT | Performed by: INTERNAL MEDICINE

## 2023-05-02 PROCEDURE — 3017F COLORECTAL CA SCREEN DOC REV: CPT | Performed by: INTERNAL MEDICINE

## 2023-05-02 PROCEDURE — 93000 ELECTROCARDIOGRAM COMPLETE: CPT | Performed by: INTERNAL MEDICINE

## 2023-05-02 PROCEDURE — 99214 OFFICE O/P EST MOD 30 MIN: CPT | Performed by: INTERNAL MEDICINE

## 2023-05-02 NOTE — PROGRESS NOTES
HISTORY  41-year-old gentleman with multiple risk factors for coronary disease [dense family history, hypertension, combined dyslipidemia, and insulin-dependent type 2 diabetes] returns for routine follow-up. In January 2020 he underwent Lexiscan dual-isotope testing because of recurrent chest discomfort with exertion felt to be strongly suspicious with new onset angina. His Lexiscan dual-isotope at that time revealed no evidence of ischemia but did relate an ejection fraction of 46%. An accompanying echocardiogram however revealed what appeared to be normal left ventricular function [EF 55%] with mild left atrial enlargement and mild left ventricular hypertrophy. He returns today as I see him now 3 years out from the last visit with me with no interim difficulties. He push mows part of his yard and weed eats with no noticeable change in exercise tolerance, unusual shortness of breath, or discomfort suspicious for angina. His blood pressure has been recently controlled as have his lipids with May values from this past year LDL 67, HDL 40, triglycerides 263. PHYSICAL EXAM  On exam carries 197 pounds on a 5 foot 11 inches frame. Pressure is 102/60 pulse 83. Normal male balding pattern. EOMs full, sclerae and conjunctiva normal. PERRLA. Trachea midline with no neck masses. Assessment of internal jugular veins reveals no elevation of central venous pressure at 45 degrees. Carotid pulses normal without delay or bruit. Thyroid normal to palpation. Chest exam reveals normal respiratory effort, no abnormal breath sounds and normal expiratory phase. No skin lesions seen. PMI normal. S1, S2 normal without murmur or herminio or click. Normal bowel sounds without palpable mass or bruit. No clubbing or acrocyanosis. No significant lower extremity edema or signs of venous insufficiency. General motor strength appears to be within normal limits. Normal range of motion with normal gait.  Alert, oriented x 3, memory and

## 2023-05-23 NOTE — PROGRESS NOTES
History     Tobacco Use    Smoking status: Never    Smokeless tobacco: Never   Substance Use Topics    Alcohol use: No    Drug use: No     History reviewed. No pertinent family history. Health Maintenance   Topic Date Due    Diabetic foot exam  Never done    Lipids  Never done    Depression Screen  Never done    Diabetic Alb to Cr ratio (uACR) test  Never done    Diabetic retinal exam  Never done    Hepatitis C screen  Never done    Colorectal Cancer Screen  Never done    A1C test (Diabetic or Prediabetic)  12/27/2019    Annual Wellness Visit (AWV)  Never done    Shingles vaccine (2 of 2) 04/05/2023    GFR test (Diabetes, CKD 3-4, OR last GFR 15-59)  05/24/2024    DTaP/Tdap/Td vaccine (2 - Td or Tdap) 07/15/2026    Flu vaccine  Completed    Pneumococcal 65+ years Vaccine  Completed    COVID-19 Vaccine  Completed    Hepatitis A vaccine  Aged Out    Hib vaccine  Aged Out    Meningococcal (ACWY) vaccine  Aged Out     Subjective   Review of Systems   Constitutional:  Positive for fatigue. Negative for fever. HENT:  Negative for dental problem, hearing loss, mouth sores, nosebleeds, sore throat and trouble swallowing. Eyes:  Negative for discharge and itching. Respiratory:  Negative for cough, shortness of breath and wheezing. Cardiovascular:  Negative for chest pain, palpitations and leg swelling. Gastrointestinal:  Negative for abdominal pain, constipation, diarrhea, nausea and vomiting. Endocrine: Negative for cold intolerance and heat intolerance. Diabetic   Genitourinary:  Negative for dysuria, frequency, hematuria and urgency. Musculoskeletal:  Positive for arthralgias. Negative for joint swelling and myalgias. Pain anterior right chest, beneath breast   Skin:  Negative for pallor and rash. Allergic/Immunologic: Negative for environmental allergies and immunocompromised state. Neurological:  Positive for numbness. Negative for seizures and syncope.    Hematological:  Negative for

## 2023-05-24 ENCOUNTER — OFFICE VISIT (OUTPATIENT)
Dept: HEMATOLOGY | Age: 68
End: 2023-05-24
Payer: MEDICARE

## 2023-05-24 ENCOUNTER — HOSPITAL ENCOUNTER (OUTPATIENT)
Dept: INFUSION THERAPY | Age: 68
Discharge: HOME | End: 2023-05-24
Payer: MEDICARE

## 2023-05-24 VITALS
WEIGHT: 197 LBS | OXYGEN SATURATION: 97 % | HEART RATE: 95 BPM | SYSTOLIC BLOOD PRESSURE: 120 MMHG | HEIGHT: 71 IN | BODY MASS INDEX: 27.58 KG/M2 | DIASTOLIC BLOOD PRESSURE: 60 MMHG

## 2023-05-24 DIAGNOSIS — D47.2 MGUS (MONOCLONAL GAMMOPATHY OF UNKNOWN SIGNIFICANCE): ICD-10-CM

## 2023-05-24 DIAGNOSIS — D58.2 ELEVATED HEMOGLOBIN (HCC): ICD-10-CM

## 2023-05-24 DIAGNOSIS — Z71.89 CARE PLAN DISCUSSED WITH PATIENT: ICD-10-CM

## 2023-05-24 DIAGNOSIS — D47.2 MGUS (MONOCLONAL GAMMOPATHY OF UNKNOWN SIGNIFICANCE): Primary | ICD-10-CM

## 2023-05-24 LAB
ALBUMIN SERPL-MCNC: 4.6 G/DL (ref 3.5–5.2)
ALP SERPL-CCNC: 151 U/L (ref 40–130)
ALT SERPL-CCNC: 35 U/L (ref 21–72)
ANION GAP SERPL CALCULATED.3IONS-SCNC: 13 MMOL/L (ref 7–19)
AST SERPL-CCNC: 43 U/L (ref 17–59)
BASOPHILS # BLD: 0.07 K/UL (ref 0.01–0.08)
BASOPHILS NFR BLD: 0.7 % (ref 0.1–1.2)
BILIRUB SERPL-MCNC: 0.5 MG/DL (ref 0.2–1.3)
BUN SERPL-MCNC: 17 MG/DL (ref 9–20)
CALCIUM SERPL-MCNC: 9.7 MG/DL (ref 8.4–10.2)
CHLORIDE SERPL-SCNC: 106 MMOL/L (ref 98–111)
CO2 SERPL-SCNC: 25 MMOL/L (ref 22–29)
CREAT SERPL-MCNC: 1.2 MG/DL (ref 0.6–1.2)
EOSINOPHIL # BLD: 0.45 K/UL (ref 0.04–0.54)
EOSINOPHIL NFR BLD: 4.3 % (ref 0.7–7)
ERYTHROCYTE [DISTWIDTH] IN BLOOD BY AUTOMATED COUNT: 15.6 % (ref 11.6–14.4)
GLOBULIN: 4.6 G/DL
GLUCOSE SERPL-MCNC: 158 MG/DL (ref 74–106)
HCT VFR BLD AUTO: 54.2 % (ref 40.1–51)
HGB BLD-MCNC: 17 G/DL (ref 13.7–17.5)
LYMPHOCYTES # BLD: 3.71 K/UL (ref 1.18–3.74)
LYMPHOCYTES NFR BLD: 35.4 % (ref 19.3–53.1)
MCH RBC QN AUTO: 28.3 PG (ref 25.7–32.2)
MCHC RBC AUTO-ENTMCNC: 31.4 G/DL (ref 32.3–36.5)
MCV RBC AUTO: 90.2 FL (ref 79–92.2)
MONOCYTES # BLD: 0.7 K/UL (ref 0.24–0.82)
MONOCYTES NFR BLD: 6.7 % (ref 4.7–12.5)
NEUTROPHILS # BLD: 5.41 K/UL (ref 1.56–6.13)
NEUTS SEG NFR BLD: 51.5 % (ref 34–71.1)
PLATELET # BLD AUTO: 213 K/UL (ref 163–337)
PMV BLD AUTO: 10.6 FL (ref 7.4–10.4)
POTASSIUM SERPL-SCNC: 4.4 MMOL/L (ref 3.5–5.1)
PROT SERPL-MCNC: 9.2 G/DL (ref 6.3–8.2)
RBC # BLD AUTO: 6.01 M/UL (ref 4.63–6.08)
SODIUM SERPL-SCNC: 144 MMOL/L (ref 137–145)
WBC # BLD AUTO: 10.49 K/UL (ref 4.23–9.07)

## 2023-05-24 PROCEDURE — 36415 COLL VENOUS BLD VENIPUNCTURE: CPT

## 2023-05-24 PROCEDURE — 99213 OFFICE O/P EST LOW 20 MIN: CPT | Performed by: NURSE PRACTITIONER

## 2023-05-24 PROCEDURE — 1123F ACP DISCUSS/DSCN MKR DOCD: CPT | Performed by: NURSE PRACTITIONER

## 2023-05-24 PROCEDURE — 99212 OFFICE O/P EST SF 10 MIN: CPT

## 2023-05-24 PROCEDURE — 1036F TOBACCO NON-USER: CPT | Performed by: NURSE PRACTITIONER

## 2023-05-24 PROCEDURE — 80053 COMPREHEN METABOLIC PANEL: CPT

## 2023-05-24 PROCEDURE — 85025 COMPLETE CBC W/AUTO DIFF WBC: CPT

## 2023-05-24 PROCEDURE — G8417 CALC BMI ABV UP PARAM F/U: HCPCS | Performed by: NURSE PRACTITIONER

## 2023-05-24 PROCEDURE — 3017F COLORECTAL CA SCREEN DOC REV: CPT | Performed by: NURSE PRACTITIONER

## 2023-05-24 PROCEDURE — G8427 DOCREV CUR MEDS BY ELIG CLIN: HCPCS | Performed by: NURSE PRACTITIONER

## 2023-05-24 RX ORDER — DOXYCYCLINE HYCLATE 100 MG/1
100 CAPSULE ORAL 2 TIMES DAILY
COMMUNITY
Start: 2023-05-22

## 2023-05-24 ASSESSMENT — ENCOUNTER SYMPTOMS
CONSTIPATION: 0
DIARRHEA: 0
WHEEZING: 0
EYE DISCHARGE: 0
VOMITING: 0
ABDOMINAL PAIN: 0
TROUBLE SWALLOWING: 0
NAUSEA: 0
EYE ITCHING: 0
COUGH: 0
SHORTNESS OF BREATH: 0
SORE THROAT: 0

## 2023-05-26 DIAGNOSIS — G25.81 RESTLESS LEGS SYNDROME (RLS): ICD-10-CM

## 2023-05-26 DIAGNOSIS — E11.42 DIABETIC POLYNEUROPATHY ASSOCIATED WITH TYPE 2 DIABETES MELLITUS: ICD-10-CM

## 2023-05-26 LAB — B2 MICROGLOB SERPL-MCNC: 3.7 MG/L

## 2023-05-26 RX ORDER — GABAPENTIN 800 MG/1
TABLET ORAL
Qty: 270 TABLET | Refills: 0 | Status: SHIPPED | OUTPATIENT
Start: 2023-05-26

## 2023-05-28 LAB
ALBUMIN SERPL-MCNC: 3.93 G/DL (ref 3.75–5.01)
ALPHA1 GLOB SERPL ELPH-MCNC: 0.39 G/DL (ref 0.19–0.46)
ALPHA2 GLOB SERPL ELPH-MCNC: 1.23 G/DL (ref 0.48–1.05)
B-GLOBULIN SERPL ELPH-MCNC: 1.15 G/DL (ref 0.48–1.1)
DEPRECATED KAPPA LC FREE/LAMBDA SER: 2 {RATIO} (ref 0.26–1.65)
GAMMA GLOB SERPL ELPH-MCNC: 1.4 G/DL (ref 0.62–1.51)
IGA SERPL-MCNC: 430 MG/DL (ref 68–408)
IGG SERPL-MCNC: 1350 MG/DL (ref 768–1632)
IGM SERPL-MCNC: 109 MG/DL (ref 35–263)
INTERPRETATION SERPL IFE-IMP: ABNORMAL
INTERPRETATION SERPL IFE-IMP: ABNORMAL
KAPPA LC FREE SER-MCNC: 78.52 MG/L (ref 3.3–19.4)
LAMBDA LC FREE SERPL-MCNC: 39.25 MG/L (ref 5.71–26.3)
PROT SERPL-MCNC: 8.1 G/DL (ref 6.3–8.2)

## 2023-06-12 ENCOUNTER — OFFICE VISIT (OUTPATIENT)
Dept: NEUROLOGY | Facility: CLINIC | Age: 68
End: 2023-06-12
Payer: MEDICARE

## 2023-06-12 VITALS
SYSTOLIC BLOOD PRESSURE: 132 MMHG | OXYGEN SATURATION: 98 % | BODY MASS INDEX: 28 KG/M2 | HEIGHT: 71 IN | HEART RATE: 78 BPM | DIASTOLIC BLOOD PRESSURE: 70 MMHG | WEIGHT: 200 LBS

## 2023-06-12 DIAGNOSIS — E11.42 DIABETIC POLYNEUROPATHY ASSOCIATED WITH TYPE 2 DIABETES MELLITUS: Primary | ICD-10-CM

## 2023-06-12 DIAGNOSIS — G25.81 RESTLESS LEGS SYNDROME (RLS): ICD-10-CM

## 2023-06-12 PROCEDURE — 1160F RVW MEDS BY RX/DR IN RCRD: CPT | Performed by: PHYSICIAN ASSISTANT

## 2023-06-12 PROCEDURE — 1159F MED LIST DOCD IN RCRD: CPT | Performed by: PHYSICIAN ASSISTANT

## 2023-06-12 PROCEDURE — 3078F DIAST BP <80 MM HG: CPT | Performed by: PHYSICIAN ASSISTANT

## 2023-06-12 PROCEDURE — 3075F SYST BP GE 130 - 139MM HG: CPT | Performed by: PHYSICIAN ASSISTANT

## 2023-06-12 PROCEDURE — 99214 OFFICE O/P EST MOD 30 MIN: CPT | Performed by: PHYSICIAN ASSISTANT

## 2023-06-12 RX ORDER — MEXILETINE HYDROCHLORIDE 250 MG/1
500 CAPSULE ORAL DAILY
Qty: 60 CAPSULE | Refills: 6 | Status: SHIPPED | OUTPATIENT
Start: 2023-06-12

## 2023-06-12 NOTE — PROGRESS NOTES
Subjective   Supa Mcclelland is a 67 y.o. male who presents for follow-up of diabetic polyneuropathy.    History of Present Illness     Diabetic polyneuropathy  The patient reports that his feet have been swollen at night. He states that the mexiletine and Neurontin are helpful for him. He states that he tries to drink 3 liters of water a day. The patient confirms that he is taking all his medications    The following portions of the patient's history were reviewed and updated as appropriate: allergies, current medications, past family history, past medical history, past social history, past surgical history and problem list.    Review of Systems:  A review of systems was performed, and positive findings are noted in the HPI.      Current Outpatient Medications:     citalopram (CeleXA) 20 MG tablet, Take 1 tablet by mouth every night at bedtime., Disp: , Rfl:     empagliflozin (Jardiance) 10 MG tablet tablet, Take 1 tablet by mouth Daily., Disp: 90 tablet, Rfl: 3    famotidine (PEPCID) 20 MG tablet, Take 1 tablet by mouth 2 (Two) Times a Day., Disp: , Rfl:     fexofenadine (ALLEGRA) 180 MG tablet, Take 1 tablet by mouth As Needed., Disp: , Rfl:     fluticasone (FLONASE) 50 MCG/ACT nasal spray, 1 spray each nostril twice a day for three days, then daily., Disp: 1 bottle, Rfl: 0    gabapentin (NEURONTIN) 800 MG tablet, TAKE 1 TABLET BY MOUTH THREE TIMES DAILY (Patient taking differently: Take 1 tablet by mouth 3 (Three) Times a Day.), Disp: 270 tablet, Rfl: 0    hydrochlorothiazide (HYDRODIURIL) 12.5 MG tablet, Take 1 tablet by mouth Daily., Disp: , Rfl:     Insulin Degludec-Liraglutide (Xultophy) 100-3.6 UNIT-MG/ML solution pen-injector subcutaneous pen, Inject 50 Units under the skin into the appropriate area as directed Daily., Disp: 15 mL, Rfl: 11    Insulin Lispro (HumaLOG KwikPen) 200 UNIT/ML solution pen-injector, Inject 20 Units under the skin into the appropriate area as directed 3 (Three) Times a Day With  Meals., Disp: 18 mL, Rfl: 11    Insulin Pen Needle (Pen Needles) 32G X 4 MM misc, 1 each 4 (Four) Times a Day. Use 4 x daily, Dx code E11.65, Disp: 120 each, Rfl: 11    levothyroxine (SYNTHROID, LEVOTHROID) 50 MCG tablet, Take 1 tablet by mouth Daily., Disp: , Rfl:     losartan (COZAAR) 25 MG tablet, Take 1 tablet by mouth Every Morning., Disp: , Rfl:     meloxicam (MOBIC) 15 MG tablet, Take 1 tablet by mouth Daily., Disp: , Rfl:     metFORMIN ER (GLUCOPHAGE-XR) 500 MG 24 hr tablet, Take 1 tablet by mouth 2 (Two) Times a Day With Meals., Disp: 180 tablet, Rfl: 0    metoprolol tartrate (LOPRESSOR) 50 MG tablet, Take 1 tablet by mouth 2 (Two) Times a Day., Disp: , Rfl:     mexiletine (MEXITIL) 250 MG capsule, Take 2 capsules by mouth Daily., Disp: 60 capsule, Rfl: 6    potassium citrate (UROCIT-K) 10 MEQ (1080 MG) CR tablet, Take 1 tablet by mouth 3 (Three) Times a Day With Meals., Disp: 90 tablet, Rfl: 11    rOPINIRole (REQUIP) 2 MG tablet, Take 1 tablet by mouth Every Night. Take 1 hour before bedtime., Disp: 90 tablet, Rfl: 3    rosuvastatin (CRESTOR) 10 MG tablet, Take 1 tablet by mouth Daily., Disp: 30 tablet, Rfl: 11     Objective   Physical Exam  Vitals and nursing note reviewed.   HENT:      Right Ear: Hearing normal.      Left Ear: Hearing normal.   Eyes:      General: Lids are normal. Vision grossly intact. Gaze aligned appropriately. No scleral icterus.     Extraocular Movements: Extraocular movements intact.      Conjunctiva/sclera: Conjunctivae normal.   Neck:      Vascular: No JVD.      Trachea: Trachea and phonation normal.   Cardiovascular:      Rate and Rhythm: Normal rate.   Pulmonary:      Effort: Pulmonary effort is normal.   Skin:     General: Skin is warm and dry.   Neurological:      Mental Status: He is alert and oriented to person, place, and time.      GCS: GCS eye subscore is 4. GCS verbal subscore is 5. GCS motor subscore is 6.      Sensory: Sensory deficit present.      Motor: Weakness and  atrophy present.      Coordination: Coordination is intact.      Gait: Gait abnormal.      Comments: showing peripheral decreased sensation and allodynia at times with palpation in the lower extremities. Diminished reflexes, diminished vibratory sense all noted and consistent with diabetic neuropathy as previously documented.   Psychiatric:         Attention and Perception: Attention and perception normal.         Mood and Affect: Mood and affect normal.         Speech: Speech normal.         Behavior: Behavior normal.         Thought Content: Thought content normal.         Cognition and Memory: Cognition and memory normal.         Judgment: Judgment normal.         Assessment & Plan   Diagnoses and all orders for this visit:    1. Diabetic polyneuropathy associated with type 2 diabetes mellitus (Primary)  -     mexiletine (MEXITIL) 250 MG capsule; Take 2 capsules by mouth Daily.  Dispense: 60 capsule; Refill: 6    2. Restless legs syndrome (RLS)      1. Diabetic polyneuropathy  - Stable, currently treated with gabapentin 800 mg 3 times per day, which has been helpful and mexiletine 250 mg twice per day, which has also been helpful for him. No changes were made with his medication today. Continue as is.               Transcribed from ambient dictation for ZACARIAS Alonzo by Veronica Bran.  06/12/23   12:28 CDT    Patient or patient representative verbalized consent to the visit recording.  I have personally performed the services described in this document as transcribed by the above individual, and it is both accurate and complete.

## 2023-08-08 ENCOUNTER — APPOINTMENT (OUTPATIENT)
Dept: CT IMAGING | Facility: HOSPITAL | Age: 68
End: 2023-08-08
Payer: MEDICARE

## 2023-08-08 ENCOUNTER — HOSPITAL ENCOUNTER (EMERGENCY)
Facility: HOSPITAL | Age: 68
Discharge: HOME | End: 2023-08-08
Attending: EMERGENCY MEDICINE | Admitting: EMERGENCY MEDICINE
Payer: MEDICARE

## 2023-08-08 VITALS
RESPIRATION RATE: 18 BRPM | BODY MASS INDEX: 29.12 KG/M2 | WEIGHT: 208 LBS | SYSTOLIC BLOOD PRESSURE: 146 MMHG | HEIGHT: 71 IN | TEMPERATURE: 99.1 F | DIASTOLIC BLOOD PRESSURE: 83 MMHG | HEART RATE: 93 BPM | OXYGEN SATURATION: 98 %

## 2023-08-08 DIAGNOSIS — G93.89 CEREBRAL CALCIFICATION: ICD-10-CM

## 2023-08-08 DIAGNOSIS — R73.9 HYPERGLYCEMIA: Primary | ICD-10-CM

## 2023-08-08 DIAGNOSIS — R41.840 INATTENTION: ICD-10-CM

## 2023-08-08 LAB
ALBUMIN SERPL-MCNC: 3.9 G/DL (ref 3.5–5.2)
ALBUMIN/GLOB SERPL: 1.2 G/DL
ALP SERPL-CCNC: 137 U/L (ref 39–117)
ALT SERPL W P-5'-P-CCNC: 23 U/L (ref 1–41)
AMPHET+METHAMPHET UR QL: NEGATIVE
AMPHETAMINES UR QL: NEGATIVE
ANION GAP SERPL CALCULATED.3IONS-SCNC: 11 MMOL/L (ref 5–15)
AST SERPL-CCNC: 23 U/L (ref 1–40)
BARBITURATES UR QL SCN: NEGATIVE
BASOPHILS # BLD AUTO: 0.06 10*3/MM3 (ref 0–0.2)
BASOPHILS NFR BLD AUTO: 0.6 % (ref 0–1.5)
BENZODIAZ UR QL SCN: NEGATIVE
BILIRUB SERPL-MCNC: 0.4 MG/DL (ref 0–1.2)
BUN SERPL-MCNC: 16 MG/DL (ref 8–23)
BUN/CREAT SERPL: 16.2 (ref 7–25)
BUPRENORPHINE SERPL-MCNC: NEGATIVE NG/ML
CALCIUM SPEC-SCNC: 8.9 MG/DL (ref 8.6–10.5)
CANNABINOIDS SERPL QL: NEGATIVE
CHLORIDE SERPL-SCNC: 99 MMOL/L (ref 98–107)
CO2 SERPL-SCNC: 25 MMOL/L (ref 22–29)
COCAINE UR QL: NEGATIVE
CREAT SERPL-MCNC: 0.99 MG/DL (ref 0.76–1.27)
DEPRECATED RDW RBC AUTO: 42.7 FL (ref 37–54)
EGFRCR SERPLBLD CKD-EPI 2021: 82.5 ML/MIN/1.73
EOSINOPHIL # BLD AUTO: 0.5 10*3/MM3 (ref 0–0.4)
EOSINOPHIL NFR BLD AUTO: 5.1 % (ref 0.3–6.2)
ERYTHROCYTE [DISTWIDTH] IN BLOOD BY AUTOMATED COUNT: 14 % (ref 12.3–15.4)
ETHANOL UR QL: <0.01 %
FENTANYL UR-MCNC: NEGATIVE NG/ML
GLOBULIN UR ELPH-MCNC: 3.3 GM/DL
GLUCOSE SERPL-MCNC: 345 MG/DL (ref 65–99)
HCT VFR BLD AUTO: 45 % (ref 37.5–51)
HGB BLD-MCNC: 15.4 G/DL (ref 13–17.7)
IMM GRANULOCYTES # BLD AUTO: 0.14 10*3/MM3 (ref 0–0.05)
IMM GRANULOCYTES NFR BLD AUTO: 1.4 % (ref 0–0.5)
LYMPHOCYTES # BLD AUTO: 3.3 10*3/MM3 (ref 0.7–3.1)
LYMPHOCYTES NFR BLD AUTO: 33.5 % (ref 19.6–45.3)
MCH RBC QN AUTO: 28.7 PG (ref 26.6–33)
MCHC RBC AUTO-ENTMCNC: 34.2 G/DL (ref 31.5–35.7)
MCV RBC AUTO: 83.8 FL (ref 79–97)
METHADONE UR QL SCN: NEGATIVE
MONOCYTES # BLD AUTO: 0.78 10*3/MM3 (ref 0.1–0.9)
MONOCYTES NFR BLD AUTO: 7.9 % (ref 5–12)
NEUTROPHILS NFR BLD AUTO: 5.08 10*3/MM3 (ref 1.7–7)
NEUTROPHILS NFR BLD AUTO: 51.5 % (ref 42.7–76)
NRBC BLD AUTO-RTO: 0 /100 WBC (ref 0–0.2)
OPIATES UR QL: NEGATIVE
OXYCODONE UR QL SCN: NEGATIVE
PCP UR QL SCN: NEGATIVE
PLATELET # BLD AUTO: 195 10*3/MM3 (ref 140–450)
PMV BLD AUTO: 10.3 FL (ref 6–12)
POTASSIUM SERPL-SCNC: 3.7 MMOL/L (ref 3.5–5.2)
PROPOXYPH UR QL: NEGATIVE
PROT SERPL-MCNC: 7.2 G/DL (ref 6–8.5)
RBC # BLD AUTO: 5.37 10*6/MM3 (ref 4.14–5.8)
SODIUM SERPL-SCNC: 135 MMOL/L (ref 136–145)
TRICYCLICS UR QL SCN: NEGATIVE
WBC NRBC COR # BLD: 9.86 10*3/MM3 (ref 3.4–10.8)

## 2023-08-08 PROCEDURE — 80053 COMPREHEN METABOLIC PANEL: CPT | Performed by: EMERGENCY MEDICINE

## 2023-08-08 PROCEDURE — 82077 ASSAY SPEC XCP UR&BREATH IA: CPT | Performed by: EMERGENCY MEDICINE

## 2023-08-08 PROCEDURE — 93005 ELECTROCARDIOGRAM TRACING: CPT | Performed by: EMERGENCY MEDICINE

## 2023-08-08 PROCEDURE — 99284 EMERGENCY DEPT VISIT MOD MDM: CPT

## 2023-08-08 PROCEDURE — 80307 DRUG TEST PRSMV CHEM ANLYZR: CPT | Performed by: EMERGENCY MEDICINE

## 2023-08-08 PROCEDURE — 85025 COMPLETE CBC W/AUTO DIFF WBC: CPT | Performed by: EMERGENCY MEDICINE

## 2023-08-08 PROCEDURE — 36415 COLL VENOUS BLD VENIPUNCTURE: CPT

## 2023-08-08 PROCEDURE — 70450 CT HEAD/BRAIN W/O DYE: CPT

## 2023-08-08 RX ORDER — PEN NEEDLE, DIABETIC 29 G X1/2"
NEEDLE, DISPOSABLE MISCELLANEOUS DAILY
COMMUNITY
Start: 2023-07-01
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
## 2023-08-08 NOTE — DISCHARGE INSTRUCTIONS
Patient requires follow-up with neurology as an outpatient for further evaluation assessment patient be advised not to drive operate machinery or swim or climb heights of the time he is cleared by his primary MD

## 2023-08-14 LAB
QT INTERVAL: 356 MS
QTC INTERVAL: 442 MS

## 2023-08-28 ENCOUNTER — TELEPHONE (OUTPATIENT)
Dept: HEMATOLOGY | Age: 68
End: 2023-08-28

## 2023-08-28 NOTE — TELEPHONE ENCOUNTER
9:40am  Attempted to contact patient regarding appointment with Keanu Catalan. No answer. Appointment rescheduled for Friday Dec. 1, 2023 at 10:30am.  Appointment reminder letter put in mail.   PF

## 2023-10-01 DIAGNOSIS — G25.81 RESTLESS LEGS SYNDROME (RLS): ICD-10-CM

## 2023-10-01 DIAGNOSIS — E11.42 DIABETIC POLYNEUROPATHY ASSOCIATED WITH TYPE 2 DIABETES MELLITUS: ICD-10-CM

## 2023-10-02 RX ORDER — GABAPENTIN 800 MG/1
TABLET ORAL
Qty: 270 TABLET | Refills: 0 | Status: SHIPPED | OUTPATIENT
Start: 2023-10-02

## 2023-10-10 ENCOUNTER — LAB (OUTPATIENT)
Dept: LAB | Facility: HOSPITAL | Age: 68
End: 2023-10-10
Payer: MEDICARE

## 2023-10-10 ENCOUNTER — OFFICE VISIT (OUTPATIENT)
Dept: NEUROLOGY | Facility: CLINIC | Age: 68
End: 2023-10-10
Payer: MEDICARE

## 2023-10-10 VITALS
HEART RATE: 82 BPM | BODY MASS INDEX: 29.12 KG/M2 | HEIGHT: 71 IN | WEIGHT: 208 LBS | SYSTOLIC BLOOD PRESSURE: 130 MMHG | OXYGEN SATURATION: 98 % | DIASTOLIC BLOOD PRESSURE: 78 MMHG

## 2023-10-10 DIAGNOSIS — G23.8 CALCIFICATION OF BASAL GANGLIA: ICD-10-CM

## 2023-10-10 DIAGNOSIS — E11.42 DIABETIC POLYNEUROPATHY ASSOCIATED WITH TYPE 2 DIABETES MELLITUS: ICD-10-CM

## 2023-10-10 DIAGNOSIS — R90.89 OTHER ABNORMAL FINDINGS ON DIAGNOSTIC IMAGING OF CENTRAL NERVOUS SYSTEM: ICD-10-CM

## 2023-10-10 DIAGNOSIS — G31.84 MCI (MILD COGNITIVE IMPAIRMENT) WITH MEMORY LOSS: Primary | ICD-10-CM

## 2023-10-10 LAB
CA-I BLD-MCNC: 4.8 MG/DL (ref 4.6–5.4)
CA-I SERPL ISE-MCNC: 1.19 MMOL/L (ref 1.15–1.35)
CALCIUM SPEC-SCNC: 9.4 MG/DL (ref 8.6–10.5)
PTH-INTACT SERPL-MCNC: 38.5 PG/ML (ref 15–65)
T-UPTAKE NFR SERPL: 1.02 TBI (ref 0.8–1.3)
T4 SERPL-MCNC: 6.57 MCG/DL (ref 4.5–11.7)
TSH SERPL DL<=0.05 MIU/L-ACNC: 1.82 UIU/ML (ref 0.27–4.2)

## 2023-10-10 PROCEDURE — 83970 ASSAY OF PARATHORMONE: CPT | Performed by: PHYSICIAN ASSISTANT

## 2023-10-10 PROCEDURE — 84436 ASSAY OF TOTAL THYROXINE: CPT | Performed by: PHYSICIAN ASSISTANT

## 2023-10-10 PROCEDURE — 86800 THYROGLOBULIN ANTIBODY: CPT | Performed by: PHYSICIAN ASSISTANT

## 2023-10-10 PROCEDURE — 3078F DIAST BP <80 MM HG: CPT | Performed by: PHYSICIAN ASSISTANT

## 2023-10-10 PROCEDURE — 86376 MICROSOMAL ANTIBODY EACH: CPT | Performed by: PHYSICIAN ASSISTANT

## 2023-10-10 PROCEDURE — 99214 OFFICE O/P EST MOD 30 MIN: CPT | Performed by: PHYSICIAN ASSISTANT

## 2023-10-10 PROCEDURE — 36415 COLL VENOUS BLD VENIPUNCTURE: CPT | Performed by: PHYSICIAN ASSISTANT

## 2023-10-10 PROCEDURE — 84443 ASSAY THYROID STIM HORMONE: CPT | Performed by: PHYSICIAN ASSISTANT

## 2023-10-10 PROCEDURE — 84479 ASSAY OF THYROID (T3 OR T4): CPT | Performed by: PHYSICIAN ASSISTANT

## 2023-10-10 PROCEDURE — 82330 ASSAY OF CALCIUM: CPT | Performed by: PHYSICIAN ASSISTANT

## 2023-10-10 PROCEDURE — 82310 ASSAY OF CALCIUM: CPT | Performed by: PHYSICIAN ASSISTANT

## 2023-10-10 PROCEDURE — 3075F SYST BP GE 130 - 139MM HG: CPT | Performed by: PHYSICIAN ASSISTANT
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
## 2023-10-11 LAB
THYROGLOB AB SERPL-ACNC: <1 IU/ML (ref 0–0.9)
THYROPEROXIDASE AB SERPL-ACNC: 12 IU/ML (ref 0–34)

## 2023-10-20 ENCOUNTER — OFFICE VISIT (OUTPATIENT)
Dept: PHYSICAL THERAPY | Facility: CLINIC | Age: 68
End: 2023-10-20
Payer: MEDICARE

## 2023-10-20 DIAGNOSIS — R41.89 OTHER SYMPTOMS AND SIGNS INVOLVING COGNITIVE FUNCTIONS AND AWARENESS: Primary | ICD-10-CM
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
## 2023-11-21 ENCOUNTER — HOSPITAL ENCOUNTER (EMERGENCY)
Age: 68
Discharge: HOME | End: 2023-11-21
Attending: EMERGENCY MEDICINE
Payer: MEDICARE

## 2023-11-21 ENCOUNTER — APPOINTMENT (OUTPATIENT)
Dept: GENERAL RADIOLOGY | Age: 68
End: 2023-11-21
Payer: MEDICARE

## 2023-11-21 VITALS
WEIGHT: 208 LBS | DIASTOLIC BLOOD PRESSURE: 94 MMHG | RESPIRATION RATE: 18 BRPM | BODY MASS INDEX: 29.01 KG/M2 | TEMPERATURE: 97.7 F | OXYGEN SATURATION: 100 % | SYSTOLIC BLOOD PRESSURE: 153 MMHG | HEART RATE: 79 BPM

## 2023-11-21 DIAGNOSIS — R07.89 CHEST WALL PAIN: Primary | ICD-10-CM

## 2023-11-21 LAB
ALBUMIN SERPL-MCNC: 4.2 G/DL (ref 3.5–5.2)
ALP SERPL-CCNC: 118 U/L (ref 40–130)
ALT SERPL-CCNC: 58 U/L (ref 5–41)
ANION GAP SERPL CALCULATED.3IONS-SCNC: 14 MMOL/L (ref 7–19)
AST SERPL-CCNC: 57 U/L (ref 5–40)
BASOPHILS # BLD: 0 K/UL (ref 0–0.2)
BASOPHILS NFR BLD: 0.5 % (ref 0–1)
BILIRUB SERPL-MCNC: 0.5 MG/DL (ref 0.2–1.2)
BUN SERPL-MCNC: 16 MG/DL (ref 8–23)
CALCIUM SERPL-MCNC: 9.7 MG/DL (ref 8.8–10.2)
CHLORIDE SERPL-SCNC: 98 MMOL/L (ref 98–111)
CHP ED QC CHECK: YES
CO2 SERPL-SCNC: 23 MMOL/L (ref 22–29)
CREAT SERPL-MCNC: 1.1 MG/DL (ref 0.5–1.2)
CRP SERPL HS-MCNC: <0.3 MG/DL (ref 0–0.5)
EKG P AXIS: 47 DEGREES
EKG P-R INTERVAL: 156 MS
EKG Q-T INTERVAL: 360 MS
EKG QRS DURATION: 92 MS
EKG QTC CALCULATION (BAZETT): 423 MS
EKG T AXIS: 80 DEGREES
EOSINOPHIL # BLD: 0.4 K/UL (ref 0–0.6)
EOSINOPHIL NFR BLD: 4.7 % (ref 0–5)
ERYTHROCYTE [DISTWIDTH] IN BLOOD BY AUTOMATED COUNT: 13.4 % (ref 11.5–14.5)
GLUCOSE BLD-MCNC: 324 MG/DL
GLUCOSE BLD-MCNC: 324 MG/DL (ref 70–99)
GLUCOSE SERPL-MCNC: 421 MG/DL (ref 74–109)
HCT VFR BLD AUTO: 48.4 % (ref 42–52)
HGB BLD-MCNC: 16.4 G/DL (ref 14–18)
IMM GRANULOCYTES # BLD: 0.2 K/UL
LYMPHOCYTES # BLD: 2.9 K/UL (ref 1.1–4.5)
LYMPHOCYTES NFR BLD: 35.1 % (ref 20–40)
MCH RBC QN AUTO: 29.4 PG (ref 27–31)
MCHC RBC AUTO-ENTMCNC: 33.9 G/DL (ref 33–37)
MCV RBC AUTO: 86.7 FL (ref 80–94)
MONOCYTES # BLD: 0.6 K/UL (ref 0–0.9)
MONOCYTES NFR BLD: 6.8 % (ref 0–10)
NEUTROPHILS # BLD: 4.2 K/UL (ref 1.5–7.5)
NEUTS SEG NFR BLD: 50.7 % (ref 50–65)
PERFORMED ON: ABNORMAL
PLATELET # BLD AUTO: 188 K/UL (ref 130–400)
PMV BLD AUTO: 10.8 FL (ref 9.4–12.4)
POTASSIUM SERPL-SCNC: 3.8 MMOL/L (ref 3.5–5)
PROT SERPL-MCNC: 7.8 G/DL (ref 6.6–8.7)
RBC # BLD AUTO: 5.58 M/UL (ref 4.7–6.1)
SODIUM SERPL-SCNC: 135 MMOL/L (ref 136–145)
TROPONIN, HIGH SENSITIVITY: 18 NG/L (ref 0–22)
TROPONIN, HIGH SENSITIVITY: 19 NG/L (ref 0–22)
WBC # BLD AUTO: 8.2 K/UL (ref 4.8–10.8)

## 2023-11-21 PROCEDURE — 71045 X-RAY EXAM CHEST 1 VIEW: CPT

## 2023-11-21 PROCEDURE — 80053 COMPREHEN METABOLIC PANEL: CPT

## 2023-11-21 PROCEDURE — 93010 ELECTROCARDIOGRAM REPORT: CPT | Performed by: INTERNAL MEDICINE

## 2023-11-21 PROCEDURE — 36415 COLL VENOUS BLD VENIPUNCTURE: CPT

## 2023-11-21 PROCEDURE — 99285 EMERGENCY DEPT VISIT HI MDM: CPT

## 2023-11-21 PROCEDURE — 6370000000 HC RX 637 (ALT 250 FOR IP): Performed by: EMERGENCY MEDICINE

## 2023-11-21 PROCEDURE — 96372 THER/PROPH/DIAG INJ SC/IM: CPT

## 2023-11-21 PROCEDURE — 82962 GLUCOSE BLOOD TEST: CPT

## 2023-11-21 PROCEDURE — 85025 COMPLETE CBC W/AUTO DIFF WBC: CPT

## 2023-11-21 PROCEDURE — 86140 C-REACTIVE PROTEIN: CPT

## 2023-11-21 PROCEDURE — 93005 ELECTROCARDIOGRAM TRACING: CPT | Performed by: EMERGENCY MEDICINE

## 2023-11-21 PROCEDURE — 6360000002 HC RX W HCPCS: Performed by: EMERGENCY MEDICINE

## 2023-11-21 PROCEDURE — 84484 ASSAY OF TROPONIN QUANT: CPT

## 2023-11-21 PROCEDURE — 96374 THER/PROPH/DIAG INJ IV PUSH: CPT

## 2023-11-21 RX ORDER — KETOROLAC TROMETHAMINE 30 MG/ML
30 INJECTION, SOLUTION INTRAMUSCULAR; INTRAVENOUS ONCE
Status: COMPLETED | OUTPATIENT
Start: 2023-11-21 | End: 2023-11-21

## 2023-11-21 RX ORDER — KETOROLAC TROMETHAMINE 10 MG/1
10 TABLET, FILM COATED ORAL EVERY 6 HOURS PRN
Qty: 20 TABLET | Refills: 0 | Status: SHIPPED | OUTPATIENT
Start: 2023-11-21

## 2023-11-21 RX ORDER — INSULIN LISPRO 100 [IU]/ML
6 INJECTION, SOLUTION INTRAVENOUS; SUBCUTANEOUS ONCE
Status: COMPLETED | OUTPATIENT
Start: 2023-11-21 | End: 2023-11-21

## 2023-11-21 RX ADMIN — KETOROLAC TROMETHAMINE 30 MG: 30 INJECTION, SOLUTION INTRAMUSCULAR; INTRAVENOUS at 11:04

## 2023-11-21 RX ADMIN — INSULIN LISPRO 6 UNITS: 100 INJECTION, SOLUTION INTRAVENOUS; SUBCUTANEOUS at 12:10

## 2023-11-21 ASSESSMENT — ENCOUNTER SYMPTOMS
APNEA: 0
FACIAL SWELLING: 0
NAUSEA: 0
SORE THROAT: 0
DIARRHEA: 0
CHOKING: 0
SHORTNESS OF BREATH: 0
EYE DISCHARGE: 0
VOICE CHANGE: 0
CONSTIPATION: 0
SINUS PRESSURE: 0
ABDOMINAL PAIN: 0
BLOOD IN STOOL: 0

## 2023-11-21 ASSESSMENT — HEART SCORE: ECG: 1

## 2023-11-21 ASSESSMENT — PAIN SCALES - GENERAL
PAINLEVEL_OUTOF10: 8
PAINLEVEL_OUTOF10: 5

## 2023-11-21 ASSESSMENT — PAIN - FUNCTIONAL ASSESSMENT: PAIN_FUNCTIONAL_ASSESSMENT: 0-10

## 2023-11-21 NOTE — DISCHARGE INSTRUCTIONS
Return if your symptoms change or worsen. Follow-up with cardiology if your symptoms persist after treatment.

## 2023-11-30 ENCOUNTER — TELEPHONE (OUTPATIENT)
Dept: HEMATOLOGY | Age: 68
End: 2023-11-30
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
## 2023-11-30 NOTE — TELEPHONE ENCOUNTER
Called patient and reminded patient of their appointment on 12/01/2023 and patient confirmed they would be here.

## 2023-12-01 ENCOUNTER — HOSPITAL ENCOUNTER (OUTPATIENT)
Dept: INFUSION THERAPY | Age: 68
Discharge: HOME | End: 2023-12-01
Payer: MEDICARE

## 2023-12-01 ENCOUNTER — OFFICE VISIT (OUTPATIENT)
Dept: HEMATOLOGY | Age: 68
End: 2023-12-01
Payer: MEDICARE

## 2023-12-01 VITALS
WEIGHT: 206.6 LBS | OXYGEN SATURATION: 96 % | HEIGHT: 71 IN | BODY MASS INDEX: 28.92 KG/M2 | HEART RATE: 96 BPM | DIASTOLIC BLOOD PRESSURE: 80 MMHG | TEMPERATURE: 98.2 F | SYSTOLIC BLOOD PRESSURE: 122 MMHG

## 2023-12-01 DIAGNOSIS — D47.2 MGUS (MONOCLONAL GAMMOPATHY OF UNKNOWN SIGNIFICANCE): Primary | ICD-10-CM

## 2023-12-01 DIAGNOSIS — D47.2 MGUS (MONOCLONAL GAMMOPATHY OF UNKNOWN SIGNIFICANCE): ICD-10-CM

## 2023-12-01 DIAGNOSIS — D58.2 ELEVATED HEMOGLOBIN (HCC): ICD-10-CM

## 2023-12-01 DIAGNOSIS — Z71.89 CARE PLAN DISCUSSED WITH PATIENT: ICD-10-CM

## 2023-12-01 LAB
BASOPHILS # BLD: 0.05 K/UL (ref 0.01–0.08)
BASOPHILS NFR BLD: 0.6 % (ref 0.1–1.2)
EOSINOPHIL # BLD: 0.39 K/UL (ref 0.04–0.54)
EOSINOPHIL NFR BLD: 4.4 % (ref 0.7–7)
ERYTHROCYTE [DISTWIDTH] IN BLOOD BY AUTOMATED COUNT: 13.3 % (ref 11.6–14.4)
HCT VFR BLD AUTO: 48 % (ref 40.1–51)
HGB BLD-MCNC: 16.5 G/DL (ref 13.7–17.5)
LYMPHOCYTES # BLD: 3.17 K/UL (ref 1.18–3.74)
LYMPHOCYTES NFR BLD: 36.1 % (ref 19.3–53.1)
MCH RBC QN AUTO: 29.1 PG (ref 25.7–32.2)
MCHC RBC AUTO-ENTMCNC: 34.4 G/DL (ref 32.3–36.5)
MCV RBC AUTO: 84.7 FL (ref 79–92.2)
MONOCYTES # BLD: 0.67 K/UL (ref 0.24–0.82)
MONOCYTES NFR BLD: 7.6 % (ref 4.7–12.5)
NEUTROPHILS # BLD: 4.32 K/UL (ref 1.56–6.13)
NEUTS SEG NFR BLD: 49.2 % (ref 34–71.1)
PLATELET # BLD AUTO: 182 K/UL (ref 163–337)
PMV BLD AUTO: 10.4 FL (ref 7.4–10.4)
RBC # BLD AUTO: 5.67 M/UL (ref 4.63–6.08)
WBC # BLD AUTO: 8.78 K/UL (ref 4.23–9.07)

## 2023-12-01 PROCEDURE — 3017F COLORECTAL CA SCREEN DOC REV: CPT | Performed by: NURSE PRACTITIONER

## 2023-12-01 PROCEDURE — 36415 COLL VENOUS BLD VENIPUNCTURE: CPT

## 2023-12-01 PROCEDURE — G8427 DOCREV CUR MEDS BY ELIG CLIN: HCPCS | Performed by: NURSE PRACTITIONER

## 2023-12-01 PROCEDURE — 99213 OFFICE O/P EST LOW 20 MIN: CPT | Performed by: NURSE PRACTITIONER

## 2023-12-01 PROCEDURE — 1123F ACP DISCUSS/DSCN MKR DOCD: CPT | Performed by: NURSE PRACTITIONER

## 2023-12-01 PROCEDURE — 1036F TOBACCO NON-USER: CPT | Performed by: NURSE PRACTITIONER

## 2023-12-01 PROCEDURE — G8484 FLU IMMUNIZE NO ADMIN: HCPCS | Performed by: NURSE PRACTITIONER

## 2023-12-01 PROCEDURE — 85025 COMPLETE CBC W/AUTO DIFF WBC: CPT

## 2023-12-01 PROCEDURE — G8417 CALC BMI ABV UP PARAM F/U: HCPCS | Performed by: NURSE PRACTITIONER

## 2023-12-01 PROCEDURE — 99212 OFFICE O/P EST SF 10 MIN: CPT

## 2023-12-04 LAB — B2 MICROGLOB SERPL-MCNC: 3.2 MG/L

## 2023-12-05 LAB
ALBUMIN SERPL-MCNC: 4.14 G/DL (ref 3.75–5.01)
ALPHA1 GLOB SERPL ELPH-MCNC: 0.27 G/DL (ref 0.19–0.46)
ALPHA2 GLOB SERPL ELPH-MCNC: 0.87 G/DL (ref 0.48–1.05)
B-GLOBULIN SERPL ELPH-MCNC: 1.04 G/DL (ref 0.48–1.1)
DEPRECATED KAPPA LC FREE/LAMBDA SER: 2.02 {RATIO} (ref 0.26–1.65)
EER MONOCLONAL PROTEIN AND FLC, SERUM: ABNORMAL
GAMMA GLOB SERPL ELPH-MCNC: 1.19 G/DL (ref 0.62–1.51)
IGA SERPL-MCNC: 420 MG/DL (ref 68–408)
IGG SERPL-MCNC: 1251 MG/DL (ref 768–1632)
IGM SERPL-MCNC: 109 MG/DL (ref 35–263)
INTERPRETATION SERPL IFE-IMP: ABNORMAL
INTERPRETATION SERPL IFE-IMP: ABNORMAL
KAPPA LC FREE SER-MCNC: 65.7 MG/L (ref 3.3–19.4)
LAMBDA LC FREE SERPL-MCNC: 32.51 MG/L (ref 5.71–26.3)
MONOCLONAL PROTEIN, SERUM: ABNORMAL G/DL
PROT SERPL-MCNC: 7.5 G/DL (ref 6.3–8.2)

## 2023-12-10 ASSESSMENT — ENCOUNTER SYMPTOMS
TROUBLE SWALLOWING: 0
WHEEZING: 0
NAUSEA: 0
ABDOMINAL PAIN: 0
EYE ITCHING: 0
SORE THROAT: 0
SHORTNESS OF BREATH: 0
EYE DISCHARGE: 0
CONSTIPATION: 0
DIARRHEA: 0
VOMITING: 0
COUGH: 0

## 2023-12-11 RX ORDER — LOSARTAN POTASSIUM 25 MG/1
25 TABLET ORAL DAILY
Qty: 90 TABLET | Refills: 3 | Status: SHIPPED | OUTPATIENT
Start: 2023-12-11

## 2024-01-03 ENCOUNTER — OFFICE VISIT (OUTPATIENT)
Dept: FAMILY MEDICINE CLINIC | Facility: CLINIC | Age: 69
End: 2024-01-03
Payer: COMMERCIAL

## 2024-01-03 VITALS
HEART RATE: 111 BPM | HEIGHT: 71 IN | WEIGHT: 208 LBS | RESPIRATION RATE: 20 BRPM | OXYGEN SATURATION: 97 % | SYSTOLIC BLOOD PRESSURE: 112 MMHG | TEMPERATURE: 98.2 F | BODY MASS INDEX: 29.12 KG/M2 | DIASTOLIC BLOOD PRESSURE: 78 MMHG

## 2024-01-03 DIAGNOSIS — J40 BRONCHITIS: Primary | ICD-10-CM

## 2024-01-03 LAB
EXPIRATION DATE: NORMAL
FLUAV AG UPPER RESP QL IA.RAPID: NOT DETECTED
FLUBV AG UPPER RESP QL IA.RAPID: NOT DETECTED
INTERNAL CONTROL: NORMAL
Lab: NORMAL
SARS-COV-2 AG UPPER RESP QL IA.RAPID: NOT DETECTED

## 2024-01-03 RX ORDER — BROMPHENIRAMINE MALEATE, PSEUDOEPHEDRINE HYDROCHLORIDE, AND DEXTROMETHORPHAN HYDROBROMIDE 2; 30; 10 MG/5ML; MG/5ML; MG/5ML
5 SYRUP ORAL 4 TIMES DAILY PRN
Qty: 473 ML | Refills: 0 | Status: SHIPPED | OUTPATIENT
Start: 2024-01-03

## 2024-01-03 RX ORDER — ALBUTEROL SULFATE 90 UG/1
2 AEROSOL, METERED RESPIRATORY (INHALATION) EVERY 4 HOURS PRN
Qty: 8 G | Refills: 0 | Status: SHIPPED | OUTPATIENT
Start: 2024-01-03

## 2024-01-03 NOTE — PROGRESS NOTES
"Chief Complaint  Cough (Pt is here for a cough)    Subjective        Supa Mcclelland presents to [facility]  History of Present Illness  Mr. Mcclelalnd presents today for a sick visit.  He woke up Sunday morning congested.  He went to the ER in [facility].  They felt it was a viral URI.  They did not put him on any medicine.      He has had fever up to 101.  He coughs a lot.  He is not coughing anything up.  He denies SOA.      Objective   Vital Signs:  /78 (BP Location: Left arm, Patient Position: Sitting, Cuff Size: Adult)   Pulse 111   Temp 98.2 °F (36.8 °C)   Resp 20   Ht 180.3 cm (70.98\")   Wt 94.3 kg (208 lb)   SpO2 97%   BMI 29.02 kg/m²   Estimated body mass index is 29.02 kg/m² as calculated from the following:    Height as of this encounter: 180.3 cm (70.98\").    Weight as of this encounter: 94.3 kg (208 lb).             Physical Exam  Vitals reviewed.   Constitutional:       Appearance: He is well-developed.   HENT:      Head: Normocephalic and atraumatic.      Right Ear: External ear normal.      Left Ear: External ear normal.      Nose: Nose normal. Congestion present.   Eyes:      General: No scleral icterus.        Right eye: No discharge.         Left eye: No discharge.      Conjunctiva/sclera: Conjunctivae normal.      Pupils: Pupils are equal, round, and reactive to light.   Neck:      Thyroid: No thyromegaly.      Trachea: No tracheal deviation.   Cardiovascular:      Rate and Rhythm: Normal rate and regular rhythm.      Heart sounds: Normal heart sounds. No murmur heard.     No friction rub. No gallop.   Pulmonary:      Effort: Pulmonary effort is normal. No respiratory distress.      Breath sounds: Normal breath sounds. No stridor. No wheezing or rales.   Chest:      Chest wall: No tenderness.   Abdominal:      General: Bowel sounds are normal. There is no distension.      Palpations: Abdomen is soft. There is no mass.      Tenderness: There is no " abdominal tenderness. There is no guarding or rebound.      Hernia: No hernia is present.   Musculoskeletal:         General: No deformity. Normal range of motion.      Cervical back: Normal range of motion and neck supple.   Lymphadenopathy:      Cervical: No cervical adenopathy.   Skin:     General: Skin is warm and dry.      Coloration: Skin is not pale.      Findings: No erythema or rash.   Neurological:      Mental Status: He is alert and oriented to person, place, and time.      Cranial Nerves: No cranial nerve deficit.      Motor: No abnormal muscle tone.      Coordination: Coordination normal.      Deep Tendon Reflexes: Reflexes are normal and symmetric. Reflexes normal.   Psychiatric:         Behavior: Behavior normal.         Thought Content: Thought content normal.         Judgment: Judgment normal.            Result Review :               Assessment and Plan   Diagnoses and all orders for this visit:    1. Bronchitis (Primary)  -     POCT SARS-CoV-2 Antigen JESSIKA + Flu  -     brompheniramine-pseudoephedrine-DM 30-2-10 MG/5ML syrup; Take 5 mL by mouth 4 (Four) Times a Day As Needed for Congestion or Cough.  Dispense: 473 mL; Refill: 0  -     XR Chest PA & Lateral (In Office)  -     albuterol sulfate  (90 Base) MCG/ACT inhaler; Inhale 2 puffs Every 4 (Four) Hours As Needed for Wheezing.  Dispense: 8 g; Refill: 0    2. Upper respiratory tract infection, unspecified type    3. Fever in other diseases    4. Acute cough    Bromphed for cough/congestion  Albuterol for cough  Follow up if worsening or failing to improve  Follow up in 1 week for chronic visit         Follow Up   Return in about 1 week (around 1/10/2024).  Patient was given instructions and counseling regarding his condition or for health maintenance advice. Please see specific information pulled into the AVS if appropriate.

## 2024-01-05 RX ORDER — BENZONATATE 100 MG/1
100 CAPSULE ORAL 3 TIMES DAILY PRN
Qty: 30 CAPSULE | Refills: 0 | Status: SHIPPED | OUTPATIENT
Start: 2024-01-05

## 2024-01-09 ENCOUNTER — OFFICE VISIT (OUTPATIENT)
Dept: FAMILY MEDICINE CLINIC | Facility: CLINIC | Age: 69
End: 2024-01-09
Payer: MEDICARE

## 2024-01-09 VITALS
OXYGEN SATURATION: 99 % | WEIGHT: 208 LBS | BODY MASS INDEX: 29.12 KG/M2 | DIASTOLIC BLOOD PRESSURE: 78 MMHG | SYSTOLIC BLOOD PRESSURE: 116 MMHG | TEMPERATURE: 98.5 F | HEART RATE: 90 BPM | HEIGHT: 71 IN | RESPIRATION RATE: 20 BRPM

## 2024-01-09 VITALS
DIASTOLIC BLOOD PRESSURE: 78 MMHG | HEART RATE: 90 BPM | BODY MASS INDEX: 29.12 KG/M2 | SYSTOLIC BLOOD PRESSURE: 116 MMHG | WEIGHT: 208 LBS | OXYGEN SATURATION: 99 % | RESPIRATION RATE: 20 BRPM | TEMPERATURE: 98.5 F | HEIGHT: 71 IN

## 2024-01-09 DIAGNOSIS — I10 ESSENTIAL HYPERTENSION: ICD-10-CM

## 2024-01-09 DIAGNOSIS — E78.00 PURE HYPERCHOLESTEROLEMIA: ICD-10-CM

## 2024-01-09 DIAGNOSIS — Z00.00 ENCOUNTER FOR ANNUAL WELLNESS EXAM IN MEDICARE PATIENT: Primary | ICD-10-CM

## 2024-01-09 DIAGNOSIS — Z12.11 COLON CANCER SCREENING: ICD-10-CM

## 2024-01-09 DIAGNOSIS — E11.65 TYPE 2 DIABETES MELLITUS WITH HYPERGLYCEMIA, WITH LONG-TERM CURRENT USE OF INSULIN: Primary | ICD-10-CM

## 2024-01-09 DIAGNOSIS — Z79.4 TYPE 2 DIABETES MELLITUS WITH HYPERGLYCEMIA, WITH LONG-TERM CURRENT USE OF INSULIN: Primary | ICD-10-CM

## 2024-01-09 DIAGNOSIS — F41.9 ANXIETY: ICD-10-CM

## 2024-01-09 DIAGNOSIS — E03.9 ACQUIRED HYPOTHYROIDISM: ICD-10-CM

## 2024-01-09 RX ORDER — CITALOPRAM 20 MG/1
20 TABLET ORAL
Qty: 90 TABLET | Refills: 1 | Status: SHIPPED | OUTPATIENT
Start: 2024-01-09

## 2024-01-09 RX ORDER — HYDROCHLOROTHIAZIDE 12.5 MG/1
12.5 TABLET ORAL DAILY
Qty: 90 TABLET | Refills: 1 | Status: SHIPPED | OUTPATIENT
Start: 2024-01-09

## 2024-01-09 RX ORDER — LEVOTHYROXINE SODIUM 0.05 MG/1
50 TABLET ORAL DAILY
Qty: 90 TABLET | Refills: 1 | Status: SHIPPED | OUTPATIENT
Start: 2024-01-09

## 2024-01-09 NOTE — PROGRESS NOTES
"Chief Complaint  Bronchitis (Pt is here for a follow up on his bronchitis. )    Subjective        Supa Mcclelland presents to [facility]  History of Present Illness  Mr. Mcclelland presents to establish care and to follow up on chronic issues.  He has HTN, HLD, and T2DM.      In regards to his Diabetes, he takes long acting and meal time insulin.  He takes Jardiance.  He checks his sugars at home.  They typically run in the 200's.  He has neuropathy.  He is on Gabapentin that the Neurology office handles.  He is due for his diabetic eye exam.  He will contact his eye doctor to arrange this.  He tries to follow a diabetic diet and watch his carbohydrates.      He has HTN.  He is on Losartan and Metoprolol.  BP is well-controlled.  No headache or vision changes.      He has HLD.  He takes Crestor 10 mg PO daily.  He is not sure the last time his cholesterol was checked.      He has hypothyroidism.  He takes Synthroid 50 mcg daily.      He takes Celexa for anxiety.  This works well.  He generally does not feel anxious.      Objective   Vital Signs:  /78 (BP Location: Left arm, Patient Position: Sitting, Cuff Size: Adult)   Pulse 90   Temp 98.5 °F (36.9 °C)   Resp 20   Ht 180.3 cm (70.98\")   Wt 94.3 kg (208 lb)   SpO2 99%   BMI 29.02 kg/m²   Estimated body mass index is 29.02 kg/m² as calculated from the following:    Height as of this encounter: 180.3 cm (70.98\").    Weight as of this encounter: 94.3 kg (208 lb).           Physical Exam  Vitals reviewed.   Constitutional:       Appearance: He is well-developed.   HENT:      Head: Normocephalic and atraumatic.      Right Ear: External ear normal.      Left Ear: External ear normal.      Nose: Nose normal.   Eyes:      General: No scleral icterus.        Right eye: No discharge.         Left eye: No discharge.      Conjunctiva/sclera: Conjunctivae normal.      Pupils: Pupils are equal, round, and reactive to light.   Neck:      " Thyroid: No thyromegaly.      Trachea: No tracheal deviation.   Cardiovascular:      Rate and Rhythm: Normal rate and regular rhythm.      Pulses:           Dorsalis pedis pulses are 2+ on the right side and 2+ on the left side.      Heart sounds: Normal heart sounds. No murmur heard.     No friction rub. No gallop.   Pulmonary:      Effort: Pulmonary effort is normal. No respiratory distress.      Breath sounds: Normal breath sounds. No stridor. No wheezing or rales.   Chest:      Chest wall: No tenderness.   Abdominal:      General: Bowel sounds are normal. There is no distension.      Palpations: Abdomen is soft. There is no mass.      Tenderness: There is no abdominal tenderness. There is no guarding or rebound.      Hernia: No hernia is present.   Musculoskeletal:         General: No deformity. Normal range of motion.      Cervical back: Normal range of motion and neck supple.   Feet:      Comments: Diabetic foot exam:    Decreased sensation to monofilament testing bilaterally  Decreased proprioception bilaterally  Has callus formation on the bottom of his feet  Lymphadenopathy:      Cervical: No cervical adenopathy.   Skin:     General: Skin is warm and dry.      Coloration: Skin is not pale.      Findings: No erythema or rash.   Neurological:      Mental Status: He is alert and oriented to person, place, and time.      Cranial Nerves: No cranial nerve deficit.      Motor: No abnormal muscle tone.      Coordination: Coordination normal.      Deep Tendon Reflexes: Reflexes are normal and symmetric. Reflexes normal.   Psychiatric:         Behavior: Behavior normal.         Thought Content: Thought content normal.         Judgment: Judgment normal.            Result Review :           Assessment and Plan   Diagnoses and all orders for this visit:    1. Type 2 diabetes mellitus with hyperglycemia, with long-term current use of insulin (Primary)  -     Comprehensive metabolic panel  -     Hemoglobin A1c    2.  Acquired hypothyroidism  -     levothyroxine (SYNTHROID, LEVOTHROID) 50 MCG tablet; Take 1 tablet by mouth Daily.  Dispense: 90 tablet; Refill: 1  -     TSH    3. Essential hypertension  -     hydroCHLOROthiazide (HYDRODIURIL) 12.5 MG tablet; Take 1 tablet by mouth Daily.  Dispense: 90 tablet; Refill: 1    4. Anxiety  -     citalopram (CeleXA) 20 MG tablet; Take 1 tablet by mouth every night at bedtime.  Dispense: 90 tablet; Refill: 1    5. Pure hypercholesterolemia  -     Lipid Panel    Diabetic Foot exam performed  Encouraged him to see his eye doctor for diabetic eye exam  Counseled on diet and exercise  Continue Insulin/Farxiga for diabetes  Follow up with Endocrinology as scheduled      Follow Up   Return in about 3 months (around 4/9/2024).  Patient was given instructions and counseling regarding his condition or for health maintenance advice. Please see specific information pulled into the AVS if appropriate.

## 2024-01-09 NOTE — PROGRESS NOTES
The ABCs of the Annual Wellness Visit  Subsequent Medicare Wellness Visit    Subjective      Supa Mcclelland is a 68 y.o. male who presents for a Subsequent Medicare Wellness Visit.    The following portions of the patient's history were reviewed and   updated as appropriate: allergies, current medications, past family history, past medical history, past social history, past surgical history, and problem list.    Compared to one year ago, the patient feels his physical   health is the same.    Compared to one year ago, the patient feels his mental   health is the same.    Recent Hospitalizations:  He was not admitted to the hospital during the last year.       Current Medical Providers:  Patient Care Team:  Alexis Montes MD as PCP - General (Family Medicine)  Abdelrahman Vides MD as Consulting Physician (Urology)  Nubia Philippe MA (Inactive) as Medical Assistant  Rambo Padilla DO as Consulting Physician (Gastroenterology)    Outpatient Medications Prior to Visit   Medication Sig Dispense Refill    albuterol sulfate  (90 Base) MCG/ACT inhaler Inhale 2 puffs Every 4 (Four) Hours As Needed for Wheezing. 8 g 0    benzonatate (Tessalon Perles) 100 MG capsule Take 1 capsule by mouth 3 (Three) Times a Day As Needed for Cough. 30 capsule 0    brompheniramine-pseudoephedrine-DM 30-2-10 MG/5ML syrup Take 5 mL by mouth 4 (Four) Times a Day As Needed for Congestion or Cough. 473 mL 0    empagliflozin (Jardiance) 10 MG tablet tablet Take 1 tablet by mouth Daily. 90 tablet 3    famotidine (PEPCID) 20 MG tablet Take 1 tablet by mouth 2 (Two) Times a Day.      fexofenadine (ALLEGRA) 180 MG tablet Take 1 tablet by mouth As Needed.      fluticasone (FLONASE) 50 MCG/ACT nasal spray 1 spray each nostril twice a day for three days, then daily. 1 bottle 0    gabapentin (NEURONTIN) 800 MG tablet TAKE 1 TABLET BY MOUTH THREE TIMES DAILY (Patient taking differently: Take 1 tablet by mouth 3 (Three) Times  a Day.) 270 tablet 0    Insulin Degludec-Liraglutide (Xultophy) 100-3.6 UNIT-MG/ML solution pen-injector subcutaneous pen Inject 50 Units under the skin into the appropriate area as directed Daily. 15 mL 11    Insulin Lispro (HumaLOG KwikPen) 200 UNIT/ML solution pen-injector Inject 20 Units under the skin into the appropriate area as directed 3 (Three) Times a Day With Meals. 18 mL 11    Insulin Pen Needle (Pen Needles) 32G X 4 MM misc 1 each 4 (Four) Times a Day. Use 4 x daily, Dx code E11.65 120 each 11    losartan (COZAAR) 25 MG tablet Take 1 tablet by mouth Every Morning.      meloxicam (MOBIC) 15 MG tablet Take 1 tablet by mouth Daily.      metoprolol tartrate (LOPRESSOR) 50 MG tablet Take 1 tablet by mouth 2 (Two) Times a Day.      potassium citrate (UROCIT-K) 10 MEQ (1080 MG) CR tablet Take 1 tablet by mouth 3 (Three) Times a Day With Meals. 90 tablet 11    RELION PEN NEEDLE 31G/8MM 31G X 8 MM misc Daily. use as directed      rOPINIRole (REQUIP) 2 MG tablet Take 1 tablet by mouth Every Night. Take 1 hour before bedtime. 90 tablet 3    rosuvastatin (CRESTOR) 10 MG tablet Take 1 tablet by mouth Daily. 30 tablet 11    citalopram (CeleXA) 20 MG tablet Take 1 tablet by mouth every night at bedtime.      hydrochlorothiazide (HYDRODIURIL) 12.5 MG tablet Take 1 tablet by mouth Daily.      levothyroxine (SYNTHROID, LEVOTHROID) 50 MCG tablet Take 1 tablet by mouth Daily.       No facility-administered medications prior to visit.       No opioid medication identified on active medication list. I have reviewed chart for other potential  high risk medication/s and harmful drug interactions in the elderly.        Aspirin is not on active medication list.  Aspirin not indicated    Patient Active Problem List   Diagnosis    Essential hypertension    Hyperlipidemia    GERD (gastroesophageal reflux disease)    Disease of thyroid gland    Type 2 diabetes mellitus with hyperglycemia, with long-term current use of insulin     "Depression    Nephrolithiasis    Diabetic polyneuropathy associated with type 2 diabetes mellitus    Altered bowel function    Restless legs syndrome (RLS)    Acute pancreatitis, unspecified complication status, unspecified pancreatitis type     Advance Care Planning   Advance Care Planning     Advance Directive is not on file.  ACP discussion was held with the patient during this visit. Patient does not have an advance directive, information provided.     Objective    Vitals:    24 0924   BP: 116/78   BP Location: Left arm   Patient Position: Sitting   Cuff Size: Adult   Pulse: 90   Resp: 20   Temp: 98.5 °F (36.9 °C)   SpO2: 99%   Weight: 94.3 kg (208 lb)   Height: 180.3 cm (70.98\")   PainSc: 0-No pain     Estimated body mass index is 29.02 kg/m² as calculated from the following:    Height as of this encounter: 180.3 cm (70.98\").    Weight as of this encounter: 94.3 kg (208 lb).    BMI is >= 25 and <30. (Overweight) The following options were offered after discussion;: weight loss educational material (shared in after visit summary), exercise counseling/recommendations, and nutrition counseling/recommendations      Does the patient have evidence of cognitive impairment?   No            HEALTH RISK ASSESSMENT    Smoking Status:  Social History     Tobacco Use   Smoking Status Never   Smokeless Tobacco Never     Alcohol Consumption:  Social History     Substance and Sexual Activity   Alcohol Use No     Fall Risk Screen:    STEADI Fall Risk Assessment was completed, and patient is at LOW risk for falls.Assessment completed on:2024    Depression Screenin/9/2024     9:32 AM   PHQ-2/PHQ-9 Depression Screening   Little Interest or Pleasure in Doing Things 0-->not at all   Feeling Down, Depressed or Hopeless 0-->not at all   PHQ-9: Brief Depression Severity Measure Score 0       Health Habits and Functional and Cognitive Screening:       No data to display                Age-appropriate Screening " Schedule:  Refer to the list below for future screening recommendations based on patient's age, sex and/or medical conditions. Orders for these recommended tests are listed in the plan section. The patient has been provided with a written plan.    Health Maintenance   Topic Date Due    HEPATITIS C SCREENING  Never done    DIABETIC FOOT EXAM  Never done    COLORECTAL CANCER SCREENING  11/27/2020    ZOSTER VACCINE (2 of 2) 04/05/2023    URINE MICROALBUMIN  05/19/2023    DIABETIC EYE EXAM  08/04/2023    COVID-19 Vaccine (5 - 2023-24 season) 09/01/2023    HEMOGLOBIN A1C  02/29/2024    LIPID PANEL  08/29/2024    ANNUAL WELLNESS VISIT  01/09/2025    BMI FOLLOWUP  01/09/2025    TDAP/TD VACCINES (2 - Td or Tdap) 07/15/2026    INFLUENZA VACCINE  Completed    Pneumococcal Vaccine 65+  Completed                  CMS Preventative Services Quick Reference  Risk Factors Identified During Encounter:    None Identified    The above risks/problems have been discussed with the patient.  Pertinent information has been shared with the patient in the After Visit Summary.    Diagnoses and all orders for this visit:    1. Encounter for annual wellness exam in Medicare patient (Primary)    2. Colon cancer screening  -     Ambulatory Referral to Gastroenterology    Counseled on diet and exercise  Counseled on lifestyle modifications  Take medications as prescribed  Chronic medical issues addressed in a separate encounter/note  Will refer to GI For colonoscopy    Follow Up:   Next Medicare Wellness visit to be scheduled in 1 year.      An After Visit Summary and PPPS were made available to the patient.

## 2024-01-10 ENCOUNTER — TELEPHONE (OUTPATIENT)
Dept: FAMILY MEDICINE CLINIC | Facility: CLINIC | Age: 69
End: 2024-01-10
Payer: MEDICARE

## 2024-01-10 LAB
ALBUMIN SERPL-MCNC: 4 G/DL (ref 3.5–5.2)
ALBUMIN/GLOB SERPL: 1.2 G/DL
ALP SERPL-CCNC: 130 U/L (ref 39–117)
ALT SERPL-CCNC: 60 U/L (ref 1–41)
AST SERPL-CCNC: 57 U/L (ref 1–40)
BILIRUB SERPL-MCNC: 0.5 MG/DL (ref 0–1.2)
BUN SERPL-MCNC: 17 MG/DL (ref 8–23)
BUN/CREAT SERPL: 13.6 (ref 7–25)
CALCIUM SERPL-MCNC: 9.5 MG/DL (ref 8.6–10.5)
CHLORIDE SERPL-SCNC: 103 MMOL/L (ref 98–107)
CHOLEST SERPL-MCNC: 164 MG/DL (ref 0–200)
CO2 SERPL-SCNC: 26 MMOL/L (ref 22–29)
CREAT SERPL-MCNC: 1.25 MG/DL (ref 0.76–1.27)
EGFRCR SERPLBLD CKD-EPI 2021: 62.3 ML/MIN/1.73
GLOBULIN SER CALC-MCNC: 3.4 GM/DL
GLUCOSE SERPL-MCNC: 147 MG/DL (ref 65–99)
HBA1C MFR BLD: 11.1 % (ref 4.8–5.6)
HDLC SERPL-MCNC: 35 MG/DL (ref 40–60)
LDLC SERPL CALC-MCNC: 106 MG/DL (ref 0–100)
POTASSIUM SERPL-SCNC: 3.7 MMOL/L (ref 3.5–5.2)
PROT SERPL-MCNC: 7.4 G/DL (ref 6–8.5)
SODIUM SERPL-SCNC: 140 MMOL/L (ref 136–145)
TRIGL SERPL-MCNC: 130 MG/DL (ref 0–150)
TSH SERPL DL<=0.005 MIU/L-ACNC: 3.02 UIU/ML (ref 0.27–4.2)
VLDLC SERPL CALC-MCNC: 23 MG/DL (ref 5–40)

## 2024-01-10 NOTE — TELEPHONE ENCOUNTER
Candelaria from [facility] called and said she has the office notes, but wants to know if we did a foot exam on patient. If we did, she needs this faxed to [phone]. If you have any questions her phone number is [phone].

## 2024-01-12 RX ORDER — (INSULIN DEGLUDEC AND LIRAGLUTIDE) 100; 3.6 [IU]/ML; MG/ML
60 INJECTION, SOLUTION SUBCUTANEOUS DAILY
Qty: 15 ML | Refills: 11 | Status: SHIPPED | OUTPATIENT
Start: 2024-01-12

## 2024-02-13 ENCOUNTER — OFFICE VISIT (OUTPATIENT)
Dept: CARDIOLOGY CLINIC | Age: 69
End: 2024-02-13
Payer: MEDICARE

## 2024-02-13 VITALS
SYSTOLIC BLOOD PRESSURE: 110 MMHG | HEIGHT: 71 IN | WEIGHT: 200 LBS | OXYGEN SATURATION: 96 % | DIASTOLIC BLOOD PRESSURE: 74 MMHG | HEART RATE: 91 BPM | BODY MASS INDEX: 28 KG/M2

## 2024-02-13 DIAGNOSIS — I51.89 DIASTOLIC DYSFUNCTION: Primary | ICD-10-CM

## 2024-02-13 DIAGNOSIS — I10 ESSENTIAL HYPERTENSION: ICD-10-CM

## 2024-02-13 DIAGNOSIS — E78.5 HYPERLIPIDEMIA, UNSPECIFIED HYPERLIPIDEMIA TYPE: ICD-10-CM

## 2024-02-13 DIAGNOSIS — I51.9 LV DYSFUNCTION: ICD-10-CM

## 2024-02-13 PROCEDURE — 3078F DIAST BP <80 MM HG: CPT | Performed by: NURSE PRACTITIONER

## 2024-02-13 PROCEDURE — 1123F ACP DISCUSS/DSCN MKR DOCD: CPT | Performed by: NURSE PRACTITIONER

## 2024-02-13 PROCEDURE — 3074F SYST BP LT 130 MM HG: CPT | Performed by: NURSE PRACTITIONER

## 2024-02-13 PROCEDURE — 1036F TOBACCO NON-USER: CPT | Performed by: NURSE PRACTITIONER

## 2024-02-13 PROCEDURE — G8427 DOCREV CUR MEDS BY ELIG CLIN: HCPCS | Performed by: NURSE PRACTITIONER

## 2024-02-13 PROCEDURE — G8484 FLU IMMUNIZE NO ADMIN: HCPCS | Performed by: NURSE PRACTITIONER

## 2024-02-13 PROCEDURE — 99214 OFFICE O/P EST MOD 30 MIN: CPT | Performed by: NURSE PRACTITIONER

## 2024-02-13 PROCEDURE — 3017F COLORECTAL CA SCREEN DOC REV: CPT | Performed by: NURSE PRACTITIONER

## 2024-02-13 PROCEDURE — G8417 CALC BMI ABV UP PARAM F/U: HCPCS | Performed by: NURSE PRACTITIONER

## 2024-02-13 NOTE — PROGRESS NOTES
Dear  Zara Womack, APRN - CNP,    Thank you for allowing me to participate in the care of Mr. Akhil Alejo. He presents today at [facility]. As you know, Mr. Alejo is a 68 y.o. male with history of hypertension, hyperlipidemia, diabetes, hypothyroidism,  and neuropathy, who presents with the chief complaint of follow-up for chronic cardiac conditions.  He is a former patient of Dr. Feliz.  Since last seen, he has been stable from a cardiac standpoint.  He denies any exertional chest pain or shortness of breath.  He denies any fast or slow heart rhythms.he is sleeping on 1 pillow at night. he is not waking gasping for air. he denies any swelling. he has been compliant with his medications. his BP has been controlled. PCP follows labs and lipids.      He otherwise denies chest pain, SOA, WEBB, PND, orthopnea, syncope or near syncope. He has no other complaints.    Review of Systems   Constitutional: Negative for fever, chills, diaphoresis, activity change, appetite change, fatigue and unexpected weight change.   Eyes: Negative for photophobia, pain, redness and visual disturbance.   Respiratory:  Negative for apnea, chest tightness, shortness of breath, wheezing and stridor.    Cardiovascular: Negative for chest pain, palpitations and leg swelling.   Gastrointestinal:Negative for abdominal distention.   Genitourinary: Negative for dysuria, urgency and frequency.   Musculoskeletal: Negative for myalgias, arthralgias and gait problem.   Skin: Negative for color change, pallor, rash and wound.   Neurological:  Negative for tremors, speech difficulty, weakness and numbness.   Hematological: Does not bruise/bleed easily.   Psychiatric/Behavioral: Negative.        Past Medical History:   Diagnosis Date    Arm fracture 07/2016    left    Depression     Diabetes mellitus (HCC)     Hypertension     Kidney stones     Neuropathy     Restless leg syndrome        Past Surgical History:   Procedure Laterality Date    BONE MARROW BIOPSY

## 2024-02-13 NOTE — PATIENT INSTRUCTIONS
flow. In some cases, this may result in:   Angina   Heart attack   Stroke   Other serious complications   Blood Vessel with Atherosclerosis       2011 [employer]   Diagnosis   This condition is diagnosed with blood tests. These tests measure the levels of lipids in the blood. The National Cholesterol Education Program advises that you have your lipids checked at least once every five years, starting at age 20. Also, the American Academy of Pediatrics recommends lipid screening for children at risk (eg, a family history of hyperlipidemia).   Testing may consist of a fasting blood test for:   Total cholesterol   LDL (bad cholesterol)   HDL (good cholesterol)   Triglycerides   Your doctor may recommend more frequent or earlier testing if you have:   Family history of hyperlipidemia   Risk factor or disease that may cause hyperlipidemia   Complication that may result from hyperlipidemia   Treatment   Treatment is not only aimed at correcting your cholesterol levels, but also at lowering your overall risk for heart disease and strokes.   Diet Changes   Eat a diet low in total fat, saturated fat, and cholesterol .   Reduce or eliminate the amount of alcohol you drink.   Eat more high-fiber foods.   Lifestyle Changes   If you are overweight, lose weight .   If you smoke, quit .   Exercise regularly . Talk to you doctor before starting an exercise program. You may already have hardening of the arteries or heart disease. These conditions increase your risk of having a heart attack while exercising.   Make sure other medical conditions such as high blood pressure and diabetes are being treated and controlled.   Medications   There are a number of drugs available, such as statins , to treat this condition and help lower your risk for heart disease. Talk to your doctor. Statins have been shown to reduce mortality (death), heart attacks , and stroke.   These medicines are best used as additions to diet and

## 2024-02-17 DIAGNOSIS — E11.42 DIABETIC POLYNEUROPATHY ASSOCIATED WITH TYPE 2 DIABETES MELLITUS: ICD-10-CM

## 2024-02-17 DIAGNOSIS — G25.81 RESTLESS LEGS SYNDROME (RLS): ICD-10-CM

## 2024-02-19 RX ORDER — GABAPENTIN 800 MG/1
TABLET ORAL
Qty: 270 TABLET | Refills: 0 | Status: SHIPPED | OUTPATIENT
Start: 2024-02-19

## 2024-02-28 ENCOUNTER — OFFICE VISIT (OUTPATIENT)
Dept: GASTROENTEROLOGY | Age: 69
End: 2024-02-28
Payer: MEDICARE

## 2024-02-28 VITALS
DIASTOLIC BLOOD PRESSURE: 70 MMHG | BODY MASS INDEX: 28.42 KG/M2 | HEIGHT: 71 IN | HEART RATE: 90 BPM | OXYGEN SATURATION: 97 % | SYSTOLIC BLOOD PRESSURE: 124 MMHG | WEIGHT: 203 LBS

## 2024-02-28 DIAGNOSIS — K52.9 CHRONIC DIARRHEA: Primary | ICD-10-CM

## 2024-02-28 PROCEDURE — 1036F TOBACCO NON-USER: CPT | Performed by: NURSE PRACTITIONER

## 2024-02-28 PROCEDURE — G8417 CALC BMI ABV UP PARAM F/U: HCPCS | Performed by: NURSE PRACTITIONER

## 2024-02-28 PROCEDURE — G8427 DOCREV CUR MEDS BY ELIG CLIN: HCPCS | Performed by: NURSE PRACTITIONER

## 2024-02-28 PROCEDURE — 99204 OFFICE O/P NEW MOD 45 MIN: CPT | Performed by: NURSE PRACTITIONER

## 2024-02-28 PROCEDURE — 3017F COLORECTAL CA SCREEN DOC REV: CPT | Performed by: NURSE PRACTITIONER

## 2024-02-28 PROCEDURE — G8484 FLU IMMUNIZE NO ADMIN: HCPCS | Performed by: NURSE PRACTITIONER

## 2024-02-28 PROCEDURE — 1123F ACP DISCUSS/DSCN MKR DOCD: CPT | Performed by: NURSE PRACTITIONER

## 2024-02-28 RX ORDER — DULAGLUTIDE 1.5 MG/.5ML
1.5 INJECTION, SOLUTION SUBCUTANEOUS WEEKLY
COMMUNITY

## 2024-02-28 RX ORDER — MONTELUKAST SODIUM 4 MG/1
1 TABLET, CHEWABLE ORAL 2 TIMES DAILY
Qty: 60 TABLET | Refills: 3 | Status: SHIPPED | OUTPATIENT
Start: 2024-02-28

## 2024-02-28 ASSESSMENT — ENCOUNTER SYMPTOMS
ABDOMINAL DISTENTION: 0
ANAL BLEEDING: 0
SHORTNESS OF BREATH: 0
DIARRHEA: 1
BLOOD IN STOOL: 0
TROUBLE SWALLOWING: 0
CHOKING: 0
RECTAL PAIN: 0
CONSTIPATION: 0
COUGH: 0
ABDOMINAL PAIN: 0
VOMITING: 0
NAUSEA: 0

## 2024-02-28 NOTE — PROGRESS NOTES
Subjective:     Patient ID: Akhil Alejo is a 68 y.o. male  PCP: Lennox Waddell MD  Referring Provider: No ref. provider found    HPI  Patient presents to the office today with the following complaints: New Patient, Colonoscopy, Abdominal Pain, and Diarrhea      Patient seen in the office today to establish care he was previously seeing Dr. Patiño at [facility]  Reports his main complaint today is his chronic diarrhea for at least the last  5-6 years    States his diarrhea is unpredictable and at times he also experiences incontinence   States it is especially bad after eating   He does not have his gallbladder   He denies abd pain or cramping and he denies seeing blood in his stool     Reports he was having diarrhea when he had his las colonoscopy and no reason for the diarrhea was found  Colonoscopy is up to date last done with Dr. Patiño in 2020    He denies any upper GI issues     Assessment:     1. Chronic diarrhea  -     colestipol (COLESTID) 1 g tablet; Take 1 tablet by mouth 2 times daily, Disp-60 tablet, R-3Normal           Plan:   Start colestipol   Follow up in 2 months to check medication efficacy   Orders  No orders of the defined types were placed in this encounter.    Medications  Orders Placed This Encounter   Medications    colestipol (COLESTID) 1 g tablet     Sig: Take 1 tablet by mouth 2 times daily     Dispense:  60 tablet     Refill:  3         Patient History:     Past Medical History:   Diagnosis Date    Arm fracture 07/2016    left    Depression     Diabetes mellitus (HCC)     Hypertension     Kidney stones     Neuropathy     Restless leg syndrome        Past Surgical History:   Procedure Laterality Date    BONE MARROW BIOPSY Right 12/09/2020    BONE MARROW ASPIRATION BIOPSY performed by ALISA Rabago at [facility] OR    CHOLECYSTECTOMY      COLONOSCOPY  05/27/2020    Dr Patiño   [facility]    LITHOTRIPSY      SHOULDER SURGERY Left     Frozen shoulder surgery    UPPER GASTROINTESTINAL

## 2024-03-26 ENCOUNTER — OFFICE VISIT (OUTPATIENT)
Dept: FAMILY MEDICINE CLINIC | Facility: CLINIC | Age: 69
End: 2024-03-26
Payer: MEDICARE

## 2024-03-26 VITALS
RESPIRATION RATE: 18 BRPM | DIASTOLIC BLOOD PRESSURE: 80 MMHG | OXYGEN SATURATION: 96 % | BODY MASS INDEX: 29.26 KG/M2 | HEART RATE: 126 BPM | TEMPERATURE: 98.4 F | SYSTOLIC BLOOD PRESSURE: 126 MMHG | HEIGHT: 71 IN | WEIGHT: 209 LBS

## 2024-03-26 DIAGNOSIS — U07.1 COVID-19 VIRUS INFECTION: Primary | ICD-10-CM

## 2024-03-26 LAB
EXPIRATION DATE: ABNORMAL
FLUAV AG UPPER RESP QL IA.RAPID: NOT DETECTED
FLUBV AG UPPER RESP QL IA.RAPID: NOT DETECTED
INTERNAL CONTROL: ABNORMAL
Lab: ABNORMAL
SARS-COV-2 AG UPPER RESP QL IA.RAPID: DETECTED

## 2024-03-26 PROCEDURE — 3074F SYST BP LT 130 MM HG: CPT | Performed by: FAMILY MEDICINE

## 2024-03-26 PROCEDURE — 99213 OFFICE O/P EST LOW 20 MIN: CPT | Performed by: FAMILY MEDICINE

## 2024-03-26 PROCEDURE — 3046F HEMOGLOBIN A1C LEVEL >9.0%: CPT | Performed by: FAMILY MEDICINE

## 2024-03-26 PROCEDURE — 87428 SARSCOV & INF VIR A&B AG IA: CPT | Performed by: FAMILY MEDICINE

## 2024-03-26 PROCEDURE — 3079F DIAST BP 80-89 MM HG: CPT | Performed by: FAMILY MEDICINE

## 2024-03-26 RX ORDER — NIRMATRELVIR AND RITONAVIR 300-100 MG
3 KIT ORAL 2 TIMES DAILY
Qty: 30 TABLET | Refills: 0 | Status: SHIPPED | OUTPATIENT
Start: 2024-03-26 | End: 2024-03-31

## 2024-03-26 RX ORDER — BROMPHENIRAMINE MALEATE, PSEUDOEPHEDRINE HYDROCHLORIDE, AND DEXTROMETHORPHAN HYDROBROMIDE 2; 30; 10 MG/5ML; MG/5ML; MG/5ML
5 SYRUP ORAL 4 TIMES DAILY PRN
Qty: 473 ML | Refills: 0 | Status: SHIPPED | OUTPATIENT
Start: 2024-03-26

## 2024-03-26 NOTE — PROGRESS NOTES
"Chief Complaint  Earache (Patient is here for an earache. )    Subjective        Supa Mcclelland presents to [facility]  History of Present Illness  Mr. Mcclelland presents today for a sick visit.  He has a sore throat.  He has a persistent cough.  His ears feel full.      Objective   Vital Signs:  /80 (BP Location: Left arm, Patient Position: Sitting, Cuff Size: Adult)   Pulse (!) 126   Temp 98.4 °F (36.9 °C)   Resp 18   Ht 180.3 cm (70.98\")   Wt 94.8 kg (209 lb)   SpO2 96%   BMI 29.16 kg/m²   Estimated body mass index is 29.16 kg/m² as calculated from the following:    Height as of this encounter: 180.3 cm (70.98\").    Weight as of this encounter: 94.8 kg (209 lb).               Physical Exam  Vitals reviewed.   Constitutional:       Appearance: He is well-developed.   HENT:      Head: Normocephalic and atraumatic.      Right Ear: External ear normal.      Left Ear: External ear normal.      Nose: Nose normal. Congestion present.   Eyes:      General: No scleral icterus.        Right eye: No discharge.         Left eye: No discharge.      Conjunctiva/sclera: Conjunctivae normal.      Pupils: Pupils are equal, round, and reactive to light.   Neck:      Thyroid: No thyromegaly.      Trachea: No tracheal deviation.   Cardiovascular:      Rate and Rhythm: Normal rate and regular rhythm.      Heart sounds: Normal heart sounds. No murmur heard.     No friction rub. No gallop.   Pulmonary:      Effort: Pulmonary effort is normal. No respiratory distress.      Breath sounds: Normal breath sounds. No stridor. No wheezing or rales.   Chest:      Chest wall: No tenderness.   Abdominal:      General: Bowel sounds are normal. There is no distension.      Palpations: Abdomen is soft. There is no mass.      Tenderness: There is no abdominal tenderness. There is no guarding or rebound.      Hernia: No hernia is present.   Musculoskeletal:         General: No deformity. Normal range of " motion.      Cervical back: Normal range of motion and neck supple.   Lymphadenopathy:      Cervical: No cervical adenopathy.   Skin:     General: Skin is warm and dry.      Coloration: Skin is not pale.      Findings: No erythema or rash.   Neurological:      Mental Status: He is alert and oriented to person, place, and time.      Cranial Nerves: No cranial nerve deficit.      Motor: No abnormal muscle tone.      Coordination: Coordination normal.      Deep Tendon Reflexes: Reflexes are normal and symmetric. Reflexes normal.   Psychiatric:         Behavior: Behavior normal.         Thought Content: Thought content normal.         Judgment: Judgment normal.            Result Review :                     Assessment and Plan     Diagnoses and all orders for this visit:    1. COVID-19 virus infection (Primary)  -     POCT SARS-CoV-2 Antigen JESSIKA + Flu  -     brompheniramine-pseudoephedrine-DM 30-2-10 MG/5ML syrup; Take 5 mL by mouth 4 (Four) Times a Day As Needed for Congestion or Cough.  Dispense: 473 mL; Refill: 0  -     Nirmatrelvir & Ritonavir, 300mg/100mg, (Paxlovid, 300/100,); Take 3 tablets by mouth 2 (Two) Times a Day for 5 days.  Dispense: 30 tablet; Refill: 0    Bromphed for cough/congestion  Paxlovid as he is high risk for progression to severe disease  Follow up if worsening or failing to improve         Follow Up     No follow-ups on file.  Patient was given instructions and counseling regarding his condition or for health maintenance advice. Please see specific information pulled into the AVS if appropriate.

## 2024-04-16 ENCOUNTER — OFFICE VISIT (OUTPATIENT)
Dept: FAMILY MEDICINE CLINIC | Facility: CLINIC | Age: 69
End: 2024-04-16
Payer: MEDICARE

## 2024-04-16 VITALS
BODY MASS INDEX: 29.12 KG/M2 | HEART RATE: 92 BPM | DIASTOLIC BLOOD PRESSURE: 78 MMHG | SYSTOLIC BLOOD PRESSURE: 118 MMHG | TEMPERATURE: 97.2 F | RESPIRATION RATE: 18 BRPM | WEIGHT: 208 LBS | OXYGEN SATURATION: 97 % | HEIGHT: 71 IN

## 2024-04-16 DIAGNOSIS — Z11.59 ENCOUNTER FOR HEPATITIS C SCREENING TEST FOR LOW RISK PATIENT: ICD-10-CM

## 2024-04-16 DIAGNOSIS — L29.9 ITCHING: ICD-10-CM

## 2024-04-16 DIAGNOSIS — E78.00 PURE HYPERCHOLESTEROLEMIA: ICD-10-CM

## 2024-04-16 DIAGNOSIS — Z79.4 TYPE 2 DIABETES MELLITUS WITH HYPERGLYCEMIA, WITH LONG-TERM CURRENT USE OF INSULIN: ICD-10-CM

## 2024-04-16 DIAGNOSIS — E11.65 TYPE 2 DIABETES MELLITUS WITH HYPERGLYCEMIA, WITH LONG-TERM CURRENT USE OF INSULIN: ICD-10-CM

## 2024-04-16 DIAGNOSIS — Z23 NEED FOR SHINGLES VACCINE: ICD-10-CM

## 2024-04-16 DIAGNOSIS — I10 ESSENTIAL HYPERTENSION: ICD-10-CM

## 2024-04-16 DIAGNOSIS — R79.89 ELEVATED LFTS: ICD-10-CM

## 2024-04-16 DIAGNOSIS — Z23 NEED FOR VACCINATION: Primary | ICD-10-CM

## 2024-04-16 PROCEDURE — 3046F HEMOGLOBIN A1C LEVEL >9.0%: CPT | Performed by: FAMILY MEDICINE

## 2024-04-16 PROCEDURE — 3074F SYST BP LT 130 MM HG: CPT | Performed by: FAMILY MEDICINE

## 2024-04-16 PROCEDURE — 99214 OFFICE O/P EST MOD 30 MIN: CPT | Performed by: FAMILY MEDICINE

## 2024-04-16 PROCEDURE — 90480 ADMN SARSCOV2 VAC 1/ONLY CMP: CPT | Performed by: FAMILY MEDICINE

## 2024-04-16 PROCEDURE — 91320 SARSCV2 VAC 30MCG TRS-SUC IM: CPT | Performed by: FAMILY MEDICINE

## 2024-04-16 PROCEDURE — 90679 RSV VACC PREF RECOMB ADJT IM: CPT | Performed by: FAMILY MEDICINE

## 2024-04-16 PROCEDURE — 3078F DIAST BP <80 MM HG: CPT | Performed by: FAMILY MEDICINE

## 2024-04-16 PROCEDURE — 90471 IMMUNIZATION ADMIN: CPT | Performed by: FAMILY MEDICINE

## 2024-04-16 RX ORDER — HYDROXYZINE PAMOATE 25 MG/1
25 CAPSULE ORAL 3 TIMES DAILY PRN
Qty: 90 CAPSULE | Refills: 2 | Status: SHIPPED | OUTPATIENT
Start: 2024-04-16

## 2024-04-16 RX ORDER — ZOSTER VACCINE RECOMBINANT, ADJUVANTED 50 MCG/0.5
0.5 KIT INTRAMUSCULAR ONCE
Qty: 1 EACH | Refills: 0 | Status: SHIPPED | OUTPATIENT
Start: 2024-04-16 | End: 2024-04-16

## 2024-04-16 NOTE — LETTER
[facility]  Vaccine Consent Form    Patient Name:  Supa Mcclelland  Patient :  1955     Vaccine(s) Ordered    Arexvy Vaccine (RSV for Adults > 60)  COVID-19 F23 (Pfizer) 12yrs+ (COMIRNATY)        Screening Checklist  The following questions should be completed prior to vaccination. If you answer “yes” to any question, it does not necessarily mean you should not be vaccinated. It just means we may need to clarify or ask more questions. If a question is unclear, please ask your healthcare provider to explain it.    Yes No   Any fever or moderate to severe illness today (mild illness and/or antibiotic treatment are not contraindications)?     Do you have a history of a serious reaction to any previous vaccinations, such as anaphylaxis, encephalopathy within 7 days, Guillain-[place] syndrome within 6 weeks, seizure?     Have you received any live vaccine(s) (e.g MMR, SHADE) or any other vaccines in the last month (to ensure duplicate doses aren't given)?     Do you have an anaphylactic allergy to latex (DTaP, DTaP-IPV, Hep A, Hep B, MenB, RV, Td, Tdap), baker’s yeast (Hep B, HPV), polysorbates (RSV, nirsevimab, PCV 20, Rotavirrus, Tdap, Shingrix), or gelatin (SHADE, MMR)?     Do you have an anaphylactic allergy to neomycin (Rabies, SHADE, MMR, IPV, Hep A), polymyxin B (IPV), or streptomycin (IPV)?      Any cancer, leukemia, AIDS, or other immune system disorder? (SHADE, MMR, RV)     Do you have a parent, brother, or sister with an immune system problem (if immune competence of vaccine recipient clinically verified, can proceed)? (MMR, SHADE)     Any recent steroid treatments for >2 weeks, chemotherapy, or radiation treatment? (SHADE, MMR)     Have you received antibody-containing blood transfusions or IVIG in the past 11 months (recommended interval is dependent on product)? (MMR, SHADE)     Have you taken antiviral drugs (acyclovir, famciclovir, valacyclovir for SHADE) in the last 24 or 48 hours, respectively?      Are you  "pregnant or planning to become pregnant within 1 month? (SHADE, MMR, HPV, IPV, MenB, Abrexvy; For Hep B- refer to Engerix-B; For RSV - Abrysvo is indicated for 32-36 weeks of pregnancy from September to January)     For infants, have you ever been told your child has had intussusception or a medical emergency involving obstruction of the intestine (Rotavirus)? If not for an infant, can skip this question.         *Ordering Physicians/APC should be consulted if \"yes\" is checked by the patient or guardian above.  I have received, read, and understand the Vaccine Information Statement (VIS) for each vaccine ordered.  I have considered my or my child's health status as well as the health status of my close contacts.  I have taken the opportunity to discuss my vaccine questions with my or my child's health care provider.   I have requested that the ordered vaccine(s) be given to me or my child.  I understand the benefits and risks of the vaccines.  I understand that I should remain in the clinic for 15 minutes after receiving the vaccine(s).  _________________________________________________________  Signature of Patient or Parent/Legal Guardian ____________________  Date     "

## 2024-04-16 NOTE — PROGRESS NOTES
"Chief Complaint  Diabetes (Patient is here for a follow up for his diabetes. )    Subjective        Supa Mcclelland presents to [facility]  History of Present Illness  Mr. Mcclelland presents to follow up on chronic issues.  He has HTN, HLD, and T2DM.      In regards to his Diabetes, he takes long acting and meal time insulin.  He takes Jardiance.  He has not been checking his sugars much of late.  He denies increased thirst or urination.  He has diabetic neuropathy.  He is on Gabapentin that the Neurology office handles.  He checks his feet regularly.  He wears diabetic shoes.  He has not had his diabetic eye exam.  He plans to get this done soon.      He has HTN.  He is on Losartan and Metoprolol.  BP is well-controlled.  No headache or vision changes.      Last CMP in January showed elevated LFT's.      He has skin itching.  The skin over his chest itches.        Objective   Vital Signs:  /78 (BP Location: Left arm, Patient Position: Sitting, Cuff Size: Adult)   Pulse 92   Temp 97.2 °F (36.2 °C)   Resp 18   Ht 180.3 cm (70.98\")   Wt 94.3 kg (208 lb)   SpO2 97%   BMI 29.02 kg/m²   Estimated body mass index is 29.02 kg/m² as calculated from the following:    Height as of this encounter: 180.3 cm (70.98\").    Weight as of this encounter: 94.3 kg (208 lb).               Physical Exam  Vitals reviewed.   Constitutional:       Appearance: He is well-developed.   HENT:      Head: Normocephalic and atraumatic.      Right Ear: External ear normal.      Left Ear: External ear normal.      Nose: Nose normal.   Eyes:      General: No scleral icterus.        Right eye: No discharge.         Left eye: No discharge.      Conjunctiva/sclera: Conjunctivae normal.      Pupils: Pupils are equal, round, and reactive to light.   Neck:      Thyroid: No thyromegaly.      Trachea: No tracheal deviation.   Cardiovascular:      Rate and Rhythm: Normal rate and regular rhythm.      Heart sounds: " Normal heart sounds. No murmur heard.     No friction rub. No gallop.   Pulmonary:      Effort: Pulmonary effort is normal. No respiratory distress.      Breath sounds: Normal breath sounds. No stridor. No wheezing or rales.   Chest:      Chest wall: No tenderness.   Abdominal:      General: Bowel sounds are normal. There is no distension.      Palpations: Abdomen is soft. There is no mass.      Tenderness: There is no abdominal tenderness. There is no guarding or rebound.      Hernia: No hernia is present.   Musculoskeletal:         General: No deformity. Normal range of motion.      Cervical back: Normal range of motion and neck supple.   Lymphadenopathy:      Cervical: No cervical adenopathy.   Skin:     General: Skin is warm and dry.      Coloration: Skin is not pale.      Findings: No erythema or rash.   Neurological:      Mental Status: He is alert and oriented to person, place, and time.      Cranial Nerves: No cranial nerve deficit.      Motor: No abnormal muscle tone.      Coordination: Coordination normal.      Deep Tendon Reflexes: Reflexes are normal and symmetric. Reflexes normal.   Psychiatric:         Behavior: Behavior normal.         Thought Content: Thought content normal.         Judgment: Judgment normal.            Result Review :                     Assessment and Plan     Diagnoses and all orders for this visit:    1. Need for vaccination (Primary)  -     Arexvy Vaccine (RSV for Adults > 60)  -     COVID-19 F23 (Pfizer) 12yrs+ (COMIRNATY)    2. Type 2 diabetes mellitus with hyperglycemia, with long-term current use of insulin  -     Hemoglobin A1c    3. Essential hypertension    4. Pure hypercholesterolemia    5. Elevated LFTs  -     Comprehensive metabolic panel    6. Itching    7. Encounter for hepatitis C screening test for low risk patient  -     Hepatitis C Antibody    8. Need for shingles vaccine    Other orders  -     hydrOXYzine pamoate (Vistaril) 25 MG capsule; Take 1 capsule by mouth  3 (Three) Times a Day As Needed for Itching or Allergies.  Dispense: 90 capsule; Refill: 2  -     Zoster Vac Recomb Adjuvanted (Shingrix) 50 MCG/0.5ML reconstituted suspension; Inject 0.5 mL into the appropriate muscle as directed by prescriber 1 (One) Time for 1 dose.  Dispense: 1 each; Refill: 0    Continue current medications  Start Vistaril for itching  Will check labs:  CMP, A1C  Will order Shingles, RSV, Covid-19 vaccines  Follow up in 3 months  Keep appointment with Endocrinology         Follow Up     Return in about 3 months (around 7/16/2024) for Recheck.  Patient was given instructions and counseling regarding his condition or for health maintenance advice. Please see specific information pulled into the AVS if appropriate.

## 2024-04-17 LAB
ALBUMIN SERPL-MCNC: 4.3 G/DL (ref 3.5–5.2)
ALBUMIN/GLOB SERPL: 1.3 G/DL
ALP SERPL-CCNC: 94 U/L (ref 39–117)
ALT SERPL-CCNC: 59 U/L (ref 1–41)
AST SERPL-CCNC: 50 U/L (ref 1–40)
BASOPHILS # BLD AUTO: 0.02 10*3/MM3 (ref 0–0.2)
BASOPHILS NFR BLD AUTO: 0.2 % (ref 0–1.5)
BILIRUB SERPL-MCNC: 0.7 MG/DL (ref 0–1.2)
BUN SERPL-MCNC: 12 MG/DL (ref 8–23)
BUN/CREAT SERPL: 12.4 (ref 7–25)
CALCIUM SERPL-MCNC: 9 MG/DL (ref 8.6–10.5)
CHLORIDE SERPL-SCNC: 100 MMOL/L (ref 98–107)
CO2 SERPL-SCNC: 22 MMOL/L (ref 22–29)
CREAT SERPL-MCNC: 0.97 MG/DL (ref 0.76–1.27)
EGFRCR SERPLBLD CKD-EPI 2021: 84.5 ML/MIN/1.73
EOSINOPHIL # BLD AUTO: 0.44 10*3/MM3 (ref 0–0.4)
EOSINOPHIL NFR BLD AUTO: 5.2 % (ref 0.3–6.2)
ERYTHROCYTE [DISTWIDTH] IN BLOOD BY AUTOMATED COUNT: 13.2 % (ref 12.3–15.4)
GLOBULIN SER CALC-MCNC: 3.2 GM/DL
GLUCOSE SERPL-MCNC: 164 MG/DL (ref 65–99)
HBA1C MFR BLD: 9.6 % (ref 4.8–5.6)
HCT VFR BLD AUTO: 50.2 % (ref 37.5–51)
HCV IGG SERPL QL IA: NON REACTIVE
HGB BLD-MCNC: 16.7 G/DL (ref 13–17.7)
IMM GRANULOCYTES # BLD AUTO: 0.12 10*3/MM3 (ref 0–0.05)
IMM GRANULOCYTES NFR BLD AUTO: 1.4 % (ref 0–0.5)
LYMPHOCYTES # BLD AUTO: 3.64 10*3/MM3 (ref 0.7–3.1)
LYMPHOCYTES NFR BLD AUTO: 43.4 % (ref 19.6–45.3)
MCH RBC QN AUTO: 27.8 PG (ref 26.6–33)
MCHC RBC AUTO-ENTMCNC: 33.3 G/DL (ref 31.5–35.7)
MCV RBC AUTO: 83.7 FL (ref 79–97)
MONOCYTES # BLD AUTO: 0.7 10*3/MM3 (ref 0.1–0.9)
MONOCYTES NFR BLD AUTO: 8.3 % (ref 5–12)
NEUTROPHILS # BLD AUTO: 3.47 10*3/MM3 (ref 1.7–7)
NEUTROPHILS NFR BLD AUTO: 41.5 % (ref 42.7–76)
NRBC BLD AUTO-RTO: 0 /100 WBC (ref 0–0.2)
PLATELET # BLD AUTO: 195 10*3/MM3 (ref 140–450)
POTASSIUM SERPL-SCNC: 3.7 MMOL/L (ref 3.5–5.2)
PROT SERPL-MCNC: 7.5 G/DL (ref 6–8.5)
RBC # BLD AUTO: 6 10*6/MM3 (ref 4.14–5.8)
SODIUM SERPL-SCNC: 140 MMOL/L (ref 136–145)
WBC # BLD AUTO: 8.39 10*3/MM3 (ref 3.4–10.8)

## 2024-04-30 ENCOUNTER — OFFICE VISIT (OUTPATIENT)
Dept: GASTROENTEROLOGY | Age: 69
End: 2024-04-30
Payer: MEDICARE

## 2024-04-30 VITALS
SYSTOLIC BLOOD PRESSURE: 120 MMHG | WEIGHT: 202 LBS | HEIGHT: 71 IN | DIASTOLIC BLOOD PRESSURE: 80 MMHG | OXYGEN SATURATION: 98 % | HEART RATE: 80 BPM | BODY MASS INDEX: 28.28 KG/M2

## 2024-04-30 DIAGNOSIS — K52.9 CHRONIC DIARRHEA: ICD-10-CM

## 2024-04-30 PROCEDURE — 3017F COLORECTAL CA SCREEN DOC REV: CPT | Performed by: NURSE PRACTITIONER

## 2024-04-30 PROCEDURE — 1036F TOBACCO NON-USER: CPT | Performed by: NURSE PRACTITIONER

## 2024-04-30 PROCEDURE — G8417 CALC BMI ABV UP PARAM F/U: HCPCS | Performed by: NURSE PRACTITIONER

## 2024-04-30 PROCEDURE — 99213 OFFICE O/P EST LOW 20 MIN: CPT | Performed by: NURSE PRACTITIONER

## 2024-04-30 PROCEDURE — 1123F ACP DISCUSS/DSCN MKR DOCD: CPT | Performed by: NURSE PRACTITIONER

## 2024-04-30 PROCEDURE — G8427 DOCREV CUR MEDS BY ELIG CLIN: HCPCS | Performed by: NURSE PRACTITIONER

## 2024-04-30 RX ORDER — MONTELUKAST SODIUM 4 MG/1
1 TABLET, CHEWABLE ORAL 2 TIMES DAILY
Qty: 60 TABLET | Refills: 11 | Status: SHIPPED | OUTPATIENT
Start: 2024-04-30

## 2024-04-30 NOTE — PROGRESS NOTES
Subjective:     Patient ID: Akhil Aeljo is a 68 y.o. male  PCP: Lennox Waddell MD  Referring Provider: No ref. provider found    HPI  Patient presents to the office today with the following complaints: Follow-up      Patient seen in the office today for follow up on constipation   I saw him a couple of months ago and we started him on colestipol  Today he states the colestipol works great.  States his diarrhea is much better controled  States \"it works great\"  He denies any further GI needs at this time    Colonoscopy recall due 5/2025    Assessment:     1. Chronic diarrhea  -     colestipol (COLESTID) 1 g tablet; Take 1 tablet by mouth 2 times daily, Disp-60 tablet, R-11Normal       Review of Systems   Constitutional:  Negative for activity change, appetite change, fatigue, fever and unexpected weight change.   HENT:  Negative for trouble swallowing.    Respiratory:  Negative for cough, choking and shortness of breath.    Cardiovascular:  Negative for chest pain.   Gastrointestinal:  Negative for abdominal distention, abdominal pain, anal bleeding, blood in stool, constipation, diarrhea, nausea, rectal pain and vomiting.   Allergic/Immunologic: Negative for food allergies.   All other systems reviewed and are negative.      Plan:   Continue colestipol  Follow up in 1 year or sooner if needed  Colonoscopy recall due 5/2025     Orders  No orders of the defined types were placed in this encounter.    Medications  Orders Placed This Encounter   Medications    colestipol (COLESTID) 1 g tablet     Sig: Take 1 tablet by mouth 2 times daily     Dispense:  60 tablet     Refill:  11         Patient History:     Past Medical History:   Diagnosis Date    Arm fracture 07/2016    left    Depression     Diabetes mellitus (HCC)     Hypertension     Kidney stones     Neuropathy     Restless leg syndrome        Past Surgical History:   Procedure Laterality Date    BONE MARROW BIOPSY Right 12/09/2020    BONE MARROW

## 2024-05-03 ASSESSMENT — ENCOUNTER SYMPTOMS
NAUSEA: 0
CONSTIPATION: 0
TROUBLE SWALLOWING: 0
ABDOMINAL PAIN: 0
COUGH: 0
SHORTNESS OF BREATH: 0
ANAL BLEEDING: 0
VOMITING: 0
DIARRHEA: 0
BLOOD IN STOOL: 0
ABDOMINAL DISTENTION: 0
CHOKING: 0
RECTAL PAIN: 0

## 2024-05-07 ENCOUNTER — TELEPHONE (OUTPATIENT)
Dept: FAMILY MEDICINE CLINIC | Facility: CLINIC | Age: 69
End: 2024-05-07
Payer: MEDICARE

## 2024-05-07 NOTE — TELEPHONE ENCOUNTER
Attempted pt for further review following yesterday's outreach. Voice mailbox FULL; unable to leave message. Will outreach in coming weeks, as agreed. PA pending

## 2024-05-08 ENCOUNTER — TELEPHONE (OUTPATIENT)
Dept: NEUROLOGY | Facility: CLINIC | Age: 69
End: 2024-05-08
Payer: MEDICARE

## 2024-05-20 NOTE — TELEPHONE ENCOUNTER
PA denied- qty limit- will only fill 15ml for 30 days. Called pharmacy to inquire on last fill date- reports that med was last picked up on 1/23/2024 for a 75 day supply.

## 2024-05-21 ENCOUNTER — TELEPHONE (OUTPATIENT)
Dept: NEUROLOGY | Facility: CLINIC | Age: 69
End: 2024-05-21
Payer: MEDICARE

## 2024-05-21 NOTE — TELEPHONE ENCOUNTER
Provider: RAJAT VYAS    Caller: PARUL FRANZ    Relationship to Patient: SELF    Pharmacy: [facility]    Phone Number: [phone]    Reason for Call: PT'S SPOUSE IS CALLING REGARDING THE PT'S ROPINOLE RX.  PT HAS NOT TAKEN THIS SINCE OCTOBER 2023 BUT SPOUSE FOUND THE EMPTY BOTTLE AND SHE WAS WONDERING IF HE NEEDS TO BE TAKING IT.      ALSO, PT WAS TOLD HE COULD NOT DRIVE BY THE ED PHYSICIAN UNTIL HE WAS CLEARED BY A NEUROLOGIST.  THEY NEED TO KNOW A DX.  PT HAD AN APPT ON 5/8/24 THAT WAS RESCHEDULED BY THE OFFICE FOR 9/16/24 AND WOULD LIKE TO BE SEEN SOONER IF POSSIBLE, OR TOLD A DX REGARDING HIM BEING ABLE TO DRIVE.     PLEASE REVIEW AND ADVISE     THANK YOU

## 2024-05-22 NOTE — TELEPHONE ENCOUNTER
Ankur Aguilar PA Carnes, Colleen, LPN  Has significant memory issues, discuss at follow up - continue no driving - can do  eval if they wish          Previous Messages       ----- Message -----  From: Camilla Erickson LPN  Sent: 5/22/2024   1:11 PM CDT  To: ZACARIAS Alonzo    Rossana ( wife) said Supa is having difficult sleeping due to RLS.  He has been out of Requip, she would like a refill.  She would like to know if he can drive, they didn't receive the lab or memory evaluation results.  His last 2 appointments have been cancelled.

## 2024-05-22 NOTE — TELEPHONE ENCOUNTER
Caller: Rossana Mcclelland    Relationship: Emergency Contact    Best call back number: [phone]    What is the best time to reach you:     Who are you requesting to speak with (clinical staff, provider,  specific staff member):     Do you know the name of the person who called:     What was the call regarding: MEDICATION AND SOONER APPT    Is it okay if the provider responds through MyChart:     TRIED TO TRANSFER TO CLINIC, NO ANSWER.  ADVISED ROSSANA THAT I WAS UNABLE TO GET A HOLD OF CLINIC AND WILL SEND A MESSAGE.  ROSSANA V/U.    PLEASE CALL & ADVISE

## 2024-05-24 ENCOUNTER — TELEPHONE (OUTPATIENT)
Dept: HEMATOLOGY | Age: 69
End: 2024-05-24

## 2024-05-24 NOTE — TELEPHONE ENCOUNTER
Called Patient and reminded patient of their appointment on 05/31/2024 and patient confirmed they would be here.

## 2024-06-19 ENCOUNTER — OFFICE VISIT (OUTPATIENT)
Dept: FAMILY MEDICINE CLINIC | Facility: CLINIC | Age: 69
End: 2024-06-19
Payer: MEDICARE

## 2024-06-19 VITALS
DIASTOLIC BLOOD PRESSURE: 69 MMHG | WEIGHT: 207 LBS | OXYGEN SATURATION: 98 % | RESPIRATION RATE: 16 BRPM | TEMPERATURE: 97.5 F | HEART RATE: 89 BPM | SYSTOLIC BLOOD PRESSURE: 107 MMHG | BODY MASS INDEX: 28.98 KG/M2 | HEIGHT: 71 IN

## 2024-06-19 DIAGNOSIS — F41.9 ANXIETY: ICD-10-CM

## 2024-06-19 DIAGNOSIS — R53.83 OTHER FATIGUE: ICD-10-CM

## 2024-06-19 DIAGNOSIS — M79.672 LEFT FOOT PAIN: Primary | ICD-10-CM

## 2024-06-19 PROCEDURE — 1125F AMNT PAIN NOTED PAIN PRSNT: CPT | Performed by: FAMILY MEDICINE

## 2024-06-19 PROCEDURE — 3074F SYST BP LT 130 MM HG: CPT | Performed by: FAMILY MEDICINE

## 2024-06-19 PROCEDURE — 99214 OFFICE O/P EST MOD 30 MIN: CPT | Performed by: FAMILY MEDICINE

## 2024-06-19 PROCEDURE — 3078F DIAST BP <80 MM HG: CPT | Performed by: FAMILY MEDICINE

## 2024-06-19 PROCEDURE — 3046F HEMOGLOBIN A1C LEVEL >9.0%: CPT | Performed by: FAMILY MEDICINE

## 2024-06-19 RX ORDER — CITALOPRAM 20 MG/1
20 TABLET ORAL
Qty: 90 TABLET | Refills: 3 | Status: SHIPPED | OUTPATIENT
Start: 2024-06-19

## 2024-06-19 NOTE — PROGRESS NOTES
"Chief Complaint  Foot Pain (Pt is here for foot pain. )    Subjective        Supa Mcclelland presents to [facility]  History of Present Illness  Mr. Mcclelland presents today with left foot burning and tingling.  He has Diabetic Neuropathy.  He says the pain is a burning/tingling sensation.  He is prescribed Gabapentin by Neurology.  The prescription is for TID, but he has only been taking it twice per day.    He is fatigued.  He has had trouble sleeping.  He has anxiety and says his mind races at bedtime.  He was sleeping better and his anxiety was doing better when he was on Celexa.  He has been out for a while.      Objective   Vital Signs:  /69 (BP Location: Left arm, Patient Position: Sitting, Cuff Size: Adult)   Pulse 89   Temp 97.5 °F (36.4 °C)   Resp 16   Ht 180.3 cm (70.98\")   Wt 93.9 kg (207 lb)   SpO2 98%   BMI 28.88 kg/m²   Estimated body mass index is 28.88 kg/m² as calculated from the following:    Height as of this encounter: 180.3 cm (70.98\").    Weight as of this encounter: 93.9 kg (207 lb).               Physical Exam   Result Review :                     Assessment and Plan     Diagnoses and all orders for this visit:    1. Left foot pain (Primary)  -     XR Foot 3+ View Left    2. Anxiety  -     citalopram (CeleXA) 20 MG tablet; Take 1 tablet by mouth every night at bedtime.  Dispense: 90 tablet; Refill: 3    3. Other fatigue  -     TSH  -     Vitamin B12    Will get X-rays  Will check labs for fatigue  I suspect much of his pain is Neuropathic, encouraged him to take the Gabapentin as prescribed  Restart Celexa  Follow up in July for Diabetes check up           Follow Up     No follow-ups on file.  Patient was given instructions and counseling regarding his condition or for health maintenance advice. Please see specific information pulled into the AVS if appropriate.         "

## 2024-06-20 LAB
TSH SERPL DL<=0.005 MIU/L-ACNC: 3.04 UIU/ML (ref 0.27–4.2)
VIT B12 SERPL-MCNC: 281 PG/ML (ref 211–946)

## 2024-06-27 ENCOUNTER — TELEPHONE (OUTPATIENT)
Dept: FAMILY MEDICINE CLINIC | Facility: CLINIC | Age: 69
End: 2024-06-27
Payer: MEDICARE

## 2024-06-27 NOTE — TELEPHONE ENCOUNTER
PT's spouse called back to follow up- on [facility]. Results discussed, no questions or concerns at this time.

## 2024-06-27 NOTE — TELEPHONE ENCOUNTER
----- Message from Alexis Montes sent at 6/27/2024  2:17 PM CDT -----  It looks like he has an old fracture on the x-ray.  I think his pain is probably due to Neuropathy.  We'll re-check at the next visit.

## 2024-07-01 DIAGNOSIS — G25.81 RESTLESS LEGS SYNDROME (RLS): ICD-10-CM

## 2024-07-01 DIAGNOSIS — E11.42 DIABETIC POLYNEUROPATHY ASSOCIATED WITH TYPE 2 DIABETES MELLITUS: ICD-10-CM

## 2024-07-01 RX ORDER — GABAPENTIN 800 MG/1
TABLET ORAL
Qty: 270 TABLET | Refills: 0 | Status: SHIPPED | OUTPATIENT
Start: 2024-07-01

## 2024-07-16 ENCOUNTER — OFFICE VISIT (OUTPATIENT)
Dept: FAMILY MEDICINE CLINIC | Facility: CLINIC | Age: 69
End: 2024-07-16
Payer: MEDICARE

## 2024-07-16 VITALS
WEIGHT: 197 LBS | TEMPERATURE: 98 F | HEART RATE: 80 BPM | HEIGHT: 71 IN | SYSTOLIC BLOOD PRESSURE: 140 MMHG | RESPIRATION RATE: 16 BRPM | OXYGEN SATURATION: 99 % | DIASTOLIC BLOOD PRESSURE: 88 MMHG | BODY MASS INDEX: 27.58 KG/M2

## 2024-07-16 DIAGNOSIS — E11.40 TYPE 2 DIABETES MELLITUS WITH DIABETIC NEUROPATHY, WITH LONG-TERM CURRENT USE OF INSULIN: ICD-10-CM

## 2024-07-16 DIAGNOSIS — I10 ESSENTIAL HYPERTENSION: ICD-10-CM

## 2024-07-16 DIAGNOSIS — Z79.4 TYPE 2 DIABETES MELLITUS WITH HYPERGLYCEMIA, WITH LONG-TERM CURRENT USE OF INSULIN: Primary | ICD-10-CM

## 2024-07-16 DIAGNOSIS — E11.65 TYPE 2 DIABETES MELLITUS WITH HYPERGLYCEMIA, WITH LONG-TERM CURRENT USE OF INSULIN: Primary | ICD-10-CM

## 2024-07-16 DIAGNOSIS — Z79.4 TYPE 2 DIABETES MELLITUS WITH DIABETIC NEUROPATHY, WITH LONG-TERM CURRENT USE OF INSULIN: ICD-10-CM

## 2024-07-16 DIAGNOSIS — Z12.11 SCREENING FOR COLON CANCER: ICD-10-CM

## 2024-07-16 PROCEDURE — 3046F HEMOGLOBIN A1C LEVEL >9.0%: CPT | Performed by: FAMILY MEDICINE

## 2024-07-16 PROCEDURE — 1126F AMNT PAIN NOTED NONE PRSNT: CPT | Performed by: FAMILY MEDICINE

## 2024-07-16 PROCEDURE — 3077F SYST BP >= 140 MM HG: CPT | Performed by: FAMILY MEDICINE

## 2024-07-16 PROCEDURE — G2211 COMPLEX E/M VISIT ADD ON: HCPCS | Performed by: FAMILY MEDICINE

## 2024-07-16 PROCEDURE — 3079F DIAST BP 80-89 MM HG: CPT | Performed by: FAMILY MEDICINE

## 2024-07-16 PROCEDURE — 99214 OFFICE O/P EST MOD 30 MIN: CPT | Performed by: FAMILY MEDICINE

## 2024-07-16 NOTE — PROGRESS NOTES
"Chief Complaint  Foot Pain (Pt is here for a follow up for a foot pain. )    Subjective        Supa Mcclelland presents to [facility]  Foot Pain      Mr. Mcclelland presents for a follow up on T2DM.    He has T2DM.  Last A1C showed it is uncontrolled at 9.6.  He is on Insulin with Xultrophy and Humalog.  He is on Jardiance.  He is on Losartan.  He says he checks his sugars regularly, but cannot tell me what they run.      He has Diabetic Neuropathy.  Neurology started him on Gabapentin a few months back.  This is working well for the neuropathic pain.      He has HTN.  He is on Losartan 25 mg and HCTZ 12.5 mg.  BP is well-controlled at 140/88.  No headache or vision changes.    He is showing due for colonoscopy.  Last Colonoscopy was in May of 2020 and showed polyps.  GI recommended follow up colonoscopy in 6 months    Objective   Vital Signs:  /88 (BP Location: Left arm, Patient Position: Sitting, Cuff Size: Adult)   Pulse 80   Temp 98 °F (36.7 °C)   Resp 16   Ht 180.3 cm (70.98\")   Wt 89.4 kg (197 lb)   SpO2 99%   BMI 27.49 kg/m²   Estimated body mass index is 27.49 kg/m² as calculated from the following:    Height as of this encounter: 180.3 cm (70.98\").    Weight as of this encounter: 89.4 kg (197 lb).               Physical Exam  Vitals reviewed.   Constitutional:       Appearance: He is well-developed.   HENT:      Head: Normocephalic and atraumatic.      Right Ear: External ear normal.      Left Ear: External ear normal.      Nose: Nose normal.   Eyes:      General: No scleral icterus.        Right eye: No discharge.         Left eye: No discharge.      Conjunctiva/sclera: Conjunctivae normal.      Pupils: Pupils are equal, round, and reactive to light.   Neck:      Thyroid: No thyromegaly.      Trachea: No tracheal deviation.   Cardiovascular:      Rate and Rhythm: Normal rate and regular rhythm.      Heart sounds: Normal heart sounds. No murmur heard.     No friction " rub. No gallop.   Pulmonary:      Effort: Pulmonary effort is normal. No respiratory distress.      Breath sounds: Normal breath sounds. No stridor. No wheezing or rales.   Chest:      Chest wall: No tenderness.   Abdominal:      General: Bowel sounds are normal. There is no distension.      Palpations: Abdomen is soft. There is no mass.      Tenderness: There is no abdominal tenderness. There is no guarding or rebound.      Hernia: No hernia is present.   Musculoskeletal:         General: No deformity. Normal range of motion.      Cervical back: Normal range of motion and neck supple.   Lymphadenopathy:      Cervical: No cervical adenopathy.   Skin:     General: Skin is warm and dry.      Coloration: Skin is not pale.      Findings: No erythema or rash.   Neurological:      Mental Status: He is alert and oriented to person, place, and time.      Cranial Nerves: No cranial nerve deficit.      Motor: No abnormal muscle tone.      Coordination: Coordination normal.      Deep Tendon Reflexes: Reflexes are normal and symmetric. Reflexes normal.   Psychiatric:         Behavior: Behavior normal.         Thought Content: Thought content normal.         Judgment: Judgment normal.            Result Review :               Assessment and Plan     Diagnoses and all orders for this visit:    1. Type 2 diabetes mellitus with hyperglycemia, with long-term current use of insulin (Primary)  -     Hemoglobin A1c  -     Ambulatory Referral to Ophthalmology    2. Screening for colon cancer  -     Ambulatory Referral to Gastroenterology    3. Type 2 diabetes mellitus with diabetic neuropathy, with long-term current use of insulin    4. Essential hypertension    Continue current Diabetes medications  Continue current BP regimen  Will check A1C  Encouraged him to keep a log of blood sugar readings.  Refer to GI to get colonoscopy update  Refer to Ophthalmology to get diabetic eye exam  Keep follow up appointments with Endocrinology          Follow Up     Return in about 3 months (around 10/16/2024) for Recheck.  Patient was given instructions and counseling regarding his condition or for health maintenance advice. Please see specific information pulled into the AVS if appropriate.

## 2024-07-17 LAB — HBA1C MFR BLD: 10 % (ref 4.8–5.6)

## 2024-07-18 RX ORDER — (INSULIN DEGLUDEC AND LIRAGLUTIDE) 100; 3.6 [IU]/ML; MG/ML
64 INJECTION, SOLUTION SUBCUTANEOUS DAILY
Qty: 15 ML | Refills: 11 | Status: SHIPPED | OUTPATIENT
Start: 2024-07-18

## 2024-07-18 RX ORDER — INSULIN LISPRO 200 [IU]/ML
22 INJECTION, SOLUTION SUBCUTANEOUS
Qty: 18 ML | Refills: 11 | Status: SHIPPED | OUTPATIENT
Start: 2024-07-18

## 2024-07-22 ENCOUNTER — TELEPHONE (OUTPATIENT)
Dept: FAMILY MEDICINE CLINIC | Facility: CLINIC | Age: 69
End: 2024-07-22
Payer: MEDICARE

## 2024-07-23 ENCOUNTER — TELEPHONE (OUTPATIENT)
Dept: FAMILY MEDICINE CLINIC | Facility: CLINIC | Age: 69
End: 2024-07-23
Payer: MEDICARE

## 2024-07-23 NOTE — TELEPHONE ENCOUNTER
Pt wife, Rossana ([facility]) called about lab results- called her back [facility] for call back- [facility] to transfer

## 2024-07-23 NOTE — TELEPHONE ENCOUNTER
----- Message from Alexis Montes sent at 7/18/2024  5:20 PM CDT -----  Let him know:  A1C still very elevated at 10.  I'm going to increase his Long acting insulin from 60-64.  I'm going to increase the meal time insulin from 20-22.  Keep appointment with Endocrine

## 2024-07-23 NOTE — TELEPHONE ENCOUNTER
Pt's wife called back- results discussed. Explained that pt seen endocrine  Today and switched him from the insulin degludec-liraglutide 100-3.6 u-MG/ML to insulin degludec (tresiba) 100 u/ml. Reports that pt will start at 30 units daily and will work up to 50u daily. Med list updated- she reports no changes to short acting insulin humalog, however the pt is only taking 22u once daily and not as directed. Pt's wife also mentioned that inulin pump was brought up at apt, no decision has been made currently.

## 2024-08-08 ENCOUNTER — TELEPHONE (OUTPATIENT)
Dept: HEMATOLOGY | Age: 69
End: 2024-08-08

## 2024-08-08 NOTE — TELEPHONE ENCOUNTER
I called and left patient a detailed voicemail with their appointment date and time for 08/13/24 and to come at the follow up appointment time and not the lab appointment time. I also made them aware of what that time was.  I made patient aware that we are in the [facility] and where it is located and gave the address in case, they use GPS. Left message for patient to call our office if they could not keep this appointment or if they did not know where our new building is located.

## 2024-08-13 ENCOUNTER — OFFICE VISIT (OUTPATIENT)
Dept: HEMATOLOGY | Age: 69
End: 2024-08-13
Payer: MEDICARE

## 2024-08-13 ENCOUNTER — HOSPITAL ENCOUNTER (OUTPATIENT)
Dept: INFUSION THERAPY | Age: 69
Discharge: HOME | End: 2024-08-13
Payer: MEDICARE

## 2024-08-13 VITALS
HEART RATE: 98 BPM | WEIGHT: 195.3 LBS | SYSTOLIC BLOOD PRESSURE: 138 MMHG | BODY MASS INDEX: 27.34 KG/M2 | TEMPERATURE: 98.3 F | HEIGHT: 71 IN | DIASTOLIC BLOOD PRESSURE: 72 MMHG | OXYGEN SATURATION: 82 %

## 2024-08-13 DIAGNOSIS — D58.2 ELEVATED HEMOGLOBIN (HCC): ICD-10-CM

## 2024-08-13 DIAGNOSIS — D47.2 MGUS (MONOCLONAL GAMMOPATHY OF UNKNOWN SIGNIFICANCE): Primary | ICD-10-CM

## 2024-08-13 DIAGNOSIS — D47.2 MGUS (MONOCLONAL GAMMOPATHY OF UNKNOWN SIGNIFICANCE): ICD-10-CM

## 2024-08-13 DIAGNOSIS — Z71.89 CARE PLAN DISCUSSED WITH PATIENT: ICD-10-CM

## 2024-08-13 LAB
ALBUMIN SERPL-MCNC: 4.1 G/DL (ref 3.5–5.2)
ALP SERPL-CCNC: 95 U/L (ref 40–129)
ALT SERPL-CCNC: 31 U/L (ref 5–41)
ANION GAP SERPL CALCULATED.3IONS-SCNC: 13 MMOL/L (ref 7–19)
AST SERPL-CCNC: 33 U/L (ref 5–40)
BASOPHILS # BLD: 0.04 K/UL (ref 0.01–0.08)
BASOPHILS NFR BLD: 0.5 % (ref 0.1–1.2)
BILIRUB SERPL-MCNC: 0.5 MG/DL (ref 0–1.2)
BUN SERPL-MCNC: 20 MG/DL (ref 8–23)
CALCIUM SERPL-MCNC: 9.1 MG/DL (ref 8.8–10.2)
CHLORIDE SERPL-SCNC: 101 MMOL/L (ref 98–107)
CO2 SERPL-SCNC: 23 MMOL/L (ref 22–29)
CREAT SERPL-MCNC: 1.1 MG/DL (ref 0.7–1.2)
EOSINOPHIL # BLD: 0.48 K/UL (ref 0.04–0.54)
EOSINOPHIL NFR BLD: 5.5 % (ref 0.7–7)
ERYTHROCYTE [DISTWIDTH] IN BLOOD BY AUTOMATED COUNT: 13.8 % (ref 11.6–14.4)
GLUCOSE SERPL-MCNC: 233 MG/DL (ref 70–99)
HCT VFR BLD AUTO: 53.9 % (ref 40.1–51)
HGB BLD-MCNC: 18.1 G/DL (ref 13.7–17.5)
LYMPHOCYTES # BLD: 3.41 K/UL (ref 1.18–3.74)
LYMPHOCYTES NFR BLD: 39.3 % (ref 19.3–53.1)
MCH RBC QN AUTO: 28.7 PG (ref 25.7–32.2)
MCHC RBC AUTO-ENTMCNC: 33.6 G/DL (ref 32.3–36.5)
MCV RBC AUTO: 85.4 FL (ref 79–92.2)
MONOCYTES # BLD: 0.65 K/UL (ref 0.24–0.82)
MONOCYTES NFR BLD: 7.5 % (ref 4.7–12.5)
NEUTROPHILS # BLD: 3.99 K/UL (ref 1.56–6.13)
NEUTS SEG NFR BLD: 46 % (ref 34–71.1)
PLATELET # BLD AUTO: 199 K/UL (ref 163–337)
PMV BLD AUTO: 10.6 FL (ref 7.4–10.4)
POTASSIUM SERPL-SCNC: 3.6 MMOL/L (ref 3.5–5.1)
PROT SERPL-MCNC: 8 G/DL (ref 6.4–8.3)
RBC # BLD AUTO: 6.31 M/UL (ref 4.63–6.08)
SODIUM SERPL-SCNC: 137 MMOL/L (ref 136–145)
WBC # BLD AUTO: 8.67 K/UL (ref 4.23–9.07)

## 2024-08-13 PROCEDURE — 1036F TOBACCO NON-USER: CPT | Performed by: NURSE PRACTITIONER

## 2024-08-13 PROCEDURE — 36415 COLL VENOUS BLD VENIPUNCTURE: CPT

## 2024-08-13 PROCEDURE — 80053 COMPREHEN METABOLIC PANEL: CPT

## 2024-08-13 PROCEDURE — 99212 OFFICE O/P EST SF 10 MIN: CPT

## 2024-08-13 PROCEDURE — G8427 DOCREV CUR MEDS BY ELIG CLIN: HCPCS | Performed by: NURSE PRACTITIONER

## 2024-08-13 PROCEDURE — 99214 OFFICE O/P EST MOD 30 MIN: CPT | Performed by: NURSE PRACTITIONER

## 2024-08-13 PROCEDURE — 85025 COMPLETE CBC W/AUTO DIFF WBC: CPT

## 2024-08-13 PROCEDURE — 1123F ACP DISCUSS/DSCN MKR DOCD: CPT | Performed by: NURSE PRACTITIONER

## 2024-08-13 PROCEDURE — G8417 CALC BMI ABV UP PARAM F/U: HCPCS | Performed by: NURSE PRACTITIONER

## 2024-08-13 PROCEDURE — 3017F COLORECTAL CA SCREEN DOC REV: CPT | Performed by: NURSE PRACTITIONER

## 2024-08-13 ASSESSMENT — ENCOUNTER SYMPTOMS
EYE DISCHARGE: 0
SHORTNESS OF BREATH: 0
COUGH: 0
DIARRHEA: 0
ABDOMINAL PAIN: 0
CONSTIPATION: 0
NAUSEA: 0
VOMITING: 0
WHEEZING: 0
EYE ITCHING: 0
SORE THROAT: 0
TROUBLE SWALLOWING: 0

## 2024-08-16 LAB
ALBUMIN SERPL-MCNC: 4.1 G/DL (ref 3.75–5.01)
ALPHA1 GLOB SERPL ELPH-MCNC: 0.3 G/DL (ref 0.19–0.46)
ALPHA2 GLOB SERPL ELPH-MCNC: 0.91 G/DL (ref 0.48–1.05)
B-GLOBULIN SERPL ELPH-MCNC: 1.07 G/DL (ref 0.48–1.1)
DEPRECATED KAPPA LC FREE/LAMBDA SER: 2.2 {RATIO} (ref 0.26–1.65)
EER MONOCLONAL PROTEIN AND FLC, SERUM: ABNORMAL
GAMMA GLOB SERPL ELPH-MCNC: 1.43 G/DL (ref 0.62–1.51)
IGA SERPL-MCNC: 408 MG/DL (ref 68–408)
IGG SERPL-MCNC: 1350 MG/DL (ref 768–1632)
IGM SERPL-MCNC: 105 MG/DL (ref 35–263)
INTERPRETATION SERPL IFE-IMP: ABNORMAL
INTERPRETATION SERPL IFE-IMP: ABNORMAL
KAPPA LC FREE SER-MCNC: 71.74 MG/L (ref 3.3–19.4)
LAMBDA LC FREE SERPL-MCNC: 32.68 MG/L (ref 5.71–26.3)
MONOCLONAL PROTEIN, SERUM: ABNORMAL G/DL
PROT SERPL-MCNC: 7.8 G/DL (ref 6.3–8.2)

## 2024-08-20 ENCOUNTER — OFFICE VISIT (OUTPATIENT)
Dept: CARDIOLOGY CLINIC | Age: 69
End: 2024-08-20
Payer: MEDICARE

## 2024-08-20 VITALS
WEIGHT: 196 LBS | HEART RATE: 85 BPM | BODY MASS INDEX: 27.34 KG/M2 | DIASTOLIC BLOOD PRESSURE: 74 MMHG | SYSTOLIC BLOOD PRESSURE: 134 MMHG | OXYGEN SATURATION: 98 %

## 2024-08-20 DIAGNOSIS — I10 ESSENTIAL HYPERTENSION: Primary | ICD-10-CM

## 2024-08-20 PROCEDURE — 3017F COLORECTAL CA SCREEN DOC REV: CPT | Performed by: INTERNAL MEDICINE

## 2024-08-20 PROCEDURE — G8427 DOCREV CUR MEDS BY ELIG CLIN: HCPCS | Performed by: INTERNAL MEDICINE

## 2024-08-20 PROCEDURE — 3075F SYST BP GE 130 - 139MM HG: CPT | Performed by: INTERNAL MEDICINE

## 2024-08-20 PROCEDURE — 1036F TOBACCO NON-USER: CPT | Performed by: INTERNAL MEDICINE

## 2024-08-20 PROCEDURE — 3078F DIAST BP <80 MM HG: CPT | Performed by: INTERNAL MEDICINE

## 2024-08-20 PROCEDURE — 1123F ACP DISCUSS/DSCN MKR DOCD: CPT | Performed by: INTERNAL MEDICINE

## 2024-08-20 PROCEDURE — G8417 CALC BMI ABV UP PARAM F/U: HCPCS | Performed by: INTERNAL MEDICINE

## 2024-08-20 PROCEDURE — 99214 OFFICE O/P EST MOD 30 MIN: CPT | Performed by: INTERNAL MEDICINE

## 2024-08-20 ASSESSMENT — ENCOUNTER SYMPTOMS
APNEA: 0
DIARRHEA: 0
FACIAL SWELLING: 0
EYE DISCHARGE: 0
ABDOMINAL DISTENTION: 0
SHORTNESS OF BREATH: 0
SORE THROAT: 0
ABDOMINAL PAIN: 0
COUGH: 0
WHEEZING: 0
CHEST TIGHTNESS: 0
VOMITING: 0
NAUSEA: 0
EYE REDNESS: 0
BLOOD IN STOOL: 0
CONSTIPATION: 0
EYE PAIN: 0

## 2024-08-20 NOTE — PROGRESS NOTES
sounds. No stridor. No wheezing, rhonchi or rales.   Chest:      Chest wall: No tenderness.   Abdominal:      General: Bowel sounds are normal. There is no distension.      Palpations: Abdomen is soft. There is no mass.      Tenderness: There is no abdominal tenderness. There is no guarding.   Musculoskeletal:         General: No swelling.      Cervical back: Normal range of motion and neck supple. No rigidity.      Right lower leg: No edema.      Left lower leg: No edema.   Skin:     General: Skin is warm and dry.      Coloration: Skin is not jaundiced.      Findings: No erythema or rash.   Neurological:      General: No focal deficit present.      Mental Status: He is alert and oriented to person, place, and time. Mental status is at baseline.   Psychiatric:         Mood and Affect: Mood normal.         Behavior: Behavior normal.         Thought Content: Thought content normal.           Labs:     CARDIAC ENZYMES:   Troponin   Date Value Ref Range Status   11/19/2016 <0.01 0.00 - 0.03 ng/mL Final     Comment:     <0.030 ng/ml       No measurable cardiac damage.    0.030-0.099 ng/ml  Values of troponin in this range suggest  possible myocardial damage. Repeat assay  4 to 6 hours after the current specimen.    >= 0.100 ng/ml     Indicative of myocardial damage.  Recommend comtinued monitoring of  patient status and cardiac markers.        LIPID PANEL: No results found for: \"LIPIDPAN\"  LIVER PROFILE:   AST   Date Value Ref Range Status   08/13/2024 33 5 - 40 U/L Final     ALT   Date Value Ref Range Status   08/13/2024 31 5 - 41 U/L Final                ASSESSMENT and PLAN:      Chronic essential hypertension  Goal blood pressure less than 130/80  Low-sodium diet, less than 2 g per 24 hours  Continue losartan and hydrochlorothiazide  Home BP monitoring    Chronic valvular heart disease  Mild mitral regurgitation, echo 2020  Continue conservative medical management  Surveillance monitoring by echocardiogram 5 years

## 2024-09-03 DIAGNOSIS — I10 ESSENTIAL HYPERTENSION: ICD-10-CM

## 2024-09-04 RX ORDER — HYDROCHLOROTHIAZIDE 12.5 MG/1
12.5 TABLET ORAL DAILY
Qty: 90 TABLET | Refills: 0 | Status: SHIPPED | OUTPATIENT
Start: 2024-09-04

## 2024-09-04 NOTE — TELEPHONE ENCOUNTER
Rx Refill Note  Requested Prescriptions     Pending Prescriptions Disp Refills    hydroCHLOROthiazide 12.5 MG tablet [Pharmacy Med Name: hydroCHLOROthiazide 12.5 MG Oral Tablet] 90 tablet 0     Sig: Take 1 tablet by mouth once daily      Last office visit with prescribing clinician: 7/16/2024   Last telemedicine visit with prescribing clinician: Visit date not found   Next office visit with prescribing clinician: 10/16/2024                         Would you like a call back once the refill request has been completed: [] Yes [] No    If the office needs to give you a call back, can they leave a voicemail: [] Yes [] No    Radha Purdy MA  09/04/24, 10:22 CDT

## 2024-09-16 ENCOUNTER — OFFICE VISIT (OUTPATIENT)
Dept: NEUROLOGY | Facility: CLINIC | Age: 69
End: 2024-09-16
Payer: MEDICARE

## 2024-09-16 VITALS
HEART RATE: 80 BPM | HEIGHT: 71 IN | DIASTOLIC BLOOD PRESSURE: 76 MMHG | RESPIRATION RATE: 16 BRPM | WEIGHT: 195.2 LBS | BODY MASS INDEX: 27.33 KG/M2 | SYSTOLIC BLOOD PRESSURE: 122 MMHG

## 2024-09-16 DIAGNOSIS — G23.8 CALCIFICATION OF BASAL GANGLIA: ICD-10-CM

## 2024-09-16 DIAGNOSIS — G25.81 RESTLESS LEGS SYNDROME (RLS): ICD-10-CM

## 2024-09-16 DIAGNOSIS — E11.42 DIABETIC POLYNEUROPATHY ASSOCIATED WITH TYPE 2 DIABETES MELLITUS: ICD-10-CM

## 2024-09-16 DIAGNOSIS — G31.84 MCI (MILD COGNITIVE IMPAIRMENT) WITH MEMORY LOSS: Primary | ICD-10-CM

## 2024-09-16 RX ORDER — GABAPENTIN 800 MG/1
800 TABLET ORAL 3 TIMES DAILY
Qty: 270 TABLET | Refills: 0 | Status: SHIPPED | OUTPATIENT
Start: 2024-09-16

## 2024-10-15 ENCOUNTER — OFFICE VISIT (OUTPATIENT)
Dept: FAMILY MEDICINE CLINIC | Facility: CLINIC | Age: 69
End: 2024-10-15
Payer: MEDICARE

## 2024-10-15 ENCOUNTER — TELEPHONE (OUTPATIENT)
Dept: FAMILY MEDICINE CLINIC | Facility: CLINIC | Age: 69
End: 2024-10-15

## 2024-10-15 VITALS
WEIGHT: 192 LBS | SYSTOLIC BLOOD PRESSURE: 128 MMHG | HEIGHT: 71 IN | OXYGEN SATURATION: 98 % | RESPIRATION RATE: 18 BRPM | DIASTOLIC BLOOD PRESSURE: 70 MMHG | BODY MASS INDEX: 26.88 KG/M2 | HEART RATE: 73 BPM | TEMPERATURE: 98 F

## 2024-10-15 DIAGNOSIS — Z79.4 TYPE 2 DIABETES MELLITUS WITH HYPERGLYCEMIA, WITH LONG-TERM CURRENT USE OF INSULIN: ICD-10-CM

## 2024-10-15 DIAGNOSIS — I10 ESSENTIAL HYPERTENSION: Primary | ICD-10-CM

## 2024-10-15 DIAGNOSIS — E11.65 TYPE 2 DIABETES MELLITUS WITH HYPERGLYCEMIA, WITH LONG-TERM CURRENT USE OF INSULIN: ICD-10-CM

## 2024-10-15 PROCEDURE — 1126F AMNT PAIN NOTED NONE PRSNT: CPT | Performed by: FAMILY MEDICINE

## 2024-10-15 PROCEDURE — 3046F HEMOGLOBIN A1C LEVEL >9.0%: CPT | Performed by: FAMILY MEDICINE

## 2024-10-15 PROCEDURE — 99214 OFFICE O/P EST MOD 30 MIN: CPT | Performed by: FAMILY MEDICINE

## 2024-10-15 PROCEDURE — 3078F DIAST BP <80 MM HG: CPT | Performed by: FAMILY MEDICINE

## 2024-10-15 PROCEDURE — 3074F SYST BP LT 130 MM HG: CPT | Performed by: FAMILY MEDICINE

## 2024-10-15 NOTE — PROGRESS NOTES
"Chief Complaint  Diabetes (Pt is here for a follow up for diabetes )    Subjective        Supa Mcclelland presents to [facility]  History of Present Illness  Mr. Mcclelland presents for a follow up on T2DM.      He has T2DM.  He is on Xultrophy and Humalog.  He is on Jardiance.  He follows with Endocrinology in [place].  He checks his sugars regularly.  He says they have been running \"normal\" but does not give a number.  He checks his feet regularly.  No sores.  He has diabetic neuropathy.  He is on Gabapentin.  This works well for him.      He has HTN.  He is on Losartan 25 mg PO daily.  He is on Metoprolol 50 mg PO BID.  He is on HCTZ 12.5 mg PO daily.      Objective   Vital Signs:  /70 (BP Location: Left arm, Patient Position: Sitting, Cuff Size: Adult)   Pulse 73   Temp 98 °F (36.7 °C) (Temporal)   Resp 18   Ht 180.3 cm (70.98\")   Wt 87.1 kg (192 lb)   SpO2 98%   BMI 26.79 kg/m²   Estimated body mass index is 26.79 kg/m² as calculated from the following:    Height as of this encounter: 180.3 cm (70.98\").    Weight as of this encounter: 87.1 kg (192 lb).            Physical Exam  Vitals reviewed.   Constitutional:       General: He is not in acute distress.     Appearance: Normal appearance. He is normal weight. He is not ill-appearing, toxic-appearing or diaphoretic.   HENT:      Head: Normocephalic and atraumatic.      Right Ear: External ear normal.      Left Ear: External ear normal.      Nose: Nose normal.   Eyes:      Extraocular Movements: Extraocular movements intact.   Cardiovascular:      Rate and Rhythm: Normal rate and regular rhythm.   Pulmonary:      Effort: Pulmonary effort is normal. No respiratory distress.      Breath sounds: Normal breath sounds.   Skin:     General: Skin is warm and dry.      Coloration: Skin is not jaundiced or pale.   Neurological:      Mental Status: He is alert and oriented to person, place, and time.   Psychiatric:         " Mood and Affect: Mood normal.         Behavior: Behavior normal.         Thought Content: Thought content normal.         Judgment: Judgment normal.            Result Review :                Assessment and Plan   Diagnoses and all orders for this visit:    1. Essential hypertension (Primary)  -     Comprehensive metabolic panel    2. Type 2 diabetes mellitus with hyperglycemia, with long-term current use of insulin  -     Hemoglobin A1c  -     Comprehensive metabolic panel    Continue current medications  Will check CMP and A1C  Keep follow up with Endocrine           Follow Up   No follow-ups on file.  Patient was given instructions and counseling regarding his condition or for health maintenance advice. Please see specific information pulled into the AVS if appropriate.

## 2024-10-15 NOTE — TELEPHONE ENCOUNTER
Caller: Supa Mcclelland    Relationship: Self    Best call back number: [phone]    What is the best time to reach you: ANYTIME     Who are you requesting to speak with (clinical staff, provider,  specific staff member): EMELIA    Do you know the name of the person who called: EMELIA    What was the call regarding: PATIENT RETURNING A MISSED CALL FROM EMELIA    Is it okay if the provider responds through MyChart: PREFERS A CALL BACK

## 2024-10-16 LAB
ALBUMIN SERPL-MCNC: 4.3 G/DL (ref 3.5–5.2)
ALBUMIN/GLOB SERPL: 1.4 G/DL
ALP SERPL-CCNC: 95 U/L (ref 39–117)
ALT SERPL-CCNC: 19 U/L (ref 1–41)
AST SERPL-CCNC: 23 U/L (ref 1–40)
BILIRUB SERPL-MCNC: 0.7 MG/DL (ref 0–1.2)
BUN SERPL-MCNC: 12 MG/DL (ref 8–23)
BUN/CREAT SERPL: 10 (ref 7–25)
CALCIUM SERPL-MCNC: 9.3 MG/DL (ref 8.6–10.5)
CHLORIDE SERPL-SCNC: 103 MMOL/L (ref 98–107)
CO2 SERPL-SCNC: 25.2 MMOL/L (ref 22–29)
CREAT SERPL-MCNC: 1.2 MG/DL (ref 0.76–1.27)
EGFRCR SERPLBLD CKD-EPI 2021: 65.1 ML/MIN/1.73
GLOBULIN SER CALC-MCNC: 3.1 GM/DL
GLUCOSE SERPL-MCNC: 81 MG/DL (ref 65–99)
HBA1C MFR BLD: 9.2 % (ref 4.8–5.6)
POTASSIUM SERPL-SCNC: 3.7 MMOL/L (ref 3.5–5.2)
PROT SERPL-MCNC: 7.4 G/DL (ref 6–8.5)
SODIUM SERPL-SCNC: 140 MMOL/L (ref 136–145)

## 2024-10-18 ENCOUNTER — HOSPITAL ENCOUNTER (EMERGENCY)
Facility: HOSPITAL | Age: 69
Discharge: HOME | End: 2024-10-18
Attending: INTERNAL MEDICINE
Payer: MEDICARE

## 2024-10-18 ENCOUNTER — APPOINTMENT (OUTPATIENT)
Dept: ULTRASOUND IMAGING | Facility: HOSPITAL | Age: 69
End: 2024-10-18
Payer: MEDICARE

## 2024-10-18 ENCOUNTER — APPOINTMENT (OUTPATIENT)
Dept: CT IMAGING | Facility: HOSPITAL | Age: 69
End: 2024-10-18
Payer: MEDICARE

## 2024-10-18 VITALS
DIASTOLIC BLOOD PRESSURE: 80 MMHG | HEART RATE: 78 BPM | OXYGEN SATURATION: 98 % | TEMPERATURE: 98.3 F | BODY MASS INDEX: 28.88 KG/M2 | RESPIRATION RATE: 18 BRPM | HEIGHT: 69 IN | WEIGHT: 195 LBS | SYSTOLIC BLOOD PRESSURE: 132 MMHG

## 2024-10-18 DIAGNOSIS — M79.604 RIGHT LEG PAIN: Primary | ICD-10-CM

## 2024-10-18 LAB
ALBUMIN SERPL-MCNC: 3.9 G/DL (ref 3.5–5.2)
ALBUMIN/GLOB SERPL: 1.1 G/DL
ALP SERPL-CCNC: 94 U/L (ref 39–117)
ALT SERPL W P-5'-P-CCNC: 19 U/L (ref 1–41)
ANION GAP SERPL CALCULATED.3IONS-SCNC: 15 MMOL/L (ref 5–15)
AST SERPL-CCNC: 23 U/L (ref 1–40)
BASOPHILS # BLD AUTO: 0.06 10*3/MM3 (ref 0–0.2)
BASOPHILS NFR BLD AUTO: 0.7 % (ref 0–1.5)
BILIRUB SERPL-MCNC: 0.6 MG/DL (ref 0–1.2)
BUN SERPL-MCNC: 15 MG/DL (ref 8–23)
BUN/CREAT SERPL: 14.9 (ref 7–25)
CALCIUM SPEC-SCNC: 9.4 MG/DL (ref 8.6–10.5)
CHLORIDE SERPL-SCNC: 100 MMOL/L (ref 98–107)
CO2 SERPL-SCNC: 22 MMOL/L (ref 22–29)
CREAT SERPL-MCNC: 1.01 MG/DL (ref 0.76–1.27)
CRP SERPL-MCNC: <0.3 MG/DL (ref 0–0.5)
DEPRECATED RDW RBC AUTO: 43.7 FL (ref 37–54)
EGFRCR SERPLBLD CKD-EPI 2021: 80 ML/MIN/1.73
EOSINOPHIL # BLD AUTO: 0.33 10*3/MM3 (ref 0–0.4)
EOSINOPHIL NFR BLD AUTO: 3.7 % (ref 0.3–6.2)
ERYTHROCYTE [DISTWIDTH] IN BLOOD BY AUTOMATED COUNT: 14.8 % (ref 12.3–15.4)
ERYTHROCYTE [SEDIMENTATION RATE] IN BLOOD: 27 MM/HR (ref 0–20)
GLOBULIN UR ELPH-MCNC: 3.7 GM/DL
GLUCOSE SERPL-MCNC: 231 MG/DL (ref 65–99)
HCT VFR BLD AUTO: 54.1 % (ref 37.5–51)
HGB BLD-MCNC: 18.2 G/DL (ref 13–17.7)
IMM GRANULOCYTES # BLD AUTO: 0.13 10*3/MM3 (ref 0–0.05)
IMM GRANULOCYTES NFR BLD AUTO: 1.5 % (ref 0–0.5)
LYMPHOCYTES # BLD AUTO: 3.25 10*3/MM3 (ref 0.7–3.1)
LYMPHOCYTES NFR BLD AUTO: 36.8 % (ref 19.6–45.3)
MCH RBC QN AUTO: 28.3 PG (ref 26.6–33)
MCHC RBC AUTO-ENTMCNC: 33.6 G/DL (ref 31.5–35.7)
MCV RBC AUTO: 84.1 FL (ref 79–97)
MONOCYTES # BLD AUTO: 0.69 10*3/MM3 (ref 0.1–0.9)
MONOCYTES NFR BLD AUTO: 7.8 % (ref 5–12)
NEUTROPHILS NFR BLD AUTO: 4.38 10*3/MM3 (ref 1.7–7)
NEUTROPHILS NFR BLD AUTO: 49.5 % (ref 42.7–76)
NRBC BLD AUTO-RTO: 0 /100 WBC (ref 0–0.2)
PLATELET # BLD AUTO: 194 10*3/MM3 (ref 140–450)
PMV BLD AUTO: 10.4 FL (ref 6–12)
POTASSIUM SERPL-SCNC: 3.8 MMOL/L (ref 3.5–5.2)
PROT SERPL-MCNC: 7.6 G/DL (ref 6–8.5)
RBC # BLD AUTO: 6.43 10*6/MM3 (ref 4.14–5.8)
SODIUM SERPL-SCNC: 137 MMOL/L (ref 136–145)
URATE SERPL-MCNC: 5.6 MG/DL (ref 3.4–7)
WBC NRBC COR # BLD AUTO: 8.84 10*3/MM3 (ref 3.4–10.8)

## 2024-10-18 PROCEDURE — 72131 CT LUMBAR SPINE W/O DYE: CPT

## 2024-10-18 PROCEDURE — 99284 EMERGENCY DEPT VISIT MOD MDM: CPT

## 2024-10-18 PROCEDURE — 93971 EXTREMITY STUDY: CPT

## 2024-10-18 PROCEDURE — 93971 EXTREMITY STUDY: CPT | Performed by: SURGERY

## 2024-10-18 PROCEDURE — 84550 ASSAY OF BLOOD/URIC ACID: CPT | Performed by: INTERNAL MEDICINE

## 2024-10-18 PROCEDURE — 80053 COMPREHEN METABOLIC PANEL: CPT | Performed by: INTERNAL MEDICINE

## 2024-10-18 PROCEDURE — 93922 UPR/L XTREMITY ART 2 LEVELS: CPT | Performed by: SURGERY

## 2024-10-18 PROCEDURE — 93922 UPR/L XTREMITY ART 2 LEVELS: CPT

## 2024-10-18 PROCEDURE — 36415 COLL VENOUS BLD VENIPUNCTURE: CPT

## 2024-10-18 PROCEDURE — 85652 RBC SED RATE AUTOMATED: CPT | Performed by: INTERNAL MEDICINE

## 2024-10-18 PROCEDURE — 85025 COMPLETE CBC W/AUTO DIFF WBC: CPT | Performed by: INTERNAL MEDICINE

## 2024-10-18 PROCEDURE — 86140 C-REACTIVE PROTEIN: CPT | Performed by: INTERNAL MEDICINE

## 2024-10-18 RX ORDER — GABAPENTIN 100 MG/1
100 CAPSULE ORAL ONCE
Status: DISCONTINUED | OUTPATIENT
Start: 2024-10-18 | End: 2024-10-18

## 2024-10-18 RX ORDER — HYDROCODONE BITARTRATE AND ACETAMINOPHEN 5; 325 MG/1; MG/1
1 TABLET ORAL ONCE
Status: COMPLETED | OUTPATIENT
Start: 2024-10-18 | End: 2024-10-18

## 2024-10-18 RX ORDER — HYDROCODONE BITARTRATE AND ACETAMINOPHEN 5; 325 MG/1; MG/1
1 TABLET ORAL EVERY 6 HOURS PRN
Qty: 12 TABLET | Refills: 0 | Status: SHIPPED | OUTPATIENT
Start: 2024-10-18

## 2024-10-18 RX ADMIN — HYDROCODONE BITARTRATE AND ACETAMINOPHEN 1 TABLET: 5; 325 TABLET ORAL at 10:52

## 2024-10-18 NOTE — ED PROVIDER NOTES
Subjective   History of Present Illness  69-year-old male presents to the emergency department with complaints of right leg pain.  It occurred all of a sudden about midnight.  He denies injury.  He denies any low back pain.  He describes it as a constant and sharp pain that is worse with movement.  He denies any shooting or electrical type of pain.  He states that the pain hurts worse with movement of the ankle.  The pain is in the medial aspect of the leg as well as the posterior aspect of the leg with palpation.  He also notes pain in the bottom of his foot.    Review of Systems   Musculoskeletal:         Right leg pain       Past Medical History:   Diagnosis Date    Depression     Diabetes mellitus     Disease of thyroid gland     HYPOTHYROIDISM    GERD (gastroesophageal reflux disease)     Hyperlipidemia     Hypertension     Kidney stone     Neuropathy     Plantar fasciitis        Allergies   Allergen Reactions    Atacand [Candesartan Cilexetil] Rash    Biaxin [Clarithromycin] Rash    Cefzil [Cefprozil] Rash    Levaquin [Levofloxacin] Rash    Penicillins Rash    Zestril [Lisinopril] Rash       Past Surgical History:   Procedure Laterality Date    CHOLECYSTECTOMY      COLONOSCOPY      COLONOSCOPY N/A 5/27/2020    Procedure: COLONOSCOPY WITH ANESTHESIA;  Surgeon: Rambo Padilla DO;  Location: [facility] ENDOSCOPY;  Service: Gastroenterology;  Laterality: N/A;  Pre: Change in Bowels  Post: Colon Polyp, Suboptimal Prep  [place] APRN  CO2 Inflation Used    ENDOSCOPY N/A 4/4/2017    Procedure: ESOPHAGOGASTRODUODENOSCOPY WITH ANESTHESIA;  Surgeon: Rambo Padilla DO;  Location: [facility] ENDOSCOPY;  Service:     KIDNEY STONE SURGERY      SHOULDER SURGERY         Family History   Problem Relation Age of Onset    Heart disease Mother     Diabetes Mother     Alzheimer's disease Father     Heart disease Father     Diabetes Sister     Heart disease Brother     Diabetes Sister     Diabetes Sister     Colon cancer Neg Hx      Esophageal cancer Neg Hx        Social History     Socioeconomic History    Marital status:    Tobacco Use    Smoking status: Never    Smokeless tobacco: Never   Vaping Use    Vaping status: Never Used   Substance and Sexual Activity    Alcohol use: No    Drug use: No    Sexual activity: Defer     Partners: Female           Objective   Physical Exam  Vitals reviewed.   Constitutional:       Appearance: Normal appearance. He is not ill-appearing.   HENT:      Head: Normocephalic and atraumatic.      Nose: Nose normal.   Eyes:      Conjunctiva/sclera: Conjunctivae normal.   Pulmonary:      Effort: Pulmonary effort is normal.   Musculoskeletal:         General: Tenderness present.      Comments: Tenderness to the lower aspect of the posterior right leg.  Pulses are difficult to palpate, I did ask nursing to Doppler the pulses.  Range of motion of the ankle and toes are intact.  There is no noted temperature difference between the right and left toes foot ankle or calf.  No obvious sign of bruising, laceration, or erythema.   Skin:     General: Skin is warm and dry.   Neurological:      Mental Status: He is alert and oriented to person, place, and time.      Cranial Nerves: No cranial nerve deficit.      Sensory: No sensory deficit.      Motor: No weakness.   Psychiatric:         Mood and Affect: Mood normal.         Behavior: Behavior normal.         Procedures           ED Course  ED Course as of 10/18/24 1356   Fri Oct 18, 2024   1353 I spoke with the patient.  We reviewed his lab results and imaging studies.  Discussed that he did have some decreased blood flow in the right foot.  I would like for him to follow-up with his PCP regarding this.  I will send home pain medication, as this did improve his pain in the ED.  The patient and his wife are agreeable with this plan of discharge with outpatient follow-up.  Patient will be discharged home in stable condition. [AJ]      ED Course User Index  [AJ] Rocky  DO Rafa Fields Anali TATY on 10/18/2024  1:14 PM CDT   RLE: no thrombus vis                              Lab Results (last 24 hours)       Procedure Component Value Units Date/Time    CBC & Differential [[id number]]  (Abnormal) Collected: 10/18/24 1103    Specimen: Blood Updated: 10/18/24 1108    Narrative:      The following orders were created for panel order CBC & Differential.  Procedure                               Abnormality         Status                     ---------                               -----------         ------                     CBC Auto Differential[[id number]]        Abnormal            Final result                 Please view results for these tests on the individual orders.    Comprehensive Metabolic Panel [[id number]]  (Abnormal) Collected: 10/18/24 1103    Specimen: Blood Updated: 10/18/24 1136     Glucose 231 mg/dL      BUN 15 mg/dL      Creatinine 1.01 mg/dL      Sodium 137 mmol/L      Potassium 3.8 mmol/L      Comment: Slight hemolysis detected by analyzer. Result may be falsely elevated.        Chloride 100 mmol/L      CO2 22.0 mmol/L      Calcium 9.4 mg/dL      Total Protein 7.6 g/dL      Albumin 3.9 g/dL      ALT (SGPT) 19 U/L      AST (SGOT) 23 U/L      Comment: Slight hemolysis detected by analyzer. Result may be falsely elevated.        Alkaline Phosphatase 94 U/L      Total Bilirubin 0.6 mg/dL      Globulin 3.7 gm/dL      A/G Ratio 1.1 g/dL      BUN/Creatinine Ratio 14.9     Anion Gap 15.0 mmol/L      eGFR 80.0 mL/min/1.73     Narrative:      GFR Normal >60  Chronic Kidney Disease <60  Kidney Failure <15      Uric Acid [[id number]]  (Normal) Collected: 10/18/24 1103    Specimen: Blood Updated: 10/18/24 1125     Uric Acid 5.6 mg/dL     Sedimentation Rate [[id number]]  (Abnormal) Collected: 10/18/24 1103    Specimen: Blood Updated: 10/18/24 1116     Sed Rate 27 mm/hr     C-reactive Protein [[id number]]  (Normal) Collected: 10/18/24 1103    Specimen: Blood Updated: 10/18/24  1125     C-Reactive Protein <0.30 mg/dL     CBC Auto Differential [[id number]]  (Abnormal) Collected: 10/18/24 1103    Specimen: Blood Updated: 10/18/24 1108     WBC 8.84 10*3/mm3      RBC 6.43 10*6/mm3      Hemoglobin 18.2 g/dL      Hematocrit 54.1 %      MCV 84.1 fL      MCH 28.3 pg      MCHC 33.6 g/dL      RDW 14.8 %      RDW-SD 43.7 fl      MPV 10.4 fL      Platelets 194 10*3/mm3      Neutrophil % 49.5 %      Lymphocyte % 36.8 %      Monocyte % 7.8 %      Eosinophil % 3.7 %      Basophil % 0.7 %      Immature Grans % 1.5 %      Neutrophils, Absolute 4.38 10*3/mm3      Lymphocytes, Absolute 3.25 10*3/mm3      Monocytes, Absolute 0.69 10*3/mm3      Eosinophils, Absolute 0.33 10*3/mm3      Basophils, Absolute 0.06 10*3/mm3      Immature Grans, Absolute 0.13 10*3/mm3      nRBC 0.0 /100 WBC           CT Lumbar Spine Without Contrast   Final Result   1. Moderate lumbar spondylosis.   2. Neural foramina and spinal canal are patent. No findings of HNP.                                                           This report was signed and finalized on 10/18/2024 11:35 AM by Dr. Adam Loco MD.          US Ankle / Brachial Indices Extremity Limited    (Results Pending)   US Venous Doppler Lower Extremity Right (duplex)    (Results Pending)                 Medical Decision Making  Differential diagnosis includes gout, sciatica/nerve impingement, DVT    Amount and/or Complexity of Data Reviewed  Labs: ordered.     Details: CBC CMP ESR CRP uric acid  Radiology: ordered.     Details: BINA venous Doppler CT lumbar spine    Risk  Prescription drug management.      Please follow-up with Dr. Montes this coming week.  He will want to review your studies done in the emergency department, and may recommend an outpatient follow-up with vascular surgery or a follow-up on your leg pain in the clinic.  Pain medication was called into your pharmacy this afternoon as we discussed.  Please make sure your bowels move daily as opioid  medications can cause constipation.  Return to the emergency department if your symptoms worsen or you have further concerns.    Final diagnoses:   Right leg pain       ED Disposition  ED Disposition       ED Disposition   Discharge    Condition   Stable    Comment   --               Alexis Montes MD  [address] [address] [phone]    In 3 days           Medication List        New Prescriptions      HYDROcodone-acetaminophen 5-325 MG per tablet  Commonly known as: Norco  Take 1 tablet by mouth Every 6 (Six) Hours As Needed for Moderate Pain.               Where to Get Your Medications        These medications were sent to [facility] [address] [place] [address] - [address] [place] [address] - [phone] [facility] [phone] [address] [address]      Phone: [phone]   HYDROcodone-acetaminophen 5-325 MG per tablet            Xenia Hidalgo, DO  10/18/24 [id number]       Xenia Hidalgo, DO  10/18/24 4728

## 2024-10-18 NOTE — DISCHARGE INSTRUCTIONS
Please follow-up with Dr. Montes this coming week.  He will want to review your studies done in the emergency department, and may recommend an outpatient follow-up with vascular surgery or a follow-up on your leg pain in the clinic.  Pain medication was called into your pharmacy this afternoon as we discussed.  Please make sure your bowels move daily as opioid medications can cause constipation.  Return to the emergency department if your symptoms worsen or you have further concerns.

## 2024-10-21 ENCOUNTER — TELEPHONE (OUTPATIENT)
Dept: FAMILY MEDICINE CLINIC | Facility: CLINIC | Age: 69
End: 2024-10-21
Payer: COMMERCIAL

## 2024-10-21 NOTE — TELEPHONE ENCOUNTER
Called Rossana back to follow up - wrong phone number listed on call note from [facility].   NA LVM for call back - [facility] to relay A1C Still elevated at 9.2 . Keep follow up with Endocrine

## 2024-10-21 NOTE — TELEPHONE ENCOUNTER
[facility] staff attempted to follow warm transfer process and was unsuccessful     Caller: McclellandRossana    Relationship to patient: Emergency Contact    Best call back number:    [phone]        Patient is needing: RETURNING A CALL TO DANIELITO

## 2024-10-21 NOTE — TELEPHONE ENCOUNTER
----- Message from Alexis Montes sent at 10/17/2024  3:00 PM CDT -----  Let him know:  A1C Still elevated at 9.2 . Keep follow up with Endocrine

## 2024-10-22 ENCOUNTER — OFFICE VISIT (OUTPATIENT)
Dept: FAMILY MEDICINE CLINIC | Facility: CLINIC | Age: 69
End: 2024-10-22
Payer: MEDICARE

## 2024-10-22 VITALS
HEIGHT: 69 IN | RESPIRATION RATE: 20 BRPM | TEMPERATURE: 97 F | SYSTOLIC BLOOD PRESSURE: 130 MMHG | WEIGHT: 196.4 LBS | HEART RATE: 79 BPM | BODY MASS INDEX: 29.09 KG/M2 | DIASTOLIC BLOOD PRESSURE: 76 MMHG | OXYGEN SATURATION: 96 %

## 2024-10-22 DIAGNOSIS — M72.2 PLANTAR FASCIITIS OF RIGHT FOOT: ICD-10-CM

## 2024-10-22 DIAGNOSIS — I73.9 PERIPHERAL ARTERIAL DISEASE: Primary | ICD-10-CM

## 2024-10-22 PROCEDURE — 3046F HEMOGLOBIN A1C LEVEL >9.0%: CPT | Performed by: FAMILY MEDICINE

## 2024-10-22 PROCEDURE — 1160F RVW MEDS BY RX/DR IN RCRD: CPT | Performed by: FAMILY MEDICINE

## 2024-10-22 PROCEDURE — 1159F MED LIST DOCD IN RCRD: CPT | Performed by: FAMILY MEDICINE

## 2024-10-22 PROCEDURE — 3078F DIAST BP <80 MM HG: CPT | Performed by: FAMILY MEDICINE

## 2024-10-22 PROCEDURE — 3075F SYST BP GE 130 - 139MM HG: CPT | Performed by: FAMILY MEDICINE

## 2024-10-22 PROCEDURE — 99214 OFFICE O/P EST MOD 30 MIN: CPT | Performed by: FAMILY MEDICINE

## 2024-10-22 PROCEDURE — 1126F AMNT PAIN NOTED NONE PRSNT: CPT | Performed by: FAMILY MEDICINE

## 2024-10-22 PROCEDURE — G2211 COMPLEX E/M VISIT ADD ON: HCPCS | Performed by: FAMILY MEDICINE

## 2024-10-22 RX ORDER — ASPIRIN 81 MG/1
81 TABLET ORAL DAILY
Qty: 30 TABLET | Refills: 0 | Status: SHIPPED | OUTPATIENT
Start: 2024-10-22

## 2024-10-22 NOTE — PROGRESS NOTES
"Chief Complaint  Hospital Follow Up Visit    Subjective        Supa Mcclelland presents to [facility]  History of Present Illness  Mr. Mcclelland presents today for an ER follow up.  He went to the ER 4 days ago on 10/18/24 with RLE pain.  BINA ordered in the ER is consistent with mild right lower extremity arterial insuffiencey.      He has pain in his right lower extremity.  He says it's in the leg and the foot.  He says his pain is not bothering him today.      Objective   Vital Signs:  /76 (BP Location: Left arm, Patient Position: Sitting, Cuff Size: Adult)   Pulse 79   Temp 97 °F (36.1 °C) (Infrared)   Resp 20   Ht 175.3 cm (69\")   Wt 89.1 kg (196 lb 6.4 oz)   SpO2 96%   BMI 29.00 kg/m²   Estimated body mass index is 29 kg/m² as calculated from the following:    Height as of this encounter: 175.3 cm (69\").    Weight as of this encounter: 89.1 kg (196 lb 6.4 oz).            Physical Exam  Vitals reviewed.   Constitutional:       General: He is not in acute distress.     Appearance: Normal appearance. He is normal weight. He is not ill-appearing, toxic-appearing or diaphoretic.   HENT:      Head: Normocephalic and atraumatic.      Right Ear: External ear normal.      Left Ear: External ear normal.      Nose: Nose normal.   Eyes:      Extraocular Movements: Extraocular movements intact.   Cardiovascular:      Rate and Rhythm: Normal rate and regular rhythm.   Pulmonary:      Effort: Pulmonary effort is normal. No respiratory distress.      Breath sounds: Normal breath sounds.   Skin:     General: Skin is warm and dry.      Coloration: Skin is not jaundiced or pale.   Neurological:      Mental Status: He is alert and oriented to person, place, and time.   Psychiatric:         Mood and Affect: Mood normal.         Behavior: Behavior normal.         Thought Content: Thought content normal.         Judgment: Judgment normal.        Result Review :                Assessment and " Plan   Diagnoses and all orders for this visit:    1. Peripheral arterial disease (Primary)  -     Ambulatory Referral to Vascular Surgery  -     aspirin 81 MG EC tablet; Take 1 tablet by mouth Daily.  Dispense: 30 tablet; Refill: 0    2. Plantar fasciitis of right foot    Add ASA 81 mg   Continue statin  Refer to Vascular surgery    Continue OTC meds for plantar fasciitis pain  Stretches provided             Follow Up   Return for Recheck.  Patient was given instructions and counseling regarding his condition or for health maintenance advice. Please see specific information pulled into the AVS if appropriate.

## 2024-10-30 ENCOUNTER — TELEPHONE (OUTPATIENT)
Dept: VASCULAR SURGERY | Facility: CLINIC | Age: 69
End: 2024-10-30
Payer: COMMERCIAL

## 2024-10-31 ENCOUNTER — OFFICE VISIT (OUTPATIENT)
Dept: VASCULAR SURGERY | Facility: CLINIC | Age: 69
End: 2024-10-31
Payer: MEDICARE

## 2024-10-31 VITALS
DIASTOLIC BLOOD PRESSURE: 80 MMHG | HEART RATE: 80 BPM | WEIGHT: 193 LBS | BODY MASS INDEX: 28.58 KG/M2 | SYSTOLIC BLOOD PRESSURE: 154 MMHG | OXYGEN SATURATION: 97 % | HEIGHT: 69 IN

## 2024-10-31 DIAGNOSIS — E78.49 OTHER HYPERLIPIDEMIA: ICD-10-CM

## 2024-10-31 DIAGNOSIS — Z79.4 TYPE 2 DIABETES MELLITUS WITH HYPERGLYCEMIA, WITH LONG-TERM CURRENT USE OF INSULIN: ICD-10-CM

## 2024-10-31 DIAGNOSIS — I73.9 PAD (PERIPHERAL ARTERY DISEASE): Primary | ICD-10-CM

## 2024-10-31 DIAGNOSIS — E11.65 TYPE 2 DIABETES MELLITUS WITH HYPERGLYCEMIA, WITH LONG-TERM CURRENT USE OF INSULIN: ICD-10-CM

## 2024-10-31 DIAGNOSIS — E11.42 DIABETIC POLYNEUROPATHY ASSOCIATED WITH TYPE 2 DIABETES MELLITUS: ICD-10-CM

## 2024-10-31 DIAGNOSIS — I65.23 BILATERAL CAROTID ARTERY STENOSIS: ICD-10-CM

## 2024-10-31 DIAGNOSIS — I10 ESSENTIAL HYPERTENSION: ICD-10-CM

## 2024-10-31 PROCEDURE — 99214 OFFICE O/P EST MOD 30 MIN: CPT | Performed by: NURSE PRACTITIONER

## 2024-10-31 PROCEDURE — 3079F DIAST BP 80-89 MM HG: CPT | Performed by: NURSE PRACTITIONER

## 2024-10-31 PROCEDURE — 3077F SYST BP >= 140 MM HG: CPT | Performed by: NURSE PRACTITIONER

## 2024-10-31 PROCEDURE — 1160F RVW MEDS BY RX/DR IN RCRD: CPT | Performed by: NURSE PRACTITIONER

## 2024-10-31 PROCEDURE — 1159F MED LIST DOCD IN RCRD: CPT | Performed by: NURSE PRACTITIONER

## 2024-10-31 RX ORDER — EMPAGLIFLOZIN 25 MG/1
25 TABLET, FILM COATED ORAL DAILY
COMMUNITY

## 2024-10-31 NOTE — PROGRESS NOTES
10/31/2024      Alexis Montes MD  [address]  [place] [address]    Supa Mcclelland  1955    Chief Complaint   Patient presents with    new patient     Says his right foot hasn't been hurting since he was in the ED.        Dear Alexis Montes MD:      HPI  I had the pleasure of seeing your patient Supa Mcclelland in the office today.  Thank you kindly for this consultation.  As you recall, Supa Mcclelland is a 69 y.o.  male who you are currently following for routine health maintenance.  He was recently seen in the emergency department with pain to his right shin into his foot.  He reports since that time he has not had any discomfort.  He denies any claudication to his lower extremities.  His feet are nice and warm.  He is maintained on aspirin and Crestor.  He did have noninvasive testing performed which I did review in office.    Past Medical History:   Diagnosis Date    Depression     Diabetes mellitus     Disease of thyroid gland     HYPOTHYROIDISM    GERD (gastroesophageal reflux disease)     Hyperlipidemia     Hypertension     Kidney stone     Neuropathy     Plantar fasciitis        Past Surgical History:   Procedure Laterality Date    CHOLECYSTECTOMY      COLONOSCOPY      COLONOSCOPY N/A 5/27/2020    Procedure: COLONOSCOPY WITH ANESTHESIA;  Surgeon: Rambo Padilla DO;  Location: [facility] ENDOSCOPY;  Service: Gastroenterology;  Laterality: N/A;  Pre: Change in Bowels  Post: Colon Polyp, Suboptimal Prep  [place] APRN  CO2 Inflation Used    ENDOSCOPY N/A 4/4/2017    Procedure: ESOPHAGOGASTRODUODENOSCOPY WITH ANESTHESIA;  Surgeon: Rambo Padilla DO;  Location: [facility] ENDOSCOPY;  Service:     KIDNEY STONE SURGERY      SHOULDER SURGERY         Family History   Problem Relation Age of Onset    Heart disease Mother     Diabetes Mother     Alzheimer's disease Father     Heart disease Father     Diabetes Sister     Heart disease Brother     Diabetes Sister     Diabetes Sister      Colon cancer Neg Hx     Esophageal cancer Neg Hx        Social History     Socioeconomic History    Marital status:    Tobacco Use    Smoking status: Never    Smokeless tobacco: Never   Vaping Use    Vaping status: Never Used   Substance and Sexual Activity    Alcohol use: No    Drug use: No    Sexual activity: Defer     Partners: Female       Allergies   Allergen Reactions    Atacand [Candesartan Cilexetil] Rash    Biaxin [Clarithromycin] Rash    Cefzil [Cefprozil] Rash    Levaquin [Levofloxacin] Rash    Penicillins Rash    Zestril [Lisinopril] Rash       Current Outpatient Medications   Medication Instructions    albuterol sulfate  (90 Base) MCG/ACT inhaler 2 puffs, Inhalation, Every 4 Hours PRN    aspirin 81 mg, Oral, Daily    citalopram (CELEXA) 20 mg, Oral, Every Night at Bedtime    famotidine (PEPCID) 20 mg, 2 Times Daily    fexofenadine (ALLEGRA) 180 mg, As Needed    fluticasone (FLONASE) 50 MCG/ACT nasal spray 1 spray each nostril twice a day for three days, then daily.    gabapentin (NEURONTIN) 800 mg, Oral, 3 Times Daily    HumaLOG KwikPen 22 Units, Subcutaneous, 3 Times Daily With Meals    hydroCHLOROthiazide 12.5 mg, Oral, Daily    HYDROcodone-acetaminophen (Norco) 5-325 MG per tablet 1 tablet, Oral, Every 6 Hours PRN    hydrOXYzine pamoate (VISTARIL) 25 mg, Oral, 3 Times Daily PRN    insulin degludec (TRESIBA FLEXTOUCH) 30 Units, Daily    Insulin Pen Needle (Pen Needles) 32G X 4 MM misc 1 each, Does not apply, 4 Times Daily, Use 4 x daily, Dx code E11.65    Jardiance 25 mg, Daily    levothyroxine (SYNTHROID, LEVOTHROID) 50 mcg, Oral, Daily    losartan (COZAAR) 25 mg, Every Morning    meloxicam (MOBIC) 15 MG tablet 1 tablet, Daily    metoprolol tartrate (LOPRESSOR) 50 mg, 2 Times Daily    potassium citrate (UROCIT-K) 10 MEQ (1080 MG) CR tablet 10 mEq, Oral, 3 Times Daily With Meals    RELION PEN NEEDLE 31G/8MM 31G X 8 MM misc Daily    rOPINIRole (REQUIP) 2 mg, Oral, Nightly, Take 1 hour  "before bedtime.     rosuvastatin (CRESTOR) 10 mg, Oral, Daily         Review of Systems   Constitutional: Negative.    HENT: Negative.     Eyes: Negative.    Respiratory: Negative.     Cardiovascular: Negative.    Gastrointestinal: Negative.    Endocrine: Negative.    Genitourinary: Negative.    Musculoskeletal:  Positive for arthralgias and back pain.   Skin: Negative.    Allergic/Immunologic: Negative.    Neurological: Negative.    Hematological: Negative.    Psychiatric/Behavioral: Negative.     All other systems reviewed and are negative.      /80   Pulse 80   Ht 175.3 cm (69\")   Wt 87.5 kg (193 lb)   SpO2 97%   BMI 28.50 kg/m²   Physical Exam  Constitutional:       General: He is not in acute distress.     Appearance: Normal appearance. He is well-developed. He is not diaphoretic.   HENT:      Head: Normocephalic and atraumatic.   Neck:      Vascular: No carotid bruit or JVD.   Cardiovascular:      Rate and Rhythm: Normal rate and regular rhythm.      Pulses:           Femoral pulses are 2+ on the right side and 2+ on the left side.       Dorsalis pedis pulses are detected w/ Doppler on the right side and detected w/ Doppler on the left side.        Posterior tibial pulses are detected w/ Doppler on the right side and detected w/ Doppler on the left side.      Heart sounds: Normal heart sounds, S1 normal and S2 normal. No murmur heard.     No friction rub. No gallop.   Pulmonary:      Effort: Pulmonary effort is normal.      Breath sounds: Normal breath sounds.   Abdominal:      General: Bowel sounds are normal. There is no abdominal bruit.      Palpations: Abdomen is soft.      Tenderness: There is no abdominal tenderness.   Musculoskeletal:         General: Normal range of motion.   Skin:     General: Skin is warm and dry.   Neurological:      Mental Status: He is alert and oriented to person, place, and time.      Cranial Nerves: No cranial nerve deficit.   Psychiatric:         Behavior: Behavior " normal.         Thought Content: Thought content normal.         Judgment: Judgment normal.         US Venous Doppler Lower Extremity Right (duplex)    Result Date: 10/18/2024  Narrative: History: Pain and swelling      Impression: Impression: There is no evidence of deep venous thrombosis or superficial thrombophlebitis of the right lower extremity.  Comments: Right lower extremity venous duplex exam was performed using color Doppler flow, Doppler wave form analysis, and grayscale imaging, with and without compression. There is no evidence of deep venous thrombosis of the common femoral, superficial femoral, popliteal, posterior tibial, and peroneal veins. There is no thrombus identified in the saphenofemoral junction or the greater saphenous vein.   This report was signed and finalized on 10/18/2024 3:36 PM by Dr. Murali Edyd MD.      US Ankle / Brachial Indices Extremity Limited    Result Date: 10/18/2024  Narrative:  History: PAD  Comments: Bilateral lower extremity arterial with multi-level pulse volume recordings and segmental pressures were performed at rest and stress.  The right ankle/brachial index is 0.79. The waveforms are biphasic without dampening. These findings are consistent with mild arterial insufficiency of the right lower extremity at rest.  The left ankle/brachial index is not obtainable due to noncompressibility of the vessels. The waveforms are biphasic without dampening.      Impression: Impression: 1. Mild arterial insufficiency of the right lower extremity at rest. 2. The left ankle/brachial index was not obtainable due to noncompressibility of the vessels. The waveforms are biphasic and maintained at the ankle.   This report was signed and finalized on 10/18/2024 3:21 PM by Dr. Murali Eddy MD.      CT Lumbar Spine Without Contrast    Result Date: 10/18/2024  Narrative: EXAMINATION: CT LUMBAR SPINE WO CONTRAST-   10/18/2024 9:48 AM  HISTORY: Radiculopathy right leg  In order to  have a CT radiation dose as low as reasonably achievable Automated Exposure Control was utilized for adjustment of the mA and/or KV according to patient size.  Total DLP = 558.11 mGy.cm  The CT scan of the lumbar spine is performed without intravenous or intrathecal contrast enhancement.  Images are acquired in axial plane with subsequent reconstruction in coronal and sagittal planes.  There is no previous similar study for comparison.  There is no evidence of an acute fracture or compression.  No acute displacement or malalignment.  Curvature and alignment are maintained.  Moderate chronic degenerative changes are seen in the form of osteophytes and facet arthropathy. There is a vacuum sign in the disc space L5-S1 without a significant loss of height. This represent a chronic degenerative process. Remaining disc heights are maintained.  T12-L1: No disc bulging or herniation. Moderate facet arthropathy. The neural foramina or spinal canal are patent.  L1-2: No disc bulging or herniation. No significant osteophytes. Moderate facet arthropathy. The neural foramina spinal canal are patent.  L2-3: No disc bulging or herniation. No significant osteophytes. Moderate bilateral facet arthropathy. Mild ligamental hypertrophy. The neural foramina and spinal canal are patent. There is a linear calcification along the left side of the thecal sac opposite vertebral L2 which probably represent a vascular calcification.  L3-4: Mild diffuse disc bulging. Bilateral facet arthropathy and mild ligamental hypertrophy. The neural foramina or spinal canal are patent.  L4-5: There is mild diffuse disc bulging. Moderate bilateral facet arthropathy and ligamental hypertrophy. The neural foramina and spinal canal are patent.  L5-S1: Mild diffuse disc bulging. There is bilateral facet arthropathy and mild ligamental hypertrophy. Neural foramina and spinal canal are patent.  Limited visualized paravertebral paraspinal soft tissues are  unremarkable.      Impression: 1. Moderate lumbar spondylosis. 2. Neural foramina and spinal canal are patent. No findings of HNP.               This report was signed and finalized on 10/18/2024 11:35 AM by Dr. Adam Loco MD.       Patient Active Problem List   Diagnosis    Essential hypertension    Hyperlipidemia    GERD (gastroesophageal reflux disease)    Disease of thyroid gland    Type 2 diabetes mellitus with hyperglycemia, with long-term current use of insulin    Depression    Nephrolithiasis    Diabetic polyneuropathy associated with type 2 diabetes mellitus    Altered bowel function    Restless legs syndrome (RLS)    Acute pancreatitis, unspecified complication status, unspecified pancreatitis type         ICD-10-CM ICD-9-CM   1. PAD (peripheral artery disease)  I73.9 443.9   2. Bilateral carotid artery stenosis  I65.23 433.10     433.30   3. Essential hypertension  I10 401.9   4. Other hyperlipidemia  E78.49 272.4   5. Type 2 diabetes mellitus with hyperglycemia, with long-term current use of insulin  E11.65 250.00    Z79.4 790.29     V58.67   6. Diabetic polyneuropathy associated with type 2 diabetes mellitus  E11.42 250.60     357.2           Plan: After thoroughly evaluating Supa Mcclelland, I believe the best course of action is to remain conservative from vascular surgery standpoint.  I did review his testing which shows mild arterial insufficiency to the right lower extremity and left with noncompressible vessels however waveforms are biphasic and maintained at the ankle.  His feet are nice and warm with Doppler signals present.  He describes no claudication and reports he has not had any pain since leaving the emergency room.  I will see him back in a year with repeat noninvasive testing including ABIs as well as a screening carotid duplex.  He can contact me sooner should he need anything.  I did discuss vascular risk factors as they pertain to the progression of vascular disease including  controlling his hypertension, hyperlipidemia, and diabetes.  His blood pressure is elevated in office at 154/80.  His diabetes is uncontrolled with most recent hemoglobin A1c of 9.2% however down from 11% 9 months ago.  He should continue his aspirin 81 mg daily and Crestor 10 mg daily in addition to his other medications.  The patient can continue taking their current medication regimen as previously planned.  Body mass index is 28.5 kg/m².       This was all discussed in full with complete understanding.    Thank you for allowing me to participate in the care of your patient.  Please do not hesitate with any questions or concerns.  I will keep you aware of any further encounters with Supa Mcclelland.        Sincerely yours,         [place]

## 2024-10-31 NOTE — LETTER
October 31, 2024     Alexis Montes MD  [address]  [place] [address]    Patient: Supa Mcclelland   YOB: 1955   Date of Visit: 10/31/2024     Dear Alexis Montes MD:       Thank you for referring Supa Mcclelland to me for evaluation. Below are the relevant portions of my assessment and plan of care.    If you have questions, please do not hesitate to call me. I look forward to following Supa along with you.         Sincerely,        GREY Park        CC: No Recipients    Rayna Dao APRN  10/31/24 1005  Sign when Signing Visit  10/31/2024      Alexis Montes MD  [address]  [place] [address]    Supa Mcclelland  1955    Chief Complaint   Patient presents with   • new patient     Says his right foot hasn't been hurting since he was in the ED.        Dear Alexis Montes MD:      HPI  I had the pleasure of seeing your patient Supa Mcclelland in the office today.  Thank you kindly for this consultation.  As you recall, Supa Mcclelland is a 69 y.o.  male who you are currently following for routine health maintenance.  He was recently seen in the emergency department with pain to his right shin into his foot.  He reports since that time he has not had any discomfort.  He denies any claudication to his lower extremities.  His feet are nice and warm.  He is maintained on aspirin and Crestor.  He did have noninvasive testing performed which I did review in office.    Past Medical History:   Diagnosis Date   • Depression    • Diabetes mellitus    • Disease of thyroid gland     HYPOTHYROIDISM   • GERD (gastroesophageal reflux disease)    • Hyperlipidemia    • Hypertension    • Kidney stone    • Neuropathy    • Plantar fasciitis        Past Surgical History:   Procedure Laterality Date   • CHOLECYSTECTOMY     • COLONOSCOPY     • COLONOSCOPY N/A 5/27/2020    Procedure: COLONOSCOPY WITH ANESTHESIA;  Surgeon: Rambo Padilla DO;  Location:   [facility];  Service: Gastroenterology;  Laterality: N/A;  Pre: Change in Bowels  Post: Colon Polyp, Suboptimal Prep  Kings APRN  CO2 Inflation Used   • ENDOSCOPY N/A 4/4/2017    Procedure: ESOPHAGOGASTRODUODENOSCOPY WITH ANESTHESIA;  Surgeon: Rambo Padilla DO;  Location: [facility] END[facility]COPY;  Service:    • KIDNEY STONE SURGERY     • SHOULDER SURGERY         Family History   Problem Relation Age of Onset   • Heart disease Mother    • Diabetes Mother    • Alzheimer's disease Father    • Heart disease Father    • Diabetes Sister    • Heart disease Brother    • Diabetes Sister    • Diabetes Sister    • Colon cancer Neg Hx    • Esophageal cancer Neg Hx        Social History     Socioeconomic History   • Marital status:    Tobacco Use   • Smoking status: Never   • Smokeless tobacco: Never   Vaping Use   • Vaping status: Never Used   Substance and Sexual Activity   • Alcohol use: No   • Drug use: No   • Sexual activity: Defer     Partners: Female       Allergies   Allergen Reactions   • Atacand [Candesartan Cilexetil] Rash   • Biaxin [Clarithromycin] Rash   • Cefzil [Cefprozil] Rash   • Levaquin [Levofloxacin] Rash   • Penicillins Rash   • Zestril [Lisinopril] Rash       Current Outpatient Medications   Medication Instructions   • albuterol sulfate  (90 Base) MCG/ACT inhaler 2 puffs, Inhalation, Every 4 Hours PRN   • aspirin 81 mg, Oral, Daily   • citalopram (CELEXA) 20 mg, Oral, Every Night at Bedtime   • famotidine (PEPCID) 20 mg, 2 Times Daily   • fexofenadine (ALLEGRA) 180 mg, As Needed   • fluticasone (FLONASE) 50 MCG/ACT nasal spray 1 spray each nostril twice a day for three days, then daily.   • gabapentin (NEURONTIN) 800 mg, Oral, 3 Times Daily   • HumaLOG KwikPen 22 Units, Subcutaneous, 3 Times Daily With Meals   • hydroCHLOROthiazide 12.5 mg, Oral, Daily   • HYDROcodone-acetaminophen (Norco) 5-325 MG per tablet 1 tablet, Oral, Every 6 Hours PRN   • hydrOXYzine pamoate (VISTARIL) 25 mg, Oral,  "3 Times Daily PRN   • insulin degludec (TRESIBA FLEXTOUCH) 30 Units, Daily   • Insulin Pen Needle (Pen Needles) 32G X 4 MM misc 1 each, Does not apply, 4 Times Daily, Use 4 x daily, Dx code E11.65   • Jardiance 25 mg, Daily   • levothyroxine (SYNTHROID, LEVOTHROID) 50 mcg, Oral, Daily   • losartan (COZAAR) 25 mg, Every Morning   • meloxicam (MOBIC) 15 MG tablet 1 tablet, Daily   • metoprolol tartrate (LOPRESSOR) 50 mg, 2 Times Daily   • potassium citrate (UROCIT-K) 10 MEQ (1080 MG) CR tablet 10 mEq, Oral, 3 Times Daily With Meals   • RELION PEN NEEDLE 31G/8MM 31G X 8 MM misc Daily   • rOPINIRole (REQUIP) 2 mg, Oral, Nightly, Take 1 hour before bedtime.    • rosuvastatin (CRESTOR) 10 mg, Oral, Daily         Review of Systems   Constitutional: Negative.    HENT: Negative.     Eyes: Negative.    Respiratory: Negative.     Cardiovascular: Negative.    Gastrointestinal: Negative.    Endocrine: Negative.    Genitourinary: Negative.    Musculoskeletal:  Positive for arthralgias and back pain.   Skin: Negative.    Allergic/Immunologic: Negative.    Neurological: Negative.    Hematological: Negative.    Psychiatric/Behavioral: Negative.     All other systems reviewed and are negative.      /80   Pulse 80   Ht 175.3 cm (69\")   Wt 87.5 kg (193 lb)   SpO2 97%   BMI 28.50 kg/m²   Physical Exam  Constitutional:       General: He is not in acute distress.     Appearance: Normal appearance. He is well-developed. He is not diaphoretic.   HENT:      Head: Normocephalic and atraumatic.   Neck:      Vascular: No carotid bruit or JVD.   Cardiovascular:      Rate and Rhythm: Normal rate and regular rhythm.      Pulses:           Femoral pulses are 2+ on the right side and 2+ on the left side.       Dorsalis pedis pulses are detected w/ Doppler on the right side and detected w/ Doppler on the left side.        Posterior tibial pulses are detected w/ Doppler on the right side and detected w/ Doppler on the left side.      Heart " sounds: Normal heart sounds, S1 normal and S2 normal. No murmur heard.     No friction rub. No gallop.   Pulmonary:      Effort: Pulmonary effort is normal.      Breath sounds: Normal breath sounds.   Abdominal:      General: Bowel sounds are normal. There is no abdominal bruit.      Palpations: Abdomen is soft.      Tenderness: There is no abdominal tenderness.   Musculoskeletal:         General: Normal range of motion.   Skin:     General: Skin is warm and dry.   Neurological:      Mental Status: He is alert and oriented to person, place, and time.      Cranial Nerves: No cranial nerve deficit.   Psychiatric:         Behavior: Behavior normal.         Thought Content: Thought content normal.         Judgment: Judgment normal.         US Venous Doppler Lower Extremity Right (duplex)    Result Date: 10/18/2024  Narrative: History: Pain and swelling      Impression: Impression: There is no evidence of deep venous thrombosis or superficial thrombophlebitis of the right lower extremity.  Comments: Right lower extremity venous duplex exam was performed using color Doppler flow, Doppler wave form analysis, and grayscale imaging, with and without compression. There is no evidence of deep venous thrombosis of the common femoral, superficial femoral, popliteal, posterior tibial, and peroneal veins. There is no thrombus identified in the saphenofemoral junction or the greater saphenous vein.   This report was signed and finalized on 10/18/2024 3:36 PM by Dr. Murali Eddy MD.      US Ankle / Brachial Indices Extremity Limited    Result Date: 10/18/2024  Narrative:  History: [facility]  Comments: Bilateral lower extremity arterial with multi-level pulse volume recordings and segmental pressures were performed at rest and stress.  The right ankle/brachial index is 0.79. The waveforms are biphasic without dampening. These findings are consistent with mild arterial insufficiency of the right lower extremity at rest.  The left  ankle/brachial index is not obtainable due to noncompressibility of the vessels. The waveforms are biphasic without dampening.      Impression: Impression: 1. Mild arterial insufficiency of the right lower extremity at rest. 2. The left ankle/brachial index was not obtainable due to noncompressibility of the vessels. The waveforms are biphasic and maintained at the ankle.   This report was signed and finalized on 10/18/2024 3:21 PM by Dr. Murali Eddy MD.      CT Lumbar Spine Without Contrast    Result Date: 10/18/2024  Narrative: EXAMINATION: CT LUMBAR SPINE WO CONTRAST-   10/18/2024 9:48 AM  HISTORY: Radiculopathy right leg  In order to have a CT radiation dose as low as reasonably achievable Automated Exposure Control was utilized for adjustment of the mA and/or KV according to patient size.  Total DLP = 558.11 mGy.cm  The CT scan of the lumbar spine is performed without intravenous or intrathecal contrast enhancement.  Images are acquired in axial plane with subsequent reconstruction in coronal and sagittal planes.  There is no previous similar study for comparison.  There is no evidence of an acute fracture or compression.  No acute displacement or malalignment.  Curvature and alignment are maintained.  Moderate chronic degenerative changes are seen in the form of osteophytes and facet arthropathy. There is a vacuum sign in the disc space L5-S1 without a significant loss of height. This represent a chronic degenerative process. Remaining disc heights are maintained.  T12-L1: No disc bulging or herniation. Moderate facet arthropathy. The neural foramina or spinal canal are patent.  L1-2: No disc bulging or herniation. No significant osteophytes. Moderate facet arthropathy. The neural foramina spinal canal are patent.  L2-3: No disc bulging or herniation. No significant osteophytes. Moderate bilateral facet arthropathy. Mild ligamental hypertrophy. The neural foramina and spinal canal are patent. There is a  linear calcification along the left side of the thecal sac opposite vertebral L2 which probably represent a vascular calcification.  L3-4: Mild diffuse disc bulging. Bilateral facet arthropathy and mild ligamental hypertrophy. The neural foramina or spinal canal are patent.  L4-5: There is mild diffuse disc bulging. Moderate bilateral facet arthropathy and ligamental hypertrophy. The neural foramina and spinal canal are patent.  L5-S1: Mild diffuse disc bulging. There is bilateral facet arthropathy and mild ligamental hypertrophy. Neural foramina and spinal canal are patent.  Limited visualized paravertebral paraspinal soft tissues are unremarkable.      Impression: 1. Moderate lumbar spondylosis. 2. Neural foramina and spinal canal are patent. No findings of HNP.               This report was signed and finalized on 10/18/2024 11:35 AM by Dr. Adam Loco MD.       Patient Active Problem List   Diagnosis   • Essential hypertension   • Hyperlipidemia   • GERD (gastroesophageal reflux disease)   • Disease of thyroid gland   • Type 2 diabetes mellitus with hyperglycemia, with long-term current use of insulin   • Depression   • Nephrolithiasis   • Diabetic polyneuropathy associated with type 2 diabetes mellitus   • Altered bowel function   • Restless legs syndrome (RLS)   • Acute pancreatitis, unspecified complication status, unspecified pancreatitis type         ICD-10-CM ICD-9-CM   1. PAD (peripheral artery disease)  I73.9 443.9   2. Bilateral carotid artery stenosis  I65.23 433.10     433.30   3. Essential hypertension  I10 401.9   4. Other hyperlipidemia  E78.49 272.4   5. Type 2 diabetes mellitus with hyperglycemia, with long-term current use of insulin  E11.65 250.00    Z79.4 790.29     V58.67   6. Diabetic polyneuropathy associated with type 2 diabetes mellitus  E11.42 250.60     357.2           Plan: After thoroughly evaluating Supa Mcclelland, I believe the best course of action is to remain conservative  from vascular surgery standpoint.  I did review his testing which shows mild arterial insufficiency to the right lower extremity and left with noncompressible vessels however waveforms are biphasic and maintained at the ankle.  His feet are nice and warm with Doppler signals present.  He describes no claudication and reports he has not had any pain since leaving the emergency room.  I will see him back in a year with repeat noninvasive testing including ABIs as well as a screening carotid duplex.  He can contact me sooner should he need anything.  I did discuss vascular risk factors as they pertain to the progression of vascular disease including controlling his hypertension, hyperlipidemia, and diabetes.  His blood pressure is elevated in office at 154/80.  His diabetes is uncontrolled with most recent hemoglobin A1c of 9.2% however down from 11% 9 months ago.  He should continue his aspirin 81 mg daily and Crestor 10 mg daily in addition to his other medications.  The patient can continue taking their current medication regimen as previously planned.  Body mass index is 28.5 kg/m².       This was all discussed in full with complete understanding.    Thank you for allowing me to participate in the care of your patient.  Please do not hesitate with any questions or concerns.  I will keep you aware of any further encounters with Supa Mcclelland.        Sincerely yours,         GREY Park

## 2024-11-01 ENCOUNTER — PATIENT ROUNDING (BHMG ONLY) (OUTPATIENT)
Dept: VASCULAR SURGERY | Facility: CLINIC | Age: 69
End: 2024-11-01
Payer: COMMERCIAL

## 2024-11-01 NOTE — PROGRESS NOTES
November 1, 2024    Hello, may I speak with Supa RAI Krystin?    My name is OLIVIA      I am  with [facility]  [address], [address]  [facility] [address] [phone].    Before we get started may I verify your date of birth? 1955    I am calling to officially welcome you to our practice and ask about your recent visit. Is this a good time to talk? LEFT     Tell me about your visit with us. What things went well?  LEFT        We're always looking for ways to make our patients' experiences even better. Do you have recommendations on ways we may improve?  LEFT     Overall were you satisfied with your first visit to our practice? LEFT        I appreciate you taking the time to speak with me today. Is there anything else I can do for you? LEFT       Thank you, and have a great day.

## 2024-12-02 ENCOUNTER — OFFICE VISIT (OUTPATIENT)
Dept: FAMILY MEDICINE CLINIC | Facility: CLINIC | Age: 69
End: 2024-12-02
Payer: MEDICARE

## 2024-12-02 VITALS
OXYGEN SATURATION: 99 % | DIASTOLIC BLOOD PRESSURE: 82 MMHG | TEMPERATURE: 97.3 F | SYSTOLIC BLOOD PRESSURE: 147 MMHG | HEIGHT: 69 IN | BODY MASS INDEX: 29.18 KG/M2 | WEIGHT: 197 LBS | HEART RATE: 89 BPM | RESPIRATION RATE: 20 BRPM

## 2024-12-02 DIAGNOSIS — J01.40 ACUTE NON-RECURRENT PANSINUSITIS: Primary | ICD-10-CM

## 2024-12-02 DIAGNOSIS — J06.9 UPPER RESPIRATORY TRACT INFECTION, UNSPECIFIED TYPE: ICD-10-CM

## 2024-12-02 PROCEDURE — 3046F HEMOGLOBIN A1C LEVEL >9.0%: CPT | Performed by: NURSE PRACTITIONER

## 2024-12-02 PROCEDURE — 1160F RVW MEDS BY RX/DR IN RCRD: CPT | Performed by: NURSE PRACTITIONER

## 2024-12-02 PROCEDURE — 3079F DIAST BP 80-89 MM HG: CPT | Performed by: NURSE PRACTITIONER

## 2024-12-02 PROCEDURE — 1159F MED LIST DOCD IN RCRD: CPT | Performed by: NURSE PRACTITIONER

## 2024-12-02 PROCEDURE — 3077F SYST BP >= 140 MM HG: CPT | Performed by: NURSE PRACTITIONER

## 2024-12-02 PROCEDURE — 99214 OFFICE O/P EST MOD 30 MIN: CPT | Performed by: NURSE PRACTITIONER

## 2024-12-02 PROCEDURE — 1126F AMNT PAIN NOTED NONE PRSNT: CPT | Performed by: NURSE PRACTITIONER

## 2024-12-02 RX ORDER — GUAIFENESIN/DEXTROMETHORPHAN 100-10MG/5
5 SYRUP ORAL 3 TIMES DAILY PRN
Qty: 237 ML | Refills: 0 | Status: SHIPPED | OUTPATIENT
Start: 2024-12-02

## 2024-12-02 RX ORDER — DOXYCYCLINE 100 MG/1
100 CAPSULE ORAL 2 TIMES DAILY
Qty: 14 CAPSULE | Refills: 0 | Status: SHIPPED | OUTPATIENT
Start: 2024-12-02 | End: 2024-12-09

## 2024-12-02 NOTE — PROGRESS NOTES
"Chief Complaint  URI    Dixie Mcclelland presents to [facility]  History of Present Illness  The patient is a 69-year-old male who presents today for evaluation of upper respiratory symptoms. He is accompanied by an adult female.    He reports experiencing a head cold, cough, and congestion, which began last Thursday or Friday. He has been using Astelin nasal spray and cough medicine to manage these symptoms. He reports no shortness of breath. He also mentions that his ears feel blocked, but without any associated pain. He does not believe his symptoms are indicative of COVID-19 or influenza.    His diabetes is not well controlled, and Dr. Montes is currently managing this condition. He is also under the care of Dr. Gomez for his blood pressure.    Additionally, he has some skin issues and frequently scratches the affected areas. He is seeing a dermatologist for these concerns and had a lesion removed in 01/2024.    ALLERGIES  He is allergic to PENICILLIN, CEFAZOLIN, BIAXIN, and SEVERAL ANTIBIOTICS.    Objective   Vital Signs:  /82 (BP Location: Left arm, Patient Position: Sitting, Cuff Size: Large Adult)   Pulse 89   Temp 97.3 °F (36.3 °C) (Infrared)   Resp 20   Ht 175.3 cm (69\")   Wt 89.4 kg (197 lb)   SpO2 99%   BMI 29.09 kg/m²   Estimated body mass index is 29.09 kg/m² as calculated from the following:    Height as of this encounter: 175.3 cm (69\").    Weight as of this encounter: 89.4 kg (197 lb).               Physical Exam  Vitals and nursing note reviewed.   Constitutional:       Appearance: He is well-developed.   HENT:      Head: Normocephalic and atraumatic.      Nose: Mucosal edema and congestion present.      Right Sinus: Maxillary sinus tenderness and frontal sinus tenderness present.      Left Sinus: Maxillary sinus tenderness and frontal sinus tenderness present.      Mouth/Throat:      Pharynx: Oropharyngeal exudate present.   Eyes:    " All catheters were removed.      Conjunctiva/sclera: Conjunctivae normal.   Cardiovascular:      Rate and Rhythm: Normal rate and regular rhythm.   Pulmonary:      Effort: Pulmonary effort is normal.   Musculoskeletal:      Cervical back: Normal range of motion.   Skin:     General: Skin is warm and dry.   Neurological:      General: No focal deficit present.      Mental Status: He is alert and oriented to person, place, and time.   Psychiatric:         Mood and Affect: Mood normal.         Behavior: Behavior normal.        Physical Exam      Result Review :          Results             Assessment and Plan     Diagnoses and all orders for this visit:    1. Acute non-recurrent pansinusitis (Primary)    2. Upper respiratory tract infection, unspecified type    Other orders  -     doxycycline (VIBRAMYCIN) 100 MG capsule; Take 1 capsule by mouth 2 (Two) Times a Day for 7 days.  Dispense: 14 capsule; Refill: 0  -     guaiFENesin-dextromethorphan (ROBITUSSIN DM) 100-10 MG/5ML syrup; Take 5 mL by mouth 3 (Three) Times a Day As Needed for Cough.  Dispense: 237 mL; Refill: 0      Assessment & Plan  1. Upper respiratory infection/pansinusitis   He presents with symptoms of cough, congestion, and a head cold, which have been ongoing since last Thursday or Friday. Examination revealed fluid in the ears and a slightly infected throat. Doxycycline has been prescribed to manage the infection. If there is no improvement with doxycycline, a different antibiotic will be considered.             Follow Up     Return in about 1 week (around 12/9/2024), or if symptoms worsen or fail to improve.  Patient was given instructions and counseling regarding his condition or for health maintenance advice. Please see specific information pulled into the AVS if appropriate.       Patient or patient representative verbalized consent for the use of Ambient Listening during the visit with  GREY Horn for chart documentation. 12/16/2024  21:17 CST

## 2025-01-16 ENCOUNTER — APPOINTMENT (OUTPATIENT)
Dept: GENERAL RADIOLOGY | Facility: HOSPITAL | Age: 70
End: 2025-01-16
Payer: MEDICARE

## 2025-01-16 ENCOUNTER — HOSPITAL ENCOUNTER (EMERGENCY)
Facility: HOSPITAL | Age: 70
Discharge: HOME | End: 2025-01-16
Attending: FAMILY MEDICINE
Payer: MEDICARE

## 2025-01-16 VITALS
BODY MASS INDEX: 27.58 KG/M2 | HEIGHT: 71 IN | HEART RATE: 91 BPM | SYSTOLIC BLOOD PRESSURE: 150 MMHG | DIASTOLIC BLOOD PRESSURE: 95 MMHG | RESPIRATION RATE: 20 BRPM | TEMPERATURE: 99.1 F | WEIGHT: 197 LBS | OXYGEN SATURATION: 95 %

## 2025-01-16 DIAGNOSIS — R53.1 WEAKNESS: Primary | ICD-10-CM

## 2025-01-16 DIAGNOSIS — R42 DIZZINESS: ICD-10-CM

## 2025-01-16 LAB
ALBUMIN SERPL-MCNC: 4.1 G/DL (ref 3.5–5.2)
ALBUMIN/GLOB SERPL: 1.2 G/DL
ALP SERPL-CCNC: 100 U/L (ref 39–117)
ALT SERPL W P-5'-P-CCNC: 33 U/L (ref 1–41)
ANION GAP SERPL CALCULATED.3IONS-SCNC: 15 MMOL/L (ref 5–15)
AST SERPL-CCNC: 36 U/L (ref 1–40)
BASOPHILS # BLD AUTO: 0.06 10*3/MM3 (ref 0–0.2)
BASOPHILS NFR BLD AUTO: 0.6 % (ref 0–1.5)
BILIRUB SERPL-MCNC: 0.6 MG/DL (ref 0–1.2)
BUN SERPL-MCNC: 18 MG/DL (ref 8–23)
BUN/CREAT SERPL: 15 (ref 7–25)
CALCIUM SPEC-SCNC: 9.4 MG/DL (ref 8.6–10.5)
CHLORIDE SERPL-SCNC: 100 MMOL/L (ref 98–107)
CO2 SERPL-SCNC: 22 MMOL/L (ref 22–29)
CREAT SERPL-MCNC: 1.2 MG/DL (ref 0.76–1.27)
D-LACTATE SERPL-SCNC: 1.9 MMOL/L (ref 0.5–2)
D-LACTATE SERPL-SCNC: 3 MMOL/L (ref 0.5–2)
DEPRECATED RDW RBC AUTO: 43.7 FL (ref 37–54)
EGFRCR SERPLBLD CKD-EPI 2021: 65.1 ML/MIN/1.73
EOSINOPHIL # BLD AUTO: 0.41 10*3/MM3 (ref 0–0.4)
EOSINOPHIL NFR BLD AUTO: 4.2 % (ref 0.3–6.2)
ERYTHROCYTE [DISTWIDTH] IN BLOOD BY AUTOMATED COUNT: 14.2 % (ref 12.3–15.4)
GEN 5 1HR TROPONIN T REFLEX: 23 NG/L
GLOBULIN UR ELPH-MCNC: 3.5 GM/DL
GLUCOSE BLDC GLUCOMTR-MCNC: 324 MG/DL (ref 70–130)
GLUCOSE SERPL-MCNC: 365 MG/DL (ref 65–99)
HCT VFR BLD AUTO: 47.4 % (ref 37.5–51)
HGB BLD-MCNC: 16.5 G/DL (ref 13–17.7)
IMM GRANULOCYTES # BLD AUTO: 0.14 10*3/MM3 (ref 0–0.05)
IMM GRANULOCYTES NFR BLD AUTO: 1.4 % (ref 0–0.5)
LYMPHOCYTES # BLD AUTO: 3.35 10*3/MM3 (ref 0.7–3.1)
LYMPHOCYTES NFR BLD AUTO: 34.5 % (ref 19.6–45.3)
MAGNESIUM SERPL-MCNC: 1.8 MG/DL (ref 1.6–2.4)
MCH RBC QN AUTO: 29.3 PG (ref 26.6–33)
MCHC RBC AUTO-ENTMCNC: 34.8 G/DL (ref 31.5–35.7)
MCV RBC AUTO: 84.2 FL (ref 79–97)
MONOCYTES # BLD AUTO: 0.83 10*3/MM3 (ref 0.1–0.9)
MONOCYTES NFR BLD AUTO: 8.6 % (ref 5–12)
NEUTROPHILS NFR BLD AUTO: 4.91 10*3/MM3 (ref 1.7–7)
NEUTROPHILS NFR BLD AUTO: 50.7 % (ref 42.7–76)
NRBC BLD AUTO-RTO: 0 /100 WBC (ref 0–0.2)
PLATELET # BLD AUTO: 195 10*3/MM3 (ref 140–450)
PMV BLD AUTO: 11.1 FL (ref 6–12)
POTASSIUM SERPL-SCNC: 3.8 MMOL/L (ref 3.5–5.2)
PROT SERPL-MCNC: 7.6 G/DL (ref 6–8.5)
RBC # BLD AUTO: 5.63 10*6/MM3 (ref 4.14–5.8)
SODIUM SERPL-SCNC: 137 MMOL/L (ref 136–145)
TROPONIN T % DELTA: 0
TROPONIN T NUMERIC DELTA: 0 NG/L
TROPONIN T SERPL HS-MCNC: 23 NG/L
WBC NRBC COR # BLD AUTO: 9.7 10*3/MM3 (ref 3.4–10.8)

## 2025-01-16 PROCEDURE — 85025 COMPLETE CBC W/AUTO DIFF WBC: CPT | Performed by: FAMILY MEDICINE

## 2025-01-16 PROCEDURE — 36415 COLL VENOUS BLD VENIPUNCTURE: CPT

## 2025-01-16 PROCEDURE — 80053 COMPREHEN METABOLIC PANEL: CPT | Performed by: FAMILY MEDICINE

## 2025-01-16 PROCEDURE — 82948 REAGENT STRIP/BLOOD GLUCOSE: CPT

## 2025-01-16 PROCEDURE — 93010 ELECTROCARDIOGRAM REPORT: CPT | Performed by: INTERNAL MEDICINE

## 2025-01-16 PROCEDURE — 83735 ASSAY OF MAGNESIUM: CPT | Performed by: FAMILY MEDICINE

## 2025-01-16 PROCEDURE — 99284 EMERGENCY DEPT VISIT MOD MDM: CPT

## 2025-01-16 PROCEDURE — 93005 ELECTROCARDIOGRAM TRACING: CPT | Performed by: FAMILY MEDICINE

## 2025-01-16 PROCEDURE — 84484 ASSAY OF TROPONIN QUANT: CPT | Performed by: FAMILY MEDICINE

## 2025-01-16 PROCEDURE — 71045 X-RAY EXAM CHEST 1 VIEW: CPT

## 2025-01-16 PROCEDURE — 83605 ASSAY OF LACTIC ACID: CPT | Performed by: FAMILY MEDICINE

## 2025-01-16 RX ORDER — SODIUM CHLORIDE 0.9 % (FLUSH) 0.9 %
10 SYRINGE (ML) INJECTION AS NEEDED
Status: DISCONTINUED | OUTPATIENT
Start: 2025-01-16 | End: 2025-01-16 | Stop reason: HOSPADM

## 2025-01-16 NOTE — ED PROVIDER NOTES
Subjective   History of Present Illness  69-year-old male states that he was at his house cleaning his windows.  He started to feel weak and dizzy.  He had no syncopal episode.  He then went to [facility]  Because that might be low blood sugar.  But he did not check his sugars.  History of diabetes.  Noncompliant with checking his sugars.  Ate a fish sandwich and a milkshake.  He started feel better after that.  Patient denies any other symptoms at this time.      Review of Systems   Neurological:  Positive for dizziness and weakness.   All other systems reviewed and are negative.      Past Medical History:   Diagnosis Date    Depression     Diabetes mellitus     Disease of thyroid gland     HYPOTHYROIDISM    GERD (gastroesophageal reflux disease)     Hyperlipidemia     Hypertension     Kidney stone     Neuropathy     Plantar fasciitis        Allergies   Allergen Reactions    Atacand [Candesartan Cilexetil] Rash    Biaxin [Clarithromycin] Rash    Cefzil [Cefprozil] Rash    Levaquin [Levofloxacin] Rash    Penicillins Rash    Zestril [Lisinopril] Rash       Past Surgical History:   Procedure Laterality Date    CHOLECYSTECTOMY      COLONOSCOPY      COLONOSCOPY N/A 5/27/2020    Procedure: COLONOSCOPY WITH ANESTHESIA;  Surgeon: Rambo Padilla DO;  Location: [facility] ENDOSCOPY;  Service: Gastroenterology;  Laterality: N/A;  Pre: Change in Bowels  Post: Colon Polyp, Suboptimal Prep  Kings APRN  CO2 Inflation Used    ENDOSCOPY N/A 4/4/2017    Procedure: ESOPHAGOGASTRODUODENOSCOPY WITH ANESTHESIA;  Surgeon: Rambo Padilla DO;  Location: [facility] ENDOSCOPY;  Service:     KIDNEY STONE SURGERY      SHOULDER SURGERY         Family History   Problem Relation Age of Onset    Heart disease Mother     Diabetes Mother     Alzheimer's disease Father     Heart disease Father     Diabetes Sister     Heart disease Brother     Diabetes Sister     Diabetes Sister     Colon cancer Neg Hx     Esophageal cancer Neg Hx        Social  History     Socioeconomic History    Marital status:    Tobacco Use    Smoking status: Never    Smokeless tobacco: Never   Vaping Use    Vaping status: Never Used   Substance and Sexual Activity    Alcohol use: No    Drug use: No    Sexual activity: Defer     Partners: Female           Objective   Physical Exam  Vitals and nursing note reviewed.   Constitutional:       Appearance: Normal appearance.   HENT:      Head: Normocephalic and atraumatic.      Mouth/Throat:      Mouth: Mucous membranes are moist.   Eyes:      Extraocular Movements: Extraocular movements intact.      Pupils: Pupils are equal, round, and reactive to light.   Cardiovascular:      Rate and Rhythm: Normal rate and regular rhythm.      Heart sounds: Normal heart sounds.   Pulmonary:      Effort: Pulmonary effort is normal.      Breath sounds: Normal breath sounds.   Abdominal:      General: Bowel sounds are normal.      Palpations: Abdomen is soft.      Tenderness: There is no abdominal tenderness.   Skin:     General: Skin is warm and dry.   Neurological:      General: No focal deficit present.      Mental Status: He is alert and oriented to person, place, and time.      Cranial Nerves: No cranial nerve deficit.      Sensory: No sensory deficit.      Motor: No weakness.      Coordination: Coordination normal.   Psychiatric:         Mood and Affect: Mood normal.         Behavior: Behavior normal.         Procedures           ED Course  ED Course as of 01/16/25 1558   Thu Jan 16, 2025   1223 EKG rate 101  Sinus tachycardia  No STEMI [RP]      ED Course User Index  [RP] Crescencio Taylor MD                                                     Lab Results (last 24 hours)       Procedure Component Value Units Date/Time    POC Glucose Once [[id number]]  (Abnormal) Collected: 01/16/25 1207    Specimen: Blood Updated: 01/16/25 1218     Glucose 324 mg/dL      Comment: : [id number] Andrea Nobleter ID: [id number]       CBC & Differential  [[id number]]  (Abnormal) Collected: 01/16/25 1212    Specimen: Blood Updated: 01/16/25 1229    Narrative:      The following orders were created for panel order CBC & Differential.  Procedure                               Abnormality         Status                     ---------                               -----------         ------                     CBC Auto Differential[[id number]]        Abnormal            Final result                 Please view results for these tests on the individual orders.    Comprehensive Metabolic Panel [[id number]]  (Abnormal) Collected: 01/16/25 1212    Specimen: Blood Updated: 01/16/25 1248     Glucose 365 mg/dL      BUN 18 mg/dL      Creatinine 1.20 mg/dL      Sodium 137 mmol/L      Potassium 3.8 mmol/L      Comment: Slight hemolysis detected by analyzer. Result may be falsely elevated.        Chloride 100 mmol/L      CO2 22.0 mmol/L      Calcium 9.4 mg/dL      Total Protein 7.6 g/dL      Albumin 4.1 g/dL      ALT (SGPT) 33 U/L      AST (SGOT) 36 U/L      Comment: Slight hemolysis detected by analyzer. Result may be falsely elevated.        Alkaline Phosphatase 100 U/L      Total Bilirubin 0.6 mg/dL      Globulin 3.5 gm/dL      A/G Ratio 1.2 g/dL      BUN/Creatinine Ratio 15.0     Anion Gap 15.0 mmol/L      eGFR 65.1 mL/min/1.73     Narrative:      GFR Categories in Chronic Kidney Disease (CKD)      GFR Category          GFR (mL/min/1.73)    Interpretation  G1                     90 or greater         Normal or high (1)  G2                      60-89                Mild decrease (1)  G3a                   45-59                Mild to moderate decrease  G3b                   30-44                Moderate to severe decrease  G4                    15-29                Severe decrease  G5                    14 or less           Kidney failure          (1)In the absence of evidence of kidney disease, neither GFR category G1 or G2 fulfill the criteria for CKD.    eGFR calculation 2021  CKD-EPI creatinine equation, which does not include race as a factor    High Sensitivity Troponin T [[id number]]  (Abnormal) Collected: 01/16/25 1212    Specimen: Blood Updated: 01/16/25 1243     HS Troponin T 23 ng/L     Narrative:      High Sensitive Troponin T Reference Range:  <14.0 ng/L- Negative Female for AMI  <22.0 ng/L- Negative Male for AMI  >=14 - Abnormal Female indicating possible myocardial injury.  >=22 - Abnormal Male indicating possible myocardial injury.   Clinicians would have to utilize clinical acumen, EKG, Troponin, and serial changes to determine if it is an Acute Myocardial Infarction or myocardial injury due to an underlying chronic condition.         Lactic Acid, Plasma [[id number]]  (Abnormal) Collected: 01/16/25 1212    Specimen: Blood Updated: 01/16/25 1257     Lactate 3.0 mmol/L     Magnesium [[id number]]  (Normal) Collected: 01/16/25 1212    Specimen: Blood Updated: 01/16/25 1248     Magnesium 1.8 mg/dL     CBC Auto Differential [[id number]]  (Abnormal) Collected: 01/16/25 1212    Specimen: Blood Updated: 01/16/25 1229     WBC 9.70 10*3/mm3      RBC 5.63 10*6/mm3      Hemoglobin 16.5 g/dL      Hematocrit 47.4 %      MCV 84.2 fL      MCH 29.3 pg      MCHC 34.8 g/dL      RDW 14.2 %      RDW-SD 43.7 fl      MPV 11.1 fL      Platelets 195 10*3/mm3      Neutrophil % 50.7 %      Lymphocyte % 34.5 %      Monocyte % 8.6 %      Eosinophil % 4.2 %      Basophil % 0.6 %      Immature Grans % 1.4 %      Neutrophils, Absolute 4.91 10*3/mm3      Lymphocytes, Absolute 3.35 10*3/mm3      Monocytes, Absolute 0.83 10*3/mm3      Eosinophils, Absolute 0.41 10*3/mm3      Basophils, Absolute 0.06 10*3/mm3      Immature Grans, Absolute 0.14 10*3/mm3      nRBC 0.0 /100 WBC     High Sensitivity Troponin T 1Hr [[id number]]  (Abnormal) Collected: 01/16/25 1324    Specimen: Blood Updated: 01/16/25 1417     HS Troponin T 23 ng/L      Troponin T Numeric Delta 0 ng/L      Troponin T % Delta 0    Narrative:      High  Sensitive Troponin T Reference Range:  <14.0 ng/L- Negative Female for AMI  <22.0 ng/L- Negative Male for AMI  >=14 - Abnormal Female indicating possible myocardial injury.  >=22 - Abnormal Male indicating possible myocardial injury.   Clinicians would have to utilize clinical acumen, EKG, Troponin, and serial changes to determine if it is an Acute Myocardial Infarction or myocardial injury due to an underlying chronic condition.         STAT Lactic Acid, Reflex [[id number]]  (Normal) Collected: 01/16/25 1519    Specimen: Blood Updated: 01/16/25 1540     Lactate 1.9 mmol/L           XR Chest 1 View   Final Result   Moderate pulmonary hypoventilation with central vascular   crowding, no acute abnormality is identified.           This report was signed and finalized on 1/16/2025 12:41 PM by Dr. Santos Cuello MD.            Medications   sodium chloride 0.9 % flush 10 mL (has no administration in time range)       Medical Decision Making  I had a discussion with the patient/family regarding diagnosis, diagnostic results, treatment plan, and medications.  The patient/family indicated understanding of these instructions.  I spent adequate time at the bedside prior to discharge necessary to discuss the aftercare instructions, giving patient education, providing explanations of the results of our evaluations/findings, and my decision making to assure that the patient/family understand the plan of care.  Time was allotted to answer questions at that time and throughout the ED course.  Emphasis was placed on timely follow-up after discharge.  I also discussed the potential for the development of an acute emergent condition requiring further evaluation, return to the ER, admission, or even surgical intervention. I discussed that we found nothing during the visit today indicating the need for further ER workup at this time, admission to the hospital, or the presence of an acute unstable medical condition.  I encouraged the  patient to return to the emergency department immediately for ANY concerns, worsening, new complaints, or if symptoms persist and unable to seek follow-up in a timely fashion.  The patient/family expressed understanding and agreement with this plan.      Problems Addressed:  Dizziness: complicated acute illness or injury  Weakness: complicated acute illness or injury    Amount and/or Complexity of Data Reviewed  Labs: ordered. Decision-making details documented in ED Course.  Radiology: ordered. Decision-making details documented in ED Course.  ECG/medicine tests: ordered. Decision-making details documented in ED Course.    Risk  Prescription drug management.        Final diagnoses:   Weakness   Dizziness       ED Disposition  ED Disposition       ED Disposition   Discharge    Condition   Stable    Comment   --               Alexis Montes MD  [id number] [address] [phone]    Schedule an appointment as soon as possible for a visit       [facility] EMERGENCY DEPARTMENT  [address] [place] [id number] [phone]    As needed, If symptoms worsen         Medication List      No changes were made to your prescriptions during this visit.            Crescencio Taylor MD  01/16/25 4817

## 2025-01-18 LAB
QT INTERVAL: 356 MS
QTC INTERVAL: 461 MS

## 2025-01-21 ENCOUNTER — OFFICE VISIT (OUTPATIENT)
Dept: FAMILY MEDICINE CLINIC | Facility: CLINIC | Age: 70
End: 2025-01-21
Payer: MEDICARE

## 2025-01-21 VITALS
OXYGEN SATURATION: 95 % | DIASTOLIC BLOOD PRESSURE: 80 MMHG | RESPIRATION RATE: 18 BRPM | HEIGHT: 71 IN | SYSTOLIC BLOOD PRESSURE: 133 MMHG | TEMPERATURE: 98.4 F | BODY MASS INDEX: 28.14 KG/M2 | WEIGHT: 201 LBS | HEART RATE: 88 BPM

## 2025-01-21 VITALS
HEIGHT: 71 IN | RESPIRATION RATE: 18 BRPM | WEIGHT: 201 LBS | HEART RATE: 88 BPM | SYSTOLIC BLOOD PRESSURE: 133 MMHG | BODY MASS INDEX: 28.14 KG/M2 | OXYGEN SATURATION: 95 % | DIASTOLIC BLOOD PRESSURE: 80 MMHG | TEMPERATURE: 98.4 F

## 2025-01-21 DIAGNOSIS — L03.119 CELLULITIS OF FOOT: ICD-10-CM

## 2025-01-21 DIAGNOSIS — R05.2 SUBACUTE COUGH: ICD-10-CM

## 2025-01-21 DIAGNOSIS — E11.65 TYPE 2 DIABETES MELLITUS WITH HYPERGLYCEMIA, WITH LONG-TERM CURRENT USE OF INSULIN: Primary | ICD-10-CM

## 2025-01-21 DIAGNOSIS — E16.2 HYPOGLYCEMIA: ICD-10-CM

## 2025-01-21 DIAGNOSIS — E78.00 PURE HYPERCHOLESTEROLEMIA: ICD-10-CM

## 2025-01-21 DIAGNOSIS — E03.9 ACQUIRED HYPOTHYROIDISM: ICD-10-CM

## 2025-01-21 DIAGNOSIS — Z00.00 ENCOUNTER FOR ANNUAL WELLNESS EXAM IN MEDICARE PATIENT: Primary | ICD-10-CM

## 2025-01-21 DIAGNOSIS — Z79.4 TYPE 2 DIABETES MELLITUS WITH HYPERGLYCEMIA, WITH LONG-TERM CURRENT USE OF INSULIN: Primary | ICD-10-CM

## 2025-01-21 PROCEDURE — G0439 PPPS, SUBSEQ VISIT: HCPCS | Performed by: FAMILY MEDICINE

## 2025-01-21 PROCEDURE — 99214 OFFICE O/P EST MOD 30 MIN: CPT | Performed by: FAMILY MEDICINE

## 2025-01-21 PROCEDURE — G2211 COMPLEX E/M VISIT ADD ON: HCPCS | Performed by: FAMILY MEDICINE

## 2025-01-21 PROCEDURE — 1126F AMNT PAIN NOTED NONE PRSNT: CPT | Performed by: FAMILY MEDICINE

## 2025-01-21 PROCEDURE — 3075F SYST BP GE 130 - 139MM HG: CPT | Performed by: FAMILY MEDICINE

## 2025-01-21 PROCEDURE — 3079F DIAST BP 80-89 MM HG: CPT | Performed by: FAMILY MEDICINE

## 2025-01-21 PROCEDURE — 1170F FXNL STATUS ASSESSED: CPT | Performed by: FAMILY MEDICINE

## 2025-01-21 RX ORDER — GLUCAGON INJECTION, SOLUTION 1 MG/.2ML
1 INJECTION, SOLUTION SUBCUTANEOUS ONCE AS NEEDED
Qty: 0.4 ML | Refills: 1 | Status: SHIPPED | OUTPATIENT
Start: 2025-01-21

## 2025-01-21 RX ORDER — BROMPHENIRAMINE MALEATE, PSEUDOEPHEDRINE HYDROCHLORIDE, AND DEXTROMETHORPHAN HYDROBROMIDE 2; 30; 10 MG/5ML; MG/5ML; MG/5ML
10 SYRUP ORAL 4 TIMES DAILY PRN
Qty: 473 ML | Refills: 0 | Status: SHIPPED | OUTPATIENT
Start: 2025-01-21

## 2025-01-21 RX ORDER — DOXYCYCLINE 100 MG/1
100 CAPSULE ORAL 2 TIMES DAILY
Qty: 14 CAPSULE | Refills: 0 | Status: SHIPPED | OUTPATIENT
Start: 2025-01-21

## 2025-01-21 NOTE — PROGRESS NOTES
Subjective   The ABCs of the Annual Wellness Visit  Medicare Wellness Visit      Supa Mcclelland is a 69 y.o. patient who presents for a Medicare Wellness Visit.    The following portions of the patient's history were reviewed and   updated as appropriate: allergies, current medications, past family history, past medical history, past social history, past surgical history, and problem list.    Compared to one year ago, the patient's physical   health is the same.  Compared to one year ago, the patient's mental   health is the same.    Recent Hospitalizations:  He was not admitted to the hospital during the last year.     Current Medical Providers:  Patient Care Team:  Alexis Montes MD as PCP - General (Family Medicine)  Abdelrahman Vides MD as Consulting Physician (Urology)  Nubia Philippe MA (Inactive) as Medical Assistant  Rambo Padilla DO as Consulting Physician (Gastroenterology)    Outpatient Medications Prior to Visit   Medication Sig Dispense Refill    albuterol sulfate  (90 Base) MCG/ACT inhaler Inhale 2 puffs Every 4 (Four) Hours As Needed for Wheezing. 8 g 0    aspirin 81 MG EC tablet Take 1 tablet by mouth Daily. 30 tablet 0    citalopram (CeleXA) 20 MG tablet Take 1 tablet by mouth every night at bedtime. 90 tablet 3    famotidine (PEPCID) 20 MG tablet Take 1 tablet by mouth 2 (Two) Times a Day.      fexofenadine (ALLEGRA) 180 MG tablet Take 1 tablet by mouth As Needed.      fluticasone (FLONASE) 50 MCG/ACT nasal spray 1 spray each nostril twice a day for three days, then daily. 1 bottle 0    gabapentin (NEURONTIN) 800 MG tablet Take 1 tablet by mouth 3 (Three) Times a Day. 270 tablet 0    guaiFENesin-dextromethorphan (ROBITUSSIN DM) 100-10 MG/5ML syrup Take 5 mL by mouth 3 (Three) Times a Day As Needed for Cough. 237 mL 0    hydroCHLOROthiazide 12.5 MG tablet Take 1 tablet by mouth once daily 90 tablet 0    hydrOXYzine pamoate (Vistaril) 25 MG capsule Take 1 capsule  by mouth 3 (Three) Times a Day As Needed for Itching or Allergies. 90 capsule 2    insulin degludec (TRESIBA FLEXTOUCH) 100 UNIT/ML solution pen-injector injection Inject 30 Units under the skin into the appropriate area as directed Daily. 30 units - may titrate up to 50 units daily      Insulin Lispro (HumaLOG KwikPen) 200 UNIT/ML solution pen-injector Inject 22 Units under the skin into the appropriate area as directed 3 (Three) Times a Day With Meals. 18 mL 11    Insulin Pen Needle (Pen Needles) 32G X 4 MM misc 1 each 4 (Four) Times a Day. Use 4 x daily, Dx code E11.65 120 each 11    Jardiance 25 MG tablet tablet Take 1 tablet by mouth Daily.      levothyroxine (SYNTHROID, LEVOTHROID) 50 MCG tablet Take 1 tablet by mouth Daily. 90 tablet 1    losartan (COZAAR) 25 MG tablet Take 1 tablet by mouth Every Morning.      meloxicam (MOBIC) 15 MG tablet Take 1 tablet by mouth Daily.      metoprolol tartrate (LOPRESSOR) 50 MG tablet Take 1 tablet by mouth 2 (Two) Times a Day.      potassium citrate (UROCIT-K) 10 MEQ (1080 MG) CR tablet Take 1 tablet by mouth 3 (Three) Times a Day With Meals. 90 tablet 11    RELION PEN NEEDLE 31G/8MM 31G X 8 MM misc Daily. use as directed      rOPINIRole (REQUIP) 2 MG tablet Take 1 tablet by mouth Every Night. Take 1 hour before bedtime. 90 tablet 3    rosuvastatin (CRESTOR) 10 MG tablet Take 1 tablet by mouth Daily. 30 tablet 11    HYDROcodone-acetaminophen (Norco) 5-325 MG per tablet Take 1 tablet by mouth Every 6 (Six) Hours As Needed for Moderate Pain. 12 tablet 0     No facility-administered medications prior to visit.     No opioid medication identified on active medication list. I have reviewed chart for other potential  high risk medication/s and harmful drug interactions in the elderly.      Aspirin is on active medication list. Aspirin use is indicated based on review of current medical condition/s. Pros and cons of this therapy have been discussed today. Benefits of this  "medication outweigh potential harm.  Patient has been encouraged to continue taking this medication.  .      Patient Active Problem List   Diagnosis    Essential hypertension    Hyperlipidemia    GERD (gastroesophageal reflux disease)    Disease of thyroid gland    Type 2 diabetes mellitus with hyperglycemia, with long-term current use of insulin    Depression    Nephrolithiasis    Diabetic polyneuropathy associated with type 2 diabetes mellitus    Altered bowel function    Restless legs syndrome (RLS)    Acute pancreatitis, unspecified complication status, unspecified pancreatitis type     Advance Care Planning Advance Directive is not on file.  ACP discussion was held with the patient during this visit. Patient does not have an advance directive, information provided.            Objective   Vitals:    01/21/25 0933   BP: 133/80   BP Location: Left arm   Patient Position: Sitting   Cuff Size: Large Adult   Pulse: 88   Resp: 18   Temp: 98.4 °F (36.9 °C)   TempSrc: Infrared   SpO2: 95%   Weight: 91.2 kg (201 lb)   Height: 180.3 cm (71\")   PainSc: 0-No pain       Estimated body mass index is 28.03 kg/m² as calculated from the following:    Height as of this encounter: 180.3 cm (71\").    Weight as of this encounter: 91.2 kg (201 lb).                Does the patient have evidence of cognitive impairment? No                                                                                               Health  Risk Assessment    Smoking Status:  Social History     Tobacco Use   Smoking Status Never   Smokeless Tobacco Never     Alcohol Consumption:  Social History     Substance and Sexual Activity   Alcohol Use No       Fall Risk Screen  STEADI Fall Risk Assessment was completed, and patient is at LOW risk for falls.Assessment completed on:1/21/2025    Depression Screening   Little interest or pleasure in doing things? Not at all   Feeling down, depressed, or hopeless? Not at all   PHQ-2 Total Score 0      Health Habits " and Functional and Cognitive Screenin/21/2025     9:34 AM   Functional & Cognitive Status   Do you have difficulty preparing food and eating? No   Do you have difficulty bathing yourself, getting dressed or grooming yourself? No   Do you have difficulty using the toilet? No   Do you have difficulty moving around from place to place? No   Do you have trouble with steps or getting out of a bed or a chair? No   Current Diet Well Balanced Diet   Dental Exam Not up to date   Eye Exam Up to date   Exercise (times per week) 0 times per week   Current Exercises Include No Regular Exercise   Do you need help using the phone?  No   Are you deaf or do you have serious difficulty hearing?  No   Do you need help to go to places out of walking distance? No   Do you need help shopping? No   Do you need help preparing meals?  No   Do you need help with housework?  No   Do you need help with laundry? No   Do you need help taking your medications? No   Do you need help managing money? No   Do you ever drive or ride in a car without wearing a seat belt? No   Have you felt unusual stress, anger or loneliness in the last month? No   Who do you live with? Spouse   If you need help, do you have trouble finding someone available to you? No   Have you been bothered in the last four weeks by sexual problems? No   Do you have difficulty concentrating, remembering or making decisions? No           Age-appropriate Screening Schedule:  Refer to the list below for future screening recommendations based on patient's age, sex and/or medical conditions. Orders for these recommended tests are listed in the plan section. The patient has been provided with a written plan.    Health Maintenance List  Health Maintenance   Topic Date Due    LIPID PANEL  2025    HEMOGLOBIN A1C  04/15/2025    COLORECTAL CANCER SCREENING  2025    DIABETIC EYE EXAM  2026    ANNUAL WELLNESS VISIT  2026    DIABETIC FOOT EXAM  2026    BMI  FOLLOWUP  01/21/2026    TDAP/TD VACCINES (2 - Td or Tdap) 07/15/2026    HEPATITIS C SCREENING  Completed    COVID-19 Vaccine  Completed    INFLUENZA VACCINE  Completed    Pneumococcal Vaccine 65+  Completed    ZOSTER VACCINE  Completed    URINE MICROALBUMIN  Discontinued                                                                                                                                                CMS Preventative Services Quick Reference  Risk Factors Identified During Encounter  None Identified    The above risks/problems have been discussed with the patient.  Pertinent information has been shared with the patient in the After Visit Summary.  An After Visit Summary and PPPS were made available to the patient.    Follow Up:   Next Medicare Wellness visit to be scheduled in 1 year.     Assessment & Plan  Encounter for annual wellness exam in Medicare patient         Acquired hypothyroidism    Orders:    TSH     Counseled on diet and exercise  Counseled on lifestyle modifications          Follow Up:   No follow-ups on file.

## 2025-01-21 NOTE — PROGRESS NOTES
"Chief Complaint  Hypertension (Pt is here for a follow up )    Subjective        Supa Mcclelland presents to [facility]  Hypertension    Mr. Mcclelland presents for an ER follow up.      He was in the ER on 5 days ago feeling groggy.  His blood sugar had dropped.    He has T2DM.  He is on Xultrophy and Humalog.  He is on Jardiance.  He follows with Endocrinology in [place].  He checks his sugars regularly.  He says they have been running \"normal\" but does not give a number.  His last A1C 3 months ago was 9.2.  He checks his feet regularly.  No sores.  He has diabetic neuropathy.  He is on Gabapentin.  This works well for him.      He says he has a cough.  He says the cough mostly happens at night.  CXR in the ER 5 days ago was negative for infectious process.  No fever or SOA.      He has HLD.  Last cholesterol check was mostly good.  LDL was slightly elevated at 106.  He is on Crestor 10 mg PO daily.  He says this does give him some leg cramps.      Objective   Vital Signs:  /80 (BP Location: Left arm, Patient Position: Sitting, Cuff Size: Adult)   Pulse 88   Temp 98.4 °F (36.9 °C) (Temporal)   Resp 18   Ht 180.3 cm (70.98\")   Wt 91.2 kg (201 lb)   SpO2 95%   BMI 28.05 kg/m²   Estimated body mass index is 28.05 kg/m² as calculated from the following:    Height as of this encounter: 180.3 cm (70.98\").    Weight as of this encounter: 91.2 kg (201 lb).            Physical Exam  Vitals reviewed.   Constitutional:       General: He is not in acute distress.     Appearance: Normal appearance. He is normal weight. He is not ill-appearing, toxic-appearing or diaphoretic.   HENT:      Head: Normocephalic and atraumatic.      Right Ear: External ear normal.      Left Ear: External ear normal.      Nose: Nose normal.   Eyes:      Extraocular Movements: Extraocular movements intact.   Cardiovascular:      Rate and Rhythm: Normal rate and regular rhythm.   Pulmonary:      Effort: " Pulmonary effort is normal. No respiratory distress.      Breath sounds: Normal breath sounds.   Feet:      Right foot:      Protective Sensation: 5 sites tested.  2 sites sensed.      Skin integrity: Callus present.      Left foot:      Protective Sensation: 5 sites tested.  2 sites sensed.      Skin integrity: Callus present.      Comments: He removed the toenails over his 2nd and 3rd toes.  There is surrounding erythema and granulation tissue  Skin:     General: Skin is warm and dry.      Coloration: Skin is not jaundiced or pale.   Neurological:      Mental Status: He is alert and oriented to person, place, and time.   Psychiatric:         Mood and Affect: Mood normal.         Behavior: Behavior normal.         Thought Content: Thought content normal.         Judgment: Judgment normal.          Result Review :                Assessment and Plan   Diagnoses and all orders for this visit:    1. Type 2 diabetes mellitus with hyperglycemia, with long-term current use of insulin (Primary)  -     Hemoglobin A1c  -     Glucagon (Gvoke HypoPen 2-Pack) 1 MG/0.2ML solution auto-injector; Inject 1 mg under the skin into the appropriate area as directed 1 (One) Time As Needed (hypoglycemia) for up to 1 dose.  Dispense: 0.4 mL; Refill: 1    2. Subacute cough  -     brompheniramine-pseudoephedrine-DM 30-2-10 MG/5ML syrup; Take 10 mL by mouth 4 (Four) Times a Day As Needed for Allergies, Cough or Congestion.  Dispense: 473 mL; Refill: 0    3. Hypoglycemia  -     Glucagon (Gvoke HypoPen 2-Pack) 1 MG/0.2ML solution auto-injector; Inject 1 mg under the skin into the appropriate area as directed 1 (One) Time As Needed (hypoglycemia) for up to 1 dose.  Dispense: 0.4 mL; Refill: 1    Will do bromphed for cough  Continue current medications  Diabetes uncontrolled based on most recent A1C  Will check A1C given hypoglycemia  Will write for Glucagon  Keep follow up with Endocrine           Follow Up   No follow-ups on file.  Patient was  given instructions and counseling regarding his condition or for health maintenance advice. Please see specific information pulled into the AVS if appropriate.

## 2025-01-22 LAB
CHOLEST SERPL-MCNC: 209 MG/DL (ref 0–200)
HBA1C MFR BLD: 10.6 % (ref 4.8–5.6)
HDLC SERPL-MCNC: 37 MG/DL (ref 40–60)
LDLC SERPL CALC-MCNC: 125 MG/DL (ref 0–100)
TRIGL SERPL-MCNC: 268 MG/DL (ref 0–150)
TSH SERPL DL<=0.005 MIU/L-ACNC: 2.76 UIU/ML (ref 0.27–4.2)
VLDLC SERPL CALC-MCNC: 47 MG/DL (ref 5–40)

## 2025-01-31 RX ORDER — ROSUVASTATIN CALCIUM 20 MG/1
20 TABLET, COATED ORAL DAILY
Qty: 30 TABLET | Refills: 2 | Status: SHIPPED | OUTPATIENT
Start: 2025-01-31

## 2025-02-04 ENCOUNTER — TELEPHONE (OUTPATIENT)
Dept: FAMILY MEDICINE CLINIC | Facility: CLINIC | Age: 70
End: 2025-02-04
Payer: MEDICARE

## 2025-02-04 NOTE — TELEPHONE ENCOUNTER
----- Message from Alexis Montes sent at 1/31/2025  1:07 PM CST -----  Let him know:    A1C still elevated.  Keep appointments with Endocrinology  Cholesterol elevated.  Will increase Cholesterol pill to 20 mg.  I sent in new prescription.  He can take 2 of his 10 mg pills until he is out.

## 2025-02-10 ENCOUNTER — TELEPHONE (OUTPATIENT)
Dept: HEMATOLOGY | Age: 70
End: 2025-02-10

## 2025-02-10 NOTE — TELEPHONE ENCOUNTER
I called patient and left detailed voicemail about their appointment on 02/13/2025. I made patient aware not to arrive any earlier than the appointment time and to come at the time of the follow up not the time of the lab appointment if it is different than the follow up appt time. I also made patient aware to eat and drink plenty of water to hydrate properly before coming to these appointments because this will make their lab draw much easier. Made patient aware that we are now located at the [facility] at [address] [place] [address]. Located between [facility] and the [facility].

## 2025-02-12 NOTE — PROGRESS NOTES
mellitus (HCC)     Hypertension     Kidney stones     Neuropathy     Restless leg syndrome      Past Surgical History:   Procedure Laterality Date    BONE MARROW BIOPSY Right 12/09/2020    BONE MARROW ASPIRATION BIOPSY performed by ALISA Rabago at [facility] OR    CHOLECYSTECTOMY      COLONOSCOPY  05/27/2020    Dr Patiño   AP    LITHOTRIPSY      SHOULDER SURGERY Left     Frozen shoulder surgery    UPPER GASTROINTESTINAL ENDOSCOPY  04/04/2017    Dr Patiño- normal       Current Outpatient Medications:     colestipol (COLESTID) 1 g tablet, Take 1 tablet by mouth 2 times daily, Disp: 60 tablet, Rfl: 11    losartan (COZAAR) 25 MG tablet, TAKE 1 TABLET BY MOUTH IN THE MORNING, Disp: 90 tablet, Rfl: 3    ketorolac (TORADOL) 10 MG tablet, Take 1 tablet by mouth every 6 hours as needed for Pain, Disp: 20 tablet, Rfl: 0    Insulin Degludec-Liraglutide (XULTOPHY SC), Inject 50 Units into the skin daily, Disp: , Rfl:     simvastatin (ZOCOR) 10 MG tablet, Take 1 tablet by mouth nightly, Disp: , Rfl:     ondansetron (ZOFRAN ODT) 4 MG disintegrating tablet, Take 1 tablet by mouth every 8 hours as needed for Nausea or Vomiting, Disp: 9 tablet, Rfl: 0    empagliflozin (JARDIANCE) 10 MG tablet, Take 1 tablet by mouth daily, Disp: , Rfl:     gemfibrozil (LOPID) 600 MG tablet, Take 1 tablet by mouth 2 times daily (before meals), Disp: , Rfl:     carBAMazepine (CARBATROL) 200 MG extended release capsule, Take 1 capsule by mouth 2 times daily, Disp: , Rfl:     fluticasone (FLONASE) 50 MCG/ACT nasal spray, 1 spray by Each Nostril route 2 times daily as needed for Rhinitis, Disp: , Rfl:     Insulin Lispro (HUMALOG KWIKPEN SC), Inject 8 Units into the skin daily (with breakfast), Disp: , Rfl:     loratadine (CLARITIN) 10 MG tablet, Take 1 tablet by mouth daily as needed, Disp: , Rfl:     metFORMIN (GLUCOPHAGE-XR) 500 MG extended release tablet, Take 1 tablet by mouth 2 times daily, Disp: , Rfl:     gabapentin (NEURONTIN) 800 MG

## 2025-02-13 ENCOUNTER — OFFICE VISIT (OUTPATIENT)
Dept: HEMATOLOGY | Age: 70
End: 2025-02-13
Payer: MEDICARE

## 2025-02-13 ENCOUNTER — HOSPITAL ENCOUNTER (OUTPATIENT)
Dept: INFUSION THERAPY | Age: 70
Discharge: HOME | End: 2025-02-13
Payer: MEDICARE

## 2025-02-13 VITALS
HEART RATE: 80 BPM | OXYGEN SATURATION: 97 % | SYSTOLIC BLOOD PRESSURE: 130 MMHG | HEIGHT: 71 IN | WEIGHT: 197.2 LBS | DIASTOLIC BLOOD PRESSURE: 80 MMHG | BODY MASS INDEX: 27.61 KG/M2 | TEMPERATURE: 97.5 F

## 2025-02-13 DIAGNOSIS — Z71.89 CARE PLAN DISCUSSED WITH PATIENT: ICD-10-CM

## 2025-02-13 DIAGNOSIS — D58.2 ELEVATED HEMOGLOBIN: ICD-10-CM

## 2025-02-13 DIAGNOSIS — D47.2 MGUS (MONOCLONAL GAMMOPATHY OF UNKNOWN SIGNIFICANCE): ICD-10-CM

## 2025-02-13 DIAGNOSIS — D58.2 ELEVATED HEMOGLOBIN (HCC): ICD-10-CM

## 2025-02-13 DIAGNOSIS — D47.2 MGUS (MONOCLONAL GAMMOPATHY OF UNKNOWN SIGNIFICANCE): Primary | ICD-10-CM

## 2025-02-13 LAB
BASOPHILS # BLD: 0.04 K/UL (ref 0–0.2)
BASOPHILS NFR BLD: 0.4 % (ref 0–1)
EOSINOPHIL # BLD: 0.41 K/UL (ref 0–0.6)
EOSINOPHIL NFR BLD: 4.2 % (ref 0–5)
ERYTHROCYTE [DISTWIDTH] IN BLOOD BY AUTOMATED COUNT: 13.8 % (ref 11.5–14.5)
HCT VFR BLD AUTO: 48.4 % (ref 42–52)
HGB BLD-MCNC: 16.6 G/DL (ref 14–18)
LYMPHOCYTES # BLD: 3.68 K/UL (ref 1.1–4.5)
LYMPHOCYTES NFR BLD: 38 % (ref 20–40)
MCH RBC QN AUTO: 30 PG (ref 27–31)
MCHC RBC AUTO-ENTMCNC: 34.3 G/DL (ref 33–37)
MCV RBC AUTO: 87.4 FL (ref 80–94)
MONOCYTES # BLD: 0.69 K/UL (ref 0–0.9)
MONOCYTES NFR BLD: 7.1 % (ref 1–10)
NEUTROPHILS # BLD: 4.69 K/UL (ref 1.5–7.5)
NEUTS SEG NFR BLD: 48.5 % (ref 50–65)
PLATELET # BLD AUTO: 270 K/UL (ref 130–400)
PMV BLD AUTO: 10.6 FL (ref 9.4–12.4)
RBC # BLD AUTO: 5.54 M/UL (ref 4.7–6.1)
WBC # BLD AUTO: 9.68 K/UL (ref 4.8–10.8)

## 2025-02-13 PROCEDURE — 85025 COMPLETE CBC W/AUTO DIFF WBC: CPT

## 2025-02-13 PROCEDURE — 3017F COLORECTAL CA SCREEN DOC REV: CPT | Performed by: NURSE PRACTITIONER

## 2025-02-13 PROCEDURE — 86334 IMMUNOFIX E-PHORESIS SERUM: CPT

## 2025-02-13 PROCEDURE — 1159F MED LIST DOCD IN RCRD: CPT | Performed by: NURSE PRACTITIONER

## 2025-02-13 PROCEDURE — G8417 CALC BMI ABV UP PARAM F/U: HCPCS | Performed by: NURSE PRACTITIONER

## 2025-02-13 PROCEDURE — 83521 IG LIGHT CHAINS FREE EACH: CPT

## 2025-02-13 PROCEDURE — 1036F TOBACCO NON-USER: CPT | Performed by: NURSE PRACTITIONER

## 2025-02-13 PROCEDURE — 1126F AMNT PAIN NOTED NONE PRSNT: CPT | Performed by: NURSE PRACTITIONER

## 2025-02-13 PROCEDURE — 84155 ASSAY OF PROTEIN SERUM: CPT

## 2025-02-13 PROCEDURE — 99213 OFFICE O/P EST LOW 20 MIN: CPT

## 2025-02-13 PROCEDURE — 83883 ASSAY NEPHELOMETRY NOT SPEC: CPT

## 2025-02-13 PROCEDURE — 82784 ASSAY IGA/IGD/IGG/IGM EACH: CPT

## 2025-02-13 PROCEDURE — G8427 DOCREV CUR MEDS BY ELIG CLIN: HCPCS | Performed by: NURSE PRACTITIONER

## 2025-02-13 PROCEDURE — 99213 OFFICE O/P EST LOW 20 MIN: CPT | Performed by: NURSE PRACTITIONER

## 2025-02-13 PROCEDURE — 1123F ACP DISCUSS/DSCN MKR DOCD: CPT | Performed by: NURSE PRACTITIONER

## 2025-02-13 PROCEDURE — 84165 PROTEIN E-PHORESIS SERUM: CPT

## 2025-02-13 PROCEDURE — 36415 COLL VENOUS BLD VENIPUNCTURE: CPT

## 2025-02-13 ASSESSMENT — ENCOUNTER SYMPTOMS
ABDOMINAL PAIN: 0
COUGH: 0
EYE DISCHARGE: 0
WHEEZING: 0
TROUBLE SWALLOWING: 0
CONSTIPATION: 0
EYE ITCHING: 0
SHORTNESS OF BREATH: 0
NAUSEA: 0
SORE THROAT: 0
VOMITING: 0
DIARRHEA: 0

## 2025-02-16 ENCOUNTER — HOSPITAL ENCOUNTER (EMERGENCY)
Facility: HOSPITAL | Age: 70
Discharge: HOME | End: 2025-02-16
Attending: EMERGENCY MEDICINE | Admitting: EMERGENCY MEDICINE
Payer: MEDICARE

## 2025-02-16 ENCOUNTER — APPOINTMENT (OUTPATIENT)
Dept: CT IMAGING | Facility: HOSPITAL | Age: 70
End: 2025-02-16
Payer: MEDICARE

## 2025-02-16 VITALS
BODY MASS INDEX: 27.72 KG/M2 | TEMPERATURE: 98 F | RESPIRATION RATE: 18 BRPM | DIASTOLIC BLOOD PRESSURE: 88 MMHG | HEART RATE: 80 BPM | HEIGHT: 71 IN | WEIGHT: 198 LBS | OXYGEN SATURATION: 98 % | SYSTOLIC BLOOD PRESSURE: 158 MMHG

## 2025-02-16 DIAGNOSIS — R10.9 FLANK PAIN: Primary | ICD-10-CM

## 2025-02-16 DIAGNOSIS — N39.0 URINARY TRACT INFECTION WITHOUT HEMATURIA, SITE UNSPECIFIED: ICD-10-CM

## 2025-02-16 LAB
ALBUMIN SERPL-MCNC: 4.2 G/DL (ref 3.5–5.2)
ALBUMIN/GLOB SERPL: 1 G/DL
ALP SERPL-CCNC: 114 U/L (ref 39–117)
ALT SERPL W P-5'-P-CCNC: 23 U/L (ref 1–41)
ANION GAP SERPL CALCULATED.3IONS-SCNC: 13 MMOL/L (ref 5–15)
AST SERPL-CCNC: 27 U/L (ref 1–40)
BACTERIA UR QL AUTO: ABNORMAL /HPF
BASOPHILS # BLD AUTO: 0.07 10*3/MM3 (ref 0–0.2)
BASOPHILS NFR BLD AUTO: 0.8 % (ref 0–1.5)
BILIRUB SERPL-MCNC: 0.5 MG/DL (ref 0–1.2)
BILIRUB UR QL STRIP: NEGATIVE
BUN SERPL-MCNC: 11 MG/DL (ref 8–23)
BUN/CREAT SERPL: 12 (ref 7–25)
CALCIUM SPEC-SCNC: 9.5 MG/DL (ref 8.6–10.5)
CHLORIDE SERPL-SCNC: 103 MMOL/L (ref 98–107)
CLARITY UR: CLEAR
CO2 SERPL-SCNC: 23 MMOL/L (ref 22–29)
COLOR UR: YELLOW
CREAT SERPL-MCNC: 0.92 MG/DL (ref 0.76–1.27)
DEPRECATED RDW RBC AUTO: 43.8 FL (ref 37–54)
EGFRCR SERPLBLD CKD-EPI 2021: 89.5 ML/MIN/1.73
EOSINOPHIL # BLD AUTO: 0.39 10*3/MM3 (ref 0–0.4)
EOSINOPHIL NFR BLD AUTO: 4.4 % (ref 0.3–6.2)
ERYTHROCYTE [DISTWIDTH] IN BLOOD BY AUTOMATED COUNT: 13.9 % (ref 12.3–15.4)
GLOBULIN UR ELPH-MCNC: 4.3 GM/DL
GLUCOSE SERPL-MCNC: 355 MG/DL (ref 65–99)
GLUCOSE UR STRIP-MCNC: ABNORMAL MG/DL
HCT VFR BLD AUTO: 51.9 % (ref 37.5–51)
HGB BLD-MCNC: 17.1 G/DL (ref 13–17.7)
HGB UR QL STRIP.AUTO: ABNORMAL
HOLD SPECIMEN: NORMAL
HYALINE CASTS UR QL AUTO: ABNORMAL /LPF
IMM GRANULOCYTES # BLD AUTO: 0.17 10*3/MM3 (ref 0–0.05)
IMM GRANULOCYTES NFR BLD AUTO: 1.9 % (ref 0–0.5)
KETONES UR QL STRIP: NEGATIVE
LEUKOCYTE ESTERASE UR QL STRIP.AUTO: ABNORMAL
LYMPHOCYTES # BLD AUTO: 3.55 10*3/MM3 (ref 0.7–3.1)
LYMPHOCYTES NFR BLD AUTO: 40.4 % (ref 19.6–45.3)
MCH RBC QN AUTO: 28.6 PG (ref 26.6–33)
MCHC RBC AUTO-ENTMCNC: 32.9 G/DL (ref 31.5–35.7)
MCV RBC AUTO: 86.9 FL (ref 79–97)
MONOCYTES # BLD AUTO: 0.7 10*3/MM3 (ref 0.1–0.9)
MONOCYTES NFR BLD AUTO: 8 % (ref 5–12)
NEUTROPHILS NFR BLD AUTO: 3.9 10*3/MM3 (ref 1.7–7)
NEUTROPHILS NFR BLD AUTO: 44.5 % (ref 42.7–76)
NITRITE UR QL STRIP: NEGATIVE
NRBC BLD AUTO-RTO: 0 /100 WBC (ref 0–0.2)
PH UR STRIP.AUTO: 6 [PH] (ref 5–8)
PLATELET # BLD AUTO: 245 10*3/MM3 (ref 140–450)
PMV BLD AUTO: 10.1 FL (ref 6–12)
POTASSIUM SERPL-SCNC: 3.8 MMOL/L (ref 3.5–5.2)
PROT SERPL-MCNC: 8.5 G/DL (ref 6–8.5)
PROT UR QL STRIP: ABNORMAL
RBC # BLD AUTO: 5.97 10*6/MM3 (ref 4.14–5.8)
RBC # UR STRIP: ABNORMAL /HPF
REF LAB TEST METHOD: ABNORMAL
SODIUM SERPL-SCNC: 139 MMOL/L (ref 136–145)
SP GR UR STRIP: 1.02 (ref 1–1.03)
SQUAMOUS #/AREA URNS HPF: ABNORMAL /HPF
UROBILINOGEN UR QL STRIP: ABNORMAL
WBC # UR STRIP: ABNORMAL /HPF
WBC CASTS #/AREA URNS LPF: ABNORMAL /LPF
WBC NRBC COR # BLD AUTO: 8.78 10*3/MM3 (ref 3.4–10.8)
WHOLE BLOOD HOLD COAG: NORMAL
WHOLE BLOOD HOLD SPECIMEN: NORMAL
YEAST URNS QL MICRO: ABNORMAL /HPF

## 2025-02-16 PROCEDURE — 87077 CULTURE AEROBIC IDENTIFY: CPT | Performed by: EMERGENCY MEDICINE

## 2025-02-16 PROCEDURE — 87086 URINE CULTURE/COLONY COUNT: CPT | Performed by: EMERGENCY MEDICINE

## 2025-02-16 PROCEDURE — 74176 CT ABD & PELVIS W/O CONTRAST: CPT

## 2025-02-16 PROCEDURE — 81001 URINALYSIS AUTO W/SCOPE: CPT | Performed by: EMERGENCY MEDICINE

## 2025-02-16 PROCEDURE — 80053 COMPREHEN METABOLIC PANEL: CPT | Performed by: EMERGENCY MEDICINE

## 2025-02-16 PROCEDURE — 96374 THER/PROPH/DIAG INJ IV PUSH: CPT

## 2025-02-16 PROCEDURE — 99284 EMERGENCY DEPT VISIT MOD MDM: CPT

## 2025-02-16 PROCEDURE — 85025 COMPLETE CBC W/AUTO DIFF WBC: CPT | Performed by: EMERGENCY MEDICINE

## 2025-02-16 PROCEDURE — 25010000002 KETOROLAC TROMETHAMINE PER 15 MG: Performed by: EMERGENCY MEDICINE

## 2025-02-16 RX ORDER — SULFAMETHOXAZOLE AND TRIMETHOPRIM 800; 160 MG/1; MG/1
1 TABLET ORAL 2 TIMES DAILY
Qty: 14 TABLET | Refills: 0 | Status: SHIPPED | OUTPATIENT
Start: 2025-02-16

## 2025-02-16 RX ORDER — SODIUM CHLORIDE 0.9 % (FLUSH) 0.9 %
10 SYRINGE (ML) INJECTION AS NEEDED
Status: DISCONTINUED | OUTPATIENT
Start: 2025-02-16 | End: 2025-02-16 | Stop reason: HOSPADM

## 2025-02-16 RX ORDER — KETOROLAC TROMETHAMINE 15 MG/ML
15 INJECTION, SOLUTION INTRAMUSCULAR; INTRAVENOUS ONCE
Status: COMPLETED | OUTPATIENT
Start: 2025-02-16 | End: 2025-02-16

## 2025-02-16 RX ADMIN — KETOROLAC TROMETHAMINE 15 MG: 15 INJECTION, SOLUTION INTRAMUSCULAR; INTRAVENOUS at 06:18

## 2025-02-16 NOTE — ED PROVIDER NOTES
Subjective   History of Present Illness  69-year-old male presents to the ED with complaint of right flank pain.  He has a history of kidney stones, also has history of hypertension, hyperlipidemia, diabetes.  He reports  onset of right flank pain that began about an hour prior to arrival to the ED.  Describes pain as a continuous aching pain in the right flank.  Moderate to severe.  No associated nausea, vomiting, dysuria, hematuria, frequency or urgency.  able to find a position of comfort and is worse with palpation.  States pain feels similar to previous episodes of kidney stones.  No history of aortic aneurysm.  No lower extremity pain or swelling.  Rates his symptoms as moderate to severe with no aggravating relieving factors.    History provided by:  Patient      Review of Systems   All other systems reviewed and are negative.      Past Medical History:   Diagnosis Date    Depression     Diabetes mellitus     Disease of thyroid gland     HYPOTHYROIDISM    GERD (gastroesophageal reflux disease)     Hyperlipidemia     Hypertension     Kidney stone     Neuropathy     Plantar fasciitis        Allergies   Allergen Reactions    Atacand [Candesartan Cilexetil] Rash    Biaxin [Clarithromycin] Rash    Cefzil [Cefprozil] Rash    Levaquin [Levofloxacin] Rash    Penicillins Rash    Zestril [Lisinopril] Rash       Past Surgical History:   Procedure Laterality Date    CHOLECYSTECTOMY      COLONOSCOPY      COLONOSCOPY N/A 5/27/2020    Procedure: COLONOSCOPY WITH ANESTHESIA;  Surgeon: Rambo Padilla DO;  Location: [facility] ENDOSCOPY;  Service: Gastroenterology;  Laterality: N/A;  Pre: Change in Bowels  Post: Colon Polyp, Suboptimal Prep  Kings APRN  CO2 Inflation Used    ENDOSCOPY N/A 4/4/2017    Procedure: ESOPHAGOGASTRODUODENOSCOPY WITH ANESTHESIA;  Surgeon: Rambo Padilla DO;  Location: [facility] ENDOSCOPY;  Service:     KIDNEY STONE SURGERY      SHOULDER SURGERY         Family History   Problem Relation Age of Onset     Heart disease Mother     Diabetes Mother     Alzheimer's disease Father     Heart disease Father     Diabetes Sister     Heart disease Brother     Diabetes Sister     Diabetes Sister     Colon cancer Neg Hx     Esophageal cancer Neg Hx        Social History     Socioeconomic History    Marital status:    Tobacco Use    Smoking status: Never    Smokeless tobacco: Never   Vaping Use    Vaping status: Never Used   Substance and Sexual Activity    Alcohol use: No    Drug use: No    Sexual activity: Defer     Partners: Female           Objective   Physical Exam  Vitals and nursing note reviewed.   Constitutional:       Appearance: Normal appearance. He is normal weight.   HENT:      Head: Normocephalic and atraumatic.      Nose: Nose normal. No congestion or rhinorrhea.      Mouth/Throat:      Mouth: Mucous membranes are moist.   Eyes:      Extraocular Movements: Extraocular movements intact.      Conjunctiva/sclera: Conjunctivae normal.      Pupils: Pupils are equal, round, and reactive to light.   Cardiovascular:      Rate and Rhythm: Normal rate and regular rhythm.      Heart sounds: Normal heart sounds.   Pulmonary:      Breath sounds: Normal breath sounds. No wheezing, rhonchi or rales.   Abdominal:      General: Abdomen is flat. Bowel sounds are normal.      Palpations: Abdomen is soft.      Tenderness: There is right CVA tenderness.   Musculoskeletal:      Right lower leg: No edema.      Left lower leg: No edema.   Skin:     General: Skin is warm and dry.      Capillary Refill: Capillary refill takes less than 2 seconds.   Neurological:      General: No focal deficit present.      Mental Status: He is alert and oriented to person, place, and time. Mental status is at baseline.         Procedures         Lab Results (last 24 hours)       Procedure Component Value Units Date/Time    Urinalysis With Microscopic If Indicated (No Culture) - Urine, Clean Catch [[id number]]  (Abnormal) Collected: 02/16/25 0434     Specimen: Urine, Clean Catch Updated: 02/16/25 0527     Color, UA Yellow     Appearance, UA Clear     pH, UA 6.0     Specific Gravity, UA 1.021     Glucose, UA >=1000 mg/dL (3+)     Ketones, UA Negative     Bilirubin, UA Negative     Blood, UA Trace     Protein, UA 30 mg/dL (1+)     Leuk Esterase, UA Small (1+)     Nitrite, UA Negative     Urobilinogen, UA 0.2 E.U./dL    Urinalysis, Microscopic Only - Urine, Clean Catch [[id number]]  (Abnormal) Collected: 02/16/25 0434    Specimen: Urine, Clean Catch Updated: 02/16/25 0527     RBC, UA 3-5 /HPF      WBC, UA 11-20 /HPF      Bacteria, UA None Seen /HPF      Squamous Epithelial Cells, UA 0-2 /HPF      Yeast, UA Small/1+ Budding Yeast /HPF      Hyaline Casts, UA 7-12 /LPF      WBC Casts 0-2 /LPF      Methodology Manual Light Microscopy    CBC & Differential [[id number]]  (Abnormal) Collected: 02/16/25 0439    Specimen: Blood Updated: 02/16/25 0528    Narrative:      The following orders were created for panel order CBC & Differential.  Procedure                               Abnormality         Status                     ---------                               -----------         ------                     CBC Auto Differential[[id number]]        Abnormal            Final result                 Please view results for these tests on the individual orders.    Comprehensive Metabolic Panel [[id number]]  (Abnormal) Collected: 02/16/25 0439    Specimen: Blood Updated: 02/16/25 0518     Glucose 355 mg/dL      BUN 11 mg/dL      Creatinine 0.92 mg/dL      Sodium 139 mmol/L      Potassium 3.8 mmol/L      Comment: Slight hemolysis detected by analyzer. Result may be falsely elevated.        Chloride 103 mmol/L      CO2 23.0 mmol/L      Calcium 9.5 mg/dL      Total Protein 8.5 g/dL      Albumin 4.2 g/dL      ALT (SGPT) 23 U/L      AST (SGOT) 27 U/L      Alkaline Phosphatase 114 U/L      Total Bilirubin 0.5 mg/dL      Globulin 4.3 gm/dL      A/G Ratio 1.0 g/dL      BUN/Creatinine  Ratio 12.0     Anion Gap 13.0 mmol/L      eGFR 89.5 mL/min/1.73     Narrative:      GFR Categories in Chronic Kidney Disease (CKD)      GFR Category          GFR (mL/min/1.73)    Interpretation  G1                     90 or greater         Normal or high (1)  G2                      60-89                Mild decrease (1)  G3a                   45-59                Mild to moderate decrease  G3b                   30-44                Moderate to severe decrease  G4                    15-29                Severe decrease  G5                    14 or less           Kidney failure          (1)In the absence of evidence of kidney disease, neither GFR category G1 or G2 fulfill the criteria for CKD.    eGFR calculation 2021 CKD-EPI creatinine equation, which does not include race as a factor    CBC Auto Differential [[id number]]  (Abnormal) Collected: 02/16/25 0439    Specimen: Blood Updated: 02/16/25 0528     WBC 8.78 10*3/mm3      RBC 5.97 10*6/mm3      Hemoglobin 17.1 g/dL      Hematocrit 51.9 %      MCV 86.9 fL      MCH 28.6 pg      MCHC 32.9 g/dL      RDW 13.9 %      RDW-SD 43.8 fl      MPV 10.1 fL      Platelets 245 10*3/mm3      Neutrophil % 44.5 %      Lymphocyte % 40.4 %      Monocyte % 8.0 %      Eosinophil % 4.4 %      Basophil % 0.8 %      Immature Grans % 1.9 %      Neutrophils, Absolute 3.90 10*3/mm3      Lymphocytes, Absolute 3.55 10*3/mm3      Monocytes, Absolute 0.70 10*3/mm3      Eosinophils, Absolute 0.39 10*3/mm3      Basophils, Absolute 0.07 10*3/mm3      Immature Grans, Absolute 0.17 10*3/mm3      nRBC 0.0 /100 WBC         No Radiology Exams Resulted Within Past 24 Hours   ED Course  ED Course as of 02/16/25 0621   Sun Feb 16, 2025   0616 69-year-old male with history of hypertension, hyperlipidemia, diabetes, kidney stones who presents to the ED with right flank pain.  Urine with 3-5 RBCs, 11-20 WBCs, small leuk esterase.  No nitrites.  Given history of stones we obtained a CT abdomen pelvis, this was  negative for kidney stone, negative for acute intra-abdominal abnormality.  Possibly recently passed stone, possibly mild UTI, possible muscle strain.  Recommend he take over-the-counter Tylenol and ibuprofen, will DC with prescription for antibiotics given likely mild UTI.  Given his poorly controlled diabetes, we will go ahead and treat for suspected mild UTI.  Will give Bactrim given history of allergy to cephalosporins. [AW]      ED Course User Index  [AW] Wesley Patrick MD                                                       Medical Decision Making  Amount and/or Complexity of Data Reviewed  Labs: ordered.  Radiology: ordered.    Risk  Prescription drug management.        Final diagnoses:   Flank pain   Urinary tract infection without hematuria, site unspecified       ED Disposition  ED Disposition       ED Disposition   Discharge    Condition   Stable    Comment   --               Alexis Montes MD  [address] [id number] [phone]    Schedule an appointment as soon as possible for a visit   As needed         Medication List        New Prescriptions      sulfamethoxazole-trimethoprim 800-160 MG per tablet  Commonly known as: BACTRIM DS,SEPTRA DS  Take 1 tablet by mouth 2 (Two) Times a Day.               Where to Get Your Medications        These medications were sent to [facility] [address] [place] [address] - [address] [place] [address] - [phone] [facility] [phone] [address] [address]      Phone: [phone]   sulfamethoxazole-trimethoprim 800-160 MG per tablet            Wesley Patrick MD  02/16/25 2304

## 2025-02-17 LAB
ALBUMIN SERPL-MCNC: 3.7 G/DL (ref 3.75–5.01)
ALPHA1 GLOB SERPL ELPH-MCNC: 0.33 G/DL (ref 0.19–0.46)
ALPHA2 GLOB SERPL ELPH-MCNC: 1.16 G/DL (ref 0.48–1.05)
B-GLOBULIN SERPL ELPH-MCNC: 1.13 G/DL (ref 0.48–1.1)
DEPRECATED KAPPA LC FREE/LAMBDA SER: 1.9 {RATIO} (ref 0.26–1.65)
EER MONOCLONAL PROTEIN AND FLC, SERUM: ABNORMAL
GAMMA GLOB SERPL ELPH-MCNC: 1.27 G/DL (ref 0.62–1.51)
IGA SERPL-MCNC: 407 MG/DL (ref 68–408)
IGG SERPL-MCNC: 1262 MG/DL (ref 768–1632)
IGM SERPL-MCNC: 105 MG/DL (ref 35–263)
INTERPRETATION SERPL IFE-IMP: ABNORMAL
INTERPRETATION SERPL IFE-IMP: ABNORMAL
KAPPA LC FREE SER-MCNC: 59.65 MG/L (ref 3.3–19.4)
LAMBDA LC FREE SERPL-MCNC: 31.44 MG/L (ref 5.71–26.3)
MONOCLONAL PROTEIN, SERUM: ABNORMAL G/DL
PROT SERPL-MCNC: 7.6 G/DL (ref 6.3–8.2)

## 2025-02-20 LAB — BACTERIA SPEC AEROBE CULT: ABNORMAL

## 2025-02-21 ENCOUNTER — APPOINTMENT (OUTPATIENT)
Dept: CT IMAGING | Facility: HOSPITAL | Age: 70
End: 2025-02-21
Payer: MEDICARE

## 2025-02-21 ENCOUNTER — HOSPITAL ENCOUNTER (EMERGENCY)
Facility: HOSPITAL | Age: 70
Discharge: HOME | End: 2025-02-21
Attending: EMERGENCY MEDICINE
Payer: MEDICARE

## 2025-02-21 VITALS
WEIGHT: 202 LBS | DIASTOLIC BLOOD PRESSURE: 83 MMHG | RESPIRATION RATE: 18 BRPM | BODY MASS INDEX: 28.28 KG/M2 | SYSTOLIC BLOOD PRESSURE: 161 MMHG | HEART RATE: 75 BPM | OXYGEN SATURATION: 99 % | TEMPERATURE: 98.1 F | HEIGHT: 71 IN

## 2025-02-21 DIAGNOSIS — R10.9 RIGHT FLANK PAIN: Primary | ICD-10-CM

## 2025-02-21 LAB
ALBUMIN SERPL-MCNC: 4 G/DL (ref 3.5–5.2)
ALBUMIN/GLOB SERPL: 1 G/DL
ALP SERPL-CCNC: 109 U/L (ref 39–117)
ALT SERPL W P-5'-P-CCNC: 21 U/L (ref 1–41)
ANION GAP SERPL CALCULATED.3IONS-SCNC: 15 MMOL/L (ref 5–15)
AST SERPL-CCNC: 24 U/L (ref 1–40)
BACTERIA UR QL AUTO: ABNORMAL /HPF
BASOPHILS # BLD AUTO: 0.06 10*3/MM3 (ref 0–0.2)
BASOPHILS NFR BLD AUTO: 0.6 % (ref 0–1.5)
BILIRUB SERPL-MCNC: 0.5 MG/DL (ref 0–1.2)
BILIRUB UR QL STRIP: ABNORMAL
BUN SERPL-MCNC: 17 MG/DL (ref 8–23)
BUN/CREAT SERPL: 14.9 (ref 7–25)
CALCIUM SPEC-SCNC: 9.5 MG/DL (ref 8.6–10.5)
CHLORIDE SERPL-SCNC: 97 MMOL/L (ref 98–107)
CLARITY UR: CLEAR
CO2 SERPL-SCNC: 23 MMOL/L (ref 22–29)
COLOR UR: ABNORMAL
CREAT SERPL-MCNC: 1.14 MG/DL (ref 0.76–1.27)
DEPRECATED RDW RBC AUTO: 43.7 FL (ref 37–54)
EGFRCR SERPLBLD CKD-EPI 2021: 69.2 ML/MIN/1.73
EOSINOPHIL # BLD AUTO: 0.54 10*3/MM3 (ref 0–0.4)
EOSINOPHIL NFR BLD AUTO: 5.7 % (ref 0.3–6.2)
ERYTHROCYTE [DISTWIDTH] IN BLOOD BY AUTOMATED COUNT: 13.9 % (ref 12.3–15.4)
GLOBULIN UR ELPH-MCNC: 4 GM/DL
GLUCOSE SERPL-MCNC: 292 MG/DL (ref 65–99)
GLUCOSE UR STRIP-MCNC: ABNORMAL MG/DL
HCT VFR BLD AUTO: 48.6 % (ref 37.5–51)
HGB BLD-MCNC: 16.1 G/DL (ref 13–17.7)
HGB UR QL STRIP.AUTO: NEGATIVE
HYALINE CASTS UR QL AUTO: ABNORMAL /LPF
IMM GRANULOCYTES # BLD AUTO: 0.15 10*3/MM3 (ref 0–0.05)
IMM GRANULOCYTES NFR BLD AUTO: 1.6 % (ref 0–0.5)
KETONES UR QL STRIP: NEGATIVE
LEUKOCYTE ESTERASE UR QL STRIP.AUTO: ABNORMAL
LIPASE SERPL-CCNC: 71 U/L (ref 13–60)
LYMPHOCYTES # BLD AUTO: 3.1 10*3/MM3 (ref 0.7–3.1)
LYMPHOCYTES NFR BLD AUTO: 32.6 % (ref 19.6–45.3)
MCH RBC QN AUTO: 28.4 PG (ref 26.6–33)
MCHC RBC AUTO-ENTMCNC: 33.1 G/DL (ref 31.5–35.7)
MCV RBC AUTO: 85.9 FL (ref 79–97)
MONOCYTES # BLD AUTO: 0.89 10*3/MM3 (ref 0.1–0.9)
MONOCYTES NFR BLD AUTO: 9.3 % (ref 5–12)
NEUTROPHILS NFR BLD AUTO: 4.78 10*3/MM3 (ref 1.7–7)
NEUTROPHILS NFR BLD AUTO: 50.2 % (ref 42.7–76)
NITRITE UR QL STRIP: POSITIVE
NRBC BLD AUTO-RTO: 0 /100 WBC (ref 0–0.2)
PH UR STRIP.AUTO: <=5 [PH] (ref 5–8)
PLATELET # BLD AUTO: 226 10*3/MM3 (ref 140–450)
PMV BLD AUTO: 10.2 FL (ref 6–12)
POTASSIUM SERPL-SCNC: 4.3 MMOL/L (ref 3.5–5.2)
PROT SERPL-MCNC: 8 G/DL (ref 6–8.5)
PROT UR QL STRIP: ABNORMAL
RBC # BLD AUTO: 5.66 10*6/MM3 (ref 4.14–5.8)
RBC # UR STRIP: ABNORMAL /HPF
REF LAB TEST METHOD: ABNORMAL
SODIUM SERPL-SCNC: 135 MMOL/L (ref 136–145)
SP GR UR STRIP: 1.02 (ref 1–1.03)
SQUAMOUS #/AREA URNS HPF: ABNORMAL /HPF
UROBILINOGEN UR QL STRIP: ABNORMAL
WBC # UR STRIP: ABNORMAL /HPF
WBC NRBC COR # BLD AUTO: 9.52 10*3/MM3 (ref 3.4–10.8)

## 2025-02-21 PROCEDURE — 83690 ASSAY OF LIPASE: CPT | Performed by: EMERGENCY MEDICINE

## 2025-02-21 PROCEDURE — 25010000002 HYDROMORPHONE 1 MG/ML SOLUTION: Performed by: EMERGENCY MEDICINE

## 2025-02-21 PROCEDURE — 85025 COMPLETE CBC W/AUTO DIFF WBC: CPT | Performed by: EMERGENCY MEDICINE

## 2025-02-21 PROCEDURE — 74177 CT ABD & PELVIS W/CONTRAST: CPT

## 2025-02-21 PROCEDURE — 99285 EMERGENCY DEPT VISIT HI MDM: CPT

## 2025-02-21 PROCEDURE — 81001 URINALYSIS AUTO W/SCOPE: CPT | Performed by: EMERGENCY MEDICINE

## 2025-02-21 PROCEDURE — 25510000001 IOPAMIDOL 61 % SOLUTION: Performed by: EMERGENCY MEDICINE

## 2025-02-21 PROCEDURE — 80053 COMPREHEN METABOLIC PANEL: CPT | Performed by: EMERGENCY MEDICINE

## 2025-02-21 PROCEDURE — 96374 THER/PROPH/DIAG INJ IV PUSH: CPT

## 2025-02-21 RX ORDER — HYDROCODONE BITARTRATE AND ACETAMINOPHEN 7.5; 325 MG/1; MG/1
1 TABLET ORAL EVERY 4 HOURS PRN
Qty: 10 TABLET | Refills: 0 | Status: SHIPPED | OUTPATIENT
Start: 2025-02-21

## 2025-02-21 RX ORDER — IOPAMIDOL 612 MG/ML
100 INJECTION, SOLUTION INTRAVASCULAR
Status: COMPLETED | OUTPATIENT
Start: 2025-02-21 | End: 2025-02-21

## 2025-02-21 RX ORDER — SODIUM CHLORIDE 0.9 % (FLUSH) 0.9 %
10 SYRINGE (ML) INJECTION AS NEEDED
Status: DISCONTINUED | OUTPATIENT
Start: 2025-02-21 | End: 2025-02-21 | Stop reason: HOSPADM

## 2025-02-21 RX ORDER — SODIUM CHLORIDE 0.9 % (FLUSH) 0.9 %
10 SYRINGE (ML) INJECTION AS NEEDED
Status: CANCELLED | OUTPATIENT
Start: 2025-02-21

## 2025-02-21 RX ADMIN — IOPAMIDOL 100 ML: 612 INJECTION, SOLUTION INTRAVENOUS at 07:48

## 2025-02-21 RX ADMIN — HYDROMORPHONE HYDROCHLORIDE 1 MG: 1 INJECTION, SOLUTION INTRAMUSCULAR; INTRAVENOUS; SUBCUTANEOUS at 07:09

## 2025-02-21 RX ADMIN — Medication 10 ML: at 07:10

## 2025-02-21 NOTE — ED PROVIDER NOTES
Subjective   History of Present Illness  Presents with complaint of continued right flank pain.  He was seen here last Sunday morning with pain in his right flank.  He had a history of kidney stones and he was evaluated and felt like there might be some urinary tract infection.  When I look at the note it also mentions a possibly recently passed stone.  He was treated with Bactrim and thought that he was getting better for a while.  Azo has helped some off-and-on but the pain has not gone away completely and actually seems to be worse today.  He says it hurts even to touch just right there in his right flank.  He denies any fever or chills or cough or congestion or vomiting or diarrhea or dysuria.    History provided by:  Patient   used: No    Flank Pain  Pain location:  R flank  Pain quality: aching    Pain radiates to:  Does not radiate  Pain severity:  Severe  Onset quality:  Gradual  Duration:  1 week  Timing:  Constant  Progression:  Waxing and waning  Chronicity:  New  Context: not alcohol use, not awakening from sleep, not diet changes, not eating, not laxative use, not medication withdrawal, not previous surgeries, not recent illness, not recent sexual activity, not recent travel, not retching, not sick contacts, not suspicious food intake and not trauma    Relieved by:  Nothing  Worsened by:  Nothing  Ineffective treatments:  None tried  Associated symptoms: no anorexia, no belching, no chest pain, no chills, no constipation, no cough, no diarrhea, no dysuria, no fatigue, no fever, no flatus, no hematemesis, no hematochezia, no hematuria, no melena, no nausea, no shortness of breath, no sore throat, no vaginal bleeding, no vaginal discharge and no vomiting    Risk factors: no alcohol abuse, no aspirin use, not elderly, has not had multiple surgeries, no NSAID use, not obese, not pregnant and no recent hospitalization        Review of Systems   Constitutional:  Negative for chills,  fatigue and fever.   HENT: Negative.  Negative for sore throat.    Respiratory: Negative.  Negative for cough and shortness of breath.    Cardiovascular: Negative.  Negative for chest pain.   Gastrointestinal: Negative.  Negative for anorexia, constipation, diarrhea, flatus, hematemesis, hematochezia, melena, nausea and vomiting.   Genitourinary:  Positive for flank pain. Negative for dysuria, hematuria, vaginal bleeding and vaginal discharge.   Skin: Negative.    Psychiatric/Behavioral: Negative.     All other systems reviewed and are negative.      Past Medical History:   Diagnosis Date    Depression     Diabetes mellitus     Disease of thyroid gland     HYPOTHYROIDISM    GERD (gastroesophageal reflux disease)     Hyperlipidemia     Hypertension     Kidney stone     Neuropathy     Plantar fasciitis        Allergies   Allergen Reactions    Atacand [Candesartan Cilexetil] Rash    Biaxin [Clarithromycin] Rash    Cefzil [Cefprozil] Rash    Levaquin [Levofloxacin] Rash    Penicillins Rash    Zestril [Lisinopril] Rash       Past Surgical History:   Procedure Laterality Date    CHOLECYSTECTOMY      COLONOSCOPY      COLONOSCOPY N/A 5/27/2020    Procedure: COLONOSCOPY WITH ANESTHESIA;  Surgeon: Rambo Padilla DO;  Location: [facility] ENDOSCOPY;  Service: Gastroenterology;  Laterality: N/A;  Pre: Change in Bowels  Post: Colon Polyp, Suboptimal Prep  Kings APRN  CO2 Inflation Used    ENDOSCOPY N/A 4/4/2017    Procedure: ESOPHAGOGASTRODUODENOSCOPY WITH ANESTHESIA;  Surgeon: Rambo Padilla DO;  Location: [facility] ENDOSCOPY;  Service:     KIDNEY STONE SURGERY      SHOULDER SURGERY         Family History   Problem Relation Age of Onset    Heart disease Mother     Diabetes Mother     Alzheimer's disease Father     Heart disease Father     Diabetes Sister     Heart disease Brother     Diabetes Sister     Diabetes Sister     Colon cancer Neg Hx     Esophageal cancer Neg Hx        Social History     Socioeconomic History     Marital status:    Tobacco Use    Smoking status: Never    Smokeless tobacco: Never   Vaping Use    Vaping status: Never Used   Substance and Sexual Activity    Alcohol use: No    Drug use: No    Sexual activity: Defer     Partners: Female       Prior to Admission medications    Medication Sig Start Date End Date Taking? Authorizing Provider   albuterol sulfate  (90 Base) MCG/ACT inhaler Inhale 2 puffs Every 4 (Four) Hours As Needed for Wheezing. 1/3/24   Alexis Montes MD   aspirin 81 MG EC tablet Take 1 tablet by mouth Daily. 10/22/24   Alexis Montes MD   brompheniramine-pseudoephedrine-DM 30-2-10 MG/5ML syrup Take 10 mL by mouth 4 (Four) Times a Day As Needed for Allergies, Cough or Congestion. 1/21/25   Alexis Montes MD   citalopram (CeleXA) 20 MG tablet Take 1 tablet by mouth every night at bedtime. 6/19/24   Alexis Montes MD   famotidine (PEPCID) 20 MG tablet Take 1 tablet by mouth 2 (Two) Times a Day.    ProviderUma MD   fexofenadine (ALLEGRA) 180 MG tablet Take 1 tablet by mouth As Needed.    Provider, MD Uma   fluticasone (FLONASE) 50 MCG/ACT nasal spray 1 spray each nostril twice a day for three days, then daily. 1/2/17   Leigh Barajas APRN   gabapentin (NEURONTIN) 800 MG tablet Take 1 tablet by mouth 3 (Three) Times a Day. 9/16/24   Susanna Carey APRN   Glucagon (Gvoke HypoPen 2-Pack) 1 MG/0.2ML solution auto-injector Inject 1 mg under the skin into the appropriate area as directed 1 (One) Time As Needed (hypoglycemia) for up to 1 dose. 1/21/25   Alexis Montes MD   guaiFENesin-dextromethorphan (ROBITUSSIN DM) 100-10 MG/5ML syrup Take 5 mL by mouth 3 (Three) Times a Day As Needed for Cough. 12/2/24   Kenneth Miller APRN   hydroCHLOROthiazide 12.5 MG tablet Take 1 tablet by mouth once daily 9/4/24   Alexis Montes MD   hydrOXYzine pamoate (Vistaril) 25 MG capsule Take 1 capsule by mouth 3 (Three) Times a Day As  Needed for Itching or Allergies. 4/16/24   Alexis Montes MD   insulin degludec (TRESIBA FLEXTOUCH) 100 UNIT/ML solution pen-injector injection Inject 30 Units under the skin into the appropriate area as directed Daily. 30 units - may titrate up to 50 units daily 7/23/24 7/24/25  Uma Mendoza MD   Insulin Lispro (HumaLOG KwikPen) 200 UNIT/ML solution pen-injector Inject 22 Units under the skin into the appropriate area as directed 3 (Three) Times a Day With Meals. 7/18/24   Alexis Montes MD   Insulin Pen Needle (Pen Needles) 32G X 4 MM misc 1 each 4 (Four) Times a Day. Use 4 x daily, Dx code E11.65 11/21/22   Ross Vogel MD   Jardiance 25 MG tablet tablet Take 1 tablet by mouth Daily.    Uma Mendoza MD   levothyroxine (SYNTHROID, LEVOTHROID) 50 MCG tablet Take 1 tablet by mouth Daily. 1/9/24   Alexis Montes MD   losartan (COZAAR) 25 MG tablet Take 1 tablet by mouth Every Morning. 1/30/23   Uma Mendoza MD   meloxicam (MOBIC) 15 MG tablet Take 1 tablet by mouth Daily.    Uma Mendoza MD   metoprolol tartrate (LOPRESSOR) 50 MG tablet Take 1 tablet by mouth 2 (Two) Times a Day.    Uma Mendoza MD   potassium citrate (UROCIT-K) 10 MEQ (1080 MG) CR tablet Take 1 tablet by mouth 3 (Three) Times a Day With Meals. 2/9/17   Abdelrahman Vides MD   RELION PEN NEEDLE 31G/8MM 31G X 8 MM misc Daily. use as directed 7/1/23   Uma Mendoza MD   rOPINIRole (REQUIP) 2 MG tablet Take 1 tablet by mouth Every Night. Take 1 hour before bedtime. 11/18/22   Ankur Aguilar PA   rosuvastatin (CRESTOR) 20 MG tablet Take 1 tablet by mouth Daily. 1/31/25   Alexis Montes MD   sulfamethoxazole-trimethoprim (BACTRIM DS,SEPTRA DS) 800-160 MG per tablet Take 1 tablet by mouth 2 (Two) Times a Day. 2/16/25   Wesley Patrick MD       Medications   sodium chloride 0.9 % flush 10 mL (10 mL Intravenous Given 2/21/25 6410)    HYDROmorphone (DILAUDID) injection 1 mg (1 mg Intravenous Given 2/21/25 0709)   iopamidol (ISOVUE-300) 61 % injection 100 mL (100 mL Intravenous Given 2/21/25 0748)       Vitals:    02/21/25 0617   BP: 157/85   Pulse: 78   Resp: 20   Temp: 97.6 °F (36.4 °C)   SpO2: 98%         Objective   Physical Exam  Vitals and nursing note reviewed.   Constitutional:       Appearance: Normal appearance.   HENT:      Head: Normocephalic and atraumatic.   Eyes:      Extraocular Movements: Extraocular movements intact.      Pupils: Pupils are equal, round, and reactive to light.   Cardiovascular:      Rate and Rhythm: Normal rate and regular rhythm.   Pulmonary:      Effort: Pulmonary effort is normal.      Breath sounds: Normal breath sounds.   Abdominal:      General: Abdomen is flat.      Palpations: Abdomen is soft.      Tenderness: There is abdominal tenderness.      Comments: There is tenderness palpation at the single point that he points to in the right flank right where the ribs meet the abdominal wall.  There is no crepitus or deformity noted.  There is no CVA tenderness noted.   Musculoskeletal:         General: Normal range of motion.   Skin:     General: Skin is warm and dry.      Findings: No rash.      Comments: There is no rash noted on the right flank.   Neurological:      General: No focal deficit present.      Mental Status: He is alert and oriented to person, place, and time.   Psychiatric:         Mood and Affect: Mood normal.         Behavior: Behavior normal.         Procedures         Lab Results (last 24 hours)       Procedure Component Value Units Date/Time    Urinalysis With Culture If Indicated - Urine, Clean Catch [[id number]]  (Abnormal) Collected: 02/21/25 0634    Specimen: Urine, Clean Catch Updated: 02/21/25 0720     Color, UA [place]     Appearance, UA Clear     pH, UA <=5.0     Specific Gravity, UA 1.023     Glucose, UA >=1000 mg/dL (3+)     Ketones, UA Negative     Bilirubin, UA Small (1+)      Blood, UA Negative     Protein,  mg/dL (2+)     Leuk Esterase, UA Moderate (2+)     Nitrite, UA Positive     Urobilinogen, UA 1.0 E.U./dL    Narrative:      Dipstick results may be inaccurate due to color interference.    Urinalysis, Microscopic Only - Urine, Clean Catch [[id number]]  (Abnormal) Collected: 02/21/25 0634    Specimen: Urine, Clean Catch Updated: 02/21/25 0720     RBC, UA 0-2 /HPF      WBC, UA 3-5 /HPF      Comment: Urine culture not indicated.        Bacteria, UA Trace /HPF      Squamous Epithelial Cells, UA 3-6 /HPF      Hyaline Casts, UA None Seen /LPF      Methodology Manual Light Microscopy    CBC & Differential [[id number]]  (Abnormal) Collected: 02/21/25 0651    Specimen: Blood Updated: 02/21/25 0659    Narrative:      The following orders were created for panel order CBC & Differential.  Procedure                               Abnormality         Status                     ---------                               -----------         ------                     CBC Auto Differential[[id number]]        Abnormal            Final result                 Please view results for these tests on the individual orders.    Comprehensive Metabolic Panel [[id number]]  (Abnormal) Collected: 02/21/25 0651    Specimen: Blood Updated: 02/21/25 0727     Glucose 292 mg/dL      BUN 17 mg/dL      Creatinine 1.14 mg/dL      Sodium 135 mmol/L      Potassium 4.3 mmol/L      Chloride 97 mmol/L      CO2 23.0 mmol/L      Calcium 9.5 mg/dL      Total Protein 8.0 g/dL      Albumin 4.0 g/dL      ALT (SGPT) 21 U/L      AST (SGOT) 24 U/L      Alkaline Phosphatase 109 U/L      Total Bilirubin 0.5 mg/dL      Globulin 4.0 gm/dL      A/G Ratio 1.0 g/dL      BUN/Creatinine Ratio 14.9     Anion Gap 15.0 mmol/L      eGFR 69.2 mL/min/1.73     Narrative:      GFR Categories in Chronic Kidney Disease (CKD)      GFR Category          GFR (mL/min/1.73)    Interpretation  G1                     90 or greater         Normal or high  (1)  G2                      60-89                Mild decrease (1)  G3a                   45-59                Mild to moderate decrease  G3b                   30-44                Moderate to severe decrease  G4                    15-29                Severe decrease  G5                    14 or less           Kidney failure          (1)In the absence of evidence of kidney disease, neither GFR category G1 or G2 fulfill the criteria for CKD.    eGFR calculation 2021 CKD-EPI creatinine equation, which does not include race as a factor    Lipase [[id number]]  (Abnormal) Collected: 02/21/25 0651    Specimen: Blood Updated: 02/21/25 0711     Lipase 71 U/L     CBC Auto Differential [[id number]]  (Abnormal) Collected: 02/21/25 0651    Specimen: Blood Updated: 02/21/25 0659     WBC 9.52 10*3/mm3      RBC 5.66 10*6/mm3      Hemoglobin 16.1 g/dL      Hematocrit 48.6 %      MCV 85.9 fL      MCH 28.4 pg      MCHC 33.1 g/dL      RDW 13.9 %      RDW-SD 43.7 fl      MPV 10.2 fL      Platelets 226 10*3/mm3      Neutrophil % 50.2 %      Lymphocyte % 32.6 %      Monocyte % 9.3 %      Eosinophil % 5.7 %      Basophil % 0.6 %      Immature Grans % 1.6 %      Neutrophils, Absolute 4.78 10*3/mm3      Lymphocytes, Absolute 3.10 10*3/mm3      Monocytes, Absolute 0.89 10*3/mm3      Eosinophils, Absolute 0.54 10*3/mm3      Basophils, Absolute 0.06 10*3/mm3      Immature Grans, Absolute 0.15 10*3/mm3      nRBC 0.0 /100 WBC             CT Abdomen Pelvis With Contrast   Final Result       1.  No urinary stone or evidence of obstructive uropathy. No definite   acute injury abdominal process on this exam.       2.  Mild fecal stasis.       3.  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 This is unchanged since 2016.       4.  Some mild bilateral renal cortical scarring.        This report was signed and finalized on 2/21/2025 8:11 AM by Dr. Blair Herrera MD.              ED Course           MDM  Number of Diagnoses or Management Options  Right flank pain: new and requires workup  Diagnosis management comments: I told the patient that everything got does not give a clear answer as to his pain.  There is no signs of appendicitis or kidney stone or even any significant urinary tract infection at the present time.  His pain seems to be point tender and he now points to an area lower than what I first was understanding.  Is more something just above the right iliac crest.  He does say sometimes this pain when he moves the wrong way so it may just be a muscular type of pain but certain is nothing critical right now or surgical.  I will give him something for the pain for the brief period of time to follow-up with his regular doctor for any further investigation or evaluation that is needed.  He is discharged in stable condition.       Amount and/or Complexity of Data Reviewed  Clinical lab tests: ordered and reviewed  Tests in the radiology section of CPT®: ordered and reviewed  Decide to obtain previous medical records or to obtain history from someone other than the patient: yes    Risk of Complications, Morbidity, and/or Mortality  Presenting problems: moderate  Diagnostic procedures: moderate  Management options: moderate    Patient Progress  Patient progress: stable        Final diagnoses:   Right flank pain          Kirill Ricks Jr., MD  02/21/25 0845

## 2025-02-24 ENCOUNTER — OFFICE VISIT (OUTPATIENT)
Dept: FAMILY MEDICINE CLINIC | Facility: CLINIC | Age: 70
End: 2025-02-24
Payer: MEDICARE

## 2025-02-24 VITALS
SYSTOLIC BLOOD PRESSURE: 148 MMHG | OXYGEN SATURATION: 100 % | HEART RATE: 86 BPM | BODY MASS INDEX: 27.58 KG/M2 | HEIGHT: 71 IN | TEMPERATURE: 97.2 F | WEIGHT: 197 LBS | DIASTOLIC BLOOD PRESSURE: 86 MMHG | RESPIRATION RATE: 18 BRPM

## 2025-02-24 DIAGNOSIS — Z79.4 TYPE 2 DIABETES MELLITUS WITH HYPERGLYCEMIA, WITH LONG-TERM CURRENT USE OF INSULIN: ICD-10-CM

## 2025-02-24 DIAGNOSIS — I10 ESSENTIAL HYPERTENSION: ICD-10-CM

## 2025-02-24 DIAGNOSIS — M62.838 MUSCLE SPASM: Primary | ICD-10-CM

## 2025-02-24 DIAGNOSIS — E11.65 TYPE 2 DIABETES MELLITUS WITH HYPERGLYCEMIA, WITH LONG-TERM CURRENT USE OF INSULIN: ICD-10-CM

## 2025-02-24 PROCEDURE — 3046F HEMOGLOBIN A1C LEVEL >9.0%: CPT | Performed by: FAMILY MEDICINE

## 2025-02-24 PROCEDURE — G2211 COMPLEX E/M VISIT ADD ON: HCPCS | Performed by: FAMILY MEDICINE

## 2025-02-24 PROCEDURE — 1125F AMNT PAIN NOTED PAIN PRSNT: CPT | Performed by: FAMILY MEDICINE

## 2025-02-24 PROCEDURE — 99214 OFFICE O/P EST MOD 30 MIN: CPT | Performed by: FAMILY MEDICINE

## 2025-02-24 PROCEDURE — 3077F SYST BP >= 140 MM HG: CPT | Performed by: FAMILY MEDICINE

## 2025-02-24 PROCEDURE — 3079F DIAST BP 80-89 MM HG: CPT | Performed by: FAMILY MEDICINE

## 2025-02-24 RX ORDER — METHOCARBAMOL 1000 MG/1
1000 TABLET, FILM COATED ORAL EVERY 6 HOURS PRN
Qty: 120 TABLET | Refills: 0 | Status: SHIPPED | OUTPATIENT
Start: 2025-02-24 | End: 2025-03-03 | Stop reason: ALTCHOICE

## 2025-02-24 NOTE — PROGRESS NOTES
"Chief Complaint  Abdominal Pain (Pt is here for a follow up )    Subjective        Supa Mcclelland presents to [facility]  Abdominal Pain      Mr. Mcclelland presents today for a follow up from an ER visit.  He went to the ER with right flank pain.  CT abdomen and pelvis was unremarkable.      He has T2DM.  He is due for Urine microalbumin.      He has HTN.  BP is elevated today at 148/86.  This is likely secondary to pain/discomfort.      Objective   Vital Signs:  /86 (BP Location: Left arm, Patient Position: Sitting, Cuff Size: Adult)   Pulse 86   Temp 97.2 °F (36.2 °C) (Temporal)   Resp 18   Ht 180.3 cm (70.98\")   Wt 89.4 kg (197 lb)   SpO2 100%   BMI 27.49 kg/m²   Estimated body mass index is 27.49 kg/m² as calculated from the following:    Height as of this encounter: 180.3 cm (70.98\").    Weight as of this encounter: 89.4 kg (197 lb).            Physical Exam  Vitals reviewed.   Constitutional:       General: He is not in acute distress.     Appearance: Normal appearance. He is normal weight. He is not ill-appearing, toxic-appearing or diaphoretic.   HENT:      Head: Normocephalic and atraumatic.      Right Ear: External ear normal.      Left Ear: External ear normal.      Nose: Nose normal.   Eyes:      Extraocular Movements: Extraocular movements intact.   Cardiovascular:      Rate and Rhythm: Normal rate and regular rhythm.   Pulmonary:      Effort: Pulmonary effort is normal. No respiratory distress.      Breath sounds: Normal breath sounds.   Abdominal:      Comments: Pain/tenderness over right oblique    Skin:     General: Skin is warm and dry.      Coloration: Skin is not jaundiced or pale.   Neurological:      Mental Status: He is alert and oriented to person, place, and time.   Psychiatric:         Mood and Affect: Mood normal.         Behavior: Behavior normal.         Thought Content: Thought content normal.         Judgment: Judgment normal.      "       Result Review :                Assessment and Plan   Diagnoses and all orders for this visit:    1. Muscle spasm (Primary)  -     methocarbamol 1000 MG tablet; Take 1,000 mg by mouth Every 6 (Six) Hours As Needed for Muscle Spasms for up to 30 days.  Dispense: 120 tablet; Refill: 0    2. Type 2 diabetes mellitus with hyperglycemia, with long-term current use of insulin  -     Microalbumin / Creatinine Urine Ratio - Urine, Clean Catch    3. Essential hypertension    BP is poorly controlled today, likely due to pain  Will do a trial of muscle relaxers   Continue current BP meds  Check Urine studies    Follow up in 1 month         Follow Up   No follow-ups on file.  Patient was given instructions and counseling regarding his condition or for health maintenance advice. Please see specific information pulled into the AVS if appropriate.

## 2025-02-25 ENCOUNTER — TELEPHONE (OUTPATIENT)
Dept: FAMILY MEDICINE CLINIC | Facility: CLINIC | Age: 70
End: 2025-02-25
Payer: MEDICARE

## 2025-02-25 ENCOUNTER — OFFICE VISIT (OUTPATIENT)
Dept: CARDIOLOGY CLINIC | Age: 70
End: 2025-02-25
Payer: MEDICARE

## 2025-02-25 VITALS
DIASTOLIC BLOOD PRESSURE: 68 MMHG | SYSTOLIC BLOOD PRESSURE: 136 MMHG | WEIGHT: 199 LBS | HEIGHT: 71 IN | OXYGEN SATURATION: 93 % | BODY MASS INDEX: 27.86 KG/M2 | HEART RATE: 90 BPM

## 2025-02-25 DIAGNOSIS — R94.31 ABNORMAL EKG: ICD-10-CM

## 2025-02-25 DIAGNOSIS — I10 ESSENTIAL HYPERTENSION: Primary | ICD-10-CM

## 2025-02-25 DIAGNOSIS — I25.119 CORONARY ARTERY DISEASE INVOLVING NATIVE HEART WITH ANGINA PECTORIS, UNSPECIFIED VESSEL OR LESION TYPE: ICD-10-CM

## 2025-02-25 DIAGNOSIS — E78.5 HYPERLIPIDEMIA, UNSPECIFIED HYPERLIPIDEMIA TYPE: ICD-10-CM

## 2025-02-25 PROCEDURE — 93000 ELECTROCARDIOGRAM COMPLETE: CPT | Performed by: INTERNAL MEDICINE

## 2025-02-25 PROCEDURE — G8427 DOCREV CUR MEDS BY ELIG CLIN: HCPCS | Performed by: INTERNAL MEDICINE

## 2025-02-25 PROCEDURE — 1159F MED LIST DOCD IN RCRD: CPT | Performed by: INTERNAL MEDICINE

## 2025-02-25 PROCEDURE — 1160F RVW MEDS BY RX/DR IN RCRD: CPT | Performed by: INTERNAL MEDICINE

## 2025-02-25 PROCEDURE — 3078F DIAST BP <80 MM HG: CPT | Performed by: INTERNAL MEDICINE

## 2025-02-25 PROCEDURE — 3017F COLORECTAL CA SCREEN DOC REV: CPT | Performed by: INTERNAL MEDICINE

## 2025-02-25 PROCEDURE — G8417 CALC BMI ABV UP PARAM F/U: HCPCS | Performed by: INTERNAL MEDICINE

## 2025-02-25 PROCEDURE — 1123F ACP DISCUSS/DSCN MKR DOCD: CPT | Performed by: INTERNAL MEDICINE

## 2025-02-25 PROCEDURE — 99214 OFFICE O/P EST MOD 30 MIN: CPT | Performed by: INTERNAL MEDICINE

## 2025-02-25 PROCEDURE — 3075F SYST BP GE 130 - 139MM HG: CPT | Performed by: INTERNAL MEDICINE

## 2025-02-25 PROCEDURE — 1036F TOBACCO NON-USER: CPT | Performed by: INTERNAL MEDICINE

## 2025-02-25 RX ORDER — METOPROLOL SUCCINATE 25 MG/1
25 TABLET, EXTENDED RELEASE ORAL DAILY
Qty: 30 TABLET | Refills: 3 | Status: SHIPPED | OUTPATIENT
Start: 2025-02-25

## 2025-02-25 RX ORDER — ASPIRIN 81 MG/1
81 TABLET, CHEWABLE ORAL DAILY
COMMUNITY

## 2025-02-25 RX ORDER — CLOPIDOGREL BISULFATE 75 MG/1
75 TABLET ORAL DAILY
Qty: 90 TABLET | Refills: 1 | Status: SHIPPED | OUTPATIENT
Start: 2025-02-25

## 2025-02-25 RX ORDER — ISOSORBIDE MONONITRATE 30 MG/1
30 TABLET, EXTENDED RELEASE ORAL EVERY EVENING
Qty: 30 TABLET | Refills: 3 | Status: SHIPPED | OUTPATIENT
Start: 2025-02-25

## 2025-02-25 RX ORDER — NITROGLYCERIN 0.4 MG/1
0.4 TABLET SUBLINGUAL EVERY 5 MIN PRN
Qty: 25 TABLET | Refills: 3 | Status: SHIPPED | OUTPATIENT
Start: 2025-02-25

## 2025-02-25 ASSESSMENT — ENCOUNTER SYMPTOMS
COUGH: 0
CHEST TIGHTNESS: 0
BLOOD IN STOOL: 0
EYE PAIN: 0
ABDOMINAL DISTENTION: 0
EYE REDNESS: 0
VOMITING: 0
WHEEZING: 0
DIARRHEA: 0
CONSTIPATION: 0
FACIAL SWELLING: 0
SORE THROAT: 0
NAUSEA: 0
SHORTNESS OF BREATH: 0
EYE DISCHARGE: 0
ABDOMINAL PAIN: 0
APNEA: 0

## 2025-02-25 NOTE — TELEPHONE ENCOUNTER
[facility] at [facility] called. She needs office notes and a foot exam in order for him to get his diabetic foot shoes. Please fax to [phone]. Her phone number is [phone] if you have questions.
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
## 2025-02-26 NOTE — TELEPHONE ENCOUNTER
PA denied- pt must try/fail at least 2 formulary alternatives:  Baclofen 10mg/20mg  Tizanidine tablets 2mg/4mg  Cyclobenzaprine 5mg/10mg  *none of the above medication are listed on current or past med list     Spoke with pharmacy to see if pt cash paid for medication- has not. Cash pay price is 88$

## 2025-02-27 RX ORDER — CYCLOBENZAPRINE HCL 5 MG
5 TABLET ORAL 3 TIMES DAILY PRN
Qty: 90 TABLET | Refills: 0 | Status: SHIPPED | OUTPATIENT
Start: 2025-02-27

## 2025-02-28 ENCOUNTER — PATIENT OUTREACH (OUTPATIENT)
Dept: CASE MANAGEMENT | Facility: OTHER | Age: 70
End: 2025-02-28
Payer: MEDICARE

## 2025-02-28 NOTE — OUTREACH NOTE
"AMBULATORY CASE MANAGEMENT NOTE    Names and Relationships of Patient/Support Persons: Contact: Mcclelland,Rossana; Relationship: Emergency Contact -     Patient Outreach    [facility] patient seen at [facility] ED 2.16.25 and 2.21.25 for right flank pain.    Adult Patient Profile  Questions/Answers      Flowsheet Row Most Recent Value   Symptoms/Conditions Managed at Home musculoskeletal   Barriers to Managing Health none   Musculoskeletal Symptoms/Conditions --  [right flank pain]   Musculoskeletal Management Strategies medication therapy, routine screening   Musculoskeletal Comment Spouse reports he is doing \"great\". They have not filled the muscle relaxer due to \"insruance holding it up\". She denied needs at this time.                Mandi COSTA  Ambulatory Case Management    2/28/2025, 10:57 CST  "

## 2025-03-03 NOTE — TELEPHONE ENCOUNTER
Called pharmacy to cancel methocarbamol per provider request- pharmacy reports that cyclobenzaprine needs PA, will change to good rx $26.01 for qty 90.   Called pt back to notify-NA M for call back- Hub to transfer

## 2025-03-10 DIAGNOSIS — I73.9 PERIPHERAL ARTERIAL DISEASE: ICD-10-CM

## 2025-03-11 RX ORDER — HYDROGEN PEROXIDE 2.65 ML/100ML
81 LIQUID ORAL; TOPICAL DAILY
Qty: 30 TABLET | Refills: 0 | Status: SHIPPED | OUTPATIENT
Start: 2025-03-11

## 2025-03-11 NOTE — TELEPHONE ENCOUNTER
Rx Refill Note  Requested Prescriptions     Pending Prescriptions Disp Refills    EQ Aspirin Adult Low Dose 81 MG EC tablet [Pharmacy Med Name: EQ Aspirin Adult Low Dose 81 MG Oral Tablet Delayed Release] 30 tablet 0     Sig: Take 1 tablet by mouth once daily      Last office visit with prescribing clinician: 2/24/2025   Next office visit with prescribing clinician: 3/18/2025     Justine Qiu MA  03/11/25, 10:48 CDT

## 2025-03-18 ENCOUNTER — OFFICE VISIT (OUTPATIENT)
Dept: FAMILY MEDICINE CLINIC | Facility: CLINIC | Age: 70
End: 2025-03-18
Payer: MEDICARE

## 2025-03-18 VITALS
TEMPERATURE: 98 F | DIASTOLIC BLOOD PRESSURE: 64 MMHG | SYSTOLIC BLOOD PRESSURE: 102 MMHG | RESPIRATION RATE: 20 BRPM | WEIGHT: 195 LBS | HEART RATE: 71 BPM | HEIGHT: 71 IN | BODY MASS INDEX: 27.3 KG/M2 | OXYGEN SATURATION: 99 %

## 2025-03-18 DIAGNOSIS — Z79.4 TYPE 2 DIABETES MELLITUS WITH HYPERGLYCEMIA, WITH LONG-TERM CURRENT USE OF INSULIN: Primary | ICD-10-CM

## 2025-03-18 DIAGNOSIS — E78.00 PURE HYPERCHOLESTEROLEMIA: ICD-10-CM

## 2025-03-18 DIAGNOSIS — E11.65 TYPE 2 DIABETES MELLITUS WITH HYPERGLYCEMIA, WITH LONG-TERM CURRENT USE OF INSULIN: Primary | ICD-10-CM

## 2025-03-18 DIAGNOSIS — I10 ESSENTIAL HYPERTENSION: ICD-10-CM

## 2025-03-18 RX ORDER — NITROGLYCERIN 0.4 MG/1
0.4 TABLET SUBLINGUAL
COMMUNITY
Start: 2025-02-25

## 2025-03-18 RX ORDER — METOPROLOL SUCCINATE 25 MG/1
25 TABLET, EXTENDED RELEASE ORAL DAILY
COMMUNITY
Start: 2025-02-25

## 2025-03-18 RX ORDER — ISOSORBIDE MONONITRATE 30 MG/1
30 TABLET, EXTENDED RELEASE ORAL
COMMUNITY
Start: 2025-02-25

## 2025-03-18 RX ORDER — CLOPIDOGREL BISULFATE 75 MG/1
75 TABLET ORAL DAILY
COMMUNITY
Start: 2025-02-25

## 2025-03-18 NOTE — PROGRESS NOTES
"Chief Complaint  Diabetes (Pt is here for diabetes follow up )    Subjective        Supa Mcclelland presents to [facility]  History of Present Illness  Mr. Mcclelland presents for a follow up on chronic issues.      He has HTN.  BP today is 102/64.  This is much better than normal.  His wife says she has started making him take his medications, and he is very non-compliant normally.     He has HLD.  He is on Crestor 20 mg. Lipid Panel from January shows total cholesterol of 209.  LDL is 125.  His asvcd risk score is 29.9%.  He recently saw his Cardiologist after an ER trip for chest pain.  She is concerned he suffered an MI.  He is scheduled to undergo stress testing to assess for viability.  He is taking the medication regularly now.  He does not complain of myalgias.      He has T2DM.  He follows with Endocrinology.  He recently saw them.  He is on Tresiba 30 units daily, and Humalog 22 units TID.  He is on Jardiance 25 mg O daily.  His wife is making him comply with this.  His sugars are running much better of late.        Objective   Vital Signs:  /64 (BP Location: Left arm, Patient Position: Sitting, Cuff Size: Adult)   Pulse 71   Temp 98 °F (36.7 °C) (Skin)   Resp 20   Ht 180.3 cm (70.98\")   Wt 88.5 kg (195 lb)   SpO2 99%   BMI 27.21 kg/m²   Estimated body mass index is 27.21 kg/m² as calculated from the following:    Height as of this encounter: 180.3 cm (70.98\").    Weight as of this encounter: 88.5 kg (195 lb).            Physical Exam  Vitals reviewed.   Constitutional:       General: He is not in acute distress.     Appearance: Normal appearance. He is normal weight. He is not ill-appearing, toxic-appearing or diaphoretic.   HENT:      Head: Normocephalic and atraumatic.      Right Ear: External ear normal.      Left Ear: External ear normal.      Nose: Nose normal.   Eyes:      Extraocular Movements: Extraocular movements intact.   Cardiovascular:      Rate and " Rhythm: Normal rate and regular rhythm.   Pulmonary:      Effort: Pulmonary effort is normal. No respiratory distress.      Breath sounds: Normal breath sounds.   Skin:     General: Skin is warm and dry.      Coloration: Skin is not jaundiced or pale.   Neurological:      Mental Status: He is alert and oriented to person, place, and time.   Psychiatric:         Mood and Affect: Mood normal.         Behavior: Behavior normal.         Thought Content: Thought content normal.         Judgment: Judgment normal.            Result Review :  The 10-year ASCVD risk score (Quincy BARBOUR, et al., 2019) is: 29.9%    Values used to calculate the score:      Age: 69 years      Sex: Male      Is Non- : No      Diabetic: Yes      Tobacco smoker: No      Systolic Blood Pressure: 102 mmHg      Is BP treated: Yes      HDL Cholesterol: 37 mg/dL      Total Cholesterol: 209 mg/dL           Assessment and Plan   Diagnoses and all orders for this visit:    1. Type 2 diabetes mellitus with hyperglycemia, with long-term current use of insulin (Primary)  -     Cancel: Microalbumin / Creatinine Urine Ratio - Urine, Clean Catch  -     Hemoglobin A1c    2. Essential hypertension    3. Pure hypercholesterolemia  -     Lipid Panel    4. Acquired hypothyroidism    Continue Metoprolol, Losartan, HCTZ  Continue Insulin  Continue Jardiance  Continue Crestor--would like him to stay on it for 3 months now that he is more compliant and re-test in 3 months.  He is on a high intensity dose.  I worry his issues are more related to compliance than not the proper dose of medication.  I don't want to overdo the dose and cause him side effects with leg pain to the point he won't take it.      Follow up in 3 months.          Follow Up   Return in about 3 months (around 6/18/2025) for Recheck.  Patient was given instructions and counseling regarding his condition or for health maintenance advice. Please see specific information pulled into  the AVS if appropriate.

## 2025-03-19 LAB
ALBUMIN/CREAT UR: 73 MG/G CREAT (ref 0–29)
CREAT UR-MCNC: 76 MG/DL
MICROALBUMIN UR-MCNC: 55.2 UG/ML

## 2025-03-24 ENCOUNTER — TELEPHONE (OUTPATIENT)
Dept: NEUROLOGY | Facility: CLINIC | Age: 70
End: 2025-03-24

## 2025-03-24 NOTE — TELEPHONE ENCOUNTER
CALLED PATIENT TO LET THEM KNOW THAT WE WERE NEEDING TO R/S AS RAJAT IS OUT SICK. SPOKE WITH PATIENTS WIFE. MOVED TO FIRST AVAILABLE 7/7. SHE VOICED UNDERSTANDING.

## 2025-03-25 DIAGNOSIS — E11.42 DIABETIC POLYNEUROPATHY ASSOCIATED WITH TYPE 2 DIABETES MELLITUS: ICD-10-CM

## 2025-03-25 DIAGNOSIS — G25.81 RESTLESS LEGS SYNDROME (RLS): ICD-10-CM

## 2025-03-25 RX ORDER — GABAPENTIN 800 MG/1
800 TABLET ORAL 3 TIMES DAILY
Qty: 270 TABLET | Refills: 0 | Status: SHIPPED | OUTPATIENT
Start: 2025-03-25

## 2025-04-15 DIAGNOSIS — I73.9 PERIPHERAL ARTERIAL DISEASE: ICD-10-CM

## 2025-04-16 RX ORDER — ASPIRIN 81 MG/1
81 TABLET, COATED ORAL DAILY
Qty: 30 TABLET | Refills: 0 | Status: SHIPPED | OUTPATIENT
Start: 2025-04-16

## 2025-04-16 NOTE — TELEPHONE ENCOUNTER
Rx Refill Note  Requested Prescriptions     Pending Prescriptions Disp Refills    Aspirin Low Dose 81 MG EC tablet [Pharmacy Med Name: Aspirin Low Dose 81 MG Oral Tablet Delayed Release] 30 tablet 0     Sig: Take 1 tablet by mouth once daily      Last office visit with prescribing clinician: 3/18/2025     Next office visit with prescribing clinician: 6/18/2025       Radha Purdy MA  04/16/25, 08:24 CDT

## 2025-04-25 ENCOUNTER — HOSPITAL ENCOUNTER (OUTPATIENT)
Dept: NUCLEAR MEDICINE | Age: 70
Discharge: HOME | End: 2025-04-27
Attending: INTERNAL MEDICINE
Payer: MEDICARE

## 2025-04-25 ENCOUNTER — HOSPITAL ENCOUNTER (INPATIENT)
Age: 70
LOS: 18 days | Discharge: NURSING, REHAB OR LONG-TERM CARE | DRG: 234 | End: 2025-05-13
Attending: EMERGENCY MEDICINE | Admitting: SURGERY
Payer: MEDICARE

## 2025-04-25 ENCOUNTER — APPOINTMENT (OUTPATIENT)
Dept: VASCULAR LAB | Age: 70
DRG: 234 | End: 2025-04-25
Attending: SURGERY
Payer: MEDICARE

## 2025-04-25 ENCOUNTER — APPOINTMENT (OUTPATIENT)
Dept: GENERAL RADIOLOGY | Age: 70
DRG: 234 | End: 2025-04-25
Attending: INTERNAL MEDICINE
Payer: MEDICARE

## 2025-04-25 ENCOUNTER — ANESTHESIA EVENT (OUTPATIENT)
Dept: OPERATING ROOM | Age: 70
End: 2025-04-25
Payer: MEDICARE

## 2025-04-25 ENCOUNTER — HOSPITAL ENCOUNTER (OUTPATIENT)
Age: 70
Discharge: HOME | End: 2025-04-27
Attending: INTERNAL MEDICINE
Payer: MEDICARE

## 2025-04-25 VITALS
WEIGHT: 196 LBS | HEIGHT: 71 IN | SYSTOLIC BLOOD PRESSURE: 96 MMHG | BODY MASS INDEX: 27.44 KG/M2 | DIASTOLIC BLOOD PRESSURE: 65 MMHG

## 2025-04-25 DIAGNOSIS — I25.119 CORONARY ARTERY DISEASE INVOLVING NATIVE HEART WITH ANGINA PECTORIS, UNSPECIFIED VESSEL OR LESION TYPE: ICD-10-CM

## 2025-04-25 DIAGNOSIS — R93.1 ABNORMAL NUCLEAR CARDIAC IMAGING TEST: ICD-10-CM

## 2025-04-25 DIAGNOSIS — R94.39 ABNORMAL STRESS TEST: Primary | ICD-10-CM

## 2025-04-25 DIAGNOSIS — I10 ESSENTIAL HYPERTENSION: ICD-10-CM

## 2025-04-25 DIAGNOSIS — I25.10 CAD IN NATIVE ARTERY: ICD-10-CM

## 2025-04-25 DIAGNOSIS — R94.31 ABNORMAL EKG: ICD-10-CM

## 2025-04-25 PROBLEM — E11.9 DIABETES (HCC): Status: ACTIVE | Noted: 2025-04-25

## 2025-04-25 LAB
ABO/RH: NORMAL
ALBUMIN SERPL-MCNC: 4 G/DL (ref 3.5–5.2)
ALP SERPL-CCNC: 94 U/L (ref 40–129)
ALT SERPL-CCNC: 18 U/L (ref 10–50)
ANION GAP SERPL CALCULATED.3IONS-SCNC: 13 MMOL/L (ref 8–16)
ANTI-XA UNFRAC HEPARIN: 0.46 IU/ML
ANTI-XA UNFRAC HEPARIN: <0.1 IU/ML
ANTIBODY SCREEN: NORMAL
APTT PPP: 24.7 SEC (ref 26–36.2)
AST SERPL-CCNC: 28 U/L (ref 10–50)
BASOPHILS # BLD: 0.1 K/UL (ref 0–0.2)
BASOPHILS NFR BLD: 0.7 % (ref 0–1)
BILIRUB SERPL-MCNC: 0.6 MG/DL (ref 0.2–1.2)
BUN SERPL-MCNC: 22 MG/DL (ref 8–23)
CALCIUM SERPL-MCNC: 9.2 MG/DL (ref 8.8–10.2)
CHLORIDE SERPL-SCNC: 100 MMOL/L (ref 98–107)
CO2 SERPL-SCNC: 21 MMOL/L (ref 22–29)
CREAT SERPL-MCNC: 1.2 MG/DL (ref 0.7–1.2)
ECHO AO ASC DIAM: 3.3 CM
ECHO AO ASCENDING AORTA INDEX: 1.58 CM/M2
ECHO AO ROOT DIAM: 2.9 CM
ECHO AO ROOT INDEX: 1.39 CM/M2
ECHO AV AREA PEAK VELOCITY: 2.1 CM2
ECHO AV AREA VTI: 2.3 CM2
ECHO AV AREA/BSA PEAK VELOCITY: 1 CM2/M2
ECHO AV AREA/BSA VTI: 1.1 CM2/M2
ECHO AV MEAN GRADIENT: 5 MMHG
ECHO AV MEAN GRADIENT: 5 MMHG
ECHO AV MEAN VELOCITY: 1 M/S
ECHO AV MEAN VELOCITY: 1 M/S
ECHO AV PEAK GRADIENT: 10 MMHG
ECHO AV PEAK VELOCITY: 1.6 M/S
ECHO AV VELOCITY RATIO: 0.56
ECHO AV VTI: 30.6 CM
ECHO BSA: 2.11 M2
ECHO EST RA PRESSURE: 3 MMHG
ECHO IVC PROX: 1.6 CM
ECHO LA AREA 2C: 18.3 CM2
ECHO LA AREA 4C: 21.1 CM2
ECHO LA DIAMETER INDEX: 2.2 CM/M2
ECHO LA DIAMETER: 4.6 CM
ECHO LA MAJOR AXIS: 6.4 CM
ECHO LA MINOR AXIS: 5.9 CM
ECHO LA TO AORTIC ROOT RATIO: 1.59
ECHO LA VOL BP: 53 ML (ref 18–58)
ECHO LA VOL MOD A2C: 47 ML (ref 18–58)
ECHO LA VOL MOD A4C: 57 ML (ref 18–58)
ECHO LA VOL/BSA BIPLANE: 25 ML/M2 (ref 16–34)
ECHO LA VOLUME INDEX MOD A2C: 22 ML/M2 (ref 16–34)
ECHO LA VOLUME INDEX MOD A4C: 27 ML/M2 (ref 16–34)
ECHO LV E' LATERAL VELOCITY: 5.66 CM/S
ECHO LV E' SEPTAL VELOCITY: 3.37 CM/S
ECHO LV EDV A2C: 79 ML
ECHO LV EDV A4C: 117 ML
ECHO LV EDV INDEX A4C: 56 ML/M2
ECHO LV EDV NDEX A2C: 38 ML/M2
ECHO LV EF PHYSICIAN: 55 %
ECHO LV EJECTION FRACTION A2C: 52 %
ECHO LV EJECTION FRACTION A4C: 56 %
ECHO LV EJECTION FRACTION BIPLANE: 54 % (ref 55–100)
ECHO LV ESV A2C: 38 ML
ECHO LV ESV A4C: 51 ML
ECHO LV ESV INDEX A2C: 18 ML/M2
ECHO LV ESV INDEX A4C: 24 ML/M2
ECHO LV FRACTIONAL SHORTENING: 29 % (ref 28–44)
ECHO LV INTERNAL DIMENSION DIASTOLE INDEX: 1.96 CM/M2
ECHO LV INTERNAL DIMENSION DIASTOLIC: 4.1 CM (ref 4.2–5.9)
ECHO LV INTERNAL DIMENSION SYSTOLIC INDEX: 1.39 CM/M2
ECHO LV INTERNAL DIMENSION SYSTOLIC: 2.9 CM
ECHO LV IVSD: 1.5 CM (ref 0.6–1)
ECHO LV MASS 2D: 228.6 G (ref 88–224)
ECHO LV MASS INDEX 2D: 109.4 G/M2 (ref 49–115)
ECHO LV POSTERIOR WALL DIASTOLIC: 1.4 CM (ref 0.6–1)
ECHO LV RELATIVE WALL THICKNESS RATIO: 0.68
ECHO LVOT AREA: 3.8 CM2
ECHO LVOT AV VTI INDEX: 0.61
ECHO LVOT DIAM: 2.2 CM
ECHO LVOT MEAN GRADIENT: 2 MMHG
ECHO LVOT PEAK GRADIENT: 3 MMHG
ECHO LVOT PEAK VELOCITY: 0.9 M/S
ECHO LVOT STROKE VOLUME INDEX: 34 ML/M2
ECHO LVOT SV: 71 ML
ECHO LVOT VTI: 18.7 CM
ECHO MV A VELOCITY: 0.79 M/S
ECHO MV E DECELERATION TIME (DT): 399 MS
ECHO MV E VELOCITY: 0.47 M/S
ECHO MV E/A RATIO: 0.59
ECHO MV E/E' LATERAL: 8.3
ECHO MV E/E' RATIO (AVERAGED): 11.13
ECHO MV E/E' SEPTAL: 13.95
ECHO RA AREA 4C: 10.9 CM2
ECHO RA END SYSTOLIC VOLUME APICAL 4 CHAMBER INDEX BSA: 9 ML/M2
ECHO RA VOLUME: 18 ML
ECHO RV BASAL DIMENSION: 3.1 CM
ECHO RV LONGITUDINAL DIMENSION: 6.7 CM
ECHO RV MID DIMENSION: 2.2 CM
ECHO RV TAPSE: 1.7 CM (ref 1.7–?)
EOSINOPHIL # BLD: 0.4 K/UL (ref 0–0.6)
EOSINOPHIL NFR BLD: 4 % (ref 0–5)
ERYTHROCYTE [DISTWIDTH] IN BLOOD BY AUTOMATED COUNT: 14 % (ref 11.5–14.5)
GLUCOSE BLD-MCNC: 276 MG/DL (ref 70–99)
GLUCOSE BLD-MCNC: 290 MG/DL (ref 70–99)
GLUCOSE SERPL-MCNC: 291 MG/DL (ref 70–99)
HCT VFR BLD AUTO: 49.9 % (ref 42–52)
HGB BLD-MCNC: 16.7 G/DL (ref 14–18)
IMM GRANULOCYTES # BLD: 0.2 K/UL
INR PPP: 1.08 (ref 0.88–1.18)
LYMPHOCYTES # BLD: 3.5 K/UL (ref 1.1–4.5)
LYMPHOCYTES NFR BLD: 33.7 % (ref 20–40)
MAGNESIUM SERPL-MCNC: 2.2 MG/DL (ref 1.6–2.4)
MCH RBC QN AUTO: 29.2 PG (ref 27–31)
MCHC RBC AUTO-ENTMCNC: 33.5 G/DL (ref 33–37)
MCV RBC AUTO: 87.4 FL (ref 80–94)
MONOCYTES # BLD: 0.8 K/UL (ref 0–0.9)
MONOCYTES NFR BLD: 7.7 % (ref 0–10)
MRSA DNA SPEC QL NAA+PROBE: NOT DETECTED
NEUTROPHILS # BLD: 5.4 K/UL (ref 1.5–7.5)
NEUTS SEG NFR BLD: 51.8 % (ref 50–65)
NUC STRESS EJECTION FRACTION: 44 %
PERFORMED ON: ABNORMAL
PERFORMED ON: ABNORMAL
PLATELET # BLD AUTO: 197 K/UL (ref 130–400)
PMV BLD AUTO: 10.4 FL (ref 9.4–12.4)
POTASSIUM SERPL-SCNC: 4.3 MMOL/L (ref 3.5–5.1)
PROT SERPL-MCNC: 7.8 G/DL (ref 6.4–8.3)
PROTHROMBIN TIME: 13.9 SEC (ref 12–14.6)
RBC # BLD AUTO: 5.71 M/UL (ref 4.7–6.1)
SODIUM SERPL-SCNC: 134 MMOL/L (ref 136–145)
STRESS BASELINE DIAS BP: 72 MMHG
STRESS BASELINE HR: 62 BPM
STRESS BASELINE SYS BP: 109 MMHG
STRESS ESTIMATED WORKLOAD: 1 METS
STRESS EXERCISE DUR MIN: 5 MIN
STRESS EXERCISE DUR SEC: 0 SEC
STRESS PEAK DIAS BP: 72 MMHG
STRESS PEAK SYS BP: 109 MMHG
STRESS PERCENT HR ACHIEVED: 48 %
STRESS POST PEAK HR: 72 BPM
STRESS RATE PRESSURE PRODUCT: 7848 BPM*MMHG
STRESS TARGET HR: 150 BPM
TROPONIN, HIGH SENSITIVITY: 29 NG/L (ref 0–22)
WBC # BLD AUTO: 10.4 K/UL (ref 4.8–10.8)

## 2025-04-25 PROCEDURE — 93017 CV STRESS TEST TRACING ONLY: CPT

## 2025-04-25 PROCEDURE — C8929 TTE W OR WO FOL WCON,DOPPLER: HCPCS

## 2025-04-25 PROCEDURE — 2700000000 HC OXYGEN THERAPY PER DAY

## 2025-04-25 PROCEDURE — 6360000002 HC RX W HCPCS: Performed by: INTERNAL MEDICINE

## 2025-04-25 PROCEDURE — 82962 GLUCOSE BLOOD TEST: CPT

## 2025-04-25 PROCEDURE — 86900 BLOOD TYPING SEROLOGIC ABO: CPT

## 2025-04-25 PROCEDURE — 4A023N7 MEASUREMENT OF CARDIAC SAMPLING AND PRESSURE, LEFT HEART, PERCUTANEOUS APPROACH: ICD-10-PCS | Performed by: INTERNAL MEDICINE

## 2025-04-25 PROCEDURE — 6370000000 HC RX 637 (ALT 250 FOR IP): Performed by: INTERNAL MEDICINE

## 2025-04-25 PROCEDURE — 86850 RBC ANTIBODY SCREEN: CPT

## 2025-04-25 PROCEDURE — 2500000003 HC RX 250 WO HCPCS: Performed by: SURGERY

## 2025-04-25 PROCEDURE — 36415 COLL VENOUS BLD VENIPUNCTURE: CPT

## 2025-04-25 PROCEDURE — 1200000000 HC SEMI PRIVATE

## 2025-04-25 PROCEDURE — 80053 COMPREHEN METABOLIC PANEL: CPT

## 2025-04-25 PROCEDURE — C1887 CATHETER, GUIDING: HCPCS | Performed by: INTERNAL MEDICINE

## 2025-04-25 PROCEDURE — 85730 THROMBOPLASTIN TIME PARTIAL: CPT

## 2025-04-25 PROCEDURE — 71045 X-RAY EXAM CHEST 1 VIEW: CPT

## 2025-04-25 PROCEDURE — 94760 N-INVAS EAR/PLS OXIMETRY 1: CPT

## 2025-04-25 PROCEDURE — 2709999900 HC NON-CHARGEABLE SUPPLY: Performed by: INTERNAL MEDICINE

## 2025-04-25 PROCEDURE — 96365 THER/PROPH/DIAG IV INF INIT: CPT

## 2025-04-25 PROCEDURE — 93970 EXTREMITY STUDY: CPT

## 2025-04-25 PROCEDURE — 85610 PROTHROMBIN TIME: CPT

## 2025-04-25 PROCEDURE — 6370000000 HC RX 637 (ALT 250 FOR IP): Performed by: NURSE PRACTITIONER

## 2025-04-25 PROCEDURE — C1769 GUIDE WIRE: HCPCS | Performed by: INTERNAL MEDICINE

## 2025-04-25 PROCEDURE — B211YZZ FLUOROSCOPY OF MULTIPLE CORONARY ARTERIES USING OTHER CONTRAST: ICD-10-PCS | Performed by: INTERNAL MEDICINE

## 2025-04-25 PROCEDURE — 93005 ELECTROCARDIOGRAM TRACING: CPT | Performed by: EMERGENCY MEDICINE

## 2025-04-25 PROCEDURE — 99153 MOD SED SAME PHYS/QHP EA: CPT | Performed by: INTERNAL MEDICINE

## 2025-04-25 PROCEDURE — A9502 TC99M TETROFOSMIN: HCPCS | Performed by: INTERNAL MEDICINE

## 2025-04-25 PROCEDURE — 93458 L HRT ARTERY/VENTRICLE ANGIO: CPT | Performed by: INTERNAL MEDICINE

## 2025-04-25 PROCEDURE — 94375 RESPIRATORY FLOW VOLUME LOOP: CPT

## 2025-04-25 PROCEDURE — C1894 INTRO/SHEATH, NON-LASER: HCPCS | Performed by: INTERNAL MEDICINE

## 2025-04-25 PROCEDURE — 99152 MOD SED SAME PHYS/QHP 5/>YRS: CPT | Performed by: INTERNAL MEDICINE

## 2025-04-25 PROCEDURE — 85520 HEPARIN ASSAY: CPT

## 2025-04-25 PROCEDURE — 6360000004 HC RX CONTRAST MEDICATION: Performed by: INTERNAL MEDICINE

## 2025-04-25 PROCEDURE — 6360000002 HC RX W HCPCS: Performed by: EMERGENCY MEDICINE

## 2025-04-25 PROCEDURE — 84484 ASSAY OF TROPONIN QUANT: CPT

## 2025-04-25 PROCEDURE — 99285 EMERGENCY DEPT VISIT HI MDM: CPT

## 2025-04-25 PROCEDURE — 86901 BLOOD TYPING SEROLOGIC RH(D): CPT

## 2025-04-25 PROCEDURE — 87641 MR-STAPH DNA AMP PROBE: CPT

## 2025-04-25 PROCEDURE — 99223 1ST HOSP IP/OBS HIGH 75: CPT | Performed by: SURGERY

## 2025-04-25 PROCEDURE — 93880 EXTRACRANIAL BILAT STUDY: CPT

## 2025-04-25 PROCEDURE — 3430000000 HC RX DIAGNOSTIC RADIOPHARMACEUTICAL: Performed by: INTERNAL MEDICINE

## 2025-04-25 PROCEDURE — 83735 ASSAY OF MAGNESIUM: CPT

## 2025-04-25 PROCEDURE — 85025 COMPLETE CBC W/AUTO DIFF WBC: CPT

## 2025-04-25 RX ORDER — ONDANSETRON 2 MG/ML
4 INJECTION INTRAMUSCULAR; INTRAVENOUS EVERY 6 HOURS PRN
Status: CANCELLED | OUTPATIENT
Start: 2025-04-25

## 2025-04-25 RX ORDER — SODIUM CHLORIDE 9 MG/ML
INJECTION, SOLUTION INTRAVENOUS PRN
Status: CANCELLED | OUTPATIENT
Start: 2025-04-25

## 2025-04-25 RX ORDER — SODIUM CHLORIDE 0.9 % (FLUSH) 0.9 %
10 SYRINGE (ML) INJECTION PRN
Status: DISCONTINUED | OUTPATIENT
Start: 2025-04-25 | End: 2025-04-29

## 2025-04-25 RX ORDER — ONDANSETRON 4 MG/1
4 TABLET, ORALLY DISINTEGRATING ORAL EVERY 8 HOURS PRN
Status: CANCELLED | OUTPATIENT
Start: 2025-04-25

## 2025-04-25 RX ORDER — GLUCAGON 1 MG/ML
1 KIT INJECTION PRN
Status: DISCONTINUED | OUTPATIENT
Start: 2025-04-25 | End: 2025-04-29

## 2025-04-25 RX ORDER — MIDAZOLAM HYDROCHLORIDE 1 MG/ML
INJECTION, SOLUTION INTRAMUSCULAR; INTRAVENOUS PRN
Status: DISCONTINUED | OUTPATIENT
Start: 2025-04-25 | End: 2025-04-25 | Stop reason: HOSPADM

## 2025-04-25 RX ORDER — MAGNESIUM SULFATE IN WATER 40 MG/ML
2000 INJECTION, SOLUTION INTRAVENOUS PRN
Status: CANCELLED | OUTPATIENT
Start: 2025-04-25

## 2025-04-25 RX ORDER — HEPARIN SODIUM 1000 [USP'U]/ML
INJECTION, SOLUTION INTRAVENOUS; SUBCUTANEOUS PRN
Status: DISCONTINUED | OUTPATIENT
Start: 2025-04-25 | End: 2025-04-25 | Stop reason: HOSPADM

## 2025-04-25 RX ORDER — DEXTROSE MONOHYDRATE 100 MG/ML
INJECTION, SOLUTION INTRAVENOUS CONTINUOUS PRN
Status: DISCONTINUED | OUTPATIENT
Start: 2025-04-25 | End: 2025-04-25

## 2025-04-25 RX ORDER — HEPARIN SODIUM 1000 [USP'U]/ML
2000 INJECTION, SOLUTION INTRAVENOUS; SUBCUTANEOUS PRN
Status: DISCONTINUED | OUTPATIENT
Start: 2025-04-25 | End: 2025-04-29

## 2025-04-25 RX ORDER — ACETAMINOPHEN 325 MG/1
650 TABLET ORAL EVERY 6 HOURS PRN
Status: CANCELLED | OUTPATIENT
Start: 2025-04-25

## 2025-04-25 RX ORDER — SODIUM CHLORIDE 0.9 % (FLUSH) 0.9 %
5-40 SYRINGE (ML) INJECTION PRN
Status: CANCELLED | OUTPATIENT
Start: 2025-04-25

## 2025-04-25 RX ORDER — INSULIN LISPRO 100 [IU]/ML
0-4 INJECTION, SOLUTION INTRAVENOUS; SUBCUTANEOUS
Status: DISCONTINUED | OUTPATIENT
Start: 2025-04-25 | End: 2025-04-26

## 2025-04-25 RX ORDER — NITROGLYCERIN 20 MG/100ML
INJECTION INTRAVENOUS PRN
Status: DISCONTINUED | OUTPATIENT
Start: 2025-04-25 | End: 2025-04-25 | Stop reason: HOSPADM

## 2025-04-25 RX ORDER — DEXTROSE MONOHYDRATE 100 MG/ML
INJECTION, SOLUTION INTRAVENOUS CONTINUOUS PRN
Status: DISCONTINUED | OUTPATIENT
Start: 2025-04-25 | End: 2025-04-29

## 2025-04-25 RX ORDER — HEPARIN SODIUM 10000 [USP'U]/100ML
5-30 INJECTION, SOLUTION INTRAVENOUS CONTINUOUS
Status: DISCONTINUED | OUTPATIENT
Start: 2025-04-25 | End: 2025-04-29

## 2025-04-25 RX ORDER — ENOXAPARIN SODIUM 100 MG/ML
40 INJECTION SUBCUTANEOUS DAILY
Status: CANCELLED | OUTPATIENT
Start: 2025-04-25

## 2025-04-25 RX ORDER — INSULIN GLARGINE 100 [IU]/ML
0.25 INJECTION, SOLUTION SUBCUTANEOUS DAILY
Status: DISCONTINUED | OUTPATIENT
Start: 2025-04-25 | End: 2025-04-26

## 2025-04-25 RX ORDER — POTASSIUM CHLORIDE 1500 MG/1
40 TABLET, EXTENDED RELEASE ORAL PRN
Status: CANCELLED | OUTPATIENT
Start: 2025-04-25

## 2025-04-25 RX ORDER — POLYETHYLENE GLYCOL 3350 17 G/17G
17 POWDER, FOR SOLUTION ORAL DAILY PRN
Status: CANCELLED | OUTPATIENT
Start: 2025-04-25

## 2025-04-25 RX ORDER — SODIUM CHLORIDE 0.9 % (FLUSH) 0.9 %
10 SYRINGE (ML) INJECTION EVERY 12 HOURS SCHEDULED
Status: DISCONTINUED | OUTPATIENT
Start: 2025-04-25 | End: 2025-04-29

## 2025-04-25 RX ORDER — GLUCAGON 1 MG/ML
1 KIT INJECTION PRN
Status: DISCONTINUED | OUTPATIENT
Start: 2025-04-25 | End: 2025-04-25

## 2025-04-25 RX ORDER — HEPARIN SODIUM 1000 [USP'U]/ML
4000 INJECTION, SOLUTION INTRAVENOUS; SUBCUTANEOUS PRN
Status: DISCONTINUED | OUTPATIENT
Start: 2025-04-25 | End: 2025-04-29

## 2025-04-25 RX ORDER — REGADENOSON 0.08 MG/ML
0.4 INJECTION, SOLUTION INTRAVENOUS
Status: COMPLETED | OUTPATIENT
Start: 2025-04-25 | End: 2025-04-25

## 2025-04-25 RX ORDER — SODIUM CHLORIDE 0.9 % (FLUSH) 0.9 %
5-40 SYRINGE (ML) INJECTION EVERY 12 HOURS SCHEDULED
Status: CANCELLED | OUTPATIENT
Start: 2025-04-25

## 2025-04-25 RX ORDER — FENTANYL CITRATE 50 UG/ML
INJECTION, SOLUTION INTRAMUSCULAR; INTRAVENOUS PRN
Status: DISCONTINUED | OUTPATIENT
Start: 2025-04-25 | End: 2025-04-25 | Stop reason: HOSPADM

## 2025-04-25 RX ORDER — ASPIRIN 81 MG/1
81 TABLET, CHEWABLE ORAL DAILY
Status: CANCELLED | OUTPATIENT
Start: 2025-04-25

## 2025-04-25 RX ORDER — HEPARIN SODIUM 1000 [USP'U]/ML
4000 INJECTION, SOLUTION INTRAVENOUS; SUBCUTANEOUS ONCE
Status: COMPLETED | OUTPATIENT
Start: 2025-04-25 | End: 2025-04-25

## 2025-04-25 RX ORDER — POTASSIUM CHLORIDE 7.45 MG/ML
10 INJECTION INTRAVENOUS PRN
Status: CANCELLED | OUTPATIENT
Start: 2025-04-25

## 2025-04-25 RX ORDER — SODIUM CHLORIDE 9 MG/ML
INJECTION, SOLUTION INTRAVENOUS PRN
Status: DISCONTINUED | OUTPATIENT
Start: 2025-04-25 | End: 2025-04-29

## 2025-04-25 RX ORDER — IOPAMIDOL 612 MG/ML
INJECTION, SOLUTION INTRAVASCULAR PRN
Status: DISCONTINUED | OUTPATIENT
Start: 2025-04-25 | End: 2025-04-25 | Stop reason: HOSPADM

## 2025-04-25 RX ORDER — ACETAMINOPHEN 650 MG/1
650 SUPPOSITORY RECTAL EVERY 6 HOURS PRN
Status: CANCELLED | OUTPATIENT
Start: 2025-04-25

## 2025-04-25 RX ADMIN — HEPARIN SODIUM 11 UNITS/KG/HR: 10000 INJECTION, SOLUTION INTRAVENOUS at 12:11

## 2025-04-25 RX ADMIN — SODIUM CHLORIDE, PRESERVATIVE FREE 10 ML: 5 INJECTION INTRAVENOUS at 20:57

## 2025-04-25 RX ADMIN — INSULIN GLARGINE 22 UNITS: 100 INJECTION, SOLUTION SUBCUTANEOUS at 20:59

## 2025-04-25 RX ADMIN — REGADENOSON 0.4 MG: 0.08 INJECTION, SOLUTION INTRAVENOUS at 11:09

## 2025-04-25 RX ADMIN — SULFUR HEXAFLUORIDE 2 ML: 60.7; .19; .19 INJECTION, POWDER, LYOPHILIZED, FOR SUSPENSION INTRAVENOUS; INTRAVESICAL at 09:24

## 2025-04-25 RX ADMIN — INSULIN LISPRO 2 UNITS: 100 INJECTION, SOLUTION INTRAVENOUS; SUBCUTANEOUS at 20:57

## 2025-04-25 RX ADMIN — HEPARIN SODIUM 4000 UNITS: 1000 INJECTION INTRAVENOUS; SUBCUTANEOUS at 12:09

## 2025-04-25 RX ADMIN — TETROFOSMIN 24 MILLICURIE: 0.23 INJECTION, POWDER, LYOPHILIZED, FOR SOLUTION INTRAVENOUS at 11:36

## 2025-04-25 RX ADMIN — TETROFOSMIN 8 MILLICURIE: 0.23 INJECTION, POWDER, LYOPHILIZED, FOR SOLUTION INTRAVENOUS at 11:36

## 2025-04-25 ASSESSMENT — PAIN SCALES - GENERAL: PAINLEVEL_OUTOF10: 0

## 2025-04-25 NOTE — CONSULTS
Unknown (10/19/2023)    Received from [facility], [facility]    Housing Stability     Current Living Arrangements: Not on file     Potentially Unsafe Housing Conditions: Not on file       Past Surgical History:   Procedure Laterality Date    BONE MARROW BIOPSY Right 12/09/2020    BONE MARROW ASPIRATION BIOPSY performed by ALISA Rabago at [facility] OR    CHOLECYSTECTOMY      COLONOSCOPY  05/27/2020    Dr Patiño   AP    LITHOTRIPSY      SHOULDER SURGERY Left     Frozen shoulder surgery    UPPER GASTROINTESTINAL ENDOSCOPY  04/04/2017    Dr Patiño- normal       Allergies   Allergen Reactions    Penicillins Hives         Current Facility-Administered Medications:     heparin (porcine) injection 4,000 Units, 4,000 Units, IntraVENous, PRN, Cliff Collazo Jr., MD    heparin (porcine) injection 2,000 Units, 2,000 Units, IntraVENous, PRN, Cliff Collazo Jr., MD    heparin 25,000 units in dextrose 5% 250 mL (premix) infusion, 5-30 Units/kg/hr, IntraVENous, Continuous, Cliff Collazo Jr., MD, Last Rate: 9.8 mL/hr at 04/25/25 1211, 11 Units/kg/hr at 04/25/25 1211    fentaNYL (SUBLIMAZE) injection, , IntraVENous, PRN, Chayo Cortes MD, 50 mcg at 04/25/25 1244    midazolam (VERSED) injection, , IntraVENous, PRN, Chayo Cortes MD, 1 mg at 04/25/25 1244    lidocaine 2 % injection, , IntraDERmal, PRNSophia Harikrishnan D, MD, 2 mL at 04/25/25 1244    nitroGLYCERIN injection, , , PRNSophia Harikrishnan D, MD, 200 mcg at 04/25/25 1248    heparin (porcine) injection, , , PRNSophia Harikrishnan D, MD, 5,000 Units at 04/25/25 1248    heparin (porcine) injection, , , PRNSophia Harikrishnan D, MD, 2,000 Units at 04/25/25 1308    iopamidol (ISOVUE-300) 61 % injection, , , PRNSophia Harikrishnan D, MD, 170 mL at 04/25/25 1316    Family History   Problem Relation Age of Onset    Hypertension Mother     Hypertension Father     Colon Cancer Neg Hx     Colon Polyps Neg Hx     Esophageal  swelling.   Skin: Negative.    Neurological:  Negative for dizziness, seizures, syncope, light-headedness and headaches.   Psychiatric/Behavioral:  Negative for confusion and hallucinations. The patient is not nervous/anxious.           PHYSICAL EXAM:    Physical Exam  Vitals and nursing note reviewed.   Constitutional:       General: He is not in acute distress.     Appearance: He is well-developed.   HENT:      Head: Normocephalic.   Eyes:      Conjunctiva/sclera: Conjunctivae normal.      Pupils: Pupils are equal, round, and reactive to light.   Neck:      Thyroid: No thyromegaly.      Vascular: No JVD.      Trachea: No tracheal deviation.   Cardiovascular:      Rate and Rhythm: Regular rhythm.      Heart sounds: Normal heart sounds. No murmur heard.     No friction rub. No gallop.   Pulmonary:      Effort: Pulmonary effort is normal. No respiratory distress.      Breath sounds: Normal breath sounds. No wheezing or rales.   Chest:      Chest wall: No tenderness.   Abdominal:      General: Bowel sounds are normal. There is no distension.      Palpations: Abdomen is soft. There is no mass.      Tenderness: There is no abdominal tenderness.   Musculoskeletal:         General: No tenderness. Normal range of motion.      Cervical back: Normal range of motion and neck supple.   Lymphadenopathy:      Cervical: No cervical adenopathy.   Skin:     General: Skin is warm and dry.   Neurological:      Mental Status: He is alert and oriented to person, place, and time.      Cranial Nerves: No cranial nerve deficit.      Deep Tendon Reflexes: Reflexes are normal and symmetric.   Psychiatric:         Behavior: Behavior normal.         Thought Content: Thought content normal.         Judgment: Judgment normal.        DATA:  Wt Readings from Last 3 Encounters:   04/25/25 88.9 kg (196 lb)   02/25/25 90.3 kg (199 lb)   02/13/25 89.4 kg (197 lb 3.2 oz)     Temp Readings from Last 3 Encounters:   04/25/25 97.4 °F (36.3 °C) (Oral)

## 2025-04-25 NOTE — PROGRESS NOTES
Notified  that hospitalist will not admitt they will consult that he had to be admitting and do admitting orders.[id number]

## 2025-04-25 NOTE — CONSULTS
[facility]  Cardiology Consult      Requesting MD:  Cliff Collazo Jr., MD   Admit Status:         History obtained from:   [] Patient  [] Other (specify):     Patient:  Akhil Alejo  [id number]     Chief Complaint:   Chief Complaint   Patient presents with    Abnormal Lab     ABNORMAL EKG DURING STRESS TEST         HPI: Mr. Alejo is a 69 y.o. male with a history of  DM, was seen by dr Kitchen for abnormal ECG and shortness of breath  Today had stress test showing anterior wall ischemia, EF 44%  ECG abnormal as before    Pt sent to ER for admission and cardiac cath  NO chest pain  Has multiple family members with  CAD.    Review of Systems:  Review of Systems   Constitutional:  Positive for fatigue.   HENT: Negative.     Eyes: Negative.    Respiratory:  Positive for shortness of breath.    Cardiovascular: Negative.    Gastrointestinal: Negative.    Endocrine: Negative.    Genitourinary: Negative.    Musculoskeletal: Negative.    Allergic/Immunologic: Negative.    Neurological:  Positive for dizziness.   Hematological: Negative.    Psychiatric/Behavioral: Negative.         Cardiac Specific Data:  Specialty Problems    None      Past Medical History:  Past Medical History:   Diagnosis Date    Arm fracture 07/2016    left    Depression     Diabetes mellitus (HCC)     Hypertension     Kidney stones     Neuropathy     Restless leg syndrome         Past Surgical History:  Past Surgical History:   Procedure Laterality Date    BONE MARROW BIOPSY Right 12/09/2020    BONE MARROW ASPIRATION BIOPSY performed by ALISA Rabago at [facility] OR    CHOLECYSTECTOMY      COLONOSCOPY  05/27/2020    Dr Patiño   AP    LITHOTRIPSY      SHOULDER SURGERY Left     Frozen shoulder surgery    UPPER GASTROINTESTINAL ENDOSCOPY  04/04/2017    Dr Patiño- normal       Past Family History:  Family History   Problem Relation Age of Onset    Hypertension Mother     Hypertension Father     Colon Cancer Neg Hx     Colon    WBC 10.4   HGB 16.7          No results for input(s): \"NA\", \"K\", \"CL\", \"CO2\", \"BUN\", \"CREATININE\", \"LABGLOM\", \"MG\", \"CALCIUM\", \"PHOS\" in the last 72 hours.    CK, CKMB, Troponin: @LABRCNT (CKTOTAL:3, CKMB:3, TROPONINI:3)@    Last 3 BNP:  No results for input(s): \"BNP\" in the last 72 hours.        IMAGING:  Nuclear stress test with myocardial perfusion  Result Date: 4/25/2025    Stress Combined Conclusion: The study is positive for myocardial ischemia. Findings suggest a moderate risk of cardiac events.   Stress Function: Post-stress ejection fraction is 44%.   Perfusion Defect: There is a moderate severity left ventricular stress perfusion defect that is medium in size present in the mid to distal anterior segment(s) that is predominantly reversible. The defect is consistent with abnormal perfusion in the LAD territory. Viability in the area is moderate. There is abnormal wall motion in the defect area. The defect appears to be ischemia.   ECG: Resting ECG demonstrates normal sinus rhythm.   Stress Test: A pharmacological stress test was performed using regadenoson (Lexiscan). The patient reported dizziness during the stress test. Symptoms began during stress and ended during recovery. The patient reached the end of the protocol.   Stress ECG: There were no arrhythmias during stress. There were no noted arrhythmias during recovery.   Resting ECG: ECG is abnormal. The ECG shows sinus rhythm. down sloping ST depression was noted.     Echo (TTE) complete (PRN contrast/bubble/strain/3D)  Result Date: 4/25/2025    Left Ventricle: Low normal left ventricular systolic function with a visually estimated EF of 50 - 55%. EF by 2D Simpsons Biplane is 54%. Left ventricle size is normal. Moderately increased wall thickness. Findings consistent with moderate concentric hypertrophy. Mild hypokinesis of the basal anterior lateral and mid anterior lateral segements. Normal diastolic function.   Right Ventricle: Right

## 2025-04-25 NOTE — CONSULTS
Hospital Medicine    CONSULT    [id number]    Consulting Physician: Akhil Lara MD  Reason for Consult: Medical Management  Primacy Care Physician: Lennox Waddell MD      History Obtained From:  patient, electronic medical record    History of present illness:  The patient is a 69 y.o. male who presents with with PMH listed below.  Pt  had outpt stress test that was abnormal.  He was sent to the ED and admitted .  He was taken to the cath lab.  I do not have a report on the cath but am told that he is going for cabg on Monday.  Hospitalist was consulted for medical management.   Pt denies any chest pain.  He is eating supper.  He wears a Last glucose moniter but its 100 points of the hospital accucheck so we will remove that and rely on accuchecks here with Low dose insulin protocol.      Past Medical History:        Diagnosis Date    Arm fracture 07/2016    left    Depression     Diabetes mellitus (HCC)     Hypertension     Kidney stones     Neuropathy     Restless leg syndrome        Past Surgical History:        Procedure Laterality Date    BONE MARROW BIOPSY Right 12/09/2020    BONE MARROW ASPIRATION BIOPSY performed by ALISA Rabago at [facility] OR    CHOLECYSTECTOMY      COLONOSCOPY  05/27/2020    Dr Patiño   AP    LITHOTRIPSY      SHOULDER SURGERY Left     Frozen shoulder surgery    UPPER GASTROINTESTINAL ENDOSCOPY  04/04/2017    Dr Patiño- normal       Medications Current:    Current Facility-Administered Medications   Medication Dose Route Frequency Provider Last Rate Last Admin    heparin (porcine) injection 4,000 Units  4,000 Units IntraVENous PRN Cliff Collazo Jr., MD        heparin (porcine) injection 2,000 Units  2,000 Units IntraVENous PRN Cliff Collazo Jr., MD        heparin 25,000 units in dextrose 5% 250 mL (premix) infusion  5-30 Units/kg/hr IntraVENous Continuous Cliff Collazo Jr., MD 9.8 mL/hr at 04/25/25 1211 11 Units/kg/hr at 04/25/25 1211    sodium chloride  Combined Conclusion: The study is positive for myocardial ischemia. Findings suggest a moderate risk of cardiac events.   Stress Function: Post-stress ejection fraction is 44%.   Perfusion Defect: There is a moderate severity left ventricular stress perfusion defect that is medium in size present in the mid to distal anterior segment(s) that is predominantly reversible. The defect is consistent with abnormal perfusion in the LAD territory. Viability in the area is moderate. There is abnormal wall motion in the defect area. The defect appears to be ischemia.   ECG: Resting ECG demonstrates normal sinus rhythm.   Stress Test: A pharmacological stress test was performed using regadenoson (Lexiscan). The patient reported dizziness during the stress test. Symptoms began during stress and ended during recovery. The patient reached the end of the protocol.   Stress ECG: There were no arrhythmias during stress. There were no noted arrhythmias during recovery.   Resting ECG: ECG is abnormal. The ECG shows sinus rhythm. down sloping ST depression was noted.     Echo (TTE) complete (PRN contrast/bubble/strain/3D)  Result Date: 4/25/2025    Left Ventricle: Low normal left ventricular systolic function with a visually estimated EF of 50 - 55%. EF by 2D Simpsons Biplane is 54%. Left ventricle size is normal. Moderately increased wall thickness. Findings consistent with moderate concentric hypertrophy. Mild hypokinesis of the basal anterior lateral and mid anterior lateral segements. Normal diastolic function.   Right Ventricle: Right ventricle size is normal. Normal systolic function.   Aortic Valve: Trileaflet valve. Mild sclerosis of the aortic valve cusps.   Mitral Valve: There is mild annular calcification noted. Mildly calcified leaflets.   Image quality is adequate. Contrast used: Lumason. Technically difficult study with poor endocardial visualization.               ASSESSMENT AND PLAN:  Principal Problem:    Abnormal

## 2025-04-25 NOTE — CARE COORDINATION
Case Management Assessment  Initial Evaluation    Date/Time of Evaluation: 4/25/2025 2:54 PM  Assessment Completed by: JARROD DOMINGUEZ    If patient is discharged prior to next notation, then this note serves as note for discharge by case management.    Patient Name: Akhil Alejo                   YOB: 1955  Diagnosis: Abnormal stress test [R94.39]  Abnormal nuclear cardiac imaging test [R93.1]                   Date / Time: 4/25/2025 11:35 AM    Patient Admission Status: Inpatient   Readmission Risk (Low < 19, Mod (19-27), High > 27): No data recorded  Current PCP: Lennox Waddell MD  PCP verified by CM? (P) Yes    Chart Reviewed: Yes      History Provided by: (P) Significant Other  Patient Orientation: (P) Unable to Assess, Other (see comment) (Pt spouse at bedside with pt sister .)    Patient Cognition: (P) Short Term Memory Deficit    Hospitalization in the last 30 days (Readmission):  No    If yes, Readmission Assessment in  Navigator will be completed.    Advance Directives:      Code Status: Not on file   Patient's Primary Decision Maker is: (P) Legal Next of Kin      Discharge Planning:    Patient lives with: (P) Spouse/Significant Other Type of Home: (P) House  Primary Care Giver: (P) Self  Patient Support Systems include: (P) Spouse/Significant Other   Current Financial resources: (P) Medicare  Current community resources: (P) None  Current services prior to admission: (P) None            Current DME:              Type of Home Care services:  (P) None    ADLS  Prior functional level: (P) Independent in ADLs/IADLs  Current functional level: (P) Independent in ADLs/IADLs    PT AM-PAC:   /24  OT AM-PAC:   /24    Family can provide assistance at DC: (P) Yes  Would you like Case Management to discuss the discharge plan with any other family members/significant others, and if so, who? (P) Yes  Plans to Return to Present Housing: (P) Yes  Other Identified Issues/Barriers to RETURNING to  the Discharge Plan? (P) Yes    JARROD DOMINGUEZ  Case Management Department  Electronically signed by JARROD DOMINGUEZ on 4/25/2025 at 2:54 PM       04/25/25 [id number]   Service Assessment   Patient Orientation Unable to Assess;Other (see comment)  (Pt spouse at bedside with pt sister .)   Cognition Short Term Memory Deficit   History Provided By Significant Other   Primary Caregiver Self   Accompanied By/Relationship Pt spouse at bedside and pt sister .   Support Systems Spouse/Significant Other   Patient's Healthcare Decision Maker is: Legal Next of Kin   PCP Verified by CM Yes   Last Visit to PCP Within last 3 months   Prior Functional Level Independent in ADLs/IADLs   Current Functional Level Independent in ADLs/IADLs   Can patient return to prior living arrangement Yes   Ability to make needs known: Unable  (pt was out of room at the time of sw visit.)   Family able to assist with home care needs: Yes   Would you like for me to discuss the discharge plan with any other family members/significant others, and if so, who? Yes   Financial Resources Medicare   Community Resources None   CM/SW Referral Emotional support   Social/Functional History   Lives With Significant other   Type of Home House   Home Layout One level   Home Access Stairs to enter with rails   Entrance Stairs - Number of Steps 3   Entrance Stairs - Rails Right   Bathroom Shower/Tub Tub/Shower unit;Walk-in shower   Bathroom Toilet Standard   Bathroom Equipment Shower chair   Bathroom Accessibility Accessible   Home Equipment None   Receives Help From Family   Prior Level of Assist for ADLs Independent   Prior Level of Assist for Homemaking Independent   Homemaking Responsibilities Yes   Ambulation Assistance Independent   Prior Level of Assist for Transfers Independent   Active  Yes   Mode of Transportation Car   Education n/a   Occupation Retired   Type of Occupation n/a   Discharge Planning   Type of Residence House   Living Arrangements

## 2025-04-25 NOTE — ED PROVIDER NOTES
[facility] 4 ONCOLOGY UNIT  EMERGENCY DEPARTMENT ENCOUNTER      Pt Name: Akhil Alejo  MRN: [id number]  Birthdate 1955  Date of evaluation: 4/25/2025  Provider: Cliff Collazo Jr, MD    CHIEF COMPLAINT       Chief Complaint   Patient presents with    Abnormal Lab     ABNORMAL EKG DURING STRESS TEST         HISTORY OF PRESENT ILLNESS   (Location/Symptom, Timing/Onset,Context/Setting, Quality, Duration, Modifying Factors, Severity)  Note limiting factors.   Akhil Alejo is a 69 y.o. male who presents to the emergency department for evaluation after he was sent over having an abnormal stress test.  Had EKG during the stress test that was read by the computer as \"acute MI.\"  Patient denies any chest pain or shortness of breath during the stress test or recently.  Says that he had a stress test scheduled by his cardiologist after he was noted to have a new EKG change on routine outpatient cardiology follow-up several months ago.  According to staff, the stress test did show an area of ischemia in the LAD distribution    HPI    NursingNotes were reviewed.    REVIEW OF SYSTEMS    (2-9 systems for level 4, 10 or more for level 5)     Review of Systems   Constitutional: Negative.    HENT: Negative.     Eyes: Negative.    Respiratory: Negative.     Cardiovascular: Negative.    Gastrointestinal: Negative.    Genitourinary: Negative.    Musculoskeletal: Negative.    Skin: Negative.    Neurological: Negative.    Hematological: Negative.    Psychiatric/Behavioral: Negative.         A complete review of systems was performed and is negative except as noted above in the HPI.       PAST MEDICAL HISTORY     Past Medical History:   Diagnosis Date    Arm fracture 07/2016    left    Depression     Diabetes mellitus (HCC)     Hypertension     Kidney stones     Neuropathy     Restless leg syndrome          SURGICAL HISTORY       Past Surgical History:   Procedure Laterality Date    BONE MARROW BIOPSY Right 12/09/2020    BONE MARROW  No cranial nerve deficit.      Motor: No abnormal muscle tone.      Coordination: Coordination normal.   Psychiatric:         Behavior: Behavior normal.         Judgment: Judgment normal.         DIAGNOSTIC RESULTS       RADIOLOGY:   Non-plain film images such as CT, Ultrasound and MRI are read by the radiologist. Plainradiographic images are visualized and preliminarily interpreted by the emergency physician with the below findings:      Interpretation per the Radiologist below, if available at the time of this note:    Vascular duplex vein mapping lower bilateral         Vascular duplex carotid bilateral         XR CHEST PORTABLE   Final Result   1.  No acute findings.               ______________________________________    Electronically signed by: PJ CARRANZA M.D.   Date:     04/25/2025   Time:    12:28             ED BEDSIDE ULTRASOUND:   Performed by ED Physician - none    LABS:  Labs Reviewed   COMPREHENSIVE METABOLIC PANEL - Abnormal; Notable for the following components:       Result Value    Sodium 134 (*)     CO2 21 (*)     Glucose 291 (*)     All other components within normal limits   TROPONIN - Abnormal; Notable for the following components:    Troponin, High Sensitivity 29 (*)     All other components within normal limits   APTT - Abnormal; Notable for the following components:    aPTT 24.7 (*)     All other components within normal limits   ANTI-XA, HEPARIN - Abnormal; Notable for the following components:    Anti-XA Unfrac Heparin <0.10 (*)     All other components within normal limits    Narrative:     CALL  doctor   tel. [phone],  Coag results called to and read back by  Brandon Duran RN [facility], 04/25/2025  12:30, by EARLE MARCOS GLUCOSE - Abnormal; Notable for the following components:    POC Glucose 276 (*)     All other components within normal limits   MRSA DNA PROBE, NASAL   CBC WITH AUTO DIFFERENTIAL   MAGNESIUM   PROTIME-INR   ANTI-XA, HEPARIN   ANTI-XA, HEPARIN   HEMOGLOBIN A1C

## 2025-04-25 NOTE — PROGRESS NOTES
Preliminary note    Carotid u/s  RCCA= 55/13        LCCA= 58/13  MARIAELENA= 84/23          LICA= 65/21  RECA= 120/11      LECA= 117/0  Bilateral antegrade vertebral arteries       Bilateral lower vein mapping/marking completed    Pending final report

## 2025-04-26 PROBLEM — K21.9 GERD (GASTROESOPHAGEAL REFLUX DISEASE): Status: ACTIVE | Noted: 2024-01-22

## 2025-04-26 PROBLEM — G25.81 RESTLESS LEGS SYNDROME (RLS): Status: ACTIVE | Noted: 2020-09-29

## 2025-04-26 PROBLEM — I51.89 DIASTOLIC DYSFUNCTION: Status: ACTIVE | Noted: 2024-01-23

## 2025-04-26 PROBLEM — E03.9 HYPOTHYROIDISM: Status: ACTIVE | Noted: 2025-04-26

## 2025-04-26 PROBLEM — E78.2 MIXED HYPERLIPIDEMIA: Status: ACTIVE | Noted: 2024-01-22

## 2025-04-26 PROBLEM — E55.9 VITAMIN D DEFICIENCY: Status: ACTIVE | Noted: 2024-01-23

## 2025-04-26 PROBLEM — I10 ESSENTIAL HYPERTENSION: Status: ACTIVE | Noted: 2024-01-22

## 2025-04-26 LAB
ANTI-XA UNFRAC HEPARIN: 0.17 IU/ML
ANTI-XA UNFRAC HEPARIN: 0.22 IU/ML
ANTI-XA UNFRAC HEPARIN: 0.32 IU/ML
ANTI-XA UNFRAC HEPARIN: 0.39 IU/ML
GLUCOSE BLD-MCNC: 170 MG/DL (ref 70–99)
GLUCOSE BLD-MCNC: 186 MG/DL (ref 70–99)
GLUCOSE BLD-MCNC: 215 MG/DL (ref 70–99)
GLUCOSE BLD-MCNC: 229 MG/DL (ref 70–99)
GLUCOSE BLD-MCNC: 253 MG/DL (ref 70–99)
HBA1C MFR BLD: 10.6 % (ref 4–5.6)
PERFORMED ON: ABNORMAL
TSH SERPL DL<=0.005 MIU/L-ACNC: 2.74 UIU/ML (ref 0.27–4.2)

## 2025-04-26 PROCEDURE — 82962 GLUCOSE BLOOD TEST: CPT

## 2025-04-26 PROCEDURE — 6370000000 HC RX 637 (ALT 250 FOR IP): Performed by: NURSE PRACTITIONER

## 2025-04-26 PROCEDURE — 85520 HEPARIN ASSAY: CPT

## 2025-04-26 PROCEDURE — 1200000000 HC SEMI PRIVATE

## 2025-04-26 PROCEDURE — 83036 HEMOGLOBIN GLYCOSYLATED A1C: CPT

## 2025-04-26 PROCEDURE — 6370000000 HC RX 637 (ALT 250 FOR IP): Performed by: INTERNAL MEDICINE

## 2025-04-26 PROCEDURE — 36415 COLL VENOUS BLD VENIPUNCTURE: CPT

## 2025-04-26 PROCEDURE — 84443 ASSAY THYROID STIM HORMONE: CPT

## 2025-04-26 PROCEDURE — 99024 POSTOP FOLLOW-UP VISIT: CPT | Performed by: SURGERY

## 2025-04-26 PROCEDURE — 6360000002 HC RX W HCPCS: Performed by: EMERGENCY MEDICINE

## 2025-04-26 PROCEDURE — 94760 N-INVAS EAR/PLS OXIMETRY 1: CPT

## 2025-04-26 PROCEDURE — 2500000003 HC RX 250 WO HCPCS: Performed by: SURGERY

## 2025-04-26 RX ORDER — INSULIN LISPRO 100 [IU]/ML
6 INJECTION, SOLUTION INTRAVENOUS; SUBCUTANEOUS
Status: DISCONTINUED | OUTPATIENT
Start: 2025-04-26 | End: 2025-04-28

## 2025-04-26 RX ORDER — GEMFIBROZIL 600 MG/1
600 TABLET, FILM COATED ORAL
Status: DISCONTINUED | OUTPATIENT
Start: 2025-04-26 | End: 2025-04-29

## 2025-04-26 RX ORDER — LOSARTAN POTASSIUM 50 MG/1
25 TABLET ORAL DAILY
Status: DISCONTINUED | OUTPATIENT
Start: 2025-04-26 | End: 2025-04-29

## 2025-04-26 RX ORDER — CITALOPRAM HYDROBROMIDE 10 MG/1
20 TABLET ORAL NIGHTLY
Status: DISCONTINUED | OUTPATIENT
Start: 2025-04-26 | End: 2025-05-13 | Stop reason: HOSPADM

## 2025-04-26 RX ORDER — LEVOTHYROXINE SODIUM 50 UG/1
50 TABLET ORAL DAILY
Status: DISCONTINUED | OUTPATIENT
Start: 2025-04-26 | End: 2025-05-13 | Stop reason: HOSPADM

## 2025-04-26 RX ORDER — CLOPIDOGREL BISULFATE 75 MG/1
75 TABLET ORAL DAILY
Status: DISCONTINUED | OUTPATIENT
Start: 2025-04-26 | End: 2025-04-29

## 2025-04-26 RX ORDER — ISOSORBIDE MONONITRATE 30 MG/1
30 TABLET, EXTENDED RELEASE ORAL EVERY EVENING
Status: DISCONTINUED | OUTPATIENT
Start: 2025-04-26 | End: 2025-04-29

## 2025-04-26 RX ORDER — GABAPENTIN 400 MG/1
800 CAPSULE ORAL 2 TIMES DAILY
Status: DISCONTINUED | OUTPATIENT
Start: 2025-04-26 | End: 2025-05-01

## 2025-04-26 RX ORDER — CHOLESTYRAMINE LIGHT 4 G/5.7G
4 POWDER, FOR SUSPENSION ORAL DAILY
Status: DISCONTINUED | OUTPATIENT
Start: 2025-04-26 | End: 2025-04-29

## 2025-04-26 RX ORDER — FAMOTIDINE 20 MG/1
20 TABLET, FILM COATED ORAL DAILY
Status: DISCONTINUED | OUTPATIENT
Start: 2025-04-26 | End: 2025-04-29

## 2025-04-26 RX ORDER — METOPROLOL SUCCINATE 25 MG/1
25 TABLET, EXTENDED RELEASE ORAL DAILY
Status: DISCONTINUED | OUTPATIENT
Start: 2025-04-26 | End: 2025-04-29

## 2025-04-26 RX ORDER — INSULIN LISPRO 100 [IU]/ML
0-8 INJECTION, SOLUTION INTRAVENOUS; SUBCUTANEOUS
Status: DISCONTINUED | OUTPATIENT
Start: 2025-04-26 | End: 2025-04-27 | Stop reason: DRUGHIGH

## 2025-04-26 RX ORDER — INSULIN GLARGINE 100 [IU]/ML
25 INJECTION, SOLUTION SUBCUTANEOUS DAILY
Status: DISCONTINUED | OUTPATIENT
Start: 2025-04-27 | End: 2025-04-29

## 2025-04-26 RX ORDER — CARBAMAZEPINE 200 MG/1
200 TABLET, EXTENDED RELEASE ORAL 2 TIMES DAILY
Status: DISCONTINUED | OUTPATIENT
Start: 2025-04-26 | End: 2025-05-01

## 2025-04-26 RX ORDER — HYDROCHLOROTHIAZIDE 12.5 MG/1
12.5 CAPSULE ORAL DAILY
Status: DISCONTINUED | OUTPATIENT
Start: 2025-04-26 | End: 2025-04-29

## 2025-04-26 RX ADMIN — LEVOTHYROXINE SODIUM 50 MCG: 0.05 TABLET ORAL at 10:23

## 2025-04-26 RX ADMIN — HEPARIN SODIUM 15 UNITS/KG/HR: 10000 INJECTION, SOLUTION INTRAVENOUS at 16:24

## 2025-04-26 RX ADMIN — FAMOTIDINE 20 MG: 20 TABLET, FILM COATED ORAL at 10:22

## 2025-04-26 RX ADMIN — CHOLESTYRAMINE 4 G: 4 POWDER, FOR SUSPENSION ORAL at 10:27

## 2025-04-26 RX ADMIN — INSULIN LISPRO 4 UNITS: 100 INJECTION, SOLUTION INTRAVENOUS; SUBCUTANEOUS at 11:50

## 2025-04-26 RX ADMIN — INSULIN LISPRO 1 UNITS: 100 INJECTION, SOLUTION INTRAVENOUS; SUBCUTANEOUS at 06:09

## 2025-04-26 RX ADMIN — CARBAMAZEPINE 200 MG: 200 TABLET, EXTENDED RELEASE ORAL at 11:49

## 2025-04-26 RX ADMIN — INSULIN LISPRO 2 UNITS: 100 INJECTION, SOLUTION INTRAVENOUS; SUBCUTANEOUS at 21:04

## 2025-04-26 RX ADMIN — CLOPIDOGREL BISULFATE 75 MG: 75 TABLET, FILM COATED ORAL at 10:22

## 2025-04-26 RX ADMIN — SODIUM CHLORIDE, PRESERVATIVE FREE 10 ML: 5 INJECTION INTRAVENOUS at 20:57

## 2025-04-26 RX ADMIN — LOSARTAN POTASSIUM 25 MG: 50 TABLET, FILM COATED ORAL at 10:23

## 2025-04-26 RX ADMIN — INSULIN GLARGINE 22 UNITS: 100 INJECTION, SOLUTION SUBCUTANEOUS at 08:08

## 2025-04-26 RX ADMIN — HEPARIN SODIUM 2000 UNITS: 1000 INJECTION INTRAVENOUS; SUBCUTANEOUS at 03:01

## 2025-04-26 RX ADMIN — GABAPENTIN 800 MG: 400 CAPSULE ORAL at 20:57

## 2025-04-26 RX ADMIN — HEPARIN SODIUM 13 UNITS/KG/HR: 10000 INJECTION, SOLUTION INTRAVENOUS at 13:10

## 2025-04-26 RX ADMIN — CITALOPRAM HYDROBROMIDE 20 MG: 10 TABLET, FILM COATED ORAL at 20:57

## 2025-04-26 RX ADMIN — METOPROLOL SUCCINATE 25 MG: 25 TABLET, EXTENDED RELEASE ORAL at 10:23

## 2025-04-26 RX ADMIN — INSULIN LISPRO 2 UNITS: 100 INJECTION, SOLUTION INTRAVENOUS; SUBCUTANEOUS at 17:19

## 2025-04-26 RX ADMIN — HYDROCHLOROTHIAZIDE 12.5 MG: 12.5 CAPSULE ORAL at 10:22

## 2025-04-26 RX ADMIN — HEPARIN SODIUM 2000 UNITS: 1000 INJECTION INTRAVENOUS; SUBCUTANEOUS at 16:21

## 2025-04-26 RX ADMIN — CARBAMAZEPINE 200 MG: 200 TABLET, EXTENDED RELEASE ORAL at 20:57

## 2025-04-26 RX ADMIN — GABAPENTIN 800 MG: 400 CAPSULE ORAL at 10:22

## 2025-04-26 RX ADMIN — GEMFIBROZIL 600 MG: 600 TABLET ORAL at 17:18

## 2025-04-26 RX ADMIN — ISOSORBIDE MONONITRATE 30 MG: 30 TABLET, EXTENDED RELEASE ORAL at 17:18

## 2025-04-26 RX ADMIN — INSULIN LISPRO 6 UNITS: 100 INJECTION, SOLUTION INTRAVENOUS; SUBCUTANEOUS at 17:19

## 2025-04-26 RX ADMIN — INSULIN LISPRO 6 UNITS: 100 INJECTION, SOLUTION INTRAVENOUS; SUBCUTANEOUS at 11:50

## 2025-04-26 ASSESSMENT — PAIN SCALES - GENERAL: PAINLEVEL_OUTOF10: 0
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
## 2025-04-26 NOTE — PROGRESS NOTES
Because of the patient's difficult to control diabetes (HgA1c >10), I have asked Dr. Quiroz and he accepted primary responsibility for the patient during his hospitalization prior to CABG on this coming Tuesday. I defer management of the patient's brittle glucose control to the hospitalist in order to optimize the condition prior to surgery and reduce the risk for periop complications.    I will monitor closely for any signs of coronary ischemia that would mandate surgical intervention prior to the planned date of Tuesday, which was chosen in order to provide sufficient time to wash out plavix.

## 2025-04-26 NOTE — CONSULTS
Nutrition Note    CHF Nutrition Education    Consult received for heart failure diet education.  Education deferred or not appropriate at this time related to CHF Nurse Navigator will consult RDN if additional diet education is needed.      Electronically signed by Татьяна Jennings MS, RD, LD on 4/26/2025 at 10:13 AM     Electronically signed by Татьяна Jennings MS, RD, LD on 4/26/25 at 10:13 AM CDT    Contact: [phone]

## 2025-04-26 NOTE — PLAN OF CARE
Problem: Chronic Conditions and Co-morbidities  Goal: Patient's chronic conditions and co-morbidity symptoms are monitored and maintained or improved  Outcome: Progressing  Flowsheets (Taken 4/25/2025 2100)  Care Plan - Patient's Chronic Conditions and Co-Morbidity Symptoms are Monitored and Maintained or Improved:   Collaborate with multidisciplinary team to address chronic and comorbid conditions and prevent exacerbation or deterioration   Monitor and assess patient's chronic conditions and comorbid symptoms for stability, deterioration, or improvement   Update acute care plan with appropriate goals if chronic or comorbid symptoms are exacerbated and prevent overall improvement and discharge     Problem: Safety - Adult  Goal: Free from fall injury  Outcome: Progressing  Flowsheets (Taken 4/25/2025 2303)  Free From Fall Injury: Instruct family/caregiver on patient safety     Problem: ABCDS Injury Assessment  Goal: Absence of physical injury  Outcome: Progressing  Flowsheets (Taken 4/25/2025 2303)  Absence of Physical Injury: Implement safety measures based on patient assessment

## 2025-04-26 NOTE — PROGRESS NOTES
[facility]    Patient:  Akhil Alejo  YOB: 1955  Date of Service: 4/26/2025  MRN: [id number]   Acct: [id number]   Primary Care Physician: Lennox Waddell MD  Advance Directive: Full Code  Admit Date: 4/25/2025       Hospital Day: 1      CHIEF COMPLAINT:     Chief Complaint   Patient presents with    Abnormal Lab     ABNORMAL EKG DURING STRESS TEST       4/26/2025 9:44 AM  Subjective / Interval History:   04/26/2025  Patient seen and examined this a.m.   No new complaints.  No acute changes or acute overnight event reported.   Laying comfortably in bed in no apparent acute distress.  Denies any acute complaints or distress.        Review of Systems:   Review of Systems  ROS: 14 point review of systems is negative except as specifically addressed above.    Diet NPO  ADULT DIET; Regular; 4 carb choices (60 gm/meal); Low Fat/Low Chol/High Fiber/ABNER    Intake/Output Summary (Last 24 hours) at 4/26/2025 0944  Last data filed at 4/26/2025 0527  Gross per 24 hour   Intake 1578.11 ml   Output 1820 ml   Net -241.89 ml       Medications:   heparin (PORCINE) Infusion 13 Units/kg/hr (04/26/25 0934)    sodium chloride      dextrose       Current Facility-Administered Medications   Medication Dose Route Frequency Provider Last Rate Last Admin    [START ON 4/27/2025] insulin glargine (LANTUS) injection vial 25 Units  25 Units SubCUTAneous Daily Lior Quiroz MD        insulin lispro (HUMALOG,ADMELOG) injection vial 6 Units  6 Units SubCUTAneous TID  Lior Quiroz MD        insulin lispro (HUMALOG,ADMELOG) injection vial 0-8 Units  0-8 Units SubCUTAneous 4x Daily AC & HS Lior Quiroz MD        citalopram (CELEXA) tablet 20 mg  20 mg Oral Nightly Lior Quiroz MD        clopidogrel (PLAVIX) tablet 75 mg  75 mg Oral Daily Lior Quiroz MD        famotidine (PEPCID) tablet 20 mg  20 mg Oral Daily Lior Quiroz MD        gabapentin (NEURONTIN)

## 2025-04-26 NOTE — H&P
[address] [place] [address]    DEPARTMENT OF HOSPITALIST MEDICINE        HISTORY & PHYSICAL:          REASON FOR ADMISSION:  Chief Complaint   Patient presents with    Abnormal Lab     ABNORMAL EKG DURING STRESS TEST        HISTORY OF PRESENT ILLNESS:  Akhil Alejo is an 69 y.o. male with past medical history listed below patient had an outpatient stress test today that was abnormal.  He was sent to the ED to be admitted.  He was taken to the Cath Lab and found to have multivessel disease.  He is scheduled for CABG on Monday.  He is not having any chest pain and.    PAST MEDICAL HISTORY:  Past Medical History:   Diagnosis Date    Arm fracture 07/2016    left    Depression     Diabetes mellitus (HCC)     Hypertension     Kidney stones     Neuropathy     Restless leg syndrome          PAST SURGICAL HISTORY:  Past Surgical History:   Procedure Laterality Date    BONE MARROW BIOPSY Right 12/09/2020    BONE MARROW ASPIRATION BIOPSY performed by ALISA Rabago at [facility] OR    CHOLECYSTECTOMY      COLONOSCOPY  05/27/2020    Dr Patiño   AP    LITHOTRIPSY      SHOULDER SURGERY Left     Frozen shoulder surgery    UPPER GASTROINTESTINAL ENDOSCOPY  04/04/2017    Dr Patiño- normal        SOCIAL HISTORY:  Social History     Socioeconomic History    Marital status:      Spouse name: None    Number of children: None    Years of education: None    Highest education level: None   Tobacco Use    Smoking status: Never    Smokeless tobacco: Never   Vaping Use    Vaping status: Never Used   Substance and Sexual Activity    Alcohol use: No    Drug use: No    Sexual activity: Not Currently     Social Drivers of Health     Food Insecurity: No Food Insecurity (4/25/2025)    Hunger Vital Sign     Worried About Running Out of Food in the Last Year: Never true     Ran Out of Food in the Last Year: Never true   Transportation Needs: No Transportation Needs (4/25/2025)    PRAPARE - Transportation     Lack of

## 2025-04-27 PROBLEM — R94.39 ABNORMAL STRESS TEST: Status: ACTIVE | Noted: 2025-04-27

## 2025-04-27 LAB
ALBUMIN SERPL-MCNC: 4 G/DL (ref 3.5–5.2)
ALP SERPL-CCNC: 95 U/L (ref 40–129)
ALT SERPL-CCNC: 14 U/L (ref 10–50)
ANION GAP SERPL CALCULATED.3IONS-SCNC: 14 MMOL/L (ref 8–16)
ANTI-XA UNFRAC HEPARIN: 0.31 IU/ML
AST SERPL-CCNC: 20 U/L (ref 10–50)
BILIRUB SERPL-MCNC: 0.5 MG/DL (ref 0.2–1.2)
BUN SERPL-MCNC: 27 MG/DL (ref 8–23)
CALCIUM SERPL-MCNC: 9 MG/DL (ref 8.8–10.2)
CHLORIDE SERPL-SCNC: 101 MMOL/L (ref 98–107)
CO2 SERPL-SCNC: 20 MMOL/L (ref 22–29)
CREAT SERPL-MCNC: 1.2 MG/DL (ref 0.7–1.2)
ECHO BSA: 2.11 M2
EKG P AXIS: 38 DEGREES
EKG P-R INTERVAL: 170 MS
EKG Q-T INTERVAL: 436 MS
EKG QRS DURATION: 92 MS
EKG QTC CALCULATION (BAZETT): 439 MS
EKG T AXIS: 98 DEGREES
ERYTHROCYTE [DISTWIDTH] IN BLOOD BY AUTOMATED COUNT: 14.1 % (ref 11.5–14.5)
GLUCOSE BLD-MCNC: 232 MG/DL (ref 70–99)
GLUCOSE BLD-MCNC: 249 MG/DL (ref 70–99)
GLUCOSE BLD-MCNC: 270 MG/DL (ref 70–99)
GLUCOSE BLD-MCNC: 279 MG/DL (ref 70–99)
GLUCOSE BLD-MCNC: 283 MG/DL (ref 70–99)
GLUCOSE SERPL-MCNC: 247 MG/DL (ref 70–99)
HCT VFR BLD AUTO: 47.1 % (ref 42–52)
HGB BLD-MCNC: 15.4 G/DL (ref 14–18)
MAGNESIUM SERPL-MCNC: 2.3 MG/DL (ref 1.6–2.4)
MCH RBC QN AUTO: 28.9 PG (ref 27–31)
MCHC RBC AUTO-ENTMCNC: 32.7 G/DL (ref 33–37)
MCV RBC AUTO: 88.4 FL (ref 80–94)
PERFORMED ON: ABNORMAL
PLATELET # BLD AUTO: 179 K/UL (ref 130–400)
PMV BLD AUTO: 10.5 FL (ref 9.4–12.4)
POTASSIUM SERPL-SCNC: 3.5 MMOL/L (ref 3.5–5)
PROT SERPL-MCNC: 7.3 G/DL (ref 6.4–8.3)
RBC # BLD AUTO: 5.33 M/UL (ref 4.7–6.1)
SODIUM SERPL-SCNC: 135 MMOL/L (ref 136–145)
WBC # BLD AUTO: 10.9 K/UL (ref 4.8–10.8)

## 2025-04-27 PROCEDURE — 1200000000 HC SEMI PRIVATE

## 2025-04-27 PROCEDURE — 80053 COMPREHEN METABOLIC PANEL: CPT

## 2025-04-27 PROCEDURE — 85027 COMPLETE CBC AUTOMATED: CPT

## 2025-04-27 PROCEDURE — 93010 ELECTROCARDIOGRAM REPORT: CPT | Performed by: INTERNAL MEDICINE

## 2025-04-27 PROCEDURE — 2500000003 HC RX 250 WO HCPCS: Performed by: SURGERY

## 2025-04-27 PROCEDURE — 85520 HEPARIN ASSAY: CPT

## 2025-04-27 PROCEDURE — 36415 COLL VENOUS BLD VENIPUNCTURE: CPT

## 2025-04-27 PROCEDURE — 83735 ASSAY OF MAGNESIUM: CPT

## 2025-04-27 PROCEDURE — 6360000002 HC RX W HCPCS: Performed by: EMERGENCY MEDICINE

## 2025-04-27 PROCEDURE — 6370000000 HC RX 637 (ALT 250 FOR IP): Performed by: INTERNAL MEDICINE

## 2025-04-27 PROCEDURE — 6370000000 HC RX 637 (ALT 250 FOR IP): Performed by: STUDENT IN AN ORGANIZED HEALTH CARE EDUCATION/TRAINING PROGRAM

## 2025-04-27 PROCEDURE — 82962 GLUCOSE BLOOD TEST: CPT

## 2025-04-27 PROCEDURE — 94010 BREATHING CAPACITY TEST: CPT | Performed by: INTERNAL MEDICINE

## 2025-04-27 RX ORDER — MECOBALAMIN 5000 MCG
5 TABLET,DISINTEGRATING ORAL NIGHTLY PRN
Status: DISCONTINUED | OUTPATIENT
Start: 2025-04-27 | End: 2025-04-29

## 2025-04-27 RX ORDER — INSULIN LISPRO 100 [IU]/ML
0-16 INJECTION, SOLUTION INTRAVENOUS; SUBCUTANEOUS
Status: DISCONTINUED | OUTPATIENT
Start: 2025-04-27 | End: 2025-04-29

## 2025-04-27 RX ADMIN — GABAPENTIN 800 MG: 400 CAPSULE ORAL at 20:49

## 2025-04-27 RX ADMIN — INSULIN LISPRO 6 UNITS: 100 INJECTION, SOLUTION INTRAVENOUS; SUBCUTANEOUS at 11:35

## 2025-04-27 RX ADMIN — INSULIN LISPRO 4 UNITS: 100 INJECTION, SOLUTION INTRAVENOUS; SUBCUTANEOUS at 11:35

## 2025-04-27 RX ADMIN — CHOLESTYRAMINE 4 G: 4 POWDER, FOR SUSPENSION ORAL at 08:44

## 2025-04-27 RX ADMIN — CARBAMAZEPINE 200 MG: 200 TABLET, EXTENDED RELEASE ORAL at 08:43

## 2025-04-27 RX ADMIN — CITALOPRAM HYDROBROMIDE 20 MG: 10 TABLET, FILM COATED ORAL at 20:49

## 2025-04-27 RX ADMIN — LOSARTAN POTASSIUM 25 MG: 50 TABLET, FILM COATED ORAL at 08:44

## 2025-04-27 RX ADMIN — LEVOTHYROXINE SODIUM 50 MCG: 0.05 TABLET ORAL at 06:07

## 2025-04-27 RX ADMIN — GEMFIBROZIL 600 MG: 600 TABLET ORAL at 17:18

## 2025-04-27 RX ADMIN — INSULIN LISPRO 8 UNITS: 100 INJECTION, SOLUTION INTRAVENOUS; SUBCUTANEOUS at 17:18

## 2025-04-27 RX ADMIN — GEMFIBROZIL 600 MG: 600 TABLET ORAL at 06:07

## 2025-04-27 RX ADMIN — HEPARIN SODIUM 15 UNITS/KG/HR: 10000 INJECTION, SOLUTION INTRAVENOUS at 06:38

## 2025-04-27 RX ADMIN — SODIUM CHLORIDE, PRESERVATIVE FREE 10 ML: 5 INJECTION INTRAVENOUS at 20:49

## 2025-04-27 RX ADMIN — INSULIN GLARGINE 25 UNITS: 100 INJECTION, SOLUTION SUBCUTANEOUS at 08:44

## 2025-04-27 RX ADMIN — INSULIN LISPRO 4 UNITS: 100 INJECTION, SOLUTION INTRAVENOUS; SUBCUTANEOUS at 06:14

## 2025-04-27 RX ADMIN — FAMOTIDINE 20 MG: 20 TABLET, FILM COATED ORAL at 08:44

## 2025-04-27 RX ADMIN — CARBAMAZEPINE 200 MG: 200 TABLET, EXTENDED RELEASE ORAL at 20:49

## 2025-04-27 RX ADMIN — GABAPENTIN 800 MG: 400 CAPSULE ORAL at 08:43

## 2025-04-27 RX ADMIN — HYDROCHLOROTHIAZIDE 12.5 MG: 12.5 CAPSULE ORAL at 08:44

## 2025-04-27 RX ADMIN — ISOSORBIDE MONONITRATE 30 MG: 30 TABLET, EXTENDED RELEASE ORAL at 17:18

## 2025-04-27 RX ADMIN — INSULIN LISPRO 6 UNITS: 100 INJECTION, SOLUTION INTRAVENOUS; SUBCUTANEOUS at 08:45

## 2025-04-27 RX ADMIN — SODIUM CHLORIDE, PRESERVATIVE FREE 10 ML: 5 INJECTION INTRAVENOUS at 10:15

## 2025-04-27 RX ADMIN — INSULIN LISPRO 8 UNITS: 100 INJECTION, SOLUTION INTRAVENOUS; SUBCUTANEOUS at 20:49

## 2025-04-27 RX ADMIN — METOPROLOL SUCCINATE 25 MG: 25 TABLET, EXTENDED RELEASE ORAL at 08:44

## 2025-04-27 NOTE — PLAN OF CARE
Problem: Chronic Conditions and Co-morbidities  Goal: Patient's chronic conditions and co-morbidity symptoms are monitored and maintained or improved  Outcome: Progressing  Flowsheets (Taken 4/26/2025 2058)  Care Plan - Patient's Chronic Conditions and Co-Morbidity Symptoms are Monitored and Maintained or Improved:   Monitor and assess patient's chronic conditions and comorbid symptoms for stability, deterioration, or improvement   Collaborate with multidisciplinary team to address chronic and comorbid conditions and prevent exacerbation or deterioration   Update acute care plan with appropriate goals if chronic or comorbid symptoms are exacerbated and prevent overall improvement and discharge     Problem: Safety - Adult  Goal: Free from fall injury  Outcome: Progressing  Flowsheets (Taken 4/26/2025 2304)  Free From Fall Injury: Instruct family/caregiver on patient safety     Problem: ABCDS Injury Assessment  Goal: Absence of physical injury  Outcome: Progressing  Flowsheets (Taken 4/26/2025 2304)  Absence of Physical Injury: Implement safety measures based on patient assessment     Problem: Pain  Goal: Verbalizes/displays adequate comfort level or baseline comfort level  Outcome: Progressing  Flowsheets (Taken 4/26/2025 2059)  Verbalizes/displays adequate comfort level or baseline comfort level:   Encourage patient to monitor pain and request assistance   Assess pain using appropriate pain scale   Administer analgesics based on type and severity of pain and evaluate response   Implement non-pharmacological measures as appropriate and evaluate response   Consider cultural and social influences on pain and pain management   Notify Licensed Independent Practitioner if interventions unsuccessful or patient reports new pain
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
## 2025-04-27 NOTE — PROGRESS NOTES
[facility]    Patient:  Akhil Alejo  YOB: 1955  Date of Service: 4/27/2025  MRN: [id number]   Acct: [id number]   Primary Care Physician: Lennox Waddell MD  Advance Directive: Full Code  Admit Date: 4/25/2025       Hospital Day: 2      CHIEF COMPLAINT:     Chief Complaint   Patient presents with    Abnormal Lab     ABNORMAL EKG DURING STRESS TEST       4/27/2025 7:25 AM  Subjective / Interval History:   04/27/2025  Patient seen and examined this a.m.   No new complaints.    Denies any active chest pain at this time.  No acute changes or acute overnight event reported.   Laying comfortably in bed in no apparent acute distress.    Denies any acute complaints or distress.    Endorses overall improvement.        04/26/2025  Patient seen and examined this a.m.   No new complaints.  No acute changes or acute overnight event reported.   Laying comfortably in bed in no apparent acute distress.  Denies any acute complaints or distress.        Review of Systems:   Review of Systems  ROS: 14 point review of systems is negative except as specifically addressed above.    Diet NPO  ADULT DIET; Regular; 4 carb choices (60 gm/meal); Low Fat/Low Chol/High Fiber/ABNER    Intake/Output Summary (Last 24 hours) at 4/27/2025 0725  Last data filed at 4/27/2025 0349  Gross per 24 hour   Intake 766.32 ml   Output --   Net 766.32 ml       Medications:   heparin (PORCINE) Infusion 15 Units/kg/hr (04/27/25 0638)    sodium chloride      dextrose       Current Facility-Administered Medications   Medication Dose Route Frequency Provider Last Rate Last Admin    insulin glargine (LANTUS) injection vial 25 Units  25 Units SubCUTAneous Daily Lior Quiroz MD        insulin lispro (HUMALOG,ADMELOG) injection vial 6 Units  6 Units SubCUTAneous TID  Lior Quiroz MD   6 Units at 04/26/25 1719    insulin lispro (HUMALOG,ADMELOG) injection vial 0-8 Units  0-8 Units SubCUTAneous 4x Daily AC &  Hospitalist   4/27/2025 7:25 AM

## 2025-04-27 NOTE — PROGRESS NOTES
Pt has had a blood glucose > 200 for over 24 hours, pt is scheduled for CT surgery Tuesday without adequate glucose management. Hospitalist made aware. RN asked to change to high dose protocol. Still waiting for answer, message read at 530 pm

## 2025-04-27 NOTE — PROCEDURES
Media Information      Pulmonary Function Study    Interpretation:    The FVC is moderately reduced. FEV1 is moderately reduced. FEV1/FVC ratio is Normal.       Impression:    spirometry is suggestive of restrictive lung disease.  Consider repeating pulmonary function study to include lung volumes and diffusing lung capacity in addition to spirometry.  Clinical correlation recommended.        Bacilio Castillo MD, FCCP, [facility]

## 2025-04-28 LAB
ANTI-XA UNFRAC HEPARIN: 0.32 IU/ML
ECHO BSA: 2.11 M2
ECHO BSA: 2.11 M2
GLUCOSE BLD-MCNC: 188 MG/DL (ref 70–99)
GLUCOSE BLD-MCNC: 246 MG/DL (ref 70–99)
GLUCOSE BLD-MCNC: 262 MG/DL (ref 70–99)
GLUCOSE BLD-MCNC: 328 MG/DL (ref 70–99)
HCT VFR BLD AUTO: 47.6 % (ref 42–52)
PA ADP PRP-ACNC: 156 PRU (ref 194–418)
PERFORMED ON: ABNORMAL
PLATELET # BLD AUTO: 177 K/UL (ref 130–400)
VAS LEFT CCA MID EDV: 13.2 CM/S
VAS LEFT CCA MID PSV: 58.2 CM/S
VAS LEFT CCA PROX EDV: 11 CM/S
VAS LEFT CCA PROX PSV: 55.4 CM/S
VAS LEFT ECA EDV: 0 CM/S
VAS LEFT ECA PSV: 117 CM/S
VAS LEFT GSV AT KNEE DIAM: 3.3 MM
VAS LEFT GSV BK DIST DIAM: 3.2 MM
VAS LEFT GSV BK MID DIAM: 2.9 MM
VAS LEFT GSV BK PROX DIAM: 2.5 MM
VAS LEFT GSV THIGH DIST DIAM: 2.8 MM
VAS LEFT GSV THIGH MID DIAM: 3 MM
VAS LEFT GSV THIGH PROX DIAM: 5.7 MM
VAS LEFT ICA DIST EDV: 21.4 CM/S
VAS LEFT ICA DIST PSV: 65.3 CM/S
VAS LEFT ICA MID EDV: 15.4 CM/S
VAS LEFT ICA MID PSV: 48.8 CM/S
VAS LEFT ICA PROX EDV: 13.2 CM/S
VAS LEFT ICA PROX PSV: 44.5 CM/S
VAS LEFT VERTEBRAL EDV: 11.5 CM/S
VAS LEFT VERTEBRAL PSV: 27.4 CM/S
VAS RIGHT CCA MID EDV: 12.8 CM/S
VAS RIGHT CCA MID PSV: 54.5 CM/S
VAS RIGHT CCA PROX EDV: 12.3 CM/S
VAS RIGHT CCA PROX PSV: 53.1 CM/S
VAS RIGHT ECA EDV: 10.6 CM/S
VAS RIGHT ECA PSV: 120 CM/S
VAS RIGHT GSV AT KNEE DIAM: 3 MM
VAS RIGHT GSV BK DIST DIAM: 2.2 MM
VAS RIGHT GSV BK MID DIAM: 2.5 MM
VAS RIGHT GSV BK PROX DIAM: 2.6 MM
VAS RIGHT GSV THIGH DIST DIAM: 3.3 MM
VAS RIGHT GSV THIGH MID DIAM: 2.9 MM
VAS RIGHT GSV THIGH PROX DIAM: 6.7 MM
VAS RIGHT ICA DIST EDV: 23.2 CM/S
VAS RIGHT ICA DIST PSV: 83.5 CM/S
VAS RIGHT ICA MID EDV: 21.4 CM/S
VAS RIGHT ICA MID PSV: 61.5 CM/S
VAS RIGHT ICA PROX EDV: 19.8 CM/S
VAS RIGHT ICA PROX PSV: 56 CM/S
VAS RIGHT VERTEBRAL EDV: 16.8 CM/S
VAS RIGHT VERTEBRAL PSV: 48.3 CM/S

## 2025-04-28 PROCEDURE — 94760 N-INVAS EAR/PLS OXIMETRY 1: CPT

## 2025-04-28 PROCEDURE — 82962 GLUCOSE BLOOD TEST: CPT

## 2025-04-28 PROCEDURE — 99232 SBSQ HOSP IP/OBS MODERATE 35: CPT | Performed by: INTERNAL MEDICINE

## 2025-04-28 PROCEDURE — 93970 EXTREMITY STUDY: CPT | Performed by: SURGERY

## 2025-04-28 PROCEDURE — 2500000003 HC RX 250 WO HCPCS: Performed by: SURGERY

## 2025-04-28 PROCEDURE — 85014 HEMATOCRIT: CPT

## 2025-04-28 PROCEDURE — 93880 EXTRACRANIAL BILAT STUDY: CPT | Performed by: SURGERY

## 2025-04-28 PROCEDURE — 1200000000 HC SEMI PRIVATE

## 2025-04-28 PROCEDURE — 6360000002 HC RX W HCPCS: Performed by: EMERGENCY MEDICINE

## 2025-04-28 PROCEDURE — 85520 HEPARIN ASSAY: CPT

## 2025-04-28 PROCEDURE — 99024 POSTOP FOLLOW-UP VISIT: CPT | Performed by: SURGERY

## 2025-04-28 PROCEDURE — 6370000000 HC RX 637 (ALT 250 FOR IP): Performed by: STUDENT IN AN ORGANIZED HEALTH CARE EDUCATION/TRAINING PROGRAM

## 2025-04-28 PROCEDURE — 36415 COLL VENOUS BLD VENIPUNCTURE: CPT

## 2025-04-28 PROCEDURE — 85576 BLOOD PLATELET AGGREGATION: CPT

## 2025-04-28 PROCEDURE — 85049 AUTOMATED PLATELET COUNT: CPT

## 2025-04-28 PROCEDURE — 94150 VITAL CAPACITY TEST: CPT

## 2025-04-28 PROCEDURE — 6370000000 HC RX 637 (ALT 250 FOR IP): Performed by: INTERNAL MEDICINE

## 2025-04-28 RX ORDER — INSULIN LISPRO 100 [IU]/ML
10 INJECTION, SOLUTION INTRAVENOUS; SUBCUTANEOUS
Status: DISCONTINUED | OUTPATIENT
Start: 2025-04-28 | End: 2025-04-29

## 2025-04-28 RX ADMIN — HEPARIN SODIUM 15 UNITS/KG/HR: 10000 INJECTION, SOLUTION INTRAVENOUS at 01:59

## 2025-04-28 RX ADMIN — INSULIN GLARGINE 25 UNITS: 100 INJECTION, SOLUTION SUBCUTANEOUS at 08:32

## 2025-04-28 RX ADMIN — GABAPENTIN 800 MG: 400 CAPSULE ORAL at 08:30

## 2025-04-28 RX ADMIN — INSULIN LISPRO 4 UNITS: 100 INJECTION, SOLUTION INTRAVENOUS; SUBCUTANEOUS at 16:44

## 2025-04-28 RX ADMIN — LOSARTAN POTASSIUM 25 MG: 50 TABLET, FILM COATED ORAL at 08:30

## 2025-04-28 RX ADMIN — GABAPENTIN 800 MG: 400 CAPSULE ORAL at 20:21

## 2025-04-28 RX ADMIN — CARBAMAZEPINE 200 MG: 200 TABLET, EXTENDED RELEASE ORAL at 08:31

## 2025-04-28 RX ADMIN — INSULIN LISPRO 10 UNITS: 100 INJECTION, SOLUTION INTRAVENOUS; SUBCUTANEOUS at 16:44

## 2025-04-28 RX ADMIN — GEMFIBROZIL 600 MG: 600 TABLET ORAL at 16:44

## 2025-04-28 RX ADMIN — FAMOTIDINE 20 MG: 20 TABLET, FILM COATED ORAL at 08:30

## 2025-04-28 RX ADMIN — SODIUM CHLORIDE, PRESERVATIVE FREE 10 ML: 5 INJECTION INTRAVENOUS at 08:32

## 2025-04-28 RX ADMIN — INSULIN LISPRO 4 UNITS: 100 INJECTION, SOLUTION INTRAVENOUS; SUBCUTANEOUS at 20:21

## 2025-04-28 RX ADMIN — INSULIN LISPRO 10 UNITS: 100 INJECTION, SOLUTION INTRAVENOUS; SUBCUTANEOUS at 11:54

## 2025-04-28 RX ADMIN — HYDROCHLOROTHIAZIDE 12.5 MG: 12.5 CAPSULE ORAL at 08:30

## 2025-04-28 RX ADMIN — ISOSORBIDE MONONITRATE 30 MG: 30 TABLET, EXTENDED RELEASE ORAL at 16:44

## 2025-04-28 RX ADMIN — CITALOPRAM HYDROBROMIDE 20 MG: 10 TABLET, FILM COATED ORAL at 20:21

## 2025-04-28 RX ADMIN — GEMFIBROZIL 600 MG: 600 TABLET ORAL at 05:52

## 2025-04-28 RX ADMIN — METOPROLOL SUCCINATE 25 MG: 25 TABLET, EXTENDED RELEASE ORAL at 08:30

## 2025-04-28 RX ADMIN — INSULIN LISPRO 8 UNITS: 100 INJECTION, SOLUTION INTRAVENOUS; SUBCUTANEOUS at 08:31

## 2025-04-28 RX ADMIN — INSULIN LISPRO 12 UNITS: 100 INJECTION, SOLUTION INTRAVENOUS; SUBCUTANEOUS at 11:54

## 2025-04-28 RX ADMIN — HEPARIN SODIUM 15 UNITS/KG/HR: 10000 INJECTION, SOLUTION INTRAVENOUS at 20:17

## 2025-04-28 RX ADMIN — LEVOTHYROXINE SODIUM 50 MCG: 0.05 TABLET ORAL at 05:52

## 2025-04-28 RX ADMIN — INSULIN LISPRO 6 UNITS: 100 INJECTION, SOLUTION INTRAVENOUS; SUBCUTANEOUS at 08:32

## 2025-04-28 RX ADMIN — CARBAMAZEPINE 200 MG: 200 TABLET, EXTENDED RELEASE ORAL at 20:21

## 2025-04-28 ASSESSMENT — PAIN SCALES - GENERAL: PAINLEVEL_OUTOF10: 0

## 2025-04-28 ASSESSMENT — LIFESTYLE VARIABLES: SMOKING_STATUS: 0

## 2025-04-28 NOTE — ANESTHESIA PRE PROCEDURE
Department of Anesthesiology  Preprocedure Note       Name:  Akhil Alejo   Age:  69 y.o.  :  1955                                          MRN:  [id number]         Date:  2025      Surgeon: Surgeon(s):  Akhil Lara MD    Procedure: Procedure(s):  CORONARY ARTERY BYPASS GRAFT MINIMALLY INVASIVE robotic, pump assisted via femoral access    Medications prior to admission:   Prior to Admission medications    Medication Sig Start Date End Date Taking? Authorizing Provider   aspirin 81 MG chewable tablet Take 1 tablet by mouth daily   Yes Chaz Gamble MD   metoprolol succinate (TOPROL XL) 25 MG extended release tablet Take 1 tablet by mouth daily 25  Yes Angel Kitchen MD   clopidogrel (PLAVIX) 75 MG tablet Take 1 tablet by mouth daily 25  Yes Angel Kitchen MD   isosorbide mononitrate (IMDUR) 30 MG extended release tablet Take 1 tablet by mouth every evening 25  Yes Angel Kitchen MD   colestipol (COLESTID) 1 g tablet Take 1 tablet by mouth 2 times daily 24  Yes Daniel Augustine APRN   losartan (COZAAR) 25 MG tablet TAKE 1 TABLET BY MOUTH IN THE MORNING 23  Yes Antoinette Charlton APRN   simvastatin (ZOCOR) 10 MG tablet Take 1 tablet by mouth nightly   Yes Chaz Gamble MD   gemfibrozil (LOPID) 600 MG tablet Take 1 tablet by mouth 2 times daily (before meals)   Yes ProviderChaz MD   carBAMazepine (CARBATROL) 200 MG extended release capsule Take 1 capsule by mouth 2 times daily   Yes ProviderChaz MD   gabapentin (NEURONTIN) 800 MG tablet Take 1 tablet by mouth 2 times daily.   Yes Chaz Gamble MD   hydrochlorothiazide (HYDRODIURIL) 25 MG tablet Take 0.5 tablets by mouth daily   Yes Chaz Gamble MD   levothyroxine (SYNTHROID) 50 MCG tablet Take 1 tablet by mouth Daily   Yes Chaz Gamble MD   famotidine (PEPCID) 20 MG tablet Take 1 tablet by mouth daily   Yes Chaz Gamble MD   citalopram (CELEXA) 20 MG

## 2025-04-28 NOTE — PLAN OF CARE
Problem: Chronic Conditions and Co-morbidities  Goal: Patient's chronic conditions and co-morbidity symptoms are monitored and maintained or improved  Outcome: Progressing  Flowsheets (Taken 4/27/2025 2053)  Care Plan - Patient's Chronic Conditions and Co-Morbidity Symptoms are Monitored and Maintained or Improved:   Monitor and assess patient's chronic conditions and comorbid symptoms for stability, deterioration, or improvement   Collaborate with multidisciplinary team to address chronic and comorbid conditions and prevent exacerbation or deterioration   Update acute care plan with appropriate goals if chronic or comorbid symptoms are exacerbated and prevent overall improvement and discharge     Problem: Safety - Adult  Goal: Free from fall injury  Outcome: Progressing     Problem: ABCDS Injury Assessment  Goal: Absence of physical injury  Outcome: Progressing     Problem: Pain  Goal: Verbalizes/displays adequate comfort level or baseline comfort level  Outcome: Progressing

## 2025-04-28 NOTE — PROGRESS NOTES
[facility] Progress Note    Patient:  Akhil Alejo  YOB: 1955  Date of Service: 4/28/2025  MRN: [id number]   Acct: [id number]   Primary Care Physician: Lennox Waddell MD  Advance Directive: Full Code  Admit Date: 4/25/2025       Hospital Day: 3      CHIEF COMPLAINT:     Chief Complaint   Patient presents with    Abnormal Lab     ABNORMAL EKG DURING STRESS TEST       4/28/2025 8:54 AM  Subjective / Interval History:   04/28/2025  Patient seen and examined this a.m.     Denies any active chest pain at this time.  Denies any other acute complaints or distress at this time.  No acute changes or acute overnight event reported.   Sitting comfortably in bed in no apparent acute distress.    Endorses overall improvement.        04/27/2025  Patient seen and examined this a.m.   No new complaints.    Denies any active chest pain at this time.  No acute changes or acute overnight event reported.   Laying comfortably in bed in no apparent acute distress.    Denies any acute complaints or distress.    Endorses overall improvement.      04/26/2025  Patient seen and examined this a.m.   No new complaints.  No acute changes or acute overnight event reported.   Laying comfortably in bed in no apparent acute distress.  Denies any acute complaints or distress.        Review of Systems:   Review of Systems  ROS: 14 point review of systems is negative except as specifically addressed above.    Diet NPO  ADULT DIET; Regular; 4 carb choices (60 gm/meal); Low Fat/Low Chol/High Fiber/ABNER  No intake or output data in the 24 hours ending 04/28/25 0854      Medications:   heparin (PORCINE) Infusion 15 Units/kg/hr (04/28/25 0620)    sodium chloride      dextrose       Current Facility-Administered Medications   Medication Dose Route Frequency Provider Last Rate Last Admin    insulin lispro (HUMALOG,ADMELOG) injection vial 10 Units  10 Units SubCUTAneous TID  Lior Quiroz MD        melatonin        Labs:   CBC with DIFF:   Recent Labs     04/25/25  1142 04/27/25  0517 04/28/25  0546   WBC 10.4 10.9*  --    RBC 5.71 5.33  --    HGB 16.7 15.4  --    HCT 49.9 47.1 47.6   MCV 87.4 88.4  --    MCH 29.2 28.9  --    MCHC 33.5 32.7*  --    RDW 14.0 14.1  --     179 177   MPV 10.4 10.5  --    NEUTOPHILPCT 51.8  --   --    LYMPHOPCT 33.7  --   --    MONOPCT 7.7  --   --    BASOPCT 0.7  --   --    NEUTROABS 5.4  --   --    LYMPHSABS 3.5  --   --    MONOSABS 0.80  --   --    EOSABS 0.40  --   --    BASOSABS 0.10  --   --        CMP/BMP:   Recent Labs     04/25/25  1142 04/27/25  0715   * 135*   K 4.3 3.5    101   CO2 21* 20*   ANIONGAP 13 14   GLUCOSE 291* 247*   BUN 22 27*   CREATININE 1.2 1.2   LABGLOM 65 65   CALCIUM 9.2 9.0   BILITOT 0.6 0.5   ALKPHOS 94 95   ALT 18 14   AST 28 20         CRP:  No results for input(s): \"CRP\" in the last 72 hours.  Sed Rate:  No results for input(s): \"SEDRATE\" in the last 72 hours.      HgBA1c:  No components found for: \"HGBA1C\"  FLP:  No results found for: \"TRIG\", \"HDL\", \"LDLDIRECT\"  TSH:  No results found for: \"TSH\"  Troponin T: No results for input(s): \"TROPONINI\" in the last 72 hours.  Pro-BNP: No results for input(s): \"BNP\" in the last 72 hours.  INR:   Recent Labs     04/25/25  1142   INR 1.08     ABGs: No results found for: \"PHART\", \"PO2ART\", \"AZD0RFT\"  UA:No results for input(s): \"NITRITE\", \"COLORU\", \"PHUR\", \"LABCAST\", \"WBCUA\", \"RBCUA\", \"MUCUS\", \"TRICHOMONAS\", \"YEAST\", \"BACTERIA\", \"CLARITYU\", \"SPECGRAV\", \"LEUKOCYTESUR\", \"UROBILINOGEN\", \"BILIRUBINUR\", \"BLOODU\", \"GLUCOSEU\", \"AMORPHOUS\" in the last 72 hours.    Invalid input(s): \"KETONESU\"      Culture Results:    No results for input(s): \"CXSURG\" in the last 720 hours.    Blood Culture Recent: No results for input(s): \"BC\" in the last 720 hours.    No results for input(s): \"BC\", \"BLOODCULT2\", \"ORG\" in the last 72 hours.          Cultures:   No results for input(s): \"CULTURE\" in the last 72 hours.  No

## 2025-04-28 NOTE — PROGRESS NOTES
Cardiology Progress Note   Nilsa Hyde MD      Patient:  Akhil Alejo  [id number]  Admit Date: 4/25/2025       Hospital Day: 3  Referring Provider: Lior Quiroz MD    Admission Problem List: Present on Admission:   Diabetic ulcer of ankle associated with type 2 diabetes mellitus, with fat layer exposed (HCC)   Diabetes (HCC)   Essential hypertension   GERD (gastroesophageal reflux disease)   Hypothyroidism   Mixed hyperlipidemia   Neuropathy   Vitamin D deficiency   Restless legs syndrome (RLS)   Abnormal stress test        Subjective     Mr. Alejo, patient of Dr. Kitchen, had an abnormal stress test and came into the emergency department over the weekend.  He had a cardiac catheterization done severe mid LAD and circumflex stenosis along with suspected eccentric moderate left main stenosis with mild RCA disease.    CT surgery was consulted and plan is for CABG tomorrow.    This morning, the patient has no complaints.          Objective      /67   Pulse 62   Temp 97.5 °F (36.4 °C) (Temporal)   Resp 18   Ht 1.803 m (5' 11\")   SpO2 99%   BMI 27.34 kg/m²     No intake or output data in the 24 hours ending 04/28/25 0933      Physical Exam:      Physical Exam  Vitals and nursing note reviewed.   Constitutional:       Appearance: Normal appearance.   HENT:      Head: Normocephalic and atraumatic.   Eyes:      Extraocular Movements: Extraocular movements intact.   Cardiovascular:      Rate and Rhythm: Normal rate and regular rhythm.      Heart sounds: No murmur heard.  Pulmonary:      Effort: Pulmonary effort is normal.      Breath sounds: Normal breath sounds.   Abdominal:      Palpations: Abdomen is soft.   Musculoskeletal:      Right lower leg: No edema.      Left lower leg: No edema.   Skin:     General: Skin is warm and dry.   Neurological:      General: No focal deficit present.      Mental Status: He is alert.   Psychiatric:         Mood and Affect: Mood normal.           Lab Data:  CBC:

## 2025-04-28 NOTE — PROGRESS NOTES
Results of P2Y12 test noted. The test shows there is some recovery in platelet function, although still some ongoing residual plavix effect. Most likely this will be fully resolved by tomorrow (ie PRU >194). Therefore, the timing of CABG on 4/29/25 is reasonable.    Another issue is glucose control. For the last 2 days, the glucose levels have been >200 mg/dl, in part due to the appropriate discontinuance of Empagliflozin. It may be beneficial to start the patient on a preop insulin infusion in order to improve glucose control and reduce acute surgical complications associated with hyperglycemia. He will most likely require such an infusion intra- and postop. I defer to the hospitalist team on whether such a management approach is practical and provides enough benefit to be worth it.

## 2025-04-29 ENCOUNTER — APPOINTMENT (OUTPATIENT)
Dept: GENERAL RADIOLOGY | Age: 70
DRG: 234 | End: 2025-04-29
Attending: INTERNAL MEDICINE
Payer: MEDICARE

## 2025-04-29 ENCOUNTER — RESULTS FOLLOW-UP (OUTPATIENT)
Dept: CARDIOLOGY CLINIC | Age: 70
End: 2025-04-29

## 2025-04-29 ENCOUNTER — ANESTHESIA (OUTPATIENT)
Dept: OPERATING ROOM | Age: 70
End: 2025-04-29
Payer: MEDICARE

## 2025-04-29 ENCOUNTER — HOSPITAL ENCOUNTER (INPATIENT)
Age: 70
Discharge: HOME | DRG: 234 | End: 2025-05-01
Attending: SURGERY
Payer: MEDICARE

## 2025-04-29 LAB
ABO/RH: NORMAL
ANION GAP BLD CALC-SCNC: 6 MMOL/L
ANION GAP BLD CALC-SCNC: 7 MMOL/L
ANION GAP SERPL CALC-SCNC: 111 MEQ/L (ref 99–110)
ANION GAP SERPL CALC-SCNC: 116 MEQ/L (ref 99–110)
ANION GAP SERPL CALCULATED.3IONS-SCNC: 9 MMOL/L (ref 8–16)
ANTI-XA UNFRAC HEPARIN: 0.17 IU/ML
ANTI-XA UNFRAC HEPARIN: 0.21 IU/ML
ANTI-XA UNFRAC HEPARIN: 0.28 IU/ML
ANTI-XA UNFRAC HEPARIN: 0.65 IU/ML
ANTIBODY SCREEN: NORMAL
APTT PPP: 29.6 SEC (ref 26–36.2)
BASE EXCESS ARTERIAL: -2 (ref -3–3)
BASE EXCESS ARTERIAL: -3 (ref -3–3)
BASE EXCESS ARTERIAL: -3.7 MMOL/L (ref -2–2)
BASE EXCESS ARTERIAL: -4 (ref -3–3)
BASE EXCESS ARTERIAL: -5 (ref -3–3)
BASE EXCESS ARTERIAL: -7 (ref -3–3)
BASE EXCESS BLDV CALC-SCNC: -6 MMOL/L
BUN SERPL-MCNC: 19 MG/DL (ref 8–23)
CA-I BLD-SCNC: 1.08 MMOL/L (ref 1.1–1.3)
CA-I BLD-SCNC: 1.12 MMOL/L (ref 1.1–1.3)
CA-I BLD-SCNC: 1.12 MMOL/L (ref 1.1–1.3)
CA-I BLD-SCNC: 1.16 MMOL/L (ref 1.1–1.3)
CA-I BLD-SCNC: 1.24 MMOL/L (ref 1.1–1.3)
CA-I BLD-SCNC: 1.26 MMOL/L (ref 1.1–1.3)
CALCIUM SERPL-MCNC: 8.5 MG/DL (ref 8.8–10.2)
CARBOXYHEMOGLOBIN ARTERIAL: 1.8 % (ref 0–5)
CHLORIDE SERPL-SCNC: 108 MMOL/L (ref 98–107)
CO2 BLD CALC-SCNC: 19 MEQ/L (ref 21–32)
CO2 BLD CALC-SCNC: 21 MEQ/L (ref 21–32)
CO2 BLD CALC-SCNC: 22 MEQ/L (ref 21–32)
CO2 BLD CALC-SCNC: 23 MEQ/L (ref 21–32)
CO2 BLD CALC-SCNC: 24 MEQ/L (ref 21–32)
CO2 BLD CALC-SCNC: 25 MEQ/L (ref 21–32)
CO2 BLDV-SCNC: 24 MMOL/L
CO2 SERPL-SCNC: 23 MMOL/L (ref 22–29)
CREAT SERPL-MCNC: 1 MG/DL (ref 0.3–1.3)
CREAT SERPL-MCNC: 1.1 MG/DL (ref 0.3–1.3)
CREAT SERPL-MCNC: 1.1 MG/DL (ref 0.7–1.2)
ECHO BSA: 2.11 M2
ERYTHROCYTE [DISTWIDTH] IN BLOOD BY AUTOMATED COUNT: 14.2 % (ref 11.5–14.5)
ERYTHROCYTE [DISTWIDTH] IN BLOOD BY AUTOMATED COUNT: 14.6 % (ref 11.5–14.5)
FIO2: 100 %
GLUCOSE BLD-MCNC: 118 MG/DL (ref 70–99)
GLUCOSE BLD-MCNC: 133 MG/DL (ref 70–99)
GLUCOSE BLD-MCNC: 133 MG/DL (ref 70–99)
GLUCOSE BLD-MCNC: 141 MG/DL (ref 70–99)
GLUCOSE BLD-MCNC: 141 MG/DL (ref 70–99)
GLUCOSE BLD-MCNC: 143 MG/DL (ref 70–99)
GLUCOSE BLD-MCNC: 153 MG/DL (ref 70–99)
GLUCOSE BLD-MCNC: 158 MG/DL (ref 70–99)
GLUCOSE BLD-MCNC: 182 MG/DL (ref 70–99)
GLUCOSE BLD-MCNC: 202 MG/DL (ref 70–99)
GLUCOSE BLD-MCNC: 259 MG/DL (ref 70–99)
GLUCOSE SERPL-MCNC: 127 MG/DL (ref 70–99)
HBA1C MFR BLD: 10.6 % (ref 4–5.6)
HCO3 ARTERIAL: 22.7 MMOL/L (ref 22–26)
HCT VFR BLD AUTO: 35 % (ref 37–52)
HCT VFR BLD AUTO: 36 % (ref 37–52)
HCT VFR BLD AUTO: 41 % (ref 37–52)
HCT VFR BLD AUTO: 42 % (ref 37–52)
HCT VFR BLD AUTO: 42.8 % (ref 42–52)
HCT VFR BLD AUTO: 47.1 % (ref 42–52)
HCT VFR BLD AUTO: 48 % (ref 37–52)
HCT VFR BLD AUTO: 51 % (ref 37–52)
HEMOGLOBIN, ART, EXTENDED: 15.4 G/DL (ref 14–18)
HGB BLD CALC-MCNC: 12 GM/DL (ref 12–18)
HGB BLD CALC-MCNC: 12.4 GM/DL (ref 12–18)
HGB BLD CALC-MCNC: 13.8 GM/DL (ref 12–18)
HGB BLD CALC-MCNC: 14.3 GM/DL (ref 12–18)
HGB BLD CALC-MCNC: 16.3 GM/DL (ref 12–18)
HGB BLD CALC-MCNC: 17.2 GM/DL (ref 12–18)
HGB BLD-MCNC: 13.8 G/DL (ref 14–18)
HGB BLD-MCNC: 15.7 G/DL (ref 14–18)
INR PPP: 1.24 (ref 0.88–1.18)
MAGNESIUM SERPL-MCNC: 1.9 MG/DL (ref 1.6–2.4)
MCH RBC QN AUTO: 28.8 PG (ref 27–31)
MCH RBC QN AUTO: 29.5 PG (ref 27–31)
MCHC RBC AUTO-ENTMCNC: 32.2 G/DL (ref 33–37)
MCHC RBC AUTO-ENTMCNC: 33.3 G/DL (ref 33–37)
MCV RBC AUTO: 88.5 FL (ref 80–94)
MCV RBC AUTO: 89.4 FL (ref 80–94)
MECHANICAL RATE IN BPM: 16
METHEMOGLOBIN ARTERIAL: 1.4 %
MODE: ABNORMAL
O2 CONTENT ARTERIAL: 20.5 ML/DL
O2 SAT, ARTERIAL: 100 % (ref 93–100)
O2 SAT, ARTERIAL: 94.7 %
O2 SAT, ARTERIAL: 99 % (ref 93–100)
O2 THERAPY: ABNORMAL
PCO2 ARTERIAL: 38 MM HG (ref 35–48)
PCO2 ARTERIAL: 40 MM HG (ref 35–48)
PCO2 ARTERIAL: 42 MM HG (ref 35–48)
PCO2 ARTERIAL: 42 MM HG (ref 35–48)
PCO2 ARTERIAL: 45 MMHG (ref 35–45)
PCO2 ARTERIAL: 53 MM HG (ref 35–48)
PCO2 BLDV: 53.1 MM HG (ref 40–50)
PERFORMED ON: ABNORMAL
PH ARTERIAL: 7.28 (ref 7.3–7.5)
PH ARTERIAL: 7.31 (ref 7.35–7.45)
PH ARTERIAL: 7.31 (ref 7.3–7.5)
PH ARTERIAL: 7.31 (ref 7.3–7.5)
PH ARTERIAL: 7.34 (ref 7.3–7.5)
PH ARTERIAL: 7.35 (ref 7.3–7.5)
PH BLDV: 7.24 [PH]
PLATELET # BLD AUTO: 155 K/UL (ref 130–400)
PLATELET # BLD AUTO: 186 K/UL (ref 130–400)
PMV BLD AUTO: 10.8 FL (ref 9.4–12.4)
PMV BLD AUTO: 10.8 FL (ref 9.4–12.4)
PO2 ARTERIAL: 131 MM HG (ref 83–108)
PO2 ARTERIAL: 155 MM HG (ref 83–108)
PO2 ARTERIAL: 166 MM HG (ref 83–108)
PO2 ARTERIAL: 169 MM HG (ref 83–108)
PO2 ARTERIAL: 464 MM HG (ref 83–108)
PO2 ARTERIAL: 88 MMHG (ref 80–100)
PO2 BLDV: 39.5 MM HG
POSITIVE END EXP PRESS: 5
POTASSIUM BLD-SCNC: 4.1 MMOL/L
POTASSIUM SERPL-SCNC: 3.7 MEQ/L (ref 3.5–5.1)
POTASSIUM SERPL-SCNC: 3.7 MEQ/L (ref 3.5–5.1)
POTASSIUM SERPL-SCNC: 3.8 MEQ/L (ref 3.5–5.1)
POTASSIUM SERPL-SCNC: 3.9 MEQ/L (ref 3.5–5.1)
POTASSIUM SERPL-SCNC: 3.9 MEQ/L (ref 3.5–5.1)
POTASSIUM SERPL-SCNC: 3.9 MMOL/L (ref 3.5–5.1)
POTASSIUM SERPL-SCNC: 4 MEQ/L (ref 3.5–5.1)
PROTHROMBIN TIME: 15.5 SEC (ref 12–14.6)
RBC # BLD AUTO: 4.79 M/UL (ref 4.7–6.1)
RBC # BLD AUTO: 5.32 M/UL (ref 4.7–6.1)
SAMPLE SOURCE: ABNORMAL
SAO2 % BLDV: 64 %
SODIUM BLD-SCNC: 142 MEQ/L (ref 136–145)
SODIUM BLD-SCNC: 143 MEQ/L (ref 136–145)
SODIUM BLD-SCNC: 145 MEQ/L (ref 136–145)
SODIUM BLD-SCNC: 145 MEQ/L (ref 136–145)
SODIUM BLD-SCNC: 146 MEQ/L (ref 136–145)
SODIUM BLD-SCNC: 148 MEQ/L (ref 136–145)
SODIUM SERPL-SCNC: 140 MMOL/L (ref 136–145)
TCO2 ARTERIAL: 20 MMOL/L
TCO2 ARTERIAL: 22 MMOL/L
TCO2 ARTERIAL: 23 MMOL/L
TCO2 ARTERIAL: 25 MMOL/L
TCO2 ARTERIAL: 26 MMOL/L
VT MECHANICAL: 480 %
WBC # BLD AUTO: 12.5 K/UL (ref 4.8–10.8)
WBC # BLD AUTO: 8.1 K/UL (ref 4.8–10.8)

## 2025-04-29 PROCEDURE — 82800 BLOOD PH: CPT

## 2025-04-29 PROCEDURE — 02100Z9 BYPASS CORONARY ARTERY, ONE ARTERY FROM LEFT INTERNAL MAMMARY, OPEN APPROACH: ICD-10-PCS | Performed by: SURGERY

## 2025-04-29 PROCEDURE — 6360000002 HC RX W HCPCS: Performed by: EMERGENCY MEDICINE

## 2025-04-29 PROCEDURE — 84295 ASSAY OF SERUM SODIUM: CPT

## 2025-04-29 PROCEDURE — 85520 HEPARIN ASSAY: CPT

## 2025-04-29 PROCEDURE — 82435 ASSAY OF BLOOD CHLORIDE: CPT

## 2025-04-29 PROCEDURE — 71045 X-RAY EXAM CHEST 1 VIEW: CPT

## 2025-04-29 PROCEDURE — B24BZZ4 ULTRASONOGRAPHY OF HEART WITH AORTA, TRANSESOPHAGEAL: ICD-10-PCS | Performed by: SURGERY

## 2025-04-29 PROCEDURE — 76942 ECHO GUIDE FOR BIOPSY: CPT

## 2025-04-29 PROCEDURE — 82565 ASSAY OF CREATININE: CPT

## 2025-04-29 PROCEDURE — 82947 ASSAY GLUCOSE BLOOD QUANT: CPT

## 2025-04-29 PROCEDURE — C1769 GUIDE WIRE: HCPCS | Performed by: SURGERY

## 2025-04-29 PROCEDURE — 94150 VITAL CAPACITY TEST: CPT

## 2025-04-29 PROCEDURE — C1893 INTRO/SHEATH, FIXED,NON-PEEL: HCPCS | Performed by: SURGERY

## 2025-04-29 PROCEDURE — 02104Z9 BYPASS CORONARY ARTERY, ONE ARTERY FROM LEFT INTERNAL MAMMARY, PERCUTANEOUS ENDOSCOPIC APPROACH: ICD-10-PCS | Performed by: SURGERY

## 2025-04-29 PROCEDURE — 80048 BASIC METABOLIC PNL TOTAL CA: CPT

## 2025-04-29 PROCEDURE — 99221 1ST HOSP IP/OBS SF/LOW 40: CPT | Performed by: INTERNAL MEDICINE

## 2025-04-29 PROCEDURE — 85014 HEMATOCRIT: CPT

## 2025-04-29 PROCEDURE — 021009F BYPASS CORONARY ARTERY, ONE ARTERY FROM ABDOMINAL ARTERY WITH AUTOLOGOUS VENOUS TISSUE, OPEN APPROACH: ICD-10-PCS | Performed by: SURGERY

## 2025-04-29 PROCEDURE — C1894 INTRO/SHEATH, NON-LASER: HCPCS | Performed by: SURGERY

## 2025-04-29 PROCEDURE — 6360000002 HC RX W HCPCS

## 2025-04-29 PROCEDURE — 82803 BLOOD GASES ANY COMBINATION: CPT

## 2025-04-29 PROCEDURE — 06BP3ZZ EXCISION OF RIGHT SAPHENOUS VEIN, PERCUTANEOUS APPROACH: ICD-10-PCS | Performed by: SURGERY

## 2025-04-29 PROCEDURE — 2709999900 HC NON-CHARGEABLE SUPPLY: Performed by: SURGERY

## 2025-04-29 PROCEDURE — 2720000010 HC SURG SUPPLY STERILE: Performed by: SURGERY

## 2025-04-29 PROCEDURE — 37799 UNLISTED PX VASCULAR SURGERY: CPT

## 2025-04-29 PROCEDURE — C1751 CATH, INF, PER/CENT/MIDLINE: HCPCS | Performed by: SURGERY

## 2025-04-29 PROCEDURE — 2580000003 HC RX 258: Performed by: SURGERY

## 2025-04-29 PROCEDURE — 83735 ASSAY OF MAGNESIUM: CPT

## 2025-04-29 PROCEDURE — 82810 BLOOD GASES O2 SAT ONLY: CPT

## 2025-04-29 PROCEDURE — 3600000019 HC SURGERY ROBOT ADDTL 15MIN: Performed by: SURGERY

## 2025-04-29 PROCEDURE — 2500000003 HC RX 250 WO HCPCS

## 2025-04-29 PROCEDURE — C1729 CATH, DRAINAGE: HCPCS | Performed by: SURGERY

## 2025-04-29 PROCEDURE — 94760 N-INVAS EAR/PLS OXIMETRY 1: CPT

## 2025-04-29 PROCEDURE — 02100Z8 BYPASS CORONARY ARTERY, ONE ARTERY FROM RIGHT INTERNAL MAMMARY, OPEN APPROACH: ICD-10-PCS | Performed by: SURGERY

## 2025-04-29 PROCEDURE — 94002 VENT MGMT INPAT INIT DAY: CPT

## 2025-04-29 PROCEDURE — C1713 ANCHOR/SCREW BN/BN,TIS/BN: HCPCS | Performed by: SURGERY

## 2025-04-29 PROCEDURE — C1889 IMPLANT/INSERT DEVICE, NOC: HCPCS | Performed by: SURGERY

## 2025-04-29 PROCEDURE — 85347 COAGULATION TIME ACTIVATED: CPT | Performed by: SURGERY

## 2025-04-29 PROCEDURE — 6370000000 HC RX 637 (ALT 250 FOR IP): Performed by: SURGERY

## 2025-04-29 PROCEDURE — 3600000090 HC PERFUSION PUMP ON: Performed by: SURGERY

## 2025-04-29 PROCEDURE — 82374 ASSAY BLOOD CARBON DIOXIDE: CPT

## 2025-04-29 PROCEDURE — 85530 HEPARIN-PROTAMINE TOLERANCE: CPT | Performed by: SURGERY

## 2025-04-29 PROCEDURE — 2580000003 HC RX 258

## 2025-04-29 PROCEDURE — 6360000002 HC RX W HCPCS: Performed by: SURGERY

## 2025-04-29 PROCEDURE — 2700000000 HC OXYGEN THERAPY PER DAY

## 2025-04-29 PROCEDURE — 83036 HEMOGLOBIN GLYCOSYLATED A1C: CPT

## 2025-04-29 PROCEDURE — 02104Z8 BYPASS CORONARY ARTERY, ONE ARTERY FROM RIGHT INTERNAL MAMMARY, PERCUTANEOUS ENDOSCOPIC APPROACH: ICD-10-PCS | Performed by: SURGERY

## 2025-04-29 PROCEDURE — 85730 THROMBOPLASTIN TIME PARTIAL: CPT

## 2025-04-29 PROCEDURE — 8E0W4CZ ROBOTIC ASSISTED PROCEDURE OF TRUNK REGION, PERCUTANEOUS ENDOSCOPIC APPROACH: ICD-10-PCS | Performed by: SURGERY

## 2025-04-29 PROCEDURE — 86900 BLOOD TYPING SEROLOGIC ABO: CPT

## 2025-04-29 PROCEDURE — 3600000009 HC SURGERY ROBOT BASE: Performed by: SURGERY

## 2025-04-29 PROCEDURE — S2900 ROBOTIC SURGICAL SYSTEM: HCPCS | Performed by: SURGERY

## 2025-04-29 PROCEDURE — 84132 ASSAY OF SERUM POTASSIUM: CPT

## 2025-04-29 PROCEDURE — 6370000000 HC RX 637 (ALT 250 FOR IP): Performed by: INTERNAL MEDICINE

## 2025-04-29 PROCEDURE — 82962 GLUCOSE BLOOD TEST: CPT

## 2025-04-29 PROCEDURE — 82330 ASSAY OF CALCIUM: CPT

## 2025-04-29 PROCEDURE — 5A1221Z PERFORMANCE OF CARDIAC OUTPUT, CONTINUOUS: ICD-10-PCS | Performed by: SURGERY

## 2025-04-29 PROCEDURE — 3700000000 HC ANESTHESIA ATTENDED CARE: Performed by: SURGERY

## 2025-04-29 PROCEDURE — 3700000001 HC ADD 15 MINUTES (ANESTHESIA): Performed by: SURGERY

## 2025-04-29 PROCEDURE — 2500000003 HC RX 250 WO HCPCS: Performed by: SURGERY

## 2025-04-29 PROCEDURE — 86850 RBC ANTIBODY SCREEN: CPT

## 2025-04-29 PROCEDURE — L8612 AQUEOUS SHUNT PROSTHESIS: HCPCS | Performed by: SURGERY

## 2025-04-29 PROCEDURE — 36415 COLL VENOUS BLD VENIPUNCTURE: CPT

## 2025-04-29 PROCEDURE — 85027 COMPLETE CBC AUTOMATED: CPT

## 2025-04-29 PROCEDURE — 6360000002 HC RX W HCPCS: Performed by: ANESTHESIOLOGY

## 2025-04-29 PROCEDURE — 86901 BLOOD TYPING SEROLOGIC RH(D): CPT

## 2025-04-29 PROCEDURE — 2000000000 HC ICU R&B

## 2025-04-29 PROCEDURE — 85610 PROTHROMBIN TIME: CPT

## 2025-04-29 DEVICE — PLATE BNE 4 H STR STERNALOCK BLU PRI CLSR SYS: Type: IMPLANTABLE DEVICE | Site: CHEST  WALL | Status: FUNCTIONAL

## 2025-04-29 DEVICE — CLIP INT L POLYMER LOK LIG HEM O LOK (6EA/PK): Type: IMPLANTABLE DEVICE | Site: CHEST  WALL | Status: FUNCTIONAL

## 2025-04-29 DEVICE — IMPLANTABLE DEVICE: Type: IMPLANTABLE DEVICE | Site: CHEST  WALL | Status: FUNCTIONAL

## 2025-04-29 DEVICE — CLIP INT SM TI EZ LD LIG SYS WECK HORIZON: Type: IMPLANTABLE DEVICE | Site: CHEST  WALL | Status: FUNCTIONAL

## 2025-04-29 RX ORDER — BUPIVACAINE HYDROCHLORIDE 2.5 MG/ML
INJECTION, SOLUTION EPIDURAL; INFILTRATION; INTRACAUDAL; PERINEURAL
Status: COMPLETED | OUTPATIENT
Start: 2025-04-29 | End: 2025-04-29

## 2025-04-29 RX ORDER — SODIUM CHLORIDE 9 MG/ML
INJECTION, SOLUTION INTRAVENOUS PRN
Status: DISCONTINUED | OUTPATIENT
Start: 2025-04-29 | End: 2025-04-29 | Stop reason: HOSPADM

## 2025-04-29 RX ORDER — MEPERIDINE HYDROCHLORIDE 25 MG/ML
25 INJECTION INTRAMUSCULAR; INTRAVENOUS; SUBCUTANEOUS
Status: COMPLETED | OUTPATIENT
Start: 2025-04-29 | End: 2025-04-29

## 2025-04-29 RX ORDER — CHLORHEXIDINE GLUCONATE ORAL RINSE 1.2 MG/ML
15 SOLUTION DENTAL ONCE
Status: COMPLETED | OUTPATIENT
Start: 2025-04-29 | End: 2025-04-29

## 2025-04-29 RX ORDER — AMINOCAPROIC ACID 250 MG/ML
INJECTION, SOLUTION INTRAVENOUS
Status: DISCONTINUED | OUTPATIENT
Start: 2025-04-29 | End: 2025-04-29 | Stop reason: SDUPTHER

## 2025-04-29 RX ORDER — DEXTROSE MONOHYDRATE 100 MG/ML
INJECTION, SOLUTION INTRAVENOUS CONTINUOUS PRN
Status: DISCONTINUED | OUTPATIENT
Start: 2025-04-29 | End: 2025-04-29

## 2025-04-29 RX ORDER — SODIUM CHLORIDE 0.9 % (FLUSH) 0.9 %
5-40 SYRINGE (ML) INJECTION PRN
Status: DISCONTINUED | OUTPATIENT
Start: 2025-04-29 | End: 2025-04-29 | Stop reason: HOSPADM

## 2025-04-29 RX ORDER — SODIUM CHLORIDE 0.9 % (FLUSH) 0.9 %
5-40 SYRINGE (ML) INJECTION EVERY 12 HOURS SCHEDULED
Status: DISCONTINUED | OUTPATIENT
Start: 2025-04-29 | End: 2025-04-29 | Stop reason: HOSPADM

## 2025-04-29 RX ORDER — FENTANYL CITRATE 50 UG/ML
INJECTION, SOLUTION INTRAMUSCULAR; INTRAVENOUS
Status: DISCONTINUED | OUTPATIENT
Start: 2025-04-29 | End: 2025-04-29 | Stop reason: SDUPTHER

## 2025-04-29 RX ORDER — MUPIROCIN 20 MG/G
OINTMENT TOPICAL 2 TIMES DAILY
Status: DISCONTINUED | OUTPATIENT
Start: 2025-04-29 | End: 2025-04-29 | Stop reason: HOSPADM

## 2025-04-29 RX ORDER — OXYCODONE HYDROCHLORIDE 10 MG/1
10 TABLET ORAL EVERY 4 HOURS PRN
Status: DISCONTINUED | OUTPATIENT
Start: 2025-04-29 | End: 2025-05-13 | Stop reason: HOSPADM

## 2025-04-29 RX ORDER — GLUCAGON 1 MG/ML
1 KIT INJECTION PRN
Status: DISCONTINUED | OUTPATIENT
Start: 2025-04-29 | End: 2025-05-13 | Stop reason: HOSPADM

## 2025-04-29 RX ORDER — POTASSIUM CHLORIDE 29.8 MG/ML
20 INJECTION INTRAVENOUS PRN
Status: DISCONTINUED | OUTPATIENT
Start: 2025-04-29 | End: 2025-05-01

## 2025-04-29 RX ORDER — BUPIVACAINE HYDROCHLORIDE 5 MG/ML
15 INJECTION, SOLUTION EPIDURAL; INTRACAUDAL; PERINEURAL ONCE
Status: DISCONTINUED | OUTPATIENT
Start: 2025-04-29 | End: 2025-04-29

## 2025-04-29 RX ORDER — ASPIRIN 81 MG/1
81 TABLET, CHEWABLE ORAL DAILY
Status: DISCONTINUED | OUTPATIENT
Start: 2025-04-29 | End: 2025-05-13 | Stop reason: HOSPADM

## 2025-04-29 RX ORDER — BUPIVACAINE HYDROCHLORIDE 2.5 MG/ML
30 INJECTION, SOLUTION EPIDURAL; INFILTRATION; INTRACAUDAL; PERINEURAL ONCE
Status: DISCONTINUED | OUTPATIENT
Start: 2025-04-29 | End: 2025-05-01 | Stop reason: ALTCHOICE

## 2025-04-29 RX ORDER — SODIUM CHLORIDE 9 MG/ML
INJECTION, SOLUTION INTRAVENOUS CONTINUOUS
Status: DISCONTINUED | OUTPATIENT
Start: 2025-04-29 | End: 2025-05-01

## 2025-04-29 RX ORDER — NICARDIPINE HYDROCHLORIDE 2.5 MG/ML
INJECTION INTRAVENOUS
Status: COMPLETED
Start: 2025-04-29 | End: 2025-04-29

## 2025-04-29 RX ORDER — ZOLPIDEM TARTRATE 5 MG/1
5 TABLET ORAL NIGHTLY PRN
Status: DISCONTINUED | OUTPATIENT
Start: 2025-04-30 | End: 2025-05-01

## 2025-04-29 RX ORDER — INSULIN LISPRO 100 [IU]/ML
0-6 INJECTION, SOLUTION INTRAVENOUS; SUBCUTANEOUS NIGHTLY
Status: DISCONTINUED | OUTPATIENT
Start: 2025-04-29 | End: 2025-04-29 | Stop reason: SDUPTHER

## 2025-04-29 RX ORDER — ENOXAPARIN SODIUM 100 MG/ML
40 INJECTION SUBCUTANEOUS DAILY
Status: DISCONTINUED | OUTPATIENT
Start: 2025-04-30 | End: 2025-05-13 | Stop reason: HOSPADM

## 2025-04-29 RX ORDER — INDOMETHACIN 25 MG/1
CAPSULE ORAL
Status: DISCONTINUED | OUTPATIENT
Start: 2025-04-29 | End: 2025-04-29 | Stop reason: SDUPTHER

## 2025-04-29 RX ORDER — SODIUM CHLORIDE 9 MG/ML
INJECTION, SOLUTION INTRAVENOUS
Status: DISCONTINUED | OUTPATIENT
Start: 2025-04-29 | End: 2025-04-29 | Stop reason: SDUPTHER

## 2025-04-29 RX ORDER — ONDANSETRON 2 MG/ML
4 INJECTION INTRAMUSCULAR; INTRAVENOUS EVERY 6 HOURS PRN
Status: DISCONTINUED | OUTPATIENT
Start: 2025-04-29 | End: 2025-05-13 | Stop reason: HOSPADM

## 2025-04-29 RX ORDER — INSULIN GLARGINE 100 [IU]/ML
0.15 INJECTION, SOLUTION SUBCUTANEOUS NIGHTLY
Status: DISCONTINUED | OUTPATIENT
Start: 2025-04-30 | End: 2025-05-05

## 2025-04-29 RX ORDER — CHLORHEXIDINE GLUCONATE 40 MG/ML
SOLUTION TOPICAL SEE ADMIN INSTRUCTIONS
Status: DISCONTINUED | OUTPATIENT
Start: 2025-04-29 | End: 2025-04-29 | Stop reason: HOSPADM

## 2025-04-29 RX ORDER — SODIUM CHLORIDE, SODIUM LACTATE, POTASSIUM CHLORIDE, CALCIUM CHLORIDE 600; 310; 30; 20 MG/100ML; MG/100ML; MG/100ML; MG/100ML
INJECTION, SOLUTION INTRAVENOUS
Status: DISCONTINUED | OUTPATIENT
Start: 2025-04-29 | End: 2025-04-29 | Stop reason: SDUPTHER

## 2025-04-29 RX ORDER — ROCURONIUM BROMIDE 10 MG/ML
INJECTION, SOLUTION INTRAVENOUS
Status: DISCONTINUED | OUTPATIENT
Start: 2025-04-29 | End: 2025-04-29 | Stop reason: SDUPTHER

## 2025-04-29 RX ORDER — MORPHINE SULFATE 4 MG/ML
4 INJECTION, SOLUTION INTRAMUSCULAR; INTRAVENOUS
Status: DISCONTINUED | OUTPATIENT
Start: 2025-04-29 | End: 2025-05-01

## 2025-04-29 RX ORDER — METOPROLOL TARTRATE 1 MG/ML
INJECTION, SOLUTION INTRAVENOUS
Status: DISCONTINUED | OUTPATIENT
Start: 2025-04-29 | End: 2025-04-29 | Stop reason: SDUPTHER

## 2025-04-29 RX ORDER — LIDOCAINE HYDROCHLORIDE 10 MG/ML
1 INJECTION, SOLUTION EPIDURAL; INFILTRATION; INTRACAUDAL; PERINEURAL
Status: DISCONTINUED | OUTPATIENT
Start: 2025-04-29 | End: 2025-04-29 | Stop reason: HOSPADM

## 2025-04-29 RX ORDER — SODIUM CHLORIDE 9 MG/ML
INJECTION, SOLUTION INTRAVENOUS PRN
Status: DISCONTINUED | OUTPATIENT
Start: 2025-04-29 | End: 2025-05-13 | Stop reason: HOSPADM

## 2025-04-29 RX ORDER — KETOROLAC TROMETHAMINE 30 MG/ML
15 INJECTION, SOLUTION INTRAMUSCULAR; INTRAVENOUS EVERY 6 HOURS PRN
Status: DISCONTINUED | OUTPATIENT
Start: 2025-04-29 | End: 2025-05-01

## 2025-04-29 RX ORDER — DEXAMETHASONE SODIUM PHOSPHATE 10 MG/ML
INJECTION, SOLUTION INTRAMUSCULAR; INTRAVENOUS
Status: DISCONTINUED | OUTPATIENT
Start: 2025-04-29 | End: 2025-04-29 | Stop reason: SDUPTHER

## 2025-04-29 RX ORDER — DEXMEDETOMIDINE HYDROCHLORIDE 4 UG/ML
.1-1.5 INJECTION, SOLUTION INTRAVENOUS CONTINUOUS
Status: DISCONTINUED | OUTPATIENT
Start: 2025-04-29 | End: 2025-05-01

## 2025-04-29 RX ORDER — MORPHINE SULFATE 2 MG/ML
2 INJECTION, SOLUTION INTRAMUSCULAR; INTRAVENOUS
Status: DISCONTINUED | OUTPATIENT
Start: 2025-04-29 | End: 2025-05-13 | Stop reason: HOSPADM

## 2025-04-29 RX ORDER — FLUTICASONE PROPIONATE 50 MCG
1 SPRAY, SUSPENSION (ML) NASAL 2 TIMES DAILY PRN
Status: DISCONTINUED | OUTPATIENT
Start: 2025-04-29 | End: 2025-05-13 | Stop reason: HOSPADM

## 2025-04-29 RX ORDER — CEFAZOLIN SODIUM 1 G/3ML
INJECTION, POWDER, FOR SOLUTION INTRAMUSCULAR; INTRAVENOUS
Status: DISCONTINUED | OUTPATIENT
Start: 2025-04-29 | End: 2025-04-29 | Stop reason: SDUPTHER

## 2025-04-29 RX ORDER — HEPARIN SODIUM 1000 [USP'U]/ML
INJECTION, SOLUTION INTRAVENOUS; SUBCUTANEOUS
Status: DISCONTINUED | OUTPATIENT
Start: 2025-04-29 | End: 2025-04-29 | Stop reason: SDUPTHER

## 2025-04-29 RX ORDER — DEXTROSE MONOHYDRATE 100 MG/ML
INJECTION, SOLUTION INTRAVENOUS CONTINUOUS PRN
Status: DISCONTINUED | OUTPATIENT
Start: 2025-04-29 | End: 2025-05-13 | Stop reason: HOSPADM

## 2025-04-29 RX ORDER — 0.9 % SODIUM CHLORIDE 0.9 %
500 INTRAVENOUS SOLUTION INTRAVENOUS CONTINUOUS PRN
Status: DISCONTINUED | OUTPATIENT
Start: 2025-04-29 | End: 2025-05-05

## 2025-04-29 RX ORDER — LIDOCAINE HYDROCHLORIDE 10 MG/ML
INJECTION, SOLUTION EPIDURAL; INFILTRATION; INTRACAUDAL; PERINEURAL
Status: DISCONTINUED | OUTPATIENT
Start: 2025-04-29 | End: 2025-04-29 | Stop reason: SDUPTHER

## 2025-04-29 RX ORDER — GLUCAGON 1 MG/ML
1 KIT INJECTION PRN
Status: DISCONTINUED | OUTPATIENT
Start: 2025-04-29 | End: 2025-04-29

## 2025-04-29 RX ORDER — OXYCODONE HYDROCHLORIDE 5 MG/1
5 TABLET ORAL EVERY 4 HOURS PRN
Status: DISCONTINUED | OUTPATIENT
Start: 2025-04-29 | End: 2025-05-13 | Stop reason: HOSPADM

## 2025-04-29 RX ORDER — PROPOFOL 10 MG/ML
INJECTION, EMULSION INTRAVENOUS
Status: DISCONTINUED | OUTPATIENT
Start: 2025-04-29 | End: 2025-04-29 | Stop reason: SDUPTHER

## 2025-04-29 RX ORDER — PROTAMINE SULFATE 10 MG/ML
50 INJECTION, SOLUTION INTRAVENOUS
Status: DISCONTINUED | OUTPATIENT
Start: 2025-04-29 | End: 2025-05-13 | Stop reason: HOSPADM

## 2025-04-29 RX ORDER — MIDAZOLAM HYDROCHLORIDE 2 MG/2ML
2 INJECTION, SOLUTION INTRAMUSCULAR; INTRAVENOUS
Status: COMPLETED | OUTPATIENT
Start: 2025-04-29 | End: 2025-04-29

## 2025-04-29 RX ORDER — ONDANSETRON 4 MG/1
4 TABLET, ORALLY DISINTEGRATING ORAL EVERY 8 HOURS PRN
Status: DISCONTINUED | OUTPATIENT
Start: 2025-04-29 | End: 2025-05-13 | Stop reason: HOSPADM

## 2025-04-29 RX ORDER — MAGNESIUM SULFATE IN WATER 40 MG/ML
2000 INJECTION, SOLUTION INTRAVENOUS PRN
Status: DISCONTINUED | OUTPATIENT
Start: 2025-04-29 | End: 2025-05-13 | Stop reason: HOSPADM

## 2025-04-29 RX ORDER — PROTAMINE SULFATE 10 MG/ML
INJECTION, SOLUTION INTRAVENOUS
Status: DISCONTINUED | OUTPATIENT
Start: 2025-04-29 | End: 2025-04-29 | Stop reason: SDUPTHER

## 2025-04-29 RX ORDER — ATORVASTATIN CALCIUM 20 MG/1
20 TABLET, FILM COATED ORAL NIGHTLY
Status: DISCONTINUED | OUTPATIENT
Start: 2025-04-30 | End: 2025-05-13 | Stop reason: HOSPADM

## 2025-04-29 RX ORDER — SODIUM CHLORIDE 0.9 % (FLUSH) 0.9 %
5-40 SYRINGE (ML) INJECTION EVERY 12 HOURS SCHEDULED
Status: DISCONTINUED | OUTPATIENT
Start: 2025-04-29 | End: 2025-05-13 | Stop reason: HOSPADM

## 2025-04-29 RX ORDER — SODIUM CHLORIDE 0.9 % (FLUSH) 0.9 %
5-40 SYRINGE (ML) INJECTION PRN
Status: DISCONTINUED | OUTPATIENT
Start: 2025-04-29 | End: 2025-05-13 | Stop reason: HOSPADM

## 2025-04-29 RX ORDER — MIDAZOLAM HYDROCHLORIDE 1 MG/ML
INJECTION, SOLUTION INTRAMUSCULAR; INTRAVENOUS
Status: DISCONTINUED | OUTPATIENT
Start: 2025-04-29 | End: 2025-04-29 | Stop reason: SDUPTHER

## 2025-04-29 RX ORDER — MORPHINE SULFATE 4 MG/ML
INJECTION, SOLUTION INTRAMUSCULAR; INTRAVENOUS
Status: COMPLETED
Start: 2025-04-29 | End: 2025-04-29

## 2025-04-29 RX ORDER — CLOPIDOGREL BISULFATE 75 MG/1
75 TABLET ORAL DAILY
Status: DISCONTINUED | OUTPATIENT
Start: 2025-04-30 | End: 2025-05-13 | Stop reason: HOSPADM

## 2025-04-29 RX ORDER — INSULIN LISPRO 100 [IU]/ML
0-12 INJECTION, SOLUTION INTRAVENOUS; SUBCUTANEOUS
Status: DISCONTINUED | OUTPATIENT
Start: 2025-04-29 | End: 2025-04-29 | Stop reason: SDUPTHER

## 2025-04-29 RX ADMIN — INSULIN GLARGINE 25 UNITS: 100 INJECTION, SOLUTION SUBCUTANEOUS at 08:45

## 2025-04-29 RX ADMIN — HEPARIN SODIUM 10000 UNITS: 1000 INJECTION, SOLUTION INTRAVENOUS; SUBCUTANEOUS at 15:29

## 2025-04-29 RX ADMIN — FENTANYL CITRATE 100 MCG: 0.05 INJECTION, SOLUTION INTRAMUSCULAR; INTRAVENOUS at 18:03

## 2025-04-29 RX ADMIN — POTASSIUM CHLORIDE 20 MEQ: 29.8 INJECTION, SOLUTION INTRAVENOUS at 21:46

## 2025-04-29 RX ADMIN — SODIUM CHLORIDE: 9 INJECTION, SOLUTION INTRAVENOUS at 14:59

## 2025-04-29 RX ADMIN — PHENYLEPHRINE HYDROCHLORIDE 100 MCG: 10 INJECTION INTRAVENOUS at 14:29

## 2025-04-29 RX ADMIN — INSULIN LISPRO 8 UNITS: 100 INJECTION, SOLUTION INTRAVENOUS; SUBCUTANEOUS at 08:50

## 2025-04-29 RX ADMIN — ROCURONIUM BROMIDE 50 MG: 10 INJECTION, SOLUTION INTRAVENOUS at 15:57

## 2025-04-29 RX ADMIN — HEPARIN SODIUM 2000 UNITS: 1000 INJECTION INTRAVENOUS; SUBCUTANEOUS at 03:16

## 2025-04-29 RX ADMIN — ROCURONIUM BROMIDE 50 MG: 10 INJECTION, SOLUTION INTRAVENOUS at 17:19

## 2025-04-29 RX ADMIN — DEXMEDETOMIDINE HYDROCHLORIDE 1.5 MCG/KG/HR: 400 INJECTION, SOLUTION INTRAVENOUS at 19:10

## 2025-04-29 RX ADMIN — MORPHINE SULFATE 4 MG: 4 INJECTION, SOLUTION INTRAMUSCULAR; INTRAVENOUS at 19:00

## 2025-04-29 RX ADMIN — PROTAMINE SULFATE 250 MG: 10 INJECTION, SOLUTION INTRAVENOUS at 17:27

## 2025-04-29 RX ADMIN — DEXMEDETOMIDINE HYDROCHLORIDE 1.5 MCG/KG/HR: 400 INJECTION, SOLUTION INTRAVENOUS at 21:04

## 2025-04-29 RX ADMIN — POTASSIUM CHLORIDE 20 MEQ: 29.8 INJECTION, SOLUTION INTRAVENOUS at 20:32

## 2025-04-29 RX ADMIN — AMINOCAPROIC ACID 10000 MG: 250 INJECTION, SOLUTION INTRAVENOUS at 17:58

## 2025-04-29 RX ADMIN — MIDAZOLAM 2 MG: 1 INJECTION INTRAMUSCULAR; INTRAVENOUS at 17:18

## 2025-04-29 RX ADMIN — PHENYLEPHRINE HYDROCHLORIDE 100 MCG: 10 INJECTION INTRAVENOUS at 14:57

## 2025-04-29 RX ADMIN — FENTANYL CITRATE 150 MCG: 0.05 INJECTION, SOLUTION INTRAMUSCULAR; INTRAVENOUS at 18:18

## 2025-04-29 RX ADMIN — MIDAZOLAM HYDROCHLORIDE 2 MG: 1 INJECTION, SOLUTION INTRAMUSCULAR; INTRAVENOUS at 12:31

## 2025-04-29 RX ADMIN — PROPOFOL 50 MG: 10 INJECTION, EMULSION INTRAVENOUS at 13:06

## 2025-04-29 RX ADMIN — DEXAMETHASONE SODIUM PHOSPHATE 10 MG: 10 INJECTION, SOLUTION INTRAMUSCULAR; INTRAVENOUS at 18:23

## 2025-04-29 RX ADMIN — MIDAZOLAM 4 MG: 1 INJECTION INTRAMUSCULAR; INTRAVENOUS at 12:46

## 2025-04-29 RX ADMIN — SODIUM BICARBONATE 25 MEQ: 84 INJECTION, SOLUTION INTRAVENOUS at 15:19

## 2025-04-29 RX ADMIN — DEXMEDETOMIDINE HYDROCHLORIDE 0.5 MCG/KG/HR: 100 INJECTION, SOLUTION, CONCENTRATE INTRAVENOUS at 18:01

## 2025-04-29 RX ADMIN — INSULIN LISPRO 10 UNITS: 100 INJECTION, SOLUTION INTRAVENOUS; SUBCUTANEOUS at 08:49

## 2025-04-29 RX ADMIN — FENTANYL CITRATE 50 MCG: 0.05 INJECTION, SOLUTION INTRAMUSCULAR; INTRAVENOUS at 18:19

## 2025-04-29 RX ADMIN — SODIUM CHLORIDE, SODIUM LACTATE, POTASSIUM CHLORIDE, CALCIUM CHLORIDE: 600; 310; 30; 20 INJECTION, SOLUTION INTRAVENOUS at 13:17

## 2025-04-29 RX ADMIN — CHLORHEXIDINE GLUCONATE 15 ML: 1.2 RINSE ORAL at 08:11

## 2025-04-29 RX ADMIN — MIDAZOLAM 2 MG: 1 INJECTION INTRAMUSCULAR; INTRAVENOUS at 16:29

## 2025-04-29 RX ADMIN — NICARDIPINE HYDROCHLORIDE: 25 INJECTION INTRAVENOUS at 19:10

## 2025-04-29 RX ADMIN — ROCURONIUM BROMIDE 100 MG: 10 INJECTION, SOLUTION INTRAVENOUS at 13:01

## 2025-04-29 RX ADMIN — LIDOCAINE HYDROCHLORIDE 50 MG: 10 INJECTION, SOLUTION EPIDURAL; INFILTRATION; INTRACAUDAL; PERINEURAL at 12:59

## 2025-04-29 RX ADMIN — BUPIVACAINE HYDROCHLORIDE 15 ML: 2.5 INJECTION, SOLUTION EPIDURAL; INFILTRATION; INTRACAUDAL; PERINEURAL at 12:25

## 2025-04-29 RX ADMIN — MUPIROCIN: 20 OINTMENT TOPICAL at 03:18

## 2025-04-29 RX ADMIN — PHENYLEPHRINE HYDROCHLORIDE 50 MCG: 10 INJECTION INTRAVENOUS at 16:08

## 2025-04-29 RX ADMIN — FENTANYL CITRATE 100 MCG: 0.05 INJECTION, SOLUTION INTRAMUSCULAR; INTRAVENOUS at 16:29

## 2025-04-29 RX ADMIN — FENTANYL CITRATE 100 MCG: 0.05 INJECTION, SOLUTION INTRAMUSCULAR; INTRAVENOUS at 13:50

## 2025-04-29 RX ADMIN — SODIUM CHLORIDE, SODIUM LACTATE, POTASSIUM CHLORIDE, AND CALCIUM CHLORIDE: 600; 310; 30; 20 INJECTION, SOLUTION INTRAVENOUS at 12:46

## 2025-04-29 RX ADMIN — SODIUM CHLORIDE: 0.9 INJECTION, SOLUTION INTRAVENOUS at 19:12

## 2025-04-29 RX ADMIN — NICARDIPINE HYDROCHLORIDE 5 MG/HR: 25 INJECTION INTRAVENOUS at 19:04

## 2025-04-29 RX ADMIN — FENTANYL CITRATE 200 MCG: 0.05 INJECTION, SOLUTION INTRAMUSCULAR; INTRAVENOUS at 18:35

## 2025-04-29 RX ADMIN — PHENYLEPHRINE HYDROCHLORIDE 100 MCG: 10 INJECTION INTRAVENOUS at 15:10

## 2025-04-29 RX ADMIN — SODIUM CHLORIDE 2 UNITS/HR: 9 INJECTION, SOLUTION INTRAVENOUS at 13:58

## 2025-04-29 RX ADMIN — PHENYLEPHRINE HYDROCHLORIDE 50 MCG: 10 INJECTION INTRAVENOUS at 15:30

## 2025-04-29 RX ADMIN — PHENYLEPHRINE HYDROCHLORIDE 100 MCG: 10 INJECTION INTRAVENOUS at 14:07

## 2025-04-29 RX ADMIN — BUPIVACAINE 15 ML: 13.3 INJECTION, SUSPENSION, LIPOSOMAL INFILTRATION at 12:25

## 2025-04-29 RX ADMIN — FENTANYL CITRATE 50 MCG: 0.05 INJECTION, SOLUTION INTRAMUSCULAR; INTRAVENOUS at 18:00

## 2025-04-29 RX ADMIN — FENTANYL CITRATE 100 MCG: 0.05 INJECTION, SOLUTION INTRAMUSCULAR; INTRAVENOUS at 12:55

## 2025-04-29 RX ADMIN — FENTANYL CITRATE 150 MCG: 0.05 INJECTION, SOLUTION INTRAMUSCULAR; INTRAVENOUS at 12:59

## 2025-04-29 RX ADMIN — CEFAZOLIN 2 G: 1 INJECTION, POWDER, FOR SOLUTION INTRAMUSCULAR; INTRAVENOUS at 13:30

## 2025-04-29 RX ADMIN — ROCURONIUM BROMIDE 50 MG: 10 INJECTION, SOLUTION INTRAVENOUS at 13:53

## 2025-04-29 RX ADMIN — PHENYLEPHRINE HYDROCHLORIDE 100 MCG: 10 INJECTION INTRAVENOUS at 13:45

## 2025-04-29 RX ADMIN — AMINOCAPROIC ACID 10000 MG: 250 INJECTION, SOLUTION INTRAVENOUS at 13:33

## 2025-04-29 RX ADMIN — ROCURONIUM BROMIDE 50 MG: 10 INJECTION, SOLUTION INTRAVENOUS at 14:54

## 2025-04-29 RX ADMIN — SODIUM CHLORIDE: 9 INJECTION, SOLUTION INTRAVENOUS at 13:13

## 2025-04-29 RX ADMIN — HEPARIN SODIUM 35000 UNITS: 1000 INJECTION, SOLUTION INTRAVENOUS; SUBCUTANEOUS at 15:00

## 2025-04-29 RX ADMIN — METOPROLOL TARTRATE 1 MG: 1 INJECTION, SOLUTION INTRAVENOUS at 13:35

## 2025-04-29 RX ADMIN — MAGNESIUM SULFATE HEPTAHYDRATE 2000 MG: 40 INJECTION, SOLUTION INTRAVENOUS at 20:35

## 2025-04-29 RX ADMIN — CEFAZOLIN 2 G: 1 INJECTION, POWDER, FOR SOLUTION INTRAMUSCULAR; INTRAVENOUS at 17:19

## 2025-04-29 RX ADMIN — MEPERIDINE HYDROCHLORIDE 25 MG: 25 INJECTION INTRAMUSCULAR; INTRAVENOUS; SUBCUTANEOUS at 19:00

## 2025-04-29 RX ADMIN — PHENYLEPHRINE HYDROCHLORIDE 50 MCG: 10 INJECTION INTRAVENOUS at 16:16

## 2025-04-29 ASSESSMENT — PULMONARY FUNCTION TESTS
PIF_VALUE: 20
PIF_VALUE: 22
PIF_VALUE: 17
PIF_VALUE: 19

## 2025-04-29 NOTE — PROGRESS NOTES
Cardiology Progress Note   Nilsa Hyde MD      Patient:  Akhil Alejo  [id number]  Admit Date: 4/25/2025       Hospital Day: 4  Referring Provider: Akhil Lara MD    Admission Problem List: Present on Admission:   Diabetic ulcer of ankle associated with type 2 diabetes mellitus, with fat layer exposed (HCC)   Diabetes (HCC)   Essential hypertension   GERD (gastroesophageal reflux disease)   Hypothyroidism   Mixed hyperlipidemia   Neuropathy   Vitamin D deficiency   Restless legs syndrome (RLS)   Abnormal stress test        Subjective     Mr. Alejo, patient of Dr. Kitchen, had an abnormal stress test and came into the emergency department over the weekend.  He had a cardiac catheterization done severe mid LAD and circumflex stenosis along with suspected eccentric moderate left main stenosis with mild RCA disease.    Patient is overall doing well.  He is awaiting CABG.  He has no complaints/no angina with minimal ambulation in his room.          Objective      /60   Pulse 64   Temp 97 °F (36.1 °C) (Temporal)   Resp 16   Ht 1.803 m (5' 11\")   Wt 88.6 kg (195 lb 6.4 oz)   SpO2 94%   BMI 27.25 kg/m²       Intake/Output Summary (Last 24 hours) at 4/29/2025 1519  Last data filed at 4/29/2025 1501  Gross per 24 hour   Intake 2020 ml   Output 400 ml   Net 1620 ml         Physical Exam:      Physical Exam  Vitals and nursing note reviewed.   Constitutional:       Appearance: Normal appearance.   HENT:      Head: Normocephalic and atraumatic.   Eyes:      Extraocular Movements: Extraocular movements intact.   Cardiovascular:      Rate and Rhythm: Normal rate and regular rhythm.      Heart sounds: No murmur heard.  Pulmonary:      Effort: Pulmonary effort is normal.      Breath sounds: Normal breath sounds.   Abdominal:      Palpations: Abdomen is soft.   Musculoskeletal:      Right lower leg: No edema.      Left lower leg: No edema.   Skin:     General: Skin is warm and dry.   Neurological:      General:

## 2025-04-29 NOTE — ANESTHESIA PROCEDURE NOTES
Arterial Line:    An arterial line was placed using ultrasound guidance, in the pre-op for the following indication(s): continuous blood pressure monitoring and blood sampling needed.    A 20 gauge (size), 4.45 cm (length), Arrow (type) catheter was placed, Seldinger technique not used, into the right radial artery, secured by suture.  Anesthesia type: Local  Local infiltration: Injection    Events:  patient tolerated procedure well with no complications.4/29/2025 12:20 PM4/29/2025 12:30 PM  Resident/CRNA: Sean Javier APRN - CRNA  Performed: Resident/CRNA   Preanesthetic Checklist  Completed: patient identified, IV checked, site marked, risks and benefits discussed, surgical/procedural consents, equipment checked, pre-op evaluation, timeout performed, anesthesia consent given, oxygen available, monitors applied/VS acknowledged, fire risk safety assessment completed and verbalized and blood product R/B/A discussed and consented

## 2025-04-29 NOTE — ANESTHESIA PROCEDURE NOTES
Central Venous Line:    A central venous line was placed using ultrasound guidance, in the OR for the following indication(s): central venous access and CVP monitoring.4/29/2025 1:15 PM    Sterility preparation included the following: provider used sterile gloves, gown, hat and mask, hand hygiene performed prior to central venous catheter insertion, sterile gel and probe cover used in ultrasound-guided central venous catheter insertion and maximum sterile barriers used during central venous catheter insertion.    The patient was placed in supine position.The right internal jugular vein was prepped.    The site was prepped with Chloraprep.  A 9 Fr (size), 11.5 (length), introducer single lumen was placed.    During the procedure, the following specific steps were taken: target vein identified, needle advanced into vein and blood aspirated and guidewire advanced into vein.    Intravenous verification was obtained by ultrasound, venous blood return, ELYSE, ELYSE and manometry.    Post insertion care included: all ports aspirated, all ports flushed easily, guidewire removed intact, Biopatch applied, line sutured in place and dressing applied.    During the procedure the patient experienced: patient tolerated procedure well with no complications, arterial puncture and EBL < 5mL.      Insertion site scrubbed per usage guidelines?: Yes  Skin prep agent dried for 3 minutes prior to procedure?:yes  Outcomes: patient tolerated procedure wellNo  Anesthesia type: generalA(n) oximetric, 7.5 (size) Pulmonary Artery Catheter (PAC) was placed through the Introducer CVL in the right internal jugular vein.  The PAC placement was confirmed by pressure tracing changes and ELYSE and secured at 50 cm (depth).  The patient experienced the following events during the procedure: patient tolerated procedure well with no complications.PA Cath placed?: Yes  Staffing  Performed: Anesthesiologist   Anesthesiologist: Almas Serrano DO  Performed by:

## 2025-04-29 NOTE — ANESTHESIA PROCEDURE NOTES
Procedure Performed: ELYSE       Start Time:  4/29/2025 1:29 PM       End Time:          Preanesthesia Checklist:  Patient identified, IV assessed, risks and benefits discussed, monitors and equipment assessed, procedure being performed at surgeon's request and anesthesia consent obtained.    General Procedure Information  Diagnostic Indications for Echo:  assessment of ascending aorta, assessment of surgical repair, hemodynamic monitoring and assessment of valve function  Physician Requesting Echo: Akhil Lara MD  Location performed:  OR  Intubated  Bite block placed  Heart visualized  Probe Insertion:  Easy (probe placed atraumatically)  Probe Type:  Mulitplane  Modalities:  2D

## 2025-04-29 NOTE — PROGRESS NOTES
[facility] Progress Note    Patient:  Akhil Alejo  YOB: 1955  Date of Service: 4/29/2025  MRN: [id number]   Acct: [id number]   Primary Care Physician: Lennox Waddell MD  Advance Directive: Full Code  Admit Date: 4/25/2025       Hospital Day: 4      CHIEF COMPLAINT:     Chief Complaint   Patient presents with    Abnormal Lab     ABNORMAL EKG DURING STRESS TEST       4/29/2025 7:27 AM  Subjective / Interval History:   04/29/2025  Patient seen and examined this a.m.   No new complaints.    No active chest pain or dyspnea reported this a.m.  No acute changes or acute overnight event reported.   Laying comfortably in bed in no apparent acute distress.    Denies any acute complaints or distress.    Endorses overall improvement.  Pending CABG this a.m.  Family at bedside.      04/28/2025  Patient seen and examined this a.m.     Denies any active chest pain at this time.  Denies any other acute complaints or distress at this time.  No acute changes or acute overnight event reported.   Sitting comfortably in bed in no apparent acute distress.    Endorses overall improvement.        04/27/2025  Patient seen and examined this a.m.   No new complaints.    Denies any active chest pain at this time.  No acute changes or acute overnight event reported.   Laying comfortably in bed in no apparent acute distress.    Denies any acute complaints or distress.    Endorses overall improvement.      04/26/2025  Patient seen and examined this a.m.   No new complaints.  No acute changes or acute overnight event reported.   Laying comfortably in bed in no apparent acute distress.  Denies any acute complaints or distress.        Review of Systems:   Review of Systems  ROS: 14 point review of systems is negative except as specifically addressed above.    Diet NPO    Intake/Output Summary (Last 24 hours) at 4/29/2025 0736  Last data filed at 4/28/2025 2200  Gross per 24 hour   Intake 720 ml   Output --   Net  720 ml         Medications:   heparin (PORCINE) Infusion 17 Units/kg/hr (04/29/25 0317)    sodium chloride      dextrose       Current Facility-Administered Medications   Medication Dose Route Frequency Provider Last Rate Last Admin    mupirocin (BACTROBAN) 2 % ointment   Each Nostril BID RatcliffAkhil alfonso MD   Given at 04/29/25 0318    chlorhexidine (PERIDEX) 0.12 % solution 15 mL  15 mL Mouth/Throat Once RatcliffAkhil MD        chlorhexidine gluconate (ANTISEPTIC SKIN CLEANSER) 4 % solution   Topical See Admin Instructions Akhil Lara MD        insulin lispro (HUMALOG,ADMELOG) injection vial 10 Units  10 Units SubCUTAneous TID  Lior Quiroz MD   10 Units at 04/28/25 1644    melatonin disintegrating tablet 5 mg  5 mg Oral Nightly PRN Darrian Ruano MD        insulin lispro (HUMALOG,ADMELOG) injection vial 0-16 Units  0-16 Units SubCUTAneous 4x Daily AC & HS Darrian Ruano MD   4 Units at 04/28/25 2021    insulin glargine (LANTUS) injection vial 25 Units  25 Units SubCUTAneous Daily Lior Quiroz MD   25 Units at 04/28/25 0832    citalopram (CELEXA) tablet 20 mg  20 mg Oral Nightly Lior Quiroz MD   20 mg at 04/28/25 2021    [Held by provider] clopidogrel (PLAVIX) tablet 75 mg  75 mg Oral Daily Lior Quiroz MD   75 mg at 04/26/25 1022    famotidine (PEPCID) tablet 20 mg  20 mg Oral Daily Lior Quiroz MD   20 mg at 04/28/25 0830    gabapentin (NEURONTIN) capsule 800 mg  800 mg Oral BID Lior Quiroz MD   800 mg at 04/28/25 2021    gemfibrozil (LOPID) tablet 600 mg  600 mg Oral BID  Lior Quiroz MD   600 mg at 04/28/25 1644    hydroCHLOROthiazide capsule 12.5 mg  12.5 mg Oral Daily Lior Quiroz MD   12.5 mg at 04/28/25 0830    isosorbide mononitrate (IMDUR) extended release tablet 30 mg  30 mg Oral QPM Lior Quiroz MD   30 mg at 04/28/25 1644    levothyroxine (SYNTHROID) tablet 50 mcg  50 mcg Oral Daily Lior Quiroz MD   50

## 2025-04-29 NOTE — ANESTHESIA PROCEDURE NOTES
Peripheral Block    Patient location during procedure: holding area  Reason for block: post-op pain management  Start time: 4/29/2025 12:25 PM  Staffing  Performed: anesthesiologist   Anesthesiologist: Almas Serrano DO  Performed by: Almas Serrano DO  Authorized by: Stephanie Hudson APRN - CRNA    Preanesthetic Checklist  Completed: patient identified, IV checked, site marked, risks and benefits discussed, surgical/procedural consents, equipment checked, pre-op evaluation, timeout performed, anesthesia consent given, oxygen available, monitors applied/VS acknowledged, fire risk safety assessment completed and verbalized and blood product R/B/A discussed and consented  Peripheral Block   Patient position: sitting  Prep: ChloraPrep  Provider prep: sterile gloves  Patient monitoring: cardiac monitor, continuous pulse ox, frequent blood pressure checks, IV access and responsive to questions  Block type: Erector spinae  Laterality: left  Injection technique: single-shot  Guidance: ultrasound guided    Needle   Needle type: insulated echogenic nerve stimulator needle   Needle gauge: 22 G  Needle localization: ultrasound guidance  Needle length: 10 cm  Assessment   Injection assessment: negative aspiration for heme, no paresthesia on injection and no intravascular symptoms  Paresthesia pain: none  Slow fractionated injection: yes  Hemodynamics: stable  Outcomes: uncomplicated and patient tolerated procedure well    Medications Administered  BUPivacaine (MARCAINE) PF injection 0.25% - Perineural   15 mL - 4/29/2025 12:25:00 PM  BUPivacaine liposome (EXPAREL) injection 1.3% - Perineural   15 mL - 4/29/2025 12:25:00 PM

## 2025-04-29 NOTE — BRIEF OP NOTE
Brief Postoperative Note      Patient: Akhil Alejo  YOB: 1955  MRN: [id number]    Date of Procedure: 4/29/2025    Pre-Op Diagnosis Codes:      * CAD in native artery [I25.10]    Post-Op Diagnosis: Same       Procedure(s):  CORONARY ARTERY BYPASS GRAFT X3, MINIMALLY INVASIVE robotic, pump assisted via femoral access, BILATERAL INTERNAL MAMMARY ARTERIES, ENDOSCOPIC VEIN HARVESTING, TRANSESOPHAGEAL ECHOCARDIOGRAM    Surgeon(s):  Almas Serrano DO Poston, Robert, MD    Assistant:  First Assistant: Eleanor Ross RN; Jackeline Hopson    Anesthesia: General    Estimated Blood Loss (mL): 200     Complications: None    Specimens:   * No specimens in log *    Implants:  Implant Name Type Inv. Item Serial No.  Lot No. LRB No. Used Action   CLIP INT L POLYMER GUZMAN LIG HEM O GUZMAN (6EA/PK) - [id number]  CLIP INT L POLYMER GUZMAN LIG HEM O GUZMAN (6EA/PK)  [employer]- [id number] Left 1 Implanted   CLIP INT SM TI EZ LD LIG SYS WECK HORIZON - [id number]  CLIP INT SM TI EZ LD LIG SYS WECK HORIZON  [employer]- [id number] Left 2 Implanted   PLATE BNE 4 H STR STERNALOCK ELSA JANICE CLSR SYS - [id number]  PLATE BNE 4 H STR STERNALOCK ELSA JANICE CLSR SYS  OMEGA BIOMET MICROFIXATION-WD  Left 1 Implanted   SCREW BNE L12MM DIA2.4MM G CANC TI SELF DRL GUZMAN FULL THRD - [id number]  SCREW BNE L12MM DIA2.4MM G CANC TI SELF DRL GUZMAN FULL THRD  OMEGA BIOMET MICROFIXATION-WD  Left 3 Implanted         Drains:   Chest Tube Left Pleural 1 (Active)       Urinary Catheter 04/29/25 Amezcua-Temperature (Active)       Findings:  Infection Present At Time Of Surgery (PATOS) (choose all levels that have infection present):  No infection present  Other Findings: EULOGIO to LAD, LIMA to diag, SV (axillary artery) to OM    Electronically signed by Akhil Lara MD on 4/29/2025 at 6:00 PM

## 2025-04-30 ENCOUNTER — APPOINTMENT (OUTPATIENT)
Dept: GENERAL RADIOLOGY | Age: 70
DRG: 234 | End: 2025-04-30
Attending: INTERNAL MEDICINE
Payer: MEDICARE

## 2025-04-30 PROBLEM — S19.83XA: Status: ACTIVE | Noted: 2025-04-30

## 2025-04-30 LAB
ALBUMIN SERPL-MCNC: 3.5 G/DL (ref 3.5–5.2)
ALP SERPL-CCNC: 64 U/L (ref 40–129)
ALT SERPL-CCNC: 31 U/L (ref 10–50)
ANION GAP SERPL CALCULATED.3IONS-SCNC: 9 MMOL/L (ref 8–16)
ANTI-XA UNFRAC HEPARIN: <0.1 IU/ML
AST SERPL-CCNC: 59 U/L (ref 10–50)
BASE EXCESS ARTERIAL: -4.8 MMOL/L (ref -2–2)
BASE EXCESS ARTERIAL: -5.4 MMOL/L (ref -2–2)
BASE EXCESS VENOUS: -4 MMOL/L
BILIRUB DIRECT SERPL-MCNC: <0.1 MG/DL (ref 0–0.3)
BILIRUB INDIRECT SERPL-MCNC: 0.2 MG/DL (ref 0–1)
BILIRUB SERPL-MCNC: 0.3 MG/DL (ref 0.2–1.2)
BUN SERPL-MCNC: 18 MG/DL (ref 8–23)
CALCIUM SERPL-MCNC: 8.3 MG/DL (ref 8.8–10.2)
CARBOXYHEMOGLOBIN ARTERIAL: 1.5 % (ref 0–5)
CARBOXYHEMOGLOBIN ARTERIAL: 2 % (ref 0–5)
CARBOXYHEMOGLOBIN: 1.6 %
CHLORIDE SERPL-SCNC: 113 MMOL/L (ref 98–107)
CO2 SERPL-SCNC: 20 MMOL/L (ref 22–29)
CREAT SERPL-MCNC: 1 MG/DL (ref 0.7–1.2)
ERYTHROCYTE [DISTWIDTH] IN BLOOD BY AUTOMATED COUNT: 14.6 % (ref 11.5–14.5)
FIO2: 30 %
FIO2: 80 %
GLUCOSE BLD-MCNC: 104 MG/DL (ref 70–99)
GLUCOSE BLD-MCNC: 104 MG/DL (ref 70–99)
GLUCOSE BLD-MCNC: 105 MG/DL (ref 70–99)
GLUCOSE BLD-MCNC: 110 MG/DL (ref 70–99)
GLUCOSE BLD-MCNC: 113 MG/DL (ref 70–99)
GLUCOSE BLD-MCNC: 121 MG/DL (ref 70–99)
GLUCOSE BLD-MCNC: 126 MG/DL (ref 70–99)
GLUCOSE BLD-MCNC: 131 MG/DL (ref 70–99)
GLUCOSE BLD-MCNC: 134 MG/DL (ref 70–99)
GLUCOSE BLD-MCNC: 139 MG/DL (ref 70–99)
GLUCOSE BLD-MCNC: 196 MG/DL (ref 70–99)
GLUCOSE BLD-MCNC: 228 MG/DL (ref 70–99)
GLUCOSE BLD-MCNC: 94 MG/DL (ref 70–99)
GLUCOSE BLD-MCNC: 94 MG/DL (ref 70–99)
GLUCOSE BLD-MCNC: 96 MG/DL (ref 70–99)
GLUCOSE SERPL-MCNC: 122 MG/DL (ref 70–99)
HCO3 ARTERIAL: 19.2 MMOL/L (ref 22–26)
HCO3 ARTERIAL: 19.7 MMOL/L (ref 22–26)
HCO3 VENOUS: 22 MMOL/L (ref 23–29)
HCT VFR BLD AUTO: 42.6 % (ref 42–52)
HEMOGLOBIN, ART, EXTENDED: 13.4 G/DL (ref 14–18)
HEMOGLOBIN, ART, EXTENDED: 14.3 G/DL (ref 14–18)
HGB BLD-MCNC: 13.9 G/DL (ref 14–18)
INR PPP: 1.14 (ref 0.88–1.18)
MAGNESIUM SERPL-MCNC: 2.2 MG/DL (ref 1.6–2.4)
MCH RBC QN AUTO: 29.3 PG (ref 27–31)
MCHC RBC AUTO-ENTMCNC: 32.6 G/DL (ref 33–37)
MCV RBC AUTO: 89.9 FL (ref 80–94)
MECHANICAL RATE IN BPM: 16
METHEMOGLOBIN ARTERIAL: 1 %
METHEMOGLOBIN ARTERIAL: 1.3 %
METHEMOGLOBIN VENOUS: 1.1 %
MODE: ABNORMAL
MODE: ABNORMAL
O2 CONTENT ARTERIAL: 17.2 ML/DL
O2 CONTENT ARTERIAL: 19.5 ML/DL
O2 CONTENT, VEN: 14 ML/DL
O2 SAT, ARTERIAL: 91.5 %
O2 SAT, ARTERIAL: 96.4 %
O2 SAT, VEN: 68 %
O2 THERAPY: ABNORMAL
PCO2 ARTERIAL: 34 MMHG (ref 35–45)
PCO2 ARTERIAL: 34 MMHG (ref 35–45)
PCO2 VENOUS: 42 MMHG (ref 40–50)
PERFORMED ON: ABNORMAL
PERFORMED ON: NORMAL
PH ARTERIAL: 7.36 (ref 7.35–7.45)
PH ARTERIAL: 7.37 (ref 7.35–7.45)
PH VENOUS: 7.32 (ref 7.35–7.45)
PLATELET # BLD AUTO: 153 K/UL (ref 130–400)
PMV BLD AUTO: 10.3 FL (ref 9.4–12.4)
PO2 ARTERIAL: 123 MMHG (ref 80–100)
PO2 ARTERIAL: 62 MMHG (ref 80–100)
PO2 VENOUS: 38 MMHG
POSITIVE END EXP PRESS: 5
POSITIVE END EXP PRESS: 5
POTASSIUM BLD-SCNC: 3.9 MMOL/L
POTASSIUM BLD-SCNC: 4.4 MMOL/L
POTASSIUM SERPL-SCNC: 4.5 MMOL/L (ref 3.5–5.1)
PROT SERPL-MCNC: 6.1 G/DL (ref 6.4–8.3)
PROTHROMBIN TIME: 14.5 SEC (ref 12–14.6)
RBC # BLD AUTO: 4.74 M/UL (ref 4.7–6.1)
SAMPLE SOURCE: ABNORMAL
SAMPLE SOURCE: ABNORMAL
SODIUM SERPL-SCNC: 142 MMOL/L (ref 136–145)
VT MECHANICAL: 480 %
WBC # BLD AUTO: 12.4 K/UL (ref 4.8–10.8)

## 2025-04-30 PROCEDURE — 33534 CABG ARTERIAL TWO: CPT | Performed by: SURGERY

## 2025-04-30 PROCEDURE — 6360000002 HC RX W HCPCS: Performed by: SURGERY

## 2025-04-30 PROCEDURE — 2500000003 HC RX 250 WO HCPCS: Performed by: SURGERY

## 2025-04-30 PROCEDURE — 33517 CABG ARTERY-VEIN SINGLE: CPT | Performed by: SURGERY

## 2025-04-30 PROCEDURE — 85520 HEPARIN ASSAY: CPT

## 2025-04-30 PROCEDURE — 2700000000 HC OXYGEN THERAPY PER DAY

## 2025-04-30 PROCEDURE — 99222 1ST HOSP IP/OBS MODERATE 55: CPT | Performed by: OTOLARYNGOLOGY

## 2025-04-30 PROCEDURE — 94003 VENT MGMT INPAT SUBQ DAY: CPT

## 2025-04-30 PROCEDURE — 80048 BASIC METABOLIC PNL TOTAL CA: CPT

## 2025-04-30 PROCEDURE — 2000000000 HC ICU R&B

## 2025-04-30 PROCEDURE — 85027 COMPLETE CBC AUTOMATED: CPT

## 2025-04-30 PROCEDURE — 71045 X-RAY EXAM CHEST 1 VIEW: CPT

## 2025-04-30 PROCEDURE — 85610 PROTHROMBIN TIME: CPT

## 2025-04-30 PROCEDURE — 37799 UNLISTED PX VASCULAR SURGERY: CPT

## 2025-04-30 PROCEDURE — 82800 BLOOD PH: CPT

## 2025-04-30 PROCEDURE — 36415 COLL VENOUS BLD VENIPUNCTURE: CPT

## 2025-04-30 PROCEDURE — 82962 GLUCOSE BLOOD TEST: CPT

## 2025-04-30 PROCEDURE — 80076 HEPATIC FUNCTION PANEL: CPT

## 2025-04-30 PROCEDURE — 2580000003 HC RX 258: Performed by: SURGERY

## 2025-04-30 PROCEDURE — 83735 ASSAY OF MAGNESIUM: CPT

## 2025-04-30 PROCEDURE — 82803 BLOOD GASES ANY COMBINATION: CPT

## 2025-04-30 PROCEDURE — 33508 ENDOSCOPIC VEIN HARVEST: CPT | Performed by: SURGERY

## 2025-04-30 PROCEDURE — 6370000000 HC RX 637 (ALT 250 FOR IP): Performed by: SURGERY

## 2025-04-30 PROCEDURE — 93005 ELECTROCARDIOGRAM TRACING: CPT | Performed by: SURGERY

## 2025-04-30 PROCEDURE — 94150 VITAL CAPACITY TEST: CPT

## 2025-04-30 PROCEDURE — P9045 ALBUMIN (HUMAN), 5%, 250 ML: HCPCS | Performed by: SURGERY

## 2025-04-30 RX ORDER — ALBUMIN HUMAN 50 G/1000ML
25 SOLUTION INTRAVENOUS ONCE
Status: COMPLETED | OUTPATIENT
Start: 2025-04-30 | End: 2025-04-30

## 2025-04-30 RX ORDER — FUROSEMIDE 10 MG/ML
20 INJECTION INTRAMUSCULAR; INTRAVENOUS ONCE
Status: COMPLETED | OUTPATIENT
Start: 2025-04-30 | End: 2025-04-30

## 2025-04-30 RX ORDER — INSULIN LISPRO 100 [IU]/ML
0-8 INJECTION, SOLUTION INTRAVENOUS; SUBCUTANEOUS
Status: DISCONTINUED | OUTPATIENT
Start: 2025-04-30 | End: 2025-05-13 | Stop reason: HOSPADM

## 2025-04-30 RX ORDER — METOPROLOL TARTRATE 25 MG/1
25 TABLET, FILM COATED ORAL 2 TIMES DAILY
Status: DISCONTINUED | OUTPATIENT
Start: 2025-04-30 | End: 2025-05-01

## 2025-04-30 RX ADMIN — WATER 2000 MG: 1 INJECTION INTRAMUSCULAR; INTRAVENOUS; SUBCUTANEOUS at 00:59

## 2025-04-30 RX ADMIN — ENOXAPARIN SODIUM 40 MG: 100 INJECTION SUBCUTANEOUS at 08:36

## 2025-04-30 RX ADMIN — OXYCODONE HYDROCHLORIDE 10 MG: 10 TABLET ORAL at 19:50

## 2025-04-30 RX ADMIN — INSULIN LISPRO 2 UNITS: 100 INJECTION, SOLUTION INTRAVENOUS; SUBCUTANEOUS at 19:48

## 2025-04-30 RX ADMIN — SODIUM CHLORIDE, PRESERVATIVE FREE 10 ML: 5 INJECTION INTRAVENOUS at 08:36

## 2025-04-30 RX ADMIN — WATER 2000 MG: 1 INJECTION INTRAMUSCULAR; INTRAVENOUS; SUBCUTANEOUS at 08:36

## 2025-04-30 RX ADMIN — ALBUMIN (HUMAN) 25 G: 12.5 INJECTION, SOLUTION INTRAVENOUS at 04:51

## 2025-04-30 RX ADMIN — GABAPENTIN 800 MG: 400 CAPSULE ORAL at 19:49

## 2025-04-30 RX ADMIN — SODIUM CHLORIDE: 0.9 INJECTION, SOLUTION INTRAVENOUS at 10:31

## 2025-04-30 RX ADMIN — ONDANSETRON 4 MG: 2 INJECTION INTRAMUSCULAR; INTRAVENOUS at 16:54

## 2025-04-30 RX ADMIN — ATORVASTATIN CALCIUM 20 MG: 20 TABLET, FILM COATED ORAL at 19:49

## 2025-04-30 RX ADMIN — ASPIRIN 81 MG 81 MG: 81 TABLET ORAL at 07:37

## 2025-04-30 RX ADMIN — INSULIN GLARGINE 13 UNITS: 100 INJECTION, SOLUTION SUBCUTANEOUS at 19:48

## 2025-04-30 RX ADMIN — DEXMEDETOMIDINE HYDROCHLORIDE 1.5 MCG/KG/HR: 400 INJECTION, SOLUTION INTRAVENOUS at 03:11

## 2025-04-30 RX ADMIN — INSULIN LISPRO 2 UNITS: 100 INJECTION, SOLUTION INTRAVENOUS; SUBCUTANEOUS at 16:39

## 2025-04-30 RX ADMIN — MORPHINE SULFATE 4 MG: 4 INJECTION, SOLUTION INTRAMUSCULAR; INTRAVENOUS at 02:13

## 2025-04-30 RX ADMIN — DEXMEDETOMIDINE HYDROCHLORIDE 1.5 MCG/KG/HR: 400 INJECTION, SOLUTION INTRAVENOUS at 00:09

## 2025-04-30 RX ADMIN — METOPROLOL TARTRATE 25 MG: 25 TABLET, FILM COATED ORAL at 19:49

## 2025-04-30 RX ADMIN — LEVOTHYROXINE SODIUM 50 MCG: 0.05 TABLET ORAL at 07:37

## 2025-04-30 RX ADMIN — CLOPIDOGREL BISULFATE 75 MG: 75 TABLET, FILM COATED ORAL at 07:37

## 2025-04-30 RX ADMIN — CITALOPRAM HYDROBROMIDE 20 MG: 10 TABLET, FILM COATED ORAL at 19:48

## 2025-04-30 RX ADMIN — FUROSEMIDE 20 MG: 10 INJECTION, SOLUTION INTRAMUSCULAR; INTRAVENOUS at 16:39

## 2025-04-30 RX ADMIN — CARBAMAZEPINE 200 MG: 200 TABLET, EXTENDED RELEASE ORAL at 07:37

## 2025-04-30 RX ADMIN — GABAPENTIN 800 MG: 400 CAPSULE ORAL at 07:37

## 2025-04-30 RX ADMIN — CARBAMAZEPINE 200 MG: 200 TABLET, EXTENDED RELEASE ORAL at 19:49

## 2025-04-30 RX ADMIN — WATER 2000 MG: 1 INJECTION INTRAMUSCULAR; INTRAVENOUS; SUBCUTANEOUS at 16:40

## 2025-04-30 ASSESSMENT — PAIN DESCRIPTION - LOCATION: LOCATION: GENERALIZED

## 2025-04-30 ASSESSMENT — PULMONARY FUNCTION TESTS
PIF_VALUE: 11
PIF_VALUE: 19
PIF_VALUE: 18
PIF_VALUE: 21
PIF_VALUE: 17
PIF_VALUE: 18
PIF_VALUE: 20
PIF_VALUE: 11
PIF_VALUE: 20
PIF_VALUE: 17
PIF_VALUE: 11
PIF_VALUE: 20
PIF_VALUE: 20

## 2025-04-30 ASSESSMENT — PAIN - FUNCTIONAL ASSESSMENT: PAIN_FUNCTIONAL_ASSESSMENT: ACTIVITIES ARE NOT PREVENTED

## 2025-04-30 ASSESSMENT — PAIN SCALES - GENERAL: PAINLEVEL_OUTOF10: 8

## 2025-04-30 NOTE — PROGRESS NOTES
Patient extubated at 0925.  Patient placed on 5L NC.  Vitals signs stable.    Electronically signed by Flaca Rausch RN on 4/30/2025 at 9:26 AM

## 2025-04-30 NOTE — DISCHARGE INSTRUCTIONS
Your Cardiologist has referred you for participation in Cardiac Rehabilitation and a consult has been sent to the [facility] Cardiac Rehab program.     You are to contact [facility] Cardiac [facility] Rehab WITHIN ONE WEEK AFTER HOSPITAL DISCHARGE to schedule your \"orientation & assessment\" appointment by calling direct at [phone].

## 2025-04-30 NOTE — PROGRESS NOTES
Patient passed leak test.  SBT started at 0800 with respiratory at bedside.    Electronically signed by Flaca Rausch RN on 4/30/2025 at 8:03 AM

## 2025-04-30 NOTE — PROGRESS NOTES
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 Alejo, patient of Dr. Kitchen, had an abnormal stress test and came into the emergency department over the weekend.  He had a cardiac catheterization done severe mid LAD and circumflex stenosis along with suspected eccentric moderate left main stenosis with mild RCA disease.    Patient is postop day 1 status post CABG (EULOGIO to LAD, LIMA to diag, SV (axillary artery) to OM).    He had a traumatic intubation however he was extubated without any significant event and was evaluated by ENT.    Per RN, the patient sedation was turned off this morning.  The patient overall appeared overall sluggish when seen during my assessment with RN in the room who stated that these findings are about similar to her morning exam.  Objective      BP (!) 125/58   Pulse 80   Temp 97.9 °F (36.6 °C) (Temporal)   Resp 24   Ht 1.803 m (5' 11\")   Wt 95.3 kg (210 lb)   SpO2 98%   BMI 29.29 kg/m²       Intake/Output Summary (Last 24 hours) at 4/30/2025 1418  Last data filed at 4/30/2025 1400  Gross per 24 hour   Intake 3306.98 ml   Output 2738 ml   Net 568.98 ml         Physical Exam:      Physical Exam  Vitals and nursing note reviewed.   Constitutional:       Appearance: Normal appearance.   HENT:      Head: Normocephalic and atraumatic.   Eyes:      Extraocular Movements: Extraocular movements intact.   Cardiovascular:      Rate and Rhythm: Normal rate and regular

## 2025-04-30 NOTE — OP NOTE
[address] [place] [address]                            OPERATIVE REPORT      PATIENT NAME: AKHIL RAUSCH               : 1955  MED REC NO: [id number]                          ROOM: 0145  ACCOUNT NO: [id number]                       ADMIT DATE: 2025  PROVIDER: Akhil Lara MD      DATE OF PROCEDURE:  2025    SURGEON:  Akhil Lara MD    PREOPERATIVE DIAGNOSIS:  Unstable angina due to left main coronary artery disease.    POSTOPERATIVE DIAGNOSIS:  Unstable angina due to left main coronary artery disease.    PROCEDURE:  Beating heart pump assisted minimally invasive robotic assisted coronary artery bypass grafting x3 using a right internal mammary artery onto the LAD, left internal mammary artery onto the 1st diagonal, and a saphenous vein graft from the left axillary artery grafted onto the obtuse marginal branch #1 of the circumflex.    ASSISTANT:  Jackeline Hopson.    ESTIMATED BLOOD LOSS:  200 mL.    CARDIOPULMONARY BYPASS:  42 minutes.    HISTORY:  The patient is a 69-year-old gentleman who had an abnormal stress test, therefore, was referred for cardiac cath.  He was noted to have severe distal left main disease and no significant disease in the right coronary artery.  He had additional lesions in the OM1 branch and the mid LAD, and therefore, was indicated for 3-vessel revascularization of the LAD, 1st diagonal, and obtuse marginal branch of the circumflex.  The patient understood the risks and benefits of robotic surgery and the potential need to convert to an open procedure and he agreed to proceed based on this proposal.    DESCRIPTION OF PROCEDURE:  Patient underwent general endotracheal anesthesia which he tolerated without any hemodynamic difficulty.  The chest and extremities were prepped and draped in the usual sterile fashion.  A [place]-Gisell catheter was placed.  He had normal PA pressures and they remained so throughout  the pec minor were divided and then the axillary artery was encircled with a vessel loop.  It was dissected out and a side-biting Satinsky clamp was placed after giving heparin so that the vein graft could be sewn onto this artery as an end-to-side anastomosis and to the vein onto the side of the artery.  This was done using a running 7-0 Prolene.  Following the completion anastomosis the single repair sutures required.  The patient tolerated this without any difficulty.  The patient then had the artery cannulated with a size 16 arterial cannula.  This was done just upstream of where the vein graft anastomosis was created.  The patient then had the EULOGIO to LAD anastomosis created.  This was done off-pump so as to preserve cardiopulmonary bypass time.  Subxiphoid port was inserted and through this port was placed a Nuvo stabilizer onto the LAD.  The LAD was a good caliber target, had a fairly diffuse disease but otherwise reasonable target.  It was about 2.2 mm in diameter.  The EULOGIO LAD anastomosis was created and the flow was measured and found to be 10 mL a minute.  Following the completion of this anastomosis no repair sutures were required.  The patient then had the femoral vein cannulated and a venous cannula was guided up into the right atrium-SVC junction using ELYSE guidance.  With these 2 cannulas in place, they were hooked up to the bypass circuitry and the patient went on bypass.  On bypass, the left internal mammary artery was able to be sewn onto the LAD.  A Starfish and S device was placed through the subxiphoid port and through the 7th intercostal space, a Nuvo device was placed onto the diagonal target.  The LIMA-LAD anastomosis was created using a running 7-0 Prolene.  This was a good caliber target   approximately 2.1 mm and no atherosclerosis at the site that the arteriotomy was made.  No repair sutures were required because the graft was fairly deep compared to the minithoracotomy site.  We used a

## 2025-04-30 NOTE — PROGRESS NOTES
Chest Tube removed by  at 0920.    Electronically signed by Flaca Rausch RN on 4/30/2025 at 9:21 AM

## 2025-04-30 NOTE — CONSULTS
Department of Otolaryngology  Dr. Booth consult Note      Reason for Consult: Supraglottic trauma  Requesting Physician: Dr. Lara    CHIEF COMPLAINT: Possible supraglottic trauma    History Obtained From:  electronic medical record    HISTORY OF PRESENT ILLNESS:                The patient is a 69 y.o. male with significant past medical history of coronary artery disease who presents with possible supraglottic trauma status post intubation for CABG.  When they are changing at the tube they saw some trauma and a possible false lumen.  Per anesthesia there was no issues with intubation.    Past Medical History:        Diagnosis Date    Arm fracture 07/2016    left    Depression     Diabetes mellitus (HCC)     Hypertension     Kidney stones     Neuropathy     Restless leg syndrome      Past Surgical History:        Procedure Laterality Date    BONE MARROW BIOPSY Right 12/09/2020    BONE MARROW ASPIRATION BIOPSY performed by ALISA Rabago at [facility] OR    CARDIAC PROCEDURE N/A 4/25/2025    Left heart cath / coronary angiography performed by Chayo Cortes MD at [facility] CARDIAC CATH LAB    CHOLECYSTECTOMY      COLONOSCOPY  05/27/2020    Dr Patiño   AP    LITHOTRIPSY      SHOULDER SURGERY Left     Frozen shoulder surgery    UPPER GASTROINTESTINAL ENDOSCOPY  04/04/2017    Dr Patiño- normal     Current Medications:   Current Facility-Administered Medications: aspirin chewable tablet 81 mg, 81 mg, Oral, Daily  fluticasone (FLONASE) 50 MCG/ACT nasal spray 1 spray, 1 spray, Each Nostril, BID PRN  protamine injection 50 mg, 50 mg, IntraVENous, Once PRN  0.9 % sodium chloride infusion, , IntraVENous, Continuous  sodium chloride flush 0.9 % injection 5-40 mL, 5-40 mL, IntraVENous, 2 times per day  sodium chloride flush 0.9 % injection 5-40 mL, 5-40 mL, IntraVENous, PRN  0.9 % sodium chloride infusion, , IntraVENous, PRN  ondansetron (ZOFRAN-ODT) disintegrating tablet 4 mg, 4 mg, Oral, Q8H PRN **OR** ondansetron  see no contraindication to extubation from an ENT perspective.

## 2025-04-30 NOTE — PLAN OF CARE
Problem: Chronic Conditions and Co-morbidities  Goal: Patient's chronic conditions and co-morbidity symptoms are monitored and maintained or improved  Outcome: Progressing  Flowsheets (Taken 4/29/2025 1900 by Gonsalo Santacruz, RN)  Care Plan - Patient's Chronic Conditions and Co-Morbidity Symptoms are Monitored and Maintained or Improved:   Monitor and assess patient's chronic conditions and comorbid symptoms for stability, deterioration, or improvement   Collaborate with multidisciplinary team to address chronic and comorbid conditions and prevent exacerbation or deterioration   Update acute care plan with appropriate goals if chronic or comorbid symptoms are exacerbated and prevent overall improvement and discharge     Problem: Safety - Adult  Goal: Free from fall injury  Outcome: Progressing     Problem: ABCDS Injury Assessment  Goal: Absence of physical injury  Outcome: Progressing     Problem: Pain  Goal: Verbalizes/displays adequate comfort level or baseline comfort level  Outcome: Progressing     Problem: Discharge Planning  Goal: Discharge to home or other facility with appropriate resources  Outcome: Progressing  Flowsheets (Taken 4/29/2025 1900 by Gonsalo Santacruz, RN)  Discharge to home or other facility with appropriate resources:   Identify barriers to discharge with patient and caregiver   Arrange for needed discharge resources and transportation as appropriate   Identify discharge learning needs (meds, wound care, etc)   Arrange for interpreters to assist at discharge as needed   Refer to discharge planning if patient needs post-hospital services based on physician order or complex needs related to functional status, cognitive ability or social support system     Problem: Skin/Tissue Integrity  Goal: Skin integrity remains intact  Description: 1.  Monitor for areas of redness and/or skin breakdown2.  Assess vascular access sites hourly3.  Every 4-6 hours minimum:  Change oxygen saturation probe  site4.  Every 4-6 hours:  If on nasal continuous positive airway pressure, respiratory therapy assess nares and determine need for appliance change or resting period  Outcome: Progressing

## 2025-04-30 NOTE — PROGRESS NOTES
[facility] Hospitalists Progress Note    Patient:  Akhil Alejo  YOB: 1955  Date of Service: 4/30/2025  MRN: [id number]   Acct: [id number]   Primary Care Physician: Lennox Waddell MD  Advance Directive: Full Code  Admit Date: 4/25/2025       Hospital Day: 5      CHIEF COMPLAINT:     Chief Complaint   Patient presents with    Abnormal Lab     ABNORMAL EKG DURING STRESS TEST       4/30/2025 7:02 AM  Subjective / Interval History:   04/30/2025  Patient seen and examined in the ICU this a.m.   Remain intubated in the ICU due to possible subglottic trauma  ENT consulted      04/29/2025  Patient seen and examined this a.m.   No new complaints.    No active chest pain or dyspnea reported this a.m.  No acute changes or acute overnight event reported.   Laying comfortably in bed in no apparent acute distress.    Denies any acute complaints or distress.    Endorses overall improvement.  Pending CABG this a.m.  Family at bedside.      04/28/2025  Patient seen and examined this a.m.     Denies any active chest pain at this time.  Denies any other acute complaints or distress at this time.  No acute changes or acute overnight event reported.   Sitting comfortably in bed in no apparent acute distress.    Endorses overall improvement.        04/27/2025  Patient seen and examined this a.m.   No new complaints.    Denies any active chest pain at this time.  No acute changes or acute overnight event reported.   Laying comfortably in bed in no apparent acute distress.    Denies any acute complaints or distress.    Endorses overall improvement.      04/26/2025  Patient seen and examined this a.m.   No new complaints.  No acute changes or acute overnight event reported.   Laying comfortably in bed in no apparent acute distress.  Denies any acute complaints or distress.        Review of Systems:   Review of Systems   Unable to perform ROS: Intubated       Diet NPO    Intake/Output Summary (Last 24 hours) at  4/30/2025 0702  Last data filed at 4/30/2025 0600  Gross per 24 hour   Intake 4252.9 ml   Output 2268 ml   Net 1984.9 ml         Medications:   sodium chloride 75 mL/hr at 04/29/25 1912    sodium chloride      sodium chloride      dextrose      insulin 1.8 Units/hr (04/30/25 0700)    niCARdipine Stopped (04/29/25 2030)    dexmedeTOMIDine HCl in NaCl 0.4 mcg/kg/hr (04/30/25 0620)     Current Facility-Administered Medications   Medication Dose Route Frequency Provider Last Rate Last Admin    aspirin chewable tablet 81 mg  81 mg Oral Daily MarcoAkhil alfonso MD        fluticasone (FLONASE) 50 MCG/ACT nasal spray 1 spray  1 spray Each Nostril BID PRN Akhil Lara MD        protamine injection 50 mg  50 mg IntraVENous Once PRN Akhil Lara MD        0.9 % sodium chloride infusion   IntraVENous Continuous FaberAkhil alfonso MD 75 mL/hr at 04/29/25 1912 New Bag at 04/29/25 1912    sodium chloride flush 0.9 % injection 5-40 mL  5-40 mL IntraVENous 2 times per day Akhil Lara MD        sodium chloride flush 0.9 % injection 5-40 mL  5-40 mL IntraVENous PRN Akhil Lara MD        0.9 % sodium chloride infusion   IntraVENous PRN Akhil Lara MD        ondansetron (ZOFRAN-ODT) disintegrating tablet 4 mg  4 mg Oral Q8H PRN Akhil Lara MD        Or    ondansetron (ZOFRAN) injection 4 mg  4 mg IntraVENous Q6H PRN Akhil Lara MD        clopidogrel (PLAVIX) tablet 75 mg  75 mg Oral Daily Akhil Lara MD        morphine (PF) injection 2 mg  2 mg IntraVENous Q2H PRN Akhil Lara MD        Or    morphine sulfate (PF) injection 4 mg  4 mg IntraVENous Q2H PRN Akhil Lara MD   4 mg at 04/30/25 0213    zolpidem (AMBIEN) tablet 5 mg  5 mg Oral Nightly PRN Akhil Lara MD        atorvastatin (LIPITOR) tablet 20 mg  20 mg Oral Nightly MarcoAkhil alfonso MD        ceFAZolin (ANCEF) 2,000 mg in sterile water 20 mL IV syringe  2,000 mg IntraVENous Q8H MarcoAkhil alfonso MD   2,000 mg at 04/30/25 0059    potassium chloride

## 2025-04-30 NOTE — PROGRESS NOTES
Progress Note    2025 4:29 AM          POD#   1    Subjective:  Mr. Alejo is on max precedex drip. Also required morphine 8 mg for pain.  PULSE OXIMETRY RANGE: SpO2  Av.4 %  Min: 94 %  Max: 100 %  SUPPLEMENTAL O2: O2 Flow Rate (L/min): 2 L/min   Vital Signs: /65   Pulse 75   Temp 100.1 °F (37.8 °C) (Core)   Resp 25   Ht 1.803 m (5' 11\")   Wt 88.6 kg (195 lb 6.4 oz)   SpO2 99%   BMI 27.25 kg/m²    Temperature Range:   Temp: 100.1 °F (37.8 °C)   Temp  Av.6 °F (36.4 °C)  Min: 96.8 °F (36 °C)  Max: 100.1 °F (37.8 °C)                   Rhythm: normal sinus rhythm    Labs:   ABG:    Lab Results   Component Value Date/Time    PHART 7.370 2025 04:13 AM    PO2ART 123.0 2025 04:13 AM    FJN7OGJ 34.0 2025 04:13 AM    J7FMAMMT 96.4 2025 04:13 AM    PWH6NNT 26 2025 05:49 PM    VNE4ETS 19.7 2025 04:13 AM    BEART -4.8 2025 04:13 AM     CBC:   Recent Labs     25  0211 25  1335 25  1749 25  1900 25  0235   WBC 8.1  --   --  12.5* 12.4*   HGB 15.7   < > 12.4 13.8* 13.9*   HCT 47.1  --   --  42.8 42.6   MCV 88.5  --   --  89.4 89.9     --   --  155 153    < > = values in this interval not displayed.     BMP:   Recent Labs     25  0715 25  1335 25  1500 25  1510 25  1749 25  1900 25  2057 25  0235 25  0413   *  --   --   --   --  140  --  142  --    K 3.5  --   --   --   --  3.9 4.1 4.5 4.4     --   --   --   --  108*  --  113*  --    CO2 20*   < > 23   < > 25 23  --  20*  --    BUN 27*  --   --   --   --  19  --  18  --    CREATININE 1.2   < > 1.1  --   --  1.1  --  1.0  --     < > = values in this interval not displayed.     PT/INR:   Recent Labs     25  0235   PROTIME 15.5* 14.5   INR 1.24* 1.14     APTT:   Recent Labs     25   APTT 29.6     Chest X-Ray:  Left lung well expanded, no effusion  CT:  I/O last 3 completed

## 2025-04-30 NOTE — PROGRESS NOTES
4 Eyes Skin Assessment     NAME:  Akhil Alejo  YOB: 1955  MEDICAL RECORD NUMBER:  [id number]    The patient is being assessed for  Admission    I agree that at least one RN has performed a thorough Head to Toe Skin Assessment on the patient. ALL assessment sites listed below have been assessed.      Areas assessed by both nurses:    Head, Face, Ears, Shoulders, Back, Chest, Arms, Elbows, Hands, Sacrum. Buttock, Coccyx, Ischium, Legs. Feet and Heels, and Under Medical Devices         Does the Patient have a Wound? No noted wound(s)       Donte Prevention initiated by RN: Yes  Wound Care Orders initiated by RN: No    Pressure Injury (Stage 3,4, Unstageable, DTI, NWPT, and Complex wounds) if present, place Wound referral order by RN under : No    New Ostomies, if present place, Ostomy referral order under : No     Nurse 1 eSignature: Electronically signed by Flaca Rausch RN on 4/30/25 at 8:32 AM CDT    **SHARE this note so that the co-signing nurse can place an eSignature**    Nurse 2 eSignature: Electronically signed by Mariana Colvin RN on 4/30/25 at 12:08 PM CDT

## 2025-04-30 NOTE — ANESTHESIA POSTPROCEDURE EVALUATION
Department of Anesthesiology  Postprocedure Note    Patient: Akhil Alejo  MRN: [id number]  YOB: 1955  Date of evaluation: 4/30/2025    Procedure Summary       Date: 04/29/25 Room / Location: [address]    Anesthesia Start: 1246 Anesthesia Stop: 1852    Procedure: CORONARY ARTERY BYPASS GRAFT X3, MINIMALLY INVASIVE robotic, pump assisted via femoral access, BILATERAL INTERNAL MAMMARY ARTERIES, ENDOSCOPIC VEIN HARVESTING, TRANSESOPHAGEAL ECHOCARDIOGRAM (Chest) Diagnosis:       CAD in native artery      (CAD in native artery [I25.10])    Surgeons: Akhil Lara MD Responsible Provider: Sheyla Huitron APRN - CRNA    Anesthesia Type: general, regional ASA Status: 4            Anesthesia Type: No value filed.    Bossman Phase I: Bossman Score: 10    Bossman Phase II:      Anesthesia Post Evaluation    Patient location during evaluation: ICU  Level of consciousness: obtunded/minimal responses, responsive to verbal stimuli and responsive to physical stimuli  Pain scale: unable to answer.  Airway patency: patent  Nausea & Vomiting: no nausea and no vomiting  Cardiovascular status: hemodynamically stable  Respiratory status: nasal cannula  Hydration status: euvolemic  Comments: Patient extubated, but minimally responsive.  Opens eyes and looks to me when calling his name.  Otherwise, no communication.  Pain management: adequate    Encounter Notable Events   Notable Event Outcome Phase Comment   Arterial Injury  Intraprocedure Filed from anesthesia note documentation.

## 2025-04-30 NOTE — ANESTHESIA POSTPROCEDURE EVALUATION
Department of Anesthesiology  Postprocedure Note    Patient: Akhil Alejo  MRN: [id number]  YOB: 1955  Date of evaluation: 4/29/2025    Procedure Summary       Date: 04/29/25 Room / Location: [address]    Anesthesia Start: 1246 Anesthesia Stop: 1852    Procedure: CORONARY ARTERY BYPASS GRAFT X3, MINIMALLY INVASIVE robotic, pump assisted via femoral access, BILATERAL INTERNAL MAMMARY ARTERIES, ENDOSCOPIC VEIN HARVESTING, TRANSESOPHAGEAL ECHOCARDIOGRAM (Chest) Diagnosis:       CAD in native artery      (CAD in native artery [I25.10])    Surgeons: Akhli Lara MD Responsible Provider: Sheyla Huitron APRN - CRNA    Anesthesia Type: general, regional ASA Status: 4            Anesthesia Type: No value filed.    Bossman Phase I: Bossman Score: 10    Bossman Phase II:      Anesthesia Post Evaluation    Patient location during evaluation: ICU  Patient participation: complete - patient cannot participate  Level of consciousness: sedated and ventilated  Pain score: 0  Airway patency: patent  Nausea & Vomiting: no nausea and no vomiting  Cardiovascular status: hemodynamically stable  Respiratory status: ventilator and intubated  Hydration status: stable  Comments: /60   Pulse 64   Temp 97 °F (36.1 °C) (Temporal)   Resp 16   Ht 1.803 m (5' 11\")   Wt 88.6 kg (195 lb 6.4 oz)   SpO2 94%   BMI 27.25 kg/m²     Pain management: adequate    Encounter Notable Events   Notable Event Outcome Phase Comment   Arterial Injury  Intraprocedure Filed from anesthesia note documentation.

## 2025-05-01 ENCOUNTER — APPOINTMENT (OUTPATIENT)
Dept: CT IMAGING | Age: 70
DRG: 234 | End: 2025-05-01
Attending: INTERNAL MEDICINE
Payer: MEDICARE

## 2025-05-01 ENCOUNTER — APPOINTMENT (OUTPATIENT)
Dept: GENERAL RADIOLOGY | Age: 70
DRG: 234 | End: 2025-05-01
Attending: INTERNAL MEDICINE
Payer: MEDICARE

## 2025-05-01 LAB
ALBUMIN SERPL-MCNC: 3.6 G/DL (ref 3.5–5.2)
ALLENS TEST: ABNORMAL
ALP SERPL-CCNC: 60 U/L (ref 40–129)
ALT SERPL-CCNC: 19 U/L (ref 10–50)
ANION GAP SERPL CALCULATED.3IONS-SCNC: 10 MMOL/L (ref 8–16)
AST SERPL-CCNC: 38 U/L (ref 10–50)
BASE EXCESS ARTERIAL: -3.5 MMOL/L (ref -2–2)
BILIRUB SERPL-MCNC: 0.5 MG/DL (ref 0.2–1.2)
BUN SERPL-MCNC: 20 MG/DL (ref 8–23)
CALCIUM SERPL-MCNC: 8.4 MG/DL (ref 8.8–10.2)
CARBOXYHEMOGLOBIN ARTERIAL: 2.5 % (ref 0–5)
CHLORIDE SERPL-SCNC: 112 MMOL/L (ref 98–107)
CO2 SERPL-SCNC: 21 MMOL/L (ref 22–29)
CREAT SERPL-MCNC: 1 MG/DL (ref 0.7–1.2)
ERYTHROCYTE [DISTWIDTH] IN BLOOD BY AUTOMATED COUNT: 15.1 % (ref 11.5–14.5)
GLUCOSE BLD-MCNC: 216 MG/DL (ref 70–99)
GLUCOSE BLD-MCNC: 229 MG/DL (ref 70–99)
GLUCOSE BLD-MCNC: 235 MG/DL (ref 70–99)
GLUCOSE BLD-MCNC: 239 MG/DL (ref 70–99)
GLUCOSE SERPL-MCNC: 215 MG/DL (ref 70–99)
HCO3 ARTERIAL: 20.3 MMOL/L (ref 22–26)
HCT VFR BLD AUTO: 38.9 % (ref 42–52)
HEMOGLOBIN, ART, EXTENDED: 13.1 G/DL (ref 14–18)
HGB BLD-MCNC: 12.5 G/DL (ref 14–18)
INR PPP: 1.26 (ref 0.88–1.18)
MAGNESIUM SERPL-MCNC: 1.9 MG/DL (ref 1.6–2.4)
MCH RBC QN AUTO: 29.3 PG (ref 27–31)
MCHC RBC AUTO-ENTMCNC: 32.1 G/DL (ref 33–37)
MCV RBC AUTO: 91.3 FL (ref 80–94)
METHEMOGLOBIN ARTERIAL: 1.3 %
O2 CONTENT ARTERIAL: 17.1 ML/DL
O2 DELIVERY DEVICE: ABNORMAL
O2 SAT, ARTERIAL: 92.6 %
O2 THERAPY: ABNORMAL
OXYGEN FLOW: 3
PCO2 ARTERIAL: 32 MMHG (ref 35–45)
PERFORMED ON: ABNORMAL
PH ARTERIAL: 7.41 (ref 7.35–7.45)
PLATELET # BLD AUTO: 151 K/UL (ref 130–400)
PMV BLD AUTO: 11 FL (ref 9.4–12.4)
PO2 ARTERIAL: 66 MMHG (ref 80–100)
POTASSIUM BLD-SCNC: 3.8 MMOL/L
POTASSIUM SERPL-SCNC: 3.8 MMOL/L (ref 3.5–5.1)
PROT SERPL-MCNC: 6.1 G/DL (ref 6.4–8.3)
PROTHROMBIN TIME: 15.7 SEC (ref 12–14.6)
RBC # BLD AUTO: 4.26 M/UL (ref 4.7–6.1)
SAMPLE SOURCE: ABNORMAL
SODIUM SERPL-SCNC: 143 MMOL/L (ref 136–145)
WBC # BLD AUTO: 13.8 K/UL (ref 4.8–10.8)

## 2025-05-01 PROCEDURE — 6370000000 HC RX 637 (ALT 250 FOR IP): Performed by: SURGERY

## 2025-05-01 PROCEDURE — 2700000000 HC OXYGEN THERAPY PER DAY

## 2025-05-01 PROCEDURE — 6360000002 HC RX W HCPCS: Performed by: SURGERY

## 2025-05-01 PROCEDURE — 85027 COMPLETE CBC AUTOMATED: CPT

## 2025-05-01 PROCEDURE — 85610 PROTHROMBIN TIME: CPT

## 2025-05-01 PROCEDURE — 82962 GLUCOSE BLOOD TEST: CPT

## 2025-05-01 PROCEDURE — 80053 COMPREHEN METABOLIC PANEL: CPT

## 2025-05-01 PROCEDURE — 83735 ASSAY OF MAGNESIUM: CPT

## 2025-05-01 PROCEDURE — 70450 CT HEAD/BRAIN W/O DYE: CPT

## 2025-05-01 PROCEDURE — 94640 AIRWAY INHALATION TREATMENT: CPT

## 2025-05-01 PROCEDURE — 92522 EVALUATE SPEECH PRODUCTION: CPT

## 2025-05-01 PROCEDURE — 2000000000 HC ICU R&B

## 2025-05-01 PROCEDURE — 99024 POSTOP FOLLOW-UP VISIT: CPT | Performed by: SURGERY

## 2025-05-01 PROCEDURE — 71045 X-RAY EXAM CHEST 1 VIEW: CPT

## 2025-05-01 PROCEDURE — 92610 EVALUATE SWALLOWING FUNCTION: CPT

## 2025-05-01 PROCEDURE — 82803 BLOOD GASES ANY COMBINATION: CPT

## 2025-05-01 PROCEDURE — 2500000003 HC RX 250 WO HCPCS: Performed by: SURGERY

## 2025-05-01 PROCEDURE — 94760 N-INVAS EAR/PLS OXIMETRY 1: CPT

## 2025-05-01 PROCEDURE — 37799 UNLISTED PX VASCULAR SURGERY: CPT

## 2025-05-01 RX ORDER — BISACODYL 10 MG
10 SUPPOSITORY, RECTAL RECTAL DAILY PRN
Status: DISCONTINUED | OUTPATIENT
Start: 2025-05-01 | End: 2025-05-13 | Stop reason: HOSPADM

## 2025-05-01 RX ORDER — IPRATROPIUM BROMIDE AND ALBUTEROL SULFATE 2.5; .5 MG/3ML; MG/3ML
1 SOLUTION RESPIRATORY (INHALATION)
Status: DISCONTINUED | OUTPATIENT
Start: 2025-05-01 | End: 2025-05-13 | Stop reason: HOSPADM

## 2025-05-01 RX ORDER — FUROSEMIDE 10 MG/ML
20 INJECTION INTRAMUSCULAR; INTRAVENOUS 2 TIMES DAILY
Status: DISCONTINUED | OUTPATIENT
Start: 2025-05-01 | End: 2025-05-03

## 2025-05-01 RX ORDER — METOPROLOL TARTRATE 1 MG/ML
5 INJECTION, SOLUTION INTRAVENOUS EVERY 6 HOURS PRN
Status: DISCONTINUED | OUTPATIENT
Start: 2025-05-01 | End: 2025-05-13 | Stop reason: HOSPADM

## 2025-05-01 RX ORDER — METOPROLOL TARTRATE 50 MG
50 TABLET ORAL 2 TIMES DAILY
Status: DISCONTINUED | OUTPATIENT
Start: 2025-05-01 | End: 2025-05-06

## 2025-05-01 RX ORDER — POTASSIUM CHLORIDE 7.45 MG/ML
10 INJECTION INTRAVENOUS PRN
Status: DISCONTINUED | OUTPATIENT
Start: 2025-05-01 | End: 2025-05-13 | Stop reason: HOSPADM

## 2025-05-01 RX ORDER — GABAPENTIN 400 MG/1
400 CAPSULE ORAL 2 TIMES DAILY
Status: DISCONTINUED | OUTPATIENT
Start: 2025-05-01 | End: 2025-05-01

## 2025-05-01 RX ORDER — ACETYLCYSTEINE 200 MG/ML
600 SOLUTION ORAL; RESPIRATORY (INHALATION) 2 TIMES DAILY
Status: DISCONTINUED | OUTPATIENT
Start: 2025-05-01 | End: 2025-05-13 | Stop reason: HOSPADM

## 2025-05-01 RX ADMIN — INSULIN LISPRO 2 UNITS: 100 INJECTION, SOLUTION INTRAVENOUS; SUBCUTANEOUS at 10:52

## 2025-05-01 RX ADMIN — POTASSIUM CHLORIDE 10 MEQ: 7.46 INJECTION, SOLUTION INTRAVENOUS at 17:24

## 2025-05-01 RX ADMIN — INSULIN LISPRO 4 UNITS: 100 INJECTION, SOLUTION INTRAVENOUS; SUBCUTANEOUS at 15:43

## 2025-05-01 RX ADMIN — METOPROLOL TARTRATE 5 MG: 5 INJECTION INTRAVENOUS at 23:09

## 2025-05-01 RX ADMIN — CARBAMAZEPINE 200 MG: 200 TABLET, EXTENDED RELEASE ORAL at 08:12

## 2025-05-01 RX ADMIN — SODIUM CHLORIDE, PRESERVATIVE FREE 10 ML: 5 INJECTION INTRAVENOUS at 19:53

## 2025-05-01 RX ADMIN — ACETYLCYSTEINE 600 MG: 200 SOLUTION ORAL; RESPIRATORY (INHALATION) at 18:02

## 2025-05-01 RX ADMIN — FUROSEMIDE 20 MG: 10 INJECTION, SOLUTION INTRAMUSCULAR; INTRAVENOUS at 17:23

## 2025-05-01 RX ADMIN — METFORMIN HYDROCHLORIDE 500 MG: 500 TABLET ORAL at 08:13

## 2025-05-01 RX ADMIN — WATER 2000 MG: 1 INJECTION INTRAMUSCULAR; INTRAVENOUS; SUBCUTANEOUS at 08:12

## 2025-05-01 RX ADMIN — CLOPIDOGREL BISULFATE 75 MG: 75 TABLET, FILM COATED ORAL at 08:13

## 2025-05-01 RX ADMIN — IPRATROPIUM BROMIDE AND ALBUTEROL SULFATE 1 DOSE: 2.5; .5 SOLUTION RESPIRATORY (INHALATION) at 18:02

## 2025-05-01 RX ADMIN — ACETYLCYSTEINE 600 MG: 200 SOLUTION ORAL; RESPIRATORY (INHALATION) at 14:06

## 2025-05-01 RX ADMIN — FUROSEMIDE 20 MG: 10 INJECTION, SOLUTION INTRAMUSCULAR; INTRAVENOUS at 08:12

## 2025-05-01 RX ADMIN — SODIUM CHLORIDE, PRESERVATIVE FREE 10 ML: 5 INJECTION INTRAVENOUS at 10:53

## 2025-05-01 RX ADMIN — WATER 2000 MG: 1 INJECTION INTRAMUSCULAR; INTRAVENOUS; SUBCUTANEOUS at 00:53

## 2025-05-01 RX ADMIN — METOPROLOL TARTRATE 50 MG: 50 TABLET, FILM COATED ORAL at 08:13

## 2025-05-01 RX ADMIN — MAGNESIUM SULFATE HEPTAHYDRATE 2000 MG: 40 INJECTION, SOLUTION INTRAVENOUS at 02:48

## 2025-05-01 RX ADMIN — LEVOTHYROXINE SODIUM 50 MCG: 0.05 TABLET ORAL at 08:13

## 2025-05-01 RX ADMIN — POTASSIUM CHLORIDE 10 MEQ: 7.46 INJECTION, SOLUTION INTRAVENOUS at 15:52

## 2025-05-01 RX ADMIN — INSULIN GLARGINE 13 UNITS: 100 INJECTION, SOLUTION SUBCUTANEOUS at 19:52

## 2025-05-01 RX ADMIN — BISACODYL 10 MG: 10 SUPPOSITORY RECTAL at 15:36

## 2025-05-01 RX ADMIN — MORPHINE SULFATE 2 MG: 2 INJECTION, SOLUTION INTRAMUSCULAR; INTRAVENOUS at 17:23

## 2025-05-01 RX ADMIN — ENOXAPARIN SODIUM 40 MG: 100 INJECTION SUBCUTANEOUS at 08:11

## 2025-05-01 RX ADMIN — ASPIRIN 81 MG 81 MG: 81 TABLET ORAL at 08:13

## 2025-05-01 RX ADMIN — POTASSIUM CHLORIDE 10 MEQ: 7.46 INJECTION, SOLUTION INTRAVENOUS at 17:37

## 2025-05-01 RX ADMIN — INSULIN LISPRO 2 UNITS: 100 INJECTION, SOLUTION INTRAVENOUS; SUBCUTANEOUS at 08:11

## 2025-05-01 RX ADMIN — INSULIN LISPRO 2 UNITS: 100 INJECTION, SOLUTION INTRAVENOUS; SUBCUTANEOUS at 19:53

## 2025-05-01 RX ADMIN — IPRATROPIUM BROMIDE AND ALBUTEROL SULFATE 1 DOSE: 2.5; .5 SOLUTION RESPIRATORY (INHALATION) at 14:06

## 2025-05-01 RX ADMIN — POTASSIUM CHLORIDE 20 MEQ: 29.8 INJECTION, SOLUTION INTRAVENOUS at 02:48

## 2025-05-01 RX ADMIN — GABAPENTIN 400 MG: 400 CAPSULE ORAL at 08:13

## 2025-05-01 RX ADMIN — POTASSIUM CHLORIDE 20 MEQ: 29.8 INJECTION, SOLUTION INTRAVENOUS at 05:07

## 2025-05-01 ASSESSMENT — PAIN DESCRIPTION - LOCATION: LOCATION: CHEST

## 2025-05-01 ASSESSMENT — PAIN SCALES - GENERAL
PAINLEVEL_OUTOF10: 7
PAINLEVEL_OUTOF10: 0

## 2025-05-01 ASSESSMENT — PAIN DESCRIPTION - ORIENTATION: ORIENTATION: MID

## 2025-05-01 ASSESSMENT — PAIN DESCRIPTION - DESCRIPTORS: DESCRIPTORS: ACHING

## 2025-05-01 ASSESSMENT — PAIN - FUNCTIONAL ASSESSMENT: PAIN_FUNCTIONAL_ASSESSMENT: ACTIVITIES ARE NOT PREVENTED

## 2025-05-01 NOTE — PROGRESS NOTES
Patient back to bed with lift. He got up this morning with lift. He can only follow few commands intermittently. He does move his left arm better then right arm. He can say his name but very garbled. He is not answering anymore of his questions. He is very congested but able to cough at times. Suctioned with yanker. Patient is NPO per speech therapy

## 2025-05-01 NOTE — CARE COORDINATION
[facility]  Notification of Admission Decision      [] Patient has been accepted for admit to [facility] on :       Please write discharge orders and summary prior to discharge.    [] Patient acceptance to Rehab pending the following :    [x] Eval in progress : await PT/OT evals       [] Patient determined to be ineligible for services at [facility] because :       We recommend you consider        Thank you for your referral, we appreciate you. If you have any questions, please feel   free to contact me at [phone].  Electronically Signed by Annmarie Cortes, Admissions Coordinator 5/1/2025 2:16 PM

## 2025-05-01 NOTE — PROGRESS NOTES
[facility] Hospitalists Progress Note    Patient:  Akhil Alejo  YOB: 1955  Date of Service: 5/1/2025  MRN: [id number]   Acct: [id number]   Primary Care Physician: Lennox Waddell MD  Advance Directive: Full Code  Admit Date: 4/25/2025       Hospital Day: 6      CHIEF COMPLAINT:     Chief Complaint   Patient presents with    Abnormal Lab     ABNORMAL EKG DURING STRESS TEST       5/1/2025 7:38 AM  Subjective / Interval History:   05/01/2025  Patient seen and examined this a.m.  Appears lethargic this a.m.  Extubated yesterday and lying comfortably in bed without any acute distress.  No acute changes or acute overnight event reported.   Laying comfortably in bed in no apparent acute distress.    Denies any acute complaints or distress.  Endorses overall improvement.        04/30/2025  Patient seen and examined in the ICU this a.m.   Remain intubated in the ICU due to possible subglottic trauma  ENT consulted      04/29/2025  Patient seen and examined this a.m.   No new complaints.    No active chest pain or dyspnea reported this a.m.  No acute changes or acute overnight event reported.   Laying comfortably in bed in no apparent acute distress.    Denies any acute complaints or distress.    Endorses overall improvement.  Pending CABG this a.m.  Family at bedside.      04/28/2025  Patient seen and examined this a.m.     Denies any active chest pain at this time.  Denies any other acute complaints or distress at this time.  No acute changes or acute overnight event reported.   Sitting comfortably in bed in no apparent acute distress.    Endorses overall improvement.        04/27/2025  Patient seen and examined this a.m.   No new complaints.    Denies any active chest pain at this time.  No acute changes or acute overnight event reported.   Laying comfortably in bed in no apparent acute distress.    Denies any acute complaints or distress.    Endorses overall improvement.      04/26/2025  Patient  cardiac silhouette.    ______________________________________ Electronically signed by: SU ELLISON M.D. Date:     05/01/2025 Time:    06:34     CT HEAD WO CONTRAST  Result Date: 5/1/2025  EXAM:  CT HEAD WITHOUT CONTRAST 5/1/2025. SAGITTAL AND CORONAL REFORMATTED IMAGES OBTAIN  HISTORY:  Impaired neurologic status  COMPARISON:  None.  FINDINGS: There is no evidence of intracranial hemorrhage.  The midline is maintained.  There is no hydrocephalus.  Generalized atrophy. Chronic small vessel ischemic change. Benign calcification within bilateral basal ganglia and cerebellum.  No cerebellar tonsillar ectopia.  Evaluation of the calvarium shows no fracture.  The mastoid air cells are normally pneumatized.      No acute intracranial abnormality.  All CT scans are performed using dose optimization techniques as appropriate to the performed exam and include at least one of the following: Automated exposure control, adjustment of the mA and/or kV according to size, and the use of iterative reconstruction technique.  ______________________________________ Electronically signed by: SINDY JONES M.D. Date:     05/01/2025 Time:    03:38     XR CHEST PORTABLE  Result Date: 4/30/2025  EXAM: CHEST RADIOGRAPH  TECHNIQUE: Single frontal chest radiograph.  HISTORY: Postop open heart surgery.  COMPARISON: 04/25/2025  FINDINGS:  Interval surgery with left rib plating and surgical clips in left axilla. Interval placement of an endotracheal tube, tip about 1.6 cm above the monika. Interval placement of a right IJ [place]-Gisell catheter, with tip in the right pulmonary artery. Interval  placement of a left chest tube. EKG leads and a monitoring pad project over the left chest. Hypoventilation with mild left perihilar atelectasis. No pleural effusion or pneumothorax is seen. Stable borderline cardiomegaly. No acute displaced rib fractures are identified.        1.  Tubes and lines in satisfactory position. 2. No acute findings in the chest.

## 2025-05-01 NOTE — PROGRESS NOTES
Physical Therapy  Attempted PT eval, but pt was just btb with nursing staff via lift.  Electronically signed by Majo Hatch PT on 5/1/2025 at 1:30 PM

## 2025-05-01 NOTE — PLAN OF CARE
Problem: Chronic Conditions and Co-morbidities  Goal: Patient's chronic conditions and co-morbidity symptoms are monitored and maintained or improved  4/30/2025 2054 by Akhil Turner RN  Outcome: Progressing  4/30/2025 0834 by Flaca Rausch RN  Outcome: Progressing  Flowsheets (Taken 4/29/2025 1900 by Gonsalo Santacruz, RN)  Care Plan - Patient's Chronic Conditions and Co-Morbidity Symptoms are Monitored and Maintained or Improved:   Monitor and assess patient's chronic conditions and comorbid symptoms for stability, deterioration, or improvement   Collaborate with multidisciplinary team to address chronic and comorbid conditions and prevent exacerbation or deterioration   Update acute care plan with appropriate goals if chronic or comorbid symptoms are exacerbated and prevent overall improvement and discharge     Problem: Safety - Adult  Goal: Free from fall injury  4/30/2025 2054 by Akhil Turner RN  Outcome: Progressing  4/30/2025 0834 by Flaca Rausch RN  Outcome: Progressing     Problem: ABCDS Injury Assessment  Goal: Absence of physical injury  4/30/2025 2054 by Akhil Turner RN  Outcome: Progressing  4/30/2025 0834 by Flaca Rausch RN  Outcome: Progressing     Problem: Pain  Goal: Verbalizes/displays adequate comfort level or baseline comfort level  4/30/2025 2054 by Akhil Turner RN  Outcome: Progressing  4/30/2025 0834 by Flaca Rausch RN  Outcome: Progressing     Problem: Discharge Planning  Goal: Discharge to home or other facility with appropriate resources  4/30/2025 2054 by Akhil Turner RN  Outcome: Progressing  4/30/2025 0834 by Flaca Rausch RN  Outcome: Progressing  Flowsheets (Taken 4/29/2025 1900 by Gonsalo Santacruz, RN)  Discharge to home or other facility with appropriate resources:   Identify barriers to discharge with patient and caregiver   Arrange for needed discharge resources and transportation as appropriate   Identify discharge learning needs (meds,

## 2025-05-01 NOTE — PROGRESS NOTES
dry.   Neurological:      General: No focal deficit present.      Mental Status: He is alert.   Psychiatric:         Mood and Affect: Mood normal.           Lab Data:  CBC:   Recent Labs     04/29/25 1900 04/30/25 0235 05/01/25  0103   WBC 12.5* 12.4* 13.8*   HGB 13.8* 13.9* 12.5*   HCT 42.8 42.6 38.9*   MCV 89.4 89.9 91.3    153 151     BMP:   Recent Labs     04/29/25 1900 04/29/25 2057 04/30/25 0235 04/30/25  0413 04/30/25  0913 05/01/25  0103 05/01/25  0123     --  142  --   --  143  --    K 3.9   < > 4.5   < > 3.9 3.8 3.8   *  --  113*  --   --  112*  --    CO2 23  --  20*  --   --  21*  --    BUN 19  --  18  --   --  20  --    CREATININE 1.1  --  1.0  --   --  1.0  --     < > = values in this interval not displayed.     LIVER PROFILE:   Recent Labs     04/30/25 0235 05/01/25  0103   AST 59* 38   ALT 31 19   BILIDIR <0.1  --    BILITOT 0.3 0.5   ALKPHOS 64 60     PT/INR:   Recent Labs     04/29/25 1900 04/30/25 0235 05/01/25  0103   PROTIME 15.5* 14.5 15.7*   INR 1.24* 1.14 1.26*       APTT:   Recent Labs     04/29/25 1900   APTT 29.6       BNP:  No results for input(s): \"BNP\" in the last 72 hours.  CK, CKMB, Troponin: @LABRCNT (CKTOTAL:3, CKMB:3, TROPONINI:3)@    IMAGING:    Echo:  04/25/25    ECHO (TTE) COMPLETE (PRN CONTRAST/BUBBLE/STRAIN/3D) 04/25/2025 11:58 AM (Final)    Interpretation Summary    Left Ventricle: Low normal left ventricular systolic function with a visually estimated EF of 50 - 55%. EF by 2D Simpsons Biplane is 54%. Left ventricle size is normal. Moderately increased wall thickness. Findings consistent with moderate concentric hypertrophy. Mild hypokinesis of the basal anterior lateral and mid anterior lateral segements. Normal diastolic function.    Right Ventricle: Right ventricle size is normal. Normal systolic function.    Aortic Valve: Trileaflet valve. Mild sclerosis of the aortic valve cusps.    Mitral Valve: There is mild annular calcification noted.  Left radial approach.          PROCEDURE:      1.  The  patient arrived in the laboratory in the fasting state.  The patient had nothing by mouth since midnight.  3 ECG leads were attached for image gating.  Pulse oximetry signals were monitored.  2.  Supplemental oxygen was administered by nasal cannula throughout the procedure.  3.  The planned puncture site and the right groin and left arm were clipped prepped with chlorhexidine and draped in the usual sterile manner.  4.  A timeout procedure was performed with all the staff from the case verifying the correct patient the correct procedure and the correct site.  All participants in's affirmatively agreed to proceed with the study.  5 . moderate sedation was administered by cardiology staff.  Sedation with fentanyl and midazolam for a total duration of 25 minutes.  6.  Left radial access.  The exit site was infiltrated with 1% lidocaine.  The vessel was cannulated using the Seldinger technique.  At 6 [place] short sheath was advanced into the vessel.  7. Heparin 5000 units with verapamil 2.5 mg and nitroglycerin 100 mcg was given via the left radial artery.  8.  A  JR4 catheter 5 [place] was advanced to the aortic root over a 035 wire under fluoroscopy guidance.  9.  Selective right coronary angiography was performed.  Contrast was injected by hand.  Images were obtained using multiple projections.  The left coronary artery was engaged with a JL 4 JL 3 5 diagnostic catheter and angiographic images were obtained.  10.  left radial artery hemostasis was obtained sheath was removed.  TR band for compression was applied.  11.  total sedation time 25 minutes with 1 mg Versed and 25 mcg fentanyl.  Total contrast 90 mL.  Study completion all catheters introduced during the procedure were removed.  Patient tolerated procedure well.    ANATOMICAL RELATIONSHIP:  the coronary circulation is rt  dominant.    CORONARY ARTERIES:  Left main very short vessel which gives rise to

## 2025-05-01 NOTE — PROGRESS NOTES
Progress Note    2025 4:30 AM          POD#   2    Subjective:  Mr. Alejo is on routine tegretol, celexa, neurontin and also received one dose of oxycodone last night for pain.   PULSE OXIMETRY RANGE: SpO2  Av.1 %  Min: 91 %  Max: 98 %  SUPPLEMENTAL O2: O2 Flow Rate (L/min): 3 L/min   Vital Signs: BP (!) 161/71   Pulse 93   Temp 99 °F (37.2 °C) (Oral)   Resp 24   Ht 1.803 m (5' 11\")   Wt 95.3 kg (210 lb)   SpO2 92%   BMI 29.29 kg/m²    Temperature Range:   Temp: 99 °F (37.2 °C)   Temp  Av.7 °F (37.1 °C)  Min: 97.9 °F (36.6 °C)  Max: 99.7 °F (37.6 °C)                   Rhythm: normal sinus rhythm    Labs:   ABG:    Lab Results   Component Value Date/Time    PHART 7.410 2025 01:23 AM    PO2ART 66.0 2025 01:23 AM    NVE6JXA 32.0 2025 01:23 AM    M0CRECJN 92.6 2025 01:23 AM    PFT4EJP 26 2025 05:49 PM    VTL0TAY 20.3 2025 01:23 AM    BEART -3.5 2025 01:23 AM     CBC:   Recent Labs     255 25  0103   WBC 12.5* 12.4* 13.8*   HGB 13.8* 13.9* 12.5*   HCT 42.8 42.6 38.9*   MCV 89.4 89.9 91.3    153 151     BMP:   Recent Labs     25  0235 25  0413 25  0913 25  0103 25  0123     --  142  --   --  143  --    K 3.9   < > 4.5   < > 3.9 3.8 3.8   *  --  113*  --   --  112*  --    CO2 23  --  20*  --   --  21*  --    BUN 19  --  18  --   --  20  --    CREATININE 1.1  --  1.0  --   --  1.0  --     < > = values in this interval not displayed.     PT/INR:   Recent Labs     25  1900 25  0235 25  0103   PROTIME 15.5* 14.5 15.7*   INR 1.24* 1.14 1.26*     APTT:   Recent Labs     25   APTT 29.6     Chest X-Ray:  low lung volumes, no effusions   CT:  I/O last 3 completed shifts:  In: 5198 [I.V.:4519.4; Blood:480; NG/GT:60; IV Piggyback:138.6]  Out: 3463 [Urine:3225; Chest Tube:238]      I/O last 3 completed shifts:  In: 5198
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
## 2025-05-02 ENCOUNTER — APPOINTMENT (OUTPATIENT)
Dept: GENERAL RADIOLOGY | Age: 70
DRG: 234 | End: 2025-05-02
Attending: INTERNAL MEDICINE
Payer: MEDICARE

## 2025-05-02 ENCOUNTER — APPOINTMENT (OUTPATIENT)
Dept: GENERAL RADIOLOGY | Age: 70
DRG: 234 | End: 2025-05-02
Attending: SURGERY
Payer: MEDICARE

## 2025-05-02 LAB
ANION GAP SERPL CALCULATED.3IONS-SCNC: 13 MMOL/L (ref 8–16)
BUN SERPL-MCNC: 24 MG/DL (ref 8–23)
CALCIUM SERPL-MCNC: 8.4 MG/DL (ref 8.8–10.2)
CHLORIDE SERPL-SCNC: 110 MMOL/L (ref 98–107)
CO2 SERPL-SCNC: 22 MMOL/L (ref 22–29)
CREAT SERPL-MCNC: 1 MG/DL (ref 0.7–1.2)
EKG P AXIS: 45 DEGREES
EKG P-R INTERVAL: 182 MS
EKG Q-T INTERVAL: 386 MS
EKG QRS DURATION: 90 MS
EKG QTC CALCULATION (BAZETT): 431 MS
EKG T AXIS: 100 DEGREES
ERYTHROCYTE [DISTWIDTH] IN BLOOD BY AUTOMATED COUNT: 14.6 % (ref 11.5–14.5)
GLUCOSE BLD-MCNC: 195 MG/DL (ref 70–99)
GLUCOSE BLD-MCNC: 254 MG/DL (ref 70–99)
GLUCOSE BLD-MCNC: 261 MG/DL (ref 70–99)
GLUCOSE SERPL-MCNC: 239 MG/DL (ref 70–99)
HCT VFR BLD AUTO: 39.7 % (ref 42–52)
HGB BLD-MCNC: 12.7 G/DL (ref 14–18)
MAGNESIUM SERPL-MCNC: 2.3 MG/DL (ref 1.6–2.4)
MCH RBC QN AUTO: 29.3 PG (ref 27–31)
MCHC RBC AUTO-ENTMCNC: 32 G/DL (ref 33–37)
MCV RBC AUTO: 91.5 FL (ref 80–94)
PERFORMED ON: ABNORMAL
PLATELET # BLD AUTO: 155 K/UL (ref 130–400)
PMV BLD AUTO: 10.9 FL (ref 9.4–12.4)
POTASSIUM SERPL-SCNC: 3.5 MMOL/L (ref 3.5–5)
RBC # BLD AUTO: 4.34 M/UL (ref 4.7–6.1)
SODIUM SERPL-SCNC: 145 MMOL/L (ref 136–145)
WBC # BLD AUTO: 13.7 K/UL (ref 4.8–10.8)

## 2025-05-02 PROCEDURE — 6360000002 HC RX W HCPCS: Performed by: SURGERY

## 2025-05-02 PROCEDURE — 6360000002 HC RX W HCPCS

## 2025-05-02 PROCEDURE — 94150 VITAL CAPACITY TEST: CPT

## 2025-05-02 PROCEDURE — 6370000000 HC RX 637 (ALT 250 FOR IP): Performed by: SURGERY

## 2025-05-02 PROCEDURE — 2700000000 HC OXYGEN THERAPY PER DAY

## 2025-05-02 PROCEDURE — 92507 TX SP LANG VOICE COMM INDIV: CPT

## 2025-05-02 PROCEDURE — 71045 X-RAY EXAM CHEST 1 VIEW: CPT

## 2025-05-02 PROCEDURE — 92526 ORAL FUNCTION THERAPY: CPT

## 2025-05-02 PROCEDURE — 2500000003 HC RX 250 WO HCPCS: Performed by: SURGERY

## 2025-05-02 PROCEDURE — 94640 AIRWAY INHALATION TREATMENT: CPT

## 2025-05-02 PROCEDURE — 99024 POSTOP FOLLOW-UP VISIT: CPT | Performed by: SURGERY

## 2025-05-02 PROCEDURE — 97530 THERAPEUTIC ACTIVITIES: CPT

## 2025-05-02 PROCEDURE — 36415 COLL VENOUS BLD VENIPUNCTURE: CPT

## 2025-05-02 PROCEDURE — 93010 ELECTROCARDIOGRAM REPORT: CPT | Performed by: INTERNAL MEDICINE

## 2025-05-02 PROCEDURE — 94760 N-INVAS EAR/PLS OXIMETRY 1: CPT

## 2025-05-02 PROCEDURE — 80048 BASIC METABOLIC PNL TOTAL CA: CPT

## 2025-05-02 PROCEDURE — 83735 ASSAY OF MAGNESIUM: CPT

## 2025-05-02 PROCEDURE — 92523 SPEECH SOUND LANG COMPREHEN: CPT

## 2025-05-02 PROCEDURE — 97162 PT EVAL MOD COMPLEX 30 MIN: CPT

## 2025-05-02 PROCEDURE — 82962 GLUCOSE BLOOD TEST: CPT

## 2025-05-02 PROCEDURE — 85027 COMPLETE CBC AUTOMATED: CPT

## 2025-05-02 PROCEDURE — 97165 OT EVAL LOW COMPLEX 30 MIN: CPT

## 2025-05-02 PROCEDURE — 2000000000 HC ICU R&B

## 2025-05-02 RX ORDER — LABETALOL HYDROCHLORIDE 5 MG/ML
INJECTION, SOLUTION INTRAVENOUS
Status: COMPLETED
Start: 2025-05-02 | End: 2025-05-02

## 2025-05-02 RX ORDER — LABETALOL HYDROCHLORIDE 5 MG/ML
10 INJECTION, SOLUTION INTRAVENOUS EVERY 4 HOURS PRN
Status: DISCONTINUED | OUTPATIENT
Start: 2025-05-02 | End: 2025-05-13 | Stop reason: HOSPADM

## 2025-05-02 RX ADMIN — METFORMIN HYDROCHLORIDE 500 MG: 500 TABLET ORAL at 16:30

## 2025-05-02 RX ADMIN — IPRATROPIUM BROMIDE AND ALBUTEROL SULFATE 1 DOSE: 2.5; .5 SOLUTION RESPIRATORY (INHALATION) at 14:17

## 2025-05-02 RX ADMIN — INSULIN LISPRO 4 UNITS: 100 INJECTION, SOLUTION INTRAVENOUS; SUBCUTANEOUS at 16:30

## 2025-05-02 RX ADMIN — IPRATROPIUM BROMIDE AND ALBUTEROL SULFATE 1 DOSE: 2.5; .5 SOLUTION RESPIRATORY (INHALATION) at 10:32

## 2025-05-02 RX ADMIN — SODIUM CHLORIDE, PRESERVATIVE FREE 10 ML: 5 INJECTION INTRAVENOUS at 19:30

## 2025-05-02 RX ADMIN — ASPIRIN 81 MG 81 MG: 81 TABLET ORAL at 12:44

## 2025-05-02 RX ADMIN — LABETALOL HYDROCHLORIDE 10 MG: 5 INJECTION, SOLUTION INTRAVENOUS at 07:34

## 2025-05-02 RX ADMIN — POTASSIUM CHLORIDE 10 MEQ: 7.46 INJECTION, SOLUTION INTRAVENOUS at 05:26

## 2025-05-02 RX ADMIN — LABETALOL HYDROCHLORIDE 10 MG: 5 INJECTION, SOLUTION INTRAVENOUS at 17:33

## 2025-05-02 RX ADMIN — ENOXAPARIN SODIUM 40 MG: 100 INJECTION SUBCUTANEOUS at 07:32

## 2025-05-02 RX ADMIN — METOPROLOL TARTRATE 50 MG: 50 TABLET, FILM COATED ORAL at 12:44

## 2025-05-02 RX ADMIN — METOPROLOL TARTRATE 5 MG: 5 INJECTION INTRAVENOUS at 05:37

## 2025-05-02 RX ADMIN — FUROSEMIDE 20 MG: 10 INJECTION, SOLUTION INTRAMUSCULAR; INTRAVENOUS at 07:31

## 2025-05-02 RX ADMIN — INSULIN LISPRO 2 UNITS: 100 INJECTION, SOLUTION INTRAVENOUS; SUBCUTANEOUS at 20:00

## 2025-05-02 RX ADMIN — ACETYLCYSTEINE 600 MG: 200 SOLUTION ORAL; RESPIRATORY (INHALATION) at 19:06

## 2025-05-02 RX ADMIN — MORPHINE SULFATE 2 MG: 2 INJECTION, SOLUTION INTRAMUSCULAR; INTRAVENOUS at 10:33

## 2025-05-02 RX ADMIN — POTASSIUM CHLORIDE 10 MEQ: 7.46 INJECTION, SOLUTION INTRAVENOUS at 06:20

## 2025-05-02 RX ADMIN — CLOPIDOGREL BISULFATE 75 MG: 75 TABLET, FILM COATED ORAL at 12:44

## 2025-05-02 RX ADMIN — METFORMIN HYDROCHLORIDE 500 MG: 500 TABLET ORAL at 12:43

## 2025-05-02 RX ADMIN — FUROSEMIDE 20 MG: 10 INJECTION, SOLUTION INTRAMUSCULAR; INTRAVENOUS at 16:30

## 2025-05-02 RX ADMIN — INSULIN GLARGINE 13 UNITS: 100 INJECTION, SOLUTION SUBCUTANEOUS at 20:00

## 2025-05-02 RX ADMIN — INSULIN LISPRO 4 UNITS: 100 INJECTION, SOLUTION INTRAVENOUS; SUBCUTANEOUS at 12:37

## 2025-05-02 RX ADMIN — IPRATROPIUM BROMIDE AND ALBUTEROL SULFATE 1 DOSE: 2.5; .5 SOLUTION RESPIRATORY (INHALATION) at 06:20

## 2025-05-02 RX ADMIN — IPRATROPIUM BROMIDE AND ALBUTEROL SULFATE 1 DOSE: 2.5; .5 SOLUTION RESPIRATORY (INHALATION) at 19:06

## 2025-05-02 RX ADMIN — ACETYLCYSTEINE 600 MG: 200 SOLUTION ORAL; RESPIRATORY (INHALATION) at 06:20

## 2025-05-02 RX ADMIN — INSULIN LISPRO 2 UNITS: 100 INJECTION, SOLUTION INTRAVENOUS; SUBCUTANEOUS at 07:32

## 2025-05-02 RX ADMIN — LEVOTHYROXINE SODIUM 50 MCG: 0.05 TABLET ORAL at 12:43

## 2025-05-02 ASSESSMENT — PAIN - FUNCTIONAL ASSESSMENT: PAIN_FUNCTIONAL_ASSESSMENT: ACTIVITIES ARE NOT PREVENTED

## 2025-05-02 ASSESSMENT — PAIN DESCRIPTION - ORIENTATION: ORIENTATION: MID

## 2025-05-02 ASSESSMENT — PAIN SCALES - GENERAL
PAINLEVEL_OUTOF10: 9
PAINLEVEL_OUTOF10: 2
PAINLEVEL_OUTOF10: 0

## 2025-05-02 ASSESSMENT — PAIN DESCRIPTION - DESCRIPTORS: DESCRIPTORS: ACHING

## 2025-05-02 ASSESSMENT — PAIN DESCRIPTION - LOCATION: LOCATION: CHEST
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
## 2025-05-02 NOTE — PLAN OF CARE
Problem: Chronic Conditions and Co-morbidities  Goal: Patient's chronic conditions and co-morbidity symptoms are monitored and maintained or improved  5/2/2025 0934 by Lynda Wheeler RN  Outcome: Progressing  Flowsheets (Taken 5/2/2025 0800)  Care Plan - Patient's Chronic Conditions and Co-Morbidity Symptoms are Monitored and Maintained or Improved: Monitor and assess patient's chronic conditions and comorbid symptoms for stability, deterioration, or improvement  5/1/2025 2021 by Akhil Turner RN  Outcome: Progressing  Flowsheets (Taken 5/1/2025 1300 by Lynda Wheeler, RN)  Care Plan - Patient's Chronic Conditions and Co-Morbidity Symptoms are Monitored and Maintained or Improved: Monitor and assess patient's chronic conditions and comorbid symptoms for stability, deterioration, or improvement     Problem: Safety - Adult  Goal: Free from fall injury  5/2/2025 0934 by Lynda Wheeler, RN  Outcome: Progressing  Flowsheets (Taken 5/2/2025 0900)  Free From Fall Injury: Instruct family/caregiver on patient safety  5/1/2025 2021 by Akhil Turner RN  Outcome: Progressing     Problem: ABCDS Injury Assessment  Goal: Absence of physical injury  5/2/2025 0934 by Lynda Wheeler RN  Outcome: Progressing  5/1/2025 2021 by Akhil Turner RN  Outcome: Progressing     Problem: Pain  Goal: Verbalizes/displays adequate comfort level or baseline comfort level  5/2/2025 0934 by Lynda Wheeler, RN  Outcome: Progressing  Flowsheets (Taken 5/2/2025 0800)  Verbalizes/displays adequate comfort level or baseline comfort level: Encourage patient to monitor pain and request assistance  5/1/2025 2021 by Akhil Turner RN  Outcome: Progressing     Problem: Discharge Planning  Goal: Discharge to home or other facility with appropriate resources  5/2/2025 0934 by Lynda Wheeler RN  Outcome: Progressing  Flowsheets (Taken 5/2/2025 0800)  Discharge to home or other facility with appropriate resources: Identify barriers to

## 2025-05-02 NOTE — PROGRESS NOTES
Progress Note    2025 5:30 AM          POD#   3    Subjective:  Mr. Alejo is off celexa, neurontin, tegretol due to NPO. Required morphine 2 mg and no oxycodone in the last day for pain.  PULSE OXIMETRY RANGE: SpO2  Av.5 %  Min: 91 %  Max: 98 %  SUPPLEMENTAL O2: O2 Flow Rate (L/min): 2 L/min   Vital Signs: BP (!) 173/78   Pulse 95   Temp 99.1 °F (37.3 °C) (Temporal)   Resp (!) 34   Ht 1.803 m (5' 11\")   Wt 95.3 kg (210 lb)   SpO2 94%   BMI 29.29 kg/m²    Temperature Range:   Temp: 99.1 °F (37.3 °C)   Temp  Av.7 °F (37.1 °C)  Min: 97.9 °F (36.6 °C)  Max: 99.6 °F (37.6 °C)                   Rhythm: normal sinus rhythm    Labs:   ABG:    Lab Results   Component Value Date/Time    PHART 7.410 2025 01:23 AM    PO2ART 66.0 2025 01:23 AM    THY4HHA 32.0 2025 01:23 AM    L5KXDDWC 92.6 2025 01:23 AM    LWB4WCK 26 2025 05:49 PM    SDH1AHJ 20.3 2025 01:23 AM    BEART -3.5 2025 01:23 AM     CBC:   Recent Labs     25  0235 25  0103 25  0119   WBC 12.4* 13.8* 13.7*   HGB 13.9* 12.5* 12.7*   HCT 42.6 38.9* 39.7*   MCV 89.9 91.3 91.5    151 155     BMP:   Recent Labs     25  0235 25  0413 25  0103 25  0123 25  0119     --  143  --  145   K 4.5   < > 3.8 3.8 3.5   *  --  112*  --  110*   CO2 20*  --  21*  --  22   BUN 18  --  20  --  24*   CREATININE 1.0  --  1.0  --  1.0    < > = values in this interval not displayed.     PT/INR:   Recent Labs     25  1900 25  0235 25  0103   PROTIME 15.5* 14.5 15.7*   INR 1.24* 1.14 1.26*     APTT:   Recent Labs     25  1900   APTT 29.6     Chest X-Ray:  both lungs well expanded, no infiltrates or effusions   CT:  I/O last 3 completed shifts:  In: 2345.9 [I.V.:2085.9; NG/GT:60; IV Piggyback:200]  Out: 3940 [Urine:3920; Chest Tube:20]      I/O last 3 completed shifts:  In: 2345.9 [I.V.:2085.9; NG/GT:60; IV Piggyback:200]  Out: 3940 [Urine:3920;

## 2025-05-02 NOTE — PROGRESS NOTES
Physical Therapy  Facility/Department: [facility] ICU  Physical Therapy Initial Assessment    Name: Akhil Alejo  : 1955  MRN: [id number]  Date of Service: 2025    Discharge Recommendations:  Continue to assess pending progress, 24 hour supervision or assist, Patient would benefit from continued therapy after discharge          Patient Diagnosis(es): The primary encounter diagnosis was Abnormal stress test. Diagnoses of Abnormal nuclear cardiac imaging test and CAD in native artery were also pertinent to this visit.  Past Medical History:  has a past medical history of Arm fracture, Dementia (HCC), Depression, Diabetes mellitus (HCC), Hypertension, Kidney stones, Neuropathy, and Restless leg syndrome.  Past Surgical History:  has a past surgical history that includes Cholecystectomy; Lithotripsy; shoulder surgery (Left); bone marrow biopsy (Right, 2020); Colonoscopy (2020); Upper gastrointestinal endoscopy (2017); Cardiac procedure (N/A, 2025); and Coronary artery bypass graft (N/A, 2025).    Assessment  Body Structures, Functions, Activity Limitations Requiring Skilled Therapeutic Intervention: Decreased functional mobility ;Decreased ADL status;Decreased ROM;Decreased cognition;Decreased safe awareness;Decreased strength;Decreased balance;Decreased endurance;Decreased coordination;Decreased posture  Therapy Prognosis: Good  Decision Making: Medium Complexity  Barriers to Learning: none noted  Requires PT Follow-Up: Yes  Activity Tolerance  Activity Tolerance: Patient limited by fatigue;Treatment limited secondary to decreased cognition    Plan  Physical Therapy Plan  General Plan: 5-7 times per week  Therapy Duration: 2 Weeks  Current Treatment Recommendations: Strengthening, ROM, Balance training, Functional mobility training, Transfer training, Gait training, Endurance training, Patient/Caregiver education & training, Safety education & training, Positioning, Equipment evaluation,

## 2025-05-02 NOTE — PROGRESS NOTES
[facility] Progress Note    Patient:  Akhil Alejo  YOB: 1955  Date of Service: 5/2/2025  MRN: [id number]   Acct: [id number]   Primary Care Physician: Lennox Waddell MD  Advance Directive: Full Code  Admit Date: 4/25/2025       Hospital Day: 7      CHIEF COMPLAINT:     Chief Complaint   Patient presents with    Abnormal Lab     ABNORMAL EKG DURING STRESS TEST       5/2/2025 8:32 AM  Subjective / Interval History:   05/02/2025  Patient seen and examined this a.m.   No new complaints.   No acute changes or acute overnight event reported.   Laying comfortably in bed in no apparent acute distress.  Denies any acute complaints or distress.        05/01/2025  Patient seen and examined this a.m.  Appears lethargic this a.m.  Extubated yesterday and lying comfortably in bed without any acute distress.  No acute changes or acute overnight event reported.   Laying comfortably in bed in no apparent acute distress.    Denies any acute complaints or distress.  Endorses overall improvement.        04/30/2025  Patient seen and examined in the ICU this a.m.   Remain intubated in the ICU due to possible subglottic trauma  ENT consulted      04/29/2025  Patient seen and examined this a.m.   No new complaints.    No active chest pain or dyspnea reported this a.m.  No acute changes or acute overnight event reported.   Laying comfortably in bed in no apparent acute distress.    Denies any acute complaints or distress.    Endorses overall improvement.  Pending CABG this a.m.  Family at bedside.      04/28/2025  Patient seen and examined this a.m.     Denies any active chest pain at this time.  Denies any other acute complaints or distress at this time.  No acute changes or acute overnight event reported.   Sitting comfortably in bed in no apparent acute distress.    Endorses overall improvement.        04/27/2025  Patient seen and examined this a.m.   No new complaints.    Denies any active chest pain

## 2025-05-02 NOTE — PROGRESS NOTES
Occupational Therapy Initial Assessment  Date: 2025   Patient Name: Akhil Alejo  MRN: [id number]     : 1955    Date of Service: 2025    Discharge Recommendations:  Continue to assess pending progress         General  Additional Pertinent Hx: CABG  2025  Referring Practitioner: Dr. Lara  Diagnosis: CABG    Assessment   Assessment: Pt Mod A x 2 bed mobility, Min A x 2 sit to stand with HHA and MIn A x 2 to stand and step to Recliner,  pt with nursing at conclusion of therapist treatment.  S/P CABG pt could  benefit from skilled Occupational therapy to address decreased I with ADL tasks, balance, endurance, and functional mobility for safe d/c planning, improve quality of life and decrease burden of care.  Prognosis: Good  Decision Making: Low Complexity  REQUIRES OT FOLLOW-UP: Yes  Activity Tolerance  Activity Tolerance: Patient Tolerated treatment well              Patient Diagnosis(es): The primary encounter diagnosis was Abnormal stress test. Diagnoses of Abnormal nuclear cardiac imaging test and CAD in native artery were also pertinent to this visit.    Past Medical History:   Past Medical History:   Diagnosis Date    Arm fracture 2016    left    Dementia (HCC)     per wife he needs be supervised at home    Depression     Diabetes mellitus (HCC)     Hypertension     Kidney stones     Neuropathy     Restless leg syndrome         Past Surgical History:   Past Surgical History:   Procedure Laterality Date    BONE MARROW BIOPSY Right 2020    BONE MARROW ASPIRATION BIOPSY performed by ALISA Rabago at [facility] OR    CARDIAC PROCEDURE N/A 2025    Left heart cath / coronary angiography performed by Chayo Cortes MD at [facility] CARDIAC CATH LAB    CHOLECYSTECTOMY      COLONOSCOPY  2020    Dr Patiño   AP    CORONARY ARTERY BYPASS GRAFT N/A 2025    CORONARY ARTERY BYPASS GRAFT X3, MINIMALLY INVASIVE robotic, pump assisted via femoral access, BILATERAL INTERNAL

## 2025-05-02 NOTE — PROGRESS NOTES
Comprehensive Nutrition Assessment    Type and Reason for Visit:  Initial, LOS    Nutrition Recommendations/Plan:   Follow for nutrition progression     Malnutrition Assessment:  Malnutrition Status:  At risk for malnutrition (05/02/25 0915)    Context:  Acute Illness     Findings of the 6 clinical characteristics of malnutrition:  Energy Intake:  Mild decrease in energy intake  Weight Loss:  No weight loss     Body Fat Loss:  No body fat loss     Muscle Mass Loss:  No muscle mass loss    Fluid Accumulation:  No fluid accumulation     Strength:  Not Performed    Nutrition Assessment:    Following pt for LOS day 7. Pt is s/p CABG on 4/30, has been extubated but remains NPO per SLP recs. Will follow for nutrition progression.    Nutrition Related Findings:    Gluc 229-239 Wound Type: Surgical Incision       Current Nutrition Intake & Therapies:    Average Meal Intake: NPO     Diet NPO    Anthropometric Measures:  Height: 180.3 cm (5' 11\")  Ideal Body Weight (IBW): 172 lbs (78 kg)    Admission Body Weight: 88.5 kg (195 lb)  Current Body Weight: 91.1 kg (200 lb 13.4 oz),   IBW. Weight Source: Bed scale  Current BMI (kg/m2): 28  Usual Body Weight: 88.5 kg (195 lb)     % Weight Change (Calculated): 3                    BMI Categories: Overweight (BMI 25.0-29.9)    Estimated Daily Nutrient Needs:  Energy Requirements Based On: Kcal/kg  Weight Used for Energy Requirements: Admission  Energy (kcal/day): 7378-5896  Weight Used for Protein Requirements: Ideal  Protein (g/day): 117-156  Method Used for Fluid Requirements: 1 ml/kcal  Fluid (ml/day): 6340-3093    Nutrition Diagnosis:   Inadequate oral intake related to swallowing difficulty as evidenced by swallow study results, NPO or clear liquid status due to medical condition    Nutrition Interventions:   Food and/or Nutrient Delivery: Continue NPO     Coordination of Nutrition Care: Continue to monitor while inpatient       Goals:  Goals: Initiate PO diet  Type of Goal:

## 2025-05-02 NOTE — PLAN OF CARE
Problem: Chronic Conditions and Co-morbidities  Goal: Patient's chronic conditions and co-morbidity symptoms are monitored and maintained or improved  5/1/2025 2021 by Akhil Turner RN  Outcome: Progressing  Flowsheets (Taken 5/1/2025 1300 by Lynda Wheeler, RN)  Care Plan - Patient's Chronic Conditions and Co-Morbidity Symptoms are Monitored and Maintained or Improved: Monitor and assess patient's chronic conditions and comorbid symptoms for stability, deterioration, or improvement  5/1/2025 1054 by Itzel Williamson RN  Outcome: Progressing  5/1/2025 0926 by Felicia Coulter  Outcome: Progressing     Problem: Safety - Adult  Goal: Free from fall injury  5/1/2025 2021 by Akhil Turner RN  Outcome: Progressing  5/1/2025 1054 by Itzel Williamson RN  Outcome: Progressing  5/1/2025 0926 by Felicia Coulter  Outcome: Progressing     Problem: ABCDS Injury Assessment  Goal: Absence of physical injury  5/1/2025 2021 by Akhil Turner RN  Outcome: Progressing  5/1/2025 1054 by Itzel Williamson RN  Outcome: Progressing  5/1/2025 0926 by Felicia Coulter  Outcome: Progressing     Problem: Pain  Goal: Verbalizes/displays adequate comfort level or baseline comfort level  5/1/2025 2021 by Akhil Turner RN  Outcome: Progressing  5/1/2025 1054 by Itzel Williamson RN  Outcome: Progressing  5/1/2025 0926 by Felicia Coulter  Outcome: Progressing     Problem: Discharge Planning  Goal: Discharge to home or other facility with appropriate resources  5/1/2025 2021 by Akhil Turner RN  Outcome: Progressing  Flowsheets (Taken 5/1/2025 1300 by Lynda Wheeler, RN)  Discharge to home or other facility with appropriate resources: Identify barriers to discharge with patient and caregiver  5/1/2025 1054 by Itzel Williamson RN  Outcome: Progressing  5/1/2025 0926 by Felicia Coulter  Outcome: Progressing     Problem: Skin/Tissue Integrity  Goal: Skin integrity remains intact  Description: 1.  Monitor for areas

## 2025-05-03 ENCOUNTER — APPOINTMENT (OUTPATIENT)
Dept: GENERAL RADIOLOGY | Age: 70
DRG: 234 | End: 2025-05-03
Attending: SURGERY
Payer: MEDICARE

## 2025-05-03 LAB
ALBUMIN SERPL-MCNC: 3.1 G/DL (ref 3.5–5.2)
ALP SERPL-CCNC: 72 U/L (ref 40–129)
ALT SERPL-CCNC: 10 U/L (ref 10–50)
ANION GAP SERPL CALCULATED.3IONS-SCNC: 14 MMOL/L (ref 8–16)
AST SERPL-CCNC: 22 U/L (ref 10–50)
BILIRUB SERPL-MCNC: 0.5 MG/DL (ref 0.2–1.2)
BUN SERPL-MCNC: 37 MG/DL (ref 8–23)
CALCIUM SERPL-MCNC: 9.1 MG/DL (ref 8.8–10.2)
CHLORIDE SERPL-SCNC: 111 MMOL/L (ref 98–107)
CO2 SERPL-SCNC: 22 MMOL/L (ref 22–29)
CREAT SERPL-MCNC: 0.9 MG/DL (ref 0.7–1.2)
ERYTHROCYTE [DISTWIDTH] IN BLOOD BY AUTOMATED COUNT: 14.6 % (ref 11.5–14.5)
GLUCOSE BLD-MCNC: 214 MG/DL (ref 70–99)
GLUCOSE BLD-MCNC: 214 MG/DL (ref 70–99)
GLUCOSE BLD-MCNC: 225 MG/DL (ref 70–99)
GLUCOSE BLD-MCNC: 253 MG/DL (ref 70–99)
GLUCOSE BLD-MCNC: 260 MG/DL (ref 70–99)
GLUCOSE SERPL-MCNC: 214 MG/DL (ref 70–99)
HCT VFR BLD AUTO: 40.8 % (ref 42–52)
HGB BLD-MCNC: 13 G/DL (ref 14–18)
MAGNESIUM SERPL-MCNC: 2.5 MG/DL (ref 1.6–2.4)
MCH RBC QN AUTO: 29 PG (ref 27–31)
MCHC RBC AUTO-ENTMCNC: 31.9 G/DL (ref 33–37)
MCV RBC AUTO: 90.9 FL (ref 80–94)
PERFORMED ON: ABNORMAL
PLATELET # BLD AUTO: 207 K/UL (ref 130–400)
PMV BLD AUTO: 11.1 FL (ref 9.4–12.4)
POTASSIUM SERPL-SCNC: 3.4 MMOL/L (ref 3.5–5)
PROT SERPL-MCNC: 7.1 G/DL (ref 6.4–8.3)
RBC # BLD AUTO: 4.49 M/UL (ref 4.7–6.1)
SODIUM SERPL-SCNC: 147 MMOL/L (ref 136–145)
WBC # BLD AUTO: 12.2 K/UL (ref 4.8–10.8)

## 2025-05-03 PROCEDURE — 97530 THERAPEUTIC ACTIVITIES: CPT

## 2025-05-03 PROCEDURE — 82962 GLUCOSE BLOOD TEST: CPT

## 2025-05-03 PROCEDURE — 2500000003 HC RX 250 WO HCPCS: Performed by: SURGERY

## 2025-05-03 PROCEDURE — 80053 COMPREHEN METABOLIC PANEL: CPT

## 2025-05-03 PROCEDURE — 94150 VITAL CAPACITY TEST: CPT

## 2025-05-03 PROCEDURE — 94640 AIRWAY INHALATION TREATMENT: CPT

## 2025-05-03 PROCEDURE — 6360000002 HC RX W HCPCS: Performed by: SURGERY

## 2025-05-03 PROCEDURE — 6370000000 HC RX 637 (ALT 250 FOR IP): Performed by: SURGERY

## 2025-05-03 PROCEDURE — 71045 X-RAY EXAM CHEST 1 VIEW: CPT

## 2025-05-03 PROCEDURE — 51702 INSERT TEMP BLADDER CATH: CPT

## 2025-05-03 PROCEDURE — 36415 COLL VENOUS BLD VENIPUNCTURE: CPT

## 2025-05-03 PROCEDURE — 83735 ASSAY OF MAGNESIUM: CPT

## 2025-05-03 PROCEDURE — 2700000000 HC OXYGEN THERAPY PER DAY

## 2025-05-03 PROCEDURE — 94760 N-INVAS EAR/PLS OXIMETRY 1: CPT

## 2025-05-03 PROCEDURE — 1200000000 HC SEMI PRIVATE

## 2025-05-03 PROCEDURE — 99024 POSTOP FOLLOW-UP VISIT: CPT | Performed by: SURGERY

## 2025-05-03 PROCEDURE — 85027 COMPLETE CBC AUTOMATED: CPT

## 2025-05-03 RX ORDER — GABAPENTIN 100 MG/1
100 CAPSULE ORAL 3 TIMES DAILY
Status: DISCONTINUED | OUTPATIENT
Start: 2025-05-03 | End: 2025-05-13 | Stop reason: HOSPADM

## 2025-05-03 RX ORDER — ISOSORBIDE MONONITRATE 60 MG/1
30 TABLET, EXTENDED RELEASE ORAL DAILY
Status: DISCONTINUED | OUTPATIENT
Start: 2025-05-03 | End: 2025-05-13 | Stop reason: HOSPADM

## 2025-05-03 RX ADMIN — METOPROLOL TARTRATE 50 MG: 50 TABLET, FILM COATED ORAL at 21:25

## 2025-05-03 RX ADMIN — METOPROLOL TARTRATE 50 MG: 50 TABLET, FILM COATED ORAL at 07:46

## 2025-05-03 RX ADMIN — GABAPENTIN 100 MG: 100 CAPSULE ORAL at 08:53

## 2025-05-03 RX ADMIN — ATORVASTATIN CALCIUM 20 MG: 20 TABLET, FILM COATED ORAL at 00:30

## 2025-05-03 RX ADMIN — CLOPIDOGREL BISULFATE 75 MG: 75 TABLET, FILM COATED ORAL at 07:46

## 2025-05-03 RX ADMIN — CITALOPRAM HYDROBROMIDE 20 MG: 10 TABLET, FILM COATED ORAL at 21:25

## 2025-05-03 RX ADMIN — INSULIN LISPRO 4 UNITS: 100 INJECTION, SOLUTION INTRAVENOUS; SUBCUTANEOUS at 21:26

## 2025-05-03 RX ADMIN — CITALOPRAM HYDROBROMIDE 20 MG: 10 TABLET, FILM COATED ORAL at 00:30

## 2025-05-03 RX ADMIN — IPRATROPIUM BROMIDE AND ALBUTEROL SULFATE 1 DOSE: 2.5; .5 SOLUTION RESPIRATORY (INHALATION) at 06:48

## 2025-05-03 RX ADMIN — INSULIN LISPRO 2 UNITS: 100 INJECTION, SOLUTION INTRAVENOUS; SUBCUTANEOUS at 17:56

## 2025-05-03 RX ADMIN — POTASSIUM CHLORIDE 10 MEQ: 7.46 INJECTION, SOLUTION INTRAVENOUS at 06:08

## 2025-05-03 RX ADMIN — LEVOTHYROXINE SODIUM 50 MCG: 0.05 TABLET ORAL at 07:46

## 2025-05-03 RX ADMIN — ASPIRIN 81 MG 81 MG: 81 TABLET ORAL at 07:46

## 2025-05-03 RX ADMIN — IPRATROPIUM BROMIDE AND ALBUTEROL SULFATE 1 DOSE: 2.5; .5 SOLUTION RESPIRATORY (INHALATION) at 14:43

## 2025-05-03 RX ADMIN — METFORMIN HYDROCHLORIDE 500 MG: 500 TABLET ORAL at 07:46

## 2025-05-03 RX ADMIN — OXYCODONE HYDROCHLORIDE 10 MG: 10 TABLET ORAL at 17:51

## 2025-05-03 RX ADMIN — INSULIN GLARGINE 13 UNITS: 100 INJECTION, SOLUTION SUBCUTANEOUS at 21:25

## 2025-05-03 RX ADMIN — POTASSIUM CHLORIDE 10 MEQ: 7.46 INJECTION, SOLUTION INTRAVENOUS at 07:29

## 2025-05-03 RX ADMIN — OXYCODONE HYDROCHLORIDE 10 MG: 10 TABLET ORAL at 21:23

## 2025-05-03 RX ADMIN — POTASSIUM CHLORIDE 10 MEQ: 7.46 INJECTION, SOLUTION INTRAVENOUS at 10:13

## 2025-05-03 RX ADMIN — METOPROLOL TARTRATE 50 MG: 50 TABLET, FILM COATED ORAL at 00:30

## 2025-05-03 RX ADMIN — ACETYLCYSTEINE 600 MG: 200 SOLUTION ORAL; RESPIRATORY (INHALATION) at 06:48

## 2025-05-03 RX ADMIN — SODIUM CHLORIDE, PRESERVATIVE FREE 10 ML: 5 INJECTION INTRAVENOUS at 21:26

## 2025-05-03 RX ADMIN — LABETALOL HYDROCHLORIDE 10 MG: 5 INJECTION, SOLUTION INTRAVENOUS at 05:33

## 2025-05-03 RX ADMIN — SODIUM CHLORIDE, PRESERVATIVE FREE 10 ML: 5 INJECTION INTRAVENOUS at 07:46

## 2025-05-03 RX ADMIN — IPRATROPIUM BROMIDE AND ALBUTEROL SULFATE 1 DOSE: 2.5; .5 SOLUTION RESPIRATORY (INHALATION) at 18:44

## 2025-05-03 RX ADMIN — POTASSIUM CHLORIDE 10 MEQ: 7.46 INJECTION, SOLUTION INTRAVENOUS at 08:46

## 2025-05-03 RX ADMIN — IPRATROPIUM BROMIDE AND ALBUTEROL SULFATE 1 DOSE: 2.5; .5 SOLUTION RESPIRATORY (INHALATION) at 10:37

## 2025-05-03 RX ADMIN — ISOSORBIDE MONONITRATE 30 MG: 60 TABLET, EXTENDED RELEASE ORAL at 08:53

## 2025-05-03 RX ADMIN — INSULIN LISPRO 4 UNITS: 100 INJECTION, SOLUTION INTRAVENOUS; SUBCUTANEOUS at 07:46

## 2025-05-03 RX ADMIN — ENOXAPARIN SODIUM 40 MG: 100 INJECTION SUBCUTANEOUS at 07:46

## 2025-05-03 RX ADMIN — ATORVASTATIN CALCIUM 20 MG: 20 TABLET, FILM COATED ORAL at 21:25

## 2025-05-03 RX ADMIN — METFORMIN HYDROCHLORIDE 500 MG: 500 TABLET ORAL at 17:51

## 2025-05-03 RX ADMIN — GABAPENTIN 100 MG: 100 CAPSULE ORAL at 21:25

## 2025-05-03 RX ADMIN — GABAPENTIN 100 MG: 100 CAPSULE ORAL at 14:16

## 2025-05-03 RX ADMIN — INSULIN LISPRO 2 UNITS: 100 INJECTION, SOLUTION INTRAVENOUS; SUBCUTANEOUS at 11:27

## 2025-05-03 RX ADMIN — FUROSEMIDE 20 MG: 10 INJECTION, SOLUTION INTRAMUSCULAR; INTRAVENOUS at 07:46

## 2025-05-03 RX ADMIN — ACETYLCYSTEINE 600 MG: 200 SOLUTION ORAL; RESPIRATORY (INHALATION) at 18:44

## 2025-05-03 ASSESSMENT — PAIN DESCRIPTION - DESCRIPTORS: DESCRIPTORS: ACHING

## 2025-05-03 ASSESSMENT — PAIN SCALES - GENERAL
PAINLEVEL_OUTOF10: 6
PAINLEVEL_OUTOF10: 0

## 2025-05-03 ASSESSMENT — PAIN DESCRIPTION - LOCATION: LOCATION: CHEST;INCISION

## 2025-05-03 ASSESSMENT — PAIN - FUNCTIONAL ASSESSMENT: PAIN_FUNCTIONAL_ASSESSMENT: ACTIVITIES ARE NOT PREVENTED

## 2025-05-03 ASSESSMENT — PAIN DESCRIPTION - ORIENTATION: ORIENTATION: LEFT

## 2025-05-03 NOTE — PLAN OF CARE
Problem: Chronic Conditions and Co-morbidities  Goal: Patient's chronic conditions and co-morbidity symptoms are monitored and maintained or improved  Outcome: Progressing     Problem: Safety - Adult  Goal: Free from fall injury  Outcome: Progressing     Problem: ABCDS Injury Assessment  Goal: Absence of physical injury  Outcome: Progressing     Problem: Pain  Goal: Verbalizes/displays adequate comfort level or baseline comfort level  Outcome: Progressing     Problem: Discharge Planning  Goal: Discharge to home or other facility with appropriate resources  Outcome: Progressing     Problem: Skin/Tissue Integrity  Goal: Skin integrity remains intact  Description: 1.  Monitor for areas of redness and/or skin breakdown2.  Assess vascular access sites hourly3.  Every 4-6 hours minimum:  Change oxygen saturation probe site4.  Every 4-6 hours:  If on nasal continuous positive airway pressure, respiratory therapy assess nares and determine need for appliance change or resting period  Outcome: Progressing     Problem: Nutrition Deficit:  Goal: Optimize nutritional status  Outcome: Progressing

## 2025-05-03 NOTE — PROGRESS NOTES
Akhil SOLITARIO Alejo transferred to  from 418 via bed.    Reason for transfer: Lower acuity of care   Explained reason for transfer to Patient and Family.   Belongings:  clothing  with patient at bedside .   Soft chart transferred with patient: Yes.  Telemetry box transferred with patient: yes.  Report given to: PIPO Thompson, via telephone.    Transfer well tolerated by patient.     Electronically signed by Lisa Gray RN on 5/3/2025 at 5:56 PM    Hypertension is stable and controlled  Continue current treatment regimen.  Dietary sodium restriction.  Regular aerobic exercise.  Blood pressure will be reassessed in 6 months.

## 2025-05-03 NOTE — PLAN OF CARE
Problem: Chronic Conditions and Co-morbidities  Goal: Patient's chronic conditions and co-morbidity symptoms are monitored and maintained or improved  5/3/2025 0857 by Lisa Gray RN  Outcome: Progressing  Flowsheets (Taken 5/3/2025 0800)  Care Plan - Patient's Chronic Conditions and Co-Morbidity Symptoms are Monitored and Maintained or Improved: Monitor and assess patient's chronic conditions and comorbid symptoms for stability, deterioration, or improvement  5/3/2025 0247 by Eliza Vazuqez RN  Outcome: Progressing     Problem: Safety - Adult  Goal: Free from fall injury  5/3/2025 0857 by Lisa Gray RN  Outcome: Progressing  5/3/2025 0247 by Eliza Vazquez RN  Outcome: Progressing     Problem: ABCDS Injury Assessment  Goal: Absence of physical injury  5/3/2025 0857 by Lisa Gray RN  Outcome: Progressing  5/3/2025 0247 by Eliza Vazquez RN  Outcome: Progressing     Problem: Pain  Goal: Verbalizes/displays adequate comfort level or baseline comfort level  5/3/2025 0857 by Lisa Gray RN  Outcome: Progressing  5/3/2025 0247 by Eliza Vazquez RN  Outcome: Progressing     Problem: Discharge Planning  Goal: Discharge to home or other facility with appropriate resources  5/3/2025 0857 by Lisa Gray RN  Outcome: Progressing  Flowsheets (Taken 5/3/2025 0800)  Discharge to home or other facility with appropriate resources: Identify barriers to discharge with patient and caregiver  5/3/2025 0247 by Eliza Vazquez RN  Outcome: Progressing     Problem: Skin/Tissue Integrity  Goal: Skin integrity remains intact  Description: 1.  Monitor for areas of redness and/or skin breakdown2.  Assess vascular access sites hourly3.  Every 4-6 hours minimum:  Change oxygen saturation probe site4.  Every 4-6 hours:  If on nasal continuous positive airway pressure, respiratory therapy assess nares and determine need for appliance change or resting period  5/3/2025 0857 by Lisa Gray RN  Outcome:

## 2025-05-03 NOTE — PROGRESS NOTES
Patient is alert and able to answer questions more appropriately this morning. Pt knows he is at [facility] for his heart. Able to follow commands more consistently. Still some delayed responses and incomprehensible speech at times.    Electronically signed by Eliza Vazquez RN on 5/3/2025 at 5:13 AM

## 2025-05-03 NOTE — PROGRESS NOTES
Progress Note    5/3/2025 6:55 AM          POD#   4    Subjective:  Mr. Alejo is receiving his celexa via ngt. Also required morphine 2 mg in the last day for pain. More oriented today.  PULSE OXIMETRY RANGE: SpO2  Av.2 %  Min: 90 %  Max: 97 %  SUPPLEMENTAL O2: O2 Flow Rate (L/min): 3 L/min   Vital Signs: BP (!) 143/66   Pulse 80   Temp 98.2 °F (36.8 °C) (Temporal)   Resp 27   Ht 1.803 m (5' 11\")   Wt 83.2 kg (183 lb 8 oz)   SpO2 96%   BMI 25.59 kg/m²    Temperature Range:   Temp: 98.2 °F (36.8 °C)   Temp  Av.8 °F (36.6 °C)  Min: 97 °F (36.1 °C)  Max: 98.3 °F (36.8 °C)                   Rhythm: normal sinus rhythm    Labs:   ABG:    Lab Results   Component Value Date/Time    PHART 7.410 2025 01:23 AM    PO2ART 66.0 2025 01:23 AM    CSA9SFB 32.0 2025 01:23 AM    Z9YQSNQC 92.6 2025 01:23 AM    JIY9YQR 26 2025 05:49 PM    RHG0JIT 20.3 2025 01:23 AM    BEART -3.5 2025 01:23 AM     CBC:   Recent Labs     25  013   WBC 13.8* 13.7* 12.2*   HGB 12.5* 12.7* 13.0*   HCT 38.9* 39.7* 40.8*   MCV 91.3 91.5 90.9    155 207     BMP:   Recent Labs     25  0123 25  01125  013     --  145 147*   K 3.8 3.8 3.5 3.4*   *  --  110* 111*   CO2 21*  --  22 22   BUN 20  --  24* 37*   CREATININE 1.0  --  1.0 0.9     PT/INR:   Recent Labs     05/01/25  0103   PROTIME 15.7*   INR 1.26*     APTT: No results for input(s): \"APTT\" in the last 72 hours.  Chest X-Ray:  Left lung well expanded   CT:  I/O last 3 completed shifts:  In: 1500.9 [I.V.:1050.9; NG/GT:150; IV Piggyback:300]  Out: 4940 [Urine:4940]      I/O last 3 completed shifts:  In: 1500.9 [I.V.:1050.9; NG/GT:150; IV Piggyback:300]  Out: 4940 [Urine:4940]  Scheduled Meds:    metoprolol tartrate  50 mg Oral BID    furosemide  20 mg IntraVENous BID    metFORMIN  500 mg Oral BID WC    ipratropium 0.5 mg-albuterol 2.5 mg  1 Dose Inhalation 4x

## 2025-05-03 NOTE — PROGRESS NOTES
Unable to flush dobhoff after xray verification. Multiple attempts made to flush. First dobhoff removed and replaced with new one. Patient tolerated well. Waiting for stat chest xray at this time.    Electronically signed by Eliza Vazquez RN on 5/2/2025 at 9:51 PM

## 2025-05-03 NOTE — PROGRESS NOTES
Akhil Alejo received from ICU to room # 418 .  Mental Status: Patient is disoriented and alert.   Vitals:    05/03/25 1500   BP: 127/82   Pulse: 94   Resp: 20   Temp: 98.2 °F (36.8 °C)   SpO2: 97%     Placed on cardiac monitor: Yes. Box # [id number] .  Belongings: None with patient at bedside .  Family at bedside Yes.  Oriented Patient and Family to room.  Call light within reach. Yes.  Transfer was: Well tolerated by patient..    Electronically signed by Naomy Stone RN on 5/3/2025 at 3:15 PM

## 2025-05-03 NOTE — PROGRESS NOTES
Physical Therapy  Name: Akhil Alejo  MRN:  [id number]  Date of service:  5/3/2025       05/03/25 1013   Restrictions/Precautions   Restrictions/Precautions Fall Risk;General Precautions;Surgical Protocols   Activity Level Up with Assist   Required Braces or Orthoses? No   Position Activity Restriction   Sternal Precautions No Pushing;No Pulling;5# Lifting Restrictions;Move in the Tube   Other Position/Activity Restrictions Sternal Precautions   General   Chart Reviewed Yes   Family/Caregiver Present No   Referring Practitioner Akhil Lara MD   Subjective   Subjective pt in recliner trying to get up  keeps repeating he needs to go to 5th floor  Lisa Rn present and assisting pta   Pain Assessment   Pain Assessment None - Denies Pain   Pain Level   (pt reported no pain, no facial grimmacinig with tfers)   Oxygen Therapy   O2 Device Nasal cannula   O2 Flow Rate (L/min) 3 L/min   Transfers   Sit to Stand Minimal Assistance   Stand to Sit Minimal Assistance   Ambulation   Surface Level tile   Device No Device;Hand-Held Assist  (pt holding back of chair for support)   Assistance Contact guard assistance   Gait Deviations Slow Alycia;Decreased step height;Decreased step length;Shuffles   Distance 5' fwd, 5' backward   Exercises   Comments seated masha le therex  aps , laqs, hip flex, elbow flex, shld shrugs x 5   Patient Goals    Patient Goals  go home   Short Term Goals   Time Frame for Short Term Goals 2 wks   Short Term Goal 1 supine to sit indep   Short Term Goal 2 sit to stand indep   Short Term Goal 3 amb. 100' with RW SBA   Short Term Goal 4 bed to chair SBA   Conditions Requiring Skilled Therapeutic Intervention   Body Structures, Functions, Activity Limitations Requiring Skilled Therapeutic Intervention Decreased functional mobility ;Decreased ADL status;Decreased ROM;Decreased cognition;Decreased safe awareness;Decreased strength;Decreased balance;Decreased endurance;Decreased coordination;Decreased posture    Assessment pt tolerating tx  pt confused needing numerous verbal visual and tactile cues to complete activities  pt was able to stand for extended  period of time, steps wt shifting in place and amb fwd x 5' and back x 5'  pt performing seated masha ue le therex prior to tfer, amb  pt returned to chair with sfaety and comfort measues and nsg notified   Discharge Recommendations Continue to assess pending progress;24 hour supervision or assist;Patient would benefit from continued therapy after discharge   Activity Tolerance   Activity Tolerance Treatment limited secondary to decreased cognition;Treatment limited secondary to medical complications   Physical Therapy Plan   General Plan 5-7 times per week   Therapy Duration 2 Weeks   Current Treatment Recommendations Strengthening;ROM;Balance training;Functional mobility training;Transfer training;Gait training;Endurance training;Patient/Caregiver education & training;Safety education & training;Positioning;Equipment evaluation, education, & procurement;Therapeutic activities   PT Plan of Care   Saturday X   Safety Devices   Type of Devices Call light within reach;Chair alarm in place;Gait belt;Left in chair;Nurse notified   PT Whiteboard Notes   Therapy Whiteboard RE 5/16 CABG x 3, Lindsay, 2A         Electronically signed by Jackie Rojas PTA on 5/3/2025 at 10:21 AM

## 2025-05-03 NOTE — PROGRESS NOTES
[facility] Date:     04/30/2025 Time:    06:41     XR CHEST PORTABLE  Result Date: 4/29/2025  EXAM: XR CHEST PORTABLE  TECHNIQUE: Single frontal chest radiograph.  HISTORY: OG placement  COMPARISON: April 29, 2025 Current study is timed at 2057 hours and compared with 1814 hours same day.  FINDINGS: When compared with prior study, there has been interval placement of a gastric tube. Gastric tube tip overlies the stomach Tip of endotracheal tube has been retracted. Its tip is seen in acceptable position, overlying the midline trachea within the thoracic inlet and above the monika The tip of a right IJ pulmonary artery catheter is in stable and appropriate position overlying the right pulmonary artery A mediastinal drain remains in place Heart size appears prominent Ectasis is demonstrated bilaterally.          Interval insertion of gastric tube. Its tip is in the stomach Otherwise, no significant interval change   ______________________________________ Electronically signed by: CRISTOBAL RIVERA M.D. Date:     04/29/2025 Time:    21:27     Intraoperative ELYSE  Result Date: 4/29/2025  See Anesthesia Procedure note for procedure details.    Vascular duplex vein mapping lower bilateral  Result Date: 4/28/2025  Bilateral GSVs patent with measurements below     Vascular duplex carotid bilateral  Result Date: 4/28/2025    Mild (<50%) stenosis in the right internal carotid artery.  Heterogeneous plaque in the right internal carotid artery.   Mild (<50%) stenosis in the left internal carotid artery.  Heterogeneous plaque in the left internal carotid artery.   Normal antegrade flow involving the right vertebral artery.   Normal antegrade flow involving the left vertebral artery.     Cardiac procedure  Result Date: 4/27/2025    Severe multivessel CAD   EF 50% by ECHO SUMMARY: 1.  Severe Multivessel CAD. 2.  Left ventricular systolic function 50% by echocardiogram. 3.  Left radial arterial access. 4.  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 *          Brief History/Summary:   Mr. Alejo, a 69-year-old male, history of HTN, DMT2, RLS, presenting to [facility] ED (04/25/2025) on account of anterior wall ischemia on stress test.  Subsequent  cardiac catheterization, which reportedly showing multiple vessel CAD.  Patient admitted for further workup and management, including CTS consultation      CAD  Serial EKGs for chest pain  Optimized medical management  --> Nitro glycerin sublingual when necessary for chest pain  Continue to monitor on telemetry  Cardiology on board  NM Regadenoson Stress test - 04/25/2025: Post-stress ejection fraction is 44%. Perfusion Defect There is a moderate severity left ventricular stress perfusion defect that is medium in size present in the mid to distal anterior segment(s) that is predominantly reversible. The defect is consistent with abnormal perfusion in the LAD territory. Viability in the area is moderate. There is abnormal wall motion in the defect area. The defect appears to be ischemia. Stress Combined Conclusion The study is positive for myocardial ischemia. Findings suggest a moderate risk of cardiac events.  [facility] (04/25/2025): Multivessel CAD  CTS on board  S/p CABG  04/29/2025  Follow-up stop management as per CTS.    Insulin-dependent Diabetes Mellitus II, with Hyperglycemia  Hemoglobin A1C: 10.6% (04/26/2025)  Inpatient Regimens to include;  - Insulin Glargine (Lantus) 25 units subcu nightly  - Insulin Lispro (Humalog) 10 units subcu pre-meal 3 times a day  - Insulin Lispro (Humalog) on a High dose sliding scale  Monitor POC glucose, and

## 2025-05-03 NOTE — PROGRESS NOTES
Tube feeding on hold. Patient partially pulled out dobhoff. Waiting for xray verification to restart.    Electronically signed by Eliza Vazquez RN on 5/2/2025 at 7:23 PM

## 2025-05-04 ENCOUNTER — APPOINTMENT (OUTPATIENT)
Dept: GENERAL RADIOLOGY | Age: 70
DRG: 234 | End: 2025-05-04
Attending: SURGERY
Payer: MEDICARE

## 2025-05-04 LAB
ALBUMIN SERPL-MCNC: 3.2 G/DL (ref 3.5–5.2)
ALP SERPL-CCNC: 114 U/L (ref 40–129)
ALT SERPL-CCNC: 26 U/L (ref 10–50)
ANION GAP SERPL CALCULATED.3IONS-SCNC: 11 MMOL/L (ref 8–16)
AST SERPL-CCNC: 49 U/L (ref 10–50)
BASOPHILS # BLD: 0.1 K/UL (ref 0–0.2)
BASOPHILS NFR BLD: 0.4 % (ref 0–1)
BILIRUB SERPL-MCNC: 0.5 MG/DL (ref 0.2–1.2)
BUN SERPL-MCNC: 44 MG/DL (ref 8–23)
CALCIUM SERPL-MCNC: 9.3 MG/DL (ref 8.8–10.2)
CHLORIDE SERPL-SCNC: 112 MMOL/L (ref 98–107)
CO2 SERPL-SCNC: 27 MMOL/L (ref 22–29)
CREAT SERPL-MCNC: 1 MG/DL (ref 0.7–1.2)
EOSINOPHIL # BLD: 0.4 K/UL (ref 0–0.6)
EOSINOPHIL NFR BLD: 3.3 % (ref 0–5)
ERYTHROCYTE [DISTWIDTH] IN BLOOD BY AUTOMATED COUNT: 14.7 % (ref 11.5–14.5)
GLUCOSE BLD-MCNC: 198 MG/DL (ref 70–99)
GLUCOSE BLD-MCNC: 259 MG/DL (ref 70–99)
GLUCOSE BLD-MCNC: 268 MG/DL (ref 70–99)
GLUCOSE BLD-MCNC: 275 MG/DL (ref 70–99)
GLUCOSE BLD-MCNC: 284 MG/DL (ref 70–99)
GLUCOSE SERPL-MCNC: 226 MG/DL (ref 70–99)
HCT VFR BLD AUTO: 39.2 % (ref 42–52)
HGB BLD-MCNC: 12.1 G/DL (ref 14–18)
IMM GRANULOCYTES # BLD: 0.3 K/UL
LYMPHOCYTES # BLD: 3.4 K/UL (ref 1.1–4.5)
LYMPHOCYTES NFR BLD: 27.7 % (ref 20–40)
MCH RBC QN AUTO: 29.1 PG (ref 27–31)
MCHC RBC AUTO-ENTMCNC: 30.9 G/DL (ref 33–37)
MCV RBC AUTO: 94.2 FL (ref 80–94)
MONOCYTES # BLD: 1 K/UL (ref 0–0.9)
MONOCYTES NFR BLD: 8.6 % (ref 0–10)
NEUTROPHILS # BLD: 7 K/UL (ref 1.5–7.5)
NEUTS SEG NFR BLD: 57.5 % (ref 50–65)
PERFORMED ON: ABNORMAL
PLATELET # BLD AUTO: 284 K/UL (ref 130–400)
PMV BLD AUTO: 10.9 FL (ref 9.4–12.4)
POTASSIUM SERPL-SCNC: 3.9 MMOL/L (ref 3.5–5)
PROT SERPL-MCNC: 7.3 G/DL (ref 6.4–8.3)
RBC # BLD AUTO: 4.16 M/UL (ref 4.7–6.1)
SODIUM SERPL-SCNC: 150 MMOL/L (ref 136–145)
WBC # BLD AUTO: 12.1 K/UL (ref 4.8–10.8)

## 2025-05-04 PROCEDURE — 71045 X-RAY EXAM CHEST 1 VIEW: CPT

## 2025-05-04 PROCEDURE — 99024 POSTOP FOLLOW-UP VISIT: CPT | Performed by: SURGERY

## 2025-05-04 PROCEDURE — 6370000000 HC RX 637 (ALT 250 FOR IP): Performed by: SURGERY

## 2025-05-04 PROCEDURE — 36415 COLL VENOUS BLD VENIPUNCTURE: CPT

## 2025-05-04 PROCEDURE — 97530 THERAPEUTIC ACTIVITIES: CPT

## 2025-05-04 PROCEDURE — 80053 COMPREHEN METABOLIC PANEL: CPT

## 2025-05-04 PROCEDURE — 94640 AIRWAY INHALATION TREATMENT: CPT

## 2025-05-04 PROCEDURE — 94760 N-INVAS EAR/PLS OXIMETRY 1: CPT

## 2025-05-04 PROCEDURE — 2700000000 HC OXYGEN THERAPY PER DAY

## 2025-05-04 PROCEDURE — 6360000002 HC RX W HCPCS: Performed by: SURGERY

## 2025-05-04 PROCEDURE — 82962 GLUCOSE BLOOD TEST: CPT

## 2025-05-04 PROCEDURE — 85025 COMPLETE CBC W/AUTO DIFF WBC: CPT

## 2025-05-04 PROCEDURE — 1200000000 HC SEMI PRIVATE

## 2025-05-04 PROCEDURE — 51702 INSERT TEMP BLADDER CATH: CPT

## 2025-05-04 PROCEDURE — 94150 VITAL CAPACITY TEST: CPT

## 2025-05-04 PROCEDURE — 2500000003 HC RX 250 WO HCPCS: Performed by: SURGERY

## 2025-05-04 RX ADMIN — ACETYLCYSTEINE 600 MG: 200 SOLUTION ORAL; RESPIRATORY (INHALATION) at 18:24

## 2025-05-04 RX ADMIN — METFORMIN HYDROCHLORIDE 500 MG: 500 TABLET ORAL at 07:47

## 2025-05-04 RX ADMIN — SODIUM CHLORIDE, PRESERVATIVE FREE 10 ML: 5 INJECTION INTRAVENOUS at 21:22

## 2025-05-04 RX ADMIN — ISOSORBIDE MONONITRATE 30 MG: 60 TABLET, EXTENDED RELEASE ORAL at 07:47

## 2025-05-04 RX ADMIN — GABAPENTIN 100 MG: 100 CAPSULE ORAL at 07:47

## 2025-05-04 RX ADMIN — SODIUM CHLORIDE, PRESERVATIVE FREE 10 ML: 5 INJECTION INTRAVENOUS at 07:48

## 2025-05-04 RX ADMIN — INSULIN LISPRO 4 UNITS: 100 INJECTION, SOLUTION INTRAVENOUS; SUBCUTANEOUS at 07:47

## 2025-05-04 RX ADMIN — IPRATROPIUM BROMIDE AND ALBUTEROL SULFATE 1 DOSE: 2.5; .5 SOLUTION RESPIRATORY (INHALATION) at 15:20

## 2025-05-04 RX ADMIN — METFORMIN HYDROCHLORIDE 500 MG: 500 TABLET ORAL at 16:51

## 2025-05-04 RX ADMIN — LEVOTHYROXINE SODIUM 50 MCG: 0.05 TABLET ORAL at 06:10

## 2025-05-04 RX ADMIN — ASPIRIN 81 MG 81 MG: 81 TABLET ORAL at 07:47

## 2025-05-04 RX ADMIN — INSULIN LISPRO 2 UNITS: 100 INJECTION, SOLUTION INTRAVENOUS; SUBCUTANEOUS at 21:22

## 2025-05-04 RX ADMIN — IPRATROPIUM BROMIDE AND ALBUTEROL SULFATE 1 DOSE: 2.5; .5 SOLUTION RESPIRATORY (INHALATION) at 18:24

## 2025-05-04 RX ADMIN — METOPROLOL TARTRATE 50 MG: 50 TABLET, FILM COATED ORAL at 07:47

## 2025-05-04 RX ADMIN — ENOXAPARIN SODIUM 40 MG: 100 INJECTION SUBCUTANEOUS at 07:47

## 2025-05-04 RX ADMIN — IPRATROPIUM BROMIDE AND ALBUTEROL SULFATE 1 DOSE: 2.5; .5 SOLUTION RESPIRATORY (INHALATION) at 10:10

## 2025-05-04 RX ADMIN — IPRATROPIUM BROMIDE AND ALBUTEROL SULFATE 1 DOSE: 2.5; .5 SOLUTION RESPIRATORY (INHALATION) at 07:04

## 2025-05-04 RX ADMIN — CITALOPRAM HYDROBROMIDE 20 MG: 10 TABLET, FILM COATED ORAL at 21:23

## 2025-05-04 RX ADMIN — METOPROLOL TARTRATE 50 MG: 50 TABLET, FILM COATED ORAL at 21:23

## 2025-05-04 RX ADMIN — GABAPENTIN 100 MG: 100 CAPSULE ORAL at 21:23

## 2025-05-04 RX ADMIN — CLOPIDOGREL BISULFATE 75 MG: 75 TABLET, FILM COATED ORAL at 07:47

## 2025-05-04 RX ADMIN — ACETYLCYSTEINE 600 MG: 200 SOLUTION ORAL; RESPIRATORY (INHALATION) at 07:04

## 2025-05-04 RX ADMIN — INSULIN LISPRO 4 UNITS: 100 INJECTION, SOLUTION INTRAVENOUS; SUBCUTANEOUS at 16:51

## 2025-05-04 RX ADMIN — INSULIN GLARGINE 13 UNITS: 100 INJECTION, SOLUTION SUBCUTANEOUS at 21:22

## 2025-05-04 RX ADMIN — INSULIN LISPRO 4 UNITS: 100 INJECTION, SOLUTION INTRAVENOUS; SUBCUTANEOUS at 11:35

## 2025-05-04 RX ADMIN — GABAPENTIN 100 MG: 100 CAPSULE ORAL at 15:00

## 2025-05-04 RX ADMIN — ATORVASTATIN CALCIUM 20 MG: 20 TABLET, FILM COATED ORAL at 21:23

## 2025-05-04 ASSESSMENT — PAIN SCALES - GENERAL
PAINLEVEL_OUTOF10: 0
PAINLEVEL_OUTOF10: 0

## 2025-05-04 NOTE — PROGRESS NOTES
Physical Therapy  Name: Akhil Alejo  MRN:  [id number]  Date of service:  5/4/2025 05/04/25 1251   Restrictions/Precautions   Restrictions/Precautions Fall Risk;General Precautions;Surgical Protocols   Activity Level Up with Assist   Required Braces or Orthoses? No   Position Activity Restriction   Sternal Precautions No Pushing;No Pulling;5# Lifting Restrictions;Move in the Tube   Other Position/Activity Restrictions Sternal Precautions   General   Chart Reviewed Yes   Family/Caregiver Present Yes   Referring Practitioner Akhil Lara MD   Subjective   Subjective pt in bed, agrees to therapy   General   General Comments Rn okayed therapy   Pain Assessment   Pain Assessment None - Denies Pain   Pain Level 0   Oxygen Therapy   O2 Device Nasal cannula   O2 Flow Rate (L/min) 2 L/min   Bed Mobility   Supine to Sit Contact guard assistance   Sit to Supine Contact guard assistance   Scooting Partial/Moderate assistance   Transfers   Sit to Stand Minimal Assistance;Contact guard assistance   Stand to Sit Contact guard assistance   Ambulation   Surface Level tile   Device Rolling Walker   Assistance Contact guard assistance   Gait Deviations Slow Alycia;Decreased step height;Decreased step length;Shuffles   Distance steps in place, fwd, back and side stepping at bed   Exercises   Comments seated eob masha le therex x 10 reps, hip flex, laqs, aps, elbow flex, shld shrugs   Patient Goals    Patient Goals  go home   Short Term Goals   Time Frame for Short Term Goals 2 wks   Short Term Goal 1 supine to sit indep   Short Term Goal 2 sit to stand indep   Short Term Goal 3 amb. 100' with RW SBA   Short Term Goal 4 bed to chair SBA   Conditions Requiring Skilled Therapeutic Intervention   Body Structures, Functions, Activity Limitations Requiring Skilled Therapeutic Intervention Decreased functional mobility ;Decreased ADL status;Decreased ROM;Decreased cognition;Decreased safe awareness;Decreased strength;Decreased

## 2025-05-04 NOTE — PLAN OF CARE
Problem: Chronic Conditions and Co-morbidities  Goal: Patient's chronic conditions and co-morbidity symptoms are monitored and maintained or improved  5/3/2025 2250 by Elly Estrada RN  Outcome: Progressing  5/3/2025 0857 by Lisa Gray RN  Outcome: Progressing  Flowsheets (Taken 5/3/2025 0800)  Care Plan - Patient's Chronic Conditions and Co-Morbidity Symptoms are Monitored and Maintained or Improved: Monitor and assess patient's chronic conditions and comorbid symptoms for stability, deterioration, or improvement     Problem: Safety - Adult  Goal: Free from fall injury  5/3/2025 2250 by Elly Estrada RN  Outcome: Progressing  5/3/2025 0857 by Lisa Gray RN  Outcome: Progressing     Problem: ABCDS Injury Assessment  Goal: Absence of physical injury  5/3/2025 2250 by Elly Estrada RN  Outcome: Progressing  5/3/2025 0857 by Lisa Gray RN  Outcome: Progressing     Problem: Pain  Goal: Verbalizes/displays adequate comfort level or baseline comfort level  5/3/2025 2250 by Elly Estrada RN  Outcome: Progressing  5/3/2025 0857 by Lisa Gray RN  Outcome: Progressing     Problem: Discharge Planning  Goal: Discharge to home or other facility with appropriate resources  5/3/2025 2250 by Elly Estrada RN  Outcome: Progressing  5/3/2025 0857 by Lisa Gray RN  Outcome: Progressing  Flowsheets (Taken 5/3/2025 0800)  Discharge to home or other facility with appropriate resources: Identify barriers to discharge with patient and caregiver     Problem: Skin/Tissue Integrity  Goal: Skin integrity remains intact  Description: 1.  Monitor for areas of redness and/or skin breakdown2.  Assess vascular access sites hourly3.  Every 4-6 hours minimum:  Change oxygen saturation probe site4.  Every 4-6 hours:  If on nasal continuous positive airway pressure, respiratory therapy assess nares and determine need for appliance change or resting period  5/3/2025 2250 by Elly Estrada RN  Outcome:

## 2025-05-04 NOTE — PROGRESS NOTES
Progress Note    2025 7:05 AM          POD#   5    Subjective:  Mr. Alejo required two doses of oxycodone in the last day.  PULSE OXIMETRY RANGE: SpO2  Av.3 %  Min: 92 %  Max: 97 %  SUPPLEMENTAL O2: O2 Flow Rate (L/min): 2 L/min   Vital Signs: BP (!) 165/83   Pulse 92   Temp 97.9 °F (36.6 °C)   Resp 18   Ht 1.803 m (5' 11\")   Wt 83.2 kg (183 lb 8 oz)   SpO2 94%   BMI 25.59 kg/m²    Temperature Range:   Temp: 97.9 °F (36.6 °C)   Temp  Av °F (36.7 °C)  Min: 97.7 °F (36.5 °C)  Max: 98.3 °F (36.8 °C)                   Rhythm: normal sinus rhythm    Labs:   ABG:    Lab Results   Component Value Date/Time    PHART 7.410 2025 01:23 AM    PO2ART 66.0 2025 01:23 AM    XBO3MIK 32.0 2025 01:23 AM    A0MJJVHF 92.6 2025 01:23 AM    AWS0BTF 26 2025 05:49 PM    EAG8LRW 20.3 2025 01:23 AM    BEART -3.5 2025 01:23 AM     CBC:   Recent Labs     25  0119 25  0131   WBC 13.7* 12.2*   HGB 12.7* 13.0*   HCT 39.7* 40.8*   MCV 91.5 90.9    207     BMP:   Recent Labs     25  01125  0131    147*   K 3.5 3.4*   * 111*   CO2 22 22   BUN 24* 37*   CREATININE 1.0 0.9     PT/INR: No results for input(s): \"PROTIME\", \"INR\" in the last 72 hours.  APTT: No results for input(s): \"APTT\" in the last 72 hours.  Chest X-Ray:  Left lung well expaded   CT:  I/O last 3 completed shifts:  In:  [I.V.:10; NG/GT:1224; IV Piggyback:653]  Out: 3400 [Urine:3400]    Minimal output since insertion  Air Leak:  No air leak  I/O last 3 completed shifts:  In: 1887 [I.V.:10; NG/GT:1224; IV Piggyback:653]  Out: 3400 [Urine:3400]  Scheduled Meds:    gabapentin  100 mg Oral TID    isosorbide mononitrate  30 mg Oral Daily    metoprolol tartrate  50 mg Oral BID    metFORMIN  500 mg Oral BID WC    ipratropium 0.5 mg-albuterol 2.5 mg  1 Dose Inhalation 4x Daily RT    acetylcysteine  600 mg Inhalation BID    insulin lispro  0-8 Units SubCUTAneous 4x Daily AC & HS

## 2025-05-04 NOTE — PROGRESS NOTES
[facility] Progress Note    Patient:  Akhil Alejo  YOB: 1955  Date of Service: 5/4/2025  MRN: [id number]   Acct: [id number]   Primary Care Physician: Lennox Waddell MD  Advance Directive: Full Code  Admit Date: 4/25/2025       Hospital Day: 9      CHIEF COMPLAINT:     Chief Complaint   Patient presents with    Abnormal Lab     ABNORMAL EKG DURING STRESS TEST       5/4/2025 9:47 AM  Subjective / Interval History:   05/04/2025  Patient seen and examined this a.m.   No new complaints.    No acute changes or acute overnight event reported.   Laying comfortably in bed in no apparent acute distress.    Denies any acute complaints or distress.          05/3/2025  Patient seen and examined this a.m.   No new complaints.  No acute changes or acute overnight event reported.   Laying comfortably in bed in no apparent acute distress.    Denies any acute complaints or distress.    Endorses overall improvement.        05/02/2025  Patient seen and examined this a.m.   No new complaints.   No acute changes or acute overnight event reported.   Laying comfortably in bed in no apparent acute distress.  Denies any acute complaints or distress.      05/01/2025  Patient seen and examined this a.m.  Appears lethargic this a.m.  Extubated yesterday and lying comfortably in bed without any acute distress.  No acute changes or acute overnight event reported.   Laying comfortably in bed in no apparent acute distress.    Denies any acute complaints or distress.  Endorses overall improvement.        04/30/2025  Patient seen and examined in the ICU this a.m.   Remain intubated in the ICU due to possible subglottic trauma  ENT consulted      04/29/2025  Patient seen and examined this a.m.   No new complaints.    No active chest pain or dyspnea reported this a.m.  No acute changes or acute overnight event reported.   Laying comfortably in bed in no apparent acute distress.    Denies any acute complaints or  distress.    Endorses overall improvement.  Pending CABG this a.m.  Family at bedside.      04/28/2025  Patient seen and examined this a.m.     Denies any active chest pain at this time.  Denies any other acute complaints or distress at this time.  No acute changes or acute overnight event reported.   Sitting comfortably in bed in no apparent acute distress.    Endorses overall improvement.        04/27/2025  Patient seen and examined this a.m.   No new complaints.    Denies any active chest pain at this time.  No acute changes or acute overnight event reported.   Laying comfortably in bed in no apparent acute distress.    Denies any acute complaints or distress.    Endorses overall improvement.      04/26/2025  Patient seen and examined this a.m.   No new complaints.  No acute changes or acute overnight event reported.   Laying comfortably in bed in no apparent acute distress.  Denies any acute complaints or distress.        Review of Systems:   Review of Systems   Unable to perform ROS: Mental status change       Diet NPO  ADULT TUBE FEEDING; Nasoenteric; Diabetic; Continuous; 20; Yes; 10; Q 4 hours; 45; 30; Q 4 hours    Intake/Output Summary (Last 24 hours) at 5/4/2025 0947  Last data filed at 5/4/2025 0612  Gross per 24 hour   Intake 1227.01 ml   Output 2100 ml   Net -872.99 ml         Medications:   sodium chloride 25 mL/hr at 05/01/25 1556    sodium chloride      dextrose       Current Facility-Administered Medications   Medication Dose Route Frequency Provider Last Rate Last Admin    gabapentin (NEURONTIN) capsule 100 mg  100 mg Oral TID Akhil Lara MD   100 mg at 05/04/25 0747    isosorbide mononitrate (IMDUR) extended release tablet 30 mg  30 mg Oral Daily Akhil Lara MD   30 mg at 05/04/25 0747    labetalol (NORMODYNE;TRANDATE) injection 10 mg  10 mg IntraVENous Q4H PRN Akhil Lara MD   10 mg at 05/03/25 0533    metoprolol tartrate (LOPRESSOR) tablet 50 mg  50 mg Oral BID Akhil Lara MD

## 2025-05-05 ENCOUNTER — APPOINTMENT (OUTPATIENT)
Dept: GENERAL RADIOLOGY | Age: 70
DRG: 234 | End: 2025-05-05
Attending: SURGERY
Payer: MEDICARE

## 2025-05-05 LAB
ALBUMIN SERPL-MCNC: 2.9 G/DL (ref 3.5–5.2)
ALP SERPL-CCNC: 142 U/L (ref 40–129)
ALT SERPL-CCNC: 28 U/L (ref 10–50)
ANION GAP SERPL CALCULATED.3IONS-SCNC: 12 MMOL/L (ref 8–16)
AST SERPL-CCNC: 46 U/L (ref 10–50)
BASOPHILS # BLD: 0.1 K/UL (ref 0–0.2)
BASOPHILS NFR BLD: 0.5 % (ref 0–1)
BILIRUB SERPL-MCNC: 0.5 MG/DL (ref 0.2–1.2)
BUN SERPL-MCNC: 40 MG/DL (ref 8–23)
CALCIUM SERPL-MCNC: 8.8 MG/DL (ref 8.8–10.2)
CHLORIDE SERPL-SCNC: 114 MMOL/L (ref 98–107)
CO2 SERPL-SCNC: 23 MMOL/L (ref 22–29)
CREAT SERPL-MCNC: 0.9 MG/DL (ref 0.7–1.2)
EOSINOPHIL # BLD: 0.5 K/UL (ref 0–0.6)
EOSINOPHIL NFR BLD: 4 % (ref 0–5)
ERYTHROCYTE [DISTWIDTH] IN BLOOD BY AUTOMATED COUNT: 14.6 % (ref 11.5–14.5)
GLUCOSE BLD-MCNC: 189 MG/DL (ref 70–99)
GLUCOSE BLD-MCNC: 198 MG/DL (ref 70–99)
GLUCOSE BLD-MCNC: 215 MG/DL (ref 70–99)
GLUCOSE BLD-MCNC: 249 MG/DL (ref 70–99)
GLUCOSE SERPL-MCNC: 253 MG/DL (ref 70–99)
HCT VFR BLD AUTO: 39.8 % (ref 42–52)
HGB BLD-MCNC: 12.3 G/DL (ref 14–18)
IMM GRANULOCYTES # BLD: 0.3 K/UL
LYMPHOCYTES # BLD: 2.9 K/UL (ref 1.1–4.5)
LYMPHOCYTES NFR BLD: 23.9 % (ref 20–40)
MCH RBC QN AUTO: 28.9 PG (ref 27–31)
MCHC RBC AUTO-ENTMCNC: 30.9 G/DL (ref 33–37)
MCV RBC AUTO: 93.4 FL (ref 80–94)
MONOCYTES # BLD: 1 K/UL (ref 0–0.9)
MONOCYTES NFR BLD: 8.5 % (ref 0–10)
NEUTROPHILS # BLD: 7.4 K/UL (ref 1.5–7.5)
NEUTS SEG NFR BLD: 60.7 % (ref 50–65)
PERFORMED ON: ABNORMAL
PLATELET # BLD AUTO: 263 K/UL (ref 130–400)
PMV BLD AUTO: 11 FL (ref 9.4–12.4)
POTASSIUM SERPL-SCNC: 4 MMOL/L (ref 3.5–5)
PROT SERPL-MCNC: 7.2 G/DL (ref 6.4–8.3)
RBC # BLD AUTO: 4.26 M/UL (ref 4.7–6.1)
SODIUM SERPL-SCNC: 149 MMOL/L (ref 136–145)
WBC # BLD AUTO: 12.2 K/UL (ref 4.8–10.8)

## 2025-05-05 PROCEDURE — 6370000000 HC RX 637 (ALT 250 FOR IP): Performed by: SURGERY

## 2025-05-05 PROCEDURE — 36415 COLL VENOUS BLD VENIPUNCTURE: CPT

## 2025-05-05 PROCEDURE — 85025 COMPLETE CBC W/AUTO DIFF WBC: CPT

## 2025-05-05 PROCEDURE — 1200000000 HC SEMI PRIVATE

## 2025-05-05 PROCEDURE — 6360000002 HC RX W HCPCS: Performed by: SURGERY

## 2025-05-05 PROCEDURE — 97530 THERAPEUTIC ACTIVITIES: CPT

## 2025-05-05 PROCEDURE — 82962 GLUCOSE BLOOD TEST: CPT

## 2025-05-05 PROCEDURE — 80053 COMPREHEN METABOLIC PANEL: CPT

## 2025-05-05 PROCEDURE — 97535 SELF CARE MNGMENT TRAINING: CPT

## 2025-05-05 PROCEDURE — 94760 N-INVAS EAR/PLS OXIMETRY 1: CPT

## 2025-05-05 PROCEDURE — 97129 THER IVNTJ 1ST 15 MIN: CPT

## 2025-05-05 PROCEDURE — 6370000000 HC RX 637 (ALT 250 FOR IP): Performed by: INTERNAL MEDICINE

## 2025-05-05 PROCEDURE — 71045 X-RAY EXAM CHEST 1 VIEW: CPT

## 2025-05-05 PROCEDURE — 97110 THERAPEUTIC EXERCISES: CPT

## 2025-05-05 PROCEDURE — 94640 AIRWAY INHALATION TREATMENT: CPT

## 2025-05-05 PROCEDURE — 99024 POSTOP FOLLOW-UP VISIT: CPT | Performed by: SURGERY

## 2025-05-05 PROCEDURE — 2500000003 HC RX 250 WO HCPCS: Performed by: SURGERY

## 2025-05-05 PROCEDURE — 2700000000 HC OXYGEN THERAPY PER DAY

## 2025-05-05 PROCEDURE — 92526 ORAL FUNCTION THERAPY: CPT

## 2025-05-05 PROCEDURE — 97130 THER IVNTJ EA ADDL 15 MIN: CPT

## 2025-05-05 RX ORDER — INSULIN GLARGINE 100 [IU]/ML
24 INJECTION, SOLUTION SUBCUTANEOUS NIGHTLY
Status: DISCONTINUED | OUTPATIENT
Start: 2025-05-05 | End: 2025-05-05

## 2025-05-05 RX ORDER — INSULIN GLARGINE 100 [IU]/ML
25 INJECTION, SOLUTION SUBCUTANEOUS NIGHTLY
Status: DISCONTINUED | OUTPATIENT
Start: 2025-05-05 | End: 2025-05-13 | Stop reason: HOSPADM

## 2025-05-05 RX ORDER — INSULIN LISPRO 100 [IU]/ML
7 INJECTION, SOLUTION INTRAVENOUS; SUBCUTANEOUS
Status: DISCONTINUED | OUTPATIENT
Start: 2025-05-05 | End: 2025-05-05

## 2025-05-05 RX ORDER — INSULIN LISPRO 100 [IU]/ML
10 INJECTION, SOLUTION INTRAVENOUS; SUBCUTANEOUS
Status: DISCONTINUED | OUTPATIENT
Start: 2025-05-05 | End: 2025-05-13 | Stop reason: HOSPADM

## 2025-05-05 RX ADMIN — INSULIN LISPRO 10 UNITS: 100 INJECTION, SOLUTION INTRAVENOUS; SUBCUTANEOUS at 11:44

## 2025-05-05 RX ADMIN — METOPROLOL TARTRATE 50 MG: 50 TABLET, FILM COATED ORAL at 08:42

## 2025-05-05 RX ADMIN — ENOXAPARIN SODIUM 40 MG: 100 INJECTION SUBCUTANEOUS at 08:43

## 2025-05-05 RX ADMIN — ISOSORBIDE MONONITRATE 30 MG: 60 TABLET, EXTENDED RELEASE ORAL at 08:42

## 2025-05-05 RX ADMIN — INSULIN LISPRO 10 UNITS: 100 INJECTION, SOLUTION INTRAVENOUS; SUBCUTANEOUS at 16:55

## 2025-05-05 RX ADMIN — LEVOTHYROXINE SODIUM 50 MCG: 0.05 TABLET ORAL at 06:11

## 2025-05-05 RX ADMIN — INSULIN LISPRO 2 UNITS: 100 INJECTION, SOLUTION INTRAVENOUS; SUBCUTANEOUS at 16:55

## 2025-05-05 RX ADMIN — ATORVASTATIN CALCIUM 20 MG: 20 TABLET, FILM COATED ORAL at 21:54

## 2025-05-05 RX ADMIN — GABAPENTIN 100 MG: 100 CAPSULE ORAL at 08:42

## 2025-05-05 RX ADMIN — SODIUM CHLORIDE, PRESERVATIVE FREE 10 ML: 5 INJECTION INTRAVENOUS at 08:44

## 2025-05-05 RX ADMIN — ASPIRIN 81 MG 81 MG: 81 TABLET ORAL at 08:42

## 2025-05-05 RX ADMIN — INSULIN LISPRO 4 UNITS: 100 INJECTION, SOLUTION INTRAVENOUS; SUBCUTANEOUS at 08:43

## 2025-05-05 RX ADMIN — INSULIN LISPRO 10 UNITS: 100 INJECTION, SOLUTION INTRAVENOUS; SUBCUTANEOUS at 08:42

## 2025-05-05 RX ADMIN — IPRATROPIUM BROMIDE AND ALBUTEROL SULFATE 1 DOSE: 2.5; .5 SOLUTION RESPIRATORY (INHALATION) at 14:09

## 2025-05-05 RX ADMIN — METOPROLOL TARTRATE 5 MG: 5 INJECTION INTRAVENOUS at 06:38

## 2025-05-05 RX ADMIN — CITALOPRAM HYDROBROMIDE 20 MG: 10 TABLET, FILM COATED ORAL at 21:00

## 2025-05-05 RX ADMIN — GABAPENTIN 100 MG: 100 CAPSULE ORAL at 21:54

## 2025-05-05 RX ADMIN — IPRATROPIUM BROMIDE AND ALBUTEROL SULFATE 1 DOSE: 2.5; .5 SOLUTION RESPIRATORY (INHALATION) at 10:14

## 2025-05-05 RX ADMIN — ACETYLCYSTEINE 600 MG: 200 SOLUTION ORAL; RESPIRATORY (INHALATION) at 18:10

## 2025-05-05 RX ADMIN — SODIUM CHLORIDE, PRESERVATIVE FREE 10 ML: 5 INJECTION INTRAVENOUS at 21:00

## 2025-05-05 RX ADMIN — GABAPENTIN 100 MG: 100 CAPSULE ORAL at 14:27

## 2025-05-05 RX ADMIN — ACETYLCYSTEINE 600 MG: 200 SOLUTION ORAL; RESPIRATORY (INHALATION) at 10:14

## 2025-05-05 RX ADMIN — INSULIN LISPRO 2 UNITS: 100 INJECTION, SOLUTION INTRAVENOUS; SUBCUTANEOUS at 11:44

## 2025-05-05 RX ADMIN — METOPROLOL TARTRATE 50 MG: 50 TABLET, FILM COATED ORAL at 21:00

## 2025-05-05 RX ADMIN — INSULIN GLARGINE 25 UNITS: 100 INJECTION, SOLUTION SUBCUTANEOUS at 22:06

## 2025-05-05 RX ADMIN — IPRATROPIUM BROMIDE AND ALBUTEROL SULFATE 1 DOSE: 2.5; .5 SOLUTION RESPIRATORY (INHALATION) at 18:10

## 2025-05-05 RX ADMIN — CLOPIDOGREL BISULFATE 75 MG: 75 TABLET, FILM COATED ORAL at 08:42

## 2025-05-05 ASSESSMENT — PAIN SCALES - GENERAL: PAINLEVEL_OUTOF10: 0

## 2025-05-05 NOTE — PROGRESS NOTES
[facility] Progress Note    Patient:  Akhil Alejo  YOB: 1955  Date of Service: 5/5/2025  MRN: [id number]   Acct: [id number]   Primary Care Physician: Lennox Waddell MD  Advance Directive: Full Code  Admit Date: 4/25/2025       Hospital Day: 10      CHIEF COMPLAINT:     Chief Complaint   Patient presents with    Abnormal Lab     ABNORMAL EKG DURING STRESS TEST       5/5/2025 8:23 AM  Subjective / Interval History:   05/05/2025  Patient seen and examined this a.m.   No new complaints.    No acute changes or acute overnight event reported.   Laying comfortably in bed in no apparent acute distress.    Denies any acute complaints or distress.    Dobbhoff in place  Insulin regimen adjusted for persistent hyperglycemia.  Family at bedside.      05/04/2025  Patient seen and examined this a.m.   No new complaints.    No acute changes or acute overnight event reported.   Laying comfortably in bed in no apparent acute distress.    Denies any acute complaints or distress.          05/3/2025  Patient seen and examined this a.m.   No new complaints.  No acute changes or acute overnight event reported.   Laying comfortably in bed in no apparent acute distress.    Denies any acute complaints or distress.    Endorses overall improvement.        05/02/2025  Patient seen and examined this a.m.   No new complaints.   No acute changes or acute overnight event reported.   Laying comfortably in bed in no apparent acute distress.  Denies any acute complaints or distress.      05/01/2025  Patient seen and examined this a.m.  Appears lethargic this a.m.  Extubated yesterday and lying comfortably in bed without any acute distress.  No acute changes or acute overnight event reported.   Laying comfortably in bed in no apparent acute distress.    Denies any acute complaints or distress.  Endorses overall improvement.        04/30/2025  Patient seen and examined in the ICU this a.m.   Remain intubated in the  5/2/2025  EXAM: CHEST RADIOGRAPH  TECHNIQUE: Single frontal chest radiograph.  HISTORY: Postop open heart surgery.  COMPARISON: 5/1/2025  FINDINGS:  Interval removal of the right IJ sheath. Left rib surgical plating, again noted. EKG leads project over the chest. Hypoventilation with bibasilar atelectasis. No pleural effusion or pneumothorax is seen. Subcutaneous emphysema of the left lateral lower chest wall, again noted. Stable enlargement of the cardiac silhouette. No acute displaced rib fractures are identified.        1.  Interval removal of the right IJ sheath. Otherwise, stable chest.    ______________________________________ Electronically signed by: SU ELLISON M.D. Date:     05/02/2025 Time:    06:16     XR CHEST PORTABLE  Result Date: 5/1/2025  EXAM: CHEST RADIOGRAPH  TECHNIQUE: Single frontal chest radiograph.  HISTORY: Postop open heart surgery.  COMPARISON: 4/30/2025  FINDINGS:  The patient is leaning to the left. Interval removal of the endotracheal tube, enteric tube, right IJ [place] catheter, and left chest tube. Interval placement of a right IJ sheath. EKG leads and oxygen tubing project over the chest. Hypoventilation. No pulmonary infiltrate is identified. No pleural effusion or pneumothorax is seen. Subcutaneous emphysema of the left lower lateral chest wall, stable. Stable enlargement of the cardiac silhouette. No acute displaced rib fractures are identified. Left rib surgical plating, again noted.       1.  Interval removal of all of the previously seen tubes and lines, with interval placement of a right IJ sheath. 2. Stable subcutaneous emphysema of the left lateral lower chest wall. No visible pneumothorax. 3. Stable enlargement of the cardiac silhouette.    ______________________________________ Electronically signed by: SU ELLISON M.D. Date:     05/01/2025 Time:    06:34     CT HEAD WO CONTRAST  Result Date: 5/1/2025  EXAM:  CT HEAD WITHOUT CONTRAST 5/1/2025. SAGITTAL AND CORONAL

## 2025-05-05 NOTE — PLAN OF CARE
Problem: Chronic Conditions and Co-morbidities  Goal: Patient's chronic conditions and co-morbidity symptoms are monitored and maintained or improved  Outcome: Progressing  Flowsheets (Taken 5/4/2025 2139)  Care Plan - Patient's Chronic Conditions and Co-Morbidity Symptoms are Monitored and Maintained or Improved:   Monitor and assess patient's chronic conditions and comorbid symptoms for stability, deterioration, or improvement   Collaborate with multidisciplinary team to address chronic and comorbid conditions and prevent exacerbation or deterioration   Update acute care plan with appropriate goals if chronic or comorbid symptoms are exacerbated and prevent overall improvement and discharge     Problem: Safety - Adult  Goal: Free from fall injury  Outcome: Progressing  Flowsheets (Taken 5/5/2025 0022)  Free From Fall Injury: Instruct family/caregiver on patient safety     Problem: ABCDS Injury Assessment  Goal: Absence of physical injury  Outcome: Progressing  Flowsheets (Taken 5/5/2025 0022)  Absence of Physical Injury: Implement safety measures based on patient assessment     Problem: Pain  Goal: Verbalizes/displays adequate comfort level or baseline comfort level  Outcome: Progressing  Flowsheets (Taken 5/5/2025 0016)  Verbalizes/displays adequate comfort level or baseline comfort level:   Encourage patient to monitor pain and request assistance   Assess pain using appropriate pain scale   Administer analgesics based on type and severity of pain and evaluate response   Implement non-pharmacological measures as appropriate and evaluate response   Consider cultural and social influences on pain and pain management   Notify Licensed Independent Practitioner if interventions unsuccessful or patient reports new pain     Problem: Discharge Planning  Goal: Discharge to home or other facility with appropriate resources  Outcome: Progressing  Flowsheets (Taken 5/4/2025 2139)  Discharge to home or other facility with
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
## 2025-05-05 NOTE — PROGRESS NOTES
Physical Therapy  Name: Akhil Alejo  MRN:  [id number]  Date of service:  5/5/2025 05/05/25 1013   Restrictions/Precautions   Restrictions/Precautions Fall Risk;General Precautions;Surgical Protocols   Activity Level Up with Assist   Position Activity Restriction   Sternal Precautions No Pushing;No Pulling;5# Lifting Restrictions;Move in the Tube   Other Position/Activity Restrictions Sternal Precautions, HOB>30 due to dobhoff/tubefeed   General   Chart Reviewed Yes   Response To Previous Treatment Patient unable to report, no changes reported from family or staff   Family/Caregiver Present Yes   Referring Practitioner Akhil Lara MD   Subjective   Subjective pt in bed, agrees to therapy. Asking about wearing shoes for therapy.   General   General Comments RNMecca okayed therapy   Pain Assessment   Pain Assessment None - Denies Pain   Oxygen Therapy   O2 Device Nasal cannula  Dobhoff and feeding tube, HOB >30 deg.   Bed Mobility   Supine to Sit Minimal assistance   Scooting Partial/Moderate assistance   Transfers   Sit to Stand Minimal Assistance   Stand to Sit Minimal Assistance   Bed to Chair Minimal assistance;Partial/Moderate assistance   Stand Pivot Transfers Minimal Assistance;Partial/Moderate assistance   Comment to chair with RW, pt flexing knee and hips-like high knees to chair. Pt. cued to just step to chair and not do high knees. Pt. having difficulty with following cues for directions.   Ambulation   Surface Level tile   Device Rolling Walker   Assistance Minimal assistance;Partial/Moderate assistance   Gait Deviations Slow Alycia;Decreased step height;Decreased step length;Shuffles   Distance 2'   Balance   Posture Fair   Sitting - Static Fair;+   Sitting - Dynamic Fair;-   Standing - Static Poor;+   Standing - Dynamic Poor;+   Exercises   Comments seated eob masha le therex x 10 reps, hip flex, laqs, aps, elbow flex, shld shrugs   Patient Goals    Patient Goals  go home   Short Term Goals

## 2025-05-05 NOTE — PROGRESS NOTES
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 Alejo, patient of Dr. Kitchen, had an abnormal stress test and came into the emergency department over the weekend.  He had a cardiac catheterization done severe mid LAD and circumflex stenosis along with suspected eccentric moderate left main stenosis with mild RCA disease.    Patient is postop day 3 status post CABG (EULOGIO to LAD, LIMA to diag, SV (axillary artery) to OM).    CT head with no acute findings last week  Remains in sinus rhythm    Objective      BP (!) 169/72   Pulse 87   Temp 98.4 °F (36.9 °C) (Temporal)   Resp 18   Ht 1.803 m (5' 11\")   Wt 84 kg (185 lb 3 oz)   SpO2 100%   BMI 25.83 kg/m²       Intake/Output Summary (Last 24 hours) at 5/5/2025 1702  Last data filed at 5/5/2025 1552  Gross per 24 hour   Intake 799 ml   Output 1600 ml   Net -801 ml         Physical Exam:      Physical Exam  Vitals and nursing note reviewed.   Constitutional:       Appearance: Normal appearance.   HENT:      Head: Normocephalic and atraumatic.   Eyes:      Extraocular Movements: Extraocular movements intact.   Cardiovascular:      Rate and Rhythm: Normal rate and regular rhythm.      Heart sounds: No murmur heard.  Pulmonary:      Effort: Pulmonary effort is normal.      Breath sounds: Normal breath sounds.   Abdominal:      Palpations: Abdomen is soft.   Musculoskeletal:      Right lower leg: No edema.      Left lower leg: No edema.   Skin:

## 2025-05-05 NOTE — PROGRESS NOTES
Occupational Therapy  Facility/Department: [facility] ONCOLOGY UNIT  Daily Treatment Note  NAME: Akhil Alejo  : 1955  MRN: [id number]    Date of Service: 2025    Discharge Recommendations:  IP Rehab, Patient would benefit from continued therapy after discharge       Assessment   Assessment: Pt became fatigued during ADL routine, but participated fully.  Pt requires mod assist to max assist x 1 for transfers.  Pt would be a good candidate for inpatient rehab to gain functional independence.  Pt continues to benefit from  skilled OT services.  Prognosis: Good  Activity Tolerance  Activity Tolerance: Patient limited by fatigue         Patient Diagnosis(es): The primary encounter diagnosis was Abnormal stress test. Diagnoses of Abnormal nuclear cardiac imaging test and CAD in native artery were also pertinent to this visit.      has a past medical history of Arm fracture, Dementia (HCC), Depression, Diabetes mellitus (HCC), Hypertension, Kidney stones, Neuropathy, and Restless leg syndrome.   has a past surgical history that includes Cholecystectomy; Lithotripsy; shoulder surgery (Left); bone marrow biopsy (Right, 2020); Colonoscopy (2020); Upper gastrointestinal endoscopy (2017); Cardiac procedure (N/A, 2025); and Coronary artery bypass graft (N/A, 2025).    Restrictions  Restrictions/Precautions  Restrictions/Precautions: Fall Risk, General Precautions, Surgical Protocols  Activity Level: Up with Assist  Required Braces or Orthoses?: No  Position Activity Restriction  Sternal Precautions: No Pushing, No Pulling, 5# Lifting Restrictions, Move in the Tube  Other Position/Activity Restrictions: Sternal Precautions  Subjective   General  Chart Reviewed: Yes  Patient assessed for rehabilitation services?: Yes  Additional Pertinent Hx: CABG  2025  Response to previous treatment: Patient with no complaints from previous session  Family / Caregiver Present: Yes (spouse)  Referring

## 2025-05-05 NOTE — PROGRESS NOTES
Progress Note    2025 6:57 AM          POD#   6    Subjective:  Mr. Alejo remains NPO as per speech therapist.  PULSE OXIMETRY RANGE: SpO2  Av.4 %  Min: 94 %  Max: 99 %  SUPPLEMENTAL O2: O2 Flow Rate (L/min): 2 L/min   Vital Signs: BP (!) 152/125   Pulse 83   Temp 98.2 °F (36.8 °C)   Resp 16   Ht 1.803 m (5' 11\")   Wt 84 kg (185 lb 3 oz)   SpO2 95%   BMI 25.83 kg/m²    Temperature Range:   Temp: 98.2 °F (36.8 °C)   Temp  Av °F (36.7 °C)  Min: 97.3 °F (36.3 °C)  Max: 98.8 °F (37.1 °C)                   Rhythm: normal sinus rhythm    Labs:   ABG:    Lab Results   Component Value Date/Time    PHART 7.410 2025 01:23 AM    PO2ART 66.0 2025 01:23 AM    QRP8JGP 32.0 2025 01:23 AM    H4JCIQAE 92.6 2025 01:23 AM    PTN5OPK 26 2025 05:49 PM    PCC1DJX 20.3 2025 01:23 AM    BEART -3.5 2025 01:23 AM     CBC:   Recent Labs     25  01325  1034 25  0220   WBC 12.2* 12.1* 12.2*   HGB 13.0* 12.1* 12.3*   HCT 40.8* 39.2* 39.8*   MCV 90.9 94.2* 93.4    284 263     BMP:   Recent Labs     25  01325  1034 25  0220   * 150* 149*   K 3.4* 3.9 4.0   * 112* 114*   CO2 22 27 23   BUN 37* 44* 40*   CREATININE 0.9 1.0 0.9     PT/INR: No results for input(s): \"PROTIME\", \"INR\" in the last 72 hours.  APTT: No results for input(s): \"APTT\" in the last 72 hours.  Chest X-Ray:  Left lung well expanded, no effusions   CT:  I/O last 3 completed shifts:  In: 1691 [I.V.:10; NG/GT:1028; IV Piggyback:653]  Out: 3000 [Urine:3000]      I/O last 3 completed shifts:  In: 1691 [I.V.:10; NG/GT:1028; IV Piggyback:653]  Out: 3000 [Urine:3000]  Scheduled Meds:    gabapentin  100 mg Oral TID    isosorbide mononitrate  30 mg Oral Daily    metoprolol tartrate  50 mg Oral BID    metFORMIN  500 mg Oral BID WC    ipratropium 0.5 mg-albuterol 2.5 mg  1 Dose Inhalation 4x Daily RT    acetylcysteine  600 mg Inhalation BID    insulin lispro  0-8 Units

## 2025-05-05 NOTE — PROGRESS NOTES
Comprehensive Nutrition Assessment    Type and Reason for Visit:  Initial, LOS    Nutrition Recommendations/Plan:   Glucerna 1.5 @ 55 mL/hr with 40 mL/hr flush     Malnutrition Assessment:  Malnutrition Status:  At risk for malnutrition (05/02/25 0915)    Context:  Acute Illness     Findings of the 6 clinical characteristics of malnutrition:  Energy Intake:  Mild decrease in energy intake  Weight Loss:  Greater than 2% over 1 week     Body Fat Loss:  No body fat loss     Muscle Mass Loss:  No muscle mass loss    Fluid Accumulation:  No fluid accumulation     Strength:  Not Performed    Nutrition Assessment:    TF ordered on 5/2, no RD consult. Pt currently tolerating TFs at goal rate of 45 mL/hr ordered, will modify orders to increase goal rate to meet pt's assessed nutritional needs. Today's wt indicates wt loss. Continue to follow for intake and tolerance.    Nutrition Related Findings:    Gluc 198-253, Na 149, BUN 40. Wound Type: Surgical Incision, Diabetic Ulcer       Current Nutrition Intake & Therapies:    Average Meal Intake: NPO     Current Tube Feeding (TF) Orders:  Feeding Route: Nasogastric  Formula: Diabetic  Schedule: Continuous  Feeding Regimen: Glucerna 1.5 @ 55 mL/hr  Additives/Modulars:    Water Flushes: 40 mL/hr, 960 mL/d  Current TF Provides: 1980 kcal with 109 g Pro, 176 g CHO, 1961 mL free water from formula and flush    Anthropometric Measures:  Height: 180.3 cm (5' 11\")  Ideal Body Weight (IBW): 172 lbs (78 kg)    Admission Body Weight: 88.5 kg (195 lb)  Current Body Weight: 84 kg (185 lb 3 oz),   IBW. Weight Source: Bed scale  Current BMI (kg/m2): 25.8  Usual Body Weight: 88.5 kg (195 lb)     % Weight Change (Calculated): 3                    BMI Categories: Overweight (BMI 25.0-29.9)    Estimated Daily Nutrient Needs:  Energy Requirements Based On: Kcal/kg  Weight Used for Energy Requirements: Admission  Energy (kcal/day): 0941-6098  Weight Used for Protein Requirements: Ideal  Protein

## 2025-05-05 NOTE — PROGRESS NOTES
Physical Therapy  Name: Akhil Alejo  MRN:  [id number]  Date of service:  5/5/2025 05/05/25 1340   Restrictions/Precautions   Restrictions/Precautions Fall Risk;General Precautions;Surgical Protocols   Activity Level Up with Assist   Required Braces or Orthoses? No   Position Activity Restriction   Sternal Precautions No Pushing;No Pulling;5# Lifting Restrictions;Move in the Tube   Other Position/Activity Restrictions Sternal Precautions   General   Family/Caregiver Present Yes  (wife)   Referring Practitioner Akhil Lara MD   Subjective   Subjective pt in bed, agrees to therapy. Asking about wearing shoes for therapy.   Oxygen Therapy   O2 Device None (Room air)   Bed Mobility   Supine to Sit Minimal assistance   Sit to Supine Minimal assistance   Transfers   Sit to Stand Minimal Assistance   Stand to Sit Minimal Assistance   Exercises   Hip Flexion seated marching x 10   Knee Long Arc Quad 10   Other Activities   Comment sit to stand x 4 from bed   Short Term Goals   Time Frame for Short Term Goals 2 wks   Short Term Goal 1 supine to sit indep   Short Term Goal 2 sit to stand indep   Short Term Goal 3 amb. 100' with RW SBA   Short Term Goal 4 bed to chair SBA   Conditions Requiring Skilled Therapeutic Intervention   Body Structures, Functions, Activity Limitations Requiring Skilled Therapeutic Intervention Decreased functional mobility ;Decreased ADL status;Decreased ROM;Decreased cognition;Decreased safe awareness;Decreased strength;Decreased balance;Decreased endurance;Decreased coordination;Decreased posture   Assessment Patient standing well at EOB.  Once standing he want to sit back down.  He leans backward upon standing. Not following directions well enough to ambulate at this time.   Barriers to Learning cognition   Treatment Initiated  gait, transfers, bed mobility   Discharge Recommendations Continue to assess pending progress   Activity Tolerance   Activity Tolerance Treatment limited secondary

## 2025-05-06 ENCOUNTER — APPOINTMENT (OUTPATIENT)
Dept: GENERAL RADIOLOGY | Age: 70
DRG: 234 | End: 2025-05-06
Attending: SURGERY
Payer: MEDICARE

## 2025-05-06 LAB
ALBUMIN SERPL-MCNC: 3.1 G/DL (ref 3.5–5.2)
ALP SERPL-CCNC: 151 U/L (ref 40–129)
ALT SERPL-CCNC: 30 U/L (ref 10–50)
ANION GAP SERPL CALCULATED.3IONS-SCNC: 12 MMOL/L (ref 8–16)
AST SERPL-CCNC: 37 U/L (ref 10–50)
BASOPHILS # BLD: 0.1 K/UL (ref 0–0.2)
BASOPHILS NFR BLD: 0.6 % (ref 0–1)
BILIRUB SERPL-MCNC: 0.6 MG/DL (ref 0.2–1.2)
BUN SERPL-MCNC: 38 MG/DL (ref 8–23)
CALCIUM SERPL-MCNC: 9.1 MG/DL (ref 8.8–10.2)
CHLORIDE SERPL-SCNC: 114 MMOL/L (ref 98–107)
CO2 SERPL-SCNC: 24 MMOL/L (ref 22–29)
CREAT SERPL-MCNC: 1 MG/DL (ref 0.7–1.2)
EKG P AXIS: NORMAL DEGREES
EKG P-R INTERVAL: NORMAL MS
EKG Q-T INTERVAL: 298 MS
EKG QRS DURATION: 90 MS
EKG QTC CALCULATION (BAZETT): 459 MS
EKG T AXIS: -116 DEGREES
EOSINOPHIL # BLD: 0.5 K/UL (ref 0–0.6)
EOSINOPHIL NFR BLD: 3.6 % (ref 0–5)
ERYTHROCYTE [DISTWIDTH] IN BLOOD BY AUTOMATED COUNT: 14.6 % (ref 11.5–14.5)
GLUCOSE BLD-MCNC: 225 MG/DL (ref 70–99)
GLUCOSE BLD-MCNC: 233 MG/DL (ref 70–99)
GLUCOSE BLD-MCNC: 281 MG/DL (ref 70–99)
GLUCOSE BLD-MCNC: 317 MG/DL (ref 70–99)
GLUCOSE SERPL-MCNC: 249 MG/DL (ref 70–99)
HCT VFR BLD AUTO: 41.9 % (ref 42–52)
HGB BLD-MCNC: 13 G/DL (ref 14–18)
IMM GRANULOCYTES # BLD: 0.4 K/UL
LYMPHOCYTES # BLD: 3.4 K/UL (ref 1.1–4.5)
LYMPHOCYTES NFR BLD: 25.8 % (ref 20–40)
MCH RBC QN AUTO: 28.4 PG (ref 27–31)
MCHC RBC AUTO-ENTMCNC: 31 G/DL (ref 33–37)
MCV RBC AUTO: 91.7 FL (ref 80–94)
MONOCYTES # BLD: 1 K/UL (ref 0–0.9)
MONOCYTES NFR BLD: 7.1 % (ref 0–10)
NEUTROPHILS # BLD: 7.9 K/UL (ref 1.5–7.5)
NEUTS SEG NFR BLD: 59.6 % (ref 50–65)
OSMOLALITY SERPL CALC.SUM OF ELEC: 337 MOSM/KG (ref 275–300)
PERFORMED ON: ABNORMAL
PLATELET # BLD AUTO: 303 K/UL (ref 130–400)
PMV BLD AUTO: 10.7 FL (ref 9.4–12.4)
POTASSIUM SERPL-SCNC: 3.9 MMOL/L (ref 3.5–5)
PROT SERPL-MCNC: 7.2 G/DL (ref 6.4–8.3)
RBC # BLD AUTO: 4.57 M/UL (ref 4.7–6.1)
SODIUM SERPL-SCNC: 150 MMOL/L (ref 136–145)
WBC # BLD AUTO: 13.3 K/UL (ref 4.8–10.8)

## 2025-05-06 PROCEDURE — 92611 MOTION FLUOROSCOPY/SWALLOW: CPT

## 2025-05-06 PROCEDURE — 6360000002 HC RX W HCPCS: Performed by: SURGERY

## 2025-05-06 PROCEDURE — 6370000000 HC RX 637 (ALT 250 FOR IP): Performed by: SURGERY

## 2025-05-06 PROCEDURE — 36415 COLL VENOUS BLD VENIPUNCTURE: CPT

## 2025-05-06 PROCEDURE — 92526 ORAL FUNCTION THERAPY: CPT

## 2025-05-06 PROCEDURE — 2700000000 HC OXYGEN THERAPY PER DAY

## 2025-05-06 PROCEDURE — 99232 SBSQ HOSP IP/OBS MODERATE 35: CPT | Performed by: INTERNAL MEDICINE

## 2025-05-06 PROCEDURE — 74230 X-RAY XM SWLNG FUNCJ C+: CPT

## 2025-05-06 PROCEDURE — 94150 VITAL CAPACITY TEST: CPT

## 2025-05-06 PROCEDURE — 83930 ASSAY OF BLOOD OSMOLALITY: CPT

## 2025-05-06 PROCEDURE — 1200000000 HC SEMI PRIVATE

## 2025-05-06 PROCEDURE — 2580000003 HC RX 258: Performed by: SURGERY

## 2025-05-06 PROCEDURE — 71045 X-RAY EXAM CHEST 1 VIEW: CPT

## 2025-05-06 PROCEDURE — 82962 GLUCOSE BLOOD TEST: CPT

## 2025-05-06 PROCEDURE — 6370000000 HC RX 637 (ALT 250 FOR IP): Performed by: INTERNAL MEDICINE

## 2025-05-06 PROCEDURE — 97550 CAREGIVER TRAING 1ST 30 MIN: CPT

## 2025-05-06 PROCEDURE — 93005 ELECTROCARDIOGRAM TRACING: CPT

## 2025-05-06 PROCEDURE — 80053 COMPREHEN METABOLIC PANEL: CPT

## 2025-05-06 PROCEDURE — 93010 ELECTROCARDIOGRAM REPORT: CPT | Performed by: INTERNAL MEDICINE

## 2025-05-06 PROCEDURE — 2500000003 HC RX 250 WO HCPCS: Performed by: SURGERY

## 2025-05-06 PROCEDURE — 94640 AIRWAY INHALATION TREATMENT: CPT

## 2025-05-06 PROCEDURE — 85025 COMPLETE CBC W/AUTO DIFF WBC: CPT

## 2025-05-06 PROCEDURE — 92507 TX SP LANG VOICE COMM INDIV: CPT

## 2025-05-06 PROCEDURE — 94760 N-INVAS EAR/PLS OXIMETRY 1: CPT

## 2025-05-06 RX ORDER — MAGNESIUM SULFATE 1 G/100ML
1000 INJECTION INTRAVENOUS ONCE
Status: COMPLETED | OUTPATIENT
Start: 2025-05-06 | End: 2025-05-06

## 2025-05-06 RX ADMIN — SODIUM CHLORIDE, PRESERVATIVE FREE 10 ML: 5 INJECTION INTRAVENOUS at 08:32

## 2025-05-06 RX ADMIN — IPRATROPIUM BROMIDE AND ALBUTEROL SULFATE 1 DOSE: 2.5; .5 SOLUTION RESPIRATORY (INHALATION) at 10:12

## 2025-05-06 RX ADMIN — MAGNESIUM SULFATE HEPTAHYDRATE 1000 MG: 10 INJECTION, SOLUTION INTRAVENOUS at 08:25

## 2025-05-06 RX ADMIN — IPRATROPIUM BROMIDE AND ALBUTEROL SULFATE 1 DOSE: 2.5; .5 SOLUTION RESPIRATORY (INHALATION) at 06:10

## 2025-05-06 RX ADMIN — ENOXAPARIN SODIUM 40 MG: 100 INJECTION SUBCUTANEOUS at 08:38

## 2025-05-06 RX ADMIN — AMIODARONE HYDROCHLORIDE 1 MG/MIN: 50 INJECTION, SOLUTION INTRAVENOUS at 16:13

## 2025-05-06 RX ADMIN — AMIODARONE HYDROCHLORIDE 1 MG/MIN: 50 INJECTION, SOLUTION INTRAVENOUS at 08:43

## 2025-05-06 RX ADMIN — IPRATROPIUM BROMIDE AND ALBUTEROL SULFATE 1 DOSE: 2.5; .5 SOLUTION RESPIRATORY (INHALATION) at 14:18

## 2025-05-06 RX ADMIN — INSULIN LISPRO 10 UNITS: 100 INJECTION, SOLUTION INTRAVENOUS; SUBCUTANEOUS at 17:16

## 2025-05-06 RX ADMIN — Medication 0.5 MG/MIN: at 19:02

## 2025-05-06 RX ADMIN — ACETYLCYSTEINE 600 MG: 200 SOLUTION ORAL; RESPIRATORY (INHALATION) at 06:10

## 2025-05-06 RX ADMIN — AMIODARONE HYDROCHLORIDE 150 MG: 1.5 INJECTION, SOLUTION INTRAVENOUS at 16:12

## 2025-05-06 RX ADMIN — AMIODARONE HYDROCHLORIDE 150 MG: 1.5 INJECTION, SOLUTION INTRAVENOUS at 08:26

## 2025-05-06 RX ADMIN — INSULIN LISPRO 2 UNITS: 100 INJECTION, SOLUTION INTRAVENOUS; SUBCUTANEOUS at 08:38

## 2025-05-06 RX ADMIN — ACETYLCYSTEINE 600 MG: 200 SOLUTION ORAL; RESPIRATORY (INHALATION) at 18:06

## 2025-05-06 RX ADMIN — IPRATROPIUM BROMIDE AND ALBUTEROL SULFATE 1 DOSE: 2.5; .5 SOLUTION RESPIRATORY (INHALATION) at 18:06

## 2025-05-06 NOTE — PROGRESS NOTES
Occupational Therapy  Pt just back from barium swallow study upon arrival. Nursing reports prolonged elevated heart rate. Pt resting with wife present. Will continue to follow as able. Electronically signed by ROCIO Baptiste on 5/6/2025 at 4:32 PM

## 2025-05-06 NOTE — PROGRESS NOTES
05/06/25 1000   Subjective   Subjective Attempt .  No TX per Carlos, RN & Karina RN. CN. due to elevated HR in 140's.   PT Plan of Care   Tuesday D       Electronically signed by Geovanny Claros PTA on 5/6/2025 at 10:03 AM
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
## 2025-05-06 NOTE — PROCEDURES
INSTRUMENTAL SWALLOW REPORT  MODIFIED BARIUM SWALLOW    NAME: Akhil Alejo   : 53  MRN: [id number]       Date of Eval: 25        Referring Diagnosis(es): Dysphagia     Past Medical History:  has a past medical history of Arm fracture, Dementia (HCC), Depression, Diabetes mellitus (HCC), Hypertension, Kidney stones, Neuropathy, and Restless leg syndrome.    Past Surgical History:  has a past surgical history that includes Cholecystectomy; Lithotripsy; shoulder surgery (Left); bone marrow biopsy (Right, 2020); Colonoscopy (2020); Upper gastrointestinal endoscopy (2017); Cardiac procedure (N/A, 2025); and Coronary artery bypass graft (N/A, 2025).    Patient Complaints/Reason for Referral:  Akhil Alejo was referred for a MBS to assess the efficiency of his/her swallow function, assess for aspiration, and to make recommendations regarding safe dietary consistencies, effective compensatory strategies, and safe eating environment.    Impressions:  Completed assessment. With mildly thick/nectar thick barium, moderately thick/honey thick barium, puree consistency, and thin barium, patient exhibited premature spillage and subsequent severe swallow delay to the pyriform. Patient did not initiate swallow with minced and moist consistency without mildly thick/nectar thick barium wash. During every swallow, epiglottic inversion during swallow initiation was considered to be delayed and decreased but slightly over horizontal in positioning. Patient exhibited flash penetration during the swallow with minced and moist consistency with mildly thick/nectar thick barium wash (delayed throat clear noted). No additional penetration/aspiration event was observed. With every consistency, patient exhibited consistent oral cavity residue and consistent pharyngeal residue post swallows. All oral cavity residue was slow but cleared with additional dry swallows. All pharyngeal residue was slow to

## 2025-05-06 NOTE — PROGRESS NOTES
POST OP CARDIOTHORACIC SURGERY PROGRESS NOTE    Post op day: 7    SUBJECTIVE:  Mr. Alejo is laying in bed appearing comfortable. He is oxygenating well on 2L. Seems pleasantly confused. Wife at bedside states pt is at his baseline cognitive state but at night gets more confused. Wife states if patient is unable to go to IP Rehab then she will taken him home with HH.      BP (!) 119/92   Pulse (!) 148   Temp 98.8 °F (37.1 °C) (Temporal)   Resp 18   Ht 1.803 m (5' 11\")   Wt 84.4 kg (186 lb 1.1 oz)   SpO2 98%   BMI 25.95 kg/m²   Average, Min, and Max for last 24 hours Vitals:  TEMPERATURE:  Temp  Av.3 °F (36.8 °C)  Min: 97.5 °F (36.4 °C)  Max: 99 °F (37.2 °C)  RESPIRATIONS RANGE: Resp  Av.3  Min: 18  Max: 20  PULSE RANGE: Pulse  Av.6  Min: 76  Max: 151  BLOOD PRESSURE RANGE:  Systolic (24hrs), Av , Min:119 , Max:169   ; Diastolic (24hrs), Av, Min:67, Max:92    PULSE OXIMETRY RANGE: SpO2  Av.6 %  Min: 94 %  Max: 100 %    I/O last 3 completed shifts:  In: 799 [I.V.:10; NG/GT:789]  Out: 2300 [Urine:2300]    CHEST: clear bilaterally    CARDIOVASCULAR: Irregularly irregular    INCISION: CDI    LABS:  CBC:   Lab Results   Component Value Date/Time    WBC 13.3 2025 02:00 AM    RBC 4.57 2025 02:00 AM    HGB 13.0 2025 02:00 AM    HCT 41.9 2025 02:00 AM    MCV 91.7 2025 02:00 AM    MCH 28.4 2025 02:00 AM    MCHC 31.0 2025 02:00 AM    RDW 14.6 2025 02:00 AM     2025 02:00 AM    MPV 10.7 2025 02:00 AM     CMP:    Lab Results   Component Value Date/Time     2025 02:00 AM    K 3.9 2025 02:00 AM     2025 02:00 AM    CO2 24 2025 02:00 AM    BUN 38 2025 02:00 AM    CREATININE 1.0 2025 02:00 AM    LABGLOM 81 2025 02:00 AM    GLUCOSE 249 2025 02:00 AM    CALCIUM 9.1 2025 02:00 AM    BILITOT 0.6 2025 02:00 AM    ALKPHOS 151 2025 02:00 AM    AST 37 2025

## 2025-05-06 NOTE — PROGRESS NOTES
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 Alejo, patient of Dr. Kitchen, had an abnormal stress test and came into the emergency department over the weekend.  He had a cardiac catheterization done severe mid LAD and circumflex stenosis along with suspected eccentric moderate left main stenosis with mild RCA disease.    Status post CABG (EULOGIO to LAD, LIMA to diag, SV (axillary artery) to OM).    CT head with no acute findings last week  Remains in sinus rhythm  Not in room during rounds today    Objective      /87   Pulse (!) 151   Temp 97.9 °F (36.6 °C) (Temporal)   Resp 18   Ht 1.803 m (5' 11\")   Wt 84.4 kg (186 lb 1.1 oz)   SpO2 96%   BMI 25.95 kg/m²       Intake/Output Summary (Last 24 hours) at 5/6/2025 1100  Last data filed at 5/6/2025 1034  Gross per 24 hour   Intake --   Output 2000 ml   Net -2000 ml         Physical Exam:      Physical Exam  Vitals and nursing note reviewed.   Constitutional:       Appearance: Normal appearance.   HENT:      Head: Normocephalic and atraumatic.   Eyes:      Extraocular Movements: Extraocular movements intact.   Cardiovascular:      Rate and Rhythm: Normal rate and regular rhythm.      Heart sounds: No murmur heard.  Pulmonary:      Effort: Pulmonary effort is normal.      Breath sounds: Normal breath sounds.   Abdominal:      Palpations: Abdomen is soft.   Musculoskeletal:      Right lower leg: No edema.      Left lower leg: No edema.

## 2025-05-06 NOTE — PROGRESS NOTES
DIS Nurse Note  SUBJECTIVE: Patient assessed secondary to elevated Deterioration Index Score.      Deterioration Index Score:  Predictive Model Details          66 (Warning)  Factor Value    Calculated 5/6/2025 13:30 21% Pulse 148    Deterioration Index Model 20% Sodium abnormal (150 mmol/L)     17% Age 69 years old     17% Supplemental oxygen Nasal cannula     14% Neurological exam X     5% WBC count abnormal (13.3 K/uL)     4% Respiratory rate 18     1% BUN abnormal (38 mg/dL)     1% Systolic 119     0% Pulse oximetry 98 %     0% Hematocrit abnormal (41.9 %)     0% Temperature 98.8 °F (37.1 °C)     0% Potassium 3.9 mmol/L        Vital Signs:  Vitals:    05/06/25 0447 05/06/25 0610 05/06/25 0754 05/06/25 1100   BP: (!) 158/83  126/87 (!) 119/92   Pulse: 81 76 (!) 151 (!) 148   Resp: 18 18 18 18   Temp: 98.1 °F (36.7 °C)  97.9 °F (36.6 °C) 98.8 °F (37.1 °C)   TempSrc: Temporal  Temporal Temporal   SpO2: 97% 95% 96% 98%   Weight: 84.4 kg (186 lb 1.1 oz)      Height:            Provider Notified: Reviewed patient status  & DIS score with Provider Dr. Lara    ASSESSMENT:   Pt HR elevated 140-150's,  /87, pt in bed, alert    Plan of Care: Reach out to Dr. Lara for pt elevated HR, ordered amiodarone bolus and gtt.    Electronically signed by Carlos Howard LPN

## 2025-05-06 NOTE — CARE COORDINATION
[facility]  Notification of Admission Decision      [] Patient has been accepted for admit to [facility] on :       Please write discharge orders and summary prior to discharge.    [] Patient acceptance to Rehab pending the following :    [x] Eval in progress: awaiting results of Video swallow study. Also patient unable to do therapy today d/t elevated HR. Will f/u tomorrow.      [] Patient determined to be ineligible for services at [facility] because :       We recommend you consider        Thank you for your referral, we appreciate you. If you have any questions, please feel   free to contact me at [phone].  Electronically Signed by Annmarie Cortes, Admissions Coordinator 5/6/2025 2:18 PM

## 2025-05-06 NOTE — PROGRESS NOTES
Nutrition Assessment     Type and Reason for Visit: Reassess    Nutrition Recommendations/Plan:   Will follow for diet advancement as tolerated and/or alternate means of nutrition     Malnutrition Assessment:  Malnutrition Status: At risk for malnutrition    Nutrition Assessment:  Pt note receiving TF at this time. Pt has MBSS scheduled today for evaluation of safety with po intake. Will follow for diet advancement as tolerated or restarting of alternate means of nutrition.    Estimated Daily Nutrient Needs:  Energy (kcal):  8023-8529 Weight Used for Energy Requirements: Admission     Protein (g):  101-117 Weight Used for Protein Requirements: Ideal        Fluid (ml/day):  9156-9661 Method Used for Fluid Requirements: 1 ml/kcal    Nutrition Related Findings:   Na 150, BUN 38, Glu 189-249, BM 5-5 Wound Type: Surgical Incision, Diabetic Ulcer    Current Nutrition Therapies:    Diet NPO  ADULT TUBE FEEDING; Nasoenteric; Diabetic; Continuous; 20; Yes; 10; Q 4 hours; 55; 40; Q 1 hour    Anthropometric Measures:  Height: 180.3 cm (5' 11\")  Current Body Wt: 84.4 kg (186 lb 1.1 oz)   BMI: 26      Nutrition Diagnosis:   Inadequate oral intake related to swallowing difficulty as evidenced by swallow study results, NPO or clear liquid status due to medical condition    Nutrition Interventions:   Food and/or Nutrient Delivery: Continue NPO  Nutrition Education/Counseling: No recommendation at this time  Coordination of Nutrition Care: Continue to monitor while inpatient     Goals:  Goals: Initiation of nutrition  Type of Goal: New goal  Previous Goal Met: New Goal    Nutrition Monitoring and Evaluation:   Behavioral-Environmental Outcomes: None Identified  Food/Nutrient Intake Outcomes: Progression of Nutrition  Physical Signs/Symptoms Outcomes: Biochemical Data, Chewing or Swallowing, Fluid Status or Edema, Skin, Weight    Discharge Planning:    Too soon to determine     Cristel Hopkins RD  Contact: [phone]

## 2025-05-06 NOTE — PROGRESS NOTES
05/06/25 1300   Subjective   Subjective Attempt X 2  .  No per PIPO Gonzalez.  patients HR  in 140's.       Electronically signed by Geovanny Claros PTA on 5/6/2025 at 1:48 PM

## 2025-05-06 NOTE — PROGRESS NOTES
[facility] Progress Note    Patient:  Akhil Alejo  YOB: 1955  Date of Service: 5/6/2025  MRN: [id number]   Acct: [id number]   Primary Care Physician: Lennox Waddell MD  Advance Directive: Full Code  Admit Date: 4/25/2025       Hospital Day: 11      CHIEF COMPLAINT:     Chief Complaint   Patient presents with    Abnormal Lab     ABNORMAL EKG DURING STRESS TEST       5/6/2025 7:52 AM  Subjective / Interval History:   05/06/2025  Patient seen and examined this a.m.   No new complaints.  No acute changes or acute overnight event reported.   Laying comfortably in bed in no apparent acute distress.    Denies any acute complaints or distress.    Endorses overall improvement.          05/05/2025  Patient seen and examined this a.m.   No new complaints.    No acute changes or acute overnight event reported.   Laying comfortably in bed in no apparent acute distress.    Denies any acute complaints or distress.    Dobbhoff in place  Insulin regimen adjusted for persistent hyperglycemia.  Family at bedside.      05/04/2025  Patient seen and examined this a.m.   No new complaints.    No acute changes or acute overnight event reported.   Laying comfortably in bed in no apparent acute distress.    Denies any acute complaints or distress.          05/3/2025  Patient seen and examined this a.m.   No new complaints.  No acute changes or acute overnight event reported.   Laying comfortably in bed in no apparent acute distress.    Denies any acute complaints or distress.    Endorses overall improvement.        05/02/2025  Patient seen and examined this a.m.   No new complaints.   No acute changes or acute overnight event reported.   Laying comfortably in bed in no apparent acute distress.  Denies any acute complaints or distress.      05/01/2025  Patient seen and examined this a.m.  Appears lethargic this a.m.  Extubated yesterday and lying comfortably in bed without any acute distress.  No acute  position overlying the right pulmonary artery A mediastinal drain remains in place Heart size appears prominent Ectasis is demonstrated bilaterally.          Interval insertion of gastric tube. Its tip is in the stomach Otherwise, no significant interval change   ______________________________________ Electronically signed by: CRISTOBAL RIVERA M.D. Date:     04/29/2025 Time:    21:27     Intraoperative ELYSE  Result Date: 4/29/2025  See Anesthesia Procedure note for procedure details.    Vascular duplex vein mapping lower bilateral  Result Date: 4/28/2025  Bilateral GSVs patent with measurements below     Vascular duplex carotid bilateral  Result Date: 4/28/2025    Mild (<50%) stenosis in the right internal carotid artery.  Heterogeneous plaque in the right internal carotid artery.   Mild (<50%) stenosis in the left internal carotid artery.  Heterogeneous plaque in the left internal carotid artery.   Normal antegrade flow involving the right vertebral artery.   Normal antegrade flow involving the left vertebral artery.     Cardiac procedure  Result Date: 4/27/2025    Severe multivessel CAD   EF 50% by ECHO SUMMARY: 1.  Severe Multivessel CAD. 2.  Left ventricular systolic function 50% by echocardiogram. 3.  Left radial arterial access. 4.  Total sedation time 25 minutes with 1 mg Versed and 25 mcg of fentanyl.  Total contrast is 90 cc.   RECOMMENDATIONS: 1.  CABG referral and  patient needs medical management with antianginal medications lipid-lowering medications and no smoking. 2.  TR band management per protocol. 3.  Admit to hospital medicine for observation 4.  Patient needs follow-up with Dr Kitchen   in 2 weeks   INDICATION FOR THE PROCEDURE AND APPROPRIATENESS CRITERIA:  Mr. Alejo is a 69 y.o. male with a history of  DM, was seen by dr Kitchen for abnormal ECG and shortness of breath Today had stress test showing anterior wall ischemia, EF 44% ECG abnormal as before  Pt sent to ER for admission and cardiac cath

## 2025-05-07 ENCOUNTER — APPOINTMENT (OUTPATIENT)
Dept: GENERAL RADIOLOGY | Age: 70
DRG: 234 | End: 2025-05-07
Attending: SURGERY
Payer: MEDICARE

## 2025-05-07 LAB
ALBUMIN SERPL-MCNC: 3 G/DL (ref 3.5–5.2)
ALP SERPL-CCNC: 129 U/L (ref 40–129)
ALT SERPL-CCNC: 19 U/L (ref 10–50)
ANION GAP SERPL CALCULATED.3IONS-SCNC: 13 MMOL/L (ref 8–16)
AST SERPL-CCNC: 21 U/L (ref 10–50)
BASOPHILS # BLD: 0.1 K/UL (ref 0–0.2)
BASOPHILS NFR BLD: 0.4 % (ref 0–1)
BILIRUB SERPL-MCNC: 0.7 MG/DL (ref 0.2–1.2)
BUN SERPL-MCNC: 30 MG/DL (ref 8–23)
CALCIUM SERPL-MCNC: 8.7 MG/DL (ref 8.8–10.2)
CHLORIDE SERPL-SCNC: 114 MMOL/L (ref 98–107)
CO2 SERPL-SCNC: 24 MMOL/L (ref 22–29)
CREAT SERPL-MCNC: 1.1 MG/DL (ref 0.7–1.2)
EOSINOPHIL # BLD: 0.5 K/UL (ref 0–0.6)
EOSINOPHIL NFR BLD: 3.1 % (ref 0–5)
ERYTHROCYTE [DISTWIDTH] IN BLOOD BY AUTOMATED COUNT: 14.4 % (ref 11.5–14.5)
GLUCOSE BLD-MCNC: 175 MG/DL (ref 70–99)
GLUCOSE BLD-MCNC: 202 MG/DL (ref 70–99)
GLUCOSE BLD-MCNC: 242 MG/DL (ref 70–99)
GLUCOSE BLD-MCNC: 248 MG/DL (ref 70–99)
GLUCOSE SERPL-MCNC: 234 MG/DL (ref 70–99)
HCT VFR BLD AUTO: 43.6 % (ref 42–52)
HGB BLD-MCNC: 13.3 G/DL (ref 14–18)
IMM GRANULOCYTES # BLD: 0.6 K/UL
LYMPHOCYTES # BLD: 2.9 K/UL (ref 1.1–4.5)
LYMPHOCYTES NFR BLD: 19.7 % (ref 20–40)
MCH RBC QN AUTO: 28.8 PG (ref 27–31)
MCHC RBC AUTO-ENTMCNC: 30.5 G/DL (ref 33–37)
MCV RBC AUTO: 94.4 FL (ref 80–94)
MONOCYTES # BLD: 1 K/UL (ref 0–0.9)
MONOCYTES NFR BLD: 6.8 % (ref 0–10)
NEUTROPHILS # BLD: 9.7 K/UL (ref 1.5–7.5)
NEUTS SEG NFR BLD: 65.8 % (ref 50–65)
PERFORMED ON: ABNORMAL
PLATELET # BLD AUTO: 320 K/UL (ref 130–400)
PMV BLD AUTO: 10.8 FL (ref 9.4–12.4)
POTASSIUM SERPL-SCNC: 3.8 MMOL/L (ref 3.5–5)
PROT SERPL-MCNC: 7.3 G/DL (ref 6.4–8.3)
RBC # BLD AUTO: 4.62 M/UL (ref 4.7–6.1)
SODIUM SERPL-SCNC: 151 MMOL/L (ref 136–145)
WBC # BLD AUTO: 14.7 K/UL (ref 4.8–10.8)

## 2025-05-07 PROCEDURE — 94150 VITAL CAPACITY TEST: CPT

## 2025-05-07 PROCEDURE — 97116 GAIT TRAINING THERAPY: CPT

## 2025-05-07 PROCEDURE — 92526 ORAL FUNCTION THERAPY: CPT

## 2025-05-07 PROCEDURE — 6370000000 HC RX 637 (ALT 250 FOR IP): Performed by: INTERNAL MEDICINE

## 2025-05-07 PROCEDURE — 97530 THERAPEUTIC ACTIVITIES: CPT

## 2025-05-07 PROCEDURE — 97129 THER IVNTJ 1ST 15 MIN: CPT

## 2025-05-07 PROCEDURE — 80053 COMPREHEN METABOLIC PANEL: CPT

## 2025-05-07 PROCEDURE — 85025 COMPLETE CBC W/AUTO DIFF WBC: CPT

## 2025-05-07 PROCEDURE — 6370000000 HC RX 637 (ALT 250 FOR IP): Performed by: SURGERY

## 2025-05-07 PROCEDURE — 99222 1ST HOSP IP/OBS MODERATE 55: CPT | Performed by: INTERNAL MEDICINE

## 2025-05-07 PROCEDURE — 2700000000 HC OXYGEN THERAPY PER DAY

## 2025-05-07 PROCEDURE — 1200000000 HC SEMI PRIVATE

## 2025-05-07 PROCEDURE — 94640 AIRWAY INHALATION TREATMENT: CPT

## 2025-05-07 PROCEDURE — 2580000003 HC RX 258: Performed by: INTERNAL MEDICINE

## 2025-05-07 PROCEDURE — 94760 N-INVAS EAR/PLS OXIMETRY 1: CPT

## 2025-05-07 PROCEDURE — 97535 SELF CARE MNGMENT TRAINING: CPT

## 2025-05-07 PROCEDURE — 71045 X-RAY EXAM CHEST 1 VIEW: CPT

## 2025-05-07 PROCEDURE — 82962 GLUCOSE BLOOD TEST: CPT

## 2025-05-07 PROCEDURE — 6360000002 HC RX W HCPCS: Performed by: SURGERY

## 2025-05-07 PROCEDURE — 36415 COLL VENOUS BLD VENIPUNCTURE: CPT

## 2025-05-07 PROCEDURE — 2500000003 HC RX 250 WO HCPCS: Performed by: SURGERY

## 2025-05-07 RX ORDER — METOPROLOL TARTRATE 1 MG/ML
5 INJECTION, SOLUTION INTRAVENOUS EVERY 4 HOURS
Status: DISCONTINUED | OUTPATIENT
Start: 2025-05-08 | End: 2025-05-11

## 2025-05-07 RX ORDER — DEXTROSE MONOHYDRATE 50 MG/ML
INJECTION, SOLUTION INTRAVENOUS CONTINUOUS
Status: DISCONTINUED | OUTPATIENT
Start: 2025-05-07 | End: 2025-05-08

## 2025-05-07 RX ADMIN — INSULIN GLARGINE 25 UNITS: 100 INJECTION, SOLUTION SUBCUTANEOUS at 21:33

## 2025-05-07 RX ADMIN — LABETALOL HYDROCHLORIDE 10 MG: 5 INJECTION, SOLUTION INTRAVENOUS at 16:00

## 2025-05-07 RX ADMIN — DEXTROSE MONOHYDRATE: 50 INJECTION, SOLUTION INTRAVENOUS at 11:08

## 2025-05-07 RX ADMIN — ENOXAPARIN SODIUM 40 MG: 100 INJECTION SUBCUTANEOUS at 11:02

## 2025-05-07 RX ADMIN — IPRATROPIUM BROMIDE AND ALBUTEROL SULFATE 1 DOSE: 2.5; .5 SOLUTION RESPIRATORY (INHALATION) at 06:15

## 2025-05-07 RX ADMIN — IPRATROPIUM BROMIDE AND ALBUTEROL SULFATE 1 DOSE: 2.5; .5 SOLUTION RESPIRATORY (INHALATION) at 19:24

## 2025-05-07 RX ADMIN — ACETYLCYSTEINE 600 MG: 200 SOLUTION ORAL; RESPIRATORY (INHALATION) at 06:15

## 2025-05-07 RX ADMIN — IPRATROPIUM BROMIDE AND ALBUTEROL SULFATE 1 DOSE: 2.5; .5 SOLUTION RESPIRATORY (INHALATION) at 10:12

## 2025-05-07 RX ADMIN — INSULIN LISPRO 2 UNITS: 100 INJECTION, SOLUTION INTRAVENOUS; SUBCUTANEOUS at 21:32

## 2025-05-07 RX ADMIN — ACETYLCYSTEINE 600 MG: 200 SOLUTION ORAL; RESPIRATORY (INHALATION) at 19:24

## 2025-05-07 RX ADMIN — INSULIN LISPRO 2 UNITS: 100 INJECTION, SOLUTION INTRAVENOUS; SUBCUTANEOUS at 11:01

## 2025-05-07 RX ADMIN — INSULIN LISPRO 10 UNITS: 100 INJECTION, SOLUTION INTRAVENOUS; SUBCUTANEOUS at 11:01

## 2025-05-07 RX ADMIN — IPRATROPIUM BROMIDE AND ALBUTEROL SULFATE 1 DOSE: 2.5; .5 SOLUTION RESPIRATORY (INHALATION) at 14:31

## 2025-05-07 RX ADMIN — METOPROLOL TARTRATE 5 MG: 5 INJECTION INTRAVENOUS at 11:09

## 2025-05-07 ASSESSMENT — PAIN SCALES - GENERAL: PAINLEVEL_OUTOF10: 0

## 2025-05-07 NOTE — PROGRESS NOTES
DIS Nurse Note  SUBJECTIVE: Patient assessed secondary to elevated Deterioration Index Score.      Deterioration Index Score:  Predictive Model Details          60 (Warning)  Factor Value    Calculated 5/7/2025 12:21 25% Sodium abnormal (151 mmol/L)    Deterioration Index Model 19% Age 69 years old     19% Supplemental oxygen Nasal cannula     15% Neurological exam X     11% Fabiola coma scale 14     6% WBC count abnormal (14.7 K/uL)     2% Systolic 139     2% BUN abnormal (30 mg/dL)     0% Potassium 3.8 mmol/L     0% Pulse oximetry 98 %     0% Temperature 97.7 °F (36.5 °C)     0% Respiratory rate 16     0% Hematocrit 43.6 %     0% Pulse 73        Vital Signs:  Vitals:    05/06/25 2346 05/07/25 0606 05/07/25 0615 05/07/25 0801   BP: 109/67 (!) 151/83  139/81   Pulse: 79 74 76 73   Resp: 18 18 17 16   Temp: 97.5 °F (36.4 °C) 97.3 °F (36.3 °C)  97.7 °F (36.5 °C)   TempSrc: Temporal   Temporal   SpO2: 97%  99% 98%   Weight:  84.5 kg (186 lb 4.3 oz)     Height:            Provider Notified: Not Indicated    ASSESSMENT:   409    Plan of Care: continued txt    Electronically signed by Tessie Valdes RN
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
## 2025-05-07 NOTE — PROGRESS NOTES
Physical Therapy  Name: Akhil Alejo  MRN:  [id number]  Date of service:  5/7/2025 05/07/25 0830   Restrictions/Precautions   Restrictions/Precautions Fall Risk;General Precautions;Surgical Protocols   Activity Level Up with Assist   Required Braces or Orthoses? No   Position Activity Restriction   Sternal Precautions No Pushing;No Pulling;5# Lifting Restrictions;Move in the Tube   Other Position/Activity Restrictions Sternal Precautions   General   Chart Reviewed Yes   Family/Caregiver Present Yes   Referring Practitioner Akhil Lara MD   Subjective   Subjective pt in bed, agrees to therapy  wife reports pt doing better this morning   General   General Comments Rn ,Karla coronel   Pain Assessment   Pain Assessment None - Denies Pain   Pain Level 0   Oxygen Therapy   O2 Device Nasal cannula   O2 Flow Rate (L/min) 2 L/min   Bed Mobility   Supine to Sit Minimal assistance   Sit to Supine Contact guard assistance   Transfers   Sit to Stand Contact guard assistance   Stand to Sit Contact guard assistance   Comment pt needs vcs foro hand placement  pt sits with decreased cntrol, safety   Ambulation   Surface Level tile   Device Rolling Walker   Assistance Minimal assistance   Gait Deviations Slow Alycia;Decreased step height;Decreased step length;Shuffles   Comments steps in place, fwd and side step hob with min A wx support and max vcs   Exercises   Comments seated eob masha le therex laqs x 10, hip flex x 10 , elbow flex x 10, stand masha le therex with wx support and cga min A marching, hip flex, heel raises   Other Activities   Comment sit stand from eob x 2 with cueing and cga min A   Patient Goals    Patient Goals  go home   Short Term Goals   Time Frame for Short Term Goals 2 wks   Short Term Goal 1 supine to sit indep   Short Term Goal 2 sit to stand indep   Short Term Goal 3 amb. 100' with RW SBA   Short Term Goal 4 bed to chair SBA   Conditions Requiring Skilled Therapeutic Intervention   Body

## 2025-05-07 NOTE — PROGRESS NOTES
POST OP CARDIOTHORACIC SURGERY PROGRESS NOTE    Post op day: 8    SUBJECTIVE:  Mr. Alejo is laying in bed sleeping soundly. Oxygenating well on 2L NC. Wife at bedside states he slept well last night. She states she is ok with PEG tube placement.     /81   Pulse 73   Temp 97.7 °F (36.5 °C) (Temporal)   Resp 16   Ht 1.803 m (5' 11\")   Wt 84.5 kg (186 lb 4.3 oz)   SpO2 98%   BMI 25.98 kg/m²   Average, Min, and Max for last 24 hours Vitals:  TEMPERATURE:  Temp  Av.8 °F (36.6 °C)  Min: 97.3 °F (36.3 °C)  Max: 98.8 °F (37.1 °C)  RESPIRATIONS RANGE: Resp  Av.5  Min: 16  Max: 18  PULSE RANGE: Pulse  Av.4  Min: 73  Max: 148  BLOOD PRESSURE RANGE:  Systolic (24hrs), Av , Min:109 , Max:151   ; Diastolic (24hrs), Av, Min:62, Max:92    PULSE OXIMETRY RANGE: SpO2  Av.1 %  Min: 96 %  Max: 100 %    I/O last 3 completed shifts:  In: -   Out: 2550 [Urine:2550]    CHEST: clear bilaterally although diminished     CARDIOVASCULAR: Sinus rhythm     INCISION: CDI    LABS:  CBC:   Lab Results   Component Value Date/Time    WBC 14.7 2025 01:35 AM    RBC 4.62 2025 01:35 AM    HGB 13.3 2025 01:35 AM    HCT 43.6 2025 01:35 AM    MCV 94.4 2025 01:35 AM    MCH 28.8 2025 01:35 AM    MCHC 30.5 2025 01:35 AM    RDW 14.4 2025 01:35 AM     2025 01:35 AM    MPV 10.8 2025 01:35 AM     CMP:    Lab Results   Component Value Date/Time     2025 01:35 AM    K 3.8 2025 01:35 AM     2025 01:35 AM    CO2 24 2025 01:35 AM    BUN 30 2025 01:35 AM    CREATININE 1.1 2025 01:35 AM    LABGLOM 72 2025 01:35 AM    GLUCOSE 234 2025 01:35 AM    CALCIUM 8.7 2025 01:35 AM    BILITOT 0.7 2025 01:35 AM    ALKPHOS 129 2025 01:35 AM    AST 21 2025 01:35 AM    ALT 19 2025 01:35 AM       CHEST XRAY: stable atelectasis     ASSESSMENT:   CAD s/p 3V robotic CABG on 25 by   [place]  Uncontrolled DM type II  HTN  Dementia  Postoperative atrial fibrillation-resolved   Hypernatremia   Trauma to vocal cords    PLAN:   Ambulate with PT today and wean O2 as tolerated.   Possible PEG placement   IP Rehab evaluation pending     Electronically signed by Dayami Knutson PA-C on 5/7/25 at 9:01 AM CDT

## 2025-05-07 NOTE — PROGRESS NOTES
Cardiology Progress Note   Nilsa Hyde MD      Patient:  Akhil Alejo  [id number]  Admit Date: 4/25/2025       Hospital Day: 12  Referring Provider: Akhil Lara MD    Admission Problem List: Present on Admission:   Diabetic ulcer of ankle associated with type 2 diabetes mellitus, with fat layer exposed (HCC)   Diabetes (HCC)   Essential hypertension   GERD (gastroesophageal reflux disease)   Hypothyroidism   Mixed hyperlipidemia   Neuropathy   Vitamin D deficiency   Restless legs syndrome (RLS)   Abnormal stress test   Trauma to vocal cord        Subjective     Patient seen and examined  Remains somewhat confused  Per discussion with the wife, plan for G-tube on Friday                     Objective      /81   Pulse 73   Temp 97.7 °F (36.5 °C) (Temporal)   Resp 16   Ht 1.803 m (5' 11\")   Wt 84.5 kg (186 lb 4.3 oz)   SpO2 98%   BMI 25.98 kg/m²       Intake/Output Summary (Last 24 hours) at 5/7/2025 1546  Last data filed at 5/7/2025 0606  Gross per 24 hour   Intake --   Output 1400 ml   Net -1400 ml         Physical Exam:      Physical Exam  Vitals and nursing note reviewed.   Constitutional:       Appearance: Normal appearance.   HENT:      Head: Normocephalic and atraumatic.   Eyes:      Extraocular Movements: Extraocular movements intact.   Cardiovascular:      Rate and Rhythm: Normal rate and regular rhythm.      Heart sounds: No murmur heard.  Pulmonary:      Effort: Pulmonary effort is normal.      Breath sounds: Normal breath sounds.   Abdominal:      Palpations: Abdomen is soft.   Musculoskeletal:      Right lower leg: No edema.      Left lower leg: No edema.   Skin:     General: Skin is warm and dry.   Neurological:      Mental Status: He is alert.           Lab Data:  CBC:   Recent Labs     05/05/25  0220 05/06/25  0200 05/07/25  0135   WBC 12.2* 13.3* 14.7*   HGB 12.3* 13.0* 13.3*   HCT 39.8* 41.9* 43.6   MCV 93.4 91.7 94.4*    303 320     BMP:   Recent Labs     05/05/25  0220  M.D. Date:     05/01/2025 Time:    03:38     XR CHEST PORTABLE  Result Date: 4/30/2025  EXAM: CHEST RADIOGRAPH  TECHNIQUE: Single frontal chest radiograph.  HISTORY: Postop open heart surgery.  COMPARISON: 04/25/2025  FINDINGS:  Interval surgery with left rib plating and surgical clips in left axilla. Interval placement of an endotracheal tube, tip about 1.6 cm above the monika. Interval placement of a right IJ Virginia-Gisell catheter, with tip in the right pulmonary artery. Interval  placement of a left chest tube. EKG leads and a monitoring pad project over the left chest. Hypoventilation with mild left perihilar atelectasis. No pleural effusion or pneumothorax is seen. Stable borderline cardiomegaly. No acute displaced rib fractures are identified.        1.  Tubes and lines in satisfactory position. 2. No acute findings in the chest.    ______________________________________ Electronically signed by: SU ELLISON M.D. Date:     04/30/2025 Time:    06:56     XR CHEST PORTABLE  Result Date: 4/30/2025  EXAM: CHEST RADIOGRAPH  TECHNIQUE: Single frontal chest radiograph.  HISTORY: Postop open heart surgery.  COMPARISON: April 29, 2025  FINDINGS:  Stable, satisfactory position of the endotracheal tube, enteric tube, right IJ Virginia-Gisell catheter, and left chest tube. EKG leads project over the chest. Left rib surgical plating, again noted. Mild bibasilar atelectasis. No pulmonary infiltrate is identified. No pleural effusion or pneumothorax is seen. Stable enlargement of the cardiac silhouette. No acute displaced rib fractures are identified.        1.  Stable chest.    ______________________________________ Electronically signed by: SU ELLISON M.D. Date:     04/30/2025 Time:    06:41     XR CHEST PORTABLE  Result Date: 4/29/2025  EXAM: XR CHEST PORTABLE  TECHNIQUE: Single frontal chest radiograph.  HISTORY: OG placement  COMPARISON: April 29, 2025 Current study is timed at 2057 hours and compared with 1814 hours same day.

## 2025-05-07 NOTE — PROGRESS NOTES
Physical Therapy  Name: Akhil Alejo  MRN:  [id number]  Date of service:  5/7/2025 05/07/25 1000   Restrictions/Precautions   Restrictions/Precautions Fall Risk;General Precautions;Surgical Protocols   Activity Level Up with Assist   Required Braces or Orthoses? No   Position Activity Restriction   Sternal Precautions No Pushing;No Pulling;5# Lifting Restrictions;Move in the Tube   Other Position/Activity Restrictions Sternal Precautions   General   Chart Reviewed Yes   Family/Caregiver Present Yes   Referring Practitioner Akhil Lara MD   Subjective   Subjective pt in bed, agrees to therapy with pta and OT   Pain Assessment   Pain Assessment None - Denies Pain   Oxygen Therapy   O2 Device Nasal cannula   O2 Flow Rate (L/min) 2 L/min   Bed Mobility   Supine to Sit Minimal assistance   Sit to Supine Contact guard assistance   Transfers   Sit to Stand Contact guard assistance   Stand to Sit Contact guard assistance   Comment vcs needed for technique and safety  pt with decreased control wiht sititng  pt impulsive with tfers   Ambulation   Surface Level tile   Device Rolling Walker   Assistance Minimal assistance   Gait Deviations Slow Alycia;Decreased step height;Decreased step length;Shuffles   Distance 20' x 3   Comments wc follow, portable 02  pt with slow gt pattern, gets distracted easy , rt side lob with max A to correct  pt fatigued   Patient Goals    Patient Goals  go home   Short Term Goals   Time Frame for Short Term Goals 2 wks   Short Term Goal 1 supine to sit indep   Short Term Goal 2 sit to stand indep   Short Term Goal 3 amb. 100' with RW SBA   Short Term Goal 4 bed to chair SBA   Conditions Requiring Skilled Therapeutic Intervention   Body Structures, Functions, Activity Limitations Requiring Skilled Therapeutic Intervention Decreased functional mobility ;Decreased ADL status;Decreased ROM;Decreased cognition;Decreased safe awareness;Decreased strength;Decreased balance;Decreased  endurance;Decreased coordination;Decreased posture   Assessment pt able to amb increased dist this session with wc follow, min A use of fww  pt fatigued easy needing seated rest breaks  pt did lose bal to rt 1x needing max A to correct   pt retuned to bed with safety and comfort measures and wife present   Discharge Recommendations Continue to assess pending progress   Activity Tolerance   Activity Tolerance Treatment limited secondary to decreased cognition   Physical Therapy Plan   General Plan 6-7 times per week   Therapy Duration 2 Weeks   Current Treatment Recommendations Strengthening;ROM;Balance training;Functional mobility training;Transfer training;Gait training;Endurance training;Patient/Caregiver education & training;Safety education & training;Positioning;Equipment evaluation, education, & procurement;Therapeutic activities   PT Plan of Care   Wednesday X   Safety Devices   Type of Devices Bed alarm in place;Call light within reach;Gait belt;Left in bed   PT Whiteboard Notes   Therapy Whiteboard RE 5/16 CABG x 3, [place], 2A           Electronically signed by Jackie Rojas PTA on 5/7/2025 at 11:22 AM

## 2025-05-07 NOTE — CONSULTS
Pt Name: Akhil Alejo  MRN: [id number]  [id number]  YOB: 1955  Admit Date: 4/25/2025 11:35 AM  Date of evaluation: 5/7/2025  Primary Care Physician: Lennox Waddell MD   [id number]       Requesting Provider: Dr. Akhil Lara    GI Consult    Indication: PEG tube placement.    History:  The patient is a 69 y.o. male admitted to the hospital for heart problem.  He underwent coronary artery bypass grafting, three-vessel disease on 4/29.  Postoperative course was complicated by atrial fibrillation.  After that patient started having some difficulty in swallowing.  He does have underlying mild dementia but according the patient wife his dementia and confusion and disorientation got worse after the surgery.  He failed swallowing study.  Apparently he just kept his food in the back of his throat and does not swallow.  According the patient's wife he does not have any prior history of any GI symptoms.  No previous history of any heartburns or regurgitation.  No dysphagia or dyne aphasia.  No abdominal pain.  No change in stool color, consistency or caliber.  No hematochezia or melanotic stool.      Past Medical History:        Diagnosis Date    Arm fracture 07/2016    left    Dementia (HCC)     per wife he needs be supervised at home    Depression     Diabetes mellitus (HCC)     Hypertension     Kidney stones     Neuropathy     Restless leg syndrome      Past Surgical History:        Procedure Laterality Date    BONE MARROW BIOPSY Right 12/09/2020    BONE MARROW ASPIRATION BIOPSY performed by ALISA Rabago at [facility] ASC OR    CARDIAC PROCEDURE N/A 4/25/2025    Left heart cath / coronary angiography performed by Chayo Cortes MD at [facility] CARDIAC CATH LAB    CHOLECYSTECTOMY      COLONOSCOPY  05/27/2020    Dr Patiño   AP    CORONARY ARTERY BYPASS GRAFT N/A 4/29/2025    CORONARY ARTERY BYPASS GRAFT X3, MINIMALLY INVASIVE robotic, pump assisted via femoral access, BILATERAL INTERNAL  added sound.  Abdomen: Soft, non distended and non tender. No hepatosplenomegaly. Bowel sounds are audible. No masses palpable.  Skin: warm, dry, no obvious rash, non-jaundice  Extremities: No cyanosis or clubbing or peripheral edema noted. Dorsalis pedis pulses were intact.        Labs:     Recent Labs     05/05/25 0220 05/06/25 0200 05/07/25  0135   WBC 12.2* 13.3* 14.7*   RBC 4.26* 4.57* 4.62*   HGB 12.3* 13.0* 13.3*   HCT 39.8* 41.9* 43.6   MCV 93.4 91.7 94.4*   MCH 28.9 28.4 28.8   MCHC 30.9* 31.0* 30.5*    303 320     Recent Labs     05/05/25 0220 05/06/25 0200 05/07/25 0135   * 150* 151*   K 4.0 3.9 3.8   ANIONGAP 12 12 13   * 114* 114*   CO2 23 24 24   BUN 40* 38* 30*   CREATININE 0.9 1.0 1.1   GLUCOSE 253* 249* 234*   CALCIUM 8.8 9.1 8.7*     No results for input(s): \"MG\", \"PHOS\" in the last 72 hours.  Recent Labs     05/05/25 0220 05/06/25 0200 05/07/25 0135   AST 46 37 21   ALT 28 30 19   BILITOT 0.5 0.6 0.7   ALKPHOS 142* 151* 129     HgBA1c:  No components found for: \"HGBA1C\"  FLP:  No results found for: \"TRIG\", \"HDL\", \"LDLDIRECT\"  TSH:  No results found for: \"TSH\"  Troponin T: No results for input(s): \"TROPONINI\" in the last 72 hours.  INR: No results for input(s): \"INR\" in the last 72 hours.    No results for input(s): \"LIPASE\", \"AMYLASE\" in the last 72 hours.    Radiology:    XR CHEST PORTABLE  Result Date: 5/7/2025   1.  Stable enlargement of the cardiac silhouette. 2. Trace bilateral pleural effusions, improved on the left but new on the right.    ______________________________________ Electronically signed by: SU ELLISON M.D. Date:     05/07/2025 Time:    06:26     FL MODIFIED BARIUM SWALLOW W VIDEO  Result Date: 5/6/2025  1.  No evidence of aspiration seen. 2.  Oral pharyngeal contraction demonstrated marked delay in initiation. Once a mixed moist mixture with nectar demonstrated penetration.  . 3.  See speech pathologist's assessment and recommendation.

## 2025-05-07 NOTE — PROGRESS NOTES
Facility/Department: [facility] ONCOLOGY UNIT  Daily Treatment Note  NAME: Akhil Alejo  : 1955  MRN: [id number]    Date of Service: 2025    Discharge Recommendations:  Continue to assess pending progress       Treatment Diagnosis:  The primary encounter diagnosis was Abnormal stress test. Diagnoses of Abnormal nuclear cardiac imaging test and CAD in native artery were also pertinent to this visit.    Assessment:  Assessment: Pt seen in room with YARIEL Mejia.  Pt CGA for functional mobility with 1xLOB requiring assist from therapist to recover.  Recommend ongoing OT at this time to improve I with ADL tasks, and balance  Activity Tolerance: Treatment limited secondary to decreased cognition        Restrictions:  Restrictions/Precautions  Restrictions/Precautions: Fall Risk, General Precautions, Surgical Protocols  Activity Level: Up with Assist  Required Braces or Orthoses?: No  Position Activity Restriction  Sternal Precautions: No Pushing, No Pulling, 5# Lifting Restrictions, Move in the Tube  Other Position/Activity Restrictions: Sternal Precautions    Subjective:   Subjective: Pt used sign language to say thank you to wife for ice chip-  pt still remains NPO with possible placement of PEG tube today.  Pt pulled NG tube.  Subjective  Subjective: Pt used sign language to say thank you to wife for ice chip-  pt still remains NPO with possible placement of PEG tube today.  Pt pulled NG tube.  Pain: 0/10 pain before during or after treatment-- pt seen with PT For functional ambulation in which pt LOB to R requiring Max A and Min A to recover balance    Objective:    ADL  Feeding: NPO  Feeding Skilled Clinical Factors: NPO with NG tube and possible PEG placement  Grooming: Minimal assistance  UE Bathing: Minimal assistance  LE Bathing: Maximum assistance  UE Dressing: Minimal assistance  LE Dressing: Maximum assistance  Putting On/Taking Off Footwear: Maximum assistance  Toileting: Maximum assistance  Functional  Mobility: Contact guard assistance  Functional Mobility Skilled Clinical Factors: CGA for ambulation with RW, 1x LOB  Additional Comments: ADL values per clinical judgment         Transfer Training  Transfer Training: Yes  Sit to Stand: Contact guard assistance  Stand to Sit: Contact guard assistance    Balance  Sitting: Impaired  Sitting - Static: Good (unsupported)  Sitting - Dynamic: Good (unsupported)  Standing: Impaired  Standing - Static: Fair;Constant support  Standing - Dynamic: Fair;Constant support       Patient Education  Education Given To: Patient;Family  Education Provided: ADL Adaptive Strategies;Transfer Training  Education Provided Comments: Trained in DME- Education provided in DME uses  Education Method: Demonstration;Verbal  Barriers to Learning: None  Education Outcome: Verbalized understanding       Plan:   Times Per Week: 3-5x/wk    Goals (On-Going):  Short Term Goals  Time Frame for Short Term Goals: 1 week  Short Term Goal 1: Pt will be Modified I for toileting task/transfer  Short Term Goal 2: Pt will be Modified I for functional mobility to/from bathroom  Short Term Goal 3: Pt will be able to complete grooming tasks standing at sink with Modified I and without LOB  Short Term Goal 4: Pt will be Modified I for LE dressing to don/doff socks  Patient Goals   Patient goals : Pt wishes to return home        Electronically signed by Marisol Vasquez OT on 5/7/2025 at 12:38 PM

## 2025-05-07 NOTE — PROGRESS NOTES
Nutrition Assessment     Type and Reason for Visit: Reassess    Nutrition Recommendations/Plan:   Will follow for restarting of TF pending PEG placement as able and/or need of alternate means of nutrition     Malnutrition Assessment:  Malnutrition Status: At risk for malnutrition    Nutrition Assessment:  Pt has not been receiving TF as dobhoff was removed. Pt/family is agreeable to PEG tube placement and has GI consult pending. Pt was seen by nephrology and has new order to start IV fluids D5 @ 75 mLs/hr for hypernatremia. Will follow for PEG tube placement to restart TF as able and/or possible need of alternate means of nutrition.    Estimated Daily Nutrient Needs:  Energy (kcal):  4825-6102 Weight Used for Energy Requirements: Admission     Protein (g):  101-117 Weight Used for Protein Requirements: Ideal        Fluid (ml/day):  3905-4642 Method Used for Fluid Requirements: 1 ml/kcal    Nutrition Related Findings:   Na 151, GFR 72, Glu 234-317, A1c 10.6, BM 5-3 Wound Type: Surgical Incision, Diabetic Ulcer    Current Nutrition Therapies:    Diet NPO  Diet NPO Exceptions are: Sips of Water with Meds    Anthropometric Measures:  Height: 180.3 cm (5' 11\")  Current Body Wt: 84.5 kg (186 lb 4.6 oz)   BMI: 26      Nutrition Diagnosis:   Inadequate oral intake related to swallowing difficulty as evidenced by swallow study results, NPO or clear liquid status due to medical condition    Nutrition Interventions:   Food and/or Nutrient Delivery: Continue NPO, IV Fluid Delivery  Nutrition Education/Counseling: No recommendation at this time  Coordination of Nutrition Care: Continue to monitor while inpatient  Plan of Care discussed with: Pt/wife    Goals:  Goals: Initiation of nutrition  Type of Goal: Continue current goal  Previous Goal Met: No Progress toward Goal(s)    Nutrition Monitoring and Evaluation:   Behavioral-Environmental Outcomes: None Identified  Food/Nutrient Intake Outcomes: Progression of Nutrition, IVF

## 2025-05-07 NOTE — CONSULTS
Nephrology ([facility]) Consult Note      Patient:  Akhil Alejo  YOB: 1955  Date of Service: 5/7/2025  MRN: [id number]   Acct: [id number]   Primary Care Physician: Lennox Waddell MD  Advance Directive: Full Code  Admit Date: 4/25/2025       Hospital Day: 12  Referring Provider: Akhil Lara MD    Patient independently seen and examined, Chart, Consults, Notes, Operative notes, Labs, Cardiology, and Radiology studies reviewed as available.        Subjective:  Akhil Alejo is a 69 y.o. male for whom we were consulted for evaluation and treatment of hyponatremia.  Initially seen with wife and neither noted any renal issues for him.  He underwent CABG three-vessel on 4/29/2025 by Dr. Lara.  Had episodes of postoperative atrial fibrillation and persistently failed swallowDays studies.  He had had a Dobbhoff but that was pulled out prior to my initial evaluation on hospital day 12.  He was n.p.o. for possible PEG placement.  Sodium had been normal prior to admission and recently had been ranging in the low 150s.  Patient denied other complaints upon questioning.    Allergies:  Penicillins    Medicines:  Current Facility-Administered Medications   Medication Dose Route Frequency Provider Last Rate Last Admin    metoprolol tartrate (LOPRESSOR) tablet 75 mg  75 mg Oral BID Akhil Lara MD        amiodarone (CORDARONE) 450 mg in dextrose 5% 250 mL infusion  0.5 mg/min IntraVENous Continuous Akhil Lara MD 16.7 mL/hr at 05/06/25 1902 0.5 mg/min at 05/06/25 1902    insulin glargine (LANTUS) injection vial 25 Units  25 Units SubCUTAneous Nightly Lior Quiroz MD   25 Units at 05/05/25 2206    insulin lispro (HUMALOG,ADMELOG) injection vial 10 Units  10 Units SubCUTAneous TID WC Lior Quiroz MD   10 Units at 05/06/25 1716    gabapentin (NEURONTIN) capsule 100 mg  100 mg Oral TID Akhil Lara MD   100 mg at 05/05/25 2154    isosorbide mononitrate

## 2025-05-07 NOTE — PLAN OF CARE
Problem: Chronic Conditions and Co-morbidities  Goal: Patient's chronic conditions and co-morbidity symptoms are monitored and maintained or improved  5/7/2025 0421 by Lili Will RN  Outcome: Progressing  5/7/2025 0150 by Lili Will RN  Outcome: Progressing     Problem: Safety - Adult  Goal: Free from fall injury  5/7/2025 0421 by Lili Will RN  Outcome: Progressing  5/7/2025 0150 by Lili Will RN  Outcome: Progressing     Problem: ABCDS Injury Assessment  Goal: Absence of physical injury  5/7/2025 0421 by Lili Will RN  Outcome: Progressing  5/7/2025 0150 by Lili Will RN  Outcome: Progressing     Problem: Pain  Goal: Verbalizes/displays adequate comfort level or baseline comfort level  5/7/2025 0421 by Lili Will RN  Outcome: Progressing  5/7/2025 0150 by Lili Will RN  Outcome: Progressing

## 2025-05-07 NOTE — PROGRESS NOTES
and no smoking. 2.  TR band management per protocol. 3.  Admit to hospital medicine for observation 4.  Patient needs follow-up with Dr Kitchen   in 2 weeks   INDICATION FOR THE PROCEDURE AND APPROPRIATENESS CRITERIA:  Mr. Alejo is a 69 y.o. male with a history of  DM, was seen by dr Kitchen for abnormal ECG and shortness of breath Today had stress test showing anterior wall ischemia, EF 44% ECG abnormal as before  Pt sent to ER for admission and cardiac cath NO chest pain Has multiple family members with  CAD. Cardiac catheterization was performed to rule out any significant CAD given his presentation with multiple risk factors.  Risks which include a 1% risk of MI, death, allergic reaction, CVA, vascular injury and renal failure requiring hemodialysis and benefits of cardiac cath/PTCA and or stenting has been explained to patient verbalized understanding and agrees to the procedure.  PROCEDURE:  PROCEDURE PERFORMED:    1] Coronary angiography CPT: 51069  2) Administration of moderate conscious sedation, 25  minutes of direct supervised care with IV fentanyl, IV Versed and continuous pulse oximetry, end-tidal CO2, blood pressure and heart rate monitoring Total contrast is 90 cc. CPT 29051  3] Left radial approach.     PROCEDURE:   1.  The  patient arrived in the laboratory in the fasting state.  The patient had nothing by mouth since midnight.  3 ECG leads were attached for image gating.  Pulse oximetry signals were monitored.  2.  Supplemental oxygen was administered by nasal cannula throughout the procedure. 3.  The planned puncture site and the right groin and left arm were clipped prepped with chlorhexidine and draped in the usual sterile manner. 4.  A timeout procedure was performed with all the staff from the case verifying the correct patient the correct procedure and the correct site.  All participants in's affirmatively agreed to proceed with the study. 5 . moderate sedation was administered by cardiology

## 2025-05-08 ENCOUNTER — APPOINTMENT (OUTPATIENT)
Dept: GENERAL RADIOLOGY | Age: 70
DRG: 234 | End: 2025-05-08
Attending: SURGERY
Payer: MEDICARE

## 2025-05-08 PROBLEM — R13.10 DYSPHAGIA: Status: ACTIVE | Noted: 2025-04-25

## 2025-05-08 LAB
25(OH)D3 SERPL-MCNC: 16.3 NG/ML
ALBUMIN SERPL-MCNC: 2.9 G/DL (ref 3.5–5.2)
ALP SERPL-CCNC: 117 U/L (ref 40–129)
ALT SERPL-CCNC: 15 U/L (ref 10–50)
ANION GAP SERPL CALCULATED.3IONS-SCNC: 12 MMOL/L (ref 8–16)
AST SERPL-CCNC: 20 U/L (ref 10–50)
BASOPHILS # BLD: 0.1 K/UL (ref 0–0.2)
BASOPHILS NFR BLD: 0.5 % (ref 0–1)
BILIRUB SERPL-MCNC: 0.7 MG/DL (ref 0.2–1.2)
BUN SERPL-MCNC: 24 MG/DL (ref 8–23)
CALCIUM SERPL-MCNC: 8.2 MG/DL (ref 8.8–10.2)
CHLORIDE SERPL-SCNC: 110 MMOL/L (ref 98–107)
CO2 SERPL-SCNC: 24 MMOL/L (ref 22–29)
CREAT SERPL-MCNC: 1.1 MG/DL (ref 0.7–1.2)
EOSINOPHIL # BLD: 0.7 K/UL (ref 0–0.6)
EOSINOPHIL NFR BLD: 5 % (ref 0–5)
ERYTHROCYTE [DISTWIDTH] IN BLOOD BY AUTOMATED COUNT: 14 % (ref 11.5–14.5)
GLUCOSE BLD-MCNC: 133 MG/DL (ref 70–99)
GLUCOSE BLD-MCNC: 137 MG/DL (ref 70–99)
GLUCOSE BLD-MCNC: 170 MG/DL (ref 70–99)
GLUCOSE BLD-MCNC: 94 MG/DL (ref 70–99)
GLUCOSE SERPL-MCNC: 152 MG/DL (ref 70–99)
HCT VFR BLD AUTO: 40.8 % (ref 42–52)
HGB BLD-MCNC: 12.5 G/DL (ref 14–18)
IMM GRANULOCYTES # BLD: 0.5 K/UL
LYMPHOCYTES # BLD: 3.4 K/UL (ref 1.1–4.5)
LYMPHOCYTES NFR BLD: 24.2 % (ref 20–40)
MAGNESIUM SERPL-MCNC: 2.3 MG/DL (ref 1.6–2.4)
MCH RBC QN AUTO: 28.9 PG (ref 27–31)
MCHC RBC AUTO-ENTMCNC: 30.6 G/DL (ref 33–37)
MCV RBC AUTO: 94.2 FL (ref 80–94)
MONOCYTES # BLD: 0.9 K/UL (ref 0–0.9)
MONOCYTES NFR BLD: 6.6 % (ref 0–10)
NEUTROPHILS # BLD: 8.3 K/UL (ref 1.5–7.5)
NEUTS SEG NFR BLD: 59.8 % (ref 50–65)
PERFORMED ON: ABNORMAL
PERFORMED ON: NORMAL
PHOSPHATE SERPL-MCNC: 3 MG/DL (ref 2.5–4.5)
PLATELET # BLD AUTO: 316 K/UL (ref 130–400)
PMV BLD AUTO: 10.9 FL (ref 9.4–12.4)
POTASSIUM SERPL-SCNC: 3.4 MMOL/L (ref 3.5–5)
PROT SERPL-MCNC: 7 G/DL (ref 6.4–8.3)
PTH-INTACT SERPL-MCNC: 69.8 PG/ML (ref 15–65)
RBC # BLD AUTO: 4.33 M/UL (ref 4.7–6.1)
SODIUM SERPL-SCNC: 146 MMOL/L (ref 136–145)
WBC # BLD AUTO: 14 K/UL (ref 4.8–10.8)

## 2025-05-08 PROCEDURE — 6370000000 HC RX 637 (ALT 250 FOR IP): Performed by: SURGERY

## 2025-05-08 PROCEDURE — 2500000003 HC RX 250 WO HCPCS: Performed by: INTERNAL MEDICINE

## 2025-05-08 PROCEDURE — 97530 THERAPEUTIC ACTIVITIES: CPT

## 2025-05-08 PROCEDURE — 80053 COMPREHEN METABOLIC PANEL: CPT

## 2025-05-08 PROCEDURE — 84100 ASSAY OF PHOSPHORUS: CPT

## 2025-05-08 PROCEDURE — 83970 ASSAY OF PARATHORMONE: CPT

## 2025-05-08 PROCEDURE — 36415 COLL VENOUS BLD VENIPUNCTURE: CPT

## 2025-05-08 PROCEDURE — 2500000003 HC RX 250 WO HCPCS: Performed by: HOSPITALIST

## 2025-05-08 PROCEDURE — 1200000000 HC SEMI PRIVATE

## 2025-05-08 PROCEDURE — 82962 GLUCOSE BLOOD TEST: CPT

## 2025-05-08 PROCEDURE — 85025 COMPLETE CBC W/AUTO DIFF WBC: CPT

## 2025-05-08 PROCEDURE — 6360000002 HC RX W HCPCS: Performed by: SURGERY

## 2025-05-08 PROCEDURE — 71045 X-RAY EXAM CHEST 1 VIEW: CPT

## 2025-05-08 PROCEDURE — 6370000000 HC RX 637 (ALT 250 FOR IP): Performed by: INTERNAL MEDICINE

## 2025-05-08 PROCEDURE — 94760 N-INVAS EAR/PLS OXIMETRY 1: CPT

## 2025-05-08 PROCEDURE — 2580000003 HC RX 258: Performed by: SURGERY

## 2025-05-08 PROCEDURE — 94150 VITAL CAPACITY TEST: CPT

## 2025-05-08 PROCEDURE — 83735 ASSAY OF MAGNESIUM: CPT

## 2025-05-08 PROCEDURE — 94640 AIRWAY INHALATION TREATMENT: CPT

## 2025-05-08 PROCEDURE — 82306 VITAMIN D 25 HYDROXY: CPT

## 2025-05-08 PROCEDURE — 99232 SBSQ HOSP IP/OBS MODERATE 35: CPT | Performed by: INTERNAL MEDICINE

## 2025-05-08 PROCEDURE — 2700000000 HC OXYGEN THERAPY PER DAY

## 2025-05-08 RX ORDER — VITAMIN B COMPLEX
2000 TABLET ORAL DAILY
Status: DISCONTINUED | OUTPATIENT
Start: 2025-05-08 | End: 2025-05-13 | Stop reason: HOSPADM

## 2025-05-08 RX ORDER — DEXTROSE MONOHYDRATE, SODIUM CHLORIDE, AND POTASSIUM CHLORIDE 50; 1.49; 4.5 G/1000ML; G/1000ML; G/1000ML
INJECTION, SOLUTION INTRAVENOUS CONTINUOUS
Status: DISCONTINUED | OUTPATIENT
Start: 2025-05-08 | End: 2025-05-09

## 2025-05-08 RX ADMIN — POTASSIUM CHLORIDE 10 MEQ: 7.46 INJECTION, SOLUTION INTRAVENOUS at 06:26

## 2025-05-08 RX ADMIN — SODIUM CHLORIDE: 0.9 INJECTION, SOLUTION INTRAVENOUS at 05:27

## 2025-05-08 RX ADMIN — POTASSIUM CHLORIDE 10 MEQ: 7.46 INJECTION, SOLUTION INTRAVENOUS at 07:41

## 2025-05-08 RX ADMIN — POTASSIUM CHLORIDE 10 MEQ: 7.46 INJECTION, SOLUTION INTRAVENOUS at 05:30

## 2025-05-08 RX ADMIN — METOPROLOL TARTRATE 5 MG: 5 INJECTION INTRAVENOUS at 06:39

## 2025-05-08 RX ADMIN — ENOXAPARIN SODIUM 40 MG: 100 INJECTION SUBCUTANEOUS at 09:41

## 2025-05-08 RX ADMIN — ACETYLCYSTEINE 600 MG: 200 SOLUTION ORAL; RESPIRATORY (INHALATION) at 06:16

## 2025-05-08 RX ADMIN — IPRATROPIUM BROMIDE AND ALBUTEROL SULFATE 1 DOSE: 2.5; .5 SOLUTION RESPIRATORY (INHALATION) at 06:16

## 2025-05-08 RX ADMIN — METOPROLOL TARTRATE 5 MG: 5 INJECTION INTRAVENOUS at 09:40

## 2025-05-08 RX ADMIN — POTASSIUM CHLORIDE, DEXTROSE MONOHYDRATE AND SODIUM CHLORIDE: 150; 5; 450 INJECTION, SOLUTION INTRAVENOUS at 11:52

## 2025-05-08 RX ADMIN — METOPROLOL TARTRATE 5 MG: 5 INJECTION INTRAVENOUS at 22:08

## 2025-05-08 RX ADMIN — INSULIN LISPRO 10 UNITS: 100 INJECTION, SOLUTION INTRAVENOUS; SUBCUTANEOUS at 09:41

## 2025-05-08 RX ADMIN — ACETYLCYSTEINE 600 MG: 200 SOLUTION ORAL; RESPIRATORY (INHALATION) at 18:37

## 2025-05-08 RX ADMIN — METOPROLOL TARTRATE 5 MG: 5 INJECTION INTRAVENOUS at 01:25

## 2025-05-08 RX ADMIN — IPRATROPIUM BROMIDE AND ALBUTEROL SULFATE 1 DOSE: 2.5; .5 SOLUTION RESPIRATORY (INHALATION) at 10:08

## 2025-05-08 RX ADMIN — IPRATROPIUM BROMIDE AND ALBUTEROL SULFATE 1 DOSE: 2.5; .5 SOLUTION RESPIRATORY (INHALATION) at 18:37

## 2025-05-08 RX ADMIN — IPRATROPIUM BROMIDE AND ALBUTEROL SULFATE 1 DOSE: 2.5; .5 SOLUTION RESPIRATORY (INHALATION) at 14:22

## 2025-05-08 NOTE — PLAN OF CARE
SUBJECTIVE:    PCU RN requests for Metoprolol Tartrate 75mg PO BID to be converted to IV      OBJECTIVE:    /66   Pulse 71   Temp 97.3 °F (36.3 °C) (Temporal)   Resp 18   Ht 1.803 m (5' 11\")   Wt 84.5 kg (186 lb 4.3 oz)   SpO2 100%   BMI 25.98 kg/m²       ASSESSMENTS & PLANS:    Metoprolol PO can not be taken as is pending PEG placement  Converts to 5mg OIV Q4h or 7.5mg IV Q6h , have ordered as 5mg IV Q4h

## 2025-05-08 NOTE — PROGRESS NOTES
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   Constitutional:       Appearance: Normal appearance.   HENT:      Head: Normocephalic and atraumatic.   Eyes:      Extraocular Movements: Extraocular movements intact.   Cardiovascular:      Rate and Rhythm: Normal rate and regular rhythm.      Heart sounds: No murmur heard.  Pulmonary:      Effort: Pulmonary effort is normal.      Breath sounds: Normal breath sounds.   Abdominal:      Palpations: Abdomen is soft.   Musculoskeletal:      Right lower leg: No edema.      Left lower leg: No edema.   Skin:     General: Skin is warm and dry.   Neurological:      Mental Status: He is alert.           Lab Data:  CBC:   Recent Labs     05/06/25  0200 05/07/25 0135 05/08/25 0147   WBC 13.3* 14.7* 14.0*   HGB 13.0* 13.3* 12.5*   HCT 41.9* 43.6 40.8*   MCV 91.7 94.4* 94.2*    320 316     BMP:   Recent Labs     05/06/25  0200 05/07/25 0135 05/08/25  0147   * 151* 146*   K 3.9 3.8 3.4*   CL  30% stenosis. Left anterior descending artery; tortuous vessel which gives rise to diagonal branches. Mid lad has 80% stenosis. Diag has 99% stenosis and is moderate vessel. circumflex codominant vessel which has mid 70% significant stenosis gives rise to 2 obtuse marginal branches and there is otherwise normal. Right coronary artery dominant vessel which is 30% mid vessel stenosis and gives rise to PDA and PLV branches no other significant stenosis noted.  HEMODYNAMICS: Ascending Aorta: [126/61/74] mmHg Left radial artery: [133/67/88] mmHg  ESTIMATED BLOOD LOSS:  0cc.      Dr Chayo Cortes MD. [facility].FSC[facility].      XR CHEST PORTABLE  Result Date: 4/25/2025  EXAM: CHEST RADIOGRAPH  TECHNIQUE: Single frontal chest radiograph.  HISTORY: Abnormal EKG.  COMPARISON: 11/21/23.  FINDINGS: Lungs/Pleura: The lungs are clear. There is no pleural effusion. There is no pneumothorax. Heart: The heart size is normal. Bones: Unremarkable. Other: None.      1.  No acute findings.    ______________________________________ Electronically signed by: PJ CARRANZA M.D. Date:     04/25/2025 Time:    12:28     Nuclear stress test with myocardial perfusion  Result Date: 4/25/2025    Stress Combined Conclusion: The study is positive for myocardial ischemia. Findings suggest a moderate risk of cardiac events.   Stress Function: Post-stress ejection fraction is 44%.   Perfusion Defect: There is a moderate severity left ventricular stress perfusion defect that is medium in size present in the mid to distal anterior segment(s) that is predominantly reversible. The defect is consistent with abnormal perfusion in the LAD territory. Viability in the area is moderate. There is abnormal wall motion in the defect area. The defect appears to be ischemia.   ECG: Resting ECG demonstrates normal sinus rhythm.   Stress Test: A pharmacological stress test was performed using regadenoson (Lexiscan). The patient reported dizziness during the stress test.

## 2025-05-08 NOTE — PROGRESS NOTES
Nutrition Assessment     Type and Reason for Visit: Reassess    Nutrition Recommendations/Plan:   Will follow for restarting of TF post PEG tube placement     Malnutrition Assessment:  Malnutrition Status: At risk for malnutrition    Nutrition Assessment:  Pt continues to be NPO and on D5 @ 75 mLs/hr. Pt is scheduled for PEG tube placement tomorrow. Na and BN have improved with IV fluids. Will follow for restarting of TF as tolerated post procedure.    Estimated Daily Nutrient Needs:  Energy (kcal):  5003-1732 Weight Used for Energy Requirements: Admission     Protein (g):  101-117 Weight Used for Protein Requirements: Ideal        Fluid (ml/day):  3789-0099 Method Used for Fluid Requirements: 1 ml/kcal    Nutrition Related Findings:   Na 146, K 3.4, BUN 24, GFR 72, Glu 152-248 Wound Type: Surgical Incision, Diabetic Ulcer    Current Nutrition Therapies:    Diet NPO  Diet NPO Exceptions are: Sips of Water with Meds    Anthropometric Measures:  Height: 180.3 cm (5' 11\")  Current Body Wt: 84.5 kg (186 lb 4.6 oz)   BMI: 26      Nutrition Diagnosis:   Inadequate oral intake related to swallowing difficulty as evidenced by swallow study results, NPO or clear liquid status due to medical condition    Nutrition Interventions:   Food and/or Nutrient Delivery: Continue NPO, IV Fluid Delivery  Nutrition Education/Counseling: No recommendation at this time  Coordination of Nutrition Care: Continue to monitor while inpatient  Plan of Care discussed with: Pt/wife    Goals:  Goals: Initiate nutrition support, Meet at least 75% of estimated needs  Type of Goal: New goal  Previous Goal Met: New Goal    Nutrition Monitoring and Evaluation:   Behavioral-Environmental Outcomes: None Identified  Food/Nutrient Intake Outcomes: Progression of Nutrition, IVF Intake  Physical Signs/Symptoms Outcomes: Biochemical Data, Chewing or Swallowing, Fluid Status or Edema, Skin, Weight    Discharge Planning:    Too soon to determine     Cristel

## 2025-05-08 NOTE — PROGRESS NOTES
Physical Therapy     05/08/25 1100   Restrictions/Precautions   Restrictions/Precautions Fall Risk;General Precautions;Surgical Protocols   Activity Level Up with Assist   Required Braces or Orthoses? No   Position Activity Restriction   Sternal Precautions No Pushing;No Pulling;5# Lifting Restrictions;Move in the Tube   Other Position/Activity Restrictions Sternal Precautions   General   Diagnosis MVCAD   Subjective   Subjective pt in bed, agrees to therapy with pta and OT   Bed mobility   Supine to Sit Minimal assistance;Partial/Moderate assistance   Sit to Supine Stand by assistance   Scooting Minimal assistance;2 Person assistance   Bed Mobility Comments difficulty following cues. sat EOB for several minutes to monitor vitals (all normal parameters).   Transfers   Sit to Stand Contact guard assistance   Stand to Sit Contact guard assistance   Ambulation   Surface Level tile   Device Rolling Walker   Assistance Contact guard assistance;Minimal assistance   Quality of Gait unsteady   Gait Deviations Slow Alycia;Decreased step height;Decreased step length;Shuffles   Distance 2' to HOB   PT Exercises   Exercise Treatment standing in place at RW- attempted marching in place with pt unable to follow well given verbal and visual cues. two sitting rest breaks.   Activity Tolerance   Activity Tolerance Patient limited by fatigue;Patient limited by endurance;Treatment limited secondary to decreased cognition   Assessment   Assessment pt limited by fatigue and mentation. not safe to attempt AMB away from EOB due to pt frequently sitting down with c/o fatigue and difficulty following cues. no knee buckling episodes but pt unsteady overall- several days NPO. left in bed in tall semi fowlers but BUE elevated for additional trunk support and alarm on.   PT Plan of Care   Thursday X   Safety Devices   Type of Devices Bed alarm in place;Call light within reach;Gait belt;Left in bed;Nurse notified     Electronically signed by

## 2025-05-08 NOTE — PROGRESS NOTES
Occupational Therapy     05/08/25 1200   Subjective   Subjective Pt in bed upon arrival for therapy. Pt agreeable to participate. Family/ wife present.   Pain Assessment   Pain Assessment None - Denies Pain   Vitals   O2 Device Nasal cannula   Cognition   Overall Cognitive Status Exceptions   Orientation   Overall Orientation Status X   Orientation Level Oriented to person;Disoriented to place;Disoriented to time;Disoriented to situation   Bed Mobility Training   Bed Mobility Training Yes   Overall Level of Assistance Partial/Moderate assistance   Interventions Verbal cues;Tactile cues;Manual cues   Supine to Sit Partial/Moderate assistance   Sit to Supine Contact guard assistance   Scooting Contact guard assistance   Transfer Training   Transfer Training Yes   Overall Level of Assistance Minimal assistance;Contact guard assistance;2 Person assistance   Interventions Verbal cues;Tactile cues;Manual cues   Sit to Stand Minimal assistance;Contact guard assistance;2 Person assistance   Stand to Sit Contact guard assistance;2 Person assistance   Balance   Sitting Impaired   Sitting - Static Good (unsupported)   Sitting - Dynamic Good (unsupported)   Standing Impaired   Standing - Static Fair;Constant support   Standing - Dynamic Fair;Constant support   ADL   Feeding NPO   Feeding Skilled Clinical Factors NPO with NG tube and possible PEG placement today; ice chips okay.   Grooming Minimal assistance   UE Bathing Minimal assistance   LE Bathing Moderate assistance   UE Dressing Minimal assistance   LE Dressing Moderate assistance   Putting On/Taking Off Footwear Minimal assistance;Moderate assistance   Toileting Maximum assistance   Functional Mobility Minimal assistance;Contact guard assistance   Functional Mobility Skilled Clinical Factors Min-CGA assist x2 for safety   Additional Comments Requires cues for sequencing and safety.   Assessment   Assessment Tx focused on sitting EOB to monitor vitals. Vitals WFL. Pt

## 2025-05-08 NOTE — PROGRESS NOTES
Nephrology ([facility]) Consult Note      Patient:  Akhil Alejo  YOB: 1955  Date of Service: 5/8/2025  MRN: [id number]   Acct: [id number]   Primary Care Physician: Lennox Waddell MD  Advance Directive: Full Code  Admit Date: 4/25/2025       Hospital Day: 13  Referring Provider: Akhil Lara MD    Patient independently seen and examined, Chart, Consults, Notes, Operative notes, Labs, Cardiology, and Radiology studies reviewed as available.        Subjective:  Akhil Alejo is a 69 y.o. male for whom we were consulted for evaluation and treatment of hyponatremia.  Initially seen with wife and neither noted any renal issues for him.  He underwent CABG three-vessel on 4/29/2025 by Dr. Lara.  Had episodes of postoperative atrial fibrillation and persistently failed swallowDays studies.  He had had a Dobbhoff but that was pulled out prior to my initial evaluation on hospital day 12.  He was n.p.o. for possible PEG placement.  Sodium had been normal prior to admission and recently had been ranging in the low 150s.  Patient denied other complaints upon questioning.    Today, no overnight events.  Sodium improved with hypotonic fluids.  Family notes plans for tube placement tomorrow.  Had normal saline bag infusing despite order for D5W    Allergies:  Penicillins    Medicines:  Current Facility-Administered Medications   Medication Dose Route Frequency Provider Last Rate Last Admin    dextrose 5 % solution   IntraVENous Continuous Lennox Aceves MD 75 mL/hr at 05/07/25 1108 New Bag at 05/07/25 1108    metoprolol (LOPRESSOR) injection 5 mg  5 mg IntraVENous Q4H Akhil Siegel MD   5 mg at 05/08/25 0940    [Held by provider] metoprolol tartrate (LOPRESSOR) tablet 75 mg  75 mg Oral BID Akhil Lara MD        insulin glargine (LANTUS) injection vial 25 Units  25 Units SubCUTAneous Nightly Lior Quiroz MD   25 Units at 05/07/25 2137    insulin lispro

## 2025-05-08 NOTE — PROGRESS NOTES
[facility] Progress Note    Patient:  Akhil Alejo  YOB: 1955  Date of Service: 5/8/2025  MRN: [id number]   Acct: [id number]   Primary Care Physician: Lennox Waddell MD  Advance Directive: Full Code  Admit Date: 4/25/2025       Hospital Day: 13      CHIEF COMPLAINT:     Chief Complaint   Patient presents with    Abnormal Lab     ABNORMAL EKG DURING STRESS TEST       5/8/2025 7:32 AM  Subjective / Interval History:   05/08/2025  Patient seen and examined this a.m.   No new complaints.    No acute changes or acute overnight event reported.   Laying comfortably in bed in no apparent acute distress.    Denies any acute complaints or distress.      05/07/2025  Patient seen and examined this a.m.   No new complaints.    No acute changes or acute overnight event reported.   Laying comfortably in bed in no apparent acute distress.    Denies any acute complaints or distress.    Discussed with family at bedside.  Family agreeable for PEG tube placement for nutrition.      05/06/2025  Patient seen and examined this a.m.   No new complaints.  No acute changes or acute overnight event reported.   Laying comfortably in bed in no apparent acute distress.    Denies any acute complaints or distress.    Endorses overall improvement.      05/05/2025  Patient seen and examined this a.m.   No new complaints.    No acute changes or acute overnight event reported.   Laying comfortably in bed in no apparent acute distress.    Denies any acute complaints or distress.    Dobbhoff in place  Insulin regimen adjusted for persistent hyperglycemia.  Family at bedside.    05/04/2025  Patient seen and examined this a.m.   No new complaints.    No acute changes or acute overnight event reported.   Laying comfortably in bed in no apparent acute distress.    Denies any acute complaints or distress.          05/3/2025  Patient seen and examined this a.m.   No new complaints.  No acute changes or acute overnight  procedure details.    Vascular duplex vein mapping lower bilateral  Result Date: 4/28/2025  Bilateral GSVs patent with measurements below     Vascular duplex carotid bilateral  Result Date: 4/28/2025    Mild (<50%) stenosis in the right internal carotid artery.  Heterogeneous plaque in the right internal carotid artery.   Mild (<50%) stenosis in the left internal carotid artery.  Heterogeneous plaque in the left internal carotid artery.   Normal antegrade flow involving the right vertebral artery.   Normal antegrade flow involving the left vertebral artery.     Cardiac procedure  Result Date: 4/27/2025    Severe multivessel CAD   EF 50% by ECHO SUMMARY: 1.  Severe Multivessel CAD. 2.  Left ventricular systolic function 50% by echocardiogram. 3.  Left radial arterial access. 4.  Total sedation time 25 minutes with 1 mg Versed and 25 mcg of fentanyl.  Total contrast is 90 cc.   RECOMMENDATIONS: 1.  CABG referral and  patient needs medical management with antianginal medications lipid-lowering medications and no smoking. 2.  TR band management per protocol. 3.  Admit to hospital medicine for observation 4.  Patient needs follow-up with Dr Kitchen   in 2 weeks   INDICATION FOR THE PROCEDURE AND APPROPRIATENESS CRITERIA:  Mr. Alejo is a 69 y.o. male with a history of  DM, was seen by dr Kitchen for abnormal ECG and shortness of breath Today had stress test showing anterior wall ischemia, EF 44% ECG abnormal as before  Pt sent to ER for admission and cardiac cath NO chest pain Has multiple family members with  CAD. Cardiac catheterization was performed to rule out any significant CAD given his presentation with multiple risk factors.  Risks which include a 1% risk of MI, death, allergic reaction, CVA, vascular injury and renal failure requiring hemodialysis and benefits of cardiac cath/PTCA and or stenting has been explained to patient verbalized understanding and agrees to the procedure.  PROCEDURE:  PROCEDURE PERFORMED:

## 2025-05-09 ENCOUNTER — ANESTHESIA EVENT (OUTPATIENT)
Dept: ENDOSCOPY | Age: 70
End: 2025-05-09
Payer: MEDICARE

## 2025-05-09 ENCOUNTER — APPOINTMENT (OUTPATIENT)
Dept: GENERAL RADIOLOGY | Age: 70
DRG: 234 | End: 2025-05-09
Attending: SURGERY
Payer: MEDICARE

## 2025-05-09 ENCOUNTER — ANESTHESIA (OUTPATIENT)
Dept: ENDOSCOPY | Age: 70
End: 2025-05-09
Payer: MEDICARE

## 2025-05-09 ENCOUNTER — TELEPHONE (OUTPATIENT)
Dept: FAMILY MEDICINE CLINIC | Facility: CLINIC | Age: 70
End: 2025-05-09

## 2025-05-09 LAB
ALBUMIN SERPL-MCNC: 2.8 G/DL (ref 3.5–5.2)
ALP SERPL-CCNC: 118 U/L (ref 40–129)
ALT SERPL-CCNC: 16 U/L (ref 10–50)
ANION GAP SERPL CALCULATED.3IONS-SCNC: 10 MMOL/L (ref 8–16)
AST SERPL-CCNC: 21 U/L (ref 10–50)
BASOPHILS # BLD: 0.1 K/UL (ref 0–0.2)
BASOPHILS NFR BLD: 0.5 % (ref 0–1)
BILIRUB SERPL-MCNC: 0.7 MG/DL (ref 0.2–1.2)
BUN SERPL-MCNC: 16 MG/DL (ref 8–23)
CALCIUM SERPL-MCNC: 8.1 MG/DL (ref 8.8–10.2)
CHLORIDE SERPL-SCNC: 108 MMOL/L (ref 98–107)
CO2 SERPL-SCNC: 21 MMOL/L (ref 22–29)
CREAT SERPL-MCNC: 0.9 MG/DL (ref 0.7–1.2)
EOSINOPHIL # BLD: 0.7 K/UL (ref 0–0.6)
EOSINOPHIL NFR BLD: 5.4 % (ref 0–5)
ERYTHROCYTE [DISTWIDTH] IN BLOOD BY AUTOMATED COUNT: 14 % (ref 11.5–14.5)
GLUCOSE BLD-MCNC: 129 MG/DL (ref 70–99)
GLUCOSE BLD-MCNC: 137 MG/DL (ref 70–99)
GLUCOSE BLD-MCNC: 163 MG/DL (ref 70–99)
GLUCOSE BLD-MCNC: 175 MG/DL (ref 70–99)
GLUCOSE BLD-MCNC: 185 MG/DL (ref 70–99)
GLUCOSE SERPL-MCNC: 151 MG/DL (ref 70–99)
HCT VFR BLD AUTO: 35.9 % (ref 42–52)
HGB BLD-MCNC: 11.2 G/DL (ref 14–18)
IMM GRANULOCYTES # BLD: 0.6 K/UL
LYMPHOCYTES # BLD: 3 K/UL (ref 1.1–4.5)
LYMPHOCYTES NFR BLD: 22.7 % (ref 20–40)
MCH RBC QN AUTO: 28.8 PG (ref 27–31)
MCHC RBC AUTO-ENTMCNC: 31.2 G/DL (ref 33–37)
MCV RBC AUTO: 92.3 FL (ref 80–94)
MONOCYTES # BLD: 0.7 K/UL (ref 0–0.9)
MONOCYTES NFR BLD: 5.2 % (ref 0–10)
NEUTROPHILS # BLD: 8.1 K/UL (ref 1.5–7.5)
NEUTS SEG NFR BLD: 62 % (ref 50–65)
PERFORMED ON: ABNORMAL
PLATELET # BLD AUTO: 336 K/UL (ref 130–400)
PMV BLD AUTO: 10.8 FL (ref 9.4–12.4)
POTASSIUM SERPL-SCNC: 4 MMOL/L (ref 3.5–5)
PROT SERPL-MCNC: 6.6 G/DL (ref 6.4–8.3)
RBC # BLD AUTO: 3.89 M/UL (ref 4.7–6.1)
SODIUM SERPL-SCNC: 139 MMOL/L (ref 136–145)
WBC # BLD AUTO: 13.1 K/UL (ref 4.8–10.8)

## 2025-05-09 PROCEDURE — 7100000001 HC PACU RECOVERY - ADDTL 15 MIN: Performed by: INTERNAL MEDICINE

## 2025-05-09 PROCEDURE — 2500000003 HC RX 250 WO HCPCS: Performed by: INTERNAL MEDICINE

## 2025-05-09 PROCEDURE — 6370000000 HC RX 637 (ALT 250 FOR IP): Performed by: INTERNAL MEDICINE

## 2025-05-09 PROCEDURE — 3700000000 HC ANESTHESIA ATTENDED CARE: Performed by: INTERNAL MEDICINE

## 2025-05-09 PROCEDURE — 6370000000 HC RX 637 (ALT 250 FOR IP): Performed by: SURGERY

## 2025-05-09 PROCEDURE — 85025 COMPLETE CBC W/AUTO DIFF WBC: CPT

## 2025-05-09 PROCEDURE — 3E0G76Z INTRODUCTION OF NUTRITIONAL SUBSTANCE INTO UPPER GI, VIA NATURAL OR ARTIFICIAL OPENING: ICD-10-PCS | Performed by: INTERNAL MEDICINE

## 2025-05-09 PROCEDURE — 7100000000 HC PACU RECOVERY - FIRST 15 MIN: Performed by: INTERNAL MEDICINE

## 2025-05-09 PROCEDURE — 6360000002 HC RX W HCPCS: Performed by: SURGERY

## 2025-05-09 PROCEDURE — 2580000003 HC RX 258: Performed by: INTERNAL MEDICINE

## 2025-05-09 PROCEDURE — 6360000002 HC RX W HCPCS: Performed by: INTERNAL MEDICINE

## 2025-05-09 PROCEDURE — 1200000000 HC SEMI PRIVATE

## 2025-05-09 PROCEDURE — 97129 THER IVNTJ 1ST 15 MIN: CPT

## 2025-05-09 PROCEDURE — 97535 SELF CARE MNGMENT TRAINING: CPT

## 2025-05-09 PROCEDURE — 36415 COLL VENOUS BLD VENIPUNCTURE: CPT

## 2025-05-09 PROCEDURE — 94640 AIRWAY INHALATION TREATMENT: CPT

## 2025-05-09 PROCEDURE — 94150 VITAL CAPACITY TEST: CPT

## 2025-05-09 PROCEDURE — 6360000002 HC RX W HCPCS

## 2025-05-09 PROCEDURE — 97130 THER IVNTJ EA ADDL 15 MIN: CPT

## 2025-05-09 PROCEDURE — 82962 GLUCOSE BLOOD TEST: CPT

## 2025-05-09 PROCEDURE — 2700000000 HC OXYGEN THERAPY PER DAY

## 2025-05-09 PROCEDURE — 2709999900 HC NON-CHARGEABLE SUPPLY: Performed by: INTERNAL MEDICINE

## 2025-05-09 PROCEDURE — 80053 COMPREHEN METABOLIC PANEL: CPT

## 2025-05-09 PROCEDURE — 0DH63UZ INSERTION OF FEEDING DEVICE INTO STOMACH, PERCUTANEOUS APPROACH: ICD-10-PCS | Performed by: INTERNAL MEDICINE

## 2025-05-09 PROCEDURE — 97530 THERAPEUTIC ACTIVITIES: CPT

## 2025-05-09 PROCEDURE — 43246 EGD PLACE GASTROSTOMY TUBE: CPT | Performed by: INTERNAL MEDICINE

## 2025-05-09 PROCEDURE — 71045 X-RAY EXAM CHEST 1 VIEW: CPT

## 2025-05-09 PROCEDURE — 2500000003 HC RX 250 WO HCPCS: Performed by: HOSPITALIST

## 2025-05-09 PROCEDURE — 3700000001 HC ADD 15 MINUTES (ANESTHESIA): Performed by: INTERNAL MEDICINE

## 2025-05-09 PROCEDURE — 3609013300 HC EGD TUBE PLACEMENT: Performed by: INTERNAL MEDICINE

## 2025-05-09 RX ORDER — CEFAZOLIN SODIUM 1 G/3ML
INJECTION, POWDER, FOR SOLUTION INTRAMUSCULAR; INTRAVENOUS
Status: DISCONTINUED | OUTPATIENT
Start: 2025-05-09 | End: 2025-05-09 | Stop reason: SDUPTHER

## 2025-05-09 RX ORDER — LIDOCAINE HYDROCHLORIDE 10 MG/ML
INJECTION, SOLUTION EPIDURAL; INFILTRATION; INTRACAUDAL; PERINEURAL
Status: DISCONTINUED | OUTPATIENT
Start: 2025-05-09 | End: 2025-05-09 | Stop reason: SDUPTHER

## 2025-05-09 RX ORDER — PROPOFOL 10 MG/ML
INJECTION, EMULSION INTRAVENOUS
Status: DISCONTINUED | OUTPATIENT
Start: 2025-05-09 | End: 2025-05-09 | Stop reason: SDUPTHER

## 2025-05-09 RX ORDER — SODIUM CHLORIDE, SODIUM LACTATE, POTASSIUM CHLORIDE, CALCIUM CHLORIDE 600; 310; 30; 20 MG/100ML; MG/100ML; MG/100ML; MG/100ML
INJECTION, SOLUTION INTRAVENOUS CONTINUOUS
Status: DISCONTINUED | OUTPATIENT
Start: 2025-05-09 | End: 2025-05-11

## 2025-05-09 RX ORDER — LANSOPRAZOLE 30 MG/1
30 TABLET, ORALLY DISINTEGRATING, DELAYED RELEASE ORAL 2 TIMES DAILY
Status: DISCONTINUED | OUTPATIENT
Start: 2025-05-09 | End: 2025-05-13 | Stop reason: HOSPADM

## 2025-05-09 RX ADMIN — METOPROLOL TARTRATE 5 MG: 5 INJECTION INTRAVENOUS at 01:53

## 2025-05-09 RX ADMIN — LIDOCAINE HYDROCHLORIDE 100 MG: 10 INJECTION, SOLUTION EPIDURAL; INFILTRATION; INTRACAUDAL; PERINEURAL at 10:10

## 2025-05-09 RX ADMIN — SODIUM CHLORIDE, SODIUM LACTATE, POTASSIUM CHLORIDE, AND CALCIUM CHLORIDE: 600; 310; 30; 20 INJECTION, SOLUTION INTRAVENOUS at 12:26

## 2025-05-09 RX ADMIN — SODIUM CHLORIDE, SODIUM LACTATE, POTASSIUM CHLORIDE, AND CALCIUM CHLORIDE: 600; 310; 30; 20 INJECTION, SOLUTION INTRAVENOUS at 23:18

## 2025-05-09 RX ADMIN — IPRATROPIUM BROMIDE AND ALBUTEROL SULFATE 1 DOSE: 2.5; .5 SOLUTION RESPIRATORY (INHALATION) at 07:38

## 2025-05-09 RX ADMIN — METOPROLOL TARTRATE 5 MG: 5 INJECTION INTRAVENOUS at 05:08

## 2025-05-09 RX ADMIN — ATORVASTATIN CALCIUM 20 MG: 20 TABLET, FILM COATED ORAL at 20:49

## 2025-05-09 RX ADMIN — IPRATROPIUM BROMIDE AND ALBUTEROL SULFATE 1 DOSE: 2.5; .5 SOLUTION RESPIRATORY (INHALATION) at 15:00

## 2025-05-09 RX ADMIN — ACETYLCYSTEINE 600 MG: 200 SOLUTION ORAL; RESPIRATORY (INHALATION) at 18:26

## 2025-05-09 RX ADMIN — ACETYLCYSTEINE 600 MG: 200 SOLUTION ORAL; RESPIRATORY (INHALATION) at 07:38

## 2025-05-09 RX ADMIN — LANSOPRAZOLE 30 MG: 30 TABLET, ORALLY DISINTEGRATING, DELAYED RELEASE ORAL at 17:03

## 2025-05-09 RX ADMIN — LANSOPRAZOLE 30 MG: 30 TABLET, ORALLY DISINTEGRATING, DELAYED RELEASE ORAL at 20:54

## 2025-05-09 RX ADMIN — METOPROLOL TARTRATE 5 MG: 5 INJECTION INTRAVENOUS at 12:31

## 2025-05-09 RX ADMIN — POTASSIUM CHLORIDE, DEXTROSE MONOHYDRATE AND SODIUM CHLORIDE: 150; 5; 450 INJECTION, SOLUTION INTRAVENOUS at 03:02

## 2025-05-09 RX ADMIN — CITALOPRAM HYDROBROMIDE 20 MG: 10 TABLET, FILM COATED ORAL at 20:49

## 2025-05-09 RX ADMIN — MORPHINE SULFATE 2 MG: 2 INJECTION, SOLUTION INTRAMUSCULAR; INTRAVENOUS at 12:31

## 2025-05-09 RX ADMIN — GABAPENTIN 100 MG: 100 CAPSULE ORAL at 20:49

## 2025-05-09 RX ADMIN — CEFAZOLIN 2 G: 1 INJECTION, POWDER, FOR SOLUTION INTRAMUSCULAR; INTRAVENOUS at 10:13

## 2025-05-09 RX ADMIN — PROPOFOL 120 MG: 10 INJECTION, EMULSION INTRAVENOUS at 10:10

## 2025-05-09 RX ADMIN — METOPROLOL TARTRATE 5 MG: 5 INJECTION INTRAVENOUS at 17:03

## 2025-05-09 RX ADMIN — METOPROLOL TARTRATE 5 MG: 5 INJECTION INTRAVENOUS at 21:03

## 2025-05-09 RX ADMIN — IPRATROPIUM BROMIDE AND ALBUTEROL SULFATE 1 DOSE: 2.5; .5 SOLUTION RESPIRATORY (INHALATION) at 18:26

## 2025-05-09 RX ADMIN — GABAPENTIN 100 MG: 100 CAPSULE ORAL at 17:03

## 2025-05-09 RX ADMIN — IPRATROPIUM BROMIDE AND ALBUTEROL SULFATE 1 DOSE: 2.5; .5 SOLUTION RESPIRATORY (INHALATION) at 11:00

## 2025-05-09 ASSESSMENT — PAIN SCALES - GENERAL
PAINLEVEL_OUTOF10: 8
PAINLEVEL_OUTOF10: 2
PAINLEVEL_OUTOF10: 0
PAINLEVEL_OUTOF10: 3

## 2025-05-09 ASSESSMENT — PAIN DESCRIPTION - DESCRIPTORS: DESCRIPTORS: ACHING

## 2025-05-09 ASSESSMENT — PAIN DESCRIPTION - LOCATION: LOCATION: HEAD

## 2025-05-09 ASSESSMENT — PAIN - FUNCTIONAL ASSESSMENT
PAIN_FUNCTIONAL_ASSESSMENT: PREVENTS OR INTERFERES SOME ACTIVE ACTIVITIES AND ADLS
PAIN_FUNCTIONAL_ASSESSMENT: NONE - DENIES PAIN

## 2025-05-09 ASSESSMENT — LIFESTYLE VARIABLES: SMOKING_STATUS: 0

## 2025-05-09 NOTE — TELEPHONE ENCOUNTER
Caller: Rossana Mcclelland    Relationship: Emergency Contact    Best call back number: [phone]     What is the best time to reach you: ANY    Who are you requesting to speak with (clinical staff, provider,  specific staff member): CLINICAL    Do you know the name of the person who called: WIFE    What was the call regarding: PATIENT HAD OPEN HEART SURGERY AT [facility]. CURRENTLY STILL THERE AND WILL BE DOING REHAB. LIKELY THERE FOR 2 MORE WEEKS. CALL IF NEEDED     Is it okay if the provider responds through MyChart: YES OR CALL BACK
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
## 2025-05-09 NOTE — PROGRESS NOTES
Nutrition Assessment     Type and Reason for Visit: Reassess    Nutrition Recommendations/Plan:   Follow for PEG placement and ability to start EN.  Recommend restarting previously ordered EN formula     Malnutrition Assessment:  Malnutrition Status: At risk for malnutrition    Nutrition Assessment:  Patient remains NPO.  IV of D5 NaCL & KCl continues at 75ml/hr.  Aware of PEG placement today.  Accuchek's 133-175   Will continue to follow for starting EN    Estimated Daily Nutrient Needs:  Energy (kcal):  4009-1660 kcals (20-25 kcals/kg) Weight Used for Energy Requirements: Current     Protein (g):  101-117g Weight Used for Protein Requirements: Ideal        Fluid (ml/day):  3831-3087 ml Method Used for Fluid Requirements: 1 ml/kcal    Nutrition Related Findings:   Na+ 139, K+ 4, BUN 16, Accuchek's 133-175 Wound Type: Multiple, Surgical Incision (multiple puncture wounds)    Current Nutrition Therapies:    Diet NPO Exceptions are: Sips of Water with Meds    Anthropometric Measures:  Height: 180.3 cm (5' 10.98\")  Current Body Wt: 88 kg (194 lb 0.1 oz)   BMI: 27.1        Nutrition Diagnosis:   Inadequate oral intake related to swallowing difficulty as evidenced by swallow study results, NPO or clear liquid status due to medical condition (placement of PEG)    Nutrition Interventions:   Food and/or Nutrient Delivery: Continue NPO  Nutrition Education/Counseling: No recommendation at this time  Coordination of Nutrition Care: Continue to monitor while inpatient  Plan of Care discussed with: Pt/wife    Goals:  Goals: Meet at least 75% of estimated needs, Initiation of nutrition, Initiate nutrition support, Maintain adequate nutrition status, Tolerate nutrition support at goal rate  Type of Goal: New goal  Previous Goal Met: New Goal    Nutrition Monitoring and Evaluation:   Behavioral-Environmental Outcomes: None Identified  Food/Nutrient Intake Outcomes: Progression of Nutrition  Physical Signs/Symptoms Outcomes:

## 2025-05-09 NOTE — PROGRESS NOTES
Physical Therapy  Name: Akhil Alejo  MRN:  [id number]  Date of service:  5/9/2025 05/09/25 0911   Restrictions/Precautions   Restrictions/Precautions Fall Risk;General Precautions;Surgical Protocols   Activity Level Up with Assist   Required Braces or Orthoses? No   Position Activity Restriction   Sternal Precautions No Pushing;No Pulling;5# Lifting Restrictions;Move in the Tube   Other Position/Activity Restrictions Sternal Precautions   General   Chart Reviewed Yes   Family/Caregiver Present Yes   Referring Practitioner Akhil Lara MD   Subjective   Subjective pt in bed, nsg present   Rn states that pt is going to have a peg tube placed today    Rn elda coronel for this morning   Pain Assessment   Pain Assessment None - Denies Pain   Pain Level 0   Oxygen Therapy   O2 Device Nasal cannula   O2 Flow Rate (L/min) 1 L/min   Orientation   Overall Orientation Status X   Bed Mobility   Rolling Contact guard assistance   Supine to Sit Contact guard assistance;Minimal assistance   Sit to Supine Contact guard assistance   Scooting Contact guard assistance   Comment pt needs visual and verbal cueing to complete bed mob  pt performing x 3 sup<>sit   Transfers   Sit to Stand Contact guard assistance   Stand to Sit Contact guard assistance   Comment pt performing sit stand x 4  pt educated for hand placement and control   Ambulation   Surface Level tile   Device Rolling Walker   Assistance Contact guard assistance;Minimal assistance   Quality of Gait decreased bal, safety   Gait Deviations Slow Alycia;Decreased step height;Decreased step length;Shuffles   Distance 5' x 3   Comments steps fwd x 5', back x 5' and side stepping hob   with wx support and cga min A   Exercises   Comments seated masha le therex x 10 hip flex, laqs, stand with wx support , cga hip flex, steps in place , heel raises   Other Activities   Comment sit stand x 4 from eob   Patient Goals    Patient Goals  go home   Short Term Goals   Time Frame

## 2025-05-09 NOTE — PROGRESS NOTES
POST OP CARDIOTHORACIC SURGERY PROGRESS NOTE    Post op day: 10    SUBJECTIVE: Mr. Alejo is lying in bed appearing comfortable.  He is oxygenating well on room air.  He states he is ready to get this procedure over with and really wanting something cold to drink.  Wife at bedside stating patient was able to ambulate a good bit the day before yesterday. Has had BM.     /70   Pulse 72   Temp 97.7 °F (36.5 °C) (Temporal)   Resp 16   Ht 1.803 m (5' 10.98\")   Wt 88 kg (194 lb 0.1 oz)   SpO2 99%   BMI 27.07 kg/m²   Average, Min, and Max for last 24 hours Vitals:  TEMPERATURE:  Temp  Av.1 °F (36.2 °C)  Min: 96.8 °F (36 °C)  Max: 97.7 °F (36.5 °C)  RESPIRATIONS RANGE: Resp  Av.2  Min: 16  Max: 18  PULSE RANGE: Pulse  Av.5  Min: 68  Max: 76  BLOOD PRESSURE RANGE:  Systolic (24hrs), Av , Min:122 , Max:164   ; Diastolic (24hrs), Av, Min:61, Max:83    PULSE OXIMETRY RANGE: SpO2  Av.1 %  Min: 97 %  Max: 100 %    I/O last 3 completed shifts:  In: -   Out: 1150 [Urine:1150]    CHEST: Clear bilaterally    CARDIOVASCULAR: Regular rate and rhythm    ABDOMEN: hypoactive bowel sounds. Non-tender. Non-distended.     INCISION: CDI    LABS:  CBC:   Lab Results   Component Value Date/Time    WBC 13.1 2025 02:25 AM    RBC 3.89 2025 02:25 AM    HGB 11.2 2025 02:25 AM    HCT 35.9 2025 02:25 AM    MCV 92.3 2025 02:25 AM    MCH 28.8 2025 02:25 AM    MCHC 31.2 2025 02:25 AM    RDW 14.0 2025 02:25 AM     2025 02:25 AM    MPV 10.8 2025 02:25 AM     BMP:    Lab Results   Component Value Date/Time     2025 02:25 AM    K 4.0 2025 02:25 AM     2025 02:25 AM    CO2 21 2025 02:25 AM    BUN 16 2025 02:25 AM    CREATININE 0.9 2025 02:25 AM    CALCIUM 8.1 2025 02:25 AM    LABGLOM >90 2025 02:25 AM    GLUCOSE 151 2025 02:25 AM       CHEST XRAY: Stable postop chest x-ray    ASSESSMENT:    CAD s/p 3V robotic CABG on 4/29/25 by Dr. 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  Continue current management   Ambulate with PT as able- ideally pt would be accepted to IP rehab.    Electronically signed by Dayami Knutson PA-C on 5/9/25 at 8:34 AM CDT

## 2025-05-09 NOTE — ANESTHESIA PRE PROCEDURE
the left vertebral artery.  . Other Findings: Multiple sores noted on both feet        Anesthesia Plan      general and TIVA     ASA 3       Induction: intravenous.      Anesthetic plan and risks discussed with patient and spouse.    Use of blood products discussed with patient and spouse whom consented to blood products.    Plan discussed with CRNA.    Attending anesthesiologist reviewed and agrees with Pre Eval content              Bianca Turpin MD   5/9/2025

## 2025-05-09 NOTE — PROGRESS NOTES
[facility] Progress Note    Patient:  Akhil Alejo  YOB: 1955  Date of Service: 5/9/2025  MRN: [id number]   Acct: [id number]   Primary Care Physician: Lennox Waddell MD  Advance Directive: Full Code  Admit Date: 4/25/2025       Hospital Day: 14      CHIEF COMPLAINT:     Chief Complaint   Patient presents with    Abnormal Lab     ABNORMAL EKG DURING STRESS TEST       5/9/2025 8:24 AM  Subjective / Interval History:   05/09/2025  Patient seen and examined   No new complaints.  No acute changes or acute overnight event reported.   Laying comfortably in bed in no apparent acute distress.    Denies any acute complaints or distress.    Endorses overall improvement.    PEG tube placement this am      05/08/2025  Patient seen and examined this a.m.   No new complaints.    No acute changes or acute overnight event reported.   Laying comfortably in bed in no apparent acute distress.    Denies any acute complaints or distress.      05/07/2025  Patient seen and examined this a.m.   No new complaints.    No acute changes or acute overnight event reported.   Laying comfortably in bed in no apparent acute distress.    Denies any acute complaints or distress.    Discussed with family at bedside.  Family agreeable for PEG tube placement for nutrition.      05/06/2025  Patient seen and examined this a.m.   No new complaints.  No acute changes or acute overnight event reported.   Laying comfortably in bed in no apparent acute distress.    Denies any acute complaints or distress.    Endorses overall improvement.      05/05/2025  Patient seen and examined this a.m.   No new complaints.    No acute changes or acute overnight event reported.   Laying comfortably in bed in no apparent acute distress.    Denies any acute complaints or distress.    Dobbhoff in place  Insulin regimen adjusted for persistent hyperglycemia.  Family at bedside.    05/04/2025  Patient seen and examined this a.m.   No new  consulted      Continue management of other chronic medical conditions - see above and orders.          Advance Directive: Full Code    Diet NPO Exceptions are: Sips of Water with Meds         Consults Made:   IP CONSULT TO CARDIOLOGY  IP CONSULT TO CASE MANAGEMENT  IP CONSULT TO DIETITIAN  IP CONSULT TO HOSPITALIST  IP CONSULT TO OTOLARYNGOLOGY  IP CONSULT TO REHAB/TCU ADMISSION COORDINATOR  IP CONSULT TO NEPHROLOGY  IP CONSULT TO GI      DVT prophylaxis: Heparin      Current medications reviewed  Lab work reviewed  Radiology  films reviewed  Much appreciated treatment recommendations from suspecialities reviewed  Discussed treatment plan with the nurse and addressed all questions/concerns  Discussed with Patient at the bedside        Discharge planning: tbd  CTS on board  S/p CABG - 04/29/2025  Postop management as per CTS.  Acute inpatient rehab consulted   PEG tube placement - 05/09/2025      Multiple complex medical problems  Morbidity mortality high risk  Medical decision making High complexity         Electronically signed by   Lior Quiroz MD,   Internal Medicine Hospitalist   5/9/2025 8:24 AM

## 2025-05-09 NOTE — PROGRESS NOTES
Facility/Department: [facility] ONCOLOGY UNIT  Daily Treatment Note  NAME: Akhil Alejo  : 1955  MRN: [id number]    Date of Service: 2025    Discharge Recommendations:  Patient would benefit from continued therapy after discharge, 24 hour supervision or assist       Assessment   Assessment: The patient participated in gentle bedside exercises in sitting and standing to promote ADL performance.  Treatment Diagnosis: s/p CABG, PEG placement  Activity Tolerance  Activity Tolerance: Patient Tolerated treatment well         Patient Diagnosis(es): The primary encounter diagnosis was Abnormal stress test. Diagnoses of Abnormal nuclear cardiac imaging test and CAD in native artery were also pertinent to this visit.    has a past medical history of Arm fracture, Dementia (HCC), Depression, Diabetes mellitus (HCC), Hypertension, Kidney stones, Neuropathy, and Restless leg syndrome.   has a past surgical history that includes Cholecystectomy; Lithotripsy; shoulder surgery (Left); bone marrow biopsy (Right, 2020); Colonoscopy (2020); Upper gastrointestinal endoscopy (2017); Cardiac procedure (N/A, 2025); Coronary artery bypass graft (N/A, 2025); and Gastrostomy tube placement (2025).    Restrictions  Restrictions/Precautions  Restrictions/Precautions: Fall Risk, General Precautions, Surgical Protocols  Activity Level: Up with Assist  Required Braces or Orthoses?: No  Position Activity Restriction  Sternal Precautions: No Pushing, No Pulling, 5# Lifting Restrictions, Move in the Tube  Other Position/Activity Restrictions: Sternal Precautions, now with G tube    Subjective   General  Chart Reviewed: Yes  Patient assessed for rehabilitation services?: Yes  Additional Pertinent Hx: CABG  2025  Response to previous treatment: Patient with no complaints from previous session  Family / Caregiver Present: Yes  Referring Practitioner: Dr. Lara  Diagnosis: CABG, G tube

## 2025-05-09 NOTE — PROGRESS NOTES
Nephrology ([facility]) Consult Note      Patient:  Akhil Alejo  YOB: 1955  Date of Service: 5/9/2025  MRN: [id number]   Acct: [id number]   Primary Care Physician: Lennox Waddell MD  Advance Directive: Full Code  Admit Date: 4/25/2025       Hospital Day: 14  Referring Provider: Akhil Lara MD    Patient independently seen and examined, Chart, Consults, Notes, Operative notes, Labs, Cardiology, and Radiology studies reviewed as available.        Subjective:  Akhil Alejo is a 69 y.o. male for whom we were consulted for evaluation and treatment of hyponatremia.  Initially seen with wife and neither noted any renal issues for him.  He underwent CABG three-vessel on 4/29/2025 by Dr. Lara.  Had episodes of postoperative atrial fibrillation and persistently failed swallowDays studies.  He had had a Dobbhoff but that was pulled out prior to my initial evaluation on hospital day 12.  He was n.p.o. for possible PEG placement.  Sodium had been normal prior to admission and recently had been ranging in the low 150s.  Patient denied other complaints upon questioning.    Today, no overnight events.  Sodium improved with hypotonic fluids.  Family notes plans for tube placement later today.  Patient asking for chips    Allergies:  Penicillins    Medicines:  Current Facility-Administered Medications   Medication Dose Route Frequency Provider Last Rate Last Admin    lactated ringers infusion   IntraVENous Continuous Lennox Aceves MD        lansoprazole (PREVACID SOLUTAB) disintegrating tablet 30 mg  30 mg PEG Tube BID Miley Taylor MD        Vitamin D (CHOLECALCIFEROL) tablet 2,000 Units  2,000 Units Oral Daily Lennox Aceves MD        metoprolol (LOPRESSOR) injection 5 mg  5 mg IntraVENous Q4H Akhil Siegel MD   5 mg at 05/09/25 0508    [Held by provider] metoprolol tartrate (LOPRESSOR) tablet 75 mg  75 mg Oral BID Akhil Lara MD        insulin  glargine (LANTUS) injection vial 25 Units  25 Units SubCUTAneous Nightly Lior Quiroz MD   25 Units at 05/07/25 2133    insulin lispro (HUMALOG,ADMELOG) injection vial 10 Units  10 Units SubCUTAneous TID  Lior Quiroz MD   10 Units at 05/08/25 0941    gabapentin (NEURONTIN) capsule 100 mg  100 mg Oral TID Akhil Lara MD   100 mg at 05/05/25 2154    isosorbide mononitrate (IMDUR) extended release tablet 30 mg  30 mg Oral Daily Akhil Lara MD   30 mg at 05/05/25 0842    labetalol (NORMODYNE;TRANDATE) injection 10 mg  10 mg IntraVENous Q4H PRN Akhil Lara MD   10 mg at 05/07/25 1600    [Held by provider] metFORMIN (GLUCOPHAGE) tablet 500 mg  500 mg Oral BID  Akhil Lara MD   500 mg at 05/04/25 1651    ipratropium 0.5 mg-albuterol 2.5 mg (DUONEB) nebulizer solution 1 Dose  1 Dose Inhalation 4x Daily RT Akhil Lara MD   1 Dose at 05/09/25 0738    acetylcysteine (MUCOMYST) 20 % solution 600 mg  600 mg Inhalation BID Akhil Lara MD   600 mg at 05/09/25 0738    bisacodyl (DULCOLAX) suppository 10 mg  10 mg Rectal Daily PRN Akhil Lara MD   10 mg at 05/01/25 1536    potassium chloride 10 mEq/100 mL IVPB (Peripheral Line)  10 mEq IntraVENous PRN Akhil Lara  mL/hr at 05/08/25 0741 10 mEq at 05/08/25 0741    metoprolol (LOPRESSOR) injection 5 mg  5 mg IntraVENous Q6H PRN Akhil Lara MD   5 mg at 05/07/25 1109    insulin lispro (HUMALOG,ADMELOG) injection vial 0-8 Units  0-8 Units SubCUTAneous 4x Daily AC & HS Akhil Lara MD   2 Units at 05/07/25 2132    aspirin chewable tablet 81 mg  81 mg Oral Daily Akhil Lara MD   81 mg at 05/05/25 0842    fluticasone (FLONASE) 50 MCG/ACT nasal spray 1 spray  1 spray Each Nostril BID PRN [place], Akhil, MD        protamine injection 50 mg  50 mg IntraVENous Once PRN Akhil Lara MD        sodium chloride flush 0.9 % injection 5-40 mL  5-40 mL IntraVENous 2 times per day Akhil Lara MD   10 mL at 05/06/25 0831

## 2025-05-09 NOTE — PROCEDURES
Endoscopic Procedure Note    Patient: Akhil Alejo : 1955  Med Rec#: [id number] Acc#: [id number]     Primary Care Provider Lennox Waddell MD    Referring Provider: Dr. Akhil Lara    Endoscopist: Miley Taylor MD    Date of Procedure: 2025    Pre-Op Diagnosis: Rule out peptic stricture.    Post-Op Diagnosis: 1.  Multiple duodenal ulcers.  2.  Successful placement of 20 Hungarian PEG tube.       Procedure(s):  ESOPHAGOGASTRODUODENOSCOPY PERCUTANEOUS ENDOSCOPIC GASTROSTOMY TUBE PLACEMENT      Indications:   Oropharyngeal dysphagia.  Abnormal swallowing study.    Anesthesia:  Sedation was administered by anesthesia who monitored the patient during the procedure.    Estimated Blood Loss: none    Procedure:   After reviewing the patient's chart and obtaining informed consent, the patient was placed in the left lateral decubitus position.  A forward-viewing Olympus endoscope was lubricated and inserted through the mouth into the oropharynx. Under direct visualization, the upper esophagus was intubated. The scope was advanced to the level of the second portion of duodenum without any difficulty. Scope was slowly withdrawn with careful inspection of the mucosal surfaces. The scope was retroflexed for inspection of the gastric fundus and incisura.  The patient tolerated the procedure well. The scope was removed.     Findings:     Esophagus: The esophageal mucosa was normal.  There was no evidence of inflammation, ulceration or any masses.  No evidence of any Pino's esophagus, hiatal hernia or stricture.  No esophageal varices are noted.  The scope was passed without difficulty in the stomach.    Stomach: The gastric mucosa in the lower body and the antrum appears to be normal and was free of inflammation, ulceration or any masses.  No evidence of any gastric outlet obstruction.  Retroflexion was done.  The gastric fundus cardia and the upper part of gastric body was normal.  There was no

## 2025-05-10 ENCOUNTER — APPOINTMENT (OUTPATIENT)
Dept: GENERAL RADIOLOGY | Age: 70
DRG: 234 | End: 2025-05-10
Attending: SURGERY
Payer: MEDICARE

## 2025-05-10 PROBLEM — R63.39 FEEDING DIFFICULTY IN ADULT: Status: ACTIVE | Noted: 2025-05-10

## 2025-05-10 PROBLEM — Z93.1 PEG (PERCUTANEOUS ENDOSCOPIC GASTROSTOMY) STATUS (HCC): Status: ACTIVE | Noted: 2025-05-10

## 2025-05-10 LAB
ALBUMIN SERPL-MCNC: 2.7 G/DL (ref 3.5–5.2)
ALP SERPL-CCNC: 121 U/L (ref 40–129)
ALT SERPL-CCNC: 14 U/L (ref 10–50)
ANION GAP SERPL CALCULATED.3IONS-SCNC: 11 MMOL/L (ref 8–16)
AST SERPL-CCNC: 19 U/L (ref 10–50)
BASOPHILS # BLD: 0.1 K/UL (ref 0–0.2)
BASOPHILS NFR BLD: 0.3 % (ref 0–1)
BILIRUB SERPL-MCNC: 0.9 MG/DL (ref 0.2–1.2)
BUN SERPL-MCNC: 12 MG/DL (ref 8–23)
CALCIUM SERPL-MCNC: 8.1 MG/DL (ref 8.8–10.2)
CHLORIDE SERPL-SCNC: 105 MMOL/L (ref 98–107)
CO2 SERPL-SCNC: 22 MMOL/L (ref 22–29)
CREAT SERPL-MCNC: 0.9 MG/DL (ref 0.7–1.2)
EOSINOPHIL # BLD: 0.4 K/UL (ref 0–0.6)
EOSINOPHIL NFR BLD: 2.8 % (ref 0–5)
ERYTHROCYTE [DISTWIDTH] IN BLOOD BY AUTOMATED COUNT: 14.1 % (ref 11.5–14.5)
GLUCOSE BLD-MCNC: 147 MG/DL (ref 70–99)
GLUCOSE BLD-MCNC: 147 MG/DL (ref 70–99)
GLUCOSE BLD-MCNC: 161 MG/DL (ref 70–99)
GLUCOSE BLD-MCNC: 162 MG/DL (ref 70–99)
GLUCOSE BLD-MCNC: 196 MG/DL (ref 70–99)
GLUCOSE BLD-MCNC: 247 MG/DL (ref 70–99)
GLUCOSE SERPL-MCNC: 154 MG/DL (ref 70–99)
HCT VFR BLD AUTO: 34 % (ref 42–52)
HGB BLD-MCNC: 11.1 G/DL (ref 14–18)
IMM GRANULOCYTES # BLD: 0.4 K/UL
LYMPHOCYTES # BLD: 2.8 K/UL (ref 1.1–4.5)
LYMPHOCYTES NFR BLD: 18.6 % (ref 20–40)
MCH RBC QN AUTO: 29.7 PG (ref 27–31)
MCHC RBC AUTO-ENTMCNC: 32.6 G/DL (ref 33–37)
MCV RBC AUTO: 90.9 FL (ref 80–94)
MONOCYTES # BLD: 0.9 K/UL (ref 0–0.9)
MONOCYTES NFR BLD: 5.9 % (ref 0–10)
NEUTROPHILS # BLD: 10.4 K/UL (ref 1.5–7.5)
NEUTS SEG NFR BLD: 70 % (ref 50–65)
PERFORMED ON: ABNORMAL
PLATELET # BLD AUTO: 297 K/UL (ref 130–400)
PMV BLD AUTO: 10.4 FL (ref 9.4–12.4)
POTASSIUM SERPL-SCNC: 3.9 MMOL/L (ref 3.5–5)
PROT SERPL-MCNC: 6.4 G/DL (ref 6.4–8.3)
RBC # BLD AUTO: 3.74 M/UL (ref 4.7–6.1)
REASON FOR REJECTION: NORMAL
REJECTED TEST: NORMAL
SODIUM SERPL-SCNC: 138 MMOL/L (ref 136–145)
WBC # BLD AUTO: 14.8 K/UL (ref 4.8–10.8)

## 2025-05-10 PROCEDURE — 2580000003 HC RX 258: Performed by: INTERNAL MEDICINE

## 2025-05-10 PROCEDURE — 99231 SBSQ HOSP IP/OBS SF/LOW 25: CPT | Performed by: INTERNAL MEDICINE

## 2025-05-10 PROCEDURE — 94640 AIRWAY INHALATION TREATMENT: CPT

## 2025-05-10 PROCEDURE — 6370000000 HC RX 637 (ALT 250 FOR IP): Performed by: INTERNAL MEDICINE

## 2025-05-10 PROCEDURE — 71045 X-RAY EXAM CHEST 1 VIEW: CPT

## 2025-05-10 PROCEDURE — 85025 COMPLETE CBC W/AUTO DIFF WBC: CPT

## 2025-05-10 PROCEDURE — 82962 GLUCOSE BLOOD TEST: CPT

## 2025-05-10 PROCEDURE — 2500000003 HC RX 250 WO HCPCS: Performed by: INTERNAL MEDICINE

## 2025-05-10 PROCEDURE — 99024 POSTOP FOLLOW-UP VISIT: CPT | Performed by: SURGERY

## 2025-05-10 PROCEDURE — 1200000000 HC SEMI PRIVATE

## 2025-05-10 PROCEDURE — 94760 N-INVAS EAR/PLS OXIMETRY 1: CPT

## 2025-05-10 PROCEDURE — 94150 VITAL CAPACITY TEST: CPT

## 2025-05-10 PROCEDURE — 97116 GAIT TRAINING THERAPY: CPT

## 2025-05-10 PROCEDURE — 97110 THERAPEUTIC EXERCISES: CPT

## 2025-05-10 PROCEDURE — 6360000002 HC RX W HCPCS: Performed by: INTERNAL MEDICINE

## 2025-05-10 PROCEDURE — 80053 COMPREHEN METABOLIC PANEL: CPT

## 2025-05-10 PROCEDURE — 36415 COLL VENOUS BLD VENIPUNCTURE: CPT

## 2025-05-10 PROCEDURE — 2700000000 HC OXYGEN THERAPY PER DAY

## 2025-05-10 RX ORDER — SODIUM CHLORIDE 9 MG/ML
INJECTION, SOLUTION INTRAVENOUS PRN
Status: DISCONTINUED | OUTPATIENT
Start: 2025-05-10 | End: 2025-05-10

## 2025-05-10 RX ADMIN — IPRATROPIUM BROMIDE AND ALBUTEROL SULFATE 1 DOSE: 2.5; .5 SOLUTION RESPIRATORY (INHALATION) at 10:45

## 2025-05-10 RX ADMIN — LANSOPRAZOLE 30 MG: 30 TABLET, ORALLY DISINTEGRATING, DELAYED RELEASE ORAL at 09:11

## 2025-05-10 RX ADMIN — IPRATROPIUM BROMIDE AND ALBUTEROL SULFATE 1 DOSE: 2.5; .5 SOLUTION RESPIRATORY (INHALATION) at 14:28

## 2025-05-10 RX ADMIN — Medication 2000 UNITS: at 09:10

## 2025-05-10 RX ADMIN — ATORVASTATIN CALCIUM 20 MG: 20 TABLET, FILM COATED ORAL at 21:17

## 2025-05-10 RX ADMIN — GABAPENTIN 100 MG: 100 CAPSULE ORAL at 09:11

## 2025-05-10 RX ADMIN — INSULIN LISPRO 2 UNITS: 100 INJECTION, SOLUTION INTRAVENOUS; SUBCUTANEOUS at 21:18

## 2025-05-10 RX ADMIN — METOPROLOL TARTRATE 5 MG: 5 INJECTION INTRAVENOUS at 09:10

## 2025-05-10 RX ADMIN — LANSOPRAZOLE 30 MG: 30 TABLET, ORALLY DISINTEGRATING, DELAYED RELEASE ORAL at 21:17

## 2025-05-10 RX ADMIN — ACETYLCYSTEINE 600 MG: 200 SOLUTION ORAL; RESPIRATORY (INHALATION) at 06:35

## 2025-05-10 RX ADMIN — OXYCODONE HYDROCHLORIDE 10 MG: 10 TABLET ORAL at 09:10

## 2025-05-10 RX ADMIN — METOPROLOL TARTRATE 5 MG: 5 INJECTION INTRAVENOUS at 21:16

## 2025-05-10 RX ADMIN — METOPROLOL TARTRATE 5 MG: 5 INJECTION INTRAVENOUS at 01:28

## 2025-05-10 RX ADMIN — OXYCODONE HYDROCHLORIDE 10 MG: 10 TABLET ORAL at 21:17

## 2025-05-10 RX ADMIN — METOPROLOL TARTRATE 5 MG: 5 INJECTION INTRAVENOUS at 05:44

## 2025-05-10 RX ADMIN — CITALOPRAM HYDROBROMIDE 20 MG: 10 TABLET, FILM COATED ORAL at 21:18

## 2025-05-10 RX ADMIN — GABAPENTIN 100 MG: 100 CAPSULE ORAL at 21:17

## 2025-05-10 RX ADMIN — METOPROLOL TARTRATE 5 MG: 5 INJECTION INTRAVENOUS at 16:30

## 2025-05-10 RX ADMIN — MORPHINE SULFATE 2 MG: 2 INJECTION, SOLUTION INTRAMUSCULAR; INTRAVENOUS at 01:31

## 2025-05-10 RX ADMIN — LEVOTHYROXINE SODIUM 50 MCG: 0.05 TABLET ORAL at 05:45

## 2025-05-10 RX ADMIN — ASPIRIN 81 MG 81 MG: 81 TABLET ORAL at 09:11

## 2025-05-10 RX ADMIN — INSULIN GLARGINE 25 UNITS: 100 INJECTION, SOLUTION SUBCUTANEOUS at 21:18

## 2025-05-10 RX ADMIN — SODIUM CHLORIDE, PRESERVATIVE FREE 10 ML: 5 INJECTION INTRAVENOUS at 21:18

## 2025-05-10 RX ADMIN — GABAPENTIN 100 MG: 100 CAPSULE ORAL at 16:30

## 2025-05-10 RX ADMIN — ENOXAPARIN SODIUM 40 MG: 100 INJECTION SUBCUTANEOUS at 09:11

## 2025-05-10 RX ADMIN — IPRATROPIUM BROMIDE AND ALBUTEROL SULFATE 1 DOSE: 2.5; .5 SOLUTION RESPIRATORY (INHALATION) at 06:35

## 2025-05-10 RX ADMIN — DEXTROSE MONOHYDRATE 125 ML: 100 INJECTION, SOLUTION INTRAVENOUS at 03:34

## 2025-05-10 ASSESSMENT — PAIN SCALES - GENERAL
PAINLEVEL_OUTOF10: 8
PAINLEVEL_OUTOF10: 4
PAINLEVEL_OUTOF10: 6
PAINLEVEL_OUTOF10: 7

## 2025-05-10 ASSESSMENT — PAIN DESCRIPTION - DESCRIPTORS: DESCRIPTORS: ACHING

## 2025-05-10 ASSESSMENT — PAIN DESCRIPTION - LOCATION
LOCATION: BACK;NECK
LOCATION: BACK;ABDOMEN
LOCATION: BACK

## 2025-05-10 ASSESSMENT — PAIN DESCRIPTION - ORIENTATION: ORIENTATION: LEFT

## 2025-05-10 NOTE — PROGRESS NOTES
05/10/25 1128   Subjective   Subjective Patient in recliner agrees to walk.  Wife is present.   Pain No C/O pain   Cognition   Overall Cognitive Status Exceptions   Arousal/Alertness Impaired   Following Commands Follows one step commands with increased time;Follows one step commands with repetition   Memory Impaired;Decreased recall of recent events;Decreased recall of precautions   Safety Judgement Impaired;Decreased awareness of need for safety;Decreased awareness of need for assistance   Insights Impaired   Cognition Comment Great difficulty following commands.   Orientation   Overall Orientation Status WFL   Vitals   Temp 97.3 °F (36.3 °C)   Pulse 68   Heart Rate Source Monitor   Respirations 18   SpO2 100 %   O2 Device Nasal cannula   /66   MAP (Calculated) 85   BP Location Left upper arm   Patient Position Sitting   Transfer Training   Transfer Training Yes   Sit to Stand Minimal assistance   Stand to Sit Contact guard assistance   Gait Training   Gait Training Yes   Gait   Overall Level of Assistance Minimal assistance   Distance (ft) 50 Feet   Assistive Device Walker, rolling   Interventions Demonstration;Manual cues;Safety awareness training;Tactile cues;Verbal cues  (Cues to take longer steps)   Speed/Alycia Shuffled;Slow   PT Exercises   A/AROM Exercises BL LE EX siting in recliner X 10 reps.   Patient Education   Education Given To Patient;Family   Education Provided Role of Therapy;Plan of Care;Transfer Training;Mobility Training;Energy Conservation;Fall Prevention Strategies   Education Provided Comments use of call light, staff A, sternal prec.   Education Method Demonstration;Verbal   Barriers to Learning Cognition   Education Outcome Unable to verbalize;Unable to demonstrate understanding;Continued education needed   Other Specialty Interventions   Other Treatments/Modalities In recliner call light all needs in reach personal alarm attached.   Assessment   Activity Tolerance Patient

## 2025-05-10 NOTE — PROGRESS NOTES
Comprehensive Nutrition Assessment    Type and Reason for Visit:  Reassess    Nutrition Recommendations/Plan:   Initiate tube feeding orders. Start administering tube feeding per MD.      Malnutrition Assessment:  Malnutrition Status:  At risk for malnutrition (05/10/25 1107)    Context:  Acute Illness     Findings of the 6 clinical characteristics of malnutrition:  Energy Intake:  Mild decrease in energy intake  Weight Loss:  No weight loss     Body Fat Loss:  No body fat loss     Muscle Mass Loss:  No muscle mass loss    Fluid Accumulation:  No fluid accumulation     Strength:  Not Performed    Nutrition Assessment:    Pt remains NPO. PEG has been placed. Blood glucose levels are ranging from 137-163. Will restart tube feeding orders. Begin tube feeding per MD.    Nutrition Related Findings:    Last BM noted 5/7 Wound Type: Surgical Incision       Current Nutrition Intake & Therapies:    Average Meal Intake: NPO  Average Supplements Intake: NPO  Diet NPO Exceptions are: Sips of Water with Meds    Anthropometric Measures:  Height: 180.3 cm (5' 10.98\")  Ideal Body Weight (IBW): 172 lbs (78 kg)    Admission Body Weight: 88.5 kg (195 lb)  Current Body Weight: 85.8 kg (189 lb 2.5 oz), 112.8 % IBW. Weight Source: Bed scale  Current BMI (kg/m2): 26.4  Usual Body Weight: 88.5 kg (195 lb)  % Weight Change (Calculated): -0.5  Weight Adjustment For: No Adjustment  BMI Categories: Overweight (BMI 25.0-29.9)    Estimated Daily Nutrient Needs:  Energy Requirements Based On: Kcal/kg  Weight Used for Energy Requirements: Current  Energy (kcal/day): 2861-7040 kcals/day  Weight Used for Protein Requirements: Ideal  Protein (g/day): 101-156 g/protein/day  Method Used for Fluid Requirements: 1 ml/kcal  Fluid (ml/day): 0790-1192 mL/day    Nutrition Diagnosis:   Inadequate oral intake related to acute injury/trauma as evidenced by NPO or clear liquid status due to medical condition    Nutrition Interventions:   Food and/or Nutrient  Delivery: Continue NPO, Start Tube Feeding  Nutrition Education/Counseling: No recommendation at this time  Coordination of Nutrition Care: Continue to monitor while inpatient  Plan of Care discussed with: Pt/wife    Goals:  Goals: Initiate nutrition support  Type of Goal: New goal  Previous Goal Met: New Goal    Nutrition Monitoring and Evaluation:   Behavioral-Environmental Outcomes: None Identified  Food/Nutrient Intake Outcomes: Progression of Nutrition, Enteral Nutrition Intake/Tolerance, IVF Intake  Physical Signs/Symptoms Outcomes: Biochemical Data, Nutrition Focused Physical Findings, Weight, Skin, GI Status    Discharge Planning:    Too soon to determine     Татьяна Jennings MS, RD, LD  Contact: [phone]

## 2025-05-10 NOTE — PROGRESS NOTES
time.  No acute changes or acute overnight event reported.   Sitting comfortably in bed in no apparent acute distress.    Endorses overall improvement.        04/27/2025  Patient seen and examined this a.m.   No new complaints.    Denies any active chest pain at this time.  No acute changes or acute overnight event reported.   Laying comfortably in bed in no apparent acute distress.    Denies any acute complaints or distress.    Endorses overall improvement.      04/26/2025  Patient seen and examined this a.m.   No new complaints.  No acute changes or acute overnight event reported.   Laying comfortably in bed in no apparent acute distress.  Denies any acute complaints or distress.        Review of Systems:   Review of Systems   Unable to perform ROS: Mental status change       Diet NPO Exceptions are: Sips of Water with Meds    Intake/Output Summary (Last 24 hours) at 5/10/2025 0822  Last data filed at 5/10/2025 0525  Gross per 24 hour   Intake 500 ml   Output 750 ml   Net -250 ml           Medications:   lactated ringers 100 mL/hr at 05/09/25 2318    sodium chloride 25 mL/hr at 05/09/25 1006    dextrose       Current Facility-Administered Medications   Medication Dose Route Frequency Provider Last Rate Last Admin    lactated ringers infusion   IntraVENous Continuous Miley Taylor  mL/hr at 05/09/25 2318 New Bag at 05/09/25 2318    lansoprazole (PREVACID SOLUTAB) disintegrating tablet 30 mg  30 mg PEG Tube BID Miley Taylor MD   30 mg at 05/09/25 2054    Vitamin D (CHOLECALCIFEROL) tablet 2,000 Units  2,000 Units Oral Daily Miley Taylor MD        metoprolol (LOPRESSOR) injection 5 mg  5 mg IntraVENous Q4H Miley Taylor MD   5 mg at 05/10/25 0533    [Held by provider] metoprolol tartrate (LOPRESSOR) tablet 75 mg  75 mg Oral BID Miley Taylor MD        insulin glargine (LANTUS) injection vial 25 Units  25 Units SubCUTAneous Nightly Miley Taylor MD   25 Units at 05/07/25 9902    insulin  COMPARISON: 5/9/25.  FINDINGS: Lungs/Pleura: Slight interstitial prominence. No sizable effusion. Heart: Cardiomegaly. Bones: Postsurgical change of a lower left rib. Other: Residual contrast in the colon.      1.  Cardiomegaly. Slight interstitial prominence may be due to trace vascular congestion.    ______________________________________ Electronically signed by: PJ CARRANZA M.D. Date:     05/10/2025 Time:    08:01     EGD  Result Date: 5/9/2025  No dictation     XR CHEST PORTABLE  Result Date: 5/9/2025  EXAM: CHEST RADIOGRAPH  TECHNIQUE: Single frontal chest radiograph.  HISTORY: Postop open heart surgery.  COMPARISON: 5/8/2025  FINDINGS:  The patient is leaning to the right. Hypoventilation. EKG leads and oxygen tubing project over the chest. Left rib surgical plating and surgical clips in the left axilla, again noted. No pulmonary infiltrate is identified. Equivocal trace bilateral pleural effusions, again noted. No visible pneumothorax. Stable enlargement of the cardiac silhouette. No acute displaced rib fractures are identified.        1.  Stable chest.    ______________________________________ Electronically signed by: SU ELLISON M.D. Date:     05/09/2025 Time:    06:27     XR CHEST PORTABLE  Result Date: 5/8/2025  EXAM: CHEST RADIOGRAPH  TECHNIQUE: Single frontal chest radiograph.  HISTORY: Postop open heart surgery.  COMPARISON: 5/7/2025  FINDINGS:  The patient is leaning to the right. Hypoventilation. EKG leads project over the chest. Left rib surgical plating, again noted. No pulmonary infiltrate is identified. Equivocal trace bilateral pleural effusions, again noted. No visible pneumothorax. Stable enlargement of the cardiac silhouette. No acute displaced rib fractures are identified. Surgical clips in the left axilla, again noted. Prominent amount of gas in the stomach.       1.  Stable chest.    ______________________________________ Electronically signed by: SU ELLISON M.D. Date:     05/08/2025

## 2025-05-10 NOTE — PLAN OF CARE
Problem: Chronic Conditions and Co-morbidities  Goal: Patient's chronic conditions and co-morbidity symptoms are monitored and maintained or improved  Outcome: Progressing     Problem: Safety - Adult  Goal: Free from fall injury  Outcome: Progressing     Problem: ABCDS Injury Assessment  Goal: Absence of physical injury  Outcome: Progressing     Problem: Pain  Goal: Verbalizes/displays adequate comfort level or baseline comfort level  Outcome: Progressing     Problem: Discharge Planning  Goal: Discharge to home or other facility with appropriate resources  Outcome: Progressing     Problem: Skin/Tissue Integrity  Goal: Skin integrity remains intact  Description: 1.  Monitor for areas of redness and/or skin breakdown  Outcome: Progressing     Problem: Nutrition Deficit:  Goal: Optimize nutritional status  Outcome: Progressing

## 2025-05-10 NOTE — CONSENT
Informed Consent for Blood Component Transfusion Note    I have discussed with the patient & family the rationale for blood component transfusion; its benefits in treating or preventing fatigue, organ damage, or death; and its risk which includes mild transfusion reactions, rare risk of blood borne infection, or more serious but rare reactions. I have discussed the alternatives to transfusion, including the risk and consequences of not receiving transfusion. The patient & family had an opportunity to ask questions and have agreed to proceed with transfusion of blood components.    Electronically signed by Akhil Siegel MD on 5/10/25 at 3:11 AM CDT

## 2025-05-10 NOTE — PROGRESS NOTES
Nephrology ([facility]) Consult Note      Patient:  Akhil Alejo  YOB: 1955  Date of Service: 5/10/2025  MRN: [id number]   Acct: [id number]   Primary Care Physician: Lennox Waddell MD  Advance Directive: Full Code  Admit Date: 4/25/2025       Hospital Day: 15  Referring Provider: Akhil Lara MD    Patient independently seen and examined, Chart, Consults, Notes, Operative notes, Labs, Cardiology, and Radiology studies reviewed as available.        Subjective:  Akhil Alejo is a 69 y.o. male for whom we were consulted for evaluation and treatment of hyponatremia.  Initially seen with wife and neither noted any renal issues for him.  He underwent CABG three-vessel on 4/29/2025 by Dr. Lara.  Had episodes of postoperative atrial fibrillation and persistently failed swallowDays studies.  He had had a Dobbhoff but that was pulled out prior to my initial evaluation on hospital day 12.  He was n.p.o. for possible PEG placement.  Sodium had been normal prior to admission and recently had been ranging in the low 150s.  Patient denied other complaints upon questioning.    Today, no overnight events.  Sodium improved with hypotonic fluids.  Family notes plans for tube feeds to begin later today.  Patient up in chair and in good spirits    Allergies:  Penicillins    Medicines:  Current Facility-Administered Medications   Medication Dose Route Frequency Provider Last Rate Last Admin    lactated ringers infusion   IntraVENous Continuous Miley Taylor  mL/hr at 05/09/25 2318 New Bag at 05/09/25 2318    lansoprazole (PREVACID SOLUTAB) disintegrating tablet 30 mg  30 mg PEG Tube BID Miley Taylor MD   30 mg at 05/09/25 2054    Vitamin D (CHOLECALCIFEROL) tablet 2,000 Units  2,000 Units Oral Daily Miley Taylor MD        metoprolol (LOPRESSOR) injection 5 mg  5 mg IntraVENous Q4H Miley Taylor MD   5 mg at 05/10/25 0538    [Held by provider] metoprolol  05/10/25  0522   AST 20 21 19   ALT 15 16 14   BILITOT 0.7 0.7 0.9   ALKPHOS 117 118 121     PT/INR: No results for input(s): \"PROTIME\", \"INR\" in the last 72 hours.  APTT: No results for input(s): \"APTT\" in the last 72 hours.  BNP:  No results for input(s): \"BNP\" in the last 72 hours.  Ionized Calcium:Invalid input(s): \"IONCA\"  Magnesium:  Recent Labs     05/08/25  0147   MG 2.3     Phosphorus:  Recent Labs     05/08/25  0147   PHOS 3.0     HgbA1C: No results for input(s): \"LABA1C\" in the last 72 hours.  Hepatic:   Recent Labs     05/08/25  0147 05/09/25  0225 05/10/25  0522   ALKPHOS 117 118 121   ALT 15 16 14   AST 20 21 19   BILITOT 0.7 0.7 0.9     Lactic Acid: No results for input(s): \"LACTA\" in the last 72 hours.  Troponin: No results for input(s): \"CKTOTAL\", \"CKMB\", \"TROPONINT\" in the last 72 hours.  ABGs: No results for input(s): \"PH\", \"PCO2\", \"PO2\", \"HCO3\", \"O2SAT\" in the last 72 hours.  CRP:  No results for input(s): \"CRP\" in the last 72 hours.  Sed Rate:  No results for input(s): \"SEDRATE\" in the last 72 hours.      Cultures:   No results for input(s): \"CULTURE\" in the last 72 hours.  No results for input(s): \"BC\", \"BLOODCULT2\" in the last 72 hours.  No results for input(s): \"CXSURG\" in the last 72 hours.    Radiology reports as per the Radiologist  Radiology: XR CHEST PORTABLE  Result Date: 4/30/2025  EXAM: CHEST RADIOGRAPH  TECHNIQUE: Single frontal chest radiograph.  HISTORY: Postop open heart surgery.  COMPARISON: 04/25/2025  FINDINGS:  Interval surgery with left rib plating and surgical clips in left axilla. Interval placement of an endotracheal tube, tip about 1.6 cm above the monika. Interval placement of a right IJ Kill Buck-Gisell catheter, with tip in the right pulmonary artery. Interval  placement of a left chest tube. EKG leads and a monitoring pad project over the left chest. Hypoventilation with mild left perihilar atelectasis. No pleural effusion or pneumothorax is seen. Stable borderline

## 2025-05-10 NOTE — PROGRESS NOTES
GI  - PROGRESS NOTE    Subjective:   Admit Date: 4/25/2025  PCP: Lennox Waddell MD    Patient being seen for: Post PEG check.    HPI: Patient admitted to the hospital with cardiac issue and underwent surgery.  Since then he is having difficulty in swallowing and failed swallowing study.  He underwent a PEG tube yesterday.  Denies any acute GI symptoms.  No nausea vomiting.  No fever or chills.      Medications:  Scheduled Meds:   lansoprazole  30 mg PEG Tube BID    Vitamin D  2,000 Units Oral Daily    metoprolol  5 mg IntraVENous Q4H    [Held by provider] metoprolol tartrate  75 mg Oral BID    insulin glargine  25 Units SubCUTAneous Nightly    insulin lispro  10 Units SubCUTAneous TID WC    gabapentin  100 mg Oral TID    isosorbide mononitrate  30 mg Oral Daily    [Held by provider] metFORMIN  500 mg Oral BID WC    ipratropium 0.5 mg-albuterol 2.5 mg  1 Dose Inhalation 4x Daily RT    acetylcysteine  600 mg Inhalation BID    insulin lispro  0-8 Units SubCUTAneous 4x Daily AC & HS    aspirin  81 mg Oral Daily    sodium chloride flush  5-40 mL IntraVENous 2 times per day    [Held by provider] clopidogrel  75 mg Oral Daily    atorvastatin  20 mg Oral Nightly    enoxaparin  40 mg SubCUTAneous Daily    citalopram  20 mg Oral Nightly    levothyroxine  50 mcg Oral Daily       Continuous Infusions:   lactated ringers 100 mL/hr at 05/09/25 2318    sodium chloride 25 mL/hr at 05/09/25 1006    dextrose         PRN Meds:.labetalol, bisacodyl, potassium chloride, metoprolol, fluticasone, protamine, sodium chloride flush, sodium chloride, ondansetron **OR** ondansetron, morphine **OR** [DISCONTINUED] morphine, magnesium sulfate, glucose, dextrose bolus **OR** dextrose bolus, glucagon (rDNA), dextrose, oxyCODONE **OR** oxyCODONE, magnesium hydroxide      Labs:     Recent Labs     05/08/25  0147 05/09/25  0225 05/10/25  0422   WBC 14.0* 13.1* 14.8*   RBC 4.33* 3.89* 3.74*

## 2025-05-10 NOTE — PROGRESS NOTES
Progress Note    5/10/2025 5:58 AM             Subjective:  Mr. Alejo underwent PEG tube placement yesterday due to inability to pass speech therapy evaluation for swallowing.  PULSE OXIMETRY RANGE: SpO2  Av.9 %  Min: 96 %  Max: 100 %  SUPPLEMENTAL O2: O2 Flow Rate (L/min): 2 L/min   Vital Signs: BP (!) 159/76   Pulse 70   Temp 98.2 °F (36.8 °C) (Temporal)   Resp 18   Ht 1.803 m (5' 1098\")   Wt 85.8 kg (189 lb 2.5 oz)   SpO2 99%   BMI 26.39 kg/m²    Temperature Range:   Temp: 98.2 °F (36.8 °C)   Temp  Av.1 °F (36.7 °C)  Min: 97.3 °F (36.3 °C)  Max: 99 °F (37.2 °C)                   Rhythm: normal sinus rhythm    Labs:   ABG:    Lab Results   Component Value Date/Time    PHART 7.410 2025 01:23 AM    PO2ART 66.0 2025 01:23 AM    PWI5CIK 32.0 2025 01:23 AM    C2CTUXQI 92.6 2025 01:23 AM    LIW4LMF 26 2025 05:49 PM    CLP2POL 20.3 2025 01:23 AM    BEART -3.5 2025 01:23 AM     CBC:   Recent Labs     05/08/25  0147 05/09/25  0225 05/10/25  0422   WBC 14.0* 13.1* 14.8*   HGB 12.5* 11.2* 11.1*   HCT 40.8* 35.9* 34.0*   MCV 94.2* 92.3 90.9    336 297     BMP:   Recent Labs     05/08/25  0147 05/09/25  0225 05/10/25  0522   * 139 138   K 3.4* 4.0 3.9   * 108* 105   CO2 24 21* 22   PHOS 3.0  --   --    BUN 24* 16 12   CREATININE 1.1 0.9 0.9     PT/INR: No results for input(s): \"PROTIME\", \"INR\" in the last 72 hours.  APTT: No results for input(s): \"APTT\" in the last 72 hours.  Chest X-Ray:  Left lung well expanded, no effusion  CT:  I/O last 3 completed shifts:  In: 500 [I.V.:500]  Out: 1650 [Urine:1650]      I/O last 3 completed shifts:  In: 500 [I.V.:500]  Out: 1650 [Urine:1650]  Scheduled Meds:    lansoprazole  30 mg PEG Tube BID    Vitamin D  2,000 Units Oral Daily    metoprolol  5 mg IntraVENous Q4H    [Held by provider] metoprolol tartrate  75 mg Oral BID    insulin glargine  25 Units SubCUTAneous Nightly    insulin lispro  10 Units

## 2025-05-11 ENCOUNTER — APPOINTMENT (OUTPATIENT)
Dept: GENERAL RADIOLOGY | Age: 70
DRG: 234 | End: 2025-05-11
Attending: SURGERY
Payer: MEDICARE

## 2025-05-11 LAB
ALBUMIN SERPL-MCNC: 2.5 G/DL (ref 3.5–5.2)
ALP SERPL-CCNC: 153 U/L (ref 40–129)
ALT SERPL-CCNC: 10 U/L (ref 10–50)
ANION GAP SERPL CALCULATED.3IONS-SCNC: 12 MMOL/L (ref 8–16)
AST SERPL-CCNC: 25 U/L (ref 10–50)
BASOPHILS # BLD: 0.1 K/UL (ref 0–0.2)
BASOPHILS NFR BLD: 0.4 % (ref 0–1)
BILIRUB SERPL-MCNC: 1 MG/DL (ref 0.2–1.2)
BUN SERPL-MCNC: 13 MG/DL (ref 8–23)
CALCIUM SERPL-MCNC: 7.8 MG/DL (ref 8.8–10.2)
CHLORIDE SERPL-SCNC: 103 MMOL/L (ref 98–107)
CO2 SERPL-SCNC: 19 MMOL/L (ref 22–29)
CREAT SERPL-MCNC: 0.9 MG/DL (ref 0.7–1.2)
EOSINOPHIL # BLD: 0.3 K/UL (ref 0–0.6)
EOSINOPHIL NFR BLD: 1.5 % (ref 0–5)
ERYTHROCYTE [DISTWIDTH] IN BLOOD BY AUTOMATED COUNT: 14.3 % (ref 11.5–14.5)
GLUCOSE BLD-MCNC: 235 MG/DL (ref 70–99)
GLUCOSE BLD-MCNC: 245 MG/DL (ref 70–99)
GLUCOSE BLD-MCNC: 252 MG/DL (ref 70–99)
GLUCOSE BLD-MCNC: 307 MG/DL (ref 70–99)
GLUCOSE SERPL-MCNC: 195 MG/DL (ref 70–99)
HCT VFR BLD AUTO: 34.8 % (ref 42–52)
HGB BLD-MCNC: 11.2 G/DL (ref 14–18)
IMM GRANULOCYTES # BLD: 0.5 K/UL
LYMPHOCYTES # BLD: 3.1 K/UL (ref 1.1–4.5)
LYMPHOCYTES NFR BLD: 15.6 % (ref 20–40)
MCH RBC QN AUTO: 28.8 PG (ref 27–31)
MCHC RBC AUTO-ENTMCNC: 32.2 G/DL (ref 33–37)
MCV RBC AUTO: 89.5 FL (ref 80–94)
MONOCYTES # BLD: 1 K/UL (ref 0–0.9)
MONOCYTES NFR BLD: 5.1 % (ref 0–10)
NEUTROPHILS # BLD: 14.7 K/UL (ref 1.5–7.5)
NEUTS SEG NFR BLD: 75 % (ref 50–65)
PERFORMED ON: ABNORMAL
PLATELET # BLD AUTO: 340 K/UL (ref 130–400)
PMV BLD AUTO: 10.8 FL (ref 9.4–12.4)
POTASSIUM SERPL-SCNC: 4 MMOL/L (ref 3.5–5)
PROT SERPL-MCNC: 6.8 G/DL (ref 6.4–8.3)
RBC # BLD AUTO: 3.89 M/UL (ref 4.7–6.1)
SODIUM SERPL-SCNC: 134 MMOL/L (ref 136–145)
WBC # BLD AUTO: 19.6 K/UL (ref 4.8–10.8)

## 2025-05-11 PROCEDURE — 6370000000 HC RX 637 (ALT 250 FOR IP): Performed by: INTERNAL MEDICINE

## 2025-05-11 PROCEDURE — 2700000000 HC OXYGEN THERAPY PER DAY

## 2025-05-11 PROCEDURE — 6360000002 HC RX W HCPCS: Performed by: INTERNAL MEDICINE

## 2025-05-11 PROCEDURE — 99232 SBSQ HOSP IP/OBS MODERATE 35: CPT | Performed by: INTERNAL MEDICINE

## 2025-05-11 PROCEDURE — 82962 GLUCOSE BLOOD TEST: CPT

## 2025-05-11 PROCEDURE — 2500000003 HC RX 250 WO HCPCS: Performed by: INTERNAL MEDICINE

## 2025-05-11 PROCEDURE — 94640 AIRWAY INHALATION TREATMENT: CPT

## 2025-05-11 PROCEDURE — 80053 COMPREHEN METABOLIC PANEL: CPT

## 2025-05-11 PROCEDURE — 36415 COLL VENOUS BLD VENIPUNCTURE: CPT

## 2025-05-11 PROCEDURE — 94760 N-INVAS EAR/PLS OXIMETRY 1: CPT

## 2025-05-11 PROCEDURE — 71045 X-RAY EXAM CHEST 1 VIEW: CPT

## 2025-05-11 PROCEDURE — 85025 COMPLETE CBC W/AUTO DIFF WBC: CPT

## 2025-05-11 PROCEDURE — 99024 POSTOP FOLLOW-UP VISIT: CPT | Performed by: SURGERY

## 2025-05-11 PROCEDURE — 1200000000 HC SEMI PRIVATE

## 2025-05-11 PROCEDURE — 97116 GAIT TRAINING THERAPY: CPT

## 2025-05-11 PROCEDURE — 2580000003 HC RX 258: Performed by: INTERNAL MEDICINE

## 2025-05-11 RX ADMIN — Medication 2000 UNITS: at 09:49

## 2025-05-11 RX ADMIN — INSULIN LISPRO 6 UNITS: 100 INJECTION, SOLUTION INTRAVENOUS; SUBCUTANEOUS at 16:37

## 2025-05-11 RX ADMIN — SODIUM CHLORIDE, PRESERVATIVE FREE 10 ML: 5 INJECTION INTRAVENOUS at 22:26

## 2025-05-11 RX ADMIN — INSULIN LISPRO 10 UNITS: 100 INJECTION, SOLUTION INTRAVENOUS; SUBCUTANEOUS at 16:37

## 2025-05-11 RX ADMIN — METOPROLOL TARTRATE 5 MG: 5 INJECTION INTRAVENOUS at 05:29

## 2025-05-11 RX ADMIN — ACETYLCYSTEINE 600 MG: 200 SOLUTION ORAL; RESPIRATORY (INHALATION) at 06:28

## 2025-05-11 RX ADMIN — LEVOTHYROXINE SODIUM 50 MCG: 0.05 TABLET ORAL at 05:29

## 2025-05-11 RX ADMIN — LANSOPRAZOLE 30 MG: 30 TABLET, ORALLY DISINTEGRATING, DELAYED RELEASE ORAL at 09:50

## 2025-05-11 RX ADMIN — GABAPENTIN 100 MG: 100 CAPSULE ORAL at 14:56

## 2025-05-11 RX ADMIN — SODIUM CHLORIDE, SODIUM LACTATE, POTASSIUM CHLORIDE, AND CALCIUM CHLORIDE: 600; 310; 30; 20 INJECTION, SOLUTION INTRAVENOUS at 00:50

## 2025-05-11 RX ADMIN — METOPROLOL TARTRATE 5 MG: 5 INJECTION INTRAVENOUS at 00:47

## 2025-05-11 RX ADMIN — IPRATROPIUM BROMIDE AND ALBUTEROL SULFATE 1 DOSE: 2.5; .5 SOLUTION RESPIRATORY (INHALATION) at 14:08

## 2025-05-11 RX ADMIN — ATORVASTATIN CALCIUM 20 MG: 20 TABLET, FILM COATED ORAL at 22:26

## 2025-05-11 RX ADMIN — LANSOPRAZOLE 30 MG: 30 TABLET, ORALLY DISINTEGRATING, DELAYED RELEASE ORAL at 22:26

## 2025-05-11 RX ADMIN — IPRATROPIUM BROMIDE AND ALBUTEROL SULFATE 1 DOSE: 2.5; .5 SOLUTION RESPIRATORY (INHALATION) at 10:16

## 2025-05-11 RX ADMIN — INSULIN GLARGINE 25 UNITS: 100 INJECTION, SOLUTION SUBCUTANEOUS at 22:27

## 2025-05-11 RX ADMIN — IPRATROPIUM BROMIDE AND ALBUTEROL SULFATE 1 DOSE: 2.5; .5 SOLUTION RESPIRATORY (INHALATION) at 06:28

## 2025-05-11 RX ADMIN — IPRATROPIUM BROMIDE AND ALBUTEROL SULFATE 1 DOSE: 2.5; .5 SOLUTION RESPIRATORY (INHALATION) at 18:47

## 2025-05-11 RX ADMIN — INSULIN LISPRO 4 UNITS: 100 INJECTION, SOLUTION INTRAVENOUS; SUBCUTANEOUS at 22:27

## 2025-05-11 RX ADMIN — ASPIRIN 81 MG 81 MG: 81 TABLET ORAL at 09:50

## 2025-05-11 RX ADMIN — ACETYLCYSTEINE 600 MG: 200 SOLUTION ORAL; RESPIRATORY (INHALATION) at 18:47

## 2025-05-11 RX ADMIN — CITALOPRAM HYDROBROMIDE 20 MG: 10 TABLET, FILM COATED ORAL at 22:28

## 2025-05-11 RX ADMIN — OXYCODONE HYDROCHLORIDE 10 MG: 10 TABLET ORAL at 22:43

## 2025-05-11 RX ADMIN — GABAPENTIN 100 MG: 100 CAPSULE ORAL at 09:50

## 2025-05-11 RX ADMIN — ENOXAPARIN SODIUM 40 MG: 100 INJECTION SUBCUTANEOUS at 09:50

## 2025-05-11 RX ADMIN — GABAPENTIN 100 MG: 100 CAPSULE ORAL at 22:26

## 2025-05-11 ASSESSMENT — PAIN DESCRIPTION - FREQUENCY: FREQUENCY: INTERMITTENT

## 2025-05-11 ASSESSMENT — PAIN SCALES - GENERAL
PAINLEVEL_OUTOF10: 0
PAINLEVEL_OUTOF10: 9

## 2025-05-11 ASSESSMENT — PAIN - FUNCTIONAL ASSESSMENT: PAIN_FUNCTIONAL_ASSESSMENT: PREVENTS OR INTERFERES SOME ACTIVE ACTIVITIES AND ADLS

## 2025-05-11 ASSESSMENT — PAIN DESCRIPTION - DESCRIPTORS: DESCRIPTORS: ACHING

## 2025-05-11 ASSESSMENT — PAIN DESCRIPTION - LOCATION: LOCATION: BACK;ABDOMEN

## 2025-05-11 ASSESSMENT — PAIN DESCRIPTION - ONSET: ONSET: ON-GOING

## 2025-05-11 ASSESSMENT — PAIN DESCRIPTION - DIRECTION: RADIATING_TOWARDS: NO

## 2025-05-11 ASSESSMENT — PAIN DESCRIPTION - ORIENTATION: ORIENTATION: LEFT

## 2025-05-11 ASSESSMENT — PAIN DESCRIPTION - PAIN TYPE: TYPE: ACUTE PAIN;SURGICAL PAIN

## 2025-05-11 NOTE — PROGRESS NOTES
obtained using multiple projections.  The left coronary artery was engaged with a JL 4 JL 3 5 diagnostic catheter and angiographic images were obtained. 10.  left radial artery hemostasis was obtained sheath was removed.  TR band for compression was applied. 11.  total sedation time 25 minutes with 1 mg Versed and 25 mcg fentanyl.  Total contrast 90 mL. Study completion all catheters introduced during the procedure were removed.  Patient tolerated procedure well.  ANATOMICAL RELATIONSHIP:  the coronary circulation is rt  dominant.  CORONARY ARTERIES: Left main very short vessel which gives rise to the left anterior descending artery and left circumflex , has a wedge like plaque noted with 30% stenosis. Left anterior descending artery; tortuous vessel which gives rise to diagonal branches. Mid lad has 80% stenosis. Diag has 99% stenosis and is moderate vessel. circumflex codominant vessel which has mid 70% significant stenosis gives rise to 2 obtuse marginal branches and there is otherwise normal. Right coronary artery dominant vessel which is 30% mid vessel stenosis and gives rise to PDA and PLV branches no other significant stenosis noted.  HEMODYNAMICS: Ascending Aorta: [126/61/74] mmHg Left radial artery: [133/67/88] mmHg  ESTIMATED BLOOD LOSS:  0cc.      Dr Chayo Cortes MD. [facility].      XR CHEST PORTABLE  Result Date: 4/25/2025  EXAM: CHEST RADIOGRAPH  TECHNIQUE: Single frontal chest radiograph.  HISTORY: Abnormal EKG.  COMPARISON: 11/21/23.  FINDINGS: Lungs/Pleura: The lungs are clear. There is no pleural effusion. There is no pneumothorax. Heart: The heart size is normal. Bones: Unremarkable. Other: None.      1.  No acute findings.    ______________________________________ Electronically signed by: PJ CARRANZA M.D. Date:     04/25/2025 Time:    12:28     Nuclear stress test with myocardial perfusion  Result Date: 4/25/2025    Stress Combined Conclusion: The study is positive for myocardial  OM) - on DAPT  Delirium  Poorly controlled insulin-dependent diabetes DM2  HTN  Hypernatremia - resolved  Post op afib - resolved    Plan:  Hypernatremia resolved. Now mildly hyponatremic  No further A-fib with RVR noted  S/p G tube on tube feed  Restart Plavix and aspirin for graft patency if okay with all  No further post op afib so may hold OAC for now     Nilsa Hyde MD  5/11/2025 5:28 PM

## 2025-05-11 NOTE — PROGRESS NOTES
[facility] Progress Note    Patient:  Akhil Alejo  YOB: 1955  Date of Service: 5/11/2025  MRN: [id number]   Acct: [id number]   Primary Care Physician: Lennox Waddell MD  Advance Directive: Full Code  Admit Date: 4/25/2025       Hospital Day: 16      CHIEF COMPLAINT:     Chief Complaint   Patient presents with    Abnormal Lab     ABNORMAL EKG DURING STRESS TEST       5/11/2025 7:46 AM  Subjective / Interval History:   05/11/2025  Patient seen and examined this a.m.   No new complaints.    No acute changes or acute overnight event reported.   Laying comfortably in bed in no apparent acute distress.    Denies any acute complaints or distress.       05/10/2025  Patient seen and examined this a.m.   No new complaints.    No acute changes or acute overnight event reported.   Sitting comfortably in chair in no apparent acute distress.    Denies any acute complaints or distress.   Endorses overall improvement.        05/09/2025  Patient seen and examined   No new complaints.  No acute changes or acute overnight event reported.   Laying comfortably in bed in no apparent acute distress.    Denies any acute complaints or distress.    Endorses overall improvement.    PEG tube placement this am      05/08/2025  Patient seen and examined this a.m.   No new complaints.    No acute changes or acute overnight event reported.   Laying comfortably in bed in no apparent acute distress.    Denies any acute complaints or distress.      05/07/2025  Patient seen and examined this a.m.   No new complaints.    No acute changes or acute overnight event reported.   Laying comfortably in bed in no apparent acute distress.    Denies any acute complaints or distress.    Discussed with family at bedside.  Family agreeable for PEG tube placement for nutrition.      05/06/2025  Patient seen and examined this a.m.   No new complaints.  No acute changes or acute overnight event reported.   Laying comfortably in  CARDIOLOGY  IP CONSULT TO CASE MANAGEMENT  IP CONSULT TO DIETITIAN  IP CONSULT TO HOSPITALIST  IP CONSULT TO OTOLARYNGOLOGY  IP CONSULT TO REHAB/TCU ADMISSION COORDINATOR  IP CONSULT TO NEPHROLOGY  IP CONSULT TO GI      DVT prophylaxis: Heparin      Current medications reviewed  Lab work reviewed  Radiology  films reviewed  Much appreciated treatment recommendations from suspecialities reviewed  Discussed treatment plan with the nurse and addressed all questions/concerns  Discussed with Patient at the bedside        Discharge planning: tbd  CTS on board  S/p CABG - 04/29/2025  Postop management as per CTS.  Acute inpatient rehab consulted   S/p PEG tube placement - 05/09/2025      Multiple complex medical problems  Morbidity mortality high risk  Medical decision making High complexity         Electronically signed by   Lior Quiroz MD,   Internal Medicine Hospitalist   5/11/2025 7:46 AM

## 2025-05-11 NOTE — PROGRESS NOTES
Nephrology ([facility]) Consult Note      Patient:  Akhil Alejo  YOB: 1955  Date of Service: 5/11/2025  MRN: [id number]   Acct: [id number]   Primary Care Physician: Lennox Waddell MD  Advance Directive: Full Code  Admit Date: 4/25/2025       Hospital Day: 16  Referring Provider: Akhil Lara MD    Patient independently seen and examined, Chart, Consults, Notes, Operative notes, Labs, Cardiology, and Radiology studies reviewed as available.        Subjective:  Akhil Alejo is a 69 y.o. male for whom we were consulted for evaluation and treatment of hyponatremia.  Initially seen with wife and neither noted any renal issues for him.  He underwent CABG three-vessel on 4/29/2025 by Dr. Lara.  Had episodes of postoperative atrial fibrillation and persistently failed swallowDays studies.  He had had a Dobbhoff but that was pulled out prior to my initial evaluation on hospital day 12.  He was n.p.o. for possible PEG placement.  Sodium had been normal prior to admission and recently had been ranging in the low 150s.  Patient denied other complaints upon questioning.    Today, no overnight events.  No family present today.  Patient up in chair and in good spirits    Allergies:  Penicillins    Medicines:  Current Facility-Administered Medications   Medication Dose Route Frequency Provider Last Rate Last Admin    lansoprazole (PREVACID SOLUTAB) disintegrating tablet 30 mg  30 mg PEG Tube BID Miley Taylor MD   30 mg at 05/11/25 0950    Vitamin D (CHOLECALCIFEROL) tablet 2,000 Units  2,000 Units Oral Daily Miley Taylor MD   2,000 Units at 05/11/25 0949    [Held by provider] metoprolol tartrate (LOPRESSOR) tablet 75 mg  75 mg Oral BID Akhil Lara MD        insulin glargine (LANTUS) injection vial 25 Units  25 Units SubCUTAneous Nightly Miley Taylor MD   25 Units at 05/10/25 2118    insulin lispro (HUMALOG,ADMELOG) injection vial 10 Units  10 Units  for the consult, we appreciate the opportunity to provide care to your patients.  Feel free to contact me if I can be of any further assistance.      Lennox Aceves MD  05/11/25  1:37 PM

## 2025-05-11 NOTE — PROGRESS NOTES
05/11/25 1200   Subjective   Subjective Not feeling too good.  (Patient has feeding tube.)   Pain No C/O pain   Cognition   Overall Cognitive Status Exceptions   Arousal/Alertness Impaired   Following Commands Follows one step commands with increased time;Follows one step commands with repetition   Memory Impaired;Decreased recall of recent events;Decreased recall of precautions   Safety Judgement Impaired;Decreased awareness of need for safety;Decreased awareness of need for assistance   Problem Solving Impaired;Assistance required to implement solutions;Assistance required to generate solutions   Insights Impaired   Cognition Comment Great difficulty following commands.   Orientation   Overall Orientation Status WFL   Vitals   O2 Device Nasal cannula  (SATS PRE GAIT 2LNC 97%.  GAIT RA 97% NC  DECREASED O2  1L NC.  Jaqueline TRUJILLO RN notified.)   Gait Training   Gait Training Yes   Gait   Overall Level of Assistance Minimal assistance   Distance (ft) 25 Feet  (Gait to acevedo patient became  fatigued recliner brought to ptient rolled back to bedside.)   Assistive Device Walker, rolling   Patient Education   Education Given To Patient   Education Provided Role of Therapy;Plan of Care;Transfer Training;Mobility Training;Energy Conservation;Fall Prevention Strategies   Education Provided Comments use of call light, staff A, sternal prec.   Education Method Demonstration;Verbal   Barriers to Learning Cognition   Education Outcome Unable to verbalize;Unable to demonstrate understanding;Continued education needed   Other Specialty Interventions   Other Treatments/Modalities In recliner call light all needs in reach personal alarm attached.   Assessment   Activity Tolerance Patient tolerated treatment well;Treatment limited secondary to decreased cognition;Patient limited by endurance   PT Plan of Care   Sunday X       Electronically signed by Geovanny Claros PTA on 5/11/2025 at 12:40 PM

## 2025-05-11 NOTE — PROGRESS NOTES
Progress Note    2025 6:04 AM             Subjective:  Mr. Alejo was able to ambulate 50 ft with PT. After PEG, able to resume neurontin, synthroid. Given two doses of oxycodone in the last day.  PULSE OXIMETRY RANGE: SpO2  Av.3 %  Min: 96 %  Max: 100 %  SUPPLEMENTAL O2: O2 Flow Rate (L/min): 1 L/min   Vital Signs: /71   Pulse 78   Temp 98 °F (36.7 °C)   Resp 20   Ht 1.803 m (5' 10.98\")   Wt 85.8 kg (189 lb 2.5 oz)   SpO2 97%   BMI 26.39 kg/m²    Temperature Range:   Temp: 98 °F (36.7 °C)   Temp  Av.7 °F (36.5 °C)  Min: 97 °F (36.1 °C)  Max: 98.2 °F (36.8 °C)                   Rhythm: normal sinus rhythm    Labs:   ABG:    Lab Results   Component Value Date/Time    PHART 7.410 2025 01:23 AM    PO2ART 66.0 2025 01:23 AM    RLT5JUV 32.0 2025 01:23 AM    V0CVHPYR 92.6 2025 01:23 AM    PIS8JVT 26 2025 05:49 PM    MAE4EDQ 20.3 2025 01:23 AM    BEART -3.5 2025 01:23 AM     CBC:   Recent Labs     05/09/25  0225 05/10/25  0422 25  0201   WBC 13.1* 14.8* 19.6*   HGB 11.2* 11.1* 11.2*   HCT 35.9* 34.0* 34.8*   MCV 92.3 90.9 89.5    297 340     BMP:   Recent Labs     05/09/25  0225 05/10/25  0522 25  0201    138 134*   K 4.0 3.9 4.0   * 105 103   CO2 21* 22 19*   BUN 16 12 13   CREATININE 0.9 0.9 0.9     PT/INR: No results for input(s): \"PROTIME\", \"INR\" in the last 72 hours.  APTT: No results for input(s): \"APTT\" in the last 72 hours.  Chest X-Ray:  Left lung well expanded, enlarged cardiac shadow   CT:  I/O last 3 completed shifts:  In: 1140 [I.V.:1100; NG/GT:40]  Out: 1400 [Urine:1400]      I/O last 3 completed shifts:  In: 1140 [I.V.:1100; NG/GT:40]  Out: 1400 [Urine:1400]  Scheduled Meds:    lansoprazole  30 mg PEG Tube BID    Vitamin D  2,000 Units Oral Daily    metoprolol  5 mg IntraVENous Q4H    [Held by provider] metoprolol tartrate  75 mg Oral BID    insulin glargine  25 Units SubCUTAneous Nightly    insulin lispro

## 2025-05-12 ENCOUNTER — APPOINTMENT (OUTPATIENT)
Dept: GENERAL RADIOLOGY | Age: 70
DRG: 234 | End: 2025-05-12
Attending: SURGERY
Payer: MEDICARE

## 2025-05-12 LAB
ALBUMIN SERPL-MCNC: 2.6 G/DL (ref 3.5–5.2)
ALP SERPL-CCNC: 167 U/L (ref 40–129)
ALT SERPL-CCNC: 12 U/L (ref 10–50)
ANION GAP SERPL CALCULATED.3IONS-SCNC: 10 MMOL/L (ref 8–16)
AST SERPL-CCNC: 24 U/L (ref 10–50)
BASOPHILS # BLD: 0.1 K/UL (ref 0–0.2)
BASOPHILS NFR BLD: 0.4 % (ref 0–1)
BILIRUB SERPL-MCNC: 0.7 MG/DL (ref 0.2–1.2)
BUN SERPL-MCNC: 10 MG/DL (ref 8–23)
CALCIUM SERPL-MCNC: 7.9 MG/DL (ref 8.8–10.2)
CHLORIDE SERPL-SCNC: 104 MMOL/L (ref 98–107)
CO2 SERPL-SCNC: 24 MMOL/L (ref 22–29)
CREAT SERPL-MCNC: 0.9 MG/DL (ref 0.7–1.2)
EOSINOPHIL # BLD: 0.3 K/UL (ref 0–0.6)
EOSINOPHIL NFR BLD: 2.1 % (ref 0–5)
ERYTHROCYTE [DISTWIDTH] IN BLOOD BY AUTOMATED COUNT: 14.4 % (ref 11.5–14.5)
GLUCOSE BLD-MCNC: 184 MG/DL (ref 70–99)
GLUCOSE BLD-MCNC: 186 MG/DL (ref 70–99)
GLUCOSE BLD-MCNC: 186 MG/DL (ref 70–99)
GLUCOSE BLD-MCNC: 237 MG/DL (ref 70–99)
GLUCOSE SERPL-MCNC: 201 MG/DL (ref 70–99)
HCT VFR BLD AUTO: 33.4 % (ref 42–52)
HGB BLD-MCNC: 10.8 G/DL (ref 14–18)
IMM GRANULOCYTES # BLD: 0.4 K/UL
LYMPHOCYTES # BLD: 2.7 K/UL (ref 1.1–4.5)
LYMPHOCYTES NFR BLD: 19.7 % (ref 20–40)
MAGNESIUM SERPL-MCNC: 2.1 MG/DL (ref 1.6–2.4)
MCH RBC QN AUTO: 29 PG (ref 27–31)
MCHC RBC AUTO-ENTMCNC: 32.3 G/DL (ref 33–37)
MCV RBC AUTO: 89.5 FL (ref 80–94)
MONOCYTES # BLD: 0.9 K/UL (ref 0–0.9)
MONOCYTES NFR BLD: 6.6 % (ref 0–10)
NEUTROPHILS # BLD: 9.3 K/UL (ref 1.5–7.5)
NEUTS SEG NFR BLD: 68.3 % (ref 50–65)
PERFORMED ON: ABNORMAL
PLATELET # BLD AUTO: 309 K/UL (ref 130–400)
PMV BLD AUTO: 10.8 FL (ref 9.4–12.4)
POTASSIUM SERPL-SCNC: 3.5 MMOL/L (ref 3.5–5)
PROT SERPL-MCNC: 6.7 G/DL (ref 6.4–8.3)
RBC # BLD AUTO: 3.73 M/UL (ref 4.7–6.1)
SODIUM SERPL-SCNC: 138 MMOL/L (ref 136–145)
WBC # BLD AUTO: 13.6 K/UL (ref 4.8–10.8)

## 2025-05-12 PROCEDURE — 94150 VITAL CAPACITY TEST: CPT

## 2025-05-12 PROCEDURE — 83735 ASSAY OF MAGNESIUM: CPT

## 2025-05-12 PROCEDURE — 2500000003 HC RX 250 WO HCPCS: Performed by: INTERNAL MEDICINE

## 2025-05-12 PROCEDURE — 94760 N-INVAS EAR/PLS OXIMETRY 1: CPT

## 2025-05-12 PROCEDURE — 6360000002 HC RX W HCPCS: Performed by: INTERNAL MEDICINE

## 2025-05-12 PROCEDURE — 80053 COMPREHEN METABOLIC PANEL: CPT

## 2025-05-12 PROCEDURE — 6370000000 HC RX 637 (ALT 250 FOR IP): Performed by: INTERNAL MEDICINE

## 2025-05-12 PROCEDURE — 85025 COMPLETE CBC W/AUTO DIFF WBC: CPT

## 2025-05-12 PROCEDURE — 97535 SELF CARE MNGMENT TRAINING: CPT

## 2025-05-12 PROCEDURE — 36415 COLL VENOUS BLD VENIPUNCTURE: CPT

## 2025-05-12 PROCEDURE — 71045 X-RAY EXAM CHEST 1 VIEW: CPT

## 2025-05-12 PROCEDURE — 97530 THERAPEUTIC ACTIVITIES: CPT

## 2025-05-12 PROCEDURE — 82962 GLUCOSE BLOOD TEST: CPT

## 2025-05-12 PROCEDURE — 1200000000 HC SEMI PRIVATE

## 2025-05-12 PROCEDURE — 97116 GAIT TRAINING THERAPY: CPT

## 2025-05-12 PROCEDURE — 94640 AIRWAY INHALATION TREATMENT: CPT

## 2025-05-12 RX ADMIN — ATORVASTATIN CALCIUM 20 MG: 20 TABLET, FILM COATED ORAL at 21:07

## 2025-05-12 RX ADMIN — INSULIN LISPRO 2 UNITS: 100 INJECTION, SOLUTION INTRAVENOUS; SUBCUTANEOUS at 21:09

## 2025-05-12 RX ADMIN — INSULIN LISPRO 10 UNITS: 100 INJECTION, SOLUTION INTRAVENOUS; SUBCUTANEOUS at 17:24

## 2025-05-12 RX ADMIN — ACETYLCYSTEINE 600 MG: 200 SOLUTION ORAL; RESPIRATORY (INHALATION) at 07:22

## 2025-05-12 RX ADMIN — INSULIN GLARGINE 25 UNITS: 100 INJECTION, SOLUTION SUBCUTANEOUS at 21:08

## 2025-05-12 RX ADMIN — GABAPENTIN 100 MG: 100 CAPSULE ORAL at 21:07

## 2025-05-12 RX ADMIN — INSULIN LISPRO 10 UNITS: 100 INJECTION, SOLUTION INTRAVENOUS; SUBCUTANEOUS at 11:32

## 2025-05-12 RX ADMIN — GABAPENTIN 100 MG: 100 CAPSULE ORAL at 08:48

## 2025-05-12 RX ADMIN — INSULIN LISPRO 2 UNITS: 100 INJECTION, SOLUTION INTRAVENOUS; SUBCUTANEOUS at 11:32

## 2025-05-12 RX ADMIN — IPRATROPIUM BROMIDE AND ALBUTEROL SULFATE 1 DOSE: 2.5; .5 SOLUTION RESPIRATORY (INHALATION) at 18:57

## 2025-05-12 RX ADMIN — INSULIN LISPRO 2 UNITS: 100 INJECTION, SOLUTION INTRAVENOUS; SUBCUTANEOUS at 08:49

## 2025-05-12 RX ADMIN — IPRATROPIUM BROMIDE AND ALBUTEROL SULFATE 1 DOSE: 2.5; .5 SOLUTION RESPIRATORY (INHALATION) at 10:39

## 2025-05-12 RX ADMIN — INSULIN LISPRO 10 UNITS: 100 INJECTION, SOLUTION INTRAVENOUS; SUBCUTANEOUS at 08:50

## 2025-05-12 RX ADMIN — ASPIRIN 81 MG 81 MG: 81 TABLET ORAL at 08:47

## 2025-05-12 RX ADMIN — IPRATROPIUM BROMIDE AND ALBUTEROL SULFATE 1 DOSE: 2.5; .5 SOLUTION RESPIRATORY (INHALATION) at 15:14

## 2025-05-12 RX ADMIN — OXYCODONE HYDROCHLORIDE 10 MG: 10 TABLET ORAL at 21:07

## 2025-05-12 RX ADMIN — LANSOPRAZOLE 30 MG: 30 TABLET, ORALLY DISINTEGRATING, DELAYED RELEASE ORAL at 21:08

## 2025-05-12 RX ADMIN — SODIUM CHLORIDE, PRESERVATIVE FREE 10 ML: 5 INJECTION INTRAVENOUS at 08:48

## 2025-05-12 RX ADMIN — INSULIN LISPRO 2 UNITS: 100 INJECTION, SOLUTION INTRAVENOUS; SUBCUTANEOUS at 17:24

## 2025-05-12 RX ADMIN — GABAPENTIN 100 MG: 100 CAPSULE ORAL at 15:02

## 2025-05-12 RX ADMIN — IPRATROPIUM BROMIDE AND ALBUTEROL SULFATE 1 DOSE: 2.5; .5 SOLUTION RESPIRATORY (INHALATION) at 07:22

## 2025-05-12 RX ADMIN — CITALOPRAM HYDROBROMIDE 20 MG: 10 TABLET, FILM COATED ORAL at 21:07

## 2025-05-12 RX ADMIN — OXYCODONE HYDROCHLORIDE 10 MG: 10 TABLET ORAL at 07:05

## 2025-05-12 RX ADMIN — LEVOTHYROXINE SODIUM 50 MCG: 0.05 TABLET ORAL at 07:05

## 2025-05-12 RX ADMIN — ENOXAPARIN SODIUM 40 MG: 100 INJECTION SUBCUTANEOUS at 08:48

## 2025-05-12 RX ADMIN — Medication 2000 UNITS: at 08:47

## 2025-05-12 RX ADMIN — ACETYLCYSTEINE 600 MG: 200 SOLUTION ORAL; RESPIRATORY (INHALATION) at 18:57

## 2025-05-12 RX ADMIN — LANSOPRAZOLE 30 MG: 30 TABLET, ORALLY DISINTEGRATING, DELAYED RELEASE ORAL at 08:47

## 2025-05-12 RX ADMIN — SODIUM CHLORIDE, PRESERVATIVE FREE 10 ML: 5 INJECTION INTRAVENOUS at 21:11

## 2025-05-12 ASSESSMENT — PAIN DESCRIPTION - ONSET: ONSET: ON-GOING

## 2025-05-12 ASSESSMENT — PAIN DESCRIPTION - DIRECTION: RADIATING_TOWARDS: NO

## 2025-05-12 ASSESSMENT — PAIN DESCRIPTION - FREQUENCY: FREQUENCY: INTERMITTENT

## 2025-05-12 ASSESSMENT — PAIN DESCRIPTION - LOCATION: LOCATION: BACK;ABDOMEN

## 2025-05-12 ASSESSMENT — PAIN - FUNCTIONAL ASSESSMENT: PAIN_FUNCTIONAL_ASSESSMENT: PREVENTS OR INTERFERES SOME ACTIVE ACTIVITIES AND ADLS

## 2025-05-12 ASSESSMENT — PAIN SCALES - GENERAL: PAINLEVEL_OUTOF10: 9

## 2025-05-12 ASSESSMENT — PAIN DESCRIPTION - ORIENTATION: ORIENTATION: LEFT

## 2025-05-12 ASSESSMENT — PAIN DESCRIPTION - DESCRIPTORS: DESCRIPTORS: ACHING

## 2025-05-12 ASSESSMENT — PAIN DESCRIPTION - PAIN TYPE: TYPE: ACUTE PAIN;SURGICAL PAIN

## 2025-05-12 NOTE — PROGRESS NOTES
POST OP CARDIOTHORACIC SURGERY PROGRESS NOTE    Post op day: 13    SUBJECTIVE:  Mr. Alejo is laying in bed sleeping comfortably. He is awoken easily. He was able to ambulate some yesterday. No complaints or concerns currently.    BP (!) 145/66   Pulse 87   Temp 97.5 °F (36.4 °C) (Temporal)   Resp 20   Ht 1.803 m (5' 10.98\")   Wt 86 kg (189 lb 10 oz)   SpO2 95%   BMI 26.46 kg/m²   Average, Min, and Max for last 24 hours Vitals:  TEMPERATURE:  Temp  Av.1 °F (36.7 °C)  Min: 97.5 °F (36.4 °C)  Max: 99.9 °F (37.7 °C)  RESPIRATIONS RANGE: Resp  Av.3  Min: 18  Max: 20  PULSE RANGE: Pulse  Av  Min: 87  Max: 94  BLOOD PRESSURE RANGE:  Systolic (24hrs), Av , Min:142 , Max:151   ; Diastolic (24hrs), Av, Min:66, Max:73    PULSE OXIMETRY RANGE: SpO2  Av.3 %  Min: 90 %  Max: 99 %    I/O last 3 completed shifts:  In: 2850 [I.V.:10; NG/GT:2840]  Out: 1600 [Urine:1600]    CHEST: clear bilaterally. Diminished     CARDIOVASCULAR: RRR    INCISION: CDI    LABS:  CBC:   Lab Results   Component Value Date/Time    WBC 13.6 2025 01:37 AM    RBC 3.73 2025 01:37 AM    HGB 10.8 2025 01:37 AM    HCT 33.4 2025 01:37 AM    MCV 89.5 2025 01:37 AM    MCH 29.0 2025 01:37 AM    MCHC 32.3 2025 01:37 AM    RDW 14.4 2025 01:37 AM     2025 01:37 AM    MPV 10.8 2025 01:37 AM     BMP:    Lab Results   Component Value Date/Time     2025 01:37 AM    K 3.5 2025 01:37 AM     2025 01:37 AM    CO2 24 2025 01:37 AM    BUN 10 2025 01:37 AM    CREATININE 0.9 2025 01:37 AM    CALCIUM 7.9 2025 01:37 AM    LABGLOM >90 2025 01:37 AM    GLUCOSE 201 2025 01:37 AM       CHEST XRAY: improved this AM from yesterday     ASSESSMENT:   CAD s/p 3V robotic CABG on 25 by Dr. Lara  Uncontrolled DM type II  HTN  Dementia  Postoperative atrial fibrillation-resolved   Hypernatremia   Oropharyngeal dysphagia s/p

## 2025-05-12 NOTE — PROGRESS NOTES
Occupational Therapy     05/12/25 1200   Subjective   Subjective Pt in bed upon arrival for therapy. Pt agreeable to participate.   Pain Assessment   Pain Assessment None - Denies Pain   Cognition   Overall Cognitive Status Exceptions   Orientation   Overall Orientation Status X   Orientation Level Oriented to person;Disoriented to place;Disoriented to time;Disoriented to situation   Bed Mobility Training   Bed Mobility Training Yes   Overall Level of Assistance Partial/Moderate assistance   Interventions Verbal cues;Tactile cues;Manual cues   Supine to Sit Partial/Moderate assistance   Scooting Minimal assistance   Transfer Training   Transfer Training Yes   Overall Level of Assistance Minimal assistance   Interventions Verbal cues;Tactile cues;Manual cues   Sit to Stand Minimal assistance   Stand to Sit Contact guard assistance   Bed to Chair Minimal assistance;Contact guard assistance   Balance   Sitting Intact   Standing With support;High guard  (RW)   ADL   Feeding NPO   Feeding Skilled Clinical Factors Has PEG   Grooming Supervision   UE Bathing Minimal assistance   LE Bathing Minimal assistance;Moderate assistance   UE Dressing Supervision   LE Dressing Minimal assistance;Moderate assistance   Putting On/Taking Off Footwear Minimal assistance;Moderate assistance   Toileting Minimal assistance   Functional Mobility Minimal assistance;Contact guard assistance   Functional Mobility Skilled Clinical Factors Min-CGA assist x2 for safety   Additional Comments Requires cues for sequencing and safety.   Assessment   Assessment Tx focused on sitting EOB to perform gown change and donning socks. Pt instructed on functional mobility and transfer to recliner at RW level. Pt requires assistance for balance. Pt set up to perform h/g tasks in recliner including washing face and brushing teeth.   Activity Tolerance Patient tolerated treatment well;Patient limited by endurance   Discharge Recommendations Patient would benefit

## 2025-05-12 NOTE — PLAN OF CARE
Problem: Chronic Conditions and Co-morbidities  Goal: Patient's chronic conditions and co-morbidity symptoms are monitored and maintained or improved  Outcome: Progressing  Flowsheets (Taken 5/11/2025 2250)  Care Plan - Patient's Chronic Conditions and Co-Morbidity Symptoms are Monitored and Maintained or Improved:   Monitor and assess patient's chronic conditions and comorbid symptoms for stability, deterioration, or improvement   Collaborate with multidisciplinary team to address chronic and comorbid conditions and prevent exacerbation or deterioration   Update acute care plan with appropriate goals if chronic or comorbid symptoms are exacerbated and prevent overall improvement and discharge     Problem: Safety - Adult  Goal: Free from fall injury  Outcome: Progressing  Flowsheets (Taken 5/11/2025 2318)  Free From Fall Injury: Instruct family/caregiver on patient safety     Problem: ABCDS Injury Assessment  Goal: Absence of physical injury  Outcome: Progressing  Flowsheets (Taken 5/11/2025 2318)  Absence of Physical Injury: Implement safety measures based on patient assessment     Problem: Pain  Goal: Verbalizes/displays adequate comfort level or baseline comfort level  Outcome: Progressing  Flowsheets (Taken 5/11/2025 2318)  Verbalizes/displays adequate comfort level or baseline comfort level:   Encourage patient to monitor pain and request assistance   Assess pain using appropriate pain scale   Administer analgesics based on type and severity of pain and evaluate response   Implement non-pharmacological measures as appropriate and evaluate response   Consider cultural and social influences on pain and pain management   Notify Licensed Independent Practitioner if interventions unsuccessful or patient reports new pain     Problem: Discharge Planning  Goal: Discharge to home or other facility with appropriate resources  Outcome: Progressing  Flowsheets (Taken 5/11/2025 2250)  Discharge to home or other facility

## 2025-05-12 NOTE — PROGRESS NOTES
[facility] Progress Note    Patient:  Akhil Alejo  YOB: 1955  Date of Service: 5/12/2025  MRN: [id number]   Acct: [id number]   Primary Care Physician: Lennox Waddell MD  Advance Directive: Full Code  Admit Date: 4/25/2025       Hospital Day: 17      CHIEF COMPLAINT:     Chief Complaint   Patient presents with    Abnormal Lab     ABNORMAL EKG DURING STRESS TEST       5/12/2025 7:58 AM  Subjective / Interval History:   05/12/2025  Patient seen and examined this a.m.   No new complaints.    No acute changes or acute overnight event reported.   Laying comfortably in bed in no apparent acute distress.    Denies any acute complaints or distress.    Endorses overall improvement.          05/11/2025  Patient seen and examined this a.m.   No new complaints.    No acute changes or acute overnight event reported.   Laying comfortably in bed in no apparent acute distress.    Denies any acute complaints or distress.       05/10/2025  Patient seen and examined this a.m.   No new complaints.    No acute changes or acute overnight event reported.   Sitting comfortably in chair in no apparent acute distress.    Denies any acute complaints or distress.   Endorses overall improvement.        05/09/2025  Patient seen and examined   No new complaints.  No acute changes or acute overnight event reported.   Laying comfortably in bed in no apparent acute distress.    Denies any acute complaints or distress.    Endorses overall improvement.    PEG tube placement this am      05/08/2025  Patient seen and examined this a.m.   No new complaints.    No acute changes or acute overnight event reported.   Laying comfortably in bed in no apparent acute distress.    Denies any acute complaints or distress.      05/07/2025  Patient seen and examined this a.m.   No new complaints.    No acute changes or acute overnight event reported.   Laying comfortably in bed in no apparent acute distress.    Denies any acute  tube. Interval placement of a right IJ sheath. EKG leads and oxygen tubing project over the chest. Hypoventilation. No pulmonary infiltrate is identified. No pleural effusion or pneumothorax is seen. Subcutaneous emphysema of the left lower lateral chest wall, stable. Stable enlargement of the cardiac silhouette. No acute displaced rib fractures are identified. Left rib surgical plating, again noted.       1.  Interval removal of all of the previously seen tubes and lines, with interval placement of a right IJ sheath. 2. Stable subcutaneous emphysema of the left lateral lower chest wall. No visible pneumothorax. 3. Stable enlargement of the cardiac silhouette.    ______________________________________ Electronically signed by: SU ELLISON M.D. Date:     05/01/2025 Time:    06:34     CT HEAD WO CONTRAST  Result Date: 5/1/2025  EXAM:  CT HEAD WITHOUT CONTRAST 5/1/2025. SAGITTAL AND CORONAL REFORMATTED IMAGES OBTAIN  HISTORY:  Impaired neurologic status  COMPARISON:  None.  FINDINGS: There is no evidence of intracranial hemorrhage.  The midline is maintained.  There is no hydrocephalus.  Generalized atrophy. Chronic small vessel ischemic change. Benign calcification within bilateral basal ganglia and cerebellum.  No cerebellar tonsillar ectopia.  Evaluation of the calvarium shows no fracture.  The mastoid air cells are normally pneumatized.      No acute intracranial abnormality.  All CT scans are performed using dose optimization techniques as appropriate to the performed exam and include at least one of the following: Automated exposure control, adjustment of the mA and/or kV according to size, and the use of iterative reconstruction technique.  ______________________________________ Electronically signed by: SINDY JONES M.D. Date:     05/01/2025 Time:    03:38     XR CHEST PORTABLE  Result Date: 4/30/2025  EXAM: CHEST RADIOGRAPH  TECHNIQUE: Single frontal chest radiograph.  HISTORY: Postop open heart surgery.

## 2025-05-12 NOTE — CARE COORDINATION
[facility]  Notification of Admission Decision      [] Patient has been accepted for admit to [facility] on :       Please write discharge orders and summary prior to discharge.    [] Patient acceptance to Rehab pending the following :    [x] Eval in progress       [] Patient determined to be ineligible for services at [facility] because :       We recommend you consider        Thank you for your referral, we appreciate you. If you have any questions, please feel   free to contact me at [phone].

## 2025-05-12 NOTE — PROGRESS NOTES
Nephrology ([facility]) Progress Note      Patient:  Akhil Alejo  YOB: 1955  Date of Service: 5/12/2025  MRN: [id number]   Acct: [id number]   Primary Care Physician: Lennox Waddell MD  Advance Directive: Full Code  Admit Date: 4/25/2025       Hospital Day: 17  Referring Provider: Akhil Lara MD    Patient independently seen and examined, Chart, Consults, Notes, Operative notes, Labs, Cardiology, and Radiology studies reviewed as available.        Subjective:  Akhil Alejo is a 69 y.o. male for whom we were consulted for evaluation and treatment of hyponatremia.  Initially seen with wife and neither noted any renal issues for him.  He underwent CABG three-vessel on 4/29/2025 by Dr. Lara.  Had episodes of postoperative atrial fibrillation and persistently failed swallow studies.  He had had a Dobbhoff but that was pulled out prior to our initial evaluation on hospital day 12.  He was n.p.o. for possible PEG placement.  Sodium had been normal prior to admission and recently had been ranging in the low 150s.  Patient denied other complaints upon questioning.    Today, no overnight events.  No new complaints. His repeat serum sodium was 138    Allergies:  Penicillins    Medicines:  Current Facility-Administered Medications   Medication Dose Route Frequency Provider Last Rate Last Admin    lansoprazole (PREVACID SOLUTAB) disintegrating tablet 30 mg  30 mg PEG Tube BID Miley Taylor MD   30 mg at 05/12/25 0847    Vitamin D (CHOLECALCIFEROL) tablet 2,000 Units  2,000 Units Oral Daily Miley Taylor MD   2,000 Units at 05/12/25 0847    [Held by provider] metoprolol tartrate (LOPRESSOR) tablet 75 mg  75 mg Oral BID Akhil Lara MD        insulin glargine (LANTUS) injection vial 25 Units  25 Units SubCUTAneous Nightly Miley Taylor MD   25 Units at 05/11/25 2227    insulin lispro (HUMALOG,ADMELOG) injection vial 10 Units  10 Units SubCUTAneous TID  Brandon

## 2025-05-12 NOTE — PROGRESS NOTES
Physical Therapy  Name: Akhil Alejo  MRN:  [id number]  Date of service:  5/12/2025 05/12/25 1025   Restrictions/Precautions   Restrictions/Precautions Fall Risk;General Precautions;Surgical Protocols   Activity Level Up with Assist   Required Braces or Orthoses? No   Position Activity Restriction   Sternal Precautions No Pushing;No Pulling;5# Lifting Restrictions;Move in the Tube   Other Position/Activity Restrictions Sternal Precautions, now with G tube   General   Family/Caregiver Present No   Referring Practitioner Akhil Lara MD   Subjective   Subjective pt in bed, agreeable to transfer to chair.   General   General Comments Casey ,Karla hussein therapy   Oxygen Therapy   O2 Device None (Room air)   Bed Mobility   Rolling Contact guard assistance   Supine to Sit Contact guard assistance;Minimal assistance   Sit to Supine Contact guard assistance   Scooting Contact guard assistance   Transfers   Sit to Stand Minimal Assistance   Stand to Sit Minimal Assistance   Bed to Chair Minimal assistance;Partial/Moderate assistance   Stand Pivot Transfers Minimal Assistance;Partial/Moderate assistance   Ambulation   Surface Level tile   Device Rolling Walker   Assistance Contact guard assistance;Minimal assistance   Quality of Gait decreased bal, safety   Gait Deviations Slow Alycia;Decreased step height;Decreased step length;Shuffles   Distance 15 feet   Exercises   Hip Flexion seated marching x 10   Knee Long Arc Quad 10   Ankle Pumps 10   Patient Goals    Patient Goals  go home   Short Term Goals   Time Frame for Short Term Goals 2 wks   Short Term Goal 1 supine to sit indep   Short Term Goal 2 sit to stand indep   Short Term Goal 3 amb. 100' with RW SBA   Short Term Goal 4 bed to chair SBA   Conditions Requiring Skilled Therapeutic Intervention   Body Structures, Functions, Activity Limitations Requiring Skilled Therapeutic Intervention Decreased functional mobility ;Decreased ADL status;Decreased ROM;Decreased  cognition;Decreased safe awareness;Decreased strength;Decreased balance;Decreased endurance;Decreased coordination;Decreased posture   Therapy Prognosis Fair   Barriers to Learning cognition   Treatment Initiated  gait, transfers, bed mobility   Discharge Recommendations Continue to assess pending progress   Activity Tolerance   Activity Tolerance Patient limited by fatigue;Patient limited by endurance;Treatment limited secondary to decreased cognition   Physical Therapy Plan   General Plan 6-7 times per week   Therapy Duration 2 Weeks   Current Treatment Recommendations Strengthening;ROM;Balance training;Functional mobility training;Transfer training;Gait training;Endurance training;Patient/Caregiver education & training;Safety education & training;Positioning;Equipment evaluation, education, & procurement;Therapeutic activities   PT Plan of Care   Monday X   Safety Devices   Type of Devices Call light within reach;Heels elevated for pressure relief;Chair alarm in place         Electronically signed by Angely Young PTA on 5/12/2025 at 3:02 PM

## 2025-05-12 NOTE — CERTIFICATION
Inpatient re-certification    I recertify the continued Inpatient admisson based on the followin. Continued hospitalization is required for ongoing medical treatment or medically required diagnostic study for finding an appropriate SNF/rehab facility.     2. The patient is expected to remain in the hospital for :1-2 additional days.    3. The plans for post-hospital care include (choose one):Discharge to a Skilled Nursing Facility

## 2025-05-12 NOTE — PROGRESS NOTES
Comprehensive Nutrition Assessment    Type and Reason for Visit:  Reassess    Nutrition Recommendations/Plan:   Continue POC     Malnutrition Assessment:  Malnutrition Status:  No malnutrition (05/12/25 1253)    Context:  Acute Illness     Findings of the 6 clinical characteristics of malnutrition:  Energy Intake:  No decrease in energy intake  Weight Loss:  No weight loss     Body Fat Loss:  No body fat loss     Muscle Mass Loss:  No muscle mass loss    Fluid Accumulation:  No fluid accumulation     Strength:  Not Performed    Nutrition Assessment:    TF is running at goal rate of 55 mL/hr. 15 mL residuals noted and not concerning at this time. Pt is trying to sleep and will barely answer questions. Spouse states that pt is tolerating his tube feeding \"really really well\". She also reports that they would like another swallow study soon. Continue tube feeding at this time to meet all of pt's nutritional needs. Last BM noted 5/11. Will continue to monitor clinical course.    Nutrition Related Findings:    blood sugars are ranging from 186-252 Wound Type: Surgical Incision       Current Nutrition Intake & Therapies:    Average Meal Intake: NPO  Average Supplements Intake: NPO  Current Tube Feeding (TF) Orders:  Feeding Route: PEG  Formula: Diabetic  Schedule: Continuous  Feeding Regimen: Glucerna 1.5 at 55 ml/hr  Water Flushes: 40 mL/hr = 960 mL/day  Current TF Provides: 1980 kcals, 108 g/protein, 175 g/CHO, 1001 mL/water from formula, and 960 mL/water from flushes    Anthropometric Measures:  Height: 180.3 cm (5' 10.98\")  Ideal Body Weight (IBW): 172 lbs (78 kg)    Admission Body Weight: 88.5 kg (195 lb)  Current Body Weight: 86 kg (189 lb 9.5 oz), 112.8 % IBW. Weight Source: Bed scale  Current BMI (kg/m2): 26.5  Usual Body Weight: 88.5 kg (195 lb)  % Weight Change (Calculated): -0.5  Weight Adjustment For: No Adjustment  BMI Categories: Overweight (BMI 25.0-29.9)    Estimated Daily Nutrient Needs:  Energy

## 2025-05-13 ENCOUNTER — HOSPITAL ENCOUNTER (INPATIENT)
Age: 70
LOS: 8 days | Discharge: ANOTHER HOSPITAL | DRG: 948 | End: 2025-05-21
Attending: PSYCHIATRY & NEUROLOGY | Admitting: PSYCHIATRY & NEUROLOGY
Payer: MEDICARE

## 2025-05-13 ENCOUNTER — APPOINTMENT (OUTPATIENT)
Dept: GENERAL RADIOLOGY | Age: 70
DRG: 234 | End: 2025-05-13
Attending: SURGERY
Payer: MEDICARE

## 2025-05-13 VITALS
HEIGHT: 71 IN | WEIGHT: 193.12 LBS | RESPIRATION RATE: 16 BRPM | DIASTOLIC BLOOD PRESSURE: 80 MMHG | HEART RATE: 88 BPM | TEMPERATURE: 97.7 F | OXYGEN SATURATION: 100 % | SYSTOLIC BLOOD PRESSURE: 169 MMHG | BODY MASS INDEX: 27.04 KG/M2

## 2025-05-13 LAB
ALBUMIN SERPL-MCNC: 2.7 G/DL (ref 3.5–5.2)
ALP SERPL-CCNC: 170 U/L (ref 40–129)
ALT SERPL-CCNC: 14 U/L (ref 10–50)
ANION GAP SERPL CALCULATED.3IONS-SCNC: 10 MMOL/L (ref 8–16)
AST SERPL-CCNC: 24 U/L (ref 10–50)
BASOPHILS # BLD: 0.1 K/UL (ref 0–0.2)
BASOPHILS NFR BLD: 0.4 % (ref 0–1)
BILIRUB SERPL-MCNC: 0.6 MG/DL (ref 0.2–1.2)
BUN SERPL-MCNC: 11 MG/DL (ref 8–23)
CALCIUM SERPL-MCNC: 7.9 MG/DL (ref 8.8–10.2)
CHLORIDE SERPL-SCNC: 101 MMOL/L (ref 98–107)
CO2 SERPL-SCNC: 25 MMOL/L (ref 22–29)
CREAT SERPL-MCNC: 0.9 MG/DL (ref 0.7–1.2)
EOSINOPHIL # BLD: 0.4 K/UL (ref 0–0.6)
EOSINOPHIL NFR BLD: 2.8 % (ref 0–5)
ERYTHROCYTE [DISTWIDTH] IN BLOOD BY AUTOMATED COUNT: 14.5 % (ref 11.5–14.5)
GLUCOSE BLD-MCNC: 166 MG/DL (ref 70–99)
GLUCOSE BLD-MCNC: 191 MG/DL (ref 70–99)
GLUCOSE BLD-MCNC: 248 MG/DL (ref 70–99)
GLUCOSE BLD-MCNC: 318 MG/DL (ref 70–99)
GLUCOSE SERPL-MCNC: 222 MG/DL (ref 70–99)
HCT VFR BLD AUTO: 32.8 % (ref 42–52)
HGB BLD-MCNC: 10.3 G/DL (ref 14–18)
IMM GRANULOCYTES # BLD: 0.4 K/UL
LYMPHOCYTES # BLD: 2.6 K/UL (ref 1.1–4.5)
LYMPHOCYTES NFR BLD: 17.7 % (ref 20–40)
MCH RBC QN AUTO: 28.5 PG (ref 27–31)
MCHC RBC AUTO-ENTMCNC: 31.4 G/DL (ref 33–37)
MCV RBC AUTO: 90.6 FL (ref 80–94)
MONOCYTES # BLD: 1.1 K/UL (ref 0–0.9)
MONOCYTES NFR BLD: 7.6 % (ref 0–10)
NEUTROPHILS # BLD: 10 K/UL (ref 1.5–7.5)
NEUTS SEG NFR BLD: 68.7 % (ref 50–65)
PERFORMED ON: ABNORMAL
PLATELET # BLD AUTO: 349 K/UL (ref 130–400)
PMV BLD AUTO: 11.3 FL (ref 9.4–12.4)
POTASSIUM SERPL-SCNC: 3.7 MMOL/L (ref 3.5–5)
PREALB SERPL-MCNC: 10 MG/DL (ref 20–40)
PROT SERPL-MCNC: 6.8 G/DL (ref 6.4–8.3)
RBC # BLD AUTO: 3.62 M/UL (ref 4.7–6.1)
SODIUM SERPL-SCNC: 136 MMOL/L (ref 136–145)
WBC # BLD AUTO: 14.6 K/UL (ref 4.8–10.8)

## 2025-05-13 PROCEDURE — 6370000000 HC RX 637 (ALT 250 FOR IP): Performed by: INTERNAL MEDICINE

## 2025-05-13 PROCEDURE — 94760 N-INVAS EAR/PLS OXIMETRY 1: CPT

## 2025-05-13 PROCEDURE — 36415 COLL VENOUS BLD VENIPUNCTURE: CPT

## 2025-05-13 PROCEDURE — 6370000000 HC RX 637 (ALT 250 FOR IP): Performed by: PSYCHIATRY & NEUROLOGY

## 2025-05-13 PROCEDURE — 84134 ASSAY OF PREALBUMIN: CPT

## 2025-05-13 PROCEDURE — 6360000002 HC RX W HCPCS: Performed by: INTERNAL MEDICINE

## 2025-05-13 PROCEDURE — 1180000000 HC REHAB R&B

## 2025-05-13 PROCEDURE — 94150 VITAL CAPACITY TEST: CPT

## 2025-05-13 PROCEDURE — 94640 AIRWAY INHALATION TREATMENT: CPT

## 2025-05-13 PROCEDURE — 2500000003 HC RX 250 WO HCPCS: Performed by: INTERNAL MEDICINE

## 2025-05-13 PROCEDURE — 85025 COMPLETE CBC W/AUTO DIFF WBC: CPT

## 2025-05-13 PROCEDURE — 6370000000 HC RX 637 (ALT 250 FOR IP): Performed by: SURGERY

## 2025-05-13 PROCEDURE — 97530 THERAPEUTIC ACTIVITIES: CPT

## 2025-05-13 PROCEDURE — 80053 COMPREHEN METABOLIC PANEL: CPT

## 2025-05-13 PROCEDURE — 82962 GLUCOSE BLOOD TEST: CPT

## 2025-05-13 PROCEDURE — 99024 POSTOP FOLLOW-UP VISIT: CPT | Performed by: SURGERY

## 2025-05-13 PROCEDURE — 71045 X-RAY EXAM CHEST 1 VIEW: CPT

## 2025-05-13 RX ORDER — CLOPIDOGREL BISULFATE 75 MG/1
75 TABLET ORAL DAILY
Status: DISCONTINUED | OUTPATIENT
Start: 2025-05-13 | End: 2025-05-20

## 2025-05-13 RX ORDER — SENNA AND DOCUSATE SODIUM 50; 8.6 MG/1; MG/1
1 TABLET, FILM COATED ORAL 2 TIMES DAILY
Status: DISCONTINUED | OUTPATIENT
Start: 2025-05-13 | End: 2025-05-17

## 2025-05-13 RX ORDER — ATORVASTATIN CALCIUM 20 MG/1
20 TABLET, FILM COATED ORAL DAILY
Status: DISCONTINUED | OUTPATIENT
Start: 2025-05-13 | End: 2025-05-13 | Stop reason: SDUPTHER

## 2025-05-13 RX ORDER — CITALOPRAM HYDROBROMIDE 10 MG/1
20 TABLET ORAL NIGHTLY
Status: CANCELLED | OUTPATIENT
Start: 2025-05-13

## 2025-05-13 RX ORDER — FLUTICASONE PROPIONATE 50 MCG
1 SPRAY, SUSPENSION (ML) NASAL 2 TIMES DAILY PRN
Status: DISCONTINUED | OUTPATIENT
Start: 2025-05-13 | End: 2025-05-21 | Stop reason: HOSPADM

## 2025-05-13 RX ORDER — CITALOPRAM HYDROBROMIDE 20 MG/1
20 TABLET ORAL NIGHTLY
Status: DISCONTINUED | OUTPATIENT
Start: 2025-05-13 | End: 2025-05-13 | Stop reason: SDUPTHER

## 2025-05-13 RX ORDER — CETIRIZINE HYDROCHLORIDE 10 MG/1
10 TABLET ORAL DAILY
Status: DISCONTINUED | OUTPATIENT
Start: 2025-05-13 | End: 2025-05-21 | Stop reason: HOSPADM

## 2025-05-13 RX ORDER — ENOXAPARIN SODIUM 100 MG/ML
40 INJECTION SUBCUTANEOUS DAILY
Status: DISCONTINUED | OUTPATIENT
Start: 2025-05-14 | End: 2025-05-20

## 2025-05-13 RX ORDER — INSULIN LISPRO 100 [IU]/ML
10 INJECTION, SOLUTION INTRAVENOUS; SUBCUTANEOUS
Status: CANCELLED | OUTPATIENT
Start: 2025-05-13

## 2025-05-13 RX ORDER — LANSOPRAZOLE 30 MG/1
30 TABLET, ORALLY DISINTEGRATING, DELAYED RELEASE ORAL 2 TIMES DAILY
Status: CANCELLED | OUTPATIENT
Start: 2025-05-13

## 2025-05-13 RX ORDER — BISACODYL 10 MG
10 SUPPOSITORY, RECTAL RECTAL DAILY PRN
Status: CANCELLED | OUTPATIENT
Start: 2025-05-13

## 2025-05-13 RX ORDER — NITROGLYCERIN 0.4 MG/1
0.4 TABLET SUBLINGUAL EVERY 5 MIN PRN
Status: DISCONTINUED | OUTPATIENT
Start: 2025-05-13 | End: 2025-05-21 | Stop reason: HOSPADM

## 2025-05-13 RX ORDER — OXYCODONE HYDROCHLORIDE 5 MG/1
5 TABLET ORAL EVERY 4 HOURS PRN
Status: CANCELLED | OUTPATIENT
Start: 2025-05-13

## 2025-05-13 RX ORDER — BISACODYL 10 MG
10 SUPPOSITORY, RECTAL RECTAL DAILY PRN
Status: DISCONTINUED | OUTPATIENT
Start: 2025-05-13 | End: 2025-05-13 | Stop reason: SDUPTHER

## 2025-05-13 RX ORDER — HYDROCHLOROTHIAZIDE 12.5 MG/1
12.5 CAPSULE ORAL DAILY
Status: DISCONTINUED | OUTPATIENT
Start: 2025-05-13 | End: 2025-05-15

## 2025-05-13 RX ORDER — OXYCODONE HYDROCHLORIDE 10 MG/1
10 TABLET ORAL EVERY 4 HOURS PRN
Status: CANCELLED | OUTPATIENT
Start: 2025-05-13

## 2025-05-13 RX ORDER — METOPROLOL TARTRATE 75 MG/1
75 TABLET ORAL 2 TIMES DAILY
Qty: 60 TABLET | Refills: 3 | Status: ON HOLD | OUTPATIENT
Start: 2025-05-13

## 2025-05-13 RX ORDER — LEVOTHYROXINE SODIUM 50 UG/1
50 TABLET ORAL DAILY
Status: DISCONTINUED | OUTPATIENT
Start: 2025-05-14 | End: 2025-05-13 | Stop reason: SDUPTHER

## 2025-05-13 RX ORDER — INSULIN LISPRO 100 [IU]/ML
0-8 INJECTION, SOLUTION INTRAVENOUS; SUBCUTANEOUS
Status: CANCELLED | OUTPATIENT
Start: 2025-05-13

## 2025-05-13 RX ORDER — OXYCODONE HYDROCHLORIDE 5 MG/1
5 TABLET ORAL EVERY 4 HOURS PRN
Refills: 0 | Status: DISCONTINUED | OUTPATIENT
Start: 2025-05-13 | End: 2025-05-16

## 2025-05-13 RX ORDER — LEVOTHYROXINE SODIUM 50 UG/1
50 TABLET ORAL DAILY
Status: CANCELLED | OUTPATIENT
Start: 2025-05-14

## 2025-05-13 RX ORDER — FAMOTIDINE 20 MG/1
20 TABLET, FILM COATED ORAL DAILY
Status: DISCONTINUED | OUTPATIENT
Start: 2025-05-13 | End: 2025-05-14

## 2025-05-13 RX ORDER — GABAPENTIN 100 MG/1
100 CAPSULE ORAL 3 TIMES DAILY
Status: CANCELLED | OUTPATIENT
Start: 2025-05-13

## 2025-05-13 RX ORDER — ASPIRIN 81 MG/1
81 TABLET, CHEWABLE ORAL DAILY
Status: CANCELLED | OUTPATIENT
Start: 2025-05-14

## 2025-05-13 RX ORDER — ACETAMINOPHEN 325 MG/1
650 TABLET ORAL EVERY 4 HOURS PRN
Status: DISCONTINUED | OUTPATIENT
Start: 2025-05-13 | End: 2025-05-21 | Stop reason: HOSPADM

## 2025-05-13 RX ORDER — INSULIN LISPRO 100 [IU]/ML
0-8 INJECTION, SOLUTION INTRAVENOUS; SUBCUTANEOUS
Status: DISCONTINUED | OUTPATIENT
Start: 2025-05-14 | End: 2025-05-14

## 2025-05-13 RX ORDER — INSULIN GLARGINE 100 [IU]/ML
25 INJECTION, SOLUTION SUBCUTANEOUS NIGHTLY
Status: CANCELLED | OUTPATIENT
Start: 2025-05-13

## 2025-05-13 RX ORDER — LANSOPRAZOLE 30 MG/1
30 TABLET, ORALLY DISINTEGRATING, DELAYED RELEASE ORAL 2 TIMES DAILY
Status: DISCONTINUED | OUTPATIENT
Start: 2025-05-13 | End: 2025-05-21 | Stop reason: ALTCHOICE

## 2025-05-13 RX ORDER — ASPIRIN 81 MG/1
81 TABLET, CHEWABLE ORAL DAILY
Status: DISCONTINUED | OUTPATIENT
Start: 2025-05-14 | End: 2025-05-21 | Stop reason: HOSPADM

## 2025-05-13 RX ORDER — OXYCODONE HYDROCHLORIDE 5 MG/1
5 TABLET ORAL EVERY 4 HOURS PRN
Qty: 28 TABLET | Refills: 0 | Status: SHIPPED | OUTPATIENT
Start: 2025-05-13 | End: 2025-05-20

## 2025-05-13 RX ORDER — METOPROLOL SUCCINATE 25 MG/1
25 TABLET, EXTENDED RELEASE ORAL DAILY
Status: DISCONTINUED | OUTPATIENT
Start: 2025-05-13 | End: 2025-05-14

## 2025-05-13 RX ORDER — GABAPENTIN 400 MG/1
800 CAPSULE ORAL 2 TIMES DAILY
Status: DISCONTINUED | OUTPATIENT
Start: 2025-05-13 | End: 2025-05-14

## 2025-05-13 RX ORDER — LOSARTAN POTASSIUM 25 MG/1
25 TABLET ORAL DAILY
Status: DISCONTINUED | OUTPATIENT
Start: 2025-05-13 | End: 2025-05-21 | Stop reason: HOSPADM

## 2025-05-13 RX ORDER — ATORVASTATIN CALCIUM 20 MG/1
20 TABLET, FILM COATED ORAL NIGHTLY
Status: CANCELLED | OUTPATIENT
Start: 2025-05-13

## 2025-05-13 RX ORDER — BISACODYL 10 MG
10 SUPPOSITORY, RECTAL RECTAL DAILY PRN
Status: DISCONTINUED | OUTPATIENT
Start: 2025-05-13 | End: 2025-05-21 | Stop reason: HOSPADM

## 2025-05-13 RX ORDER — ONDANSETRON 4 MG/1
4 TABLET, ORALLY DISINTEGRATING ORAL EVERY 8 HOURS PRN
Status: DISCONTINUED | OUTPATIENT
Start: 2025-05-13 | End: 2025-05-21 | Stop reason: HOSPADM

## 2025-05-13 RX ORDER — LEVOTHYROXINE SODIUM 50 UG/1
50 TABLET ORAL DAILY
Status: DISCONTINUED | OUTPATIENT
Start: 2025-05-14 | End: 2025-05-21 | Stop reason: HOSPADM

## 2025-05-13 RX ORDER — OXYCODONE HYDROCHLORIDE 5 MG/1
5 TABLET ORAL EVERY 4 HOURS PRN
Refills: 0 | Status: DISCONTINUED | OUTPATIENT
Start: 2025-05-13 | End: 2025-05-13 | Stop reason: SDUPTHER

## 2025-05-13 RX ORDER — OXYCODONE HYDROCHLORIDE 5 MG/1
10 TABLET ORAL EVERY 4 HOURS PRN
Refills: 0 | Status: DISCONTINUED | OUTPATIENT
Start: 2025-05-13 | End: 2025-05-16

## 2025-05-13 RX ORDER — CARBAMAZEPINE 200 MG/1
200 TABLET, EXTENDED RELEASE ORAL 2 TIMES DAILY
Status: DISCONTINUED | OUTPATIENT
Start: 2025-05-13 | End: 2025-05-19

## 2025-05-13 RX ORDER — ATORVASTATIN CALCIUM 20 MG/1
20 TABLET, FILM COATED ORAL NIGHTLY
Status: DISCONTINUED | OUTPATIENT
Start: 2025-05-13 | End: 2025-05-21 | Stop reason: HOSPADM

## 2025-05-13 RX ORDER — INSULIN LISPRO 100 [IU]/ML
10 INJECTION, SOLUTION INTRAVENOUS; SUBCUTANEOUS
Status: DISCONTINUED | OUTPATIENT
Start: 2025-05-14 | End: 2025-05-21 | Stop reason: HOSPADM

## 2025-05-13 RX ORDER — INSULIN GLARGINE 100 [IU]/ML
25 INJECTION, SOLUTION SUBCUTANEOUS NIGHTLY
Status: DISCONTINUED | OUTPATIENT
Start: 2025-05-13 | End: 2025-05-21 | Stop reason: HOSPADM

## 2025-05-13 RX ORDER — FLUTICASONE PROPIONATE 50 MCG
1 SPRAY, SUSPENSION (ML) NASAL 2 TIMES DAILY PRN
Status: CANCELLED | OUTPATIENT
Start: 2025-05-13

## 2025-05-13 RX ORDER — GEMFIBROZIL 600 MG/1
600 TABLET, FILM COATED ORAL
Status: DISCONTINUED | OUTPATIENT
Start: 2025-05-14 | End: 2025-05-19

## 2025-05-13 RX ORDER — ASPIRIN 81 MG/1
81 TABLET, CHEWABLE ORAL DAILY
Status: DISCONTINUED | OUTPATIENT
Start: 2025-05-14 | End: 2025-05-13 | Stop reason: SDUPTHER

## 2025-05-13 RX ORDER — ENOXAPARIN SODIUM 100 MG/ML
40 INJECTION SUBCUTANEOUS DAILY
Status: CANCELLED | OUTPATIENT
Start: 2025-05-14

## 2025-05-13 RX ORDER — CHOLESTYRAMINE LIGHT 4 G/5.7G
4 POWDER, FOR SUSPENSION ORAL DAILY
Status: DISCONTINUED | OUTPATIENT
Start: 2025-05-13 | End: 2025-05-21 | Stop reason: HOSPADM

## 2025-05-13 RX ORDER — GABAPENTIN 100 MG/1
100 CAPSULE ORAL 3 TIMES DAILY
Status: DISCONTINUED | OUTPATIENT
Start: 2025-05-13 | End: 2025-05-21 | Stop reason: HOSPADM

## 2025-05-13 RX ORDER — CITALOPRAM HYDROBROMIDE 20 MG/1
20 TABLET ORAL NIGHTLY
Status: DISCONTINUED | OUTPATIENT
Start: 2025-05-13 | End: 2025-05-21 | Stop reason: HOSPADM

## 2025-05-13 RX ORDER — METOPROLOL TARTRATE 75 MG/1
75 TABLET ORAL 2 TIMES DAILY
Status: DISCONTINUED | OUTPATIENT
Start: 2025-05-13 | End: 2025-05-13 | Stop reason: SDUPTHER

## 2025-05-13 RX ADMIN — GABAPENTIN 100 MG: 100 CAPSULE ORAL at 09:00

## 2025-05-13 RX ADMIN — IPRATROPIUM BROMIDE AND ALBUTEROL SULFATE 1 DOSE: 2.5; .5 SOLUTION RESPIRATORY (INHALATION) at 07:08

## 2025-05-13 RX ADMIN — ACETYLCYSTEINE 600 MG: 200 SOLUTION ORAL; RESPIRATORY (INHALATION) at 18:07

## 2025-05-13 RX ADMIN — IPRATROPIUM BROMIDE AND ALBUTEROL SULFATE 1 DOSE: 2.5; .5 SOLUTION RESPIRATORY (INHALATION) at 14:56

## 2025-05-13 RX ADMIN — ENOXAPARIN SODIUM 40 MG: 100 INJECTION SUBCUTANEOUS at 08:59

## 2025-05-13 RX ADMIN — GABAPENTIN 100 MG: 100 CAPSULE ORAL at 14:44

## 2025-05-13 RX ADMIN — INSULIN LISPRO 10 UNITS: 100 INJECTION, SOLUTION INTRAVENOUS; SUBCUTANEOUS at 16:37

## 2025-05-13 RX ADMIN — INSULIN LISPRO 2 UNITS: 100 INJECTION, SOLUTION INTRAVENOUS; SUBCUTANEOUS at 12:34

## 2025-05-13 RX ADMIN — IPRATROPIUM BROMIDE AND ALBUTEROL SULFATE 1 DOSE: 2.5; .5 SOLUTION RESPIRATORY (INHALATION) at 11:08

## 2025-05-13 RX ADMIN — INSULIN GLARGINE 25 UNITS: 100 INJECTION, SOLUTION SUBCUTANEOUS at 21:12

## 2025-05-13 RX ADMIN — IPRATROPIUM BROMIDE AND ALBUTEROL SULFATE 1 DOSE: 2.5; .5 SOLUTION RESPIRATORY (INHALATION) at 18:07

## 2025-05-13 RX ADMIN — OXYCODONE HYDROCHLORIDE 5 MG: 5 TABLET ORAL at 16:37

## 2025-05-13 RX ADMIN — SENNOSIDES AND DOCUSATE SODIUM 1 TABLET: 50; 8.6 TABLET ORAL at 21:07

## 2025-05-13 RX ADMIN — INSULIN LISPRO 10 UNITS: 100 INJECTION, SOLUTION INTRAVENOUS; SUBCUTANEOUS at 12:34

## 2025-05-13 RX ADMIN — LEVOTHYROXINE SODIUM 50 MCG: 0.05 TABLET ORAL at 05:52

## 2025-05-13 RX ADMIN — ACETYLCYSTEINE 600 MG: 200 SOLUTION ORAL; RESPIRATORY (INHALATION) at 07:08

## 2025-05-13 RX ADMIN — ASPIRIN 81 MG 81 MG: 81 TABLET ORAL at 09:00

## 2025-05-13 RX ADMIN — SODIUM CHLORIDE, PRESERVATIVE FREE 10 ML: 5 INJECTION INTRAVENOUS at 09:00

## 2025-05-13 RX ADMIN — Medication 2000 UNITS: at 09:00

## 2025-05-13 RX ADMIN — CITALOPRAM 20 MG: 20 TABLET, FILM COATED ORAL at 21:08

## 2025-05-13 RX ADMIN — INSULIN LISPRO 2 UNITS: 100 INJECTION, SOLUTION INTRAVENOUS; SUBCUTANEOUS at 16:36

## 2025-05-13 RX ADMIN — METOPROLOL TARTRATE 75 MG: 50 TABLET, FILM COATED ORAL at 21:07

## 2025-05-13 RX ADMIN — ATORVASTATIN CALCIUM 20 MG: 20 TABLET, FILM COATED ORAL at 21:07

## 2025-05-13 RX ADMIN — INSULIN LISPRO 6 UNITS: 100 INJECTION, SOLUTION INTRAVENOUS; SUBCUTANEOUS at 09:00

## 2025-05-13 RX ADMIN — INSULIN LISPRO 10 UNITS: 100 INJECTION, SOLUTION INTRAVENOUS; SUBCUTANEOUS at 09:00

## 2025-05-13 RX ADMIN — GABAPENTIN 800 MG: 400 CAPSULE ORAL at 21:07

## 2025-05-13 RX ADMIN — LANSOPRAZOLE 30 MG: 30 TABLET, ORALLY DISINTEGRATING, DELAYED RELEASE ORAL at 21:07

## 2025-05-13 ASSESSMENT — PAIN DESCRIPTION - LOCATION: LOCATION: BACK;BUTTOCKS

## 2025-05-13 ASSESSMENT — PAIN DESCRIPTION - DESCRIPTORS: DESCRIPTORS: ACHING;DISCOMFORT

## 2025-05-13 NOTE — PROGRESS NOTES
[facility] Progress Note    Patient:  Akhil Alejo  YOB: 1955  Date of Service: 5/13/2025  MRN: [id number]   Acct: [id number]   Primary Care Physician: Lennox Waddell MD  Advance Directive: Full Code  Admit Date: 4/25/2025       Hospital Day: 18      CHIEF COMPLAINT:     Chief Complaint   Patient presents with    Abnormal Lab     ABNORMAL EKG DURING STRESS TEST       5/13/2025 8:00 AM  Subjective / Interval History:   05/13/2025  Patient seen and examined this a.m.   No new complaints.    No acute changes or acute overnight event reported.   Laying comfortably in bed in no apparent acute distress.    Denies any acute complaints or distress.    Endorses overall improvement.        05/12/2025  Patient seen and examined this a.m.   No new complaints.    No acute changes or acute overnight event reported.   Laying comfortably in bed in no apparent acute distress.    Denies any acute complaints or distress.    Endorses overall improvement.          05/11/2025  Patient seen and examined this a.m.   No new complaints.    No acute changes or acute overnight event reported.   Laying comfortably in bed in no apparent acute distress.    Denies any acute complaints or distress.       05/10/2025  Patient seen and examined this a.m.   No new complaints.    No acute changes or acute overnight event reported.   Sitting comfortably in chair in no apparent acute distress.    Denies any acute complaints or distress.   Endorses overall improvement.        05/09/2025  Patient seen and examined   No new complaints.  No acute changes or acute overnight event reported.   Laying comfortably in bed in no apparent acute distress.    Denies any acute complaints or distress.    Endorses overall improvement.    PEG tube placement this am      05/08/2025  Patient seen and examined this a.m.   No new complaints.    No acute changes or acute overnight event reported.   Laying comfortably in bed in no apparent

## 2025-05-13 NOTE — PROGRESS NOTES
This Nurse stopped TF due to pt complaints of stomach pain and the backflow of contents through clamped tube. Dr. Quiroz notified.

## 2025-05-13 NOTE — PLAN OF CARE
Problem: Chronic Conditions and Co-morbidities  Goal: Patient's chronic conditions and co-morbidity symptoms are monitored and maintained or improved  Outcome: Adequate for Discharge     Problem: Safety - Adult  Goal: Free from fall injury  Outcome: Adequate for Discharge     Problem: ABCDS Injury Assessment  Goal: Absence of physical injury  Outcome: Adequate for Discharge     Problem: Pain  Goal: Verbalizes/displays adequate comfort level or baseline comfort level  Outcome: Adequate for Discharge     Problem: Discharge Planning  Goal: Discharge to home or other facility with appropriate resources  Outcome: Adequate for Discharge     Problem: Skin/Tissue Integrity  Goal: Skin integrity remains intact  Description: 1.  Monitor for areas of redness and/or skin breakdown  Outcome: Adequate for Discharge     Problem: Nutrition Deficit:  Goal: Optimize nutritional status  Outcome: Adequate for Discharge

## 2025-05-13 NOTE — PROGRESS NOTES
Nephrology ([facility]) Progress Note      Patient:  Akhil Alejo  YOB: 1955  Date of Service: 5/13/2025  MRN: [id number]   Acct: [id number]   Primary Care Physician: Lennox Waddell MD  Advance Directive: Full Code  Admit Date: 4/25/2025       Hospital Day: 18  Referring Provider: Akhil Lara MD    Patient independently seen and examined, Chart, Consults, Notes, Operative notes, Labs, Cardiology, and Radiology studies reviewed as available.        Subjective:  Akhil Alejo is a 69 y.o. male for whom we were consulted for evaluation and treatment of hyponatremia.  Initially seen with wife and neither noted any renal issues for him.  He underwent CABG three-vessel on 4/29/2025 by Dr. Lara.  Had episodes of postoperative atrial fibrillation and persistently failed swallow studies.  He had had a Dobbhoff but that was pulled out prior to our initial evaluation on hospital day 12.  He was n.p.o. for possible PEG placement.  Sodium had been normal prior to admission and recently had been ranging in the low 150s.  Patient denied other complaints upon questioning.    Today, no overnight events.  No new complaints. His repeat serum sodium was 136    Allergies:  Penicillins    Medicines:  Current Facility-Administered Medications   Medication Dose Route Frequency Provider Last Rate Last Admin    lansoprazole (PREVACID SOLUTAB) disintegrating tablet 30 mg  30 mg PEG Tube BID Miley Taylor MD   30 mg at 05/12/25 2108    Vitamin D (CHOLECALCIFEROL) tablet 2,000 Units  2,000 Units Oral Daily Miley Taylor MD   2,000 Units at 05/13/25 0900    [Held by provider] metoprolol tartrate (LOPRESSOR) tablet 75 mg  75 mg Oral BID Akhil Lara MD        insulin glargine (LANTUS) injection vial 25 Units  25 Units SubCUTAneous Nightly Miley Taylor MD   25 Units at 05/12/25 2108    insulin lispro (HUMALOG,ADMELOG) injection vial 10 Units  10 Units SubCUTAneous TID  Brandon  pleural effusion or pneumothorax is seen. Stable enlargement of the cardiac silhouette. No acute displaced rib fractures are identified.        1.  Stable chest.    ______________________________________ Electronically signed by: SU ELLISON M.D. Date:     04/30/2025 Time:    06:41     XR CHEST PORTABLE  Result Date: 4/29/2025  EXAM: XR CHEST PORTABLE  TECHNIQUE: Single frontal chest radiograph.  HISTORY: OG placement  COMPARISON: April 29, 2025 Current study is timed at 2057 hours and compared with 1814 hours same day.  FINDINGS: When compared with prior study, there has been interval placement of a gastric tube. Gastric tube tip overlies the stomach Tip of endotracheal tube has been retracted. Its tip is seen in acceptable position, overlying the midline trachea within the thoracic inlet and above the monika The tip of a right IJ pulmonary artery catheter is in stable and appropriate position overlying the right pulmonary artery A mediastinal drain remains in place Heart size appears prominent Ectasis is demonstrated bilaterally.          Interval insertion of gastric tube. Its tip is in the stomach Otherwise, no significant interval change   ______________________________________ Electronically signed by: CRISTOBAL RIVERA M.D. Date:     04/29/2025 Time:    21:27     Intraoperative ELYSE  Result Date: 4/29/2025  See Anesthesia Procedure note for procedure details.       Assessment   Hypovolemic hypernatremia  CAD status post CABG on 4/29  Diabetes type 2 with hyperglycemia  Hypertension  Hypothyroidism  Failed swallow studies  Vitamin D deficiency  Secondary hyperparathyroidism  Hypokalemia      Plan:  Discussed with patient   Workup reviewed today  Continue to monitor labs.       Sathish Burgos MD  05/13/25  10:44 AM

## 2025-05-13 NOTE — PROGRESS NOTES
Awaiting placement in a rehab facility. Able to ambulate short distances but not sufficient to handle his activities of daily living at home. He also requires speech therapy support in order to regain ability to take PO. In the meantime, we are continuing tube feedings via PEG.

## 2025-05-13 NOTE — DISCHARGE SUMMARY
Discharge Summary    Date: 5/13/2025  Patient Name: Akhil Alejo    YOB: 1955     Age: 69 y.o.    Admit Date: 4/25/2025  Discharge Date: 5/13/2025  Discharge Condition: Fair    Admission Diagnosis  Abnormal stress test [R94.39];Abnormal nuclear cardiac imaging test [R93.1];Unstable angina (HCC) [I20.0]      Discharge Diagnosis  Principal Problem:    Abnormal nuclear cardiac imaging test  Active Problems:    Diabetic ulcer of ankle associated with type 2 diabetes mellitus, with fat layer exposed (HCC)    Neuropathy    CAD in native artery    Diabetes (HCC)    GERD (gastroesophageal reflux disease)    Mixed hyperlipidemia    Restless legs syndrome (RLS)    Vitamin D deficiency    Hypothyroidism    Essential hypertension    Abnormal stress test    Trauma to vocal cord    Feeding difficulty in adult    PEG (percutaneous endoscopic gastrostomy) status (HCC)  Resolved Problems:    * No resolved hospital problems. *      Hospital Stay  Narrative of Hospital Course:   The patient is a 69-year-old gentleman who had an abnormal stress test, therefore, was referred for cardiac cath.  He was noted to have severe distal left main disease and no significant disease in the right coronary artery.  He had additional lesions in the OM1 branch and the mid LAD, and therefore, underwent robotic 3-vessel revascularization of the LAD, 1st diagonal, and obtuse marginal branch of the circumflex.  He did well after the procedure with no major complications with the exception of a postop delirium, causing some worsening in his state of chronic dementia. This resulted in poor ability to participate in rehab efforts with severe deconditioning. He also had a speech therapy evaluation which mandated NPO status, thereby obligating the placement of a PEG. He is now ready for transfer to rehab to continue his recovery.    Consultants:  IP CONSULT TO CARDIOLOGY  IP CONSULT TO CASE MANAGEMENT  IP CONSULT TO DIETITIAN  IP CONSULT TO

## 2025-05-13 NOTE — CARE COORDINATION
[facility]  Notification of Admission Decision      [x] [facility] : [facility]25 Room 816      Please write discharge orders and summary prior to discharge.    [] Patient acceptance to Rehab pending the following :    [] Eval in progress       [] Patient determined to be ineligible for services at [facility] because :       We recommend you consider        Thank you for your referral, we appreciate you. If you have any questions, please feel   free to contact me at [phone].  Electronically Signed by Annmarie Cortes, Admissions Coordinator 5/13/2025 8:54 AM

## 2025-05-13 NOTE — PROGRESS NOTES
Physical Therapy  Name: Akhil Alejo  MRN:  [id number]  Date of service:  5/13/2025 05/13/25 0830   Restrictions/Precautions   Restrictions/Precautions Fall Risk;General Precautions;Surgical Protocols   Activity Level Up with Assist   Required Braces or Orthoses? No   Position Activity Restriction   Sternal Precautions No Pushing;No Pulling;5# Lifting Restrictions;Move in the Tube   Other Position/Activity Restrictions Sternal Precautions, now with G tube   General   Chart Reviewed Yes   Family/Caregiver Present No   Referring Practitioner Akhil Lara MD   Subjective   Subjective pt in bed, agrees to therapy   General   General Comments Rn, Carlos coronel   Pain Assessment   Pain Assessment None - Denies Pain   Pain Location Back;Buttocks   Pain Descriptors Aching;Discomfort   Oxygen Therapy   O2 Device None (Room air)   Orientation   Overall Orientation Status X   Bed Mobility   Rolling Contact guard assistance   Supine to Sit Minimal assistance   Transfers   Sit to Stand Minimal Assistance;Contact guard assistance   Stand to Sit Minimal Assistance   Bed to Chair Minimal assistance   Comment several sit stand tfers from eob   pt needs cues for hand placement to push up off surface and not pull up on wx   Ambulation   Surface Level tile   Device Rolling Walker   Assistance Minimal assistance   Quality of Gait decreased bal, unsteady, decreased safefty   Gait Deviations Slow Alycia;Decreased step height;Decreased step length;Shuffles   Distance 5', 10', 10'   Comments steps fwd, backward   Exercises   Comments seated masha le therex x 10 , stand wt shift activities   Patient Goals    Patient Goals  go home   Short Term Goals   Time Frame for Short Term Goals 2 wks   Short Term Goal 1 supine to sit indep   Short Term Goal 2 sit to stand indep   Short Term Goal 3 amb. 100' with RW SBA   Short Term Goal 4 bed to chair SBA   Conditions Requiring Skilled Therapeutic Intervention   Body Structures, Functions,

## 2025-05-13 NOTE — PROGRESS NOTES
This nurse assessed for residual from PEG tube, no residual noted. Pt no longer complains of stomach pain. TF started back at 55ml/hr.

## 2025-05-14 LAB
GLUCOSE BLD-MCNC: 171 MG/DL (ref 70–99)
GLUCOSE BLD-MCNC: 194 MG/DL (ref 70–99)
GLUCOSE BLD-MCNC: 196 MG/DL (ref 70–99)
GLUCOSE BLD-MCNC: 216 MG/DL (ref 70–99)
PERFORMED ON: ABNORMAL

## 2025-05-14 PROCEDURE — 94760 N-INVAS EAR/PLS OXIMETRY 1: CPT

## 2025-05-14 PROCEDURE — 6370000000 HC RX 637 (ALT 250 FOR IP): Performed by: PSYCHIATRY & NEUROLOGY

## 2025-05-14 PROCEDURE — 94150 VITAL CAPACITY TEST: CPT

## 2025-05-14 PROCEDURE — 97165 OT EVAL LOW COMPLEX 30 MIN: CPT

## 2025-05-14 PROCEDURE — 92526 ORAL FUNCTION THERAPY: CPT

## 2025-05-14 PROCEDURE — 82962 GLUCOSE BLOOD TEST: CPT

## 2025-05-14 PROCEDURE — 6360000002 HC RX W HCPCS: Performed by: SURGERY

## 2025-05-14 PROCEDURE — 6370000000 HC RX 637 (ALT 250 FOR IP): Performed by: SURGERY

## 2025-05-14 PROCEDURE — 97116 GAIT TRAINING THERAPY: CPT

## 2025-05-14 PROCEDURE — 99223 1ST HOSP IP/OBS HIGH 75: CPT | Performed by: PSYCHIATRY & NEUROLOGY

## 2025-05-14 PROCEDURE — 92610 EVALUATE SWALLOWING FUNCTION: CPT

## 2025-05-14 PROCEDURE — 97161 PT EVAL LOW COMPLEX 20 MIN: CPT

## 2025-05-14 PROCEDURE — 92522 EVALUATE SPEECH PRODUCTION: CPT

## 2025-05-14 PROCEDURE — 1180000000 HC REHAB R&B

## 2025-05-14 PROCEDURE — 97535 SELF CARE MNGMENT TRAINING: CPT

## 2025-05-14 PROCEDURE — 97110 THERAPEUTIC EXERCISES: CPT

## 2025-05-14 PROCEDURE — 6370000000 HC RX 637 (ALT 250 FOR IP): Performed by: PHYSICIAN ASSISTANT

## 2025-05-14 RX ORDER — INSULIN LISPRO 100 [IU]/ML
5 INJECTION, SOLUTION INTRAVENOUS; SUBCUTANEOUS ONCE
Status: COMPLETED | OUTPATIENT
Start: 2025-05-14 | End: 2025-05-14

## 2025-05-14 RX ORDER — LOPERAMIDE HCL 1 MG/7.5ML
2 SOLUTION ORAL 4 TIMES DAILY PRN
Status: DISCONTINUED | OUTPATIENT
Start: 2025-05-14 | End: 2025-05-21 | Stop reason: HOSPADM

## 2025-05-14 RX ORDER — LOPERAMIDE HYDROCHLORIDE 2 MG/1
2 CAPSULE ORAL 4 TIMES DAILY PRN
Status: DISCONTINUED | OUTPATIENT
Start: 2025-05-14 | End: 2025-05-14

## 2025-05-14 RX ADMIN — CETIRIZINE HYDROCHLORIDE 10 MG: 10 TABLET, FILM COATED ORAL at 12:21

## 2025-05-14 RX ADMIN — METOPROLOL TARTRATE 75 MG: 50 TABLET, FILM COATED ORAL at 20:48

## 2025-05-14 RX ADMIN — ASPIRIN 81 MG: 81 TABLET, CHEWABLE ORAL at 12:20

## 2025-05-14 RX ADMIN — CLOPIDOGREL BISULFATE 75 MG: 75 TABLET, FILM COATED ORAL at 12:27

## 2025-05-14 RX ADMIN — ENOXAPARIN SODIUM 40 MG: 100 INJECTION SUBCUTANEOUS at 12:10

## 2025-05-14 RX ADMIN — INSULIN GLARGINE 25 UNITS: 100 INJECTION, SOLUTION SUBCUTANEOUS at 20:47

## 2025-05-14 RX ADMIN — INSULIN LISPRO 10 UNITS: 100 INJECTION, SOLUTION INTRAVENOUS; SUBCUTANEOUS at 12:10

## 2025-05-14 RX ADMIN — GEMFIBROZIL 600 MG: 600 TABLET ORAL at 05:20

## 2025-05-14 RX ADMIN — GEMFIBROZIL 600 MG: 600 TABLET ORAL at 17:32

## 2025-05-14 RX ADMIN — CHOLESTYRAMINE 4 G: 4 POWDER, FOR SUSPENSION ORAL at 12:22

## 2025-05-14 RX ADMIN — GABAPENTIN 100 MG: 100 CAPSULE ORAL at 20:48

## 2025-05-14 RX ADMIN — HYDROCHLOROTHIAZIDE 12.5 MG: 12.5 CAPSULE ORAL at 12:20

## 2025-05-14 RX ADMIN — METOPROLOL TARTRATE 75 MG: 50 TABLET, FILM COATED ORAL at 12:20

## 2025-05-14 RX ADMIN — INSULIN LISPRO 5 UNITS: 100 INJECTION, SOLUTION INTRAVENOUS; SUBCUTANEOUS at 17:56

## 2025-05-14 RX ADMIN — LOPERAMIDE HCL 2 MG: 1 SOLUTION ORAL at 20:49

## 2025-05-14 RX ADMIN — LANSOPRAZOLE 30 MG: 30 TABLET, ORALLY DISINTEGRATING, DELAYED RELEASE ORAL at 12:20

## 2025-05-14 RX ADMIN — LEVOTHYROXINE SODIUM 50 MCG: 0.05 TABLET ORAL at 05:20

## 2025-05-14 RX ADMIN — ATORVASTATIN CALCIUM 20 MG: 20 TABLET, FILM COATED ORAL at 20:48

## 2025-05-14 RX ADMIN — GABAPENTIN 100 MG: 100 CAPSULE ORAL at 12:20

## 2025-05-14 RX ADMIN — CITALOPRAM 20 MG: 20 TABLET, FILM COATED ORAL at 20:48

## 2025-05-14 RX ADMIN — LOSARTAN POTASSIUM 25 MG: 25 TABLET, FILM COATED ORAL at 12:20

## 2025-05-14 RX ADMIN — LANSOPRAZOLE 30 MG: 30 TABLET, ORALLY DISINTEGRATING, DELAYED RELEASE ORAL at 20:48

## 2025-05-14 RX ADMIN — SENNOSIDES AND DOCUSATE SODIUM 1 TABLET: 50; 8.6 TABLET ORAL at 12:20

## 2025-05-14 RX ADMIN — CARBAMAZEPINE 200 MG: 200 TABLET, EXTENDED RELEASE ORAL at 12:20

## 2025-05-14 RX ADMIN — CARBAMAZEPINE 200 MG: 200 TABLET, EXTENDED RELEASE ORAL at 20:48

## 2025-05-14 NOTE — THERAPY EVALUATION
Physical Therapy EVALUATION Note  DATE:  2025  NAME:  Akhil Alejo  :  1955  (69 y.o.,male)  MRN:  [id number]    HEIGHT:  Height: 180.3 cm (5' 10.98\")  WEIGHT:  Weight - Scale: 86.2 kg (190 lb)    PATIENT DIAGNOSIS(ES):    Diagnosis: CABG X3  (MINIMALLY INVASIVE ROBOTIC ASSISTED) (G TUBE)    Additional Pertinent Hx: DEPRESSION, DMN, HTN, NEUROPATHY, RLS  RESTRICTIONS/PRECAUTIONS:    Restrictions/Precautions  Restrictions/Precautions: Fall Risk, Surgical Protocols, NPO, Cardiac  Position Activity Restriction  Other Position/Activity Restrictions: G tube,\"no heavy lifting\" per dr calle  OVERALL  ORIENTATION STATUS:     PAIN:                       GROSS ASSESSMENT        POSTURE/BALANCE          ACTIVITY TOLERANCE         BED MOBILITY  Bed mobility  Rolling to Left: Partial/Moderate assistance, Minimal assistance (WITH RIAL)  Rolling to Right: Partial/Moderate assistance, Minimal assistance (WITH RAIL)  Supine to Sit: Substantial/Maximal assistance, Partial/Moderate assistance (FROM RIGHT ROXIE, HOB 30 DEG D/T TUBE FEED, JOHANA USED THERAPIST'S SHOULDER FOR LEVERAGE)  Sit to Supine: Substantial/Maximal assistance, Partial/Moderate assistance (TO RIGHT SDIELYING. ASSIST WITH TRUNK AND LES)  Bed Mobility Comments: difficult following commands        TRANSFERS  Transfers  Sit to Stand: Substantial/Maximal assistance, Partial/Moderate assistance (LACK OF ANTERIOR LEAN. FROM BED WITHOUT A.D.. MOD/MIN A PULL TO STAND // BARS WITH POST LEAN)  Stand to Sit: Partial/Moderate assistance, Minimal Assistance (DECREASED CONTROL WITH DESCENT)  Bed to Chair: Substantial/Maximal assistance, Partial/Moderate assistance (STAND PIVOT TO LEFT.  STRONG POST LEAN)       AMBULATION 1  Ambulation  Device: Rolling Walker  Other Apparatus:  (iv pole for tube feed)  Assistance: Substantial/Maximal assistance, Partial/Moderate assistance  Quality of Gait: AMBULATED FROM DOOR TO RECINLER ORIENTED TO LEFT OF PATIENT.  RW TOO

## 2025-05-14 NOTE — H&P
[facility]   History and Physical        Patient:   Akhil Alejo  MR#:    [id number]  Account Number:                   [id number]      Room:    [address] [place], [place] [id number]-   YOB: 1955  Date of Progress Note: 5/14/2025  Time of Note                           6:58 AM  Attending Physician:  Prateek Pennington MD        Admitting diagnosis: Coronary artery disease status post CABG    Secondary diagnoses: Diabetes, hypertension, restless legs, neuropathy, recent delirium, hyperlipidemia, hypothyroidism, dysphagia    CHIEF COMPLAINT: Generalized weakness and deconditioning      HISTORY OF PRESENT ILLNESS:   This 69 y.o. male  with history of depression, DM, HTN, neuropathy and RLS. Patient was seen by Dr. Kitchen as outpatient for abnormal ECG and shortness of breath. He underwent a stress test on 4/25/25 which anterior wall ischemia, EF 44%, ECG abnormal as before. He was sent to [facility] for further evaluation. He was seen by cardiologist Dr. Cortes and underwent a heart catheterization which revealed multivessel CAD. Cardiothoracic surgery was consulted. He was seen by Dr. Lara, who recommended CABG x 3 to the LAD, D1 and OM1. Patient was in agreement. The patient had been on plavix and aspirin. Therfore Plavix was placed on hold. P2Y12 test done on 4/28/25 was 156 , which was felt sufficient recovery of plt function to proceed with CABG on 4/29/25. Patient tolerated the procedure well. Patient had post-op delirium causing some worsening of his dementia. Patient was evaluated by SPT therapy. On mini-mental exam,  patient obtained 1 out of 30 possible points, indicative of severe cognitive-linguistic impairment (patient did not verbalize year, season, date, TERRENCE, month, state, county, city, hospital, or floor of hospital; patient was 0/3 registration, 0/5 attention, 0/3 recall, 1/2 confrontation naming, 0/1 repeat phrase, 0/3 multi-step command, 0/1 written comprehension, 0/1 writing of sentence, and 0/1 copy  best results. Do not take this medicine in larger or smaller amounts or for longer than recommended. If you have major surgery or a severe injury or infection, your methylprednisolone dose needs may change. Make sure any doctor caring for you knows you are taking this medicine. Steroid medication can weaken your immune system, making it easier for you to get an infection. Call your doctor if you have any signs of infection (fever, chills, body aches). You should not stop using methylprednisolone suddenly, or you could have unpleasant withdrawal symptoms. Follow your doctor's instructions about tapering your dose. Wear a medical alert tag or carry an ID card stating that you take methylprednisolone. Any medical care provider who treats you should know that you take steroid medication. Store at room temperature away from moisture and heat. What happens if I miss a dose? Call your doctor for instructions if you miss a dose of methylprednisolone. What happens if I overdose? Seek emergency medical attention or call the Exec Help line at 1-940.990.6262. An overdose of methylprednisolone is not expected to produce life threatening symptoms. However, long term use of high steroid doses can lead to symptoms such as thinning skin, easy bruising, changes in the shape or location of body fat (especially in your face, neck, back, and waist), increased acne or facial hair, menstrual problems, impotence, or loss of interest in sex. What should I avoid while taking methylprednisolone? Grapefruit and grapefruit juice may interact with methylprednisolone and lead to unwanted side effects. Avoid the use of grapefruit products while taking methylprednisolone. Do not receive a \"live\" vaccine while using methylprednisolone. Live vaccines include measles, mumps, rubella (MMR), rotavirus, typhoid, yellow fever, varicella (chickenpox), zoster (shingles), and nasal flu (influenza) vaccine.   Avoid being near people who are sick or have infections. Call your doctor for preventive treatment if you are exposed to chicken pox or measles. These conditions can be serious or even fatal in people who are using steroid medication. What are the possible side effects of methylprednisolone? Get emergency medical help if you have any of these signs of an allergic reaction: hives; difficulty breathing; swelling of your face, lips, tongue, or throat. Call your doctor at once if you have:  · blurred vision, tunnel vision, eye pain, or seeing halos around lights;  · shortness of breath (even with mild exertion), swelling, rapid weight gain;  · severe depression, changes in personality, unusual thoughts or behavior;  · new or unusual pain in an arm or leg or in your back;  · bloody or tarry stools, coughing up blood or vomit that looks like coffee grounds;  · seizure (convulsions); or  · low potassium (confusion, uneven heart rate, extreme thirst, increased urination, leg discomfort, muscle weakness or limp feeling). Common side effects may include:  · headache;  · mild muscle pain or weakness; or  · stomach discomfort, bloating. This is not a complete list of side effects and others may occur. Call your doctor for medical advice about side effects. You may report side effects to FDA at 4-058-FDA-9987. What other drugs will affect methylprednisolone? Other drugs may interact with methylprednisolone, including prescription and over-the-counter medicines, vitamins, and herbal products. Tell each of your health care providers about all medicines you use now and any medicine you start or stop using. Where can I get more information? Your pharmacist can provide more information about methylprednisolone. Remember, keep this and all other medicines out of the reach of children, never share your medicines with others, and use this medication only for the indication prescribed.   Every effort has been made to ensure that the information provided by [employer] [facility] is accurate, up-to-date, and complete, but no guarantee is made to that effect. Drug information contained herein may be time sensitive. [facility] information has been compiled for use by healthcare practitioners and consumers in the [place] and therefore [employer] does not warrant that uses outside of the [place] are appropriate, unless specifically indicated otherwise. [facility]'s drug information does not endorse drugs, diagnose patients or recommend therapy. [facility] drug information is an informational resource designed to assist licensed healthcare practitioners in caring for their patients and/or to serve consumers viewing this service as a supplement to, and not a substitute for, the expertise, skill, knowledge and judgment of healthcare practitioners. The absence of a warning for a given drug or drug combination in no way should be construed to indicate that the drug or drug combination is safe, effective or appropriate for any given patient. [facility] does not assume any responsibility for any aspect of healthcare administered with the aid of information [facility] provides. The information contained herein is not intended to cover all possible uses, directions, precautions, warnings, drug interactions, allergic reactions, or adverse effects. If you have questions about the drugs you are taking, check with your doctor, nurse or pharmacist.  Copyright 5633-4876 [address]. Version: 8.01. Revision date: 4/3/2014. Care instructions adapted under license by [facility] ([facility]). If you have questions about a medical condition or this instruction, always ask your healthcare professional. [place] [address] any warranty or liability for your use of this information. Patient Education        Sinusitis: Care Instructions  Your Care Instructions    Sinusitis is an infection of the lining of the sinus cavities in your head. Sinusitis often follows a cold.  It causes pain and for more than 3 days can make your congestion worse. When should you call for help? Call your doctor now or seek immediate medical care if:  · You have new or worse swelling or redness in your face or around your eyes. · You have a new or higher fever. Watch closely for changes in your health, and be sure to contact your doctor if:  · You have new or worse facial pain. · The mucus from your nose becomes thicker (like pus) or has new blood in it. · You are not getting better as expected. Where can you learn more? Go to https://[employer]ifree[employer]web[employer]. org and sign in to your [employer] account. Enter V683 in the Careem box to learn more about \"Sinusitis: Care Instructions. \"     If you do not have an account, please click on the \"Sign Up Now\" link. Current as of: July 29, 2016  Content Version: 11.3  © 8785-5748 [employer]. Care instructions adapted under license by [facility] ([facility]). If you have questions about a medical condition or this instruction, always ask your healthcare professional. [address] any warranty or liability for your use of this information. Patient Education          azithromycin  Pronunciation:  a ZITH naomi MYE sin  Brand:  Azithromycin 3 Day Dose Pack, Azithromycin 5 Day Dose Pack, Zithromax, Zithromax TRI-GHADA, Zithromax Z-Ghada, Zmax  What is the most important information I should know about azithromycin? You should not use this medication if you have ever had jaundice or liver problems caused by taking azithromycin. What is azithromycin? Azithromycin is an antibiotic that fights bacteria. Azithromycin is used to treat many different types of infections caused by bacteria, such as respiratory infections, skin infections, ear infections, and sexually transmitted diseases. Azithromycin may also be used for purposes not listed in this medication guide.   What should I discuss with my healthcare provider before taking Your symptoms may improve before the infection is completely cleared. Skipping doses may also increase your risk of further infection that is resistant to antibiotics. Azithromycin will not treat a viral infection such as the flu or a common cold. Store at room temperature away from moisture and heat. Throw away any unused liquid medicine after 10 days. What happens if I miss a dose? Take the missed dose as soon as you remember. Skip the missed dose if it is almost time for your next scheduled dose. Do not  take extra medicine to make up the missed dose. What happens if I overdose? Seek emergency medical attention or call the Poison Help line at 1-313.773.7525. What should I avoid while taking azithromycin? Do not take antacids that contain aluminum or magnesium within 2 hours before or after you take azithromycin. This includes Acid Gone, Aldroxicon, Alternagel, Di-Gel, Gaviscon, Gelusil, Genaton, Maalox, Maldroxal, Milk of Magnesia, Mintox, Mylagen, Mylanta, Pepcid Complete, Rolaids, Rulox, and others. These antacids can make azithromycin less effective when taken at the same time. Antibiotic medicines can cause diarrhea, which may be a sign of a new infection. If you have diarrhea that is watery or bloody, stop taking azithromycin and call your doctor. Do not use anti-diarrhea medicine unless your doctor tells you to. Avoid exposure to sunlight or tanning beds. Azithromycin can make you sunburn more easily. Wear protective clothing and use sunscreen (SPF 30 or higher) when you are outdoors. What are the possible side effects of azithromycin? Get emergency medical help if you have signs of an allergic reaction: hives; difficulty breathing; swelling of your face, lips, tongue, or throat. In rare cases, olanzapine may cause a severe skin reaction that can be fatal if it spreads to other parts of the body.   Seek medical treatment if you have a new or worsening skin rash with fever, swollen glands, flu

## 2025-05-15 ENCOUNTER — APPOINTMENT (OUTPATIENT)
Dept: GENERAL RADIOLOGY | Age: 70
DRG: 948 | End: 2025-05-15
Attending: PSYCHIATRY & NEUROLOGY
Payer: MEDICARE

## 2025-05-15 LAB
ANION GAP SERPL CALCULATED.3IONS-SCNC: 14 MMOL/L (ref 8–16)
BASOPHILS # BLD: 0.1 K/UL (ref 0–0.2)
BASOPHILS NFR BLD: 0.4 % (ref 0–1)
BUN SERPL-MCNC: 14 MG/DL (ref 8–23)
CALCIUM SERPL-MCNC: 8.6 MG/DL (ref 8.8–10.2)
CHLORIDE SERPL-SCNC: 97 MMOL/L (ref 98–107)
CO2 SERPL-SCNC: 22 MMOL/L (ref 22–29)
CREAT SERPL-MCNC: 0.8 MG/DL (ref 0.7–1.2)
EOSINOPHIL # BLD: 0.6 K/UL (ref 0–0.6)
EOSINOPHIL NFR BLD: 4.2 % (ref 0–5)
ERYTHROCYTE [DISTWIDTH] IN BLOOD BY AUTOMATED COUNT: 14.7 % (ref 11.5–14.5)
GLUCOSE BLD-MCNC: 161 MG/DL (ref 70–99)
GLUCOSE BLD-MCNC: 188 MG/DL (ref 70–99)
GLUCOSE BLD-MCNC: 235 MG/DL (ref 70–99)
GLUCOSE BLD-MCNC: 240 MG/DL (ref 70–99)
GLUCOSE SERPL-MCNC: 210 MG/DL (ref 70–99)
HCT VFR BLD AUTO: 34.7 % (ref 42–52)
HGB BLD-MCNC: 11 G/DL (ref 14–18)
IMM GRANULOCYTES # BLD: 0.4 K/UL
LYMPHOCYTES # BLD: 2.9 K/UL (ref 1.1–4.5)
LYMPHOCYTES NFR BLD: 20.7 % (ref 20–40)
MCH RBC QN AUTO: 28.1 PG (ref 27–31)
MCHC RBC AUTO-ENTMCNC: 31.7 G/DL (ref 33–37)
MCV RBC AUTO: 88.7 FL (ref 80–94)
MONOCYTES # BLD: 1.3 K/UL (ref 0–0.9)
MONOCYTES NFR BLD: 9.3 % (ref 0–10)
NEUTROPHILS # BLD: 8.9 K/UL (ref 1.5–7.5)
NEUTS SEG NFR BLD: 62.9 % (ref 50–65)
PERFORMED ON: ABNORMAL
PLATELET # BLD AUTO: 395 K/UL (ref 130–400)
PMV BLD AUTO: 10.6 FL (ref 9.4–12.4)
POTASSIUM SERPL-SCNC: 3.6 MMOL/L (ref 3.5–5)
RBC # BLD AUTO: 3.91 M/UL (ref 4.7–6.1)
SODIUM SERPL-SCNC: 133 MMOL/L (ref 136–145)
WBC # BLD AUTO: 14.1 K/UL (ref 4.8–10.8)

## 2025-05-15 PROCEDURE — 85025 COMPLETE CBC W/AUTO DIFF WBC: CPT

## 2025-05-15 PROCEDURE — 94150 VITAL CAPACITY TEST: CPT

## 2025-05-15 PROCEDURE — 6370000000 HC RX 637 (ALT 250 FOR IP): Performed by: SURGERY

## 2025-05-15 PROCEDURE — 6370000000 HC RX 637 (ALT 250 FOR IP): Performed by: PSYCHIATRY & NEUROLOGY

## 2025-05-15 PROCEDURE — 97530 THERAPEUTIC ACTIVITIES: CPT

## 2025-05-15 PROCEDURE — 6370000000 HC RX 637 (ALT 250 FOR IP): Performed by: PHYSICIAN ASSISTANT

## 2025-05-15 PROCEDURE — 6360000002 HC RX W HCPCS: Performed by: SURGERY

## 2025-05-15 PROCEDURE — 94760 N-INVAS EAR/PLS OXIMETRY 1: CPT

## 2025-05-15 PROCEDURE — 82962 GLUCOSE BLOOD TEST: CPT

## 2025-05-15 PROCEDURE — 97116 GAIT TRAINING THERAPY: CPT

## 2025-05-15 PROCEDURE — 1180000000 HC REHAB R&B

## 2025-05-15 PROCEDURE — 97110 THERAPEUTIC EXERCISES: CPT

## 2025-05-15 PROCEDURE — 99232 SBSQ HOSP IP/OBS MODERATE 35: CPT | Performed by: PSYCHIATRY & NEUROLOGY

## 2025-05-15 PROCEDURE — 97535 SELF CARE MNGMENT TRAINING: CPT

## 2025-05-15 PROCEDURE — 36415 COLL VENOUS BLD VENIPUNCTURE: CPT

## 2025-05-15 PROCEDURE — 80048 BASIC METABOLIC PNL TOTAL CA: CPT

## 2025-05-15 RX ADMIN — CARBAMAZEPINE 200 MG: 200 TABLET, EXTENDED RELEASE ORAL at 08:35

## 2025-05-15 RX ADMIN — SENNOSIDES AND DOCUSATE SODIUM 1 TABLET: 50; 8.6 TABLET ORAL at 20:26

## 2025-05-15 RX ADMIN — HYDROCHLOROTHIAZIDE 12.5 MG: 12.5 CAPSULE ORAL at 08:35

## 2025-05-15 RX ADMIN — ATORVASTATIN CALCIUM 20 MG: 20 TABLET, FILM COATED ORAL at 20:26

## 2025-05-15 RX ADMIN — INSULIN GLARGINE 25 UNITS: 100 INJECTION, SOLUTION SUBCUTANEOUS at 20:27

## 2025-05-15 RX ADMIN — LANSOPRAZOLE 30 MG: 30 TABLET, ORALLY DISINTEGRATING, DELAYED RELEASE ORAL at 20:27

## 2025-05-15 RX ADMIN — INSULIN LISPRO 10 UNITS: 100 INJECTION, SOLUTION INTRAVENOUS; SUBCUTANEOUS at 08:43

## 2025-05-15 RX ADMIN — METOPROLOL TARTRATE 75 MG: 50 TABLET, FILM COATED ORAL at 20:26

## 2025-05-15 RX ADMIN — ASPIRIN 81 MG: 81 TABLET, CHEWABLE ORAL at 08:35

## 2025-05-15 RX ADMIN — INSULIN LISPRO 10 UNITS: 100 INJECTION, SOLUTION INTRAVENOUS; SUBCUTANEOUS at 12:28

## 2025-05-15 RX ADMIN — CARBAMAZEPINE 200 MG: 200 TABLET, EXTENDED RELEASE ORAL at 20:26

## 2025-05-15 RX ADMIN — CETIRIZINE HYDROCHLORIDE 10 MG: 10 TABLET, FILM COATED ORAL at 08:35

## 2025-05-15 RX ADMIN — GEMFIBROZIL 600 MG: 600 TABLET ORAL at 17:33

## 2025-05-15 RX ADMIN — GEMFIBROZIL 600 MG: 600 TABLET ORAL at 06:00

## 2025-05-15 RX ADMIN — ENOXAPARIN SODIUM 40 MG: 100 INJECTION SUBCUTANEOUS at 08:35

## 2025-05-15 RX ADMIN — INSULIN LISPRO 10 UNITS: 100 INJECTION, SOLUTION INTRAVENOUS; SUBCUTANEOUS at 17:33

## 2025-05-15 RX ADMIN — METOPROLOL TARTRATE 75 MG: 50 TABLET, FILM COATED ORAL at 08:36

## 2025-05-15 RX ADMIN — LOSARTAN POTASSIUM 25 MG: 25 TABLET, FILM COATED ORAL at 08:35

## 2025-05-15 RX ADMIN — CLOPIDOGREL BISULFATE 75 MG: 75 TABLET, FILM COATED ORAL at 08:35

## 2025-05-15 RX ADMIN — LEVOTHYROXINE SODIUM 50 MCG: 0.05 TABLET ORAL at 06:00

## 2025-05-15 RX ADMIN — CHOLESTYRAMINE 4 G: 4 POWDER, FOR SUSPENSION ORAL at 08:35

## 2025-05-15 RX ADMIN — CITALOPRAM 20 MG: 20 TABLET, FILM COATED ORAL at 20:26

## 2025-05-15 RX ADMIN — LANSOPRAZOLE 30 MG: 30 TABLET, ORALLY DISINTEGRATING, DELAYED RELEASE ORAL at 08:36

## 2025-05-16 LAB
ANION GAP SERPL CALCULATED.3IONS-SCNC: 11 MMOL/L (ref 8–16)
BACTERIA URNS QL MICRO: NEGATIVE /HPF
BILIRUB UR QL STRIP: NEGATIVE
BUN SERPL-MCNC: 16 MG/DL (ref 8–23)
CALCIUM SERPL-MCNC: 8.5 MG/DL (ref 8.8–10.2)
CHLORIDE SERPL-SCNC: 98 MMOL/L (ref 98–107)
CLARITY UR: CLEAR
CO2 SERPL-SCNC: 21 MMOL/L (ref 22–29)
COLOR UR: YELLOW
CREAT SERPL-MCNC: 0.7 MG/DL (ref 0.7–1.2)
CRYSTALS URNS MICRO: NORMAL /HPF
EPI CELLS #/AREA URNS AUTO: 0 /HPF (ref 0–5)
GLUCOSE BLD-MCNC: 158 MG/DL (ref 70–99)
GLUCOSE BLD-MCNC: 187 MG/DL (ref 70–99)
GLUCOSE BLD-MCNC: 254 MG/DL (ref 70–99)
GLUCOSE BLD-MCNC: 258 MG/DL (ref 70–99)
GLUCOSE BLD-MCNC: 262 MG/DL (ref 70–99)
GLUCOSE SERPL-MCNC: 242 MG/DL (ref 70–99)
GLUCOSE UR STRIP.AUTO-MCNC: 250 MG/DL
HGB UR STRIP.AUTO-MCNC: NEGATIVE MG/L
HYALINE CASTS #/AREA URNS AUTO: 0 /HPF (ref 0–8)
KETONES UR STRIP.AUTO-MCNC: NEGATIVE MG/DL
LEUKOCYTE ESTERASE UR QL STRIP.AUTO: ABNORMAL
NITRITE UR QL STRIP.AUTO: NEGATIVE
PERFORMED ON: ABNORMAL
PH UR STRIP.AUTO: 6 [PH] (ref 5–8)
POTASSIUM SERPL-SCNC: 4.3 MMOL/L (ref 3.5–5.1)
PROT UR STRIP.AUTO-MCNC: ABNORMAL MG/DL
RBC #/AREA URNS AUTO: 1 /HPF (ref 0–4)
SODIUM SERPL-SCNC: 130 MMOL/L (ref 136–145)
SP GR UR STRIP.AUTO: 1.02 (ref 1–1.03)
UROBILINOGEN UR STRIP.AUTO-MCNC: 1 E.U./DL
WBC #/AREA URNS AUTO: 1 /HPF (ref 0–5)

## 2025-05-16 PROCEDURE — 6360000002 HC RX W HCPCS: Performed by: SURGERY

## 2025-05-16 PROCEDURE — 99232 SBSQ HOSP IP/OBS MODERATE 35: CPT | Performed by: PSYCHIATRY & NEUROLOGY

## 2025-05-16 PROCEDURE — 94760 N-INVAS EAR/PLS OXIMETRY 1: CPT

## 2025-05-16 PROCEDURE — 6370000000 HC RX 637 (ALT 250 FOR IP): Performed by: PHYSICIAN ASSISTANT

## 2025-05-16 PROCEDURE — 97535 SELF CARE MNGMENT TRAINING: CPT

## 2025-05-16 PROCEDURE — 92507 TX SP LANG VOICE COMM INDIV: CPT

## 2025-05-16 PROCEDURE — 97110 THERAPEUTIC EXERCISES: CPT

## 2025-05-16 PROCEDURE — 80048 BASIC METABOLIC PNL TOTAL CA: CPT

## 2025-05-16 PROCEDURE — 6370000000 HC RX 637 (ALT 250 FOR IP): Performed by: PSYCHIATRY & NEUROLOGY

## 2025-05-16 PROCEDURE — 36415 COLL VENOUS BLD VENIPUNCTURE: CPT

## 2025-05-16 PROCEDURE — 82962 GLUCOSE BLOOD TEST: CPT

## 2025-05-16 PROCEDURE — 1180000000 HC REHAB R&B

## 2025-05-16 PROCEDURE — 92526 ORAL FUNCTION THERAPY: CPT

## 2025-05-16 PROCEDURE — 6370000000 HC RX 637 (ALT 250 FOR IP): Performed by: SURGERY

## 2025-05-16 PROCEDURE — 97530 THERAPEUTIC ACTIVITIES: CPT

## 2025-05-16 PROCEDURE — 81001 URINALYSIS AUTO W/SCOPE: CPT

## 2025-05-16 PROCEDURE — 94150 VITAL CAPACITY TEST: CPT

## 2025-05-16 RX ORDER — MECOBALAMIN 5000 MCG
5 TABLET,DISINTEGRATING ORAL NIGHTLY
Status: DISCONTINUED | OUTPATIENT
Start: 2025-05-16 | End: 2025-05-21 | Stop reason: HOSPADM

## 2025-05-16 RX ADMIN — LANSOPRAZOLE 30 MG: 30 TABLET, ORALLY DISINTEGRATING, DELAYED RELEASE ORAL at 20:48

## 2025-05-16 RX ADMIN — CHOLESTYRAMINE 4 G: 4 POWDER, FOR SUSPENSION ORAL at 09:41

## 2025-05-16 RX ADMIN — INSULIN LISPRO 10 UNITS: 100 INJECTION, SOLUTION INTRAVENOUS; SUBCUTANEOUS at 09:38

## 2025-05-16 RX ADMIN — LEVOTHYROXINE SODIUM 50 MCG: 0.05 TABLET ORAL at 06:00

## 2025-05-16 RX ADMIN — ATORVASTATIN CALCIUM 20 MG: 20 TABLET, FILM COATED ORAL at 20:48

## 2025-05-16 RX ADMIN — ENOXAPARIN SODIUM 40 MG: 100 INJECTION SUBCUTANEOUS at 09:41

## 2025-05-16 RX ADMIN — METOPROLOL TARTRATE 75 MG: 50 TABLET, FILM COATED ORAL at 20:48

## 2025-05-16 RX ADMIN — INSULIN GLARGINE 25 UNITS: 100 INJECTION, SOLUTION SUBCUTANEOUS at 20:50

## 2025-05-16 RX ADMIN — LANSOPRAZOLE 30 MG: 30 TABLET, ORALLY DISINTEGRATING, DELAYED RELEASE ORAL at 09:41

## 2025-05-16 RX ADMIN — GEMFIBROZIL 600 MG: 600 TABLET ORAL at 06:00

## 2025-05-16 RX ADMIN — CETIRIZINE HYDROCHLORIDE 10 MG: 10 TABLET, FILM COATED ORAL at 09:41

## 2025-05-16 RX ADMIN — ASPIRIN 81 MG: 81 TABLET, CHEWABLE ORAL at 09:41

## 2025-05-16 RX ADMIN — CLOPIDOGREL BISULFATE 75 MG: 75 TABLET, FILM COATED ORAL at 09:40

## 2025-05-16 RX ADMIN — INSULIN LISPRO 10 UNITS: 100 INJECTION, SOLUTION INTRAVENOUS; SUBCUTANEOUS at 12:55

## 2025-05-16 RX ADMIN — Medication 5 MG: at 20:50

## 2025-05-16 RX ADMIN — GEMFIBROZIL 600 MG: 600 TABLET ORAL at 17:42

## 2025-05-16 RX ADMIN — SENNOSIDES AND DOCUSATE SODIUM 1 TABLET: 50; 8.6 TABLET ORAL at 09:41

## 2025-05-17 PROBLEM — K94.23 PEG TUBE MALFUNCTION (HCC): Status: ACTIVE | Noted: 2025-05-17

## 2025-05-17 LAB
ANION GAP SERPL CALCULATED.3IONS-SCNC: 12 MMOL/L (ref 8–16)
BUN SERPL-MCNC: 16 MG/DL (ref 8–23)
CALCIUM SERPL-MCNC: 8.7 MG/DL (ref 8.8–10.2)
CHLORIDE SERPL-SCNC: 100 MMOL/L (ref 98–107)
CO2 SERPL-SCNC: 23 MMOL/L (ref 22–29)
CREAT SERPL-MCNC: 0.8 MG/DL (ref 0.7–1.2)
GLUCOSE BLD-MCNC: 159 MG/DL (ref 70–99)
GLUCOSE BLD-MCNC: 205 MG/DL (ref 70–99)
GLUCOSE BLD-MCNC: 280 MG/DL (ref 70–99)
GLUCOSE BLD-MCNC: 282 MG/DL (ref 70–99)
GLUCOSE SERPL-MCNC: 276 MG/DL (ref 70–99)
PERFORMED ON: ABNORMAL
POTASSIUM SERPL-SCNC: 3.6 MMOL/L (ref 3.5–5.1)
SODIUM SERPL-SCNC: 135 MMOL/L (ref 136–145)

## 2025-05-17 PROCEDURE — 97110 THERAPEUTIC EXERCISES: CPT

## 2025-05-17 PROCEDURE — 99222 1ST HOSP IP/OBS MODERATE 55: CPT | Performed by: INTERNAL MEDICINE

## 2025-05-17 PROCEDURE — 36415 COLL VENOUS BLD VENIPUNCTURE: CPT

## 2025-05-17 PROCEDURE — 6370000000 HC RX 637 (ALT 250 FOR IP): Performed by: SURGERY

## 2025-05-17 PROCEDURE — 92526 ORAL FUNCTION THERAPY: CPT

## 2025-05-17 PROCEDURE — 82962 GLUCOSE BLOOD TEST: CPT

## 2025-05-17 PROCEDURE — 97530 THERAPEUTIC ACTIVITIES: CPT

## 2025-05-17 PROCEDURE — 97130 THER IVNTJ EA ADDL 15 MIN: CPT

## 2025-05-17 PROCEDURE — 1180000000 HC REHAB R&B

## 2025-05-17 PROCEDURE — 97535 SELF CARE MNGMENT TRAINING: CPT

## 2025-05-17 PROCEDURE — 97116 GAIT TRAINING THERAPY: CPT

## 2025-05-17 PROCEDURE — 6360000002 HC RX W HCPCS: Performed by: SURGERY

## 2025-05-17 PROCEDURE — 99232 SBSQ HOSP IP/OBS MODERATE 35: CPT | Performed by: PSYCHIATRY & NEUROLOGY

## 2025-05-17 PROCEDURE — 6370000000 HC RX 637 (ALT 250 FOR IP): Performed by: PHYSICIAN ASSISTANT

## 2025-05-17 PROCEDURE — 97129 THER IVNTJ 1ST 15 MIN: CPT

## 2025-05-17 PROCEDURE — 80048 BASIC METABOLIC PNL TOTAL CA: CPT

## 2025-05-17 PROCEDURE — 6370000000 HC RX 637 (ALT 250 FOR IP): Performed by: PSYCHIATRY & NEUROLOGY

## 2025-05-17 RX ADMIN — ATORVASTATIN CALCIUM 20 MG: 20 TABLET, FILM COATED ORAL at 20:34

## 2025-05-17 RX ADMIN — INSULIN GLARGINE 25 UNITS: 100 INJECTION, SOLUTION SUBCUTANEOUS at 20:34

## 2025-05-17 RX ADMIN — INSULIN LISPRO 10 UNITS: 100 INJECTION, SOLUTION INTRAVENOUS; SUBCUTANEOUS at 12:36

## 2025-05-17 RX ADMIN — CLOPIDOGREL BISULFATE 75 MG: 75 TABLET, FILM COATED ORAL at 09:18

## 2025-05-17 RX ADMIN — GEMFIBROZIL 600 MG: 600 TABLET ORAL at 18:29

## 2025-05-17 RX ADMIN — CHOLESTYRAMINE 4 G: 4 POWDER, FOR SUSPENSION ORAL at 09:20

## 2025-05-17 RX ADMIN — Medication 5 MG: at 20:34

## 2025-05-17 RX ADMIN — CARBAMAZEPINE 200 MG: 200 TABLET, EXTENDED RELEASE ORAL at 20:34

## 2025-05-17 RX ADMIN — LANSOPRAZOLE 30 MG: 30 TABLET, ORALLY DISINTEGRATING, DELAYED RELEASE ORAL at 20:34

## 2025-05-17 RX ADMIN — METOPROLOL TARTRATE 75 MG: 50 TABLET, FILM COATED ORAL at 09:17

## 2025-05-17 RX ADMIN — ASPIRIN 81 MG: 81 TABLET, CHEWABLE ORAL at 09:16

## 2025-05-17 RX ADMIN — CETIRIZINE HYDROCHLORIDE 10 MG: 10 TABLET, FILM COATED ORAL at 09:16

## 2025-05-17 RX ADMIN — LEVOTHYROXINE SODIUM 50 MCG: 0.05 TABLET ORAL at 06:30

## 2025-05-17 RX ADMIN — LOSARTAN POTASSIUM 25 MG: 25 TABLET, FILM COATED ORAL at 09:17

## 2025-05-17 RX ADMIN — GEMFIBROZIL 600 MG: 600 TABLET ORAL at 06:30

## 2025-05-17 RX ADMIN — METOPROLOL TARTRATE 75 MG: 50 TABLET, FILM COATED ORAL at 20:34

## 2025-05-17 RX ADMIN — LANSOPRAZOLE 30 MG: 30 TABLET, ORALLY DISINTEGRATING, DELAYED RELEASE ORAL at 09:17

## 2025-05-17 RX ADMIN — ENOXAPARIN SODIUM 40 MG: 100 INJECTION SUBCUTANEOUS at 09:19

## 2025-05-17 RX ADMIN — INSULIN LISPRO 10 UNITS: 100 INJECTION, SOLUTION INTRAVENOUS; SUBCUTANEOUS at 09:19

## 2025-05-17 NOTE — PATIENT CARE CONFERENCE
[facility] ACUTE INPATIENT REHABILITATION  TEAM CONFERENCE NOTE    Date: 2025  Patient Name: Akhil Alejo        MRN: [id number]    : 1955  (69 y.o.)  Gender: male             PHYSICAL THERAPY      SPEECH THERAPY      OCCUPATIONAL THERAPY      NUTRITION  Current Wt: Weight - Scale: 86.2 kg (190 lb) / Body mass index is 26.5 kg/m².  Admission Wt:    Oral Diet Orders:     Oral Nutrition Supplement (ONS) Orders:    Please see nutrition note for details.      NURSING    Past Medical History:        Diagnosis Date    Arm fracture 2016    left    Dementia (HCC)     per wife he needs be supervised at home    Depression     Diabetes mellitus (HCC)     Hypertension     Kidney stones     Neuropathy     Restless leg syndrome        Vitals:    25 1905 25 1930 25   BP: (!) 155/77  (!) 146/82   Pulse: 93  92   Resp: 18  17   Temp: 98.3 °F (36.8 °C)     SpO2: 97%  96%   Weight:  86.2 kg (190 lb)    Height: 1.778 m (5' 10\") 1.803 m (5' 11\")         SpO2: 96 %    On Room Air    Wounds/Incisions/Ulcers:  Skin warm and dry with scattered bruising to RUE and to Left arm.  Incision to Left groin, Left Subclavian, and RLE with surgical glue.  Incision to right thigh with steri strips.  Stag wounds to upper abdomen x 2 ROCIO with scabs.  Abrasion to top of right foot and right arm.  Redness to otis area, Bilateral heels, and Coccyx.     Donte Scale Score: 17    Pain: Patient's pain is currently controlled with   Roxicodone 5-10 mg q4hrs prn    Consultations/Labs/X-rays:   Routine lab on Monday and .  Consult Dr. Lara.    Family Education: Family available and participating in education    Fall Risk:  Benoit Laboy Total Score: 75    Fall in the last week? No    Sleep Concerns: \"No concerns to address    Last BM: Last BM (including prior to admit): 25    Other Nursing Issues:   Patient is Alert and Oriented x 3.  Assist x 2 with walker.  Continent of bowel and bladder with external 
warm and dry with scattered bruising and redness to otis area.  Incisions ROCIO to left chest, groin, RLE and Right thigh with steri strips.  Stab wounds x 2 to upper abdoment ROCIO with scabs.     Donte Scale Score: 15    Pain: No pain concerns to address, Tylenol prn    Consultations/Labs/X-rays:   Routine lab on Monday and Thursdays.  Consult Dr. Lara, DR. Allred-GI, and Nephrology.    Family Education: Family available and participating in education    Fall Risk:  Benoit Laboy Total Score: 75    Fall in the last week? No    Sleep Concerns: \"No concerns to address    Last BM: Last BM (including prior to admit): 05/19/25    Other Nursing Issues:   Patient is oriented to person and disoriented at times.  Assist x 2 for stand pivot.  IID to right forearm.  Peg tube with abdominal binder.  Accu check ac & hs with Humalog 10 Units tid with meals and Lantus 25 Units qhs.  Incontinent of bowel and bladder with external catheter.  Lovenox and Plavix on hold.  Patient is on tube feedings of Glucerna 1.5 at 55 ml/hr continuous and 35 ml water every hour and meds crushed and given per peg tube.        SOCIAL WORK/CASE MANAGEMENT  Assessment: Patient lives with his wife. Patient with new g-tube. Patient's wife plans to take patient home and will be with patient around the clock. MSW has started arrangements for [facility] [place] and home tube feedings.     Discharge Plan   Estimated Length of Stay: Discharge planned for Saturday, 5/31.   Destination: undetermined at this time    Recommended Family Therapy Training: Yes, scheduled for 5/29 at 9:00AM.     Pass: No    Services at Discharge: home health and home tube feedings     Equipment at Discharge: Determine closer to discharge.     Progress made in the prior week:  LIMITED DUE TO LETHARGY  2. Limited due to lethargy   3.  4.  5.      Goals for following week:  CONSISTENTLY AMBULATE 25 FT WITH RW MIN A  2. Supervision with UB dressing.   3.   4.   5.     Factors

## 2025-05-18 LAB
GLUCOSE BLD-MCNC: 200 MG/DL (ref 70–99)
GLUCOSE BLD-MCNC: 215 MG/DL (ref 70–99)
GLUCOSE BLD-MCNC: 266 MG/DL (ref 70–99)
GLUCOSE BLD-MCNC: 294 MG/DL (ref 70–99)
PERFORMED ON: ABNORMAL

## 2025-05-18 PROCEDURE — 6370000000 HC RX 637 (ALT 250 FOR IP): Performed by: SURGERY

## 2025-05-18 PROCEDURE — 1180000000 HC REHAB R&B

## 2025-05-18 PROCEDURE — 94150 VITAL CAPACITY TEST: CPT

## 2025-05-18 PROCEDURE — 82962 GLUCOSE BLOOD TEST: CPT

## 2025-05-18 PROCEDURE — 6370000000 HC RX 637 (ALT 250 FOR IP): Performed by: PSYCHIATRY & NEUROLOGY

## 2025-05-18 PROCEDURE — 6360000002 HC RX W HCPCS: Performed by: SURGERY

## 2025-05-18 PROCEDURE — 94760 N-INVAS EAR/PLS OXIMETRY 1: CPT

## 2025-05-18 PROCEDURE — 6370000000 HC RX 637 (ALT 250 FOR IP): Performed by: PHYSICIAN ASSISTANT

## 2025-05-18 RX ADMIN — LEVOTHYROXINE SODIUM 50 MCG: 0.05 TABLET ORAL at 05:45

## 2025-05-18 RX ADMIN — CARBAMAZEPINE 200 MG: 200 TABLET, EXTENDED RELEASE ORAL at 20:35

## 2025-05-18 RX ADMIN — LANSOPRAZOLE 30 MG: 30 TABLET, ORALLY DISINTEGRATING, DELAYED RELEASE ORAL at 20:35

## 2025-05-18 RX ADMIN — GEMFIBROZIL 600 MG: 600 TABLET ORAL at 05:45

## 2025-05-18 RX ADMIN — INSULIN GLARGINE 25 UNITS: 100 INJECTION, SOLUTION SUBCUTANEOUS at 20:35

## 2025-05-18 RX ADMIN — LANSOPRAZOLE 30 MG: 30 TABLET, ORALLY DISINTEGRATING, DELAYED RELEASE ORAL at 08:08

## 2025-05-18 RX ADMIN — INSULIN LISPRO 10 UNITS: 100 INJECTION, SOLUTION INTRAVENOUS; SUBCUTANEOUS at 12:14

## 2025-05-18 RX ADMIN — INSULIN LISPRO 10 UNITS: 100 INJECTION, SOLUTION INTRAVENOUS; SUBCUTANEOUS at 08:07

## 2025-05-18 RX ADMIN — ENOXAPARIN SODIUM 40 MG: 100 INJECTION SUBCUTANEOUS at 08:06

## 2025-05-18 RX ADMIN — CARBAMAZEPINE 200 MG: 200 TABLET, EXTENDED RELEASE ORAL at 08:07

## 2025-05-18 RX ADMIN — LOSARTAN POTASSIUM 25 MG: 25 TABLET, FILM COATED ORAL at 08:08

## 2025-05-18 RX ADMIN — CETIRIZINE HYDROCHLORIDE 10 MG: 10 TABLET, FILM COATED ORAL at 08:07

## 2025-05-18 RX ADMIN — METOPROLOL TARTRATE 75 MG: 50 TABLET, FILM COATED ORAL at 08:07

## 2025-05-18 RX ADMIN — Medication 5 MG: at 20:35

## 2025-05-18 RX ADMIN — CLOPIDOGREL BISULFATE 75 MG: 75 TABLET, FILM COATED ORAL at 08:07

## 2025-05-18 RX ADMIN — GEMFIBROZIL 600 MG: 600 TABLET ORAL at 14:32

## 2025-05-18 RX ADMIN — INSULIN LISPRO 10 UNITS: 100 INJECTION, SOLUTION INTRAVENOUS; SUBCUTANEOUS at 16:52

## 2025-05-18 RX ADMIN — ASPIRIN 81 MG: 81 TABLET, CHEWABLE ORAL at 08:07

## 2025-05-18 RX ADMIN — ATORVASTATIN CALCIUM 20 MG: 20 TABLET, FILM COATED ORAL at 20:34

## 2025-05-18 RX ADMIN — METOPROLOL TARTRATE 75 MG: 50 TABLET, FILM COATED ORAL at 20:34

## 2025-05-18 RX ADMIN — CHOLESTYRAMINE 4 G: 4 POWDER, FOR SUSPENSION ORAL at 08:07

## 2025-05-19 ENCOUNTER — APPOINTMENT (OUTPATIENT)
Dept: CT IMAGING | Age: 70
DRG: 948 | End: 2025-05-19
Attending: PSYCHIATRY & NEUROLOGY
Payer: MEDICARE

## 2025-05-19 ENCOUNTER — APPOINTMENT (OUTPATIENT)
Dept: GENERAL RADIOLOGY | Age: 70
DRG: 948 | End: 2025-05-19
Attending: PSYCHIATRY & NEUROLOGY
Payer: MEDICARE

## 2025-05-19 LAB
ANION GAP SERPL CALCULATED.3IONS-SCNC: 13 MMOL/L (ref 8–16)
BASOPHILS # BLD: 0.1 K/UL (ref 0–0.2)
BASOPHILS NFR BLD: 0.5 % (ref 0–1)
BUN SERPL-MCNC: 44 MG/DL (ref 8–23)
CALCIUM SERPL-MCNC: 8.7 MG/DL (ref 8.8–10.2)
CHLORIDE SERPL-SCNC: 100 MMOL/L (ref 98–107)
CO2 SERPL-SCNC: 20 MMOL/L (ref 22–29)
CREAT SERPL-MCNC: 0.8 MG/DL (ref 0.7–1.2)
EOSINOPHIL # BLD: 0.6 K/UL (ref 0–0.6)
EOSINOPHIL NFR BLD: 3.9 % (ref 0–5)
ERYTHROCYTE [DISTWIDTH] IN BLOOD BY AUTOMATED COUNT: 15.3 % (ref 11.5–14.5)
GLUCOSE BLD-MCNC: 166 MG/DL (ref 70–99)
GLUCOSE BLD-MCNC: 210 MG/DL (ref 70–99)
GLUCOSE BLD-MCNC: 306 MG/DL (ref 70–99)
GLUCOSE BLD-MCNC: 326 MG/DL (ref 70–99)
GLUCOSE SERPL-MCNC: 291 MG/DL (ref 70–99)
HCT VFR BLD AUTO: 31.3 % (ref 42–52)
HGB BLD-MCNC: 9.9 G/DL (ref 14–18)
IMM GRANULOCYTES # BLD: 0.8 K/UL
LYMPHOCYTES # BLD: 3.5 K/UL (ref 1.1–4.5)
LYMPHOCYTES NFR BLD: 23.3 % (ref 20–40)
MCH RBC QN AUTO: 28 PG (ref 27–31)
MCHC RBC AUTO-ENTMCNC: 31.6 G/DL (ref 33–37)
MCV RBC AUTO: 88.7 FL (ref 80–94)
MONOCYTES # BLD: 1.2 K/UL (ref 0–0.9)
MONOCYTES NFR BLD: 8.1 % (ref 0–10)
NEUTROPHILS # BLD: 8.9 K/UL (ref 1.5–7.5)
NEUTS SEG NFR BLD: 58.7 % (ref 50–65)
PERFORMED ON: ABNORMAL
PLATELET # BLD AUTO: 420 K/UL (ref 130–400)
PMV BLD AUTO: 10.3 FL (ref 9.4–12.4)
POTASSIUM SERPL-SCNC: 4.2 MMOL/L (ref 3.5–5)
RBC # BLD AUTO: 3.53 M/UL (ref 4.7–6.1)
SODIUM SERPL-SCNC: 133 MMOL/L (ref 136–145)
WBC # BLD AUTO: 15.1 K/UL (ref 4.8–10.8)

## 2025-05-19 PROCEDURE — 6370000000 HC RX 637 (ALT 250 FOR IP): Performed by: SURGERY

## 2025-05-19 PROCEDURE — 2500000003 HC RX 250 WO HCPCS: Performed by: PSYCHIATRY & NEUROLOGY

## 2025-05-19 PROCEDURE — 97535 SELF CARE MNGMENT TRAINING: CPT

## 2025-05-19 PROCEDURE — 94760 N-INVAS EAR/PLS OXIMETRY 1: CPT

## 2025-05-19 PROCEDURE — 94150 VITAL CAPACITY TEST: CPT

## 2025-05-19 PROCEDURE — 6370000000 HC RX 637 (ALT 250 FOR IP): Performed by: PHYSICIAN ASSISTANT

## 2025-05-19 PROCEDURE — 76937 US GUIDE VASCULAR ACCESS: CPT

## 2025-05-19 PROCEDURE — 86901 BLOOD TYPING SEROLOGIC RH(D): CPT

## 2025-05-19 PROCEDURE — 74160 CT ABDOMEN W/CONTRAST: CPT

## 2025-05-19 PROCEDURE — 36410 VNPNXR 3YR/> PHY/QHP DX/THER: CPT

## 2025-05-19 PROCEDURE — 2709999900 HC NON-CHARGEABLE SUPPLY

## 2025-05-19 PROCEDURE — 97110 THERAPEUTIC EXERCISES: CPT

## 2025-05-19 PROCEDURE — 97530 THERAPEUTIC ACTIVITIES: CPT

## 2025-05-19 PROCEDURE — 80048 BASIC METABOLIC PNL TOTAL CA: CPT

## 2025-05-19 PROCEDURE — 0DH63UZ INSERTION OF FEEDING DEVICE INTO STOMACH, PERCUTANEOUS APPROACH: ICD-10-PCS | Performed by: PSYCHIATRY & NEUROLOGY

## 2025-05-19 PROCEDURE — 36415 COLL VENOUS BLD VENIPUNCTURE: CPT

## 2025-05-19 PROCEDURE — 86900 BLOOD TYPING SEROLOGIC ABO: CPT

## 2025-05-19 PROCEDURE — 85025 COMPLETE CBC W/AUTO DIFF WBC: CPT

## 2025-05-19 PROCEDURE — 6370000000 HC RX 637 (ALT 250 FOR IP): Performed by: PSYCHIATRY & NEUROLOGY

## 2025-05-19 PROCEDURE — 82962 GLUCOSE BLOOD TEST: CPT

## 2025-05-19 PROCEDURE — 99232 SBSQ HOSP IP/OBS MODERATE 35: CPT | Performed by: PSYCHIATRY & NEUROLOGY

## 2025-05-19 PROCEDURE — 1180000000 HC REHAB R&B

## 2025-05-19 PROCEDURE — 74230 X-RAY XM SWLNG FUNCJ C+: CPT

## 2025-05-19 PROCEDURE — 86850 RBC ANTIBODY SCREEN: CPT

## 2025-05-19 PROCEDURE — 6360000002 HC RX W HCPCS: Performed by: SURGERY

## 2025-05-19 PROCEDURE — 92611 MOTION FLUOROSCOPY/SWALLOW: CPT

## 2025-05-19 PROCEDURE — 6360000004 HC RX CONTRAST MEDICATION: Performed by: INTERNAL MEDICINE

## 2025-05-19 PROCEDURE — 97116 GAIT TRAINING THERAPY: CPT

## 2025-05-19 PROCEDURE — 02HV33Z INSERTION OF INFUSION DEVICE INTO SUPERIOR VENA CAVA, PERCUTANEOUS APPROACH: ICD-10-PCS | Performed by: PSYCHIATRY & NEUROLOGY

## 2025-05-19 PROCEDURE — 99232 SBSQ HOSP IP/OBS MODERATE 35: CPT | Performed by: INTERNAL MEDICINE

## 2025-05-19 RX ORDER — GEMFIBROZIL 600 MG/1
600 TABLET, FILM COATED ORAL 2 TIMES DAILY
Status: DISCONTINUED | OUTPATIENT
Start: 2025-05-19 | End: 2025-05-21 | Stop reason: HOSPADM

## 2025-05-19 RX ORDER — CARBAMAZEPINE 100 MG/5ML
100 SUSPENSION ORAL 4 TIMES DAILY
Status: DISCONTINUED | OUTPATIENT
Start: 2025-05-19 | End: 2025-05-21 | Stop reason: HOSPADM

## 2025-05-19 RX ORDER — SODIUM CHLORIDE 0.9 % (FLUSH) 0.9 %
5-40 SYRINGE (ML) INJECTION 2 TIMES DAILY
Status: DISCONTINUED | OUTPATIENT
Start: 2025-05-19 | End: 2025-05-21 | Stop reason: HOSPADM

## 2025-05-19 RX ORDER — IOPAMIDOL 755 MG/ML
70 INJECTION, SOLUTION INTRAVASCULAR
Status: COMPLETED | OUTPATIENT
Start: 2025-05-19 | End: 2025-05-19

## 2025-05-19 RX ADMIN — LOSARTAN POTASSIUM 25 MG: 25 TABLET, FILM COATED ORAL at 10:01

## 2025-05-19 RX ADMIN — CLOPIDOGREL BISULFATE 75 MG: 75 TABLET, FILM COATED ORAL at 10:01

## 2025-05-19 RX ADMIN — METOPROLOL TARTRATE 75 MG: 50 TABLET, FILM COATED ORAL at 10:01

## 2025-05-19 RX ADMIN — SODIUM CHLORIDE, PRESERVATIVE FREE 10 ML: 5 INJECTION INTRAVENOUS at 21:38

## 2025-05-19 RX ADMIN — ASPIRIN 81 MG: 81 TABLET, CHEWABLE ORAL at 10:02

## 2025-05-19 RX ADMIN — CARBAMAZEPINE 200 MG: 200 TABLET, EXTENDED RELEASE ORAL at 10:03

## 2025-05-19 RX ADMIN — LEVOTHYROXINE SODIUM 50 MCG: 0.05 TABLET ORAL at 05:23

## 2025-05-19 RX ADMIN — ACETAMINOPHEN 650 MG: 325 TABLET ORAL at 02:44

## 2025-05-19 RX ADMIN — GEMFIBROZIL 600 MG: 600 TABLET ORAL at 05:23

## 2025-05-19 RX ADMIN — INSULIN GLARGINE 25 UNITS: 100 INJECTION, SOLUTION SUBCUTANEOUS at 21:25

## 2025-05-19 RX ADMIN — INSULIN LISPRO 10 UNITS: 100 INJECTION, SOLUTION INTRAVENOUS; SUBCUTANEOUS at 10:01

## 2025-05-19 RX ADMIN — Medication 5 MG: at 21:24

## 2025-05-19 RX ADMIN — ACETAMINOPHEN 650 MG: 325 TABLET ORAL at 10:01

## 2025-05-19 RX ADMIN — CHOLESTYRAMINE 4 G: 4 POWDER, FOR SUSPENSION ORAL at 10:02

## 2025-05-19 RX ADMIN — ENOXAPARIN SODIUM 40 MG: 100 INJECTION SUBCUTANEOUS at 10:01

## 2025-05-19 RX ADMIN — GEMFIBROZIL 600 MG: 600 TABLET ORAL at 21:25

## 2025-05-19 RX ADMIN — LANSOPRAZOLE 30 MG: 30 TABLET, ORALLY DISINTEGRATING, DELAYED RELEASE ORAL at 10:02

## 2025-05-19 RX ADMIN — Medication 100 MG: at 22:25

## 2025-05-19 RX ADMIN — LOPERAMIDE HCL 2 MG: 1 SOLUTION ORAL at 10:02

## 2025-05-19 RX ADMIN — METOPROLOL TARTRATE 75 MG: 50 TABLET, FILM COATED ORAL at 21:25

## 2025-05-19 RX ADMIN — ATORVASTATIN CALCIUM 20 MG: 20 TABLET, FILM COATED ORAL at 21:24

## 2025-05-19 RX ADMIN — CETIRIZINE HYDROCHLORIDE 10 MG: 10 TABLET, FILM COATED ORAL at 10:02

## 2025-05-19 RX ADMIN — INSULIN LISPRO 10 UNITS: 100 INJECTION, SOLUTION INTRAVENOUS; SUBCUTANEOUS at 17:55

## 2025-05-19 RX ADMIN — LANSOPRAZOLE 30 MG: 30 TABLET, ORALLY DISINTEGRATING, DELAYED RELEASE ORAL at 21:25

## 2025-05-19 RX ADMIN — INSULIN LISPRO 10 UNITS: 100 INJECTION, SOLUTION INTRAVENOUS; SUBCUTANEOUS at 12:34

## 2025-05-19 RX ADMIN — IOPAMIDOL 70 ML: 755 INJECTION, SOLUTION INTRAVENOUS at 14:53

## 2025-05-19 ASSESSMENT — PAIN SCALES - GENERAL
PAINLEVEL_OUTOF10: 5
PAINLEVEL_OUTOF10: 0

## 2025-05-19 ASSESSMENT — PAIN - FUNCTIONAL ASSESSMENT
PAIN_FUNCTIONAL_ASSESSMENT: ACTIVITIES ARE NOT PREVENTED
PAIN_FUNCTIONAL_ASSESSMENT: ACTIVITIES ARE NOT PREVENTED

## 2025-05-19 ASSESSMENT — PAIN DESCRIPTION - FREQUENCY: FREQUENCY: INTERMITTENT

## 2025-05-19 ASSESSMENT — PAIN DESCRIPTION - LOCATION
LOCATION: FLANK
LOCATION: ABDOMEN

## 2025-05-19 ASSESSMENT — PAIN SCALES - WONG BAKER: WONGBAKER_NUMERICALRESPONSE: HURTS LITTLE MORE

## 2025-05-19 ASSESSMENT — PAIN DESCRIPTION - ONSET: ONSET: ON-GOING

## 2025-05-19 ASSESSMENT — PAIN DESCRIPTION - DESCRIPTORS
DESCRIPTORS: SORE
DESCRIPTORS: DISCOMFORT

## 2025-05-19 ASSESSMENT — PAIN DESCRIPTION - ORIENTATION
ORIENTATION: LEFT
ORIENTATION: ANTERIOR;MID

## 2025-05-19 ASSESSMENT — PAIN DESCRIPTION - PAIN TYPE: TYPE: ACUTE PAIN

## 2025-05-19 NOTE — PROCEDURES
INSTRUMENTAL SWALLOW REPORT  MODIFIED BARIUM SWALLOW    NAME: Akhil Alejo   : 53  MRN: [id number]       Date of Eval: 25     Ordering Physician: Dr. Pennington  Radiologist: Dr. Mata    Referring Diagnosis(es): Dysphagia     Past Medical History:  has a past medical history of Arm fracture, Dementia (HCC), Depression, Diabetes mellitus (HCC), Hypertension, Kidney stones, Neuropathy, and Restless leg syndrome.    Past Surgical History:  has a past surgical history that includes Cholecystectomy; Lithotripsy; shoulder surgery (Left); bone marrow biopsy (Right, 2020); Colonoscopy (2020); Upper gastrointestinal endoscopy (2017); Cardiac procedure (N/A, 2025); Coronary artery bypass graft (N/A, 2025); Gastrostomy tube placement (2025); and Upper gastrointestinal endoscopy (N/A, 2025).    Patient Complaints/Reason for Referral:  Akhil Alejo was referred for a MBS to assess the efficiency of his/her swallow function, assess for aspiration, and to make recommendations regarding safe dietary consistencies, effective compensatory strategies, and safe eating environment.    Impressions:  Completed re-assessment. With mildly thick/nectar thick barium, moderately thick/honey thick barium, puree consistency, and thin barium, patient exhibited oral holding, slow oral transit, and subsequent swallow delay. Patient still did not initiate swallow with minced and moist consistency without barium wash. During every swallow, epiglottic inversion during swallow initiation was considered to be delayed and decreased but slightly over horizontal in positioning. This date, no penetration/aspiration was noted with any food/drink consistency. With every consistency, patient exhibited consistent oral cavity residue and consistent pharyngeal residue post swallows. At the beginning of the study, all oral cavity residue and all pharyngeal residue cleared with additional dry swallows. At the end

## 2025-05-20 LAB
GLUCOSE BLD-MCNC: 228 MG/DL (ref 70–99)
GLUCOSE BLD-MCNC: 239 MG/DL (ref 70–99)
GLUCOSE BLD-MCNC: 249 MG/DL (ref 70–99)
GLUCOSE BLD-MCNC: 258 MG/DL (ref 70–99)
PERFORMED ON: ABNORMAL

## 2025-05-20 PROCEDURE — 92526 ORAL FUNCTION THERAPY: CPT

## 2025-05-20 PROCEDURE — 97530 THERAPEUTIC ACTIVITIES: CPT

## 2025-05-20 PROCEDURE — 82962 GLUCOSE BLOOD TEST: CPT

## 2025-05-20 PROCEDURE — 97535 SELF CARE MNGMENT TRAINING: CPT

## 2025-05-20 PROCEDURE — 94760 N-INVAS EAR/PLS OXIMETRY 1: CPT

## 2025-05-20 PROCEDURE — 6370000000 HC RX 637 (ALT 250 FOR IP): Performed by: PSYCHIATRY & NEUROLOGY

## 2025-05-20 PROCEDURE — 99232 SBSQ HOSP IP/OBS MODERATE 35: CPT | Performed by: INTERNAL MEDICINE

## 2025-05-20 PROCEDURE — 97110 THERAPEUTIC EXERCISES: CPT

## 2025-05-20 PROCEDURE — 6370000000 HC RX 637 (ALT 250 FOR IP): Performed by: SURGERY

## 2025-05-20 PROCEDURE — 97116 GAIT TRAINING THERAPY: CPT

## 2025-05-20 PROCEDURE — 6370000000 HC RX 637 (ALT 250 FOR IP): Performed by: PHYSICIAN ASSISTANT

## 2025-05-20 PROCEDURE — 2500000003 HC RX 250 WO HCPCS: Performed by: PSYCHIATRY & NEUROLOGY

## 2025-05-20 PROCEDURE — 92507 TX SP LANG VOICE COMM INDIV: CPT

## 2025-05-20 PROCEDURE — 94150 VITAL CAPACITY TEST: CPT

## 2025-05-20 PROCEDURE — 99232 SBSQ HOSP IP/OBS MODERATE 35: CPT | Performed by: PSYCHIATRY & NEUROLOGY

## 2025-05-20 PROCEDURE — 1180000000 HC REHAB R&B

## 2025-05-20 PROCEDURE — 6360000002 HC RX W HCPCS: Performed by: SURGERY

## 2025-05-20 RX ORDER — CLOPIDOGREL BISULFATE 75 MG/1
75 TABLET ORAL DAILY
Status: DISCONTINUED | OUTPATIENT
Start: 2025-05-23 | End: 2025-05-21 | Stop reason: HOSPADM

## 2025-05-20 RX ORDER — ENOXAPARIN SODIUM 100 MG/ML
40 INJECTION SUBCUTANEOUS DAILY
Status: DISCONTINUED | OUTPATIENT
Start: 2025-05-23 | End: 2025-05-21 | Stop reason: HOSPADM

## 2025-05-20 RX ADMIN — LEVOTHYROXINE SODIUM 50 MCG: 0.05 TABLET ORAL at 05:48

## 2025-05-20 RX ADMIN — SODIUM CHLORIDE, PRESERVATIVE FREE 10 ML: 5 INJECTION INTRAVENOUS at 21:03

## 2025-05-20 RX ADMIN — INSULIN LISPRO 10 UNITS: 100 INJECTION, SOLUTION INTRAVENOUS; SUBCUTANEOUS at 08:24

## 2025-05-20 RX ADMIN — INSULIN GLARGINE 25 UNITS: 100 INJECTION, SOLUTION SUBCUTANEOUS at 21:02

## 2025-05-20 RX ADMIN — LOSARTAN POTASSIUM 25 MG: 25 TABLET, FILM COATED ORAL at 08:25

## 2025-05-20 RX ADMIN — LANSOPRAZOLE 30 MG: 30 TABLET, ORALLY DISINTEGRATING, DELAYED RELEASE ORAL at 08:25

## 2025-05-20 RX ADMIN — ENOXAPARIN SODIUM 40 MG: 100 INJECTION SUBCUTANEOUS at 08:25

## 2025-05-20 RX ADMIN — GEMFIBROZIL 600 MG: 600 TABLET ORAL at 08:25

## 2025-05-20 RX ADMIN — METOPROLOL TARTRATE 75 MG: 50 TABLET, FILM COATED ORAL at 08:24

## 2025-05-20 RX ADMIN — CETIRIZINE HYDROCHLORIDE 10 MG: 10 TABLET, FILM COATED ORAL at 08:24

## 2025-05-20 RX ADMIN — CLOPIDOGREL BISULFATE 75 MG: 75 TABLET, FILM COATED ORAL at 08:25

## 2025-05-20 RX ADMIN — Medication 5 MG: at 21:02

## 2025-05-20 RX ADMIN — SODIUM CHLORIDE, PRESERVATIVE FREE 10 ML: 5 INJECTION INTRAVENOUS at 08:25

## 2025-05-20 RX ADMIN — GEMFIBROZIL 600 MG: 600 TABLET ORAL at 21:02

## 2025-05-20 RX ADMIN — Medication 100 MG: at 12:00

## 2025-05-20 RX ADMIN — CHOLESTYRAMINE 4 G: 4 POWDER, FOR SUSPENSION ORAL at 08:25

## 2025-05-20 RX ADMIN — Medication 100 MG: at 17:24

## 2025-05-20 RX ADMIN — INSULIN LISPRO 10 UNITS: 100 INJECTION, SOLUTION INTRAVENOUS; SUBCUTANEOUS at 17:24

## 2025-05-20 RX ADMIN — Medication 100 MG: at 21:05

## 2025-05-20 RX ADMIN — METOPROLOL TARTRATE 75 MG: 50 TABLET, FILM COATED ORAL at 21:03

## 2025-05-20 RX ADMIN — INSULIN LISPRO 10 UNITS: 100 INJECTION, SOLUTION INTRAVENOUS; SUBCUTANEOUS at 12:00

## 2025-05-20 RX ADMIN — Medication 100 MG: at 08:25

## 2025-05-20 RX ADMIN — LANSOPRAZOLE 30 MG: 30 TABLET, ORALLY DISINTEGRATING, DELAYED RELEASE ORAL at 21:02

## 2025-05-20 RX ADMIN — ATORVASTATIN CALCIUM 20 MG: 20 TABLET, FILM COATED ORAL at 21:02

## 2025-05-20 RX ADMIN — ASPIRIN 81 MG: 81 TABLET, CHEWABLE ORAL at 08:25

## 2025-05-20 NOTE — H&P (VIEW-ONLY)
Progress Note            Date:5/20/2025        Patient Name:Akhil Alejo     YOB: 1955     Age:69 y.o.    CC: Follow-up blood and pain around PEG tube site.    Still with some dried blood on the gauze under his PEG bolster.  Still with significant pain to palpation under the PEG tube site.    Physical Exam   /74   Pulse 74   Temp 97.7 °F (36.5 °C) (Temporal)   Resp 16   Ht 1.803 m (5' 10.98\")   Wt 83 kg (182 lb 15.7 oz)   SpO2 96%   BMI 25.53 kg/m²      - GENERAL: Up in chair.  No acute distress. Well-nourished.    - EYES: EOMI. Anicteric.    - HENT: Moist mucous membranes. No cervical lymphadenopathy.    - LUNGS: Clear to auscultation bilaterally. No accessory muscle use.    - CARDIOVASCULAR: Regular rate and rhythm. No murmur. No JVD.    - ABDOMEN: Soft, non-tender and non-distended. No palpable masses.  Still with pain under the PEG tube site.  There are some dried blood on the dressing under the bolster.    - EXTREMITIES: No edema. Non-tender.?      Labs    CBC:  Recent Labs     05/19/25  0516   WBC 15.1*   RBC 3.53*   HGB 9.9*   HCT 31.3*   MCV 88.7   RDW 15.3*   *     CHEMISTRIES:  Recent Labs     05/19/25  0516   *   K 4.2      CO2 20*   BUN 44*   CREATININE 0.8   GLUCOSE 291*     PT/INR:No results for input(s): \"PROTIME\", \"INR\" in the last 72 hours.  APTT:No results for input(s): \"APTT\" in the last 72 hours.  LIVER PROFILE:  No results for input(s): \"AST\", \"ALT\", \"BILIDIR\", \"BILITOT\", \"ALKPHOS\" in the last 72 hours.      Assessment & Plan:   This is a 69-year-old male with a recent CABG (4/29/2025) with a history of RLS, dementia, and diabetes mellitus.  He is known to be on aspirin, Plavix, and prophylactic Lovenox.  He had a PEG tube placed 5/13/2025 for dysphagia and this was noted to be bleeding around the skin site.  We have been consulted.     Bleeding around the PEG tube site.  There was some minimal dried blood under the PEG tube bolster.  When I

## 2025-05-21 ENCOUNTER — ANESTHESIA (OUTPATIENT)
Dept: ENDOSCOPY | Age: 70
DRG: 393 | End: 2025-05-21
Payer: MEDICARE

## 2025-05-21 ENCOUNTER — APPOINTMENT (OUTPATIENT)
Dept: GENERAL RADIOLOGY | Age: 70
DRG: 948 | End: 2025-05-21
Attending: PSYCHIATRY & NEUROLOGY
Payer: MEDICARE

## 2025-05-21 ENCOUNTER — APPOINTMENT (OUTPATIENT)
Dept: CT IMAGING | Age: 70
DRG: 948 | End: 2025-05-21
Attending: PSYCHIATRY & NEUROLOGY
Payer: MEDICARE

## 2025-05-21 ENCOUNTER — APPOINTMENT (OUTPATIENT)
Dept: CT IMAGING | Age: 70
DRG: 393 | End: 2025-05-21
Attending: HOSPITALIST
Payer: MEDICARE

## 2025-05-21 ENCOUNTER — HOSPITAL ENCOUNTER (INPATIENT)
Age: 70
LOS: 7 days | Discharge: NURSING, REHAB OR LONG-TERM CARE | DRG: 393 | End: 2025-05-28
Attending: HOSPITALIST | Admitting: HOSPITALIST
Payer: MEDICARE

## 2025-05-21 ENCOUNTER — OUTSIDE FACILITY SERVICE (OUTPATIENT)
Age: 70
End: 2025-05-21
Payer: COMMERCIAL

## 2025-05-21 ENCOUNTER — ANESTHESIA EVENT (OUTPATIENT)
Dept: ENDOSCOPY | Age: 70
DRG: 393 | End: 2025-05-21
Payer: MEDICARE

## 2025-05-21 ENCOUNTER — APPOINTMENT (OUTPATIENT)
Dept: GENERAL RADIOLOGY | Age: 70
DRG: 393 | End: 2025-05-21
Attending: HOSPITALIST
Payer: MEDICARE

## 2025-05-21 VITALS
OXYGEN SATURATION: 95 % | DIASTOLIC BLOOD PRESSURE: 53 MMHG | SYSTOLIC BLOOD PRESSURE: 105 MMHG | WEIGHT: 182.98 LBS | HEIGHT: 71 IN | RESPIRATION RATE: 32 BRPM | BODY MASS INDEX: 25.62 KG/M2 | TEMPERATURE: 98.2 F | HEART RATE: 94 BPM

## 2025-05-21 DIAGNOSIS — I63.9 STROKE DETERMINED BY CLINICAL ASSESSMENT (HCC): Primary | ICD-10-CM

## 2025-05-21 PROBLEM — D64.9 ANEMIA: Status: ACTIVE | Noted: 2025-05-21

## 2025-05-21 PROBLEM — K92.2 GI BLEED: Status: ACTIVE | Noted: 2025-05-21

## 2025-05-21 PROBLEM — K92.0 HEMATEMESIS: Status: ACTIVE | Noted: 2025-05-13

## 2025-05-21 LAB
ABO + RH BLD: NORMAL
ABO/RH: NORMAL
ALBUMIN SERPL-MCNC: 2.6 G/DL (ref 3.5–5.2)
ALLENS TEST: ABNORMAL
ALP SERPL-CCNC: 125 U/L (ref 40–129)
ALT SERPL-CCNC: 10 U/L (ref 10–50)
ANION GAP SERPL CALCULATED.3IONS-SCNC: 12 MMOL/L (ref 8–16)
ANTIBODY SCREEN: NORMAL
AST SERPL-CCNC: 20 U/L (ref 10–50)
BACTERIA URNS QL MICRO: NEGATIVE /HPF
BASE EXCESS ARTERIAL: -3.9 MMOL/L (ref -2–2)
BILIRUB SERPL-MCNC: 0.3 MG/DL (ref 0.2–1.2)
BILIRUB UR QL STRIP: NEGATIVE
BLD GP AB SCN SERPL QL: NORMAL
BLOOD BANK DISPENSE STATUS: NORMAL
BLOOD BANK PRODUCT CODE: NORMAL
BNP BLD-MCNC: 956 PG/ML (ref 0–124)
BPU ID: NORMAL
BUN SERPL-MCNC: 65 MG/DL (ref 8–23)
CALCIUM SERPL-MCNC: 8.2 MG/DL (ref 8.8–10.2)
CARBOXYHEMOGLOBIN ARTERIAL: 3.1 % (ref 0–5)
CHLORIDE SERPL-SCNC: 102 MMOL/L (ref 98–107)
CLARITY UR: CLEAR
CO2 SERPL-SCNC: 18 MMOL/L (ref 22–29)
COLOR UR: YELLOW
CREAT SERPL-MCNC: 1 MG/DL (ref 0.7–1.2)
CRYSTALS URNS MICRO: NORMAL /HPF
DESCRIPTION BLOOD BANK: NORMAL
EPI CELLS #/AREA URNS AUTO: 1 /HPF (ref 0–5)
ERYTHROCYTE [DISTWIDTH] IN BLOOD BY AUTOMATED COUNT: 16 % (ref 11.5–14.5)
FIO2: 60 %
GLUCOSE BLD-MCNC: 281 MG/DL (ref 70–99)
GLUCOSE BLD-MCNC: 357 MG/DL (ref 70–99)
GLUCOSE BLD-MCNC: 432 MG/DL (ref 70–99)
GLUCOSE SERPL-MCNC: 298 MG/DL (ref 70–99)
GLUCOSE UR STRIP.AUTO-MCNC: NEGATIVE MG/DL
HCO3 ARTERIAL: 22.9 MMOL/L (ref 22–26)
HCT VFR BLD AUTO: 21.5 % (ref 42–52)
HCT VFR BLD AUTO: 22 % (ref 42–52)
HCT VFR BLD AUTO: 23.2 % (ref 42–52)
HCT VFR BLD AUTO: 25.2 % (ref 42–52)
HEMOGLOBIN, ART, EXTENDED: 6.7 G/DL (ref 14–18)
HGB BLD-MCNC: 6.7 G/DL (ref 14–18)
HGB BLD-MCNC: 7.1 G/DL (ref 14–18)
HGB BLD-MCNC: 7.5 G/DL (ref 14–18)
HGB BLD-MCNC: 7.8 G/DL (ref 14–18)
HGB UR STRIP.AUTO-MCNC: NEGATIVE MG/L
HYALINE CASTS #/AREA URNS AUTO: 1 /HPF (ref 0–8)
KETONES UR STRIP.AUTO-MCNC: ABNORMAL MG/DL
LEUKOCYTE ESTERASE UR QL STRIP.AUTO: ABNORMAL
MCH RBC QN AUTO: 28.5 PG (ref 27–31)
MCHC RBC AUTO-ENTMCNC: 31 G/DL (ref 33–37)
MCV RBC AUTO: 92 FL (ref 80–94)
MECHANICAL RATE IN BPM: 20
METHEMOGLOBIN ARTERIAL: 1.5 %
MODE: ABNORMAL
MRSA DNA SPEC QL NAA+PROBE: NOT DETECTED
NITRITE UR QL STRIP.AUTO: NEGATIVE
O2 CONTENT ARTERIAL: 9.3 ML/DL
O2 SAT, ARTERIAL: 95 %
O2 THERAPY: ABNORMAL
PCO2 ARTERIAL: 51 MMHG (ref 35–45)
PERFORMED ON: ABNORMAL
PH ARTERIAL: 7.26 (ref 7.35–7.45)
PH UR STRIP.AUTO: 5 [PH] (ref 5–8)
PLATELET # BLD AUTO: 397 K/UL (ref 130–400)
PMV BLD AUTO: 10.8 FL (ref 9.4–12.4)
PO2 ARTERIAL: 129 MMHG (ref 80–100)
POSITIVE END EXP PRESS: 5
POTASSIUM BLD-SCNC: 6 MMOL/L
POTASSIUM SERPL-SCNC: 5.5 MMOL/L (ref 3.5–5)
PROCALCITONIN: 0.16 NG/ML (ref 0–0.09)
PROT SERPL-MCNC: 6.6 G/DL (ref 6.4–8.3)
PROT UR STRIP.AUTO-MCNC: NEGATIVE MG/DL
RBC # BLD AUTO: 2.74 M/UL (ref 4.7–6.1)
RBC #/AREA URNS AUTO: 1 /HPF (ref 0–4)
SAMPLE SOURCE: ABNORMAL
SODIUM SERPL-SCNC: 132 MMOL/L (ref 136–145)
SP GR UR STRIP.AUTO: 1.02 (ref 1–1.03)
UROBILINOGEN UR STRIP.AUTO-MCNC: 0.2 E.U./DL
VT MECHANICAL: 450 %
WBC # BLD AUTO: 21.2 K/UL (ref 4.8–10.8)
WBC #/AREA URNS AUTO: 4 /HPF (ref 0–5)

## 2025-05-21 PROCEDURE — 99223 1ST HOSP IP/OBS HIGH 75: CPT | Performed by: PSYCHIATRY & NEUROLOGY

## 2025-05-21 PROCEDURE — 31500 INSERT EMERGENCY AIRWAY: CPT

## 2025-05-21 PROCEDURE — 43255 EGD CONTROL BLEEDING ANY: CPT | Performed by: INTERNAL MEDICINE

## 2025-05-21 PROCEDURE — 3609017100 HC EGD: Performed by: INTERNAL MEDICINE

## 2025-05-21 PROCEDURE — 71045 X-RAY EXAM CHEST 1 VIEW: CPT

## 2025-05-21 PROCEDURE — 30233N1 TRANSFUSION OF NONAUTOLOGOUS RED BLOOD CELLS INTO PERIPHERAL VEIN, PERCUTANEOUS APPROACH: ICD-10-PCS | Performed by: INTERNAL MEDICINE

## 2025-05-21 PROCEDURE — 87641 MR-STAPH DNA AMP PROBE: CPT

## 2025-05-21 PROCEDURE — 85027 COMPLETE CBC AUTOMATED: CPT

## 2025-05-21 PROCEDURE — 2500000003 HC RX 250 WO HCPCS: Performed by: NURSE PRACTITIONER

## 2025-05-21 PROCEDURE — 83880 ASSAY OF NATRIURETIC PEPTIDE: CPT

## 2025-05-21 PROCEDURE — 97129 THER IVNTJ 1ST 15 MIN: CPT

## 2025-05-21 PROCEDURE — 85018 HEMOGLOBIN: CPT

## 2025-05-21 PROCEDURE — 3609013000 HC EGD TRANSORAL CONTROL BLEEDING ANY METHOD: Performed by: INTERNAL MEDICINE

## 2025-05-21 PROCEDURE — 3609013800 HC EGD SUBMUCOSAL/BOTOX INJECTION: Performed by: INTERNAL MEDICINE

## 2025-05-21 PROCEDURE — 94002 VENT MGMT INPAT INIT DAY: CPT

## 2025-05-21 PROCEDURE — 74177 CT ABD & PELVIS W/CONTRAST: CPT

## 2025-05-21 PROCEDURE — 5A1935Z RESPIRATORY VENTILATION, LESS THAN 24 CONSECUTIVE HOURS: ICD-10-PCS | Performed by: INTERNAL MEDICINE

## 2025-05-21 PROCEDURE — 6360000004 HC RX CONTRAST MEDICATION: Performed by: NURSE PRACTITIONER

## 2025-05-21 PROCEDURE — 99223 1ST HOSP IP/OBS HIGH 75: CPT | Performed by: SURGERY

## 2025-05-21 PROCEDURE — 82803 BLOOD GASES ANY COMBINATION: CPT

## 2025-05-21 PROCEDURE — 0BJ08ZZ INSPECTION OF TRACHEOBRONCHIAL TREE, VIA NATURAL OR ARTIFICIAL OPENING ENDOSCOPIC: ICD-10-PCS | Performed by: SURGERY

## 2025-05-21 PROCEDURE — 86923 COMPATIBILITY TEST ELECTRIC: CPT

## 2025-05-21 PROCEDURE — 36600 WITHDRAWAL OF ARTERIAL BLOOD: CPT

## 2025-05-21 PROCEDURE — 85014 HEMATOCRIT: CPT

## 2025-05-21 PROCEDURE — 2700000000 HC OXYGEN THERAPY PER DAY

## 2025-05-21 PROCEDURE — 2500000003 HC RX 250 WO HCPCS: Performed by: SURGERY

## 2025-05-21 PROCEDURE — 6360000002 HC RX W HCPCS: Performed by: NURSE PRACTITIONER

## 2025-05-21 PROCEDURE — 80053 COMPREHEN METABOLIC PANEL: CPT

## 2025-05-21 PROCEDURE — 95816 EEG AWAKE AND DROWSY: CPT | Performed by: PSYCHIATRY & NEUROLOGY

## 2025-05-21 PROCEDURE — 2720000010 HC SURG SUPPLY STERILE: Performed by: INTERNAL MEDICINE

## 2025-05-21 PROCEDURE — 2709999900 HC NON-CHARGEABLE SUPPLY: Performed by: INTERNAL MEDICINE

## 2025-05-21 PROCEDURE — P9016 RBC LEUKOCYTES REDUCED: HCPCS

## 2025-05-21 PROCEDURE — 82962 GLUCOSE BLOOD TEST: CPT

## 2025-05-21 PROCEDURE — 0BH17EZ INSERTION OF ENDOTRACHEAL AIRWAY INTO TRACHEA, VIA NATURAL OR ARTIFICIAL OPENING: ICD-10-PCS | Performed by: INTERNAL MEDICINE

## 2025-05-21 PROCEDURE — 86850 RBC ANTIBODY SCREEN: CPT

## 2025-05-21 PROCEDURE — 87040 BLOOD CULTURE FOR BACTERIA: CPT

## 2025-05-21 PROCEDURE — 36430 TRANSFUSION BLD/BLD COMPNT: CPT

## 2025-05-21 PROCEDURE — 2580000003 HC RX 258: Performed by: INTERNAL MEDICINE

## 2025-05-21 PROCEDURE — 2580000003 HC RX 258: Performed by: SURGERY

## 2025-05-21 PROCEDURE — 6370000000 HC RX 637 (ALT 250 FOR IP): Performed by: SURGERY

## 2025-05-21 PROCEDURE — P9040 RBC LEUKOREDUCED IRRADIATED: HCPCS

## 2025-05-21 PROCEDURE — 6360000002 HC RX W HCPCS: Performed by: SURGERY

## 2025-05-21 PROCEDURE — 2500000003 HC RX 250 WO HCPCS

## 2025-05-21 PROCEDURE — 81001 URINALYSIS AUTO W/SCOPE: CPT

## 2025-05-21 PROCEDURE — 86900 BLOOD TYPING SEROLOGIC ABO: CPT

## 2025-05-21 PROCEDURE — 6370000000 HC RX 637 (ALT 250 FOR IP): Performed by: INTERNAL MEDICINE

## 2025-05-21 PROCEDURE — 84145 PROCALCITONIN (PCT): CPT

## 2025-05-21 PROCEDURE — 6360000002 HC RX W HCPCS: Performed by: INTERNAL MEDICINE

## 2025-05-21 PROCEDURE — 6360000002 HC RX W HCPCS

## 2025-05-21 PROCEDURE — 36620 INSERTION CATHETER ARTERY: CPT

## 2025-05-21 PROCEDURE — 86901 BLOOD TYPING SEROLOGIC RH(D): CPT

## 2025-05-21 PROCEDURE — 36415 COLL VENOUS BLD VENIPUNCTURE: CPT

## 2025-05-21 PROCEDURE — 97130 THER IVNTJ EA ADDL 15 MIN: CPT

## 2025-05-21 PROCEDURE — 95819 EEG AWAKE AND ASLEEP: CPT

## 2025-05-21 PROCEDURE — 70450 CT HEAD/BRAIN W/O DYE: CPT

## 2025-05-21 PROCEDURE — 2580000003 HC RX 258: Performed by: NURSE PRACTITIONER

## 2025-05-21 PROCEDURE — 2000000000 HC ICU R&B

## 2025-05-21 PROCEDURE — 2500000003 HC RX 250 WO HCPCS: Performed by: INTERNAL MEDICINE

## 2025-05-21 PROCEDURE — 6370000000 HC RX 637 (ALT 250 FOR IP): Performed by: NURSE PRACTITIONER

## 2025-05-21 PROCEDURE — 99233 SBSQ HOSP IP/OBS HIGH 50: CPT | Performed by: PSYCHIATRY & NEUROLOGY

## 2025-05-21 RX ORDER — LOPERAMIDE HCL 1 MG/7.5ML
2 SOLUTION ORAL 4 TIMES DAILY PRN
Status: DISCONTINUED | OUTPATIENT
Start: 2025-05-21 | End: 2025-05-28 | Stop reason: HOSPADM

## 2025-05-21 RX ORDER — ONDANSETRON 4 MG/1
4 TABLET, ORALLY DISINTEGRATING ORAL EVERY 8 HOURS PRN
Status: DISCONTINUED | OUTPATIENT
Start: 2025-05-21 | End: 2025-05-28 | Stop reason: HOSPADM

## 2025-05-21 RX ORDER — MECOBALAMIN 5000 MCG
5 TABLET,DISINTEGRATING ORAL NIGHTLY
Status: DISCONTINUED | OUTPATIENT
Start: 2025-05-21 | End: 2025-05-28 | Stop reason: HOSPADM

## 2025-05-21 RX ORDER — GEMFIBROZIL 600 MG/1
600 TABLET, FILM COATED ORAL 2 TIMES DAILY
Status: DISCONTINUED | OUTPATIENT
Start: 2025-05-21 | End: 2025-05-28 | Stop reason: HOSPADM

## 2025-05-21 RX ORDER — ONDANSETRON 2 MG/ML
INJECTION INTRAMUSCULAR; INTRAVENOUS
Status: COMPLETED
Start: 2025-05-21 | End: 2025-05-21

## 2025-05-21 RX ORDER — NITROGLYCERIN 0.4 MG/1
0.4 TABLET SUBLINGUAL EVERY 5 MIN PRN
Status: DISCONTINUED | OUTPATIENT
Start: 2025-05-21 | End: 2025-05-28 | Stop reason: HOSPADM

## 2025-05-21 RX ORDER — ATORVASTATIN CALCIUM 20 MG/1
20 TABLET, FILM COATED ORAL NIGHTLY
Status: CANCELLED | OUTPATIENT
Start: 2025-05-21

## 2025-05-21 RX ORDER — ONDANSETRON 2 MG/ML
4 INJECTION INTRAMUSCULAR; INTRAVENOUS ONCE
Status: DISCONTINUED | OUTPATIENT
Start: 2025-05-21 | End: 2025-05-21

## 2025-05-21 RX ORDER — INSULIN LISPRO 100 [IU]/ML
10 INJECTION, SOLUTION INTRAVENOUS; SUBCUTANEOUS
Status: CANCELLED | OUTPATIENT
Start: 2025-05-21

## 2025-05-21 RX ORDER — INSULIN GLARGINE 100 [IU]/ML
25 INJECTION, SOLUTION SUBCUTANEOUS NIGHTLY
Status: CANCELLED | OUTPATIENT
Start: 2025-05-21

## 2025-05-21 RX ORDER — CETIRIZINE HYDROCHLORIDE 10 MG/1
10 TABLET ORAL DAILY
Status: DISCONTINUED | OUTPATIENT
Start: 2025-05-22 | End: 2025-05-28 | Stop reason: HOSPADM

## 2025-05-21 RX ORDER — FENTANYL CITRATE 50 UG/ML
100 INJECTION, SOLUTION INTRAMUSCULAR; INTRAVENOUS ONCE
Status: DISCONTINUED | OUTPATIENT
Start: 2025-05-21 | End: 2025-05-28 | Stop reason: HOSPADM

## 2025-05-21 RX ORDER — NOREPINEPHRINE BITARTRATE 0.06 MG/ML
INJECTION, SOLUTION INTRAVENOUS
Status: COMPLETED
Start: 2025-05-21 | End: 2025-05-21

## 2025-05-21 RX ORDER — SODIUM CHLORIDE 0.9 % (FLUSH) 0.9 %
5-40 SYRINGE (ML) INJECTION PRN
Status: DISCONTINUED | OUTPATIENT
Start: 2025-05-21 | End: 2025-05-28 | Stop reason: HOSPADM

## 2025-05-21 RX ORDER — IOPAMIDOL 755 MG/ML
75 INJECTION, SOLUTION INTRAVASCULAR
Status: COMPLETED | OUTPATIENT
Start: 2025-05-21 | End: 2025-05-21

## 2025-05-21 RX ORDER — NITROGLYCERIN 0.4 MG/1
0.4 TABLET SUBLINGUAL EVERY 5 MIN PRN
Status: CANCELLED | OUTPATIENT
Start: 2025-05-21

## 2025-05-21 RX ORDER — ONDANSETRON 2 MG/ML
4 INJECTION INTRAMUSCULAR; INTRAVENOUS EVERY 6 HOURS PRN
Status: DISCONTINUED | OUTPATIENT
Start: 2025-05-21 | End: 2025-05-28 | Stop reason: HOSPADM

## 2025-05-21 RX ORDER — SODIUM CHLORIDE 0.9 % (FLUSH) 0.9 %
5-40 SYRINGE (ML) INJECTION 2 TIMES DAILY
Status: DISCONTINUED | OUTPATIENT
Start: 2025-05-21 | End: 2025-05-22 | Stop reason: SDUPTHER

## 2025-05-21 RX ORDER — SODIUM CHLORIDE 0.9 % (FLUSH) 0.9 %
5-40 SYRINGE (ML) INJECTION 2 TIMES DAILY
Status: CANCELLED | OUTPATIENT
Start: 2025-05-21

## 2025-05-21 RX ORDER — INSULIN GLARGINE 100 [IU]/ML
25 INJECTION, SOLUTION SUBCUTANEOUS NIGHTLY
Status: DISCONTINUED | OUTPATIENT
Start: 2025-05-21 | End: 2025-05-21

## 2025-05-21 RX ORDER — INSULIN LISPRO 100 [IU]/ML
10 INJECTION, SOLUTION INTRAVENOUS; SUBCUTANEOUS
Status: DISCONTINUED | OUTPATIENT
Start: 2025-05-21 | End: 2025-05-28 | Stop reason: HOSPADM

## 2025-05-21 RX ORDER — LEVOTHYROXINE SODIUM 50 UG/1
50 TABLET ORAL DAILY
Status: CANCELLED | OUTPATIENT
Start: 2025-05-22

## 2025-05-21 RX ORDER — CARBAMAZEPINE 100 MG/5ML
100 SUSPENSION ORAL 4 TIMES DAILY
Status: CANCELLED | OUTPATIENT
Start: 2025-05-21

## 2025-05-21 RX ORDER — CLOPIDOGREL BISULFATE 75 MG/1
75 TABLET ORAL DAILY
Status: CANCELLED | OUTPATIENT
Start: 2025-05-23

## 2025-05-21 RX ORDER — CARBAMAZEPINE 100 MG/5ML
100 SUSPENSION ORAL 4 TIMES DAILY
Status: DISCONTINUED | OUTPATIENT
Start: 2025-05-21 | End: 2025-05-25

## 2025-05-21 RX ORDER — SODIUM CHLORIDE 9 MG/ML
INJECTION, SOLUTION INTRAVENOUS PRN
Status: DISCONTINUED | OUTPATIENT
Start: 2025-05-21 | End: 2025-05-28 | Stop reason: HOSPADM

## 2025-05-21 RX ORDER — BISACODYL 10 MG
10 SUPPOSITORY, RECTAL RECTAL DAILY PRN
Status: CANCELLED | OUTPATIENT
Start: 2025-05-21

## 2025-05-21 RX ORDER — ACETAMINOPHEN 325 MG/1
650 TABLET ORAL EVERY 6 HOURS PRN
Status: DISCONTINUED | OUTPATIENT
Start: 2025-05-21 | End: 2025-05-28 | Stop reason: HOSPADM

## 2025-05-21 RX ORDER — NOREPINEPHRINE BITARTRATE 0.06 MG/ML
1-100 INJECTION, SOLUTION INTRAVENOUS CONTINUOUS
Status: DISCONTINUED | OUTPATIENT
Start: 2025-05-21 | End: 2025-05-23

## 2025-05-21 RX ORDER — LOSARTAN POTASSIUM 50 MG/1
25 TABLET ORAL DAILY
Status: DISCONTINUED | OUTPATIENT
Start: 2025-05-22 | End: 2025-05-22

## 2025-05-21 RX ORDER — BISACODYL 10 MG
10 SUPPOSITORY, RECTAL RECTAL DAILY PRN
Status: DISCONTINUED | OUTPATIENT
Start: 2025-05-21 | End: 2025-05-28 | Stop reason: HOSPADM

## 2025-05-21 RX ORDER — MIDAZOLAM HYDROCHLORIDE 2 MG/2ML
2 INJECTION, SOLUTION INTRAMUSCULAR; INTRAVENOUS ONCE
Status: DISCONTINUED | OUTPATIENT
Start: 2025-05-21 | End: 2025-05-23

## 2025-05-21 RX ORDER — CHOLESTYRAMINE LIGHT 4 G/5.7G
4 POWDER, FOR SUSPENSION ORAL DAILY
Status: DISCONTINUED | OUTPATIENT
Start: 2025-05-22 | End: 2025-05-28 | Stop reason: HOSPADM

## 2025-05-21 RX ORDER — SODIUM CHLORIDE 0.9 % (FLUSH) 0.9 %
5-40 SYRINGE (ML) INJECTION EVERY 12 HOURS SCHEDULED
Status: DISCONTINUED | OUTPATIENT
Start: 2025-05-21 | End: 2025-05-28 | Stop reason: HOSPADM

## 2025-05-21 RX ORDER — LOPERAMIDE HCL 1 MG/7.5ML
2 SOLUTION ORAL 4 TIMES DAILY PRN
Status: DISCONTINUED | OUTPATIENT
Start: 2025-05-21 | End: 2025-05-21 | Stop reason: SDUPTHER

## 2025-05-21 RX ORDER — MECOBALAMIN 5000 MCG
5 TABLET,DISINTEGRATING ORAL NIGHTLY
Status: CANCELLED | OUTPATIENT
Start: 2025-05-21

## 2025-05-21 RX ORDER — CHOLESTYRAMINE LIGHT 4 G/5.7G
4 POWDER, FOR SUSPENSION ORAL DAILY
Status: CANCELLED | OUTPATIENT
Start: 2025-05-22

## 2025-05-21 RX ORDER — ONDANSETRON 4 MG/1
4 TABLET, ORALLY DISINTEGRATING ORAL EVERY 8 HOURS PRN
Status: DISCONTINUED | OUTPATIENT
Start: 2025-05-21 | End: 2025-05-21

## 2025-05-21 RX ORDER — ACETAMINOPHEN 325 MG/1
650 TABLET ORAL EVERY 4 HOURS PRN
Status: CANCELLED | OUTPATIENT
Start: 2025-05-21

## 2025-05-21 RX ORDER — ACETAMINOPHEN 325 MG/1
650 TABLET ORAL EVERY 4 HOURS PRN
Status: DISCONTINUED | OUTPATIENT
Start: 2025-05-21 | End: 2025-05-21 | Stop reason: SDUPTHER

## 2025-05-21 RX ORDER — LOPERAMIDE HCL 1 MG/7.5ML
2 SOLUTION ORAL 4 TIMES DAILY PRN
Status: CANCELLED | OUTPATIENT
Start: 2025-05-21

## 2025-05-21 RX ORDER — METRONIDAZOLE 500 MG/100ML
500 INJECTION, SOLUTION INTRAVENOUS EVERY 8 HOURS
Status: COMPLETED | OUTPATIENT
Start: 2025-05-21 | End: 2025-05-27

## 2025-05-21 RX ORDER — INSULIN GLARGINE 100 [IU]/ML
25 INJECTION, SOLUTION SUBCUTANEOUS 2 TIMES DAILY
Status: DISCONTINUED | OUTPATIENT
Start: 2025-05-21 | End: 2025-05-28 | Stop reason: HOSPADM

## 2025-05-21 RX ORDER — LANSOPRAZOLE 30 MG/1
30 TABLET, ORALLY DISINTEGRATING, DELAYED RELEASE ORAL 2 TIMES DAILY
Status: CANCELLED | OUTPATIENT
Start: 2025-05-21 | Stop reason: ALTCHOICE

## 2025-05-21 RX ORDER — ONDANSETRON 2 MG/ML
4 INJECTION INTRAMUSCULAR; INTRAVENOUS ONCE
Status: CANCELLED | OUTPATIENT
Start: 2025-05-21

## 2025-05-21 RX ORDER — LEVOTHYROXINE SODIUM 50 UG/1
50 TABLET ORAL DAILY
Status: DISCONTINUED | OUTPATIENT
Start: 2025-05-22 | End: 2025-05-28 | Stop reason: HOSPADM

## 2025-05-21 RX ORDER — DEXMEDETOMIDINE HYDROCHLORIDE 4 UG/ML
.1-1.5 INJECTION, SOLUTION INTRAVENOUS CONTINUOUS
Status: DISCONTINUED | OUTPATIENT
Start: 2025-05-21 | End: 2025-05-23

## 2025-05-21 RX ORDER — ONDANSETRON 4 MG/1
4 TABLET, ORALLY DISINTEGRATING ORAL EVERY 8 HOURS PRN
Status: CANCELLED | OUTPATIENT
Start: 2025-05-21

## 2025-05-21 RX ORDER — ONDANSETRON 2 MG/ML
4 INJECTION INTRAMUSCULAR; INTRAVENOUS ONCE
Status: DISCONTINUED | OUTPATIENT
Start: 2025-05-21 | End: 2025-05-22 | Stop reason: ALTCHOICE

## 2025-05-21 RX ORDER — SODIUM CHLORIDE 9 MG/ML
INJECTION, SOLUTION INTRAVENOUS PRN
Status: DISCONTINUED | OUTPATIENT
Start: 2025-05-21 | End: 2025-05-22

## 2025-05-21 RX ORDER — GEMFIBROZIL 600 MG/1
600 TABLET, FILM COATED ORAL 2 TIMES DAILY
Status: CANCELLED | OUTPATIENT
Start: 2025-05-21

## 2025-05-21 RX ORDER — CETIRIZINE HYDROCHLORIDE 10 MG/1
10 TABLET ORAL DAILY
Status: CANCELLED | OUTPATIENT
Start: 2025-05-22

## 2025-05-21 RX ORDER — ACETAMINOPHEN 650 MG/1
650 SUPPOSITORY RECTAL EVERY 6 HOURS PRN
Status: DISCONTINUED | OUTPATIENT
Start: 2025-05-21 | End: 2025-05-28 | Stop reason: HOSPADM

## 2025-05-21 RX ORDER — SODIUM CHLORIDE 9 MG/ML
INJECTION, SOLUTION INTRAVENOUS CONTINUOUS
Status: DISCONTINUED | OUTPATIENT
Start: 2025-05-21 | End: 2025-05-22

## 2025-05-21 RX ORDER — LOSARTAN POTASSIUM 25 MG/1
25 TABLET ORAL DAILY
Status: CANCELLED | OUTPATIENT
Start: 2025-05-22

## 2025-05-21 RX ORDER — CLOPIDOGREL BISULFATE 75 MG/1
75 TABLET ORAL DAILY
Status: DISCONTINUED | OUTPATIENT
Start: 2025-05-23 | End: 2025-05-28 | Stop reason: HOSPADM

## 2025-05-21 RX ORDER — ATORVASTATIN CALCIUM 20 MG/1
20 TABLET, FILM COATED ORAL NIGHTLY
Status: DISCONTINUED | OUTPATIENT
Start: 2025-05-21 | End: 2025-05-28 | Stop reason: HOSPADM

## 2025-05-21 RX ADMIN — ONDANSETRON 4 MG: 2 INJECTION INTRAMUSCULAR; INTRAVENOUS at 09:30

## 2025-05-21 RX ADMIN — METRONIDAZOLE 500 MG: 500 INJECTION, SOLUTION INTRAVENOUS at 22:17

## 2025-05-21 RX ADMIN — IOPAMIDOL 75 ML: 755 INJECTION, SOLUTION INTRAVENOUS at 09:22

## 2025-05-21 RX ADMIN — LEVOTHYROXINE SODIUM 50 MCG: 0.05 TABLET ORAL at 05:53

## 2025-05-21 RX ADMIN — ATORVASTATIN CALCIUM 20 MG: 20 TABLET, FILM COATED ORAL at 20:56

## 2025-05-21 RX ADMIN — DEXMEDETOMIDINE HYDROCHLORIDE 0.2 MCG/KG/HR: 400 INJECTION, SOLUTION INTRAVENOUS at 12:30

## 2025-05-21 RX ADMIN — CEFEPIME 2000 MG: 2 INJECTION, POWDER, FOR SOLUTION INTRAVENOUS at 22:18

## 2025-05-21 RX ADMIN — Medication 5 MCG/MIN: at 12:30

## 2025-05-21 RX ADMIN — Medication 100 MG: at 17:08

## 2025-05-21 RX ADMIN — SODIUM CHLORIDE, PRESERVATIVE FREE 40 MG: 5 INJECTION INTRAVENOUS at 13:00

## 2025-05-21 RX ADMIN — INSULIN LISPRO 10 UNITS: 100 INJECTION, SOLUTION INTRAVENOUS; SUBCUTANEOUS at 17:02

## 2025-05-21 RX ADMIN — SODIUM CHLORIDE: 0.9 INJECTION, SOLUTION INTRAVENOUS at 13:54

## 2025-05-21 RX ADMIN — Medication 5 MG: at 20:56

## 2025-05-21 RX ADMIN — METOPROLOL TARTRATE 75 MG: 50 TABLET, FILM COATED ORAL at 20:57

## 2025-05-21 RX ADMIN — INSULIN GLARGINE 25 UNITS: 100 INJECTION, SOLUTION SUBCUTANEOUS at 20:47

## 2025-05-21 RX ADMIN — DEXMEDETOMIDINE HYDROCHLORIDE 1 MCG/KG/HR: 400 INJECTION, SOLUTION INTRAVENOUS at 22:19

## 2025-05-21 RX ADMIN — SODIUM CHLORIDE, PRESERVATIVE FREE 10 ML: 5 INJECTION INTRAVENOUS at 21:05

## 2025-05-21 RX ADMIN — DEXMEDETOMIDINE HYDROCHLORIDE 0.8 MCG/KG/HR: 400 INJECTION, SOLUTION INTRAVENOUS at 17:52

## 2025-05-21 RX ADMIN — VANCOMYCIN HYDROCHLORIDE 2000 MG: 5 INJECTION, POWDER, LYOPHILIZED, FOR SOLUTION INTRAVENOUS at 14:26

## 2025-05-21 RX ADMIN — GEMFIBROZIL 600 MG: 600 TABLET ORAL at 21:05

## 2025-05-21 RX ADMIN — WATER 1000 MG: 1 INJECTION INTRAMUSCULAR; INTRAVENOUS; SUBCUTANEOUS at 14:27

## 2025-05-21 RX ADMIN — SODIUM BICARBONATE: 84 INJECTION, SOLUTION INTRAVENOUS at 13:59

## 2025-05-21 RX ADMIN — NOREPINEPHRINE BITARTRATE 5 MCG/MIN: 0.06 INJECTION, SOLUTION INTRAVENOUS at 12:30

## 2025-05-21 RX ADMIN — Medication 100 MG: at 20:56

## 2025-05-21 ASSESSMENT — PULMONARY FUNCTION TESTS
PIF_VALUE: 17
PIF_VALUE: 24
PIF_VALUE: 17
PIF_VALUE: 16
PIF_VALUE: 23
PIF_VALUE: 19
PIF_VALUE: 43
PIF_VALUE: 18
PIF_VALUE: 19
PIF_VALUE: 20
PIF_VALUE: 17
PIF_VALUE: 19
PIF_VALUE: 18
PIF_VALUE: 19
PIF_VALUE: 23
PIF_VALUE: 19
PIF_VALUE: 18
PIF_VALUE: 18
PIF_VALUE: 25
PIF_VALUE: 20
PIF_VALUE: 24

## 2025-05-21 NOTE — PROGRESS NOTES
Pt intubated per Dr. Schultz with a 7.5 etube at 25 cm at lip. Good color change-  Pt placed on vent at VC 20 450 vy 60% 5 peep.

## 2025-05-21 NOTE — CONSULTS
[facility] Consult      Patient:   Akhil Alejo  MR#:    [id number]  Account Number:                   [id number]      Room:    [address] [place] [address]   YOB: 1955  Date of Progress Note: 5/21/2025  Time of Note                           3:28 PM  Attending Physician:  Ben Schultz DO  Consulting Physician:  Doyle More DO       CHIEF COMPLAINT:  AMS     HISTORY OF PRESENT ILLNESS:   This is a 69 y.o. male who underwent coronary artery bypass grafting on the 29th.  Developed hematemesis after PEG placement.  Subsequently had an aspiration event with worsening confusion and unresponsiveness noted.  He had a right gaze preference and possible right sided weakness.  Ultimately required intubation.  Found to be significantly anemic.  A bleeding vessel was noted around the PEG site which required cauterization.  He is currently on pressors and has received packed red blood cell transfusion.      REVIEW OF SYSTEMS:  Unable to obtain     PAST MEDICAL HISTORY:      Diagnosis Date    Arm fracture 07/2016    left    Dementia (HCC)     per wife he needs be supervised at home    Depression     Diabetes mellitus (HCC)     Hypertension     Kidney stones     Neuropathy     Restless leg syndrome        PAST SURGICAL HISTORY:      Procedure Laterality Date    BONE MARROW BIOPSY Right 12/09/2020    BONE MARROW ASPIRATION BIOPSY performed by ALISA Rabago at [facility] OR    CARDIAC PROCEDURE N/A 04/25/2025    Left heart cath / coronary angiography performed by Chayo Cortes MD at [facility] CARDIAC CATH LAB    CHOLECYSTECTOMY      COLONOSCOPY  05/27/2020    Dr Patiño   AP    CORONARY ARTERY BYPASS GRAFT N/A 04/29/2025    CORONARY ARTERY BYPASS GRAFT X3, MINIMALLY INVASIVE robotic, pump assisted via femoral access, BILATERAL INTERNAL MAMMARY ARTERIES, ENDOSCOPIC VEIN HARVESTING, TRANSESOPHAGEAL ECHOCARDIOGRAM performed by Akhil Lara MD at [facility] OR    GASTROSTOMY TUBE PLACEMENT  05/09/2025      project over the chest. Hypoventilation, with mild atelectasis of the left base. No pleural effusion or pneumothorax is seen. Stable cardiomegaly. No acute displaced rib fractures are identified. Left rib surgical plating, and surgical clips in the left axilla, again noted.       1.  Interval placement of an endotracheal and enteric tube, in satisfactory position. 2. Stable cardiomegaly.    ______________________________________ Electronically signed by: SU ELLISON M.D. Date:     05/21/2025 Time:    11:34     EGD  Result Date: 5/21/2025  No dictation     XR CHEST PORTABLE  Result Date: 5/21/2025  EXAM: CHEST RADIOGRAPH  TECHNIQUE: Single frontal chest radiograph.  HISTORY: Shortness of breath.  COMPARISON: 05/13/2025  FINDINGS:  The patient is mildly leaning and rotated to the right. Hypoventilation. Left rib surgical plating, and surgical clips in left axilla, again noted. No pulmonary infiltrate is identified. No pleural effusion or pneumothorax is seen. Stable enlargement of the cardiac silhouette. No acute displaced rib fractures are identified.        1.  Limited hypoventilated portable study of the chest. 2. Stable enlargement of the cardiac silhouette.    ______________________________________ Electronically signed by: SU ELLISON M.D. Date:     05/21/2025 Time:    10:05     CT ABDOMEN PELVIS W IV CONTRAST Additional Contrast? None  Result Date: 5/21/2025  EXAM:  CT SCAN OF THE ABDOMEN AND PELVIS WITH CONTRAST  HISTORY:   pain decreased hemoglobin  TECHNIQUE:  Imaging of the abdomen pelvis was performed following the intravenous administration of contrast.  5 mm thin axial images and coronal and sagittal reconstructions were obtained.  FINDINGS:  No acute abnormalities are seen within the liver, spleen, pancreas, adrenal glands and kidneys.  The proximal ureters are normal size.  The small bowel loops are normal caliber.  There is no free air.  No retroperitoneal abnormalities are seen.  A percutaneous
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
## 2025-05-21 NOTE — PROGRESS NOTES
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 Cholecystectomy; Lithotripsy; shoulder surgery (Left); bone marrow biopsy (Right, 2020); Colonoscopy (2020); Upper gastrointestinal endoscopy (2017); Cardiac procedure (N/A, 2025); Coronary artery bypass graft (N/A, 2025); Gastrostomy tube placement (2025); and Upper gastrointestinal endoscopy (N/A, 2025).    Restrictions  Restrictions/Precautions: Fall Risk, Surgical Protocols, NPO, Cardiac  Other Position/Activity Restrictions: G tube,\"no heavy lifting\" per dr calle  Right Lower Extremity Weight Bearing: Weight Bearing As Tolerated  Left Lower Extremity Weight Bearing: Weight Bearing As Tolerated      Plan  Occupational Therapy Plan  Specific Instructions for Next Treatment: sittinng balance, endurance training  Current Treatment Recommendations: Strengthening;Balance training;ROM;Functional mobility training;Endurance training;Patient/Caregiver education & training;Safety education & training;Cognitive reorientation;Equipment evaluation, education, & procurement;Self-Care / ADL;Home management training   05/15/25 0900   Pain   Pre-Pain 4  (faces)   Post-Pain 0   Pain Location   (surgery incisions)   Pain Interventions Repositioning        05/15/25 0900   General   Family / Caregiver Present No   Height and Weight   Weight - Scale 82.1 kg (181 lb)   Weight Method Bed scale   BMI (Calculated) 25.3   Balance   Sitting Balance Minimal 
Occupational Therapy  Facility/Department: [facility] 8 REHAB UNIT  Rehabilitation Occupational Therapy Daily Treatment Note    Date: 25  Patient Name: Akhil Alejo       Room: 0816/816-02  MRN: [id number]  Account: [id number]   : 1955  (69 y.o.) Gender: male     Referring Practitioner: (P) Dr. Lara  Diagnosis: (P) CABG on 25       Treatment Diagnosis: CABG on 25   Past Medical History:  has a past medical history of Arm fracture, Dementia (HCC), Depression, Diabetes mellitus (HCC), Hypertension, Kidney stones, Neuropathy, and Restless leg syndrome.  Past Surgical History:   has a past surgical history that includes Cholecystectomy; Lithotripsy; shoulder surgery (Left); bone marrow biopsy (Right, 2020); Colonoscopy (2020); Upper gastrointestinal endoscopy (2017); Cardiac procedure (N/A, 2025); Coronary artery bypass graft (N/A, 2025); Gastrostomy tube placement (2025); and Upper gastrointestinal endoscopy (N/A, 2025).     25 1000   Restrictions/Precautions   Restrictions/Precautions Fall Risk;Surgical Protocols;NPO;Cardiac   Position Activity Restriction   Sternal Precautions No Pushing;No Pulling;5# Lifting Restrictions;Move in the Tube   Other Position/Activity Restrictions G tube,\"no heavy lifting\" per dr lara, can have ice chips/small sips of water after oral care per SLP   General   Referring Practitioner Dr. Lara   Diagnosis CABG on 25   Pain   Pain Location   (at pegtube incision site, did not quantify)   Orientation   Orientation Level Oriented to place;Oriented to situation;Oriented to person   Cognition   Following Commands Follows one step commands with repetition;Follows one step commands with increased time   Attention Span Impaired   ADL   Feeding Skilled Clinical Factors ice chips after oral care   Grooming Maximum assistance   Grooming Skilled Clinical Factors max for oral care   Toileting Maximum assistance   Toileting 
Occupational Therapy  Facility/Department: [facility] 8 REHAB UNIT  Rehabilitation Occupational Therapy Daily Treatment Note    Date: 25  Patient Name: Akhil Alejo       Room: 0816/816-02  MRN: [id number]  Account: [id number]   : 1955  (69 y.o.) Gender: male                    Past Medical History:  has a past medical history of Arm fracture, Dementia (HCC), Depression, Diabetes mellitus (HCC), Hypertension, Kidney stones, Neuropathy, and Restless leg syndrome.  Past Surgical History:   has a past surgical history that includes Cholecystectomy; Lithotripsy; shoulder surgery (Left); bone marrow biopsy (Right, 2020); Colonoscopy (2020); Upper gastrointestinal endoscopy (2017); Cardiac procedure (N/A, 2025); Coronary artery bypass graft (N/A, 2025); Gastrostomy tube placement (2025); and Upper gastrointestinal endoscopy (N/A, 2025).    Restrictions  Restrictions/Precautions: Fall Risk, Surgical Protocols, NPO, Cardiac  Other Position/Activity Restrictions: G tube,\"no heavy lifting\" per dr calle  Right Lower Extremity Weight Bearing: Weight Bearing As Tolerated  Left Lower Extremity Weight Bearing: Weight Bearing As Tolerated    Subjective  Comments: no longer has to wear abdominal binder            25 1400   General   Family / Caregiver Present Yes  (spouse)   General Comment   Comments no longer has to wear abdominal binder   Balance   Sitting Balance Stand by assistance   Standing Balance Minimal assistance  (with RW)   Bed mobility   Supine to Sit Partial/Moderate assistance  (cues)   Sit to Supine Partial/Moderate assistance  (cues)   Transfers   Stand Step Transfers Minimal assistance   Sit to stand Minimal assistance   Stand to sit Minimal assistance   Assessment   Performance deficits / Impairments Decreased functional mobility ;Decreased ADL status;Decreased ROM;Decreased strength;Decreased safe awareness;Decreased balance;Decreased endurance;Decreased 
Occupational Therapy  Facility/Department: [facility] 8 REHAB UNIT  Rehabilitation Occupational Therapy Daily Treatment Note    Date: 25  Patient Name: Akhil Alejo       Room: 0816/816-02  MRN: [id number]  Account: [id number]   : 1955  (69 y.o.) Gender: male                    Past Medical History:  has a past medical history of Arm fracture, Dementia (HCC), Depression, Diabetes mellitus (HCC), Hypertension, Kidney stones, Neuropathy, and Restless leg syndrome.  Past Surgical History:   has a past surgical history that includes Cholecystectomy; Lithotripsy; shoulder surgery (Left); bone marrow biopsy (Right, 2020); Colonoscopy (2020); Upper gastrointestinal endoscopy (2017); Cardiac procedure (N/A, 2025); Coronary artery bypass graft (N/A, 2025); Gastrostomy tube placement (2025); and Upper gastrointestinal endoscopy (N/A, 2025).    Restrictions  Restrictions/Precautions: Fall Risk, Surgical Protocols, NPO, Cardiac  Other Position/Activity Restrictions: G tube,\"no heavy lifting\" per dr calle, can have ice chips/small sips of water after oral care per SLP  Right Lower Extremity Weight Bearing: Weight Bearing As Tolerated  Left Lower Extremity Weight Bearing: Weight Bearing As Tolerated        Plan  Occupational Therapy Plan  Specific Instructions for Next Treatment: functional mobility  Current Treatment Recommendations: Strengthening;Balance training;ROM;Functional mobility training;Endurance training;Patient/Caregiver education & training;Safety education & training;Cognitive reorientation;Equipment evaluation, education, & procurement;Self-Care / ADL;Home management training         25 1000   Oral Hygiene   Assistance Needed Substantial/maximal assistance   Comment oral swab   CARE Score 2   Lower Body Dressing   Assistance Needed Partial/moderate assistance   Comment max cues, max encouragement, min A for shorts only   CARE Score 3   Putting On/Taking Off 
PEG tube site cleaned and redressed per order  
Patient vomited up old bloody color emesis and old blood emesis was coming through Peg tube.  Stopped feeding for now until Dr. Pennington comes in this AM.  Will continue to monitor patient.   /63.  
Patient:   Akhil Alejo  MR#:    [id number]   Room:    0816/816-02   YOB: 1955  Date of Progress Note: 5/16/2025  Time of Note                           8:23 AM  Consulting Physician:   Prateek Pennington M.D.  Attending Physician:  Prateek Pennington MD       CHIEF COMPLAINT: Generalized weakness and deconditioning     Subjective  This 69 y.o. male  with history of depression, DM, HTN, neuropathy and RLS. Patient was seen by Dr. Kitchen as outpatient for abnormal ECG and shortness of breath. He underwent a stress test on 4/25/25 which anterior wall ischemia, EF 44%, ECG abnormal as before. He was sent to [facility] for further evaluation. He was seen by cardiologist Dr. Cortes and underwent a heart catheterization which revealed multivessel CAD. Cardiothoracic surgery was consulted. He was seen by Dr. Lara, who recommended CABG x 3 to the LAD, D1 and OM1. Patient was in agreement. The patient had been on plavix and aspirin. Therfore Plavix was placed on hold. P2Y12 test done on 4/28/25 was 156 , which was felt sufficient recovery of plt function to proceed with CABG on 4/29/25. Patient tolerated the procedure well. Patient had post-op delirium causing some worsening of his dementia. Patient was evaluated by SPT therapy. On mini-mental exam,  patient obtained 1 out of 30 possible points, indicative of severe cognitive-linguistic impairment (patient did not verbalize year, season, date, TERR[place], month, state, county, city, hospital, or floor of hospital; patient was 0/3 registration, 0/5 attention, 0/3 recall, 1/2 confrontation naming, 0/1 repeat phrase, 0/3 multi-step command, 0/1 written comprehension, 0/1 writing of sentence, and 0/1 copy design).Subsequently transitioned to full assessment and patient exhibited decreased select and sustained attention, slow processing, delayed and decreased simple auditory comprehension, impaired verbalizations with patient primarily perseverating on previously spoken 
Patient:   Akhil Alejo  MR#:    [id number]   Room:    0816/816-02   YOB: 1955  Date of Progress Note: 5/20/2025  Time of Note                           8:14 AM  Consulting Physician:   Prateek Pennington M.D.  Attending Physician:  Prateek Pennington MD       CHIEF COMPLAINT: Generalized weakness and deconditioning     Subjective  This 69 y.o. male  with history of depression, DM, HTN, neuropathy and RLS. Patient was seen by Dr. Kitchen as outpatient for abnormal ECG and shortness of breath. He underwent a stress test on 4/25/25 which anterior wall ischemia, EF 44%, ECG abnormal as before. He was sent to [facility] for further evaluation. He was seen by cardiologist Dr. Cortes and underwent a heart catheterization which revealed multivessel CAD. Cardiothoracic surgery was consulted. He was seen by Dr. Lara, who recommended CABG x 3 to the LAD, D1 and OM1. Patient was in agreement. The patient had been on plavix and aspirin. Therfore Plavix was placed on hold. P2Y12 test done on 4/28/25 was 156 , which was felt sufficient recovery of plt function to proceed with CABG on 4/29/25. Patient tolerated the procedure well. Patient had post-op delirium causing some worsening of his dementia. Patient was evaluated by SPT therapy. On mini-mental exam,  patient obtained 1 out of 30 possible points, indicative of severe cognitive-linguistic impairment (patient did not verbalize year, season, date, TERR[place], month, state, county, city, hospital, or floor of hospital; patient was 0/3 registration, 0/5 attention, 0/3 recall, 1/2 confrontation naming, 0/1 repeat phrase, 0/3 multi-step command, 0/1 written comprehension, 0/1 writing of sentence, and 0/1 copy design).Subsequently transitioned to full assessment and patient exhibited decreased select and sustained attention, slow processing, delayed and decreased simple auditory comprehension, impaired verbalizations with patient primarily perseverating on previously spoken 
Patient:   Akhil Alejo  MR#:    [id number]   Room:    0816/816-02   YOB: 1955  Date of Progress Note: 5/15/2025  Time of Note                           7:16 AM  Consulting Physician:   Prateek Pennington M.D.  Attending Physician:  Parteek Pennington MD       CHIEF COMPLAINT: Generalized weakness and deconditioning     Subjective  This 69 y.o. male  with history of depression, DM, HTN, neuropathy and RLS. Patient was seen by Dr. Kitchen as outpatient for abnormal ECG and shortness of breath. He underwent a stress test on 4/25/25 which anterior wall ischemia, EF 44%, ECG abnormal as before. He was sent to [facility] for further evaluation. He was seen by cardiologist Dr. Cortes and underwent a heart catheterization which revealed multivessel CAD. Cardiothoracic surgery was consulted. He was seen by Dr. Lara, who recommended CABG x 3 to the LAD, D1 and OM1. Patient was in agreement. The patient had been on plavix and aspirin. Therfore Plavix was placed on hold. P2Y12 test done on 4/28/25 was 156 , which was felt sufficient recovery of plt function to proceed with CABG on 4/29/25. Patient tolerated the procedure well. Patient had post-op delirium causing some worsening of his dementia. Patient was evaluated by SPT therapy. On mini-mental exam,  patient obtained 1 out of 30 possible points, indicative of severe cognitive-linguistic impairment (patient did not verbalize year, season, date, TERR[place], month, state, county, city, hospital, or floor of hospital; patient was 0/3 registration, 0/5 attention, 0/3 recall, 1/2 confrontation naming, 0/1 repeat phrase, 0/3 multi-step command, 0/1 written comprehension, 0/1 writing of sentence, and 0/1 copy design).Subsequently transitioned to full assessment and patient exhibited decreased select and sustained attention, slow processing, delayed and decreased simple auditory comprehension, impaired verbalizations with patient primarily perseverating on previously spoken 
Patient:   Akhil Alejo  MR#:    [id number]   Room:    0816/816-02   YOB: 1955  Date of Progress Note: 5/17/2025  Time of Note                           8:29 AM  Consulting Physician:   Prateek Pennington M.D.  Attending Physician:  Prateek Pennington MD       CHIEF COMPLAINT: Generalized weakness and deconditioning     Subjective  This 69 y.o. male  with history of depression, DM, HTN, neuropathy and RLS. Patient was seen by Dr. Kitchen as outpatient for abnormal ECG and shortness of breath. He underwent a stress test on 4/25/25 which anterior wall ischemia, EF 44%, ECG abnormal as before. He was sent to [facility] for further evaluation. He was seen by cardiologist Dr. Cortes and underwent a heart catheterization which revealed multivessel CAD. Cardiothoracic surgery was consulted. He was seen by Dr. Lara, who recommended CABG x 3 to the LAD, D1 and OM1. Patient was in agreement. The patient had been on plavix and aspirin. Therfore Plavix was placed on hold. P2Y12 test done on 4/28/25 was 156 , which was felt sufficient recovery of plt function to proceed with CABG on 4/29/25. Patient tolerated the procedure well. Patient had post-op delirium causing some worsening of his dementia. Patient was evaluated by SPT therapy. On mini-mental exam,  patient obtained 1 out of 30 possible points, indicative of severe cognitive-linguistic impairment (patient did not verbalize year, season, date, TERR[place], month, state, county, city, hospital, or floor of hospital; patient was 0/3 registration, 0/5 attention, 0/3 recall, 1/2 confrontation naming, 0/1 repeat phrase, 0/3 multi-step command, 0/1 written comprehension, 0/1 writing of sentence, and 0/1 copy design).Subsequently transitioned to full assessment and patient exhibited decreased select and sustained attention, slow processing, delayed and decreased simple auditory comprehension, impaired verbalizations with patient primarily perseverating on previously spoken 
Patient:   Akhil Alejo  MR#:    [id number]   Room:    0816/816-02   YOB: 1955  Date of Progress Note: 5/21/2025  Time of Note                           8:03 AM  Consulting Physician:   Prateek Pennington M.D.  Attending Physician:  Prateek Pennington MD       CHIEF COMPLAINT: Generalized weakness and deconditioning     Subjective  This 69 y.o. male  with history of depression, DM, HTN, neuropathy and RLS. Patient was seen by Dr. Kitchen as outpatient for abnormal ECG and shortness of breath. He underwent a stress test on 4/25/25 which anterior wall ischemia, EF 44%, ECG abnormal as before. He was sent to [facility] for further evaluation. He was seen by cardiologist Dr. Cortes and underwent a heart catheterization which revealed multivessel CAD. Cardiothoracic surgery was consulted. He was seen by Dr. Lara, who recommended CABG x 3 to the LAD, D1 and OM1. Patient was in agreement. The patient had been on plavix and aspirin. Therfore Plavix was placed on hold. P2Y12 test done on 4/28/25 was 156 , which was felt sufficient recovery of plt function to proceed with CABG on 4/29/25. Patient tolerated the procedure well. Patient had post-op delirium causing some worsening of his dementia. Patient was evaluated by SPT therapy. On mini-mental exam,  patient obtained 1 out of 30 possible points, indicative of severe cognitive-linguistic impairment (patient did not verbalize year, season, date, TERR[place], month, state, county, city, hospital, or floor of hospital; patient was 0/3 registration, 0/5 attention, 0/3 recall, 1/2 confrontation naming, 0/1 repeat phrase, 0/3 multi-step command, 0/1 written comprehension, 0/1 writing of sentence, and 0/1 copy design).Subsequently transitioned to full assessment and patient exhibited decreased select and sustained attention, slow processing, delayed and decreased simple auditory comprehension, impaired verbalizations with patient primarily perseverating on previously spoken 
Patient:   Akhil Alejo  MR#:    [id number]   Room:    0816/816-02   YOB: 1955  Date of Progress Note: 5/19/2025  Time of Note                           8:00 AM  Consulting Physician:   Prateek Pennington M.D.  Attending Physician:  Prateek Pennington MD       CHIEF COMPLAINT: Generalized weakness and deconditioning     Subjective  This 69 y.o. male  with history of depression, DM, HTN, neuropathy and RLS. Patient was seen by Dr. Kitchen as outpatient for abnormal ECG and shortness of breath. He underwent a stress test on 4/25/25 which anterior wall ischemia, EF 44%, ECG abnormal as before. He was sent to [facility] for further evaluation. He was seen by cardiologist Dr. Cortes and underwent a heart catheterization which revealed multivessel CAD. Cardiothoracic surgery was consulted. He was seen by Dr. Lara, who recommended CABG x 3 to the LAD, D1 and OM1. Patient was in agreement. The patient had been on plavix and aspirin. Therfore Plavix was placed on hold. P2Y12 test done on 4/28/25 was 156 , which was felt sufficient recovery of plt function to proceed with CABG on 4/29/25. Patient tolerated the procedure well. Patient had post-op delirium causing some worsening of his dementia. Patient was evaluated by SPT therapy. On mini-mental exam,  patient obtained 1 out of 30 possible points, indicative of severe cognitive-linguistic impairment (patient did not verbalize year, season, date, TERRENCE, month, state, county, city, hospital, or floor of hospital; patient was 0/3 registration, 0/5 attention, 0/3 recall, 1/2 confrontation naming, 0/1 repeat phrase, 0/3 multi-step command, 0/1 written comprehension, 0/1 writing of sentence, and 0/1 copy design).Subsequently transitioned to full assessment and patient exhibited decreased select and sustained attention, slow processing, delayed and decreased simple auditory comprehension, impaired verbalizations with patient primarily perseverating on previously spoken 
Physical Therapy  Name: Akhil Alejo  MRN:  [id number]  Date of service:  5/15/2025       05/15/25 1430   Restrictions/Precautions   Restrictions/Precautions Fall Risk;Surgical Protocols;NPO;Cardiac   Lower Extremity Weight Bearing Restrictions   Right Lower Extremity Weight Bearing Weight Bearing As Tolerated   Left Lower Extremity Weight Bearing Weight Bearing As Tolerated   Position Activity Restriction   Other Position/Activity Restrictions G tube,\"no heavy lifting\" per dr calle   General   Additional Pertinent Hx DEPRESSION, DMN, HTN, NEUROPATHY, RLS   Diagnosis CABG X3 4/29 (MINIMALLY INVASIVE ROBOTIC ASSISTED)   General   General Comments in bed, awake with L arm across forehead; brief obviously bulging and pants wet in groin area, needing cleanup   Subjective   Subjective Pt with little comment to questions. Denies pain.   Bed mobility   Rolling to Left Partial/Moderate assistance;Substantial/Maximal assistance   Rolling to Right Minimal assistance   Supine to Sit Partial/Moderate assistance;Substantial/Maximal assistance  (bed functions also used as pt not initiating despite verbal and tactile cues)   Bed Mobility Comments side rails used during bed mob   Transfers   Sit to Stand Minimal Assistance;Partial/Moderate assistance   Stand to Sit Partial/Moderate assistance;Minimal Assistance   Bed to Chair Partial/Moderate assistance;Minimal assistance  (stand step with RW bed<>WC)   Comment poor safety with max cues for technique and hand position   Ambulation   Surface Level tile   Device Rolling Walker   Other Apparatus Wheelchair follow  (feeding tube pole)   Assistance Minimal assistance;Partial/Moderate assistance   Quality of Gait short stride, walker excessively fwd, rounded shoulders with arms more extended that recommended (attempting to push walker farther fwd with therapist regulating); asks to sit down almost immediately with encouragement to push on   Gait Deviations Slow Alycia;Decreased step 
Progress Note            Date:5/19/2025        Patient Name:Akhil Alejo     YOB: 1955     Age:69 y.o.    CC: PEG tube site with intermittent bleeding at the level of the skin.    Nursing reports intermittent blood present at the level of the skin of the PEG tube site over the weekend.  Most recently this morning, after changing his dressing.  Patient reports pain at this site.    Physical Exam   /64   Pulse 75   Temp 97.3 °F (36.3 °C) (Temporal)   Resp 16   Ht 1.803 m (5' 10.98\")   Wt 85.1 kg (187 lb 9.8 oz)   SpO2 96%   BMI 26.18 kg/m²      - GENERAL: Alert and oriented x 3. No acute distress. Well-nourished.    - EYES: EOMI. Anicteric.    - HENT: Moist mucous membranes. No cervical lymphadenopathy.    - LUNGS: Clear to auscultation bilaterally. No accessory muscle use.    - CARDIOVASCULAR: Regular rate and rhythm. No murmur. No JVD.    - ABDOMEN: Soft, non-tender and non-distended. No palpable masses.  PEG tube in place in the upper abdomen with increased erythema under the bolster.  No blood on my exam but his dressing was just changed.  There is pain with palpation at this area.    - EXTREMITIES: No edema. Non-tender.      Labs    CBC:  Recent Labs     05/19/25  0516   WBC 15.1*   RBC 3.53*   HGB 9.9*   HCT 31.3*   MCV 88.7   RDW 15.3*   *     CHEMISTRIES:  Recent Labs     05/17/25  0530 05/19/25  0516   * 133*   K 3.6 4.2    100   CO2 23 20*   BUN 16 44*   CREATININE 0.8 0.8   GLUCOSE 276* 291*     PT/INR:No results for input(s): \"PROTIME\", \"INR\" in the last 72 hours.  APTT:No results for input(s): \"APTT\" in the last 72 hours.  LIVER PROFILE:  No results for input(s): \"AST\", \"ALT\", \"BILIDIR\", \"BILITOT\", \"ALKPHOS\" in the last 72 hours.      Assessment & Plan:   This is a 69-year-old male with a recent CABG (4/29/2025) with a history of RLS, dementia, and diabetes mellitus.  He is known to be on aspirin, Plavix, and prophylactic Lovenox.  He had a PEG tube placed 
Progress Note            Date:5/20/2025        Patient Name:Akhil Alejo     YOB: 1955     Age:69 y.o.    CC: Follow-up blood and pain around PEG tube site.    Still with some dried blood on the gauze under his PEG bolster.  Still with significant pain to palpation under the PEG tube site.    Physical Exam   /74   Pulse 74   Temp 97.7 °F (36.5 °C) (Temporal)   Resp 16   Ht 1.803 m (5' 10.98\")   Wt 83 kg (182 lb 15.7 oz)   SpO2 96%   BMI 25.53 kg/m²      - GENERAL: Up in chair.  No acute distress. Well-nourished.    - EYES: EOMI. Anicteric.    - HENT: Moist mucous membranes. No cervical lymphadenopathy.    - LUNGS: Clear to auscultation bilaterally. No accessory muscle use.    - CARDIOVASCULAR: Regular rate and rhythm. No murmur. No JVD.    - ABDOMEN: Soft, non-tender and non-distended. No palpable masses.  Still with pain under the PEG tube site.  There are some dried blood on the dressing under the bolster.    - EXTREMITIES: No edema. Non-tender.?      Labs    CBC:  Recent Labs     05/19/25  0516   WBC 15.1*   RBC 3.53*   HGB 9.9*   HCT 31.3*   MCV 88.7   RDW 15.3*   *     CHEMISTRIES:  Recent Labs     05/19/25  0516   *   K 4.2      CO2 20*   BUN 44*   CREATININE 0.8   GLUCOSE 291*     PT/INR:No results for input(s): \"PROTIME\", \"INR\" in the last 72 hours.  APTT:No results for input(s): \"APTT\" in the last 72 hours.  LIVER PROFILE:  No results for input(s): \"AST\", \"ALT\", \"BILIDIR\", \"BILITOT\", \"ALKPHOS\" in the last 72 hours.      Assessment & Plan:   This is a 69-year-old male with a recent CABG (4/29/2025) with a history of RLS, dementia, and diabetes mellitus.  He is known to be on aspirin, Plavix, and prophylactic Lovenox.  He had a PEG tube placed 5/13/2025 for dysphagia and this was noted to be bleeding around the skin site.  We have been consulted.     Bleeding around the PEG tube site.  There was some minimal dried blood under the PEG tube bolster.  When I 
Spoke to patient's wife, Meaghan, re discharge planning. MSW and Meaghan discussed options pending patient's progress. Meaghan states she is a nurse and will not consider SNF placement. MSW will arrange home health and home tube feedings. Meaghan has requested to use [facility]. Preliminary referral information sent to Yaneli Murguia with [facility] and will send HH order when entered. Preliminary referral information also sent to Citlaly You with [facility] for home tube feedings. Will keep chart updated on progress of discharge planning arrangements.     CMG date is 5/29; however, after speaking to Meaghan, will plan for discharge on Saturday, 5/31, so that patient can utilize two additional days of PT/OT/ST.     FTD scheduled for Thursday, 5/29 at 9:00AM. PT and OT notified.     Electronically signed by JARROD Rivas on 5/20/2025 at 2:45 PM   
Was given report this am by night shift that patient had vomited blood and when he coughed blood was in his peg tube.  Nurse reports that she flushed the peg tube and stopped the tube feeding.    Went to assess patient and notified Dr. Mir regarding the bleeding and informed Dr. Pennington of the situation. Orders were received.   Discussed with Chasity BROOKS medical consult what had happened and orders were received. Patient was taken to CT scan and rapid response was called due to patient vomiting blood clots while in CT scan as stated by [facility] Clinical house.  Discussed with her that patient needs to go to ICU.  Patient will be going to ICU room 150  Called reported to Naomy FOSS. Informed Dr. Pennington, Dr. Lara, Dr. Mir, and patient's wife Meaghan Alejo.   
    Cristel Hopkins RD  Contact: [phone]    
  PT Individual Minutes   Time In  --   --   --    Time Out  --   --   --    Minutes  --   --   --       05/19/25 1359 05/19/25 1400   Restrictions/Precautions   Restrictions/Precautions  --   --    Activity Level  --   --    Lower Extremity Weight Bearing Restrictions   Right Lower Extremity Weight Bearing  --   --    Left Lower Extremity Weight Bearing  --   --    Position Activity Restriction   Sternal Precautions  --   --    Other Position/Activity Restrictions  --   --    General   Additional Pertinent Hx  --   --    Diagnosis  --   --    Pain   Pre-Pain  --   --    Bed mobility   Rolling to Right  --   --    Supine to Sit  --   --    Transfers   Sit to Stand  --   --    Stand to Sit  --   --    Bed to Chair  --   --    Lateral Transfers  --   --    Ambulation   Surface  --   --    Device  --   --    Other Apparatus  --   --    Assistance  --   --    Quality of Gait  --   --    Gait Deviations  --   --    Distance  --   --    Comments  --   --    Activity Tolerance   Activity Tolerance Patient limited by fatigue;Patient limited by endurance  --    Assessment   Assessment Pt. required encouragement to participate and get out of bed.  Able to increase amb distance this session.  Requires mod-min assist and uses RW.  Assisted pt to Recliner at end of session.  --    Safety Devices   Type of Devices  --  Call light within reach;Chair alarm in place;Nurse notified;Left in chair   PT Individual Minutes   Time In  --  1300   Time Out  --  1400   Minutes  --  60     Electronically signed by Shireen Mace PTA on 5/19/2025 at 2:02 PM   
RD  Contact: [phone]    
abd/ add, SAQ.  --   --    Activity Tolerance   Activity Tolerance  --  Patient limited by fatigue;Patient limited by endurance  --    Assessment   Assessment  --  Pt. agreeable to bedside LE exercises this session.  AAROM with all exercises.  --    Safety Devices   Type of Devices  --   --  Bed alarm in place;Call light within reach;Telesitter in use   PT Individual Minutes   Time In  --   --   --    Time Out  --   --   --    Minutes  --   --   --       05/17/25 1339   Restrictions/Precautions   Restrictions/Precautions  --    Activity Level  --    Lower Extremity Weight Bearing Restrictions   Right Lower Extremity Weight Bearing  --    Left Lower Extremity Weight Bearing  --    Position Activity Restriction   Sternal Precautions  --    Other Position/Activity Restrictions  --    General   Additional Pertinent Hx  --    Diagnosis  --    Subjective   Subjective  --    Pain   Pre-Pain  --    Pain Location  --    PT Exercises   Exercise Treatment  --    Activity Tolerance   Activity Tolerance  --    Assessment   Assessment  --    Safety Devices   Type of Devices  --    PT Individual Minutes   Time In 1300   Time Out 1335   Minutes 35     Electronically signed by Shireen Mace PTA on 5/17/2025 at 1:40 PM   
assistance  (with RW)   Sit to stand Minimal assistance   Stand to sit Minimal assistance   Assessment   Performance deficits / Impairments Decreased functional mobility ;Decreased ADL status;Decreased ROM;Decreased strength;Decreased safe awareness;Decreased balance;Decreased endurance;Decreased cognition;Decreased high-level IADLs   Assessment Pt continues to demonstrate decreased endurance and activity tolerance. Pt often required cues to to continue therapy session. Pt often stated wanting to return to bed.   Activity Tolerance   Activity Tolerance Patient limited by fatigue;Treatment limited secondary to decreased cognition   Occupational Therapy Plan   Current Treatment Recommendations Strengthening;Balance training;ROM;Functional mobility training;Endurance training;Patient/Caregiver education & training;Safety education & training;Cognitive reorientation;Equipment evaluation, education, & procurement;Self-Care / ADL;Home management training      05/16/25 0900   Oral Hygiene   Assistance Needed Partial/moderate assistance   Comment cues, oral swab   CARE Score 3      05/16/25 0900   OT Exercises   Exercise Treatment cane/wand--2#, 2 sets, 10reps, encouragement to continue exercises and to complete full range      05/16/25 0900   Safety Devices   Type of Devices Left in bed;Bed alarm in place;Call light within reach     Therapy Time   Individual Concurrent Group Co-treatment   Time In 0900         Time Out 1000         Minutes 60         Timed Code Treatment Minutes: 60 Minutes       Kadi Trejo OT     
intake  Goal 2: Patient/staff/caregivers will demonstrate awareness of swallowing precautions  Goal 3: Patient/staff/caregivers will complete daily oral hygiene to prevent aspiration of oral bacteria  Goal 4: Patient will complete pharyngeal exercises breath support, and oral motor exercises with minimal cues  Goal 5: Patient will participate in an MBSS to assess aspiration risk, oral and pharyngeal phases of the swallow and determine LRD and treatment compensatory strategies. Goal 6: The patient will tolerate p.o. trials with SLP. Goal 7: The patient will remain NPO     ASSESSMENT:  Assessment: [x]Progressing towards goals           []Not Progressing towards goals     Patient Tolerance of Treatment:       []Tolerated well          [x]Tolerated fair           [x]Required rest breaks          [x]Fatigued     Education:  Learner:  [x]Patient          []Significant Other          []Other  Education provided regarding:  [x]Goals and POC                               []Diet and swallowing precautions                  []Home Exercise Program                 []Progress and/or discharge information  Method of Education:  [x]Discussion          []Demonstration          []Handout          []Other  Evaluation of Education:   []Verbalized understanding                           []Demonstrates without assistance  []Demonstrates with assistance                    [x]Needs further instruction                               []No evidence of learning                              [x]No family present                    Plan:   [x]Continue with current plan of care                []Modify current plan of care as follows:               []Discharge patient                          Discharge Location:                          Services/Supervision Recommended:      Electronically signed by MATIAS Poole on 5/21/2025 at 7:53 AM      
of aspiration and make recommendations regarding safe dietary consistencies, effective compensatory strategies, and safe eating environment.    Impression  Dysphagia Impression : Moderate oropharyngeal dysphagia is suspected due to weak vocal intensity, hoarse vocal quality, decreased hyolaryngeal elevation, delayed pharyngeal swallow responses, throat clearing and cough following p.o trials. Recommend repeating his MBSS tomorrow at 8:00 am to determine if he can tolerate any oral intake. Recommend continue NPO status and continuing PEG as his primary means of nutrition, hydration, and medications. Recommend daily oral hygiene to prevent aspiration of oral bacteria. Recommend ice chips after oral hygiene.      Treatment Plan  Requires SLP Intervention: Yes     D/C Recommendations: Ongoing speech therapy is recommended during this hospitalization       Recommended Diet and Intervention  Diet Solids Recommendation: NPO  Liquid Consistency Recommendation: NPO  Recommended Form of Meds: Via alternative means of nutrition  Recommendations: Modified barium swallow study  Therapeutic Interventions: Pharyngeal exercises;Diet tolerance monitoring;Oral care;Oral motor exercises;Patient/Family education        Treatment/Goals  Short-term Goals  Goal 1: The patient will participate in swallowing reassessments to ensure safety with oral intake  Goal 2: Patient/staff/caregivers will demonstrate awareness of swallowing precautions  Goal 3: Patient/staff/caregivers will complete daily oral hygiene to prevent aspiration of oral bacteria  Goal 4: Patient will complete pharyngeal exercises breath support, and oral motor exercises with minimal cues  Goal 5: Patient will participate in an MBSS to assess aspiration risk, oral and pharyngeal phases of the swallow and determine LRD and treatment compensatory strategies. Goal 6: The patient will tolerate p.o. trials with SLP.  Long-term Goals  Goal 1: The patient will tolerate the safest 
chills  Respiratory ROS: No cough, shortness of breath, wheezing  Cardiovascular ROS: No chest pain or palpitations  Gastrointestinal ROS: No abdominal pain or melena  Genito-Urinary ROS: No dysuria or hematuria  Musculoskeletal ROS: No joint pain or swelling   14 point ROS reviewed with the patient and negative except as noted above and in the HPI unless unable to obtain.    Objective:  Patient Vitals for the past 24 hrs:   BP Temp Temp src Pulse Resp SpO2   05/17/25 0430 138/73 97.5 °F (36.4 °C) Temporal 73 16 97 %   05/16/25 2048 (!) 146/62 -- -- 88 -- --   05/16/25 1557 (!) 150/74 98.2 °F (36.8 °C) -- 88 17 96 %       Intake/Output Summary (Last 24 hours) at 5/17/2025 1126  Last data filed at 5/17/2025 0621  Gross per 24 hour   Intake 2226 ml   Output 1250 ml   Net 976 ml       General: awake/alert   Head: Atraumatic, normocephalic  Neck: No masses, no JVD  Chest:  clear to auscultation bilaterally  CVS: regular rate and rhythm, soft systolic murmur  Abdominal: soft, nontender, normal bowel sounds  Extremities: no cyanosis or edema  Skin: warm and dry without rash      Labs:  BMP:   Recent Labs     05/15/25  0534 05/16/25  0447 05/17/25  0530   * 130* 135*   K 3.6 4.3 3.6   CL 97* 98 100   CO2 22 21* 23   BUN 14 16 16   CREATININE 0.8 0.7 0.8   CALCIUM 8.6* 8.5* 8.7*     CBC:   Recent Labs     05/15/25  0534   WBC 14.1*   HGB 11.0*   HCT 34.7*   MCV 88.7        LIVER PROFILE:   No results for input(s): \"AST\", \"ALT\", \"LIPASE\", \"AMYLASE\", \"BILIDIR\", \"BILITOT\", \"ALKPHOS\" in the last 72 hours.    Invalid input(s): \"ALB\"    PT/INR: No results for input(s): \"PROTIME\", \"INR\" in the last 72 hours.  APTT: No results for input(s): \"APTT\" in the last 72 hours.  BNP:  No results for input(s): \"BNP\" in the last 72 hours.  Ionized Calcium:Invalid input(s): \"IONCA\"  Magnesium:  No results for input(s): \"MG\" in the last 72 hours.      Phosphorus:  No results for input(s): \"PHOS\" in the last 72 hours.    HgbA1C: No 
  Hypertension  Hypothyroidism  Dysphagia  Vitamin D deficiency      Plan:  Discussed with patient , therapy team  Workup reviewed today  Continue to monitor labs  Holding HCTZ for now and futher lower free water rate via PEG tube.       Sathish Burgso MD  05/16/25  10:12 AM

## 2025-05-21 NOTE — H&P
[address] [place] [address]    DEPARTMENT OF HOSPITALIST MEDICINE        HISTORY & PHYSICAL:          REASON FOR ADMISSION:  No chief complaint on file.       HISTORY OF PRESENT ILLNESS:  Akhil Alejo is an 69 y.o. male  with PMH listed below   The patient is a 69 y.o. male who presents with with PMH listed below.  Pt was sent for stress test by his cardiologist after concern in office for EKG changes.  Pt underwent heart cath and found to have multivessel disease.  Pt had robotic 3 vessel CABG on 4/29.  He developed some delerium post op.  SLP did cog eval with severe impairment.  Pt also failed multiple swallow evals.  G tube was placed on 05/09/2025.  Nursing reported hematemesis and blood from g tube overnight.  Consult called to hospitalist.  Pt was found to be tachypneic and pale,  Pulse ox was 95 .  Bp 103/59 hr 94.  Pt complaining of abd pain.  Hgb resulted at 7.8.  Pt sent for emergent ct.  Pt vomited large amount of clots during scan. Rapid response called.   GI notified.  Pt will be transferred to ICU.  Will get PCXR.  Holding asa, lovenox and tube feedings. Pt had a mental status change from 8th to ICU.  Stat head ct ordered,.  Pt continued to decline and intubated per intensivist.  Large amount of blood clots vomited attempting to get NGT placed.  CTS, Neurology and GI all updated and made aware.  Levophed gtt initiated.  Hgb 6.7.  1 unit PRBC ordered.  GI and CTS at bedside.     PAST MEDICAL HISTORY:  Past Medical History:   Diagnosis Date    Arm fracture 07/2016    left    Dementia (HCC)     per wife he needs be supervised at home    Depression     Diabetes mellitus (HCC)     Hypertension     Kidney stones     Neuropathy     Restless leg syndrome          PAST SURGICAL HISTORY:  Past Surgical History:   Procedure Laterality Date    BONE MARROW BIOPSY Right 12/09/2020    BONE MARROW ASPIRATION BIOPSY performed by ALISA Rabago at [facility] OR    CARDIAC PROCEDURE N/A 04/25/2025

## 2025-05-21 NOTE — PROGRESS NOTES
Dr. Schultz and ALISA Howell at bedside. Orders received to intubate.   20 Etomidate given at 1050 per Antoine  10 Vec given at 1051 per Antoine  Patient intubated with #7.5 25@ lip at 1052. Positive color change, equal bilateral breath sounds.

## 2025-05-21 NOTE — CONSULTS
Department of Cardiothoracic Surgery    CHIEF COMPLAINT: altered mental status s/p GI bleed         HISTORY OF PRESENT ILLNESS:      The patient is a 69 y.o. male with significant past medical history of chronic dementia who underwent robotic CABG x 3 due to severe distal LM disease on 4/29/25. There was some injury noted to the pharynx during intubation or reintubation with the double and single lumen ETT during surgery. Afterwards, the patient had a smooth postop course other than issues related to his dementia and intubation with prolonged period to fully awake from anesthesia due to worsened dementia and inability to tolerate PO. He had an ENT consult to evaluate the oral injury and no intervention was required. He had GI consult for placement of a PEG tube. This was done and the patient was transferred to 8th floor rehab. At rehab, he was making progress until he developed worsening anemia and evidence of bleeding from the PEG site. He had altered mental status, gastric distension and aspirated, causing the need for urgent intubation after transfer to the ICU. On arrival, I placed an arterial line and performed a bronchscopy, confirming the presence of aspiration of GI contents into the right side bronchial tree.    Past Medical History:   Diagnosis Date    Arm fracture 07/2016    left    Dementia (HCC)     per wife he needs be supervised at home    Depression     Diabetes mellitus (HCC)     Hypertension     Kidney stones     Neuropathy     Restless leg syndrome        Social History     Socioeconomic History    Marital status:      Spouse name: Not on file    Number of children: Not on file    Years of education: Not on file    Highest education level: Not on file   Occupational History    Not on file   Tobacco Use    Smoking status: Never    Smokeless tobacco: Never   Vaping Use    Vaping status: Never Used   Substance and Sexual Activity    Alcohol use: No    Drug use: No    Sexual activity: Not  Currently   Other Topics Concern    Not on file   Social History Narrative    Not on file     Social Drivers of Health     Financial Resource Strain: Not on file   Food Insecurity: No Food Insecurity (5/13/2025)    Hunger Vital Sign     Worried About Running Out of Food in the Last Year: Never true     Ran Out of Food in the Last Year: Never true   Transportation Needs: No Transportation Needs (5/13/2025)    PRAPARE - Transportation     Lack of Transportation (Medical): No     Lack of Transportation (Non-Medical): No   Physical Activity: Not on file   Stress: Not on file   Social Connections: Unknown (10/10/2023)    Received from [facility], [facility]    Family and Community Support     Help with Day-to-Day Activities: Not on file     Lonely or Isolated: Not on file   Intimate Partner Violence: Not At Risk (2/21/2025)    Received from [facility]    Abuse Screen     Feels Unsafe at Home or Work/School: no     Feels Threatened by Someone: no     Does Anyone Try to Keep You From Having Contact with Others or Doing Things Outside Your Home?: no     Physical Signs of Abuse Present: no   Housing Stability: Low Risk  (5/13/2025)    Housing Stability Vital Sign     Unable to Pay for Housing in the Last Year: No     Number of Times Moved in the Last Year: 1     Homeless in the Last Year: No       Past Surgical History:   Procedure Laterality Date    BONE MARROW BIOPSY Right 12/09/2020    BONE MARROW ASPIRATION BIOPSY performed by ALISA Rabago at [facility] OR    CARDIAC PROCEDURE N/A 04/25/2025    Left heart cath / coronary angiography performed by Chayo Cortes MD at [facility] CARDIAC CATH LAB    CHOLECYSTECTOMY      COLONOSCOPY  05/27/2020    Dr Patiño   AP    CORONARY ARTERY BYPASS GRAFT N/A 04/29/2025    CORONARY ARTERY BYPASS GRAFT X3, MINIMALLY INVASIVE robotic, pump assisted via femoral access, BILATERAL INTERNAL MAMMARY ARTERIES, ENDOSCOPIC VEIN HARVESTING,

## 2025-05-21 NOTE — INTERVAL H&P NOTE
Update History & Physical    The patient's History and Physical of May 20, 2025 was reviewed with the patient and I examined the patient. There was no change. The surgical site was confirmed by the patient and me.     Plan: The risks, benefits, expected outcome, and alternative to the recommended procedure have been discussed with the patient. Patient understands and wants to proceed with the procedure.     Electronically signed by Nelsy Mir MD on 5/21/2025 at 11:17 AM

## 2025-05-21 NOTE — PROCEDURES
PROCEDURE NOTE  Date: 5/21/2025   Name: Akhil Alejo  YOB: 1955    Procedures  Patient was bagged with a bag-valve-mask to a saturation greater than 92%.  The patient was given etomidate 20 mg IV push followed with vecuronium 10 mg IV push.  The glide scope was used to identify the vocal cords without difficulty.  A #7-1/2 ET tube was visualized going through the cords.  The tube was 25 cm at the lip.  There was positive end-tidal CO2.  Patient tolerated the procedure well.  No immediate complications.  Saturations went up to 99% post intubation.  Chest x-ray is pending.

## 2025-05-21 NOTE — CONSULTS
Gastrostomy tube placement.  Lithotripsy.  Shoulder surgery.  Cholecystectomy.  Bone marrow aspiration.    Social History: Per chart review-no tobacco use.  No alcohol use.  No illicit drug use.    Family History: Hypertension    Exposure History: Unknown    Review of Systems: Unobtainable from patient due to sedation and mechanical ventilation    Vital Signs:  BP (!) 100/55   Pulse 90   Temp (!) 96.6 °F (35.9 °C)   Resp 25   Ht 1.803 m (5' 10.98\")   SpO2 100%   BMI 25.53 kg/m²  Temp (24hrs), Av.6 °F (36.4 °C), Min:96.6 °F (35.9 °C), Max:98.6 °F (37 °C)    Physical Exam:   Vital signs reviewed.  Currently sedated on mechanical ventilation  He appears to be breathing comfortably  He is on 60% FiO2 and 5 of PEEP  He is on low-dose Levophed  Skin without rash  Lungs with few scattered crackles right greater than left  No wheezing  Heart distant heart sounds without apparent murmur    Lab Results:  CBC:   Recent Labs     25  0516 25  0629 25  1059 25  1542   WBC 15.1* 21.2*  --   --    HGB 9.9* 7.8* 6.7* 7.1*   HCT 31.3* 25.2* 23.2* 21.5*   * 397  --   --    LYMPHOPCT 23.3  --   --   --    MONOPCT 8.1  --   --   --      CMP:   Recent Labs     25  0516 25  0907 25  1137   * 132*  --    K 4.2 5.5* 6.0    102  --    CO2 20* 18*  --    BUN 44* 65*  --    CREATININE 0.8 1.0  --    CALCIUM 8.7* 8.2*  --    BILITOT  --  0.3  --    ALKPHOS  --  125  --    ALT  --  10  --    AST  --  20  --    GLUCOSE 291* 298*  --      Culture Results:   Blood cultures May 21, 2025-no growth  Blood cultures May 21, 2025-no growth    Radiology:     Chest x-ray done today personally reviewed.  Radiology report outlined below.  Image outlined below.  FINDINGS:  Interval placement of an endotracheal tube, tip about 3.2 cm above the monika. Interval placement of an enteric tube, tip in the mid stomach. EKG leads project over the chest.  Hypoventilation, with mild atelectasis of  the left base.  No pleural effusion or pneumothorax is seen.  Stable cardiomegaly.  No acute displaced rib fractures are identified.  Left rib surgical plating, and surgical clips in the left axilla, again noted.  IMPRESSION:  1.  Interval placement of an endotracheal and enteric tube, in satisfactory position.  2. Stable cardiomegaly.   ____________________________________   Electronically signed by: SU ELLISON M.D.  Date:     05/21/2025  Time:    11:34       CT abdomen and pelvis done today:  IMPRESSION:  No evidence of acute retroperitoneal hemorrhage.  There is no bowel obstruction or acute inflammatory change seen within the abdomen pelvis.  Small left pleural effusion.  There is a small amount of gas seen within the lumen of the urinary bladder.  Clinical correlation is recommended for recent Amezcua catheterization.  Infection of the urinary bladder cannot be excluded.   All CT scans are performed using dose optimization techniques as appropriate to the performed exam and include   at least one of the following: Automated exposure control, adjustment of the mA and/or kV according to size, and the use of iterative reconstruction technique.      ______________________________________   Electronically signed by: LEORA SINGLETON M.D.  Date:     05/21/2025  Time:    09:35     Impression:   1.  Aspiration  2.  Gastrointestinal bleeding  3.  Recent coronary artery bypass grafting-April 29    Recommendations:    Continue vancomycin  Change ceftriaxone to cefepime  Add metronidazole  Continue suctioning/pulmonary toilet  Continue supportive care  Continue to follow    CHLOE MIRANDA MD  05/21/25  5:19 PM

## 2025-05-21 NOTE — PROGRESS NOTES
[facility]   Pharmacy Pharmacokinetic Monitoring Service - Vancomycin     Akhil Alejo is a 69 y.o. male starting on vancomycin therapy for Pneumonia (Nosocomial). Pharmacy consulted by Akhil Lara MD, for monitoring and adjustment.    Target Concentration: Goal AUC/ALESIA 400-600 mg*hr/L    Additional Antimicrobials: Ceftriaxone    Pertinent Laboratory Values:   Wt Readings from Last 1 Encounters:   05/20/25 83 kg (182 lb 15.7 oz)     Temp Readings from Last 1 Encounters:   05/21/25 98.2 °F (36.8 °C)     Estimated Creatinine Clearance: 73 mL/min (based on SCr of 1 mg/dL).  Recent Labs     05/19/25  0516 05/21/25  0629 05/21/25  0907   CREATININE 0.8  --  1.0   BUN 44*  --  65*   WBC 15.1* 21.2*  --      Procalcitonin: 05/21/25 = 0.16    Pertinent Cultures:  Culture Date Source Results   05/21/25 Blood x 2 Sent   MRSA Nasal Swab: not ordered. Order placed by pharmacy.    Plan:  Dosing recommendations based on Bayesian software  Start loading dose of Vancomycin 2000 mg IV x 1 followed by a maintenance dose of Vancomycin 1000 mg IV every 12 hours  Anticipated AUC of 510 / 574 and trough concentration of 17.5 at steady state    Loading dose: 2000 mg at 13:00 05/21/2025.  Regimen: 1000 mg IV every 12 hours.  Start time: 01:00 on 05/22/2025  Exposure target: AUC24 (range) 400-600 mg/L.hr   TYP82-62: 510 mg/L.hr  AUC24,ss: 574 mg/L.hr  Probability of AUC24 > 400: 94 %  Ctrough,ss: 17.5 mg/L  Probability of Ctrough,ss > 20: 33 %    Renal labs as indicated   Vancomycin concentration ordered for 05/23/25 @ 0030   Pharmacy will continue to monitor patient and adjust therapy as indicated    Thank you for the consult,    Naheed Gomes, [facility]  5/21/2025 12:26 PM

## 2025-05-21 NOTE — OP NOTE
Endoscopy Note          Operative Report    Patient: Akhil Alejo MRN: [id number]      YOB: 1955  Age: 69 y.o.  Sex: male            Indications: Hematemesis.  PEG tube in place.  Drop in hemoglobin requiring blood transfusion.  Last dose aspirin, Plavix, prophylactic Lovenox 5/20/2025.    Preoperative Evaluation: The patient was evaluated prior to the procedure in the GI lab admission area, the patient's ASA was recorded in the chart.  Consent was obtained from the patient with discussion to include the risk of perforation, bleeding, infection, and aspiration.    Anesthesia: RONAK-per anesthesia.    Complication: None; patient tolerated the procedure well.    Estimated blood loss: Insignificant.    Procedure: The patient was sedated into the left lateral decubitus position.  Scope was advanced from the mouth to the third portion of duodenum.  Scope was withdrawn to the stomach and retroflexed view was performed.     Findings:  Normal esophagus.  There was red blood upon entering the stomach.  Large amount in the gastric cardia could not be cleared due to clotting and clogging of the endoscope.  Attention was paid to the more distal features and showed a normal antrum, duodenal bulb, and duodenum.  There was red blood pooling under the PEG tube bumper.  A small superficial mucosal vessel was actively bleeding.  There were some mild surrounding erythema.  No ulceration. This was treated with epinephrine and gold probe successfully.  No bleeding at the end of the procedure.    Postoperative Diagnosis:  Superficial actively bleeding submucosal vessel at the PEG tube bolster site, treated successfully with epinephrine and gold probe.  PEG tube secured at 3 cm to the level of the skin.      Recommendations:   Continue to monitor in ICU.  Transfusion to target goals.  Continue to monitor serial hemoglobin.  Continue pantoprazole 40 mg IV twice daily for now.  Extubation timing per ICU and Dr. Lara.  I will

## 2025-05-21 NOTE — SIGNIFICANT EVENT
Rapid response called from CT.  Pt vomiting blood clots.  VSS.  Concern for aspiration .  CXR ordered.  Will go to ICU.

## 2025-05-21 NOTE — OP NOTE
[facility]              [address] [place] [address]                            OPERATIVE REPORT      PATIENT NAME: AKHIL RAUSCH               : 1955  MED REC NO: [id number]                          ROOM: 0150  ACCOUNT NO: [id number]                       ADMIT DATE: 2025  PROVIDER: Akhil Lara MD      DATE OF PROCEDURE:  2025    SURGEON:  Akhil Lara MD    PREOPERATIVE DIAGNOSIS:  Aspiration pneumonia.    POSTOPERATIVE DIAGNOSIS:  Aspiration pneumonia.    PROCEDURE:  Therapeutic and diagnostic bronchoscopy.    ESTIMATED BLOOD LOSS:  Not applicable.    HISTORY:  The patient is a 69-year-old gentleman who underwent robotic bypass surgery 3 weeks ago.  He was discharged to a rehab facility and subsequently has developed a GI bleed due to an arterial injury from a PEG tube.  Because of acute on chronic dementia, he did not adequately protect his airway and aspirated.  This was evident when the ICU physician emergently intubating him noted evidence of bloody gastric contents at the vocal cords.  The patient therefore was indicated for bronchoscopy to further diagnose the severity of the aspiration and help clear out the secretions to improve oxygenation.    DESCRIPTION OF PROCEDURE:  The patient was already endotracheally intubated and sedated.  He underwent placement of a large bore bronchoscope down into the ET tube.  There was, through the transparent wall of the ET tube, noted to be a lot of gastric-type bloody secretions in the upper trachea.  Once advancing the tube passed the bronchoscope past the tip of the ET tube.  The monika was identified and had copious amounts of dark coffee-grounds type of fluid mixed in with tracheal secretions.  These secretions were found to be occluding the right mainstem bronchus.  All these secretions were aspirated out from the upper and lower lobe and middle lobe of the right lobe bronchus using the scope.  The

## 2025-05-21 NOTE — CONSULTS
[facility] Vascular Access Team:  Consult Note    Patient: Akhil Alejo  YOB: 1955   MRN: [id number]  Room: [address] [place] [address]     Attending Physician: Prateek Pennington MD  Ordering Physician: Prateek Pennington MD    Diagnosis:   CAD (coronary atherosclerotic disease) without angina pectoris [I25.10]  CAD in native artery [I25.10]    Active LDAs:  Peripheral IV 05/19/25 Right Forearm (Active)   Number of days: 0       Reason for Consult:  [facility] vascular access team consulted for placement of Ultrasound Guided Peripheral IV.    Indication(s):  Akhil Alejo is a 69 y.o. male admitted to [address] [place] [address] for CAD in native artery. Akhil Alejo currently has no IV access and is in need of IV access for an ordered CT scan with IV contrast. There have been attempts made to establish IV access, however they have been unsuccessful.     Findings:  Successfully placed a 22 gauge 1.75\" ultrasound guided peripheral IV in the patient's right forearm with one attempt and no complications. Patient tolerated well. Was able to easily flush 10mL normal saline through newly place USGPIV. Notified primary RN.     Impression/Plan:  1. Ultrasound-Guided Peripheral IV is ready to be used    Thank you for your time and consult.     Electronically Signed By: Evaristo Ly RN on 5/19/2025 at 2:24 PM    
Nephrology ([facility]) Consultation Note      Patient:  Akhil Alejo  YOB: 1955  Date of Service: 5/15/2025  MRN: [id number]   Acct: [id number]   Primary Care Physician: Lennox Waddell MD  Advance Directive: Full Code  Admit Date: 5/13/2025       Hospital Day: 2  Referring Provider: Prateek Pennington MD    Patient independently seen and examined, Chart, Consults, Notes, Operative notes, Labs, Cardiology, and Radiology studies reviewed as available.        Subjective:  Akhil Alejo is a 69 y.o. male for whom we were consulted for evaluation and treatment of hyponatremia.  He underwent CABG three-vessel on 4/29/2025 by Dr. Lara.  Had episodes of postoperative atrial fibrillation and persistently failed swallow studies.  He has had a Dobbhoff but then received a PEG placement.  Sodium had been normal prior to admission and recently had been ranging in the low 150s.  Patient denied other complaints upon questioning. Later, his serum na started dropping again. He was subsequently transferred to rehab. His sodium is now 133.  Renal service is consulted to continue to follow his abnormal electrolytes.  Patient remains n.p.o. and is receiving PEG tube feeding.  He seems to be tolerating his physical therapy.  He has no change his urinary habits.      Allergies:  Penicillins    Medicines:  Current Facility-Administered Medications   Medication Dose Route Frequency Provider Last Rate Last Admin    Loperamide HCl (IMODIUM) 1 MG/7.5ML solution 2 mg  2 mg Per G Tube 4x Daily PRN Prateek Pennington MD   2 mg at 05/14/25 2049    atorvastatin (LIPITOR) tablet 20 mg  20 mg Oral Nightly PangburnAkhil MD   20 mg at 05/14/25 2048    bisacodyl (DULCOLAX) suppository 10 mg  10 mg Rectal Daily PRN Akhil Lara MD        enoxaparin (LOVENOX) injection 40 mg  40 mg SubCUTAneous Daily Akhil Lara MD   40 mg at 05/15/25 0835    fluticasone (FLONASE) 50 MCG/ACT nasal spray 1 spray  1 spray 
Oral BID       Allergies:  Penicillins     Social History:       Social History     Socioeconomic History    Marital status:      Spouse name: Not on file    Number of children: Not on file    Years of education: Not on file    Highest education level: Not on file   Occupational History    Not on file   Tobacco Use    Smoking status: Never    Smokeless tobacco: Never   Vaping Use    Vaping status: Never Used   Substance and Sexual Activity    Alcohol use: No    Drug use: No    Sexual activity: Not Currently   Other Topics Concern    Not on file   Social History Narrative    Not on file     Social Drivers of Health     Financial Resource Strain: Not on file   Food Insecurity: No Food Insecurity (5/13/2025)    Hunger Vital Sign     Worried About Running Out of Food in the Last Year: Never true     Ran Out of Food in the Last Year: Never true   Transportation Needs: No Transportation Needs (5/13/2025)    PRAPARE - Transportation     Lack of Transportation (Medical): No     Lack of Transportation (Non-Medical): No   Physical Activity: Not on file   Stress: Not on file   Social Connections: Unknown (10/10/2023)    [facility]    Family and Community Support     Help with Day-to-Day Activities: Not on file     Lonely or Isolated: Not on file   Intimate Partner Violence: Not At Risk (2/21/2025)    Received from [facility]    Abuse Screen     Feels Unsafe at Home or Work/School: no     Feels Threatened by Someone: no     Does Anyone Try to Keep You From Having Contact with Others or Doing Things Outside Your Home?: no     Physical Signs of Abuse Present: no   Housing Stability: Low Risk  (5/13/2025)    Housing Stability Vital Sign     Unable to Pay for Housing in the Last Year: No     Number of Times Moved in the Last Year: 1     Homeless in the Last Year: No       Family History:     Family History   Problem Relation Age of Onset    Hypertension 
PRN          Allergies   Penicillins    Social History     Pertinent history mentioned above.     Family History     Family History   Problem Relation Age of Onset    Hypertension Mother     Hypertension Father     Colon Cancer Neg Hx     Colon Polyps Neg Hx     Esophageal Cancer Neg Hx     Liver Cancer Neg Hx     Liver Disease Neg Hx     Stomach Cancer Neg Hx     Rectal Cancer Neg Hx        Review of Systems   Review of systems difficult to obtain given his baseline slight altered mental status.  He states that the PEG tube site is \"itching\".  But I believe he is trying to convey that it is painful when I palpate.    Physical Exam   /73   Pulse 73   Temp 97.5 °F (36.4 °C) (Temporal)   Resp 16   Ht 1.803 m (5' 10.98\")   Wt 85.1 kg (187 lb 9.8 oz)   SpO2 97%   BMI 26.18 kg/m²      - GENERAL: Alert and and responsive.  Has difficulty answering questions given his underlying baseline mental status.    - EYES: EOMI. Anicteric.    - HENT: Moist mucous membranes. No cervical lymphadenopathy.    - LUNGS: Clear to auscultation bilaterally. No accessory muscle use.    - CARDIOVASCULAR: Regular rate and rhythm. No murmur. No JVD.    - ABDOMEN: Soft, non-tender and non-distended. No palpable masses.  PEG tube in place in the left upper quadrant.  Tender to palpation.  There is a dry minimal blood residue under the PEG tube bolster.  I am able to turn the PEG tube 360 degrees without resistance able to push the tube in and withdraw.    - EXTREMITIES: No edema. Non-tender.    - NEUROLOGIC: No focal neurological deficits. CN II-XII grossly intact, but not individually tested.    - PSYCHIATRIC: Cooperative. Appropriate mood and affect.    - SKIN: No rashes, sores, or lesions.     - RECTAL: Deferred.     Labs    CBC:  Recent Labs     05/15/25  0534   WBC 14.1*   RBC 3.91*   HGB 11.0*   HCT 34.7*   MCV 88.7   RDW 14.7*        CHEMISTRIES:  Recent Labs     05/15/25  0534 05/16/25  0447 05/17/25  0530   * 
______________________________________ Electronically signed by: PJ CARRANZA M.D. Date:     04/25/2025 Time:    12:28     Nuclear stress test with myocardial perfusion  Result Date: 4/25/2025    Stress Combined Conclusion: The study is positive for myocardial ischemia. Findings suggest a moderate risk of cardiac events.   Stress Function: Post-stress ejection fraction is 44%.   Perfusion Defect: There is a moderate severity left ventricular stress perfusion defect that is medium in size present in the mid to distal anterior segment(s) that is predominantly reversible. The defect is consistent with abnormal perfusion in the LAD territory. Viability in the area is moderate. There is abnormal wall motion in the defect area. The defect appears to be ischemia.   ECG: Resting ECG demonstrates normal sinus rhythm.   Stress Test: A pharmacological stress test was performed using regadenoson (Lexiscan). The patient reported dizziness during the stress test. Symptoms began during stress and ended during recovery. The patient reached the end of the protocol.   Stress ECG: There were no arrhythmias during stress. There were no noted arrhythmias during recovery.   Resting ECG: ECG is abnormal. The ECG shows sinus rhythm. down sloping ST depression was noted.     Echo (TTE) complete (PRN contrast/bubble/strain/3D)  Result Date: 4/25/2025    Left Ventricle: Low normal left ventricular systolic function with a visually estimated EF of 50 - 55%. EF by 2D Simpsons Biplane is 54%. Left ventricle size is normal. Moderately increased wall thickness. Findings consistent with moderate concentric hypertrophy. Mild hypokinesis of the basal anterior lateral and mid anterior lateral segements. Normal diastolic function.   Right Ventricle: Right ventricle size is normal. Normal systolic function.   Aortic Valve: Trileaflet valve. Mild sclerosis of the aortic valve cusps.   Mitral Valve: There is mild annular calcification noted. Mildly

## 2025-05-21 NOTE — PROGRESS NOTES
Comprehensive Nutrition Assessment    Type and Reason for Visit:  Initial    Nutrition Recommendations/Plan:   Will follow for progression of nutrition as tolerated     Malnutrition Assessment:  Malnutrition Status:  At risk for malnutrition (05/21/25 1212)    Context:  Acute Illness     Findings of the 6 clinical characteristics of malnutrition:  Energy Intake:  Mild decrease in energy intake  Weight Loss:  1% to 2% over 1 week     Body Fat Loss:  No body fat loss     Muscle Mass Loss:  No muscle mass loss    Fluid Accumulation:  No fluid accumulation     Strength:  Not Performed    Nutrition Assessment:    Pt was being followed on rehab and tolerating TF well. Pt noted to have significant decline, vomiting blood and was transferred to ICU. Pt currently intubated and TF on hold. Pt has order for IV fluids in D5% at 150 mLs/hr. Pt noted to have significant weight loss of 3.7% in past week. Will follow for restarting of TF as tolerated.    Nutrition Related Findings:    Na 132, K 5.5, BUN 65, GFR 81, Glu 298, BM 5-19 Wound Type: Surgical Incision, Multiple       Current Nutrition Intake & Therapies:    Average Meal Intake: NPO  Average Supplements Intake: NPO  Diet NPO Exceptions are: Ice Chips    Anthropometric Measures:  Height: 180.3 cm (5' 10.98\")  Ideal Body Weight (IBW): 172 lbs (78 kg)     Current Body Weight: 83 kg (182 lb 15.7 oz), 106.4 % IBW. Weight Source: Bed scale  Current BMI (kg/m2): 25.5  Usual Body Weight: 86.2 kg (190 lb 0.6 oz) (5-13-25)   % Weight Change (Calculated): -3.7  Weight Adjustment For: No Adjustment   BMI Categories: Overweight (BMI 25.0-29.9)    Estimated Daily Nutrient Needs:  Energy Requirements Based On: Kcal/kg  Weight Used for Energy Requirements: Current  Energy (kcal/day): 2702-0789 (20-25/kg)  Weight Used for Protein Requirements: Ideal  Protein (g/day):  (1.2-2/kg)  Method Used for Fluid Requirements: 1 ml/kcal  Fluid (ml/day): 6136-7068 (20-25/kg)    Nutrition

## 2025-05-22 ENCOUNTER — OUTSIDE FACILITY SERVICE (OUTPATIENT)
Age: 70
End: 2025-05-22

## 2025-05-22 ENCOUNTER — APPOINTMENT (OUTPATIENT)
Dept: GENERAL RADIOLOGY | Age: 70
DRG: 393 | End: 2025-05-22
Attending: HOSPITALIST
Payer: MEDICARE

## 2025-05-22 LAB
ALBUMIN SERPL-MCNC: 2.5 G/DL (ref 3.5–5.2)
ALP SERPL-CCNC: 91 U/L (ref 40–129)
ALT SERPL-CCNC: 8 U/L (ref 10–50)
ANION GAP SERPL CALCULATED.3IONS-SCNC: 11 MMOL/L (ref 8–16)
ANISOCYTOSIS BLD QL SMEAR: ABNORMAL
APTT PPP: 28.5 SEC (ref 26–36.2)
AST SERPL-CCNC: 18 U/L (ref 10–50)
BACTERIA BLD CULT ORG #2: NORMAL
BACTERIA BLD CULT: NORMAL
BASE EXCESS ARTERIAL: 6.1 MMOL/L (ref -2–2)
BASOPHILS # BLD: 0.3 K/UL (ref 0–0.2)
BASOPHILS NFR BLD: 1 % (ref 0–1)
BILIRUB SERPL-MCNC: 0.3 MG/DL (ref 0.2–1.2)
BLOOD BANK DISPENSE STATUS: NORMAL
BLOOD BANK PRODUCT CODE: NORMAL
BPU ID: NORMAL
BUN SERPL-MCNC: 68 MG/DL (ref 8–23)
CALCIUM SERPL-MCNC: 7.9 MG/DL (ref 8.8–10.2)
CARBOXYHEMOGLOBIN ARTERIAL: 2.5 % (ref 0–5)
CHLORIDE SERPL-SCNC: 104 MMOL/L (ref 98–107)
CO2 SERPL-SCNC: 21 MMOL/L (ref 22–29)
CREAT SERPL-MCNC: 1.2 MG/DL (ref 0.7–1.2)
DACRYOCYTES BLD QL SMEAR: ABNORMAL
DESCRIPTION BLOOD BANK: NORMAL
EOSINOPHIL # BLD: 0 K/UL (ref 0–0.6)
EOSINOPHIL NFR BLD: 0 % (ref 0–5)
ERYTHROCYTE [DISTWIDTH] IN BLOOD BY AUTOMATED COUNT: 15.6 % (ref 11.5–14.5)
GLUCOSE BLD-MCNC: 118 MG/DL (ref 70–99)
GLUCOSE BLD-MCNC: 127 MG/DL (ref 70–99)
GLUCOSE BLD-MCNC: 185 MG/DL (ref 70–99)
GLUCOSE BLD-MCNC: 328 MG/DL (ref 70–99)
GLUCOSE BLD-MCNC: 385 MG/DL (ref 70–99)
GLUCOSE SERPL-MCNC: 368 MG/DL (ref 70–99)
HCO3 ARTERIAL: 29.2 MMOL/L (ref 22–26)
HCT VFR BLD AUTO: 24.2 % (ref 42–52)
HCT VFR BLD AUTO: 24.5 % (ref 42–52)
HCT VFR BLD AUTO: 28.2 % (ref 42–52)
HEMOGLOBIN, ART, EXTENDED: 8 G/DL (ref 14–18)
HGB BLD-MCNC: 8.1 G/DL (ref 14–18)
HGB BLD-MCNC: 8.3 G/DL (ref 14–18)
HGB BLD-MCNC: 9.1 G/DL (ref 14–18)
IMM GRANULOCYTES # BLD: 1.3 K/UL
INR PPP: 1.29 (ref 0.88–1.18)
IRON SATN MFR SERPL: 22 % (ref 20–50)
IRON SERPL-MCNC: 57 UG/DL (ref 59–158)
LYMPHOCYTES # BLD: 2.5 K/UL (ref 1.1–4.5)
LYMPHOCYTES NFR BLD: 8 % (ref 20–40)
MAGNESIUM SERPL-MCNC: 2.1 MG/DL (ref 1.6–2.4)
MCH RBC QN AUTO: 29.5 PG (ref 27–31)
MCHC RBC AUTO-ENTMCNC: 34.3 G/DL (ref 33–37)
MCV RBC AUTO: 86.1 FL (ref 80–94)
METHEMOGLOBIN ARTERIAL: 1.3 %
MODE: ABNORMAL
MONOCYTES # BLD: 1.9 K/UL (ref 0–0.9)
MONOCYTES NFR BLD: 6 % (ref 0–10)
NEUTROPHILS # BLD: 26.6 K/UL (ref 1.5–7.5)
NEUTS BAND NFR BLD MANUAL: 2 % (ref 0–5)
NEUTS SEG NFR BLD: 83 % (ref 50–65)
O2 CONTENT ARTERIAL: 10.9 ML/DL
O2 SAT, ARTERIAL: 95.5 %
O2 THERAPY: ABNORMAL
OVALOCYTES BLD QL SMEAR: ABNORMAL
OXYGEN FLOW: 35
PCO2 ARTERIAL: 35 MMHG (ref 35–45)
PERFORMED ON: ABNORMAL
PH ARTERIAL: 7.53 (ref 7.35–7.45)
PHOSPHATE SERPL-MCNC: 3.3 MG/DL (ref 2.5–4.5)
PLATELET # BLD AUTO: 305 K/UL (ref 130–400)
PLATELET SLIDE REVIEW: ADEQUATE
PMV BLD AUTO: 10.1 FL (ref 9.4–12.4)
PO2 ARTERIAL: 87 MMHG (ref 80–100)
POSITIVE END EXP PRESS: 5
POTASSIUM BLD-SCNC: 3.7 MMOL/L
POTASSIUM SERPL-SCNC: 4.4 MMOL/L (ref 3.5–5.1)
PROT SERPL-MCNC: 5.7 G/DL (ref 6.4–8.3)
PROTHROMBIN TIME: 16 SEC (ref 12–14.6)
RBC # BLD AUTO: 2.81 M/UL (ref 4.7–6.1)
SAMPLE SOURCE: ABNORMAL
SMUDGE CELLS BLD QL SMEAR: ABNORMAL
SODIUM SERPL-SCNC: 136 MMOL/L (ref 136–145)
SPONT RATE(BPM): 26
TIBC SERPL-MCNC: 259 UG/DL (ref 250–400)
WBC # BLD AUTO: 31.3 K/UL (ref 4.8–10.8)

## 2025-05-22 PROCEDURE — 6370000000 HC RX 637 (ALT 250 FOR IP): Performed by: INTERNAL MEDICINE

## 2025-05-22 PROCEDURE — 2500000003 HC RX 250 WO HCPCS: Performed by: NURSE PRACTITIONER

## 2025-05-22 PROCEDURE — 2700000000 HC OXYGEN THERAPY PER DAY

## 2025-05-22 PROCEDURE — 71045 X-RAY EXAM CHEST 1 VIEW: CPT

## 2025-05-22 PROCEDURE — 85025 COMPLETE CBC W/AUTO DIFF WBC: CPT

## 2025-05-22 PROCEDURE — 83540 ASSAY OF IRON: CPT

## 2025-05-22 PROCEDURE — 36415 COLL VENOUS BLD VENIPUNCTURE: CPT

## 2025-05-22 PROCEDURE — 2000000000 HC ICU R&B

## 2025-05-22 PROCEDURE — 6360000002 HC RX W HCPCS: Performed by: SURGERY

## 2025-05-22 PROCEDURE — OUTSIDEPOS PR OUTSIDE POS PLACEHOLDER: Performed by: INTERNAL MEDICINE

## 2025-05-22 PROCEDURE — 99232 SBSQ HOSP IP/OBS MODERATE 35: CPT | Performed by: PSYCHIATRY & NEUROLOGY

## 2025-05-22 PROCEDURE — 99024 POSTOP FOLLOW-UP VISIT: CPT | Performed by: SURGERY

## 2025-05-22 PROCEDURE — 85610 PROTHROMBIN TIME: CPT

## 2025-05-22 PROCEDURE — P9047 ALBUMIN (HUMAN), 25%, 50ML: HCPCS | Performed by: SURGERY

## 2025-05-22 PROCEDURE — 5A09357 ASSISTANCE WITH RESPIRATORY VENTILATION, LESS THAN 24 CONSECUTIVE HOURS, CONTINUOUS POSITIVE AIRWAY PRESSURE: ICD-10-PCS | Performed by: INTERNAL MEDICINE

## 2025-05-22 PROCEDURE — 6360000002 HC RX W HCPCS: Performed by: INTERNAL MEDICINE

## 2025-05-22 PROCEDURE — 85014 HEMATOCRIT: CPT

## 2025-05-22 PROCEDURE — 99232 SBSQ HOSP IP/OBS MODERATE 35: CPT | Performed by: INTERNAL MEDICINE

## 2025-05-22 PROCEDURE — 36430 TRANSFUSION BLD/BLD COMPNT: CPT

## 2025-05-22 PROCEDURE — 2580000003 HC RX 258: Performed by: INTERNAL MEDICINE

## 2025-05-22 PROCEDURE — 36600 WITHDRAWAL OF ARTERIAL BLOOD: CPT

## 2025-05-22 PROCEDURE — 6360000002 HC RX W HCPCS: Performed by: NURSE PRACTITIONER

## 2025-05-22 PROCEDURE — 94003 VENT MGMT INPAT SUBQ DAY: CPT

## 2025-05-22 PROCEDURE — 80053 COMPREHEN METABOLIC PANEL: CPT

## 2025-05-22 PROCEDURE — 6370000000 HC RX 637 (ALT 250 FOR IP): Performed by: SURGERY

## 2025-05-22 PROCEDURE — 94660 CPAP INITIATION&MGMT: CPT

## 2025-05-22 PROCEDURE — 85730 THROMBOPLASTIN TIME PARTIAL: CPT

## 2025-05-22 PROCEDURE — 82803 BLOOD GASES ANY COMBINATION: CPT

## 2025-05-22 PROCEDURE — 2500000003 HC RX 250 WO HCPCS: Performed by: INTERNAL MEDICINE

## 2025-05-22 PROCEDURE — 94760 N-INVAS EAR/PLS OXIMETRY 1: CPT

## 2025-05-22 PROCEDURE — 83735 ASSAY OF MAGNESIUM: CPT

## 2025-05-22 PROCEDURE — 82962 GLUCOSE BLOOD TEST: CPT

## 2025-05-22 PROCEDURE — 85018 HEMOGLOBIN: CPT

## 2025-05-22 PROCEDURE — 84100 ASSAY OF PHOSPHORUS: CPT

## 2025-05-22 PROCEDURE — 6370000000 HC RX 637 (ALT 250 FOR IP): Performed by: NURSE PRACTITIONER

## 2025-05-22 PROCEDURE — 2580000003 HC RX 258: Performed by: SURGERY

## 2025-05-22 PROCEDURE — 2580000003 HC RX 258: Performed by: NURSE PRACTITIONER

## 2025-05-22 PROCEDURE — 83550 IRON BINDING TEST: CPT

## 2025-05-22 RX ORDER — ASPIRIN 81 MG/1
81 TABLET, CHEWABLE ORAL DAILY
Status: DISCONTINUED | OUTPATIENT
Start: 2025-05-22 | End: 2025-05-28 | Stop reason: HOSPADM

## 2025-05-22 RX ORDER — METOPROLOL TARTRATE 25 MG/1
25 TABLET, FILM COATED ORAL 2 TIMES DAILY
Status: DISCONTINUED | OUTPATIENT
Start: 2025-05-22 | End: 2025-05-23

## 2025-05-22 RX ORDER — GLYCOPYRROLATE 0.2 MG/ML
0.2 INJECTION INTRAMUSCULAR; INTRAVENOUS 2 TIMES DAILY
Status: DISCONTINUED | OUTPATIENT
Start: 2025-05-22 | End: 2025-05-27

## 2025-05-22 RX ORDER — SODIUM CHLORIDE 9 MG/ML
INJECTION, SOLUTION INTRAVENOUS PRN
Status: DISCONTINUED | OUTPATIENT
Start: 2025-05-22 | End: 2025-05-22

## 2025-05-22 RX ORDER — ALBUMIN (HUMAN) 12.5 G/50ML
25 SOLUTION INTRAVENOUS EVERY 6 HOURS
Status: DISCONTINUED | OUTPATIENT
Start: 2025-05-22 | End: 2025-05-22

## 2025-05-22 RX ADMIN — SODIUM BICARBONATE: 84 INJECTION, SOLUTION INTRAVENOUS at 09:02

## 2025-05-22 RX ADMIN — CETIRIZINE HYDROCHLORIDE 10 MG: 10 TABLET ORAL at 08:08

## 2025-05-22 RX ADMIN — CHOLESTYRAMINE 4 G: 4 POWDER, FOR SUSPENSION ORAL at 08:08

## 2025-05-22 RX ADMIN — Medication 5 MG: at 19:48

## 2025-05-22 RX ADMIN — VANCOMYCIN HYDROCHLORIDE 1000 MG: 1 INJECTION, POWDER, LYOPHILIZED, FOR SOLUTION INTRAVENOUS at 11:21

## 2025-05-22 RX ADMIN — INSULIN GLARGINE 25 UNITS: 100 INJECTION, SOLUTION SUBCUTANEOUS at 11:47

## 2025-05-22 RX ADMIN — CEFEPIME 2000 MG: 2 INJECTION, POWDER, FOR SOLUTION INTRAVENOUS at 06:06

## 2025-05-22 RX ADMIN — INSULIN LISPRO 10 UNITS: 100 INJECTION, SOLUTION INTRAVENOUS; SUBCUTANEOUS at 04:58

## 2025-05-22 RX ADMIN — SODIUM BICARBONATE: 84 INJECTION, SOLUTION INTRAVENOUS at 23:43

## 2025-05-22 RX ADMIN — METRONIDAZOLE 500 MG: 500 INJECTION, SOLUTION INTRAVENOUS at 13:55

## 2025-05-22 RX ADMIN — Medication 100 MG: at 11:21

## 2025-05-22 RX ADMIN — ATORVASTATIN CALCIUM 20 MG: 20 TABLET, FILM COATED ORAL at 19:48

## 2025-05-22 RX ADMIN — CEFEPIME 2000 MG: 2 INJECTION, POWDER, FOR SOLUTION INTRAVENOUS at 15:27

## 2025-05-22 RX ADMIN — Medication 100 MG: at 19:48

## 2025-05-22 RX ADMIN — LEVOTHYROXINE SODIUM 50 MCG: 0.05 TABLET ORAL at 05:23

## 2025-05-22 RX ADMIN — GEMFIBROZIL 600 MG: 600 TABLET ORAL at 19:49

## 2025-05-22 RX ADMIN — INSULIN GLARGINE 25 UNITS: 100 INJECTION, SOLUTION SUBCUTANEOUS at 19:49

## 2025-05-22 RX ADMIN — SODIUM CHLORIDE, PRESERVATIVE FREE 10 ML: 5 INJECTION INTRAVENOUS at 19:48

## 2025-05-22 RX ADMIN — VANCOMYCIN HYDROCHLORIDE 1000 MG: 1 INJECTION, POWDER, LYOPHILIZED, FOR SOLUTION INTRAVENOUS at 01:21

## 2025-05-22 RX ADMIN — Medication 100 MG: at 18:01

## 2025-05-22 RX ADMIN — METRONIDAZOLE 500 MG: 500 INJECTION, SOLUTION INTRAVENOUS at 05:20

## 2025-05-22 RX ADMIN — DEXMEDETOMIDINE HYDROCHLORIDE 1 MCG/KG/HR: 400 INJECTION, SOLUTION INTRAVENOUS at 03:01

## 2025-05-22 RX ADMIN — METOPROLOL TARTRATE 25 MG: 25 TABLET, FILM COATED ORAL at 19:49

## 2025-05-22 RX ADMIN — SODIUM CHLORIDE, PRESERVATIVE FREE 40 MG: 5 INJECTION INTRAVENOUS at 00:03

## 2025-05-22 RX ADMIN — SODIUM BICARBONATE: 84 INJECTION, SOLUTION INTRAVENOUS at 01:22

## 2025-05-22 RX ADMIN — Medication 100 MG: at 08:08

## 2025-05-22 RX ADMIN — ALBUMIN (HUMAN) 25 G: 0.25 INJECTION, SOLUTION INTRAVENOUS at 13:55

## 2025-05-22 RX ADMIN — SODIUM CHLORIDE, PRESERVATIVE FREE 40 MG: 5 INJECTION INTRAVENOUS at 11:21

## 2025-05-22 RX ADMIN — GEMFIBROZIL 600 MG: 600 TABLET ORAL at 08:08

## 2025-05-22 RX ADMIN — ASPIRIN 81 MG: 81 TABLET, CHEWABLE ORAL at 15:27

## 2025-05-22 RX ADMIN — METRONIDAZOLE 500 MG: 500 INJECTION, SOLUTION INTRAVENOUS at 21:14

## 2025-05-22 RX ADMIN — GLYCOPYRROLATE 0.2 MG: 0.2 INJECTION INTRAMUSCULAR; INTRAVENOUS at 19:47

## 2025-05-22 RX ADMIN — GLYCOPYRROLATE 0.2 MG: 0.2 INJECTION INTRAMUSCULAR; INTRAVENOUS at 13:55

## 2025-05-22 RX ADMIN — CEFEPIME 2000 MG: 2 INJECTION, POWDER, FOR SOLUTION INTRAVENOUS at 22:23

## 2025-05-22 ASSESSMENT — PULMONARY FUNCTION TESTS
PIF_VALUE: 18
PIF_VALUE: 18
PIF_VALUE: 17
PIF_VALUE: 21
PIF_VALUE: 17
PIF_VALUE: 17
PIF_VALUE: 11
PIF_VALUE: 16
PIF_VALUE: 20
PIF_VALUE: 16
PIF_VALUE: 18

## 2025-05-22 ASSESSMENT — PAIN SCALES - GENERAL: PAINLEVEL_OUTOF10: 0

## 2025-05-22 NOTE — CONSENT
Informed Consent for Blood Component Transfusion Note    I have discussed with the patient the rationale for blood component transfusion; its benefits in treating or preventing fatigue, organ damage, or death; and its risk which includes mild transfusion reactions, rare risk of blood borne infection, or more serious but rare reactions. I have discussed the alternatives to transfusion, including the risk and consequences of not receiving transfusion. The patient had an opportunity to ask questions and had agreed to proceed with transfusion of blood components.    Electronically signed by Akhil Lara MD on 5/22/25 at 8:21 AM CDT

## 2025-05-22 NOTE — PROGRESS NOTES
Patient coughed hard then had sinus bradycardia down in 30s with  hypotension for about minute.  Laid patient back and vitals recovered.

## 2025-05-22 NOTE — PROGRESS NOTES
Progress Note    2025 6:12 AM            Subjective:  Mr. Alejo remains intubated and sedated on precedex. Moving all extremities.  PULSE OXIMETRY RANGE: SpO2  Av.4 %  Min: 92 %  Max: 100 %  SUPPLEMENTAL O2:     Vital Signs: BP (!) 96/54   Pulse 66   Temp 97.4 °F (36.3 °C) (Temporal)   Resp 24   Ht 1.803 m (5' 10.98\")   SpO2 98%   BMI 25.53 kg/m²    Temperature Range:   Temp: 97.4 °F (36.3 °C)   Temp  Av.7 °F (36.5 °C)  Min: 96.6 °F (35.9 °C)  Max: 98.4 °F (36.9 °C)                   Rhythm: normal sinus rhythm    Labs:   ABG:    Lab Results   Component Value Date/Time    PHART 7.260 2025 11:37 AM    PO2ART 129.0 2025 11:37 AM    FET9XOB 51.0 2025 11:37 AM    P6BUBAEG 95.0 2025 11:37 AM    GGY7QFJ 26 2025 05:49 PM    XIQ9EMJ 22.9 2025 11:37 AM    BEART -3.9 2025 11:37 AM     CBC:   Recent Labs     25  0629 25  1059 25  1542 25  2326 25  0330   WBC 21.2*  --   --   --  31.3*   HGB 7.8*   < > 7.1* 7.5* 8.3*   HCT 25.2*   < > 21.5* 22.0* 24.2*   MCV 92.0  --   --   --  86.1     --   --   --  305    < > = values in this interval not displayed.     BMP:   Recent Labs     25  0907 25  1137 25  0330   *  --  136   K 5.5* 6.0 4.4     --  104   CO2 18*  --  21*   PHOS  --   --  3.3   BUN 65*  --  68*   CREATININE 1.0  --  1.2     PT/INR:   Recent Labs     25  0330   PROTIME 16.0*   INR 1.29*     APTT:   Recent Labs     25   APTT 28.5     Chest X-Ray:  pending   CT:  I/O last 3 completed shifts:  In: 1535.3 [Blood:231.3; NG/GT:1304]  Out: 800 [Urine:800]      I/O last 3 completed shifts:  In: 1535.3 [Blood:231.3; NG/GT:1304]  Out: 800 [Urine:800]  Scheduled Meds:    atorvastatin  20 mg Oral Nightly    insulin lispro  10 Units SubCUTAneous TID WC    levothyroxine  50 mcg Oral Daily    metoprolol tartrate  75 mg Oral BID    cholestyramine light  4 g Oral Daily    cetirizine  10 mg  Oral Daily    losartan  25 mg Oral Daily    melatonin  5 mg Oral Nightly    gemfibrozil  600 mg Oral BID    carBAMazepine  100 mg Oral 4x Daily    sodium chloride flush  5-40 mL IntraVENous BID    [START ON 5/23/2025] clopidogrel  75 mg Oral Daily    ondansetron  4 mg IntraVENous Once    sodium chloride flush  5-40 mL IntraVENous 2 times per day    pantoprazole (PROTONIX) 40 mg in sodium chloride (PF) 0.9 % 10 mL injection  40 mg IntraVENous Q12H    midazolam  2 mg IntraVENous Once    fentanNYL  100 mcg IntraVENous Once    vancomycin  1,000 mg IntraVENous Q12H    vancomycin (VANCOCIN) intermittent dosing (placeholder)   Other RX Placeholder    insulin glargine  25 Units SubCUTAneous BID    metroNIDAZOLE  500 mg IntraVENous Q8H    cefepime  2,000 mg IntraVENous Q8H     Continuous Infusions:    sodium chloride      sodium chloride      sodium chloride Stopped (05/21/25 1703)    norepinephrine 9 mcg/min (05/21/25 2130)    dexmedeTOMIDine HCl in NaCl 1 mcg/kg/hr (05/22/25 0301)    sodium chloride      sodium bicarbonate 150 mEq in dextrose 5 % 1,000 mL infusion 150 mL/hr at 05/22/25 0122    sodium chloride       Exam:   General appearance: on vent  Neck: no bruits, no JVD, No lymph nodes   Lungs: no rhonchi, no wheezes, no rales   Thoracic incision: stable, dressing dry and intact    Heart: S1 and S2 no murmer, + rub  Abdomen: positive bowel sounds, no bruits, no masses  Extremities: warm and dry, no cyanosis, no clubbing  Neuro: sedated, nonfocal exam    Assessment  SP EGD and treatment of active bleeding source at PEG tube insertion site.  Massive GI bleeding - required 5 u PRBC so far. Most recent Hct is 24. Will continue transfusions to meet a transfusion target of 30. No signs of coagulopathy - plt count >300, INR 1.2. There does not appear to be any ongoing bleeding - no melena, no bloody NGT output. Continue to hold plavix and lovenox. Hold tube feeds until cleared by GI consult.  Hypotensive on levo due to

## 2025-05-22 NOTE — PLAN OF CARE
Problem: Chronic Conditions and Co-morbidities  Goal: Patient's chronic conditions and co-morbidity symptoms are monitored and maintained or improved  5/22/2025 1105 by Lynda Wheeler, RN  Outcome: Progressing  Flowsheets (Taken 5/22/2025 0800)  Care Plan - Patient's Chronic Conditions and Co-Morbidity Symptoms are Monitored and Maintained or Improved: Monitor and assess patient's chronic conditions and comorbid symptoms for stability, deterioration, or improvement  5/21/2025 2232 by Markie Henning, RN  Outcome: Progressing     Problem: Discharge Planning  Goal: Discharge to home or other facility with appropriate resources  5/22/2025 1105 by Lynda Wheeler, RN  Outcome: Progressing  Flowsheets (Taken 5/22/2025 0800)  Discharge to home or other facility with appropriate resources: Identify barriers to discharge with patient and caregiver  5/21/2025 2232 by Markie Henning RN  Outcome: Progressing     Problem: Safety - Adult  Goal: Free from fall injury  5/22/2025 1105 by Lynda Wheeler, RN  Outcome: Progressing  Flowsheets (Taken 5/22/2025 1100)  Free From Fall Injury: Instruct family/caregiver on patient safety  5/21/2025 2232 by Markie Henning RN  Outcome: Progressing     Problem: Nutrition Deficit:  Goal: Optimize nutritional status  5/22/2025 1105 by Lynda Wheeler, RN  Outcome: Progressing  Flowsheets (Taken 5/22/2025 1055 by Majo Hess, MS, RD, LD)  Nutrient intake appropriate for improving, restoring, or maintaining nutritional needs:   Assess nutritional status and recommend course of action   Recommend, monitor, and adjust tube feedings and TPN/PPN based on assessed needs  5/21/2025 2232 by Markie Henning, RN  Outcome: Progressing  Flowsheets (Taken 5/21/2025 1213 by Cristel Hopkins RD)  Nutrient intake appropriate for improving, restoring, or maintaining nutritional needs: Assess nutritional status and recommend course of action     Problem: Skin/Tissue Integrity  Goal: Skin

## 2025-05-22 NOTE — CARE COORDINATION
[facility]  has accepted pt and will need HH orders at time of discharge.  Electronically signed by Larisa Goyal RN on 5/22/2025 at 7:02 AM

## 2025-05-22 NOTE — CONSULTS
This pt is a new PC pts. He did respond when his name was called.  No family present.  Will follow. P. Care to follow. SC support to follow.

## 2025-05-22 NOTE — PROCEDURES
ADULT INPATIENT ELECTROENCEPHALOGRAM REPORT    Patient:   Akhil Alejo  MR#:    [id number]  Room #:    INPATIENT  YOB: 1955  Date of Evaluation:  5/21/2025  Primary Physician:     Lennox Waddell MD   Referring Physician:   Doyle More DO      CLINICAL INFORMATION:     This patient is a 69 y.o. male with a history of AMS.     MEDICATIONS:     See MAR.    RECORDING CONDITIONS:     This EEG was performed utilizing standard International 10-20 System of electrode placement, with additional channels monitored for eye movement. One channel electrocardiogram was monitored. Data was obtained, stored, and interpreted according to ACNS guidelines (J Clin Neurophysiol 2006;23(2):) utilizing referential montage recording, with reformatting to longitudinal, transverse bipolar, and referential montages as necessary for interpretation, along with the digital/automated EEG analysis. Patient tolerated entire procedure well. Photic stimulation and hyperventilation were utilized as activation procedures unless otherwise specified below.     E.E.G. DESCRIPTION:     The background consists of diffuse lower voltage 4-6 Hz slowing.  No overt epileptiform abnormalities or electrographic seizure activity was seen. Hyperventilation was not performed. Photic stimulation was performed and had little change on the recording. Muscle, motion, and eye movement artifacts were noted.       EEG INTERPRETATION:    Abnormal EEG due to diffuse slowing of the background rhythm.  No overt epileptiform abnormalities or electrographic seizure activity was seen.        CLINICAL CORRELATION:     This EEG is suggestive of a moderate to severe encephalopathy, nonspecific to etiology.       Doyle More DO  Board Certified Neurologist    Date reported: 5/22/2025  Date signed: 5/22/2025

## 2025-05-22 NOTE — PROGRESS NOTES
Hospitalist Progress Note    Patient:  Akhil Alejo  YOB: 1955  Date of Service: 5/22/2025  MRN: [id number]   Acct: [id number]   Primary Care Physician: Lennox Waddell MD  Advance Directive: Full Code  Admit Date: 5/21/2025       Hospital Day: 1  Referring Provider: Ben Schultz DO    Patient Seen, Chart, Consults, Notes, Labs, Radiology studies reviewed.    Subjective:  Akhil Alejo is a 69 y.o. male  whom we are following for GI bleed, recent CABG, hypoxemic respiratory failure.  He was admitted yesterday from a floor after significant GI bleed.  He underwent EGD and was found to have bleeding near the previous PEG tube within the stomach.  This was cauterized.  His hemodynamics have remained stable H&H is stable.  There is no further evidence of bleeding.  We were able to successfully liberate him from the ventilator.    Allergies:  Penicillins    Medicines:  Current Facility-Administered Medications   Medication Dose Route Frequency Provider Last Rate Last Admin    0.9 % sodium chloride infusion   IntraVENous PRN Akhil Lara MD        atorvastatin (LIPITOR) tablet 20 mg  20 mg Oral Nightly Chasity Thomas APRN - CNP   20 mg at 05/21/25 2056    bisacodyl (DULCOLAX) suppository 10 mg  10 mg Rectal Daily PRN Chasity Thomas APRN - CNP        insulin lispro (HUMALOG,ADMELOG) injection vial 10 Units  10 Units SubCUTAneous TID WC Chasity Thomas APRN - CNP   10 Units at 05/22/25 0458    levothyroxine (SYNTHROID) tablet 50 mcg  50 mcg Oral Daily Chasity Thomas APRN - CNP   50 mcg at 05/22/25 0523    metoprolol tartrate (LOPRESSOR) tablet 75 mg  75 mg Oral BID Chasity Thomas APRN - CNP   75 mg at 05/21/25 2057    Benzocaine-Menthol (CEPACOL) 1 lozenge  1 lozenge Oral Q2H PRN Chasity Thomas APRN - CNP        cholestyramine light packet 4 g  4 g Oral Daily Chasity Thomas APRN - CNP        cetirizine (ZYRTEC) tablet 10 mg  10 mg Oral Daily Chasity Thomas APRN - CNP

## 2025-05-22 NOTE — PROGRESS NOTES
4 Eyes Skin Assessment     NAME:  Akhil Alejo  YOB: 1955  MEDICAL RECORD NUMBER:  [id number]    The patient is being assessed for  Transfer to New Unit    I agree that at least one RN has performed a thorough Head to Toe Skin Assessment on the patient. ALL assessment sites listed below have been assessed.      Areas assessed by both nurses:    Head, Face, Ears, Shoulders, Back, Chest, Arms, Elbows, Hands, Sacrum. Buttock, Coccyx, Ischium, Legs. Feet and Heels, and Under Medical Devices         Does the Patient have a Wound? Yes wound(s) were present on assessment. LDA wound assessment was Initiated and completed by RN       Donte Prevention initiated by RN: Yes  Wound Care Orders initiated by RN: No    Pressure Injury (Stage 3,4, Unstageable, DTI, NWPT, and Complex wounds) if present, place Wound referral order by RN under : No    New Ostomies, if present place, Ostomy referral order under : No     Nurse 1 eSignature: Electronically signed by Markie Henning RN on 5/22/25 at 6:46 AM CDT    **SHARE this note so that the co-signing nurse can place an eSignature**    Nurse 2 eSignature: Electronically signed by Naomy Sharif RN on 5/22/25 at 6:47 AM CDT

## 2025-05-22 NOTE — PROGRESS NOTES
Progress Note            Date:5/22/2025        Patient Name:Akhil Alejo     YOB: 1955     Age:69 y.o.    CC: Follow-up hematemesis.    Patient is now extubated and on BiPAP.  Off pressor support.    Physical Exam   BP (!) 128/56   Pulse 73   Temp (!) 96.5 °F (35.8 °C) (Temporal)   Resp 22   Ht 1.803 m (5' 10.98\")   SpO2 97%   BMI 25.53 kg/m²      - GENERAL: Resting comfortably no distress.    - EYES: EOMI. Anicteric.    - HENT: Moist mucous membranes. No cervical lymphadenopathy.    - LUNGS: Clear to auscultation bilaterally. No accessory muscle use.    - CARDIOVASCULAR: Regular rate and rhythm. No murmur. No JVD.    - ABDOMEN: Soft, non-tender and non-distended. No palpable masses.    - EXTREMITIES: No edema. Non-tender.?      Labs    CBC:  Recent Labs     05/21/25  0629 05/21/25  1059 05/21/25  2326 05/22/25  0330 05/22/25  1041   WBC 21.2*  --   --  31.3*  --    RBC 2.74*  --   --  2.81*  --    HGB 7.8*   < > 7.5* 8.3* 9.1*   HCT 25.2*   < > 22.0* 24.2* 28.2*   MCV 92.0  --   --  86.1  --    RDW 16.0*  --   --  15.6*  --      --   --  305  --     < > = values in this interval not displayed.     CHEMISTRIES:  Recent Labs     05/21/25  0907 05/21/25  1137 05/22/25  0330 05/22/25  0807   *  --  136  --    K 5.5* 6.0 4.4 3.7     --  104  --    CO2 18*  --  21*  --    BUN 65*  --  68*  --    CREATININE 1.0  --  1.2  --    GLUCOSE 298*  --  368*  --    PHOS  --   --  3.3  --    MG  --   --  2.1  --      PT/INR:  Recent Labs     05/22/25  0330   PROTIME 16.0*   INR 1.29*     APTT:  Recent Labs     05/22/25  0330   APTT 28.5     LIVER PROFILE:  Recent Labs     05/21/25  0907 05/22/25  0330   AST 20 18   ALT 10 8*   BILITOT 0.3 0.3   ALKPHOS 125 91         Assessment & Plan:   This is a 69-year-old male with a recent CABG (4/29/2025) with a history of RLS, dementia, and diabetes mellitus.  He is known to be on aspirin, Plavix, and prophylactic Lovenox.  He had a PEG tube placed

## 2025-05-22 NOTE — PROGRESS NOTES
Comprehensive Nutrition Assessment    Type and Reason for Visit:  Reassess    Nutrition Recommendations/Plan:   When able start Glucerna 1.5 @ 15ml/hr and advance by 5 ml every 4 hours until 50ml is reached.  Give 35ml free water flush hourly     Malnutrition Assessment:  Malnutrition Status:  At risk for malnutrition (05/22/25 1102)    Context:  Acute Illness     Findings of the 6 clinical characteristics of malnutrition:  Energy Intake:  Mild decrease in energy intake  Weight Loss:  1% to 2% over 1 week     Body Fat Loss:  No body fat loss     Muscle Mass Loss:  No muscle mass loss    Fluid Accumulation:  No fluid accumulation     Strength:  Not Performed    Nutrition Assessment:    Spoke with nursing.  Would like TF orders reenterd.  Will restart Glucerna 1.5 with a goal rate of 50ml/hr and 35ml free water flush hourly.  No Propofol.  No new weight available.  Aware does have a PEG.  New weight not available.  IV- D5 has been discontinued    Nutrition Related Findings:    BUN 68., Glucose 298-360.,  Accuchek's 281-385 Wound Type: Multiple, Surgical Incision       Current Nutrition Intake & Therapies:    Average Meal Intake: NPO  Average Supplements Intake: NPO  Current Tube Feeding (TF) Orders:  Feeding Route: PEG  Formula: Diabetic  Schedule: Continuous  Feeding Regimen: Glucerna 1.5 goal rate 50ml/hr  Additives/Modulars: None  Water Flushes: 35ml hourly = 840 ml  Current TF Provides: Glucerna 1.5 at 50ml/hr = 1800 kcals with 99g protein, 159g CHO and 910ml free water from formula, t840ml free water froom flush    Anthropometric Measures:  Height: 180.3 cm (5' 10.98\")  Ideal Body Weight (IBW): 172 lbs (78 kg)       Current Body Weight: 83 kg (182 lb 15.7 oz), 106.4 % IBW. Weight Source: Bed scale  Current BMI (kg/m2): 25.5  Usual Body Weight: 86.2 kg (190 lb 0.6 oz) (5-13-25)  % Weight Change (Calculated): -3.7  Weight Adjustment For: No Adjustment  BMI Categories: Overweight (BMI 25.0-29.9)    Estimated

## 2025-05-22 NOTE — PROGRESS NOTES
Pharmacy Renal Adjustment    Akhil Alejo is a 69 y.o. male. Pharmacy has renally adjusted medications per protocol.    Recent Labs     05/19/25  0516 05/21/25  0907   BUN 44* 65*       Recent Labs     05/19/25  0516 05/21/25  0907   CREATININE 0.8 1.0       Estimated Creatinine Clearance: 73 mL/min (based on SCr of 1 mg/dL).    Height:   Ht Readings from Last 1 Encounters:   05/21/25 1.803 m (5' 10.98\")     Weight:  Wt Readings from Last 1 Encounters:   05/20/25 83 kg (182 lb 15.7 oz)     Plan: Adjust the following medications based on renal function:           Cefepime to 2000 mg IV once over 30 minutes followed by 2000 mg IV every 8 hours extended infusion over 240 minutes     Electronically signed by SHAQUILLE DIAZ RPH on 5/21/2025 at 10:01 PM

## 2025-05-22 NOTE — PROGRESS NOTES
[facility] Neurology Progress Note      Patient:   Akhil Alejo  MR#:    [id number]   Room:    [facility]150-   YOB: 1955  Date of Progress Note: 5/22/2025  Time of Note                           3:24 PM  Consulting Physician:  Doyle More DO  Attending Physician:  Ben Schultz DO      INTERVAL HISTORY:  More responsive today, extubated, following some commands.     REVIEW OF SYSTEMS:  Limited given AMS.     PHYSICAL EXAM:    Constitutional -   BP (!) 128/56   Pulse 92   Temp 97 °F (36.1 °C) (Temporal)   Resp 20   Ht 1.803 m (5' 10.98\")   SpO2 97%   BMI 25.53 kg/m²   General appearance: Extubated, more responsive   EYES -   Conjunctiva normal  Pupillary exam as below, see CN exam in the neurologic exam  ENT-    No scars, masses, or lesions over external nose or ears  Musculoskeletal -   No significant wasting of muscles noted  Gait as below, see gait exam in the neurologic exam  Muscle strength, tone, stability as below see the motor exam in the neurologic exam.   No bony deformities  Skin -   Warm, dry, and intact to inspection and palpation.    No rash, erythema, or pallor        NEUROLOGICAL EXAM     Mental status    [] Awake, alert, oriented   [] Affect attention and concentration appear appropriate  [] Recent and remote memory appears unremarkable  [] Speech normal without dysarthria or aphasia, comprehension and repetition intact.   COMMENTS:More responsive, following some commands.     Cranial Nerves [] No VF deficit to confrontation,  optic discs normal, no papilledema on fundoscopic exam.  [] PERRLA, EOMI, no nystagmus, conjugate eye movements, no ptosis  [] Face symmetric  [] Facial sensation intact  [] Tongue midline no atrophy or fasciculations present  [] Palate midline, hearing to finger rub normal  [] Shoulder shrug and SCM testing normal  COMMENTS:PERRL, EOM limited, face appears sym    Motor   [] 5/5 strength x 4 extremities  [] Normal bulk and tone  [] No tremor  present  [] No rigidity or bradykinesia noted  COMMENTS:SMAE    Sensory  [] Sensation intact to light touch, pin prick, vibration, and proprioception BLE  [] Sensation intact to light touch, pin prick, vibration, and proprioception BUE  COMMENTS: Limited    Coordination [] FTN normal bilaterally   [] HTS normal bilaterally  [] MEGHA normal.   COMMENTS:Limited    Reflexes  [] Symmetric and non-pathological  [x] Toes downgoing bilaterally  [x] No clonus present  COMMENTS: Hypoactive    Gait                  [] Normal steady gait    [] Ataxic    [] Spastic     [] Magnetic     [] Shuffling  [x] Not assessed  COMMENTS:        LABS/IMAGING:    CT HEAD WO CONTRAST  Result Date: 5/22/2025  EXAMINATION: CT HEAD WO CONTRAST  HISTORY: change in mental status  TECHNIQUE: Noncontrast CT of the brain was performed with images acquired from skull base to vertex.  2-D coronal and sagittal reformatted images were obtained from the axial source images. Contrast Dose: None. CT Dose Reduction Techniques Performed: Yes.  COMPARISON: None.  FINDINGS: There is mild prominence of the ventricles and sulci consistent with chronic cerebral atrophy Mild low attenuation is seen in the white matter of both cerebral hemispheres, most likely reflecting chronic microvascular ischemia Coarse calcifications are again noted in the bilateral cerebellar hemispheres and in the bilateral basal ganglia No focus of low attenuation seen within the brain to suggest recent stroke No abnormal intracranial fluid collection is seen to suggest recent intracranial hemorrhage No abnormal intracranial mass, mass effect, brain edema, or jono brain herniation is identified        Chronic atrophy Chronic microvascular ischemia  All CT scans are performed using dose optimization techniques as appropriate to the performed exam and include at least one of the following: Automated exposure control, adjustment of the mA and/or kV according to size, and the use of iterative

## 2025-05-22 NOTE — DISCHARGE SUMMARY
[facility]                      Inpatient Speech Therapy                Discharge Summary      Patient Identification:  Akhil Alejo is a 69 y.o. male.  :  1955  Admit Date:  2025  Discharge date :  2025  Attending Provider: Radha att. providers found     Account Number: [id number]                                    Admission Diagnoses:   CAD (coronary atherosclerotic disease) without angina pectoris [I25.10]  CAD in native artery [I25.10]     Discharge Diagnoses:  Principal Problem:    CAD in native artery  Active Problems:    PEG tube malfunction (HCC)    Gastrointestinal hemorrhage with hematemesis    Anemia  Resolved Problems:    * No resolved hospital problems. *    The patient demonstrated mild dysarthria, moderate expressive language deficits, mild to moderate receptive language deficits, and severe cognitive deficits. He also demonstrated decreased safety awareness.     MBSS HISTORY 25:  Completed re-assessment. With mildly thick/nectar thick barium, moderately thick/honey thick barium, puree consistency, and thin barium, patient exhibited oral holding, slow oral transit, and subsequent swallow delay. Patient still did not initiate swallow with minced and moist consistency without barium wash. During every swallow, epiglottic inversion during swallow initiation was considered to be delayed and decreased but slightly over horizontal in positioning. This date, no penetration/aspiration was noted with any food/drink consistency. With every consistency, patient exhibited consistent oral cavity residue and consistent pharyngeal residue post swallows. At the beginning of the study, all oral cavity residue and all pharyngeal residue cleared with additional dry swallows. At the end of exam (test was only 4:55 in length), significant pharyngeal residue was present and did not efficiently clear; RN was subsequently notified of need for suctioning.     As patient demonstrates quick 
  Physical Therapy DISCHARGE Note  DATE:  2025  NAME:  Akhil Alejo  :  1955  (69 y.o.,male)  MRN:  [id number]    HEIGHT:  Height: 180.3 cm (5' 10.98\")  WEIGHT:  Weight - Scale: 83 kg (182 lb 15.7 oz)    PATIENT DIAGNOSIS(ES):    Diagnosis: CABG X3  (MINIMALLY INVASIVE ROBOTIC ASSISTED) (G TUBE)    Additional Pertinent Hx: DEPRESSION, DMN, HTN, NEUROPATHY, RLS  RESTRICTIONS/PRECAUTIONS:    Restrictions/Precautions  Restrictions/Precautions: Fall Risk, Surgical Protocols, NPO, Cardiac  Activity Level: Up with Assist  Position Activity Restriction  Sternal Precautions: No Pushing, No Pulling, 5# Lifting Restrictions, Move in the Tube  Other Position/Activity Restrictions: G tube,\"no heavy lifting\" per dr calle, can have ice chips/small sips of water after oral care per SLP  OVERALL  ORIENTATION STATUS:     PAIN:  Pain Level: 0  Pain Type: Acute pain    Pain Location: Abdomen     Pain Orientation: Anterior, Mid      GROSS ASSESSMENT        POSTURE/BALANCE          ACTIVITY TOLERANCE  Activity Tolerance  Activity Tolerance: Patient limited by fatigue, Patient limited by endurance      BED MOBILITY  Bed mobility  Rolling to Left: Substantial/Maximal assistance, Partial/Moderate assistance (WITH RAIL. MAX CUES. DONNING PANTS)  Rolling to Right: Partial/Moderate assistance  Supine to Sit: Partial/Moderate assistance  Sit to Supine: Contact guard assistance, Stand by assistance (TRIPLANAR FROM RIGHT WIHT RAIL)  Scooting: Contact guard assistance  Bed Mobility Comments: side rails used during bed mob; cleanup required extra bed mob        TRANSFERS  Transfers  Sit to Stand: Minimal Assistance, Contact guard assistance (FROM RECLINER)  Stand to Sit: Minimal Assistance, Contact guard assistance  Bed to Chair: Minimal assistance, Contact guard assistance (WITH RW)  Stand Pivot Transfers:  (.)    Comment: poor safety with max cues for technique and hand position       AMBULATION 1  Ambulation  Surface: Level 
Neurology Discharge Summary     Patient Identification:  Akhil Alejo is a 69 y.o. male.  :  1955  Admit Date:  2025  Discharge date :  2025  Attending Provider: Radha att. providers found     Account Number: [id number]                                   Admission Diagnoses:   CAD (coronary atherosclerotic disease) without angina pectoris [I25.10]  CAD in native artery [I25.10]    Discharge Diagnoses:  Principal Problem:    CAD in native artery  Active Problems:    PEG tube malfunction (HCC)    Gastrointestinal hemorrhage with hematemesis    Anemia  Resolved Problems:    * No resolved hospital problems. *      Discharge Medications:    Discharge Medication List as of 2025 11:12 AM             Details   metoprolol tartrate 75 MG TABS Take 75 mg by mouth 2 times daily, Disp-60 tablet, R-3Normal      Benzocaine-Menthol (CEPACOL) 6-10 MG LOZG lozenge Take 1 lozenge by mouth every 2 hours as needed for Sore Throat, Disp-30 lozenge, R-0Normal      aspirin 81 MG chewable tablet Take 1 tablet by mouth dailyHistorical Med      metoprolol succinate (TOPROL XL) 25 MG extended release tablet Take 1 tablet by mouth daily, Disp-30 tablet, R-3Normal      nitroGLYCERIN (NITROSTAT) 0.4 MG SL tablet Place 1 tablet under the tongue every 5 minutes as needed for Chest pain, Disp-25 tablet, R-3Normal      clopidogrel (PLAVIX) 75 MG tablet Take 1 tablet by mouth daily, Disp-90 tablet, R-1Normal      colestipol (COLESTID) 1 g tablet Take 1 tablet by mouth 2 times daily, Disp-60 tablet, R-11Normal      losartan (COZAAR) 25 MG tablet TAKE 1 TABLET BY MOUTH IN THE MORNING, Disp-90 tablet, R-3Normal      simvastatin (ZOCOR) 10 MG tablet Take 1 tablet by mouth nightlyHistorical Med      ondansetron (ZOFRAN ODT) 4 MG disintegrating tablet Take 1 tablet by mouth every 8 hours as needed for Nausea or Vomiting, Disp-9 tablet, R-0Print      gemfibrozil (LOPID) 600 MG tablet Take 1 tablet by mouth 2 times daily (before 
presents with with PMH listed below.  Pt was sent for stress test by his cardiologist after concern in office for EKG changes.  Pt underwent heart cath and found to have multivessel disease.  Pt had robotic 3 vessel CABG on 4/29.  He developed some delerium post op.  SLP did cog eval with severe impairment.  Pt also failed multiple swallow evals.  G tube was placed on 05/09/2025.  Nursing reported hematemesis and blood from g tube overnight.  Consult called to hospitalist.  Pt was found to be tachypneic and pale,  Pulse ox was 95 .  Bp 103/59 hr 94.  Pt complaining of abd pain.  Hgb resulted at 7.8.  Pt sent for emergent ct.  Pt vomited large amount of clots during scan. Rapid response called.   GI notified.  Pt will be transferred to ICU.  Will get PCXR.  Holding asa, lovenox and tube feedings.     Physical Exam:  Vital Signs: BP (!) 105/53   Pulse 94   Temp 98.2 °F (36.8 °C)   Resp (!) 32   Ht 1.803 m (5' 10.98\")   Wt 83 kg (182 lb 15.7 oz)   SpO2 95%   BMI 25.53 kg/m²   General appearance:.Alert and Cooperative   HEENT: Normocephalic.  Chest: clear to auscultation bilaterally without wheezes or rhonchi.  Cardiac: Normal heart tones with regular rate and rhythm, S1, S2 normal. No murmurs, gallops, or rubs auscultated.   Abdomen:soft, non-tender; normal bowel sounds, no masses, no organomegaly.  Extremities: No clubbing or cyanosis. No peripheral edema. Peripheral pulses palpable.  Neurologic: Grossly intact.    Discharge Medications:         Medication List        ASK your doctor about these medications      aspirin 81 MG chewable tablet     Benzocaine-Menthol 6-10 MG Lozg lozenge  Commonly known as: CEPACOL  Take 1 lozenge by mouth every 2 hours as needed for Sore Throat     carBAMazepine 200 MG extended release capsule  Commonly known as: CARBATROL     citalopram 20 MG tablet  Commonly known as: CELEXA     clopidogrel 75 MG tablet  Commonly known as: PLAVIX  Take 1 tablet by mouth daily     colestipol 1 g

## 2025-05-22 NOTE — PROGRESS NOTES
Infectious Diseases Progress Note    Patient:  Akhil Alejo  YOB: 1955  MRN: [id number]   Admit date: 5/21/2025   Admitting Physician: Ben Schultz DO  Primary Care Physician: Lennox Waddell MD    Chief Complaint: Seeing in follow-up today for aspiration pneumonia.    Interval History: He remains in the intensive care unit at this time.  He has been without fever overnight.  Nursing indicates they were able to suction some material from ET tube earlier this morning.  He was successfully extubated about a half an hour before I saw him this morning.  He was placed on BiPAP for support initially postextubation.  He appears to be stable with a heart rate of 71 and a blood pressure of 108/50.  His wife is at bedside.  He is maintaining excellent oxygen saturation at present.  He has not had any significant cough or sputum production.  He appears to be tolerating his antibiotic treatment without side effect.    In/Out    Intake/Output Summary (Last 24 hours) at 5/22/2025 0922  Last data filed at 5/22/2025 0822  Gross per 24 hour   Intake 4580.61 ml   Output 1480 ml   Net 3100.61 ml     Allergies:   Allergies   Allergen Reactions    Penicillins Hives     Current Meds: 0.9 % sodium chloride infusion, PRN  metoprolol tartrate (LOPRESSOR) tablet 25 mg, BID  albumin human 25% IV solution 25 g, Q6H  sodium bicarbonate 150 mEq in dextrose 5 % 1,000 mL infusion, Continuous  atorvastatin (LIPITOR) tablet 20 mg, Nightly  bisacodyl (DULCOLAX) suppository 10 mg, Daily PRN  insulin lispro (HUMALOG,ADMELOG) injection vial 10 Units, TID WC  levothyroxine (SYNTHROID) tablet 50 mcg, Daily  Benzocaine-Menthol (CEPACOL) 1 lozenge, Q2H PRN  cholestyramine light packet 4 g, Daily  cetirizine (ZYRTEC) tablet 10 mg, Daily  nitroGLYCERIN (NITROSTAT) SL tablet 0.4 mg, Q5 Min PRN  magnesium hydroxide (MILK OF MAGNESIA) 400 MG/5ML suspension 30 mL, Daily PRN  melatonin disintegrating tablet 5 mg, Nightly  gemfibrozil

## 2025-05-23 ENCOUNTER — APPOINTMENT (OUTPATIENT)
Dept: GENERAL RADIOLOGY | Age: 70
DRG: 393 | End: 2025-05-23
Attending: HOSPITALIST
Payer: MEDICARE

## 2025-05-23 ENCOUNTER — OUTSIDE FACILITY SERVICE (OUTPATIENT)
Age: 70
End: 2025-05-23

## 2025-05-23 LAB
ALBUMIN SERPL-MCNC: 2.6 G/DL (ref 3.5–5.2)
ALP SERPL-CCNC: 80 U/L (ref 40–129)
ALT SERPL-CCNC: 8 U/L (ref 10–50)
ANION GAP SERPL CALCULATED.3IONS-SCNC: 10 MMOL/L (ref 8–16)
AST SERPL-CCNC: 17 U/L (ref 10–50)
BASOPHILS # BLD: 0 K/UL (ref 0–0.2)
BASOPHILS NFR BLD: 0.2 % (ref 0–1)
BILIRUB SERPL-MCNC: 0.4 MG/DL (ref 0.2–1.2)
BUN SERPL-MCNC: 30 MG/DL (ref 8–23)
CALCIUM SERPL-MCNC: 7.8 MG/DL (ref 8.8–10.2)
CHLORIDE SERPL-SCNC: 106 MMOL/L (ref 98–107)
CO2 SERPL-SCNC: 25 MMOL/L (ref 22–29)
CREAT SERPL-MCNC: 0.8 MG/DL (ref 0.7–1.2)
EOSINOPHIL # BLD: 0.1 K/UL (ref 0–0.6)
EOSINOPHIL NFR BLD: 0.4 % (ref 0–5)
ERYTHROCYTE [DISTWIDTH] IN BLOOD BY AUTOMATED COUNT: 16.2 % (ref 11.5–14.5)
GLUCOSE BLD-MCNC: 213 MG/DL (ref 70–99)
GLUCOSE BLD-MCNC: 231 MG/DL (ref 70–99)
GLUCOSE BLD-MCNC: 284 MG/DL (ref 70–99)
GLUCOSE SERPL-MCNC: 251 MG/DL (ref 70–99)
HCT VFR BLD AUTO: 24.6 % (ref 42–52)
HGB BLD-MCNC: 8.2 G/DL (ref 14–18)
IMM GRANULOCYTES # BLD: 0.5 K/UL
LYMPHOCYTES # BLD: 2.1 K/UL (ref 1.1–4.5)
LYMPHOCYTES NFR BLD: 11.3 % (ref 20–40)
MAGNESIUM SERPL-MCNC: 2.2 MG/DL (ref 1.6–2.4)
MCH RBC QN AUTO: 29.3 PG (ref 27–31)
MCHC RBC AUTO-ENTMCNC: 33.3 G/DL (ref 33–37)
MCV RBC AUTO: 87.9 FL (ref 80–94)
MONOCYTES # BLD: 1.1 K/UL (ref 0–0.9)
MONOCYTES NFR BLD: 6.1 % (ref 0–10)
NEUTROPHILS # BLD: 14.5 K/UL (ref 1.5–7.5)
NEUTS SEG NFR BLD: 79.2 % (ref 50–65)
PERFORMED ON: ABNORMAL
PHOSPHATE SERPL-MCNC: 2.5 MG/DL (ref 2.5–4.5)
PLATELET # BLD AUTO: 205 K/UL (ref 130–400)
PMV BLD AUTO: 10.1 FL (ref 9.4–12.4)
POTASSIUM SERPL-SCNC: 3.6 MMOL/L (ref 3.5–5.1)
PROT SERPL-MCNC: 5.9 G/DL (ref 6.4–8.3)
RBC # BLD AUTO: 2.8 M/UL (ref 4.7–6.1)
SODIUM SERPL-SCNC: 141 MMOL/L (ref 136–145)
VANCOMYCIN TROUGH SERPL-MCNC: 11.3 UG/ML (ref 10–20)
WBC # BLD AUTO: 18.3 K/UL (ref 4.8–10.8)

## 2025-05-23 PROCEDURE — 6370000000 HC RX 637 (ALT 250 FOR IP): Performed by: INTERNAL MEDICINE

## 2025-05-23 PROCEDURE — 80053 COMPREHEN METABOLIC PANEL: CPT

## 2025-05-23 PROCEDURE — 97530 THERAPEUTIC ACTIVITIES: CPT

## 2025-05-23 PROCEDURE — 85025 COMPLETE CBC W/AUTO DIFF WBC: CPT

## 2025-05-23 PROCEDURE — 80202 ASSAY OF VANCOMYCIN: CPT

## 2025-05-23 PROCEDURE — 94640 AIRWAY INHALATION TREATMENT: CPT

## 2025-05-23 PROCEDURE — 2500000003 HC RX 250 WO HCPCS: Performed by: NURSE PRACTITIONER

## 2025-05-23 PROCEDURE — 83735 ASSAY OF MAGNESIUM: CPT

## 2025-05-23 PROCEDURE — 94760 N-INVAS EAR/PLS OXIMETRY 1: CPT

## 2025-05-23 PROCEDURE — OUTSIDEPOS PR OUTSIDE POS PLACEHOLDER: Performed by: INTERNAL MEDICINE

## 2025-05-23 PROCEDURE — 82962 GLUCOSE BLOOD TEST: CPT

## 2025-05-23 PROCEDURE — 6360000002 HC RX W HCPCS: Performed by: INTERNAL MEDICINE

## 2025-05-23 PROCEDURE — 2580000003 HC RX 258: Performed by: SURGERY

## 2025-05-23 PROCEDURE — 6370000000 HC RX 637 (ALT 250 FOR IP): Performed by: NURSE PRACTITIONER

## 2025-05-23 PROCEDURE — 97161 PT EVAL LOW COMPLEX 20 MIN: CPT

## 2025-05-23 PROCEDURE — 6360000002 HC RX W HCPCS: Performed by: NURSE PRACTITIONER

## 2025-05-23 PROCEDURE — 51701 INSERT BLADDER CATHETER: CPT

## 2025-05-23 PROCEDURE — 84100 ASSAY OF PHOSPHORUS: CPT

## 2025-05-23 PROCEDURE — 2580000003 HC RX 258: Performed by: NURSE PRACTITIONER

## 2025-05-23 PROCEDURE — 92610 EVALUATE SWALLOWING FUNCTION: CPT

## 2025-05-23 PROCEDURE — 1200000000 HC SEMI PRIVATE

## 2025-05-23 PROCEDURE — 2580000003 HC RX 258: Performed by: INTERNAL MEDICINE

## 2025-05-23 PROCEDURE — 71045 X-RAY EXAM CHEST 1 VIEW: CPT

## 2025-05-23 PROCEDURE — 6360000002 HC RX W HCPCS: Performed by: SURGERY

## 2025-05-23 PROCEDURE — 51798 US URINE CAPACITY MEASURE: CPT

## 2025-05-23 PROCEDURE — 92523 SPEECH SOUND LANG COMPREHEN: CPT

## 2025-05-23 PROCEDURE — 99232 SBSQ HOSP IP/OBS MODERATE 35: CPT | Performed by: PSYCHIATRY & NEUROLOGY

## 2025-05-23 PROCEDURE — 97165 OT EVAL LOW COMPLEX 30 MIN: CPT

## 2025-05-23 PROCEDURE — 6370000000 HC RX 637 (ALT 250 FOR IP): Performed by: SURGERY

## 2025-05-23 RX ORDER — IPRATROPIUM BROMIDE AND ALBUTEROL SULFATE 2.5; .5 MG/3ML; MG/3ML
1 SOLUTION RESPIRATORY (INHALATION)
Status: DISCONTINUED | OUTPATIENT
Start: 2025-05-23 | End: 2025-05-28 | Stop reason: HOSPADM

## 2025-05-23 RX ORDER — METOPROLOL TARTRATE 50 MG
50 TABLET ORAL 2 TIMES DAILY
Status: DISCONTINUED | OUTPATIENT
Start: 2025-05-23 | End: 2025-05-28

## 2025-05-23 RX ADMIN — CEFEPIME 2000 MG: 2 INJECTION, POWDER, FOR SOLUTION INTRAVENOUS at 06:01

## 2025-05-23 RX ADMIN — SODIUM CHLORIDE, PRESERVATIVE FREE 40 MG: 5 INJECTION INTRAVENOUS at 00:23

## 2025-05-23 RX ADMIN — Medication 100 MG: at 21:33

## 2025-05-23 RX ADMIN — LEVOTHYROXINE SODIUM 50 MCG: 0.05 TABLET ORAL at 05:27

## 2025-05-23 RX ADMIN — GEMFIBROZIL 600 MG: 600 TABLET ORAL at 21:33

## 2025-05-23 RX ADMIN — VANCOMYCIN HYDROCHLORIDE 1000 MG: 1 INJECTION, POWDER, LYOPHILIZED, FOR SOLUTION INTRAVENOUS at 01:44

## 2025-05-23 RX ADMIN — SODIUM CHLORIDE, PRESERVATIVE FREE 40 MG: 5 INJECTION INTRAVENOUS at 10:49

## 2025-05-23 RX ADMIN — INSULIN GLARGINE 25 UNITS: 100 INJECTION, SOLUTION SUBCUTANEOUS at 21:34

## 2025-05-23 RX ADMIN — IPRATROPIUM BROMIDE AND ALBUTEROL SULFATE 1 DOSE: 2.5; .5 SOLUTION RESPIRATORY (INHALATION) at 19:02

## 2025-05-23 RX ADMIN — Medication 100 MG: at 13:52

## 2025-05-23 RX ADMIN — METRONIDAZOLE 500 MG: 500 INJECTION, SOLUTION INTRAVENOUS at 05:27

## 2025-05-23 RX ADMIN — METRONIDAZOLE 500 MG: 500 INJECTION, SOLUTION INTRAVENOUS at 13:50

## 2025-05-23 RX ADMIN — Medication 100 MG: at 18:06

## 2025-05-23 RX ADMIN — INSULIN GLARGINE 25 UNITS: 100 INJECTION, SOLUTION SUBCUTANEOUS at 10:49

## 2025-05-23 RX ADMIN — CETIRIZINE HYDROCHLORIDE 10 MG: 10 TABLET ORAL at 08:24

## 2025-05-23 RX ADMIN — CEFEPIME 2000 MG: 2 INJECTION, POWDER, FOR SOLUTION INTRAVENOUS at 23:15

## 2025-05-23 RX ADMIN — GLYCOPYRROLATE 0.2 MG: 0.2 INJECTION INTRAMUSCULAR; INTRAVENOUS at 21:38

## 2025-05-23 RX ADMIN — INSULIN LISPRO 10 UNITS: 100 INJECTION, SOLUTION INTRAVENOUS; SUBCUTANEOUS at 10:48

## 2025-05-23 RX ADMIN — METOPROLOL TARTRATE 25 MG: 25 TABLET, FILM COATED ORAL at 08:25

## 2025-05-23 RX ADMIN — SODIUM CHLORIDE, PRESERVATIVE FREE 10 ML: 5 INJECTION INTRAVENOUS at 21:00

## 2025-05-23 RX ADMIN — Medication 5 MG: at 21:33

## 2025-05-23 RX ADMIN — GLYCOPYRROLATE 0.2 MG: 0.2 INJECTION INTRAMUSCULAR; INTRAVENOUS at 08:25

## 2025-05-23 RX ADMIN — METRONIDAZOLE 500 MG: 500 INJECTION, SOLUTION INTRAVENOUS at 21:44

## 2025-05-23 RX ADMIN — IPRATROPIUM BROMIDE AND ALBUTEROL SULFATE 1 DOSE: 2.5; .5 SOLUTION RESPIRATORY (INHALATION) at 15:00

## 2025-05-23 RX ADMIN — CLOPIDOGREL BISULFATE 75 MG: 75 TABLET, FILM COATED ORAL at 08:24

## 2025-05-23 RX ADMIN — ASPIRIN 81 MG: 81 TABLET, CHEWABLE ORAL at 08:24

## 2025-05-23 RX ADMIN — INSULIN LISPRO 10 UNITS: 100 INJECTION, SOLUTION INTRAVENOUS; SUBCUTANEOUS at 18:06

## 2025-05-23 RX ADMIN — VANCOMYCIN HYDROCHLORIDE 1000 MG: 1 INJECTION, POWDER, LYOPHILIZED, FOR SOLUTION INTRAVENOUS at 13:14

## 2025-05-23 RX ADMIN — Medication 100 MG: at 08:25

## 2025-05-23 RX ADMIN — ATORVASTATIN CALCIUM 20 MG: 20 TABLET, FILM COATED ORAL at 21:33

## 2025-05-23 RX ADMIN — GEMFIBROZIL 600 MG: 600 TABLET ORAL at 08:24

## 2025-05-23 RX ADMIN — METOPROLOL TARTRATE 50 MG: 50 TABLET, FILM COATED ORAL at 21:42

## 2025-05-23 RX ADMIN — CEFEPIME 2000 MG: 2 INJECTION, POWDER, FOR SOLUTION INTRAVENOUS at 13:50

## 2025-05-23 ASSESSMENT — PAIN SCALES - GENERAL
PAINLEVEL_OUTOF10: 0
PAINLEVEL_OUTOF10: 0

## 2025-05-23 ASSESSMENT — PAIN SCALES - WONG BAKER: WONGBAKER_NUMERICALRESPONSE: NO HURT

## 2025-05-23 NOTE — PLAN OF CARE
Problem: Chronic Conditions and Co-morbidities  Goal: Patient's chronic conditions and co-morbidity symptoms are monitored and maintained or improved  5/23/2025 0857 by Lynda Wheeler, RN  Outcome: Progressing  Flowsheets (Taken 5/23/2025 0800)  Care Plan - Patient's Chronic Conditions and Co-Morbidity Symptoms are Monitored and Maintained or Improved: Monitor and assess patient's chronic conditions and comorbid symptoms for stability, deterioration, or improvement  5/22/2025 1934 by Markie Henning RN  Outcome: Progressing     Problem: Discharge Planning  Goal: Discharge to home or other facility with appropriate resources  5/23/2025 0857 by Lynda Wheeler, RN  Outcome: Progressing  Flowsheets (Taken 5/23/2025 0800)  Discharge to home or other facility with appropriate resources: Identify barriers to discharge with patient and caregiver  5/22/2025 1934 by Markie Henning RN  Outcome: Progressing     Problem: Safety - Adult  Goal: Free from fall injury  5/23/2025 0857 by Lynda Wheeler, RN  Outcome: Progressing  Flowsheets (Taken 5/23/2025 0800)  Free From Fall Injury: Instruct family/caregiver on patient safety  5/22/2025 1934 by Markie Henning RN  Outcome: Progressing     Problem: Nutrition Deficit:  Goal: Optimize nutritional status  5/23/2025 0857 by Lynda Wheeler, RN  Outcome: Progressing  5/22/2025 1934 by Markie Henning RN  Outcome: Progressing     Problem: Skin/Tissue Integrity  Goal: Skin integrity remains intact  Description: 1.  Monitor for areas of redness and/or skin breakdown2.  Assess vascular access sites hourly3.  Every 4-6 hours minimum:  Change oxygen saturation probe site4.  Every 4-6 hours:  If on nasal continuous positive airway pressure, respiratory therapy assess nares and determine need for appliance change or resting period  5/23/2025 0857 by Lynda Wheeler, RN  Outcome: Progressing  Flowsheets (Taken 5/23/2025 0800)  Skin Integrity Remains Intact: Monitor for areas

## 2025-05-23 NOTE — PROGRESS NOTES
Infectious Diseases Progress Note    Patient:  Akhil Alejo  YOB: 1955  MRN: [id number]   Admit date: 5/21/2025   Admitting Physician: Ben Schultz DO  Primary Care Physician: Lennox Waddell MD    Chief Complaint: Seeing in follow-up for treatment of aspiration pneumonia.    Interval History: He has been transferred out of intensive care.  He is off BiPAP.  He has been on general medical floor for about 5 hours.  His eyes are open.  He tracks with eyes.  He is not verbal at present.  Nursing indicates he had mild to few words to his wife.  He apparently has had some nonverbal responses after previous procedures.  He is on room air with excellent oxygen saturation.  Per review of neurology notes he may have some right gaze preference and right-sided weakness.    In/Out    Intake/Output Summary (Last 24 hours) at 5/23/2025 1846  Last data filed at 5/23/2025 1720  Gross per 24 hour   Intake 2555.01 ml   Output 1785 ml   Net 770.01 ml     Allergies:   Allergies   Allergen Reactions    Penicillins Hives     Current Meds: metoprolol tartrate (LOPRESSOR) tablet 50 mg, BID  ipratropium 0.5 mg-albuterol 2.5 mg (DUONEB) nebulizer solution 1 Dose, Q4H WA RT  aspirin chewable tablet 81 mg, Daily  Glycopyrrolate injection 0.2 mg, BID  atorvastatin (LIPITOR) tablet 20 mg, Nightly  bisacodyl (DULCOLAX) suppository 10 mg, Daily PRN  insulin lispro (HUMALOG,ADMELOG) injection vial 10 Units, TID WC  levothyroxine (SYNTHROID) tablet 50 mcg, Daily  Benzocaine-Menthol (CEPACOL) 1 lozenge, Q2H PRN  cholestyramine light packet 4 g, Daily  cetirizine (ZYRTEC) tablet 10 mg, Daily  nitroGLYCERIN (NITROSTAT) SL tablet 0.4 mg, Q5 Min PRN  magnesium hydroxide (MILK OF MAGNESIA) 400 MG/5ML suspension 30 mL, Daily PRN  melatonin disintegrating tablet 5 mg, Nightly  gemfibrozil (LOPID) tablet 600 mg, BID  carBAMazepine (TEGRETOL) 100 MG/5ML suspension 100 mg, 4x Daily  clopidogrel (PLAVIX) tablet 75 mg,  Daily  Loperamide HCl (IMODIUM) 1 MG/7.5ML solution 2 mg, 4x Daily PRN  sodium chloride flush 0.9 % injection 5-40 mL, 2 times per day  sodium chloride flush 0.9 % injection 5-40 mL, PRN  0.9 % sodium chloride infusion, PRN  ondansetron (ZOFRAN-ODT) disintegrating tablet 4 mg, Q8H PRN   Or  ondansetron (ZOFRAN) injection 4 mg, Q6H PRN  acetaminophen (TYLENOL) tablet 650 mg, Q6H PRN   Or  acetaminophen (TYLENOL) suppository 650 mg, Q6H PRN  pantoprazole (PROTONIX) 40 mg in sodium chloride (PF) 0.9 % 10 mL injection, Q12H  fentaNYL (SUBLIMAZE) injection 100 mcg, Once  vancomycin (VANCOCIN) 1,000 mg in sodium chloride 0.9 % 250 mL IVPB (Awuy0Oea), Q12H  vancomycin (VANCOCIN) intermittent dosing (placeholder), RX Placeholder  insulin glargine (LANTUS) injection vial 25 Units, BID  metroNIDAZOLE (FLAGYL) 500 mg in 0.9% NaCl 100 mL IVPB premix, Q8H  ceFEPIme (MAXIPIME) 2,000 mg in sodium chloride 0.9 % 100 mL IVPB (Dzfz9Osd), Q8H      Review of Systems see HPI    VitalSigns:  /79   Pulse 88   Temp 98.1 °F (36.7 °C) (Temporal)   Resp 18   Ht 1.803 m (5' 10.98\")   SpO2 97%   BMI 25.53 kg/m²     Physical Exam  Line/IV site: No erythema, warmth, induration, or tenderness.  Lungs without crackles or wheezes  He is not taking very deep breaths  He does appear to be moving air equally to both lungs    Lab Results:  CBC:   Recent Labs     05/21/25  0629 05/21/25  1059 05/22/25  0330 05/22/25  1041 05/22/25  2145 05/23/25  0351   WBC 21.2*  --  31.3*  --   --  18.3*   HGB 7.8*   < > 8.3* 9.1* 8.1* 8.2*     --  305  --   --  205    < > = values in this interval not displayed.     BMP:  Recent Labs     05/21/25  0907 05/21/25  1137 05/22/25  0330 05/22/25  0807 05/23/25  0351   *  --  136  --  141   K 5.5*   < > 4.4 3.7 3.6     --  104  --  106   CO2 18*  --  21*  --  25   BUN 65*  --  68*  --  30*   CREATININE 1.0  --  1.2  --  0.8   GLUCOSE 298*  --  368*  --  251*    < > = values in this interval

## 2025-05-23 NOTE — CARE COORDINATION
[facility]  Notification of Admission Decision      [] Patient has been accepted for admit to [facility] on :       Please write discharge orders and summary prior to discharge.    [] Patient acceptance to Rehab pending the following :    [x] Eval in progress: per therapy notes patient still in restraints and too low level with therapy. Will see how patient does with therapy over holiday weekend. I will f/u on Tuesday 5/27/25.      [] Patient determined to be ineligible for services at [facility] because :       We recommend you consider        Thank you for your referral, we appreciate you. If you have any questions, please feel   free to contact me at [phone].  Electronically Signed by Annmarie Cortes, Admissions Coordinator 5/23/2025 12:50 PM

## 2025-05-23 NOTE — PROGRESS NOTES
Comprehensive Nutrition Assessment    Type and Reason for Visit:  Reassess    Nutrition Recommendations/Plan:   Continue current nutritional interventions     Malnutrition Assessment:  Malnutrition Status:  At risk for malnutrition (05/23/25 1151)    Context:  Acute Illness     Findings of the 6 clinical characteristics of malnutrition:  Energy Intake:  Mild decrease in energy intake  Weight Loss:  1% to 2% over 1 week     Body Fat Loss:  No body fat loss     Muscle Mass Loss:  No muscle mass loss    Fluid Accumulation:  No fluid accumulation     Strength:  Not Performed    Nutrition Assessment:    Patient has been extubated.  TF has been started through PEG and is currently at 50ml/hr and 35ml free water.  Residuals 4-5 ml.  New weight not available.    Nutrition Related Findings:    Glucose 251-368.  Accuchek's 127-185 Wound Type: Multiple, Surgical Incision       Current Nutrition Intake & Therapies:    Average Meal Intake: NPO  Average Supplements Intake: NPO  Current Tube Feeding (TF) Orders:  Feeding Route: PEG  Formula: Diabetic  Schedule: Continuous  Feeding Regimen: glucerna 1.5 @ 50ml/hr  Additives/Modulars: None  Water Flushes: 35ml hourly = 840 ml  Current TF Provides: Glucerna 1.5 at 50ml/hr = 1800 kcals with 99g protein, 159g CHO and 910ml free water from formula, 840ml free water froom flush    Anthropometric Measures:  Height: 180.3 cm (5' 10.98\")  Ideal Body Weight (IBW): 172 lbs (78 kg)       Current Body Weight: 83 kg (182 lb 15.7 oz), 106.4 % IBW. Weight Source: Bed scale  Current BMI (kg/m2): 25.5  Usual Body Weight: 86.2 kg (190 lb 0.6 oz) (5-13-25)  % Weight Change (Calculated): -3.7  Weight Adjustment For: No Adjustment  BMI Categories: Overweight (BMI 25.0-29.9)    Estimated Daily Nutrient Needs:  Energy Requirements Based On: Kcal/kg  Weight Used for Energy Requirements: Current  Energy (kcal/day): 1844-4649 (20-25/kg)  Weight Used for Protein Requirements: Ideal  Protein (g/day):

## 2025-05-23 NOTE — CARE COORDINATION
Case Management Assessment  Initial Evaluation    Date/Time of Evaluation: 5/23/2025 7:22 AM  Assessment Completed by: Larisa Goyal RN    If patient is discharged prior to next notation, then this note serves as note for discharge by case management.    Patient Name: Akhil Alejo                   YOB: 1955  Diagnosis: GI bleed [K92.2]                   Date / Time: 5/21/2025 10:25 AM    Patient Admission Status: Inpatient   Readmission Risk (Low < 19, Mod (19-27), High > 27): Readmission Risk Score: 23.6    Current PCP: Lennox Waddell MD  PCP verified by CM? (P) Yes    Chart Reviewed: Yes      History Provided by: (P) Significant Other  Patient Orientation: (P) Other (see comment) (confused)    Patient Cognition: (P) Short Term Memory Deficit    Hospitalization in the last 30 days (Readmission):  Yes    If yes, Readmission Assessment in  Navigator will be completed.    Advance Directives:      Code Status: Full Code   Patient's Primary Decision Maker is: (P) Legal Next of Kin    Primary Decision Maker: Meaghan Alejo - Spouse - [phone]    Discharge Planning:    Patient lives with: (P) Spouse/Significant Other Type of Home: (P) House  Primary Care Giver: (P) Spouse  Patient Support Systems include: (P) Spouse/Significant Other, Family Members   Current Financial resources: (P) Medicare  Current community resources: (P) None  Current services prior to admission: (P) None            Current DME:              Type of Home Care services:  (P) None    ADLS  Prior functional level: (P) Assistance with the following:, Mobility, Bathing, Dressing  Current functional level: (P) Assistance with the following:, Bathing, Dressing, Mobility    PT AM-PAC:   /24  OT AM-PAC:   /24    Family can provide assistance at DC: (P) Yes  Would you like Case Management to discuss the discharge plan with any other family members/significant others, and if so, who? (P) Yes (spouse)  Plans to Return to Present

## 2025-05-23 NOTE — PROGRESS NOTES
are clear. Osseous structures are unremarkable.      No evidence of aspiration    ______________________________________ Electronically signed by: MATEO JAIN M.D. Date:     05/22/2025 Time:    07:07     XR CHEST PORTABLE  Result Date: 5/21/2025  EXAM: CHEST RADIOGRAPH  TECHNIQUE: Single frontal chest radiograph.  HISTORY: Intubation.  COMPARISON: Earlier same date at 9:04 a.m.  FINDINGS:  Interval placement of an endotracheal tube, tip about 3.2 cm above the monika. Interval placement of an enteric tube, tip in the mid stomach. EKG leads project over the chest. Hypoventilation, with mild atelectasis of the left base. No pleural effusion or pneumothorax is seen. Stable cardiomegaly. No acute displaced rib fractures are identified. Left rib surgical plating, and surgical clips in the left axilla, again noted.       1.  Interval placement of an endotracheal and enteric tube, in satisfactory position. 2. Stable cardiomegaly.    ______________________________________ Electronically signed by: SU ELLISON M.D. Date:     05/21/2025 Time:    11:34     EGD  Result Date: 5/21/2025  No dictation     XR CHEST PORTABLE  Result Date: 5/21/2025  EXAM: CHEST RADIOGRAPH  TECHNIQUE: Single frontal chest radiograph.  HISTORY: Shortness of breath.  COMPARISON: 05/13/2025  FINDINGS:  The patient is mildly leaning and rotated to the right. Hypoventilation. Left rib surgical plating, and surgical clips in left axilla, again noted. No pulmonary infiltrate is identified. No pleural effusion or pneumothorax is seen. Stable enlargement of the cardiac silhouette. No acute displaced rib fractures are identified.        1.  Limited hypoventilated portable study of the chest. 2. Stable enlargement of the cardiac silhouette.    ______________________________________ Electronically signed by: SU ELLISON M.D. Date:     05/21/2025 Time:    10:05     CT ABDOMEN PELVIS W IV CONTRAST Additional Contrast? None  Result Date: 5/21/2025  EXAM:  CT SCAN

## 2025-05-23 NOTE — PROGRESS NOTES
[facility] [place] Neurology Progress Note      Patient:   kAhil Alejo  MR#:    [id number]   Room:    [facility]-   YOB: 1955  Date of Progress Note: 5/23/2025  Time of Note                           11:31 AM  Consulting Physician:  Doyle More DO  Attending Physician:  Ben Schultz DO      INTERVAL HISTORY:  More responsive today, extubated, following some commands.     REVIEW OF SYSTEMS:  Limited given AMS.     PHYSICAL EXAM:    Constitutional -   /63   Pulse 94   Temp 98 °F (36.7 °C) (Temporal)   Resp 21   Ht 1.803 m (5' 10.98\")   SpO2 94%   BMI 25.53 kg/m²   General appearance: Extubated, more responsive   EYES -   Conjunctiva normal  Pupillary exam as below, see CN exam in the neurologic exam  ENT-    No scars, masses, or lesions over external nose or ears  Musculoskeletal -   No significant wasting of muscles noted  Gait as below, see gait exam in the neurologic exam  Muscle strength, tone, stability as below see the motor exam in the neurologic exam.   No bony deformities  Skin -   Warm, dry, and intact to inspection and palpation.    No rash, erythema, or pallor        NEUROLOGICAL EXAM     Mental status    [] Awake, alert, oriented   [] Affect attention and concentration appear appropriate  [] Recent and remote memory appears unremarkable  [] Speech normal without dysarthria or aphasia, comprehension and repetition intact.   COMMENTS:More responsive, following some commands, speech limited.     Cranial Nerves [] No VF deficit to confrontation,  optic discs normal, no papilledema on fundoscopic exam.  [] PERRLA, EOMI, no nystagmus, conjugate eye movements, no ptosis  [] Face symmetric  [] Facial sensation intact  [] Tongue midline no atrophy or fasciculations present  [] Palate midline, hearing to finger rub normal  [] Shoulder shrug and SCM testing normal  COMMENTS:PERRL, EOM limited, face appears sym    Motor   [] 5/5 strength x 4 extremities  [] Normal bulk and tone  []

## 2025-05-23 NOTE — PROGRESS NOTES
DIS Nurse Note  SUBJECTIVE: Patient assessed secondary to elevated Deterioration Index Score.      Deterioration Index Score:  Predictive Model Details          56 (Warning)  Factor Value    Calculated 5/23/2025 16:29 34% Fabiola coma scale 11    Deterioration Index Model 20% Age 69 years old     16% Neurological exam X     9% Hematocrit abnormal (24.6 %)     7% WBC count abnormal (18.3 K/uL)     4% Respiratory rate 18     4% Blood pH abnormal (7.530)     2% Systolic 135     2% BUN abnormal (30 mg/dL)     1% Potassium 3.6 mmol/L     1% Pulse 88     0% Pulse oximetry 97 %     0% Sodium 141 mmol/L     0% Temperature 98.1 °F (36.7 °C)        Vital Signs:  Vitals:    05/23/25 1200 05/23/25 1235 05/23/25 1501 05/23/25 1521   BP:  120/65  135/79   Pulse: 87 81  88   Resp: 25 18  18   Temp:  97.3 °F (36.3 °C)  98.1 °F (36.7 °C)   TempSrc:    Temporal   SpO2: 98% 94% 94% 97%   Height:            Provider Notified: Reviewed patient status  & DIS score with Provider Dr Schultz    ASSESSMENT:   No change from previous assessment; fabiola coma 11; still unable to communicate verbally;  VSS    Plan of Care: Continue to monitor on telemetry; neurological exams q shift and as warranted.     Electronically signed by Larisa Villagran RN

## 2025-05-23 NOTE — PROGRESS NOTES
4 Eyes Skin Assessment     NAME:  Akhil Alejo  YOB: 1955  MEDICAL RECORD NUMBER:  [id number]    The patient is being assessed for  Transfer to New Unit    I agree that at least one RN has performed a thorough Head to Toe Skin Assessment on the patient. ALL assessment sites listed below have been assessed.      Areas assessed by both nurses:    Head, Face, Ears, Shoulders, Back, Chest, Arms, Elbows, Hands, Sacrum. Buttock, Coccyx, Ischium, Legs. Feet and Heels, and Under Medical Devices         Does the Patient have a Wound? No noted wound(s)       Donte Prevention initiated by RN: Yes  Wound Care Orders initiated by RN: No    Pressure Injury (Stage 3,4, Unstageable, DTI, NWPT, and Complex wounds) if present, place Wound referral order by RN under : No    New Ostomies, if present place, Ostomy referral order under : No     Nurse 1 eSignature: Electronically signed by Larisa Villagran RN on 5/23/25 at 4:27 PM CDT    **SHARE this note so that the co-signing nurse can place an eSignature**    Nurse 2 eSignature: {Esignature:[id number]}

## 2025-05-23 NOTE — PROGRESS NOTES
Occupational Therapy Initial Assessment  Date: 2025   Patient Name: Akhil Alejo  MRN: [id number]     : 1955    Date of Service: 2025    Discharge Recommendations:  Continue to assess pending progress, 24 hour supervision or assist, Patient would benefit from continued therapy after discharge       Assessment   Performance deficits / Impairments: Decreased functional mobility ;Decreased ADL status;Decreased ROM;Decreased strength;Decreased balance;Decreased posture;Decreased endurance  Assessment: Evaluation completed and tx initiated.  The patient would benefit from further skilled therapy to upgrade safety and functional independence. Pt was seen in ICU and was not able to follow commands during session. Pt was able to briefly hold the railing with L UE during bed mobility and was using R UE to scratch his face. Pt struggled to keep his eyes open and would open them when his name was called. Pt required max Ax2 for getting to EOB and required min/mod A for maintaining sitting balance. Pt displayed posterior and L lateral leaning. Pt was willing to attempt to stand but was not able to clear his bottom from bed at this time. Pt would benefit from skilled OT services to address these deficits.  Treatment Diagnosis: GI bleed, CABG on 25  REQUIRES OT FOLLOW-UP: Yes  Activity Tolerance  Activity Tolerance: Patient Tolerated treatment well;Patient limited by fatigue              Patient Diagnosis(es): There were no encounter diagnoses.    Past Medical History:   Past Medical History:   Diagnosis Date    Arm fracture 2016    left    Dementia (HCC)     per wife he needs be supervised at home    Depression     Diabetes mellitus (HCC)     Hypertension     Kidney stones     Neuropathy     Palliative care patient 2025    Restless leg syndrome         Past Surgical History:   Past Surgical History:   Procedure Laterality Date    BONE MARROW BIOPSY Right 2020    BONE MARROW ASPIRATION BIOPSY

## 2025-05-23 NOTE — PROGRESS NOTES
Akhil Alejo arrived to room # 312.   Presented with: GI Bleed  Mental Status: Patient is alert.   Vitals:    05/23/25 1235   BP: 120/65   Pulse: 81   Resp: 18   Temp: 97.3 °F (36.3 °C)   SpO2: 94%     Patient safety contract and falls prevention contract reviewed with patient Yes.  Oriented Patient to room.  Call light within reach. Yes.  Needs, issues or concerns expressed at this time: no.      Electronically signed by Eliana Hewitt RN on 5/23/2025 at 1:41 PM

## 2025-05-23 NOTE — PROGRESS NOTES
Physical Therapy  Facility/Department: [facility] ICU  Physical Therapy Initial Assessment    Name: Akhil Alejo  : 1955  MRN: [id number]  Date of Service: 2025    Discharge Recommendations:  Continue to assess pending progress, Patient would benefit from continued therapy after discharge          Patient Diagnosis(es): There were no encounter diagnoses.  Past Medical History:  has a past medical history of Arm fracture, Dementia (HCC), Depression, Diabetes mellitus (HCC), Hypertension, Kidney stones, Neuropathy, Palliative care patient, and Restless leg syndrome.  Past Surgical History:  has a past surgical history that includes Cholecystectomy; Lithotripsy; shoulder surgery (Left); bone marrow biopsy (Right, 2020); Colonoscopy (2020); Upper gastrointestinal endoscopy (2017); Cardiac procedure (N/A, 2025); Coronary artery bypass graft (N/A, 2025); Gastrostomy tube placement (2025); Upper gastrointestinal endoscopy (N/A, 2025); Upper gastrointestinal endoscopy (N/A, 2025); and Upper gastrointestinal endoscopy (2025).    Assessment  Body Structures, Functions, Activity Limitations Requiring Skilled Therapeutic Intervention: Decreased functional mobility ;Decreased ADL status;Decreased ROM;Decreased cognition;Decreased safe awareness;Decreased strength;Decreased balance;Decreased endurance;Decreased coordination;Decreased posture  Assessment: pt WOULD BNEFIT FROM SKILLED PT IN THIS SETTING TO ADDRESS HIS MOBILITY DEFICITS AND ASSIST IN DC PLANNING  Therapy Prognosis: Guarded  Decision Making: Low Complexity  Requires PT Follow-Up: Yes  Activity Tolerance  Activity Tolerance: Patient tolerated treatment well    Plan  Physical Therapy Plan  General Plan: 5-7 times per week  Therapy Duration: 2 Weeks  Current Treatment Recommendations: Strengthening, Balance training, ROM, Functional mobility training, Transfer training, Endurance training, Gait training, Home

## 2025-05-23 NOTE — CARE COORDINATION
05/22/25 0830   Readmission Assessment   Number of Days since last admission? 1-7 days   Previous Disposition Acute Rehab   Who is being Interviewed Caregiver  (medical record)   What was the patient's/caregiver's perception as to why they think they needed to return back to the hospital? Other (Comment)  (vomiting blood clots)   Did you visit your Primary Care Physician after you left the hospital, before you returned this time? No   Why weren't you able to visit your PCP? Other (Comment)  (was in inpt rehab and seeing physicians in unit)   Did you see a specialist, such as Cardiac, Pulmonary, Orthopedic Physician, etc. after you left the hospital? Other (Comment)  (hospital physicians in rehab unit)   Who advised the patient to return to the hospital? Acute Rehab   Does the patient report anything that got in the way of taking their medications? No   In our efforts to provide the best possible care to you and others like you, can you think of anything that we could have done to help you after you left the hospital the first time, so that you might not have needed to return so soon? Other (Comment)  (nothing)

## 2025-05-23 NOTE — PROGRESS NOTES
[facility]   Pharmacy Pharmacokinetic Monitoring Service - Vancomycin    Consulting Provider: Dr Lara   Indication: Pneumonia  Target Concentration: Goal AUC/ALESIA 400-600 mg*hr/L  Day of Therapy: 3  Additional Antimicrobials: Maxipime and Flagyl    Pertinent Laboratory Values:   Wt Readings from Last 1 Encounters:   05/20/25 83 kg (182 lb 15.7 oz)     Temp Readings from Last 1 Encounters:   05/23/25 97.4 °F (36.3 °C) (Temporal)     Estimated Creatinine Clearance: 61 mL/min (based on SCr of 1.2 mg/dL).  Recent Labs     05/21/25  0629 05/21/25  0907 05/22/25  0330   CREATININE  --  1.0 1.2   BUN  --  65* 68*   WBC 21.2*  --  31.3*     Procalcitonin: 5/21= 0.16    Pertinent Cultures: No current cultures at this time  Culture Date Source Results        MRSA Nasal Swab: Not detected-5/21    Recent vancomycin administrations                     vancomycin (VANCOCIN) 1,000 mg in sodium chloride 0.9 % 250 mL IVPB ([id number]) (mg) 1,000 mg New Bag 05/23/25 0144     1,000 mg New Bag 05/22/25 1121     1,000 mg New Bag  0121    vancomycin (VANCOCIN) 2,000 mg in sodium chloride 0.9 % 500 mL IVPB (mg) 2,000 mg New Bag 05/21/25 1426                    Assessment:  Date/Time Current Dose Concentration Timing of Concentration (h) AUC   05/23/25 1000mg Q12hr 11.3 (SS=17.4) 12hr 49 min 575   Note: Serum concentrations collected for AUC dosing may appear elevated if collected in close proximity to the dose administered, this is not necessarily an indication of toxicity    Plan:  Current dosing regimen is therapeutic  Continue current dose   Pharmacy will continue to monitor patient and adjust therapy as indicated    Thank you for the consult,  Shawn Pulido [facility]  5/23/2025 2:45 AM
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
## 2025-05-24 PROBLEM — F02.B0 MODERATE LATE ONSET ALZHEIMER'S DEMENTIA WITHOUT BEHAVIORAL DISTURBANCE, PSYCHOTIC DISTURBANCE, MOOD DISTURBANCE, OR ANXIETY (HCC): Status: ACTIVE | Noted: 2025-05-24

## 2025-05-24 PROBLEM — G93.41 METABOLIC ENCEPHALOPATHY: Status: ACTIVE | Noted: 2025-05-24

## 2025-05-24 PROBLEM — J69.0 ASPIRATION PNEUMONIA DUE TO GASTRIC SECRETIONS (HCC): Status: ACTIVE | Noted: 2025-05-24

## 2025-05-24 PROBLEM — G30.1 MODERATE LATE ONSET ALZHEIMER'S DEMENTIA WITHOUT BEHAVIORAL DISTURBANCE, PSYCHOTIC DISTURBANCE, MOOD DISTURBANCE, OR ANXIETY (HCC): Status: ACTIVE | Noted: 2025-05-24

## 2025-05-24 PROBLEM — I63.9 STROKE DETERMINED BY CLINICAL ASSESSMENT (HCC): Status: ACTIVE | Noted: 2025-05-24

## 2025-05-24 LAB
FOLATE SERPL-MCNC: 5 NG/ML (ref 4.5–32.2)
GLUCOSE BLD-MCNC: 153 MG/DL (ref 70–99)
GLUCOSE BLD-MCNC: 171 MG/DL (ref 70–99)
GLUCOSE BLD-MCNC: 215 MG/DL (ref 70–99)
GLUCOSE BLD-MCNC: 250 MG/DL (ref 70–99)
GLUCOSE BLD-MCNC: 98 MG/DL (ref 70–99)
PERFORMED ON: ABNORMAL
PERFORMED ON: NORMAL
TSH SERPL DL<=0.005 MIU/L-ACNC: 2.82 UIU/ML (ref 0.27–4.2)
VIT B12 SERPL-MCNC: 415 PG/ML (ref 232–1245)

## 2025-05-24 PROCEDURE — 6370000000 HC RX 637 (ALT 250 FOR IP): Performed by: STUDENT IN AN ORGANIZED HEALTH CARE EDUCATION/TRAINING PROGRAM

## 2025-05-24 PROCEDURE — 6370000000 HC RX 637 (ALT 250 FOR IP): Performed by: INTERNAL MEDICINE

## 2025-05-24 PROCEDURE — 82746 ASSAY OF FOLIC ACID SERUM: CPT

## 2025-05-24 PROCEDURE — 6360000002 HC RX W HCPCS: Performed by: NURSE PRACTITIONER

## 2025-05-24 PROCEDURE — 2580000003 HC RX 258: Performed by: INTERNAL MEDICINE

## 2025-05-24 PROCEDURE — 84443 ASSAY THYROID STIM HORMONE: CPT

## 2025-05-24 PROCEDURE — 6360000002 HC RX W HCPCS: Performed by: INTERNAL MEDICINE

## 2025-05-24 PROCEDURE — 99024 POSTOP FOLLOW-UP VISIT: CPT | Performed by: SURGERY

## 2025-05-24 PROCEDURE — 51798 US URINE CAPACITY MEASURE: CPT

## 2025-05-24 PROCEDURE — 2500000003 HC RX 250 WO HCPCS: Performed by: STUDENT IN AN ORGANIZED HEALTH CARE EDUCATION/TRAINING PROGRAM

## 2025-05-24 PROCEDURE — 2580000003 HC RX 258: Performed by: SURGERY

## 2025-05-24 PROCEDURE — 2580000003 HC RX 258: Performed by: NURSE PRACTITIONER

## 2025-05-24 PROCEDURE — 82607 VITAMIN B-12: CPT

## 2025-05-24 PROCEDURE — 36415 COLL VENOUS BLD VENIPUNCTURE: CPT

## 2025-05-24 PROCEDURE — 1200000000 HC SEMI PRIVATE

## 2025-05-24 PROCEDURE — 94760 N-INVAS EAR/PLS OXIMETRY 1: CPT

## 2025-05-24 PROCEDURE — 6370000000 HC RX 637 (ALT 250 FOR IP): Performed by: NURSE PRACTITIONER

## 2025-05-24 PROCEDURE — 94640 AIRWAY INHALATION TREATMENT: CPT

## 2025-05-24 PROCEDURE — 82962 GLUCOSE BLOOD TEST: CPT

## 2025-05-24 PROCEDURE — 6360000002 HC RX W HCPCS: Performed by: SURGERY

## 2025-05-24 PROCEDURE — 92526 ORAL FUNCTION THERAPY: CPT

## 2025-05-24 PROCEDURE — 99233 SBSQ HOSP IP/OBS HIGH 50: CPT | Performed by: PSYCHIATRY & NEUROLOGY

## 2025-05-24 PROCEDURE — 2500000003 HC RX 250 WO HCPCS: Performed by: NURSE PRACTITIONER

## 2025-05-24 RX ORDER — GUAIFENESIN 200 MG/10ML
200 LIQUID ORAL 3 TIMES DAILY PRN
Status: DISCONTINUED | OUTPATIENT
Start: 2025-05-24 | End: 2025-05-28 | Stop reason: HOSPADM

## 2025-05-24 RX ORDER — INSULIN LISPRO 100 [IU]/ML
0-4 INJECTION, SOLUTION INTRAVENOUS; SUBCUTANEOUS
Status: DISCONTINUED | OUTPATIENT
Start: 2025-05-24 | End: 2025-05-28 | Stop reason: HOSPADM

## 2025-05-24 RX ADMIN — INSULIN GLARGINE 25 UNITS: 100 INJECTION, SOLUTION SUBCUTANEOUS at 20:03

## 2025-05-24 RX ADMIN — CEFEPIME 2000 MG: 2 INJECTION, POWDER, FOR SOLUTION INTRAVENOUS at 07:32

## 2025-05-24 RX ADMIN — METRONIDAZOLE 500 MG: 500 INJECTION, SOLUTION INTRAVENOUS at 13:49

## 2025-05-24 RX ADMIN — IPRATROPIUM BROMIDE AND ALBUTEROL SULFATE 1 DOSE: 2.5; .5 SOLUTION RESPIRATORY (INHALATION) at 06:20

## 2025-05-24 RX ADMIN — ACETAMINOPHEN 650 MG: 325 TABLET ORAL at 20:03

## 2025-05-24 RX ADMIN — IPRATROPIUM BROMIDE AND ALBUTEROL SULFATE 1 DOSE: 2.5; .5 SOLUTION RESPIRATORY (INHALATION) at 18:39

## 2025-05-24 RX ADMIN — Medication 5 MG: at 20:03

## 2025-05-24 RX ADMIN — GEMFIBROZIL 600 MG: 600 TABLET ORAL at 20:03

## 2025-05-24 RX ADMIN — Medication 100 MG: at 08:41

## 2025-05-24 RX ADMIN — INSULIN GLARGINE 25 UNITS: 100 INJECTION, SOLUTION SUBCUTANEOUS at 08:41

## 2025-05-24 RX ADMIN — Medication 100 MG: at 16:45

## 2025-05-24 RX ADMIN — SODIUM CHLORIDE, PRESERVATIVE FREE 10 ML: 5 INJECTION INTRAVENOUS at 07:43

## 2025-05-24 RX ADMIN — METOPROLOL TARTRATE 50 MG: 50 TABLET, FILM COATED ORAL at 08:43

## 2025-05-24 RX ADMIN — GLYCOPYRROLATE 0.2 MG: 0.2 INJECTION INTRAMUSCULAR; INTRAVENOUS at 20:02

## 2025-05-24 RX ADMIN — CLOPIDOGREL BISULFATE 75 MG: 75 TABLET, FILM COATED ORAL at 08:43

## 2025-05-24 RX ADMIN — IPRATROPIUM BROMIDE AND ALBUTEROL SULFATE 1 DOSE: 2.5; .5 SOLUTION RESPIRATORY (INHALATION) at 14:18

## 2025-05-24 RX ADMIN — INSULIN LISPRO 10 UNITS: 100 INJECTION, SOLUTION INTRAVENOUS; SUBCUTANEOUS at 07:39

## 2025-05-24 RX ADMIN — GUAIFENESIN 200 MG: 100 SOLUTION ORAL at 03:24

## 2025-05-24 RX ADMIN — CETIRIZINE HYDROCHLORIDE 10 MG: 10 TABLET ORAL at 08:43

## 2025-05-24 RX ADMIN — IPRATROPIUM BROMIDE AND ALBUTEROL SULFATE 1 DOSE: 2.5; .5 SOLUTION RESPIRATORY (INHALATION) at 10:06

## 2025-05-24 RX ADMIN — METRONIDAZOLE 500 MG: 500 INJECTION, SOLUTION INTRAVENOUS at 06:30

## 2025-05-24 RX ADMIN — VANCOMYCIN HYDROCHLORIDE 1000 MG: 1 INJECTION, POWDER, LYOPHILIZED, FOR SOLUTION INTRAVENOUS at 15:59

## 2025-05-24 RX ADMIN — Medication 100 MG: at 13:50

## 2025-05-24 RX ADMIN — GUAIFENESIN 200 MG: 100 SOLUTION ORAL at 10:56

## 2025-05-24 RX ADMIN — Medication 100 MG: at 20:04

## 2025-05-24 RX ADMIN — VANCOMYCIN HYDROCHLORIDE 1000 MG: 1 INJECTION, POWDER, LYOPHILIZED, FOR SOLUTION INTRAVENOUS at 00:18

## 2025-05-24 RX ADMIN — ASPIRIN 81 MG: 81 TABLET, CHEWABLE ORAL at 08:43

## 2025-05-24 RX ADMIN — METOPROLOL TARTRATE 50 MG: 50 TABLET, FILM COATED ORAL at 20:02

## 2025-05-24 RX ADMIN — CHOLESTYRAMINE 4 G: 4 POWDER, FOR SUSPENSION ORAL at 08:43

## 2025-05-24 RX ADMIN — INSULIN LISPRO 10 UNITS: 100 INJECTION, SOLUTION INTRAVENOUS; SUBCUTANEOUS at 16:50

## 2025-05-24 RX ADMIN — INSULIN LISPRO 10 UNITS: 100 INJECTION, SOLUTION INTRAVENOUS; SUBCUTANEOUS at 13:40

## 2025-05-24 RX ADMIN — CEFEPIME 2000 MG: 2 INJECTION, POWDER, FOR SOLUTION INTRAVENOUS at 17:19

## 2025-05-24 RX ADMIN — GEMFIBROZIL 600 MG: 600 TABLET ORAL at 08:43

## 2025-05-24 RX ADMIN — SODIUM CHLORIDE, PRESERVATIVE FREE 40 MG: 5 INJECTION INTRAVENOUS at 10:57

## 2025-05-24 RX ADMIN — SODIUM CHLORIDE, PRESERVATIVE FREE 40 MG: 5 INJECTION INTRAVENOUS at 00:27

## 2025-05-24 RX ADMIN — GLYCOPYRROLATE 0.2 MG: 0.2 INJECTION INTRAMUSCULAR; INTRAVENOUS at 08:42

## 2025-05-24 RX ADMIN — INSULIN LISPRO 4 UNITS: 100 INJECTION, SOLUTION INTRAVENOUS; SUBCUTANEOUS at 13:43

## 2025-05-24 RX ADMIN — GUAIFENESIN 200 MG: 100 SOLUTION ORAL at 17:00

## 2025-05-24 RX ADMIN — LEVOTHYROXINE SODIUM 50 MCG: 0.05 TABLET ORAL at 08:43

## 2025-05-24 RX ADMIN — ATORVASTATIN CALCIUM 20 MG: 20 TABLET, FILM COATED ORAL at 20:03

## 2025-05-24 NOTE — PROGRESS NOTES
Recap: Patient had robotic CABG x 3 on 4/29/25. Did well from a cardiac standpoint.  There was a period of poor responsiveness and reports of episodic focal neuro exam findings in the first few days immediately after CABG (never witnessed by me). This prompted head CT, neuro consult at the time. The ultimate conclusion was that any neuro abnormalities were due to acute worsening of chronic dementia.  The patient was ultimately transferred to the 8th floor rehab where was was managed by a neurologist, Dr. Pennington. According to his assessment while at rehab there was no concern about a recent ischemic stroke, but \"the patient did fairly well during his stay in the rehab unit although remained confused intermittently and generally weak.\"  The patient developed a massive GI bleed with aspiration and was transferred to the ICU. He was hypoxic but had no drop in blood pressure that would raise concerns about a watershed CVA. Again, in the immediate aftermath of this particular clinical event, there were reports of episodic focal neuro findings with another stat head CT at that time showing again documenting no new ischemic changes in the brain.    Current status: He now has difficulty speaking but no evidence of gaze preference or right sided weakness. The existing finding seems consistent with acute worsening of chronic dementia, not an ischemic CVA. In my opinion, the patient would benefit from continued support from speech/OT/PT therapy in order to return to his baseline state. I appreciate the 8th floor team reconsidering whether they will allow him back into their program. This is what he needs the most.  He should be ready for transfer to rehab soon. He has done remarkably well from a serious setback due to the aggressive support of the ICU team and others involved in his care. He is now transferred our of the ICU to the 4th floor. He is afebrile, but the last WBC ct is 18K, down from 31k the previous day. Will

## 2025-05-24 NOTE — PROGRESS NOTES
Daily Progress Note    Date:2025  Patient: Akhil Alejo  : 1955  MRN:[id number]  CODE:Full Code No additional code details  PCP:Lennox Waddell MD    Admit Date: 2025 10:25 AM   LOS: 3 days     No chief complaint on file.        Subjective: Pt does not answer questions verbally. No family present at bedside. Tube feeds ongoing. Not requiring supplemental O2.         Hospital Summary: 68 yo M with dementia, T2DM, CAD s/p CABG, HTN, hypothyroidism who was initially admitted to [facility] after abnormal stress test. He underwent LHC which showed multivessel disease and CT surgery was consulted for CABG.  CABG was performed on 24. Post-op pt apparently had episodes of worsening confusion, but no focal neurological deficits. CT head at that time was negative for acute findings. SLP eval completed and recommendation for pt to remain NPO. Eventually PEG tube was placed on 25, EGD also showed multiple duodenal ulcers. Tube feedings were initiated and pt tolerated without issue.   Pt was transferred to acute rehab on . Apparently pt was persistently confused, but able to speak and follow some commands at this time based on other providers notes.     Pt initially showed some progress with therapy. He developed pain and bleeding around PEG tube site on . This persisted and CT abdomen on  showed possible mild intramuscular hemorrhage of the left rectus abdominus muscles surrounding PEG tube.   Pt developed hematemesis with clots on . Rapid response was called and pt required transfer to the ICU. Repeat CT abdomen at that time was unremarkable.  Mental status acutely worsened during this event and he became unresponsive. There was possible right gaze preference and right sided weakness.   He required intubation due to inability to protect airway and aspiration of clots. Underwent EGD on  which showed active bleeding into the stomach from a small superficial mucosal vessel which was

## 2025-05-24 NOTE — PLAN OF CARE
Problem: Chronic Conditions and Co-morbidities  Goal: Patient's chronic conditions and co-morbidity symptoms are monitored and maintained or improved  5/23/2025 2328 by Marty Holly RN  Outcome: Progressing  5/23/2025 2327 by Marty Holly RN  Outcome: Progressing     Problem: Discharge Planning  Goal: Discharge to home or other facility with appropriate resources  5/23/2025 2328 by Marty Holly RN  Outcome: Progressing  5/23/2025 2327 by Marty Holly RN  Outcome: Progressing     Problem: Safety - Adult  Goal: Free from fall injury  5/23/2025 2328 by Marty Holly RN  Outcome: Progressing  5/23/2025 2327 by Marty Holly RN  Outcome: Progressing     Problem: Nutrition Deficit:  Goal: Optimize nutritional status  5/23/2025 2328 by Marty Holly RN  Outcome: Progressing  5/23/2025 2327 by Marty Holly RN  Outcome: Progressing  Flowsheets (Taken 5/23/2025 1145 by Majo Hess, MS, RD, LD)  Nutrient intake appropriate for improving, restoring, or maintaining nutritional needs:   Assess nutritional status and recommend course of action   Recommend, monitor, and adjust tube feedings and TPN/PPN based on assessed needs     Problem: Skin/Tissue Integrity  Goal: Skin integrity remains intact  Description: 1.  Monitor for areas of redness and/or skin breakdown2.  Assess vascular access sites hourly3.  Every 4-6 hours minimum:  Change oxygen saturation probe site4.  Every 4-6 hours:  If on nasal continuous positive airway pressure, respiratory therapy assess nares and determine need for appliance change or resting period  5/23/2025 2328 by Marty Holly RN  Outcome: Progressing  5/23/2025 2327 by Marty Holly RN  Outcome: Progressing     Problem: Pain  Goal: Verbalizes/displays adequate comfort level or baseline comfort level  5/23/2025 2328 by Marty Holly RN  Outcome: Progressing  5/23/2025 2327 by Marty Holly RN  Outcome: Progressing     Problem: Neurosensory - Adult  Goal: Achieves stable or  PIPO Ferguson  Outcome: Progressing  Goal: Maintain proper alignment of affected body part  5/23/2025 2328 by Marty Holly RN  Outcome: Progressing  5/23/2025 2327 by Marty Holly RN  Outcome: Progressing  Goal: Return ADL status to a safe level of function  5/23/2025 2328 by Marty Holly RN  Outcome: Progressing  5/23/2025 2327 by Marty Holly RN  Outcome: Progressing     Problem: Gastrointestinal - Adult  Goal: Minimal or absence of nausea and vomiting  5/23/2025 2328 by Marty Holly RN  Outcome: Progressing  5/23/2025 2327 by Marty Holly RN  Outcome: Progressing  Goal: Maintains or returns to baseline bowel function  5/23/2025 2328 by Marty Holly RN  Outcome: Progressing  5/23/2025 2327 by Marty Holly RN  Outcome: Progressing  Goal: Maintains adequate nutritional intake  5/23/2025 2328 by Marty Holly RN  Outcome: Progressing  5/23/2025 2327 by Marty Holly RN  Outcome: Progressing     Problem: Genitourinary - Adult  Goal: Absence of urinary retention  5/23/2025 2328 by Marty Holly RN  Outcome: Progressing  5/23/2025 2327 by Marty Holly RN  Outcome: Progressing     Problem: Infection - Adult  Goal: Absence of infection at discharge  5/23/2025 2328 by Marty Holly RN  Outcome: Progressing  5/23/2025 2327 by Marty Holly RN  Outcome: Progressing     Problem: Metabolic/Fluid and Electrolytes - Adult  Goal: Electrolytes maintained within normal limits  5/23/2025 2328 by Marty Holly RN  Outcome: Progressing  5/23/2025 2327 by Marty Holly RN  Outcome: Progressing  Goal: Hemodynamic stability and optimal renal function maintained  5/23/2025 2328 by Marty Holly RN  Outcome: Progressing  5/23/2025 2327 by Marty Holly RN  Outcome: Progressing  Goal: Glucose maintained within prescribed range  5/23/2025 2328 by Marty Holly RN  Outcome: Progressing  5/23/2025 2327 by Marty Holly RN  Outcome: Progressing     Problem: Hematologic - Adult  Goal: Maintains hematologic

## 2025-05-24 NOTE — PROGRESS NOTES
[facility] Neurology  [address], [address]  [place] [address]  Phone [phone]     Neurology Progress Note  2025 1:52 PM  Information:   Patient Name: Akhil Alejo  :   1955  Age:   69 y.o.  MRN:   [id number]  Account #:  [id number]  Admit Date:   2025  Today:  25     ADMIT DX:   GI bleed    Subjective:     Akhil Alejo is a 70 YO man with hypertension, hyperlipidemia, diabetes, mild cognitive impairment, recent CABG with worsened dementia and dysphagia requiring PEG who was admitted from rehab  with GI bleeding, aspiration, acute respiratory failure.  He required cauterization for bleeding near PEG site.  He was extubated on .      Interval History:   Presently, he is awake.  He is able to recognize family and wave to them.  He was able to say a few words to his wife.  He is generally weak and coughs frequently.      Objective:     Past Medical History:  Past Medical History:   Diagnosis Date    Arm fracture 2016    left    Dementia (HCC)     per wife he needs be supervised at home    Depression     Diabetes mellitus (HCC)     Hypertension     Kidney stones     Neuropathy     Palliative care patient 2025    Restless leg syndrome        Past Surgical History:   Procedure Laterality Date    BONE MARROW BIOPSY Right 2020    BONE MARROW ASPIRATION BIOPSY performed by ALISA Rabago at [facility] ASC OR    CARDIAC PROCEDURE N/A 2025    Left heart cath / coronary angiography performed by Chayo Cortes MD at [facility] CARDIAC CATH LAB    CHOLECYSTECTOMY      COLONOSCOPY  2020    Dr Patiño   AP    CORONARY ARTERY BYPASS GRAFT N/A 2025    CORONARY ARTERY BYPASS GRAFT X3, MINIMALLY INVASIVE robotic, pump assisted via femoral access, BILATERAL INTERNAL MAMMARY ARTERIES, ENDOSCOPIC VEIN HARVESTING, TRANSESOPHAGEAL ECHOCARDIOGRAM performed by Akhil Lara MD at [facility] OR    GASTROSTOMY TUBE PLACEMENT  2025    Dr Taylor-Multiple duodenal ulcers,  kg (182 lb 15.7 oz)   SpO2 96%   BMI 25.53 kg/m²    Intake/Output Summary (Last 24 hours) at 5/24/2025 1352  Last data filed at 5/24/2025 0911  Gross per 24 hour   Intake 1612 ml   Output 300 ml   Net 1312 ml     General appearance: He is a chronically ill appearing man with a chest binder on, coughing, moving, but not talking.  HEENT: Exam; heent: Head: Normal, normocephalic, atraumatic.  Lungs: rhonchi bilaterally  Heart: regular rate and rhythm, S1, S2 normal, no murmur, click, rub or gallop  Abdomen: soft, non-tender; bowel sounds normal; no masses,  no organomegaly  Extremities: muscle atrophy in feet, PN findings, mild peripheral edema  Neurologic:  Awake, no speech.  He does follow a few simple commands.  PERRL.   He makes eye contact and blinks to threat.  No facial weakness.  Generalized weakness in limbs but moves purposeful and to command.    Assessment:   1. Left hemisphere cerebral infarct with expressive>receptive aphasia suspect  2. Encephalopathy, underlying dementia  3. UGI bleed  4. Aspiration pneumonitis/pneumonia  5. CAD s/p recent CABG  6. DM with PN    Plan:   1. Monitoring CBC  2. Abx  3. Protonix  4. PT/OT/ST  5. Continue Plavix, ASA, Lipitor  6. PT/OT/ST  7. PEG enteral nutrition  8. Supportive and preventative measures     Discharge planning:   To be determined    I spent 50 total minutes in face to face interaction with patient and coordination of care.   I spent > 50% of the total time discussing the diagnoses, evaluation, test results, treatment options, plans; counseling and advising with the patient and/or family and/or caregiver as well as with the care team.    Electronically signed by Marino Garcia M.D.
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
## 2025-05-24 NOTE — PLAN OF CARE
Problem: Chronic Conditions and Co-morbidities  Goal: Patient's chronic conditions and co-morbidity symptoms are monitored and maintained or improved  Outcome: Progressing     Problem: Discharge Planning  Goal: Discharge to home or other facility with appropriate resources  Outcome: Progressing     Problem: Safety - Adult  Goal: Free from fall injury  Outcome: Progressing     Problem: Nutrition Deficit:  Goal: Optimize nutritional status  Outcome: Progressing  Flowsheets (Taken 5/23/2025 1145 by Majo Hess, MS, RD, LD)  Nutrient intake appropriate for improving, restoring, or maintaining nutritional needs:   Assess nutritional status and recommend course of action   Recommend, monitor, and adjust tube feedings and TPN/PPN based on assessed needs     Problem: Skin/Tissue Integrity  Goal: Skin integrity remains intact  Description: 1.  Monitor for areas of redness and/or skin breakdown2.  Assess vascular access sites hourly3.  Every 4-6 hours minimum:  Change oxygen saturation probe site4.  Every 4-6 hours:  If on nasal continuous positive airway pressure, respiratory therapy assess nares and determine need for appliance change or resting period  Outcome: Progressing     Problem: Pain  Goal: Verbalizes/displays adequate comfort level or baseline comfort level  Outcome: Progressing     Problem: Neurosensory - Adult  Goal: Achieves stable or improved neurological status  Outcome: Progressing  Goal: Absence of seizures  Outcome: Progressing     Problem: Respiratory - Adult  Goal: Achieves optimal ventilation and oxygenation  Outcome: Progressing     Problem: Cardiovascular - Adult  Goal: Maintains optimal cardiac output and hemodynamic stability  Outcome: Progressing  Goal: Absence of cardiac dysrhythmias or at baseline  Outcome: Progressing     Problem: Skin/Tissue Integrity - Adult  Goal: Skin integrity remains intact  Description: 1.  Monitor for areas of redness and/or skin breakdown2.  Assess vascular  access sites hourly3.  Every 4-6 hours minimum:  Change oxygen saturation probe site4.  Every 4-6 hours:  If on nasal continuous positive airway pressure, respiratory therapy assess nares and determine need for appliance change or resting period  Outcome: Progressing  Goal: Incisions, wounds, or drain sites healing without S/S of infection  Outcome: Progressing  Goal: Oral mucous membranes remain intact  Outcome: Progressing     Problem: Musculoskeletal - Adult  Goal: Return mobility to safest level of function  Outcome: Progressing  Goal: Maintain proper alignment of affected body part  Outcome: Progressing  Goal: Return ADL status to a safe level of function  Outcome: Progressing     Problem: Gastrointestinal - Adult  Goal: Minimal or absence of nausea and vomiting  Outcome: Progressing  Goal: Maintains or returns to baseline bowel function  Outcome: Progressing  Goal: Maintains adequate nutritional intake  Outcome: Progressing     Problem: Genitourinary - Adult  Goal: Absence of urinary retention  Outcome: Progressing     Problem: Infection - Adult  Goal: Absence of infection at discharge  Outcome: Progressing     Problem: Metabolic/Fluid and Electrolytes - Adult  Goal: Electrolytes maintained within normal limits  Outcome: Progressing  Goal: Hemodynamic stability and optimal renal function maintained  Outcome: Progressing  Goal: Glucose maintained within prescribed range  Outcome: Progressing     Problem: Hematologic - Adult  Goal: Maintains hematologic stability  Outcome: Progressing     Problem: Anxiety  Goal: Will report anxiety at manageable levels  Description: INTERVENTIONS:1. Administer medication as ordered2. Teach and rehearse alternative coping skills3. Provide emotional support with 1:1 interaction with staff  Outcome: Progressing     Problem: Coping  Goal: Pt/Family able to verbalize concerns and demonstrate effective coping strategies  Description: INTERVENTIONS:1. Assist patient/family to identify

## 2025-05-25 ENCOUNTER — OUTSIDE FACILITY SERVICE (OUTPATIENT)
Age: 70
End: 2025-05-25

## 2025-05-25 LAB
ALBUMIN SERPL-MCNC: 2.7 G/DL (ref 3.5–5.2)
ALP SERPL-CCNC: 100 U/L (ref 40–129)
ALT SERPL-CCNC: 8 U/L (ref 10–50)
ANION GAP SERPL CALCULATED.3IONS-SCNC: 9 MMOL/L (ref 8–16)
AST SERPL-CCNC: 19 U/L (ref 10–50)
BASOPHILS # BLD: 0.1 K/UL (ref 0–0.2)
BASOPHILS NFR BLD: 0.8 % (ref 0–1)
BILIRUB SERPL-MCNC: 0.3 MG/DL (ref 0.2–1.2)
BUN SERPL-MCNC: 15 MG/DL (ref 8–23)
CALCIUM SERPL-MCNC: 8.3 MG/DL (ref 8.8–10.2)
CHLORIDE SERPL-SCNC: 107 MMOL/L (ref 98–107)
CO2 SERPL-SCNC: 24 MMOL/L (ref 22–29)
CREAT SERPL-MCNC: 0.7 MG/DL (ref 0.7–1.2)
EOSINOPHIL # BLD: 1.3 K/UL (ref 0–0.6)
EOSINOPHIL NFR BLD: 9 % (ref 0–5)
ERYTHROCYTE [DISTWIDTH] IN BLOOD BY AUTOMATED COUNT: 17.6 % (ref 11.5–14.5)
GLUCOSE BLD-MCNC: 118 MG/DL (ref 70–99)
GLUCOSE BLD-MCNC: 137 MG/DL (ref 70–99)
GLUCOSE BLD-MCNC: 144 MG/DL (ref 70–99)
GLUCOSE BLD-MCNC: 166 MG/DL (ref 70–99)
GLUCOSE BLD-MCNC: 191 MG/DL (ref 70–99)
GLUCOSE SERPL-MCNC: 126 MG/DL (ref 70–99)
HCT VFR BLD AUTO: 30.8 % (ref 42–52)
HGB BLD-MCNC: 9.4 G/DL (ref 14–18)
IMM GRANULOCYTES # BLD: 0.7 K/UL
LYMPHOCYTES # BLD: 3.3 K/UL (ref 1.1–4.5)
LYMPHOCYTES NFR BLD: 23.7 % (ref 20–40)
MAGNESIUM SERPL-MCNC: 2.1 MG/DL (ref 1.6–2.4)
MCH RBC QN AUTO: 28.8 PG (ref 27–31)
MCHC RBC AUTO-ENTMCNC: 30.5 G/DL (ref 33–37)
MCV RBC AUTO: 94.5 FL (ref 80–94)
MONOCYTES # BLD: 1 K/UL (ref 0–0.9)
MONOCYTES NFR BLD: 7.4 % (ref 0–10)
NEUTROPHILS # BLD: 7.6 K/UL (ref 1.5–7.5)
NEUTS SEG NFR BLD: 54 % (ref 50–65)
PERFORMED ON: ABNORMAL
PHOSPHATE SERPL-MCNC: 3 MG/DL (ref 2.5–4.5)
PLATELET # BLD AUTO: 205 K/UL (ref 130–400)
PMV BLD AUTO: 9.7 FL (ref 9.4–12.4)
POTASSIUM SERPL-SCNC: 4.2 MMOL/L (ref 3.5–5.1)
PROT SERPL-MCNC: 6.1 G/DL (ref 6.4–8.3)
RBC # BLD AUTO: 3.26 M/UL (ref 4.7–6.1)
SODIUM SERPL-SCNC: 140 MMOL/L (ref 136–145)
VANCOMYCIN TROUGH SERPL-MCNC: 12.8 UG/ML (ref 10–20)
WBC # BLD AUTO: 14 K/UL (ref 4.8–10.8)

## 2025-05-25 PROCEDURE — 94640 AIRWAY INHALATION TREATMENT: CPT

## 2025-05-25 PROCEDURE — 1200000000 HC SEMI PRIVATE

## 2025-05-25 PROCEDURE — 2580000003 HC RX 258: Performed by: NURSE PRACTITIONER

## 2025-05-25 PROCEDURE — 6370000000 HC RX 637 (ALT 250 FOR IP): Performed by: INTERNAL MEDICINE

## 2025-05-25 PROCEDURE — 36415 COLL VENOUS BLD VENIPUNCTURE: CPT

## 2025-05-25 PROCEDURE — 6360000002 HC RX W HCPCS: Performed by: INTERNAL MEDICINE

## 2025-05-25 PROCEDURE — 6370000000 HC RX 637 (ALT 250 FOR IP): Performed by: STUDENT IN AN ORGANIZED HEALTH CARE EDUCATION/TRAINING PROGRAM

## 2025-05-25 PROCEDURE — 6370000000 HC RX 637 (ALT 250 FOR IP): Performed by: NURSE PRACTITIONER

## 2025-05-25 PROCEDURE — 80202 ASSAY OF VANCOMYCIN: CPT

## 2025-05-25 PROCEDURE — 6360000002 HC RX W HCPCS: Performed by: NURSE PRACTITIONER

## 2025-05-25 PROCEDURE — 2580000003 HC RX 258: Performed by: INTERNAL MEDICINE

## 2025-05-25 PROCEDURE — 2580000003 HC RX 258: Performed by: SURGERY

## 2025-05-25 PROCEDURE — OUTSIDEPOS PR OUTSIDE POS PLACEHOLDER: Performed by: INTERNAL MEDICINE

## 2025-05-25 PROCEDURE — 84100 ASSAY OF PHOSPHORUS: CPT

## 2025-05-25 PROCEDURE — 83735 ASSAY OF MAGNESIUM: CPT

## 2025-05-25 PROCEDURE — 82962 GLUCOSE BLOOD TEST: CPT

## 2025-05-25 PROCEDURE — 2500000003 HC RX 250 WO HCPCS: Performed by: STUDENT IN AN ORGANIZED HEALTH CARE EDUCATION/TRAINING PROGRAM

## 2025-05-25 PROCEDURE — 2500000003 HC RX 250 WO HCPCS: Performed by: NURSE PRACTITIONER

## 2025-05-25 PROCEDURE — 6360000002 HC RX W HCPCS: Performed by: SURGERY

## 2025-05-25 PROCEDURE — 85025 COMPLETE CBC W/AUTO DIFF WBC: CPT

## 2025-05-25 PROCEDURE — 99232 SBSQ HOSP IP/OBS MODERATE 35: CPT | Performed by: PSYCHIATRY & NEUROLOGY

## 2025-05-25 PROCEDURE — 80053 COMPREHEN METABOLIC PANEL: CPT

## 2025-05-25 PROCEDURE — 94760 N-INVAS EAR/PLS OXIMETRY 1: CPT

## 2025-05-25 RX ADMIN — ASPIRIN 81 MG: 81 TABLET, CHEWABLE ORAL at 09:37

## 2025-05-25 RX ADMIN — VANCOMYCIN HYDROCHLORIDE 1250 MG: 10 INJECTION, POWDER, LYOPHILIZED, FOR SOLUTION INTRAVENOUS at 14:46

## 2025-05-25 RX ADMIN — INSULIN LISPRO 10 UNITS: 100 INJECTION, SOLUTION INTRAVENOUS; SUBCUTANEOUS at 09:27

## 2025-05-25 RX ADMIN — METOPROLOL TARTRATE 50 MG: 50 TABLET, FILM COATED ORAL at 20:37

## 2025-05-25 RX ADMIN — INSULIN GLARGINE 25 UNITS: 100 INJECTION, SOLUTION SUBCUTANEOUS at 09:27

## 2025-05-25 RX ADMIN — IPRATROPIUM BROMIDE AND ALBUTEROL SULFATE 1 DOSE: 2.5; .5 SOLUTION RESPIRATORY (INHALATION) at 19:23

## 2025-05-25 RX ADMIN — METRONIDAZOLE 500 MG: 500 INJECTION, SOLUTION INTRAVENOUS at 00:10

## 2025-05-25 RX ADMIN — INSULIN LISPRO 1 UNITS: 100 INJECTION, SOLUTION INTRAVENOUS; SUBCUTANEOUS at 09:27

## 2025-05-25 RX ADMIN — CEFEPIME 2000 MG: 2 INJECTION, POWDER, FOR SOLUTION INTRAVENOUS at 06:14

## 2025-05-25 RX ADMIN — IPRATROPIUM BROMIDE AND ALBUTEROL SULFATE 1 DOSE: 2.5; .5 SOLUTION RESPIRATORY (INHALATION) at 06:21

## 2025-05-25 RX ADMIN — GEMFIBROZIL 600 MG: 600 TABLET ORAL at 09:37

## 2025-05-25 RX ADMIN — ACETAMINOPHEN 650 MG: 325 TABLET ORAL at 20:37

## 2025-05-25 RX ADMIN — ATORVASTATIN CALCIUM 20 MG: 20 TABLET, FILM COATED ORAL at 20:37

## 2025-05-25 RX ADMIN — IPRATROPIUM BROMIDE AND ALBUTEROL SULFATE 1 DOSE: 2.5; .5 SOLUTION RESPIRATORY (INHALATION) at 14:17

## 2025-05-25 RX ADMIN — GEMFIBROZIL 600 MG: 600 TABLET ORAL at 20:37

## 2025-05-25 RX ADMIN — GLYCOPYRROLATE 0.2 MG: 0.2 INJECTION INTRAMUSCULAR; INTRAVENOUS at 09:37

## 2025-05-25 RX ADMIN — VANCOMYCIN HYDROCHLORIDE 1000 MG: 1 INJECTION, POWDER, LYOPHILIZED, FOR SOLUTION INTRAVENOUS at 01:39

## 2025-05-25 RX ADMIN — Medication 100 MG: at 09:36

## 2025-05-25 RX ADMIN — INSULIN GLARGINE 25 UNITS: 100 INJECTION, SOLUTION SUBCUTANEOUS at 20:38

## 2025-05-25 RX ADMIN — CHOLESTYRAMINE 4 G: 4 POWDER, FOR SUSPENSION ORAL at 09:36

## 2025-05-25 RX ADMIN — GUAIFENESIN 200 MG: 100 SOLUTION ORAL at 09:36

## 2025-05-25 RX ADMIN — CLOPIDOGREL BISULFATE 75 MG: 75 TABLET, FILM COATED ORAL at 09:37

## 2025-05-25 RX ADMIN — SODIUM CHLORIDE, PRESERVATIVE FREE 40 MG: 5 INJECTION INTRAVENOUS at 13:08

## 2025-05-25 RX ADMIN — METRONIDAZOLE 500 MG: 500 INJECTION, SOLUTION INTRAVENOUS at 20:32

## 2025-05-25 RX ADMIN — IPRATROPIUM BROMIDE AND ALBUTEROL SULFATE 1 DOSE: 2.5; .5 SOLUTION RESPIRATORY (INHALATION) at 10:10

## 2025-05-25 RX ADMIN — CEFEPIME 2000 MG: 2 INJECTION, POWDER, FOR SOLUTION INTRAVENOUS at 16:29

## 2025-05-25 RX ADMIN — LEVOTHYROXINE SODIUM 50 MCG: 0.05 TABLET ORAL at 06:08

## 2025-05-25 RX ADMIN — SODIUM CHLORIDE, PRESERVATIVE FREE 40 MG: 5 INJECTION INTRAVENOUS at 01:47

## 2025-05-25 RX ADMIN — METOPROLOL TARTRATE 50 MG: 50 TABLET, FILM COATED ORAL at 09:37

## 2025-05-25 RX ADMIN — GUAIFENESIN 200 MG: 100 SOLUTION ORAL at 20:39

## 2025-05-25 RX ADMIN — SODIUM CHLORIDE, PRESERVATIVE FREE 10 ML: 5 INJECTION INTRAVENOUS at 09:37

## 2025-05-25 RX ADMIN — GLYCOPYRROLATE 0.2 MG: 0.2 INJECTION INTRAMUSCULAR; INTRAVENOUS at 20:37

## 2025-05-25 RX ADMIN — METRONIDAZOLE 500 MG: 500 INJECTION, SOLUTION INTRAVENOUS at 09:36

## 2025-05-25 RX ADMIN — GUAIFENESIN 200 MG: 100 SOLUTION ORAL at 16:31

## 2025-05-25 RX ADMIN — CETIRIZINE HYDROCHLORIDE 10 MG: 10 TABLET ORAL at 09:37

## 2025-05-25 RX ADMIN — Medication 5 MG: at 20:37

## 2025-05-25 RX ADMIN — GUAIFENESIN 200 MG: 100 SOLUTION ORAL at 01:47

## 2025-05-25 NOTE — PLAN OF CARE
sites healing without S/S of infection  Outcome: Progressing  Goal: Oral mucous membranes remain intact  Outcome: Progressing     Problem: Musculoskeletal - Adult  Goal: Return mobility to safest level of function  Outcome: Progressing  Goal: Maintain proper alignment of affected body part  Outcome: Progressing  Goal: Return ADL status to a safe level of function  Outcome: Progressing     Problem: Gastrointestinal - Adult  Goal: Minimal or absence of nausea and vomiting  Outcome: Progressing  Goal: Maintains or returns to baseline bowel function  Outcome: Progressing  Goal: Maintains adequate nutritional intake  Outcome: Progressing     Problem: Genitourinary - Adult  Goal: Absence of urinary retention  Outcome: Progressing     Problem: Infection - Adult  Goal: Absence of infection at discharge  Outcome: Progressing     Problem: Metabolic/Fluid and Electrolytes - Adult  Goal: Electrolytes maintained within normal limits  Outcome: Progressing  Goal: Hemodynamic stability and optimal renal function maintained  Outcome: Progressing  Goal: Glucose maintained within prescribed range  Outcome: Progressing     Problem: Hematologic - Adult  Goal: Maintains hematologic stability  Outcome: Progressing     Problem: Anxiety  Goal: Will report anxiety at manageable levels  Description: INTERVENTIONS:1. Administer medication as ordered2. Teach and rehearse alternative coping skills3. Provide emotional support with 1:1 interaction with staff  Outcome: Progressing     Problem: Coping  Goal: Pt/Family able to verbalize concerns and demonstrate effective coping strategies  Description: INTERVENTIONS:1. Assist patient/family to identify coping skills, available support systems and cultural and spiritual values2. Provide emotional support, including active listening and acknowledgement of concerns of patient and caregivers3. Reduce environmental stimuli, as able4. Instruct patient/family in relaxation techniques, as appropriate5.

## 2025-05-25 NOTE — PROGRESS NOTES
Infectious Diseases Progress Note    Patient:  Akhil Alejo  YOB: 1955  MRN: [id number]   Admit date: 5/21/2025   Admitting Physician: Primitivo Archibald MD  Primary Care Physician: Lennox Waddell MD    Chief Complaint: Return to see patient in follow-up for aspiration pneumonia.    Interval History: He is a little bit more verbal for me today than he was the day before yesterday.  He will say a few words.  He is not conversant with sentences.  He has been remaining without fever.  He does not appear to be coughing.  He does not appear to be dyspneic.  He is maintaining excellent oxygen saturations on room air with values ranging between 96 and 98%.  He is receiving tube feeds.  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    In/Out    Intake/Output Summary (Last 24 hours) at 5/25/2025 0756  Last data filed at 5/25/2025 0549  Gross per 24 hour   Intake 2957 ml   Output 1600 ml   Net 1357 ml     Allergies:   Allergies   Allergen Reactions    Penicillins Hives     Current Meds: guaiFENesin (ROBITUSSIN) 100 MG/5ML liquid 200 mg, TID PRN  insulin lispro (HUMALOG,ADMELOG) injection vial 0-4 Units, 4x Daily AC & HS  metoprolol tartrate (LOPRESSOR) tablet 50 mg, BID  ipratropium 0.5 mg-albuterol 2.5 mg (DUONEB) nebulizer solution 1 Dose, Q4H WA RT  aspirin chewable tablet 81 mg, Daily  Glycopyrrolate injection 0.2 mg, BID  atorvastatin (LIPITOR) tablet 20 mg, Nightly  bisacodyl (DULCOLAX) suppository 10 mg, Daily PRN  insulin lispro (HUMALOG,ADMELOG) injection vial 10 Units, TID WC  levothyroxine (SYNTHROID) tablet 50 mcg, Daily  Benzocaine-Menthol (CEPACOL) 1 lozenge, Q2H PRN  cholestyramine light packet 4 g, Daily  cetirizine (ZYRTEC) tablet 10 mg, Daily  nitroGLYCERIN (NITROSTAT) SL tablet 0.4 mg, Q5 Min PRN  magnesium hydroxide (MILK OF MAGNESIA) 400 MG/5ML suspension 30 mL, Daily PRN  melatonin

## 2025-05-25 NOTE — PROGRESS NOTES
[facility]   Pharmacy Pharmacokinetic Monitoring Service - Vancomycin    Consulting Provider: Akhil Lara MD   Indication: Pneumonia (Nosocomial)  Therapeutic Target: AUC/ALESIA 400-600 mg*hr/L  Day of Therapy: 5  Additional Antimicrobials: cefepime, flagyl    Pertinent Laboratory Values:   Wt Readings from Last 1 Encounters:   05/23/25 83 kg (182 lb 15.7 oz)     Temp Readings from Last 1 Encounters:   05/25/25 97 °F (36.1 °C) (Temporal)     Estimated Creatinine Clearance: 105 mL/min (based on SCr of 0.7 mg/dL).  Recent Labs     05/23/25  0351 05/25/25  0308   CREATININE 0.8 0.7   BUN 30* 15   WBC 18.3* 14.0*       Pertinent Cultures:  Culture Date Source Results           MRSA Nasal Swab:  NOT detected 5/21 , continue per ID recommendations     Recent vancomycin administrations                     vancomycin (VANCOCIN) 1,000 mg in sodium chloride 0.9 % 250 mL IVPB ([id number]) (mg) 1,000 mg New Bag 05/25/25 0139     1,000 mg New Bag 05/24/25 1559     1,000 mg New Bag  0018     1,000 mg New Bag 05/23/25 1314     1,000 mg New Bag  0144                    Assessment:  Date/Time Current Dose Concentration Timing of Concentration (h) AUC   5/25/25 1300 1000mg IV q12h 12.8 10 h 11 min 416   Note: Serum concentrations collected for AUC dosing may appear elevated if collected in close proximity to the dose administered, this is not necessarily an indication of toxicity    Plan:  Current dosing regimen is therapeutic but at the lowest end of therapeutic  Increase dose to 1250mg IV q12h  Repeat vancomycin concentration ordered for 5/26 @ 1330   Pharmacy will continue to monitor patient and adjust therapy as indicated    Thank you for the consult,  Trish Estrada RPH  5/25/2025 12:55 PM

## 2025-05-25 NOTE — PROGRESS NOTES
[facility] Neurology  [address], [address]  [place] [address]  Phone [phone]     Neurology Progress Note  2025 2:14 PM  Information:   Patient Name: Akhil Alejo  :   1955  Age:   69 y.o.  MRN:   [id number]  Account #:  [id number]  Admit Date:   2025  Today:  25     ADMIT DX:   GI bleed    Subjective:     Akhil Alejo is a 70 YO man with hypertension, hyperlipidemia, diabetes, mild cognitive impairment, recent CABG with worsened dementia and dysphagia requiring PEG who was admitted from rehab  with GI bleeding, aspiration, acute respiratory failure.  He required cauterization for bleeding near PEG site.  He was extubated on .      Interval History:   Presently, he is awake. He is talking more according to wife.       Objective:     Past Medical History:  Past Medical History:   Diagnosis Date    Arm fracture 2016    left    Dementia (HCC)     per wife he needs be supervised at home    Depression     Diabetes mellitus (HCC)     Hypertension     Kidney stones     Neuropathy     Palliative care patient 2025    Restless leg syndrome        Past Surgical History:   Procedure Laterality Date    BONE MARROW BIOPSY Right 2020    BONE MARROW ASPIRATION BIOPSY performed by ALISA Rabago at [facility] OR    CARDIAC PROCEDURE N/A 2025    Left heart cath / coronary angiography performed by Chayo Cortes MD at [facility] CARDIAC CATH LAB    CHOLECYSTECTOMY      COLONOSCOPY  2020    Dr Haroon OROURKE    CORONARY ARTERY BYPASS GRAFT N/A 2025    CORONARY ARTERY BYPASS GRAFT X3, MINIMALLY INVASIVE robotic, pump assisted via femoral access, BILATERAL INTERNAL MAMMARY ARTERIES, ENDOSCOPIC VEIN HARVESTING, TRANSESOPHAGEAL ECHOCARDIOGRAM performed by Akhil Lara MD at [facility] OR    GASTROSTOMY TUBE PLACEMENT  2025    Dr Taylor-Multiple duodenal ulcers, successful placement of 20 Irish PEG tube    LITHOTRIPSY      SHOULDER SURGERY Left     Frozen  dementia  3.UGI bleed  4.         Aspiration pneumonitis/pneumonia  5.         CAD s/p recent CABG  6. DM with PN    Plan:   1.         Monitoring CBC  2.         Abx  3.         Protonix  4.         PT/OT/ST  5. Continue Plavix, ASA, Lipitor  6. PT/OT/ST  7. PEG enteral nutrition  8. Supportive and preventative measures     Discharge planning:   To be determined    I spent 35 total minutes in face to face interaction with patient and coordination of care.   I spent > 50% of the total time discussing the diagnoses, evaluation, test results, treatment options, plans; counseling and advising with the patient and/or family and/or caregiver as well as with the care team.    Electronically signed by Marino Garcia M.D.

## 2025-05-25 NOTE — PROGRESS NOTES
Daily Progress Note    Date:2025  Patient: Akhil Alejo  : 1955  MRN:[id number]  CODE:Full Code No additional code details  PCP:Lennox Waddell MD    Admit Date: 2025 10:25 AM   LOS: 4 days     No chief complaint on file.        Subjective: Pt answering yes/no to wife's questions. He didn't answer my questions. Tube feeds are ongoing.         Hospital Summary: 68 yo M with dementia, T2DM, CAD s/p CABG, HTN, hypothyroidism who was initially admitted to [facility] after abnormal stress test. He underwent LHC which showed multivessel disease and CT surgery was consulted for CABG.  CABG was performed on 24. Post-op pt apparently had episodes of worsening confusion, but no focal neurological deficits. CT head at that time was negative for acute findings. SLP eval completed and recommendation for pt to remain NPO. Eventually PEG tube was placed on 25, EGD also showed multiple duodenal ulcers. Tube feedings were initiated and pt tolerated without issue.   Pt was transferred to acute rehab on . Apparently pt was persistently confused, but able to speak and follow some commands at this time based on other providers notes.     Pt initially showed some progress with therapy. He developed pain and bleeding around PEG tube site on . This persisted and CT abdomen on  showed possible mild intramuscular hemorrhage of the left rectus abdominus muscles surrounding PEG tube.   Pt developed hematemesis with clots on . Rapid response was called and pt required transfer to the ICU. Repeat CT abdomen at that time was unremarkable.  Mental status acutely worsened during this event and he became unresponsive. There was possible right gaze preference and right sided weakness.   He required intubation due to inability to protect airway and aspiration of clots. Underwent EGD on  which showed active bleeding into the stomach from a small superficial mucosal vessel which was treated with Epinephrine

## 2025-05-25 NOTE — PROGRESS NOTES
Physical Therapy  Name: Akhil Alejo  MRN:  [id number]  Date of service:  5/25/2025 05/25/25 2498   Subjective   Subjective Attempt: Pt not alert enough to participate, opened eyes occasionally but did not verbalize or initiate movement. Will continue to follow.           Electronically signed by Alma Mendez PTA on 5/25/2025 at 5:42 PM

## 2025-05-25 NOTE — PLAN OF CARE
Problem: Chronic Conditions and Co-morbidities  Goal: Patient's chronic conditions and co-morbidity symptoms are monitored and maintained or improved  5/25/2025 1118 by Sherron Orellana RN  Outcome: Progressing  5/25/2025 0222 by Marty Holly RN  Outcome: Progressing     Problem: Discharge Planning  Goal: Discharge to home or other facility with appropriate resources  5/25/2025 1118 by Sherron Orellana RN  Outcome: Progressing  5/25/2025 0222 by Marty Holly RN  Outcome: Progressing     Problem: Safety - Adult  Goal: Free from fall injury  5/25/2025 1118 by Sherron Orellana RN  Outcome: Progressing  5/25/2025 0222 by Marty Holly RN  Outcome: Progressing     Problem: Nutrition Deficit:  Goal: Optimize nutritional status  5/25/2025 1118 by Sherron Orellana RN  Outcome: Progressing  5/25/2025 0222 by Marty Holly RN  Outcome: Progressing     Problem: Skin/Tissue Integrity  Goal: Skin integrity remains intact  Description: 1.  Monitor for areas of redness and/or skin breakdown2.  Assess vascular access sites hourly3.  Every 4-6 hours minimum:  Change oxygen saturation probe site4.  Every 4-6 hours:  If on nasal continuous positive airway pressure, respiratory therapy assess nares and determine need for appliance change or resting period  5/25/2025 1118 by Sherron Orellana RN  Outcome: Progressing  Flowsheets (Taken 5/25/2025 0937)  Skin Integrity Remains Intact: Monitor for areas of redness and/or skin breakdown  5/25/2025 0222 by Marty Holly RN  Outcome: Progressing     Problem: Pain  Goal: Verbalizes/displays adequate comfort level or baseline comfort level  5/25/2025 1118 by Sherron Orellana RN  Outcome: Progressing  5/25/2025 0222 by Maryt Holly RN  Outcome: Progressing

## 2025-05-26 ENCOUNTER — APPOINTMENT (OUTPATIENT)
Dept: MRI IMAGING | Age: 70
DRG: 393 | End: 2025-05-26
Attending: HOSPITALIST
Payer: MEDICARE

## 2025-05-26 LAB
ANION GAP SERPL CALCULATED.3IONS-SCNC: 10 MMOL/L (ref 8–16)
BACTERIA BLD CULT ORG #2: NORMAL
BACTERIA BLD CULT: NORMAL
BASOPHILS # BLD: 0.1 K/UL (ref 0–0.2)
BASOPHILS NFR BLD: 0.6 % (ref 0–1)
BUN SERPL-MCNC: 15 MG/DL (ref 8–23)
CALCIUM SERPL-MCNC: 8.2 MG/DL (ref 8.8–10.2)
CHLORIDE SERPL-SCNC: 103 MMOL/L (ref 98–107)
CO2 SERPL-SCNC: 24 MMOL/L (ref 22–29)
CREAT SERPL-MCNC: 0.7 MG/DL (ref 0.7–1.2)
EOSINOPHIL # BLD: 1.3 K/UL (ref 0–0.6)
EOSINOPHIL NFR BLD: 9.8 % (ref 0–5)
ERYTHROCYTE [DISTWIDTH] IN BLOOD BY AUTOMATED COUNT: 17.6 % (ref 11.5–14.5)
GLUCOSE BLD-MCNC: 149 MG/DL (ref 70–99)
GLUCOSE BLD-MCNC: 164 MG/DL (ref 70–99)
GLUCOSE BLD-MCNC: 164 MG/DL (ref 70–99)
GLUCOSE BLD-MCNC: 189 MG/DL (ref 70–99)
GLUCOSE SERPL-MCNC: 131 MG/DL (ref 70–99)
HCT VFR BLD AUTO: 30.5 % (ref 42–52)
HGB BLD-MCNC: 9.5 G/DL (ref 14–18)
IMM GRANULOCYTES # BLD: 0.7 K/UL
LYMPHOCYTES # BLD: 3 K/UL (ref 1.1–4.5)
LYMPHOCYTES NFR BLD: 23.8 % (ref 20–40)
MCH RBC QN AUTO: 29.3 PG (ref 27–31)
MCHC RBC AUTO-ENTMCNC: 31.1 G/DL (ref 33–37)
MCV RBC AUTO: 94.1 FL (ref 80–94)
MONOCYTES # BLD: 0.8 K/UL (ref 0–0.9)
MONOCYTES NFR BLD: 6.1 % (ref 0–10)
NEUTROPHILS # BLD: 6.9 K/UL (ref 1.5–7.5)
NEUTS SEG NFR BLD: 54.3 % (ref 50–65)
PERFORMED ON: ABNORMAL
PLATELET # BLD AUTO: 242 K/UL (ref 130–400)
PMV BLD AUTO: 9.8 FL (ref 9.4–12.4)
POTASSIUM SERPL-SCNC: 4.2 MMOL/L (ref 3.5–5)
RBC # BLD AUTO: 3.24 M/UL (ref 4.7–6.1)
SODIUM SERPL-SCNC: 137 MMOL/L (ref 136–145)
VANCOMYCIN TROUGH SERPL-MCNC: 13.7 UG/ML (ref 10–20)
WBC # BLD AUTO: 12.7 K/UL (ref 4.8–10.8)

## 2025-05-26 PROCEDURE — 80048 BASIC METABOLIC PNL TOTAL CA: CPT

## 2025-05-26 PROCEDURE — 6360000002 HC RX W HCPCS: Performed by: INTERNAL MEDICINE

## 2025-05-26 PROCEDURE — 1200000000 HC SEMI PRIVATE

## 2025-05-26 PROCEDURE — 94760 N-INVAS EAR/PLS OXIMETRY 1: CPT

## 2025-05-26 PROCEDURE — 2580000003 HC RX 258: Performed by: SURGERY

## 2025-05-26 PROCEDURE — 6370000000 HC RX 637 (ALT 250 FOR IP): Performed by: INTERNAL MEDICINE

## 2025-05-26 PROCEDURE — 2500000003 HC RX 250 WO HCPCS: Performed by: STUDENT IN AN ORGANIZED HEALTH CARE EDUCATION/TRAINING PROGRAM

## 2025-05-26 PROCEDURE — 6360000002 HC RX W HCPCS: Performed by: NURSE PRACTITIONER

## 2025-05-26 PROCEDURE — 70551 MRI BRAIN STEM W/O DYE: CPT

## 2025-05-26 PROCEDURE — 94150 VITAL CAPACITY TEST: CPT

## 2025-05-26 PROCEDURE — 2580000003 HC RX 258: Performed by: NURSE PRACTITIONER

## 2025-05-26 PROCEDURE — 6370000000 HC RX 637 (ALT 250 FOR IP): Performed by: NURSE PRACTITIONER

## 2025-05-26 PROCEDURE — 6370000000 HC RX 637 (ALT 250 FOR IP): Performed by: STUDENT IN AN ORGANIZED HEALTH CARE EDUCATION/TRAINING PROGRAM

## 2025-05-26 PROCEDURE — 80202 ASSAY OF VANCOMYCIN: CPT

## 2025-05-26 PROCEDURE — 2580000003 HC RX 258: Performed by: INTERNAL MEDICINE

## 2025-05-26 PROCEDURE — 85025 COMPLETE CBC W/AUTO DIFF WBC: CPT

## 2025-05-26 PROCEDURE — 94640 AIRWAY INHALATION TREATMENT: CPT

## 2025-05-26 PROCEDURE — 82962 GLUCOSE BLOOD TEST: CPT

## 2025-05-26 PROCEDURE — 6360000002 HC RX W HCPCS: Performed by: SURGERY

## 2025-05-26 PROCEDURE — 36415 COLL VENOUS BLD VENIPUNCTURE: CPT

## 2025-05-26 PROCEDURE — 99232 SBSQ HOSP IP/OBS MODERATE 35: CPT | Performed by: PSYCHIATRY & NEUROLOGY

## 2025-05-26 RX ADMIN — IPRATROPIUM BROMIDE AND ALBUTEROL SULFATE 1 DOSE: 2.5; .5 SOLUTION RESPIRATORY (INHALATION) at 10:10

## 2025-05-26 RX ADMIN — CEFEPIME 2000 MG: 2 INJECTION, POWDER, FOR SOLUTION INTRAVENOUS at 00:43

## 2025-05-26 RX ADMIN — GEMFIBROZIL 600 MG: 600 TABLET ORAL at 09:52

## 2025-05-26 RX ADMIN — SODIUM CHLORIDE, PRESERVATIVE FREE 40 MG: 5 INJECTION INTRAVENOUS at 12:39

## 2025-05-26 RX ADMIN — INSULIN LISPRO 1 UNITS: 100 INJECTION, SOLUTION INTRAVENOUS; SUBCUTANEOUS at 17:47

## 2025-05-26 RX ADMIN — ATORVASTATIN CALCIUM 20 MG: 20 TABLET, FILM COATED ORAL at 21:00

## 2025-05-26 RX ADMIN — ACETAMINOPHEN 650 MG: 325 TABLET ORAL at 21:07

## 2025-05-26 RX ADMIN — IPRATROPIUM BROMIDE AND ALBUTEROL SULFATE 1 DOSE: 2.5; .5 SOLUTION RESPIRATORY (INHALATION) at 14:42

## 2025-05-26 RX ADMIN — CETIRIZINE HYDROCHLORIDE 10 MG: 10 TABLET ORAL at 09:52

## 2025-05-26 RX ADMIN — LEVOTHYROXINE SODIUM 50 MCG: 0.05 TABLET ORAL at 09:52

## 2025-05-26 RX ADMIN — IPRATROPIUM BROMIDE AND ALBUTEROL SULFATE 1 DOSE: 2.5; .5 SOLUTION RESPIRATORY (INHALATION) at 06:32

## 2025-05-26 RX ADMIN — METOPROLOL TARTRATE 50 MG: 50 TABLET, FILM COATED ORAL at 21:00

## 2025-05-26 RX ADMIN — METRONIDAZOLE 500 MG: 500 INJECTION, SOLUTION INTRAVENOUS at 15:58

## 2025-05-26 RX ADMIN — ASPIRIN 81 MG: 81 TABLET, CHEWABLE ORAL at 09:52

## 2025-05-26 RX ADMIN — INSULIN GLARGINE 25 UNITS: 100 INJECTION, SOLUTION SUBCUTANEOUS at 09:53

## 2025-05-26 RX ADMIN — METRONIDAZOLE 500 MG: 500 INJECTION, SOLUTION INTRAVENOUS at 23:38

## 2025-05-26 RX ADMIN — METOPROLOL TARTRATE 50 MG: 50 TABLET, FILM COATED ORAL at 09:52

## 2025-05-26 RX ADMIN — VANCOMYCIN HYDROCHLORIDE 1250 MG: 10 INJECTION, POWDER, LYOPHILIZED, FOR SOLUTION INTRAVENOUS at 14:36

## 2025-05-26 RX ADMIN — GUAIFENESIN 200 MG: 100 SOLUTION ORAL at 12:39

## 2025-05-26 RX ADMIN — GUAIFENESIN 200 MG: 100 SOLUTION ORAL at 21:11

## 2025-05-26 RX ADMIN — METRONIDAZOLE 500 MG: 500 INJECTION, SOLUTION INTRAVENOUS at 07:23

## 2025-05-26 RX ADMIN — CEFEPIME 2000 MG: 2 INJECTION, POWDER, FOR SOLUTION INTRAVENOUS at 17:45

## 2025-05-26 RX ADMIN — GLYCOPYRROLATE 0.2 MG: 0.2 INJECTION INTRAMUSCULAR; INTRAVENOUS at 09:52

## 2025-05-26 RX ADMIN — Medication 5 MG: at 21:00

## 2025-05-26 RX ADMIN — CEFEPIME 2000 MG: 2 INJECTION, POWDER, FOR SOLUTION INTRAVENOUS at 09:51

## 2025-05-26 RX ADMIN — IPRATROPIUM BROMIDE AND ALBUTEROL SULFATE 1 DOSE: 2.5; .5 SOLUTION RESPIRATORY (INHALATION) at 19:07

## 2025-05-26 RX ADMIN — GLYCOPYRROLATE 0.2 MG: 0.2 INJECTION INTRAMUSCULAR; INTRAVENOUS at 21:01

## 2025-05-26 RX ADMIN — SODIUM CHLORIDE, PRESERVATIVE FREE 40 MG: 5 INJECTION INTRAVENOUS at 00:42

## 2025-05-26 RX ADMIN — SODIUM CHLORIDE, PRESERVATIVE FREE 40 MG: 5 INJECTION INTRAVENOUS at 23:37

## 2025-05-26 RX ADMIN — VANCOMYCIN HYDROCHLORIDE 1250 MG: 10 INJECTION, POWDER, LYOPHILIZED, FOR SOLUTION INTRAVENOUS at 02:07

## 2025-05-26 RX ADMIN — CHOLESTYRAMINE 4 G: 4 POWDER, FOR SUSPENSION ORAL at 09:53

## 2025-05-26 RX ADMIN — INSULIN GLARGINE 25 UNITS: 100 INJECTION, SOLUTION SUBCUTANEOUS at 21:01

## 2025-05-26 RX ADMIN — CLOPIDOGREL BISULFATE 75 MG: 75 TABLET, FILM COATED ORAL at 09:52

## 2025-05-26 RX ADMIN — GEMFIBROZIL 600 MG: 600 TABLET ORAL at 21:01

## 2025-05-26 ASSESSMENT — PAIN SCALES - WONG BAKER: WONGBAKER_NUMERICALRESPONSE: NO HURT

## 2025-05-26 ASSESSMENT — PAIN SCALES - GENERAL
PAINLEVEL_OUTOF10: 0
PAINLEVEL_OUTOF10: 6

## 2025-05-26 ASSESSMENT — PAIN DESCRIPTION - ORIENTATION: ORIENTATION: MID;LOWER

## 2025-05-26 ASSESSMENT — PAIN DESCRIPTION - DESCRIPTORS: DESCRIPTORS: ACHING

## 2025-05-26 ASSESSMENT — PAIN DESCRIPTION - LOCATION: LOCATION: BACK

## 2025-05-26 NOTE — PROGRESS NOTES
amount of gas seen within the lumen of the urinary bladder.  Clinical correlation is recommended for recent Amezcua catheterization.  Infection of the urinary bladder cannot be excluded.  All CT scans are performed using dose optimization techniques as appropriate to the performed exam and include at least one of the following: Automated exposure control, adjustment of the mA and/or kV according to size, and the use of iterative reconstruction technique.  ______________________________________ Electronically signed by: LEORA SINGLETON M.D. Date:     05/21/2025 Time:    09:35       Lab Results   Component Value Date    WBC 12.7 (H) 05/26/2025    HGB 9.5 (L) 05/26/2025    HCT 30.5 (L) 05/26/2025    MCV 94.1 (H) 05/26/2025     05/26/2025     Lab Results   Component Value Date     05/26/2025    K 4.2 05/26/2025     05/26/2025    CO2 24 05/26/2025    BUN 15 05/26/2025    CREATININE 0.7 05/26/2025    GLUCOSE 131 (H) 05/26/2025    CALCIUM 8.2 (L) 05/26/2025    BILITOT 0.3 05/25/2025    ALKPHOS 100 05/25/2025    AST 19 05/25/2025    ALT 8 (L) 05/25/2025    LABGLOM >90 05/26/2025    GFRAA >59 06/23/2021    AGRATIO 0.9 04/21/2021    GLOB 4.6 05/24/2023     Lab Results   Component Value Date    INR 1.29 (H) 05/22/2025    INR 1.26 (H) 05/01/2025    INR 1.14 04/30/2025    PROTIME 16.0 (H) 05/22/2025    PROTIME 15.7 (H) 05/01/2025    PROTIME 14.5 04/30/2025       RECORD REVIEW:   Previous medical records, medications were reviewed at today's visit.  Nursing/physician notes, imaging, labs and vitals reviewed. PT,OT and/or speech notes reviewed      ASSESSMENT:  69 y.o. status post coronary artery bypass grafting, PEG placement complicated by superficial vessel bleeding with hematemesis, aspiration event, confusion, possible right gaze preference, right sided weakness noted. Anemia, aspiration pneumonia, respiratory failure noted.  Exam slowly improving, more alert, following commands, speech slowly improving. CT head  negative.  EEG with slowing nothing epileptiform.      PLAN:   Continue addressing anemia, aspiration pneumonia   Limit sedation    Will follow exam, plan MRI brain to clarify / exclude new ischemic changes.        Please feel free to call with any questions.   [phone] (cell phone).      Doyle More DO  Board Certified Neurology

## 2025-05-26 NOTE — PLAN OF CARE
Problem: Chronic Conditions and Co-morbidities  Goal: Patient's chronic conditions and co-morbidity symptoms are monitored and maintained or improved  5/26/2025 1640 by Solange Perdue LPN  Outcome: Progressing  Flowsheets (Taken 5/26/2025 0952)  Care Plan - Patient's Chronic Conditions and Co-Morbidity Symptoms are Monitored and Maintained or Improved:   Monitor and assess patient's chronic conditions and comorbid symptoms for stability, deterioration, or improvement   Collaborate with multidisciplinary team to address chronic and comorbid conditions and prevent exacerbation or deterioration   Update acute care plan with appropriate goals if chronic or comorbid symptoms are exacerbated and prevent overall improvement and discharge  5/26/2025 0329 by Marty Holly RN  Outcome: Progressing     Problem: Discharge Planning  Goal: Discharge to home or other facility with appropriate resources  5/26/2025 1640 by Solange Perdue LPN  Outcome: Progressing  Flowsheets (Taken 5/26/2025 0952)  Discharge to home or other facility with appropriate resources:   Arrange for needed discharge resources and transportation as appropriate   Identify barriers to discharge with patient and caregiver   Identify discharge learning needs (meds, wound care, etc)   Arrange for interpreters to assist at discharge as needed   Refer to discharge planning if patient needs post-hospital services based on physician order or complex needs related to functional status, cognitive ability or social support system  5/26/2025 0329 by Marty Holly, RN  Outcome: Progressing     Problem: Safety - Adult  Goal: Free from fall injury  5/26/2025 1640 by Solange Perdue LPN  Outcome: Progressing  5/26/2025 0329 by Marty Holly, RN  Outcome: Progressing     Problem: Nutrition Deficit:  Goal: Optimize nutritional status  5/26/2025 1640 by Solange Perdue LPN  Outcome: Progressing  5/26/2025 0329 by Avni

## 2025-05-26 NOTE — PROGRESS NOTES
[facility]   Pharmacy Pharmacokinetic Monitoring Service - Vancomycin    Consulting Provider: Akhil Lara MD    Indication: Pneumonia (Nosocomial)   Target Concentration: Goal AUC/ALESIA 400-600 mg*hr/L  Day of Therapy: 6  Additional Antimicrobials: Cefepime, Metronidazole     Pertinent Laboratory Values:   Wt Readings from Last 1 Encounters:   05/23/25 83 kg (182 lb 15.7 oz)     Temp Readings from Last 1 Encounters:   05/26/25 97.3 °F (36.3 °C) (Axillary)     Estimated Creatinine Clearance: 105 mL/min (based on SCr of 0.7 mg/dL).  Recent Labs     05/25/25  0308 05/26/25  0239   CREATININE 0.7 0.7   BUN 15 15   WBC 14.0* 12.7*     Procalcitonin: 05/21/25 = 0.16    Pertinent Cultures:  Culture Date Source Results   05/21/25 Blood x 2  No growth    MRSA Nasal Swab:  was negative on 05/21, continue per ID recommendations    Recent vancomycin administrations                     vancomycin (VANCOCIN) 1,250 mg in sodium chloride 0.9 % 250 mL IVPB (mg) 1,250 mg New Bag 05/26/25 1436     1,250 mg New Bag  0207     1,250 mg New Bag 05/25/25 1446    vancomycin (VANCOCIN) 1,000 mg in sodium chloride 0.9 % 250 mL IVPB ([id number]) (mg) 1,000 mg New Bag 05/25/25 0139     1,000 mg New Bag 05/24/25 1559     1,000 mg New Bag  0018                    Assessment:  Date/Time Current Dose Concentration Timing of Concentration (h) AUC   05/26 at 1315 Vancomycin 1250 mg IV every 12 hours 13.7 11 hours 7 minutes  510   Note: Serum concentrations collected for AUC dosing may appear elevated if collected in close proximity to the dose administered, this is not necessarily an indication of toxicity    Plan:  Current dosing regimen is therapeutic  Continue current dose  Pharmacy will continue to monitor patient and adjust therapy as indicated    Regimen: 1250 mg IV every 12 hours.  Start time: 02:36 on 05/27/2025  Exposure target: AUC24 (range) 400-600 mg/L.hr   [id number]: 510 mg/L.hr  AUC24,ss: 519 mg/L.hr  Probability of AUC24 >

## 2025-05-26 NOTE — PLAN OF CARE
Problem: Chronic Conditions and Co-morbidities  Goal: Patient's chronic conditions and co-morbidity symptoms are monitored and maintained or improved  Outcome: Progressing     Problem: Discharge Planning  Goal: Discharge to home or other facility with appropriate resources  Outcome: Progressing     Problem: Safety - Adult  Goal: Free from fall injury  Outcome: Progressing     Problem: Nutrition Deficit:  Goal: Optimize nutritional status  Outcome: Progressing     Problem: Skin/Tissue Integrity  Goal: Skin integrity remains intact  Description: 1.  Monitor for areas of redness and/or skin breakdown2.  Assess vascular access sites hourly3.  Every 4-6 hours minimum:  Change oxygen saturation probe site4.  Every 4-6 hours:  If on nasal continuous positive airway pressure, respiratory therapy assess nares and determine need for appliance change or resting period  Outcome: Progressing     Problem: Pain  Goal: Verbalizes/displays adequate comfort level or baseline comfort level  Outcome: Progressing     Problem: Neurosensory - Adult  Goal: Achieves stable or improved neurological status  Outcome: Progressing  Goal: Absence of seizures  Outcome: Progressing     Problem: Respiratory - Adult  Goal: Achieves optimal ventilation and oxygenation  Outcome: Progressing     Problem: Cardiovascular - Adult  Goal: Maintains optimal cardiac output and hemodynamic stability  Outcome: Progressing  Goal: Absence of cardiac dysrhythmias or at baseline  Outcome: Progressing     Problem: Skin/Tissue Integrity - Adult  Goal: Skin integrity remains intact  Description: 1.  Monitor for areas of redness and/or skin breakdown2.  Assess vascular access sites hourly3.  Every 4-6 hours minimum:  Change oxygen saturation probe site4.  Every 4-6 hours:  If on nasal continuous positive airway pressure, respiratory therapy assess nares and determine need for appliance change or resting period  Outcome: Progressing  Goal: Incisions, wounds, or drain

## 2025-05-27 ENCOUNTER — APPOINTMENT (OUTPATIENT)
Dept: VASCULAR LAB | Age: 70
DRG: 393 | End: 2025-05-27
Attending: PSYCHIATRY & NEUROLOGY
Payer: MEDICARE

## 2025-05-27 ENCOUNTER — OUTSIDE FACILITY SERVICE (OUTPATIENT)
Age: 70
End: 2025-05-27

## 2025-05-27 LAB
ANION GAP SERPL CALCULATED.3IONS-SCNC: 11 MMOL/L (ref 8–16)
BASOPHILS # BLD: 0.1 K/UL (ref 0–0.2)
BASOPHILS NFR BLD: 0.8 % (ref 0–1)
BUN SERPL-MCNC: 18 MG/DL (ref 8–23)
CALCIUM SERPL-MCNC: 8.4 MG/DL (ref 8.8–10.2)
CHLORIDE SERPL-SCNC: 104 MMOL/L (ref 98–107)
CO2 SERPL-SCNC: 22 MMOL/L (ref 22–29)
CREAT SERPL-MCNC: 0.7 MG/DL (ref 0.7–1.2)
EOSINOPHIL # BLD: 1.1 K/UL (ref 0–0.6)
EOSINOPHIL NFR BLD: 9.7 % (ref 0–5)
ERYTHROCYTE [DISTWIDTH] IN BLOOD BY AUTOMATED COUNT: 17.8 % (ref 11.5–14.5)
GLUCOSE BLD-MCNC: 130 MG/DL (ref 70–99)
GLUCOSE BLD-MCNC: 156 MG/DL (ref 70–99)
GLUCOSE BLD-MCNC: 164 MG/DL (ref 70–99)
GLUCOSE SERPL-MCNC: 146 MG/DL (ref 70–99)
HCT VFR BLD AUTO: 33.7 % (ref 42–52)
HGB BLD-MCNC: 10.8 G/DL (ref 14–18)
IMM GRANULOCYTES # BLD: 0.8 K/UL
LYMPHOCYTES # BLD: 2.7 K/UL (ref 1.1–4.5)
LYMPHOCYTES NFR BLD: 23.5 % (ref 20–40)
MCH RBC QN AUTO: 29.6 PG (ref 27–31)
MCHC RBC AUTO-ENTMCNC: 32 G/DL (ref 33–37)
MCV RBC AUTO: 92.3 FL (ref 80–94)
MONOCYTES # BLD: 0.7 K/UL (ref 0–0.9)
MONOCYTES NFR BLD: 6 % (ref 0–10)
NEUTROPHILS # BLD: 6.1 K/UL (ref 1.5–7.5)
NEUTS SEG NFR BLD: 53 % (ref 50–65)
PERFORMED ON: ABNORMAL
PLATELET # BLD AUTO: 274 K/UL (ref 130–400)
PMV BLD AUTO: 9.9 FL (ref 9.4–12.4)
POTASSIUM SERPL-SCNC: 4.2 MMOL/L (ref 3.5–5)
RBC # BLD AUTO: 3.65 M/UL (ref 4.7–6.1)
SODIUM SERPL-SCNC: 137 MMOL/L (ref 136–145)
WBC # BLD AUTO: 11.6 K/UL (ref 4.8–10.8)

## 2025-05-27 PROCEDURE — 97110 THERAPEUTIC EXERCISES: CPT

## 2025-05-27 PROCEDURE — 6370000000 HC RX 637 (ALT 250 FOR IP): Performed by: INTERNAL MEDICINE

## 2025-05-27 PROCEDURE — 2500000003 HC RX 250 WO HCPCS: Performed by: NURSE PRACTITIONER

## 2025-05-27 PROCEDURE — 6370000000 HC RX 637 (ALT 250 FOR IP): Performed by: STUDENT IN AN ORGANIZED HEALTH CARE EDUCATION/TRAINING PROGRAM

## 2025-05-27 PROCEDURE — 97530 THERAPEUTIC ACTIVITIES: CPT

## 2025-05-27 PROCEDURE — 6360000002 HC RX W HCPCS: Performed by: NURSE PRACTITIONER

## 2025-05-27 PROCEDURE — 94760 N-INVAS EAR/PLS OXIMETRY 1: CPT

## 2025-05-27 PROCEDURE — 1200000000 HC SEMI PRIVATE

## 2025-05-27 PROCEDURE — 6370000000 HC RX 637 (ALT 250 FOR IP): Performed by: NURSE PRACTITIONER

## 2025-05-27 PROCEDURE — 6360000002 HC RX W HCPCS: Performed by: INTERNAL MEDICINE

## 2025-05-27 PROCEDURE — 93880 EXTRACRANIAL BILAT STUDY: CPT

## 2025-05-27 PROCEDURE — 97116 GAIT TRAINING THERAPY: CPT

## 2025-05-27 PROCEDURE — 85025 COMPLETE CBC W/AUTO DIFF WBC: CPT

## 2025-05-27 PROCEDURE — 94150 VITAL CAPACITY TEST: CPT

## 2025-05-27 PROCEDURE — 80048 BASIC METABOLIC PNL TOTAL CA: CPT

## 2025-05-27 PROCEDURE — 99233 SBSQ HOSP IP/OBS HIGH 50: CPT | Performed by: PSYCHIATRY & NEUROLOGY

## 2025-05-27 PROCEDURE — 82962 GLUCOSE BLOOD TEST: CPT

## 2025-05-27 PROCEDURE — 2580000003 HC RX 258: Performed by: NURSE PRACTITIONER

## 2025-05-27 PROCEDURE — 99024 POSTOP FOLLOW-UP VISIT: CPT | Performed by: SURGERY

## 2025-05-27 PROCEDURE — 94640 AIRWAY INHALATION TREATMENT: CPT

## 2025-05-27 PROCEDURE — 36415 COLL VENOUS BLD VENIPUNCTURE: CPT

## 2025-05-27 RX ADMIN — ASPIRIN 81 MG: 81 TABLET, CHEWABLE ORAL at 09:45

## 2025-05-27 RX ADMIN — METOPROLOL TARTRATE 50 MG: 50 TABLET, FILM COATED ORAL at 09:44

## 2025-05-27 RX ADMIN — GEMFIBROZIL 600 MG: 600 TABLET ORAL at 09:44

## 2025-05-27 RX ADMIN — IPRATROPIUM BROMIDE AND ALBUTEROL SULFATE 1 DOSE: 2.5; .5 SOLUTION RESPIRATORY (INHALATION) at 07:33

## 2025-05-27 RX ADMIN — Medication 5 MG: at 20:40

## 2025-05-27 RX ADMIN — GLYCOPYRROLATE 0.2 MG: 0.2 INJECTION INTRAMUSCULAR; INTRAVENOUS at 09:45

## 2025-05-27 RX ADMIN — GEMFIBROZIL 600 MG: 600 TABLET ORAL at 20:40

## 2025-05-27 RX ADMIN — SODIUM CHLORIDE, PRESERVATIVE FREE 40 MG: 5 INJECTION INTRAVENOUS at 12:25

## 2025-05-27 RX ADMIN — IPRATROPIUM BROMIDE AND ALBUTEROL SULFATE 1 DOSE: 2.5; .5 SOLUTION RESPIRATORY (INHALATION) at 11:00

## 2025-05-27 RX ADMIN — IPRATROPIUM BROMIDE AND ALBUTEROL SULFATE 1 DOSE: 2.5; .5 SOLUTION RESPIRATORY (INHALATION) at 19:08

## 2025-05-27 RX ADMIN — SODIUM CHLORIDE, PRESERVATIVE FREE 10 ML: 5 INJECTION INTRAVENOUS at 20:40

## 2025-05-27 RX ADMIN — CHOLESTYRAMINE 4 G: 4 POWDER, FOR SUSPENSION ORAL at 09:45

## 2025-05-27 RX ADMIN — ATORVASTATIN CALCIUM 20 MG: 20 TABLET, FILM COATED ORAL at 20:40

## 2025-05-27 RX ADMIN — LEVOTHYROXINE SODIUM 50 MCG: 0.05 TABLET ORAL at 05:48

## 2025-05-27 RX ADMIN — CLOPIDOGREL BISULFATE 75 MG: 75 TABLET, FILM COATED ORAL at 09:45

## 2025-05-27 RX ADMIN — CETIRIZINE HYDROCHLORIDE 10 MG: 10 TABLET ORAL at 09:45

## 2025-05-27 RX ADMIN — IPRATROPIUM BROMIDE AND ALBUTEROL SULFATE 1 DOSE: 2.5; .5 SOLUTION RESPIRATORY (INHALATION) at 15:01

## 2025-05-27 RX ADMIN — METOPROLOL TARTRATE 50 MG: 50 TABLET, FILM COATED ORAL at 20:40

## 2025-05-27 RX ADMIN — INSULIN GLARGINE 25 UNITS: 100 INJECTION, SOLUTION SUBCUTANEOUS at 09:45

## 2025-05-27 NOTE — PLAN OF CARE
Problem: Chronic Conditions and Co-morbidities  Goal: Patient's chronic conditions and co-morbidity symptoms are monitored and maintained or improved  5/27/2025 0208 by Marty Holly RN  Outcome: Progressing  5/26/2025 1640 by Solange Perdue LPN  Outcome: Progressing  Flowsheets (Taken 5/26/2025 0952)  Care Plan - Patient's Chronic Conditions and Co-Morbidity Symptoms are Monitored and Maintained or Improved:   Monitor and assess patient's chronic conditions and comorbid symptoms for stability, deterioration, or improvement   Collaborate with multidisciplinary team to address chronic and comorbid conditions and prevent exacerbation or deterioration   Update acute care plan with appropriate goals if chronic or comorbid symptoms are exacerbated and prevent overall improvement and discharge     Problem: Discharge Planning  Goal: Discharge to home or other facility with appropriate resources  5/27/2025 0208 by Marty Holly RN  Outcome: Progressing  5/26/2025 1640 by Solange Perdue LPN  Outcome: Progressing  Flowsheets (Taken 5/26/2025 0952)  Discharge to home or other facility with appropriate resources:   Arrange for needed discharge resources and transportation as appropriate   Identify barriers to discharge with patient and caregiver   Identify discharge learning needs (meds, wound care, etc)   Arrange for interpreters to assist at discharge as needed   Refer to discharge planning if patient needs post-hospital services based on physician order or complex needs related to functional status, cognitive ability or social support system     Problem: Safety - Adult  Goal: Free from fall injury  5/27/2025 0208 by Marty Holly RN  Outcome: Progressing  5/26/2025 1640 by Solange Perdue LPN  Outcome: Progressing     Problem: Nutrition Deficit:  Goal: Optimize nutritional status  5/27/2025 0208 by Marty Holly RN  Outcome: Progressing  5/26/2025 1640 by Solange Perdue  receive information   Initiate Consults from Ethics, Palliative Care or initiate Family Care Conference as is appropriate   Serve as a liaison between patient and family and health care team     Problem: ABCDS Injury Assessment  Goal: Absence of physical injury  5/27/2025 0208 by Marty Holly RN  Outcome: Progressing  5/26/2025 1640 by Solange Perdue LPN  Outcome: Progressing

## 2025-05-27 NOTE — PROGRESS NOTES
Daily Progress Note    Date:2025  Patient: Akhil Alejo  : 1955  MRN:[id number]  CODE:Full Code No additional code details  PCP:Lennox Waddell MD    Admit Date: 2025 10:25 AM   LOS: 6 days     No chief complaint on file.        Subjective: Pt much more awake and alert today. Able to answer some questions appropriately. Shook my hand. Worked with PT.         Hospital Summary: 70 yo M with dementia, T2DM, CAD s/p CABG, HTN, hypothyroidism who was initially admitted to [facility] after abnormal stress test. He underwent LHC which showed multivessel disease and CT surgery was consulted for CABG.  CABG was performed on 24. Post-op pt apparently had episodes of worsening confusion, but no focal neurological deficits. CT head at that time was negative for acute findings. SLP eval completed and recommendation for pt to remain NPO. Eventually PEG tube was placed on 25, EGD also showed multiple duodenal ulcers. Tube feedings were initiated and pt tolerated without issue.   Pt was transferred to acute rehab on . Apparently pt was persistently confused, but able to speak and follow some commands at this time based on other providers notes.     Pt initially showed some progress with therapy. He developed pain and bleeding around PEG tube site on . This persisted and CT abdomen on  showed possible mild intramuscular hemorrhage of the left rectus abdominus muscles surrounding PEG tube.   Pt developed hematemesis with clots on . Rapid response was called and pt required transfer to the ICU. Repeat CT abdomen at that time was unremarkable.  Mental status acutely worsened during this event and he became unresponsive. There was possible right gaze preference and right sided weakness.   He required intubation due to inability to protect airway and aspiration of clots. Underwent EGD on  which showed active bleeding into the stomach from a small superficial mucosal vessel which was treated

## 2025-05-27 NOTE — PROGRESS NOTES
Comprehensive Nutrition Assessment    Type and Reason for Visit:  Reassess    Nutrition Recommendations/Plan:   Continue to monitor weight, tolerance to tf, labs     Malnutrition Assessment:  Malnutrition Status:  At risk for malnutrition (05/27/25 1028)    Context:  Acute Illness     Findings of the 6 clinical characteristics of malnutrition:  Energy Intake:  No decrease in energy intake  Weight Loss:  1% to 2% over 1 week     Body Fat Loss:  No body fat loss     Muscle Mass Loss:  No muscle mass loss    Fluid Accumulation:  No fluid accumulation     Strength:  Not Performed    Nutrition Assessment:    TF continues at 50ml/hr with 35ml free water flush hourly.  Weight remains stable.  Residuals 0-7ml.  Glucose 131-146.   Accuchek's 149-164    Nutrition Related Findings:    Glucose 131-146.  Accuchek's 149-164 Wound Type: Multiple, Surgical Incision       Current Nutrition Intake & Therapies:    Average Meal Intake: NPO  Average Supplements Intake: NPO  Current Tube Feeding (TF) Orders:  Feeding Route: PEG  Formula: Diabetic  Schedule: Continuous  Feeding Regimen: Glucerna 1.5 @ 50ml/hr  Additives/Modulars: None  Water Flushes: 35ml hourly = 840 ml  Current TF Provides: Glucerna 1.5 @ 50ml/hr with 35ml free water flush hourly = 1800 kcals with 99g protein 159g CHO and 1750ml free water from formula and flush    Anthropometric Measures:  Height: 180.3 cm (5' 10.98\")  Ideal Body Weight (IBW): 172 lbs (78 kg)       Current Body Weight: 83 kg (182 lb 15.7 oz), 106.4 % IBW. Weight Source: Bed scale  Current BMI (kg/m2): 25.5  Usual Body Weight: 86.2 kg (190 lb 0.6 oz) (5-13-25)  % Weight Change (Calculated): -3.7  Weight Adjustment For: No Adjustment  BMI Categories: Overweight (BMI 25.0-29.9)    Estimated Daily Nutrient Needs:  Energy Requirements Based On: Kcal/kg  Weight Used for Energy Requirements: Current  Energy (kcal/day): 6169-3290 (20-25/kg)  Weight Used for Protein Requirements: Ideal  Protein (g/day):

## 2025-05-27 NOTE — CARE COORDINATION
[facility]  Notification of Admission Decision      [] Patient has been accepted for admit to [facility] on :       Please write discharge orders and summary prior to discharge.    [] Patient acceptance to Rehab pending the following :    [x] Eval in progress : awaiting updated O.T. note and neurology note once he reviews MRI.      [] Patient determined to be ineligible for services at [facility] because :       We recommend you consider        Thank you for your referral, we appreciate you. If you have any questions, please feel   free to contact me at [phone].  Electronically Signed by Annmarie Cortes, Admissions Coordinator 5/27/2025 1:59 PM

## 2025-05-27 NOTE — PROGRESS NOTES
Afebrile, vital signs have been stable. WBC ct back to normal.  Serial Hct have been stable between 32-34 since EGD with no transfusions required since treatment of bleeding source.  Results of MRI are noted. Subcentimeter brain lesions were detected could have happened during the initial CABG procedure 1 month ago or during the hypovolemia/anemia associated with last week's episode of acute GI bleeding.   My exam that I performed today shows that his neuro exam findings are nonfocal, meaning that nothing seen clinically can be confirmed to be attributable to the diffuse bilateral tiny defects seen on head MRI today. This neuro exam is similar to what I have seen in every prior exam that I have performed. There were some focal neuro finding by report of those present when he was emergently intubated last week, but even those reported findings are not corroborated by today's MRI. Moreover, those reported neuro changes fully resolved by the time I arrived to examine him an hour later and by the time the Neuro consult was performed. The definition of a stroke according to our STS National Database is \"a confirmed neurological deficit of abrupt onset caused by a disturbance in blood supply to the brain that did not resolve within 24 hours.\" (NQF #0131). While there obviously was some neuro injury, it was not a stroke according to the STS definition.   The best explanation of the head MRI finding is silent brain injury, an issue detected in up to 50% of patients that undergo cardiopulmonary bypass/CABG ([facility]. 2012 Aug, 60 [phone]). At most, the clinical circumstances and MRI findings raise an unconfirmed suspicion of TIA.

## 2025-05-27 NOTE — PROGRESS NOTES
Vascular preliminary report.  Bilateral carotid duplex scan completed.   Bilateral ICA's <50% stenosis.   Bilateral vertebral arteries antegrade flow.   Final report pending.

## 2025-05-27 NOTE — PROGRESS NOTES
Daily Progress Note    Date:2025  Patient: Akhil Alejo  : 1955  MRN:[id number]  CODE:Full Code No additional code details  PCP:Lennox Waddell MD    Admit Date: 2025 10:25 AM   LOS: 6 days     No chief complaint on file.        Subjective: Pt answering yes/no to wife's questions. He didn't answer my questions. Tube feeds are ongoing.         Hospital Summary: 70 yo M with dementia, T2DM, CAD s/p CABG, HTN, hypothyroidism who was initially admitted to [facility] after abnormal stress test. He underwent LHC which showed multivessel disease and CT surgery was consulted for CABG.  CABG was performed on 24. Post-op pt apparently had episodes of worsening confusion, but no focal neurological deficits. CT head at that time was negative for acute findings. SLP eval completed and recommendation for pt to remain NPO. Eventually PEG tube was placed on 25, EGD also showed multiple duodenal ulcers. Tube feedings were initiated and pt tolerated without issue.   Pt was transferred to acute rehab on . Apparently pt was persistently confused, but able to speak and follow some commands at this time based on other providers notes.     Pt initially showed some progress with therapy. He developed pain and bleeding around PEG tube site on . This persisted and CT abdomen on  showed possible mild intramuscular hemorrhage of the left rectus abdominus muscles surrounding PEG tube.   Pt developed hematemesis with clots on . Rapid response was called and pt required transfer to the ICU. Repeat CT abdomen at that time was unremarkable.  Mental status acutely worsened during this event and he became unresponsive. There was possible right gaze preference and right sided weakness.   He required intubation due to inability to protect airway and aspiration of clots. Underwent EGD on  which showed active bleeding into the stomach from a small superficial mucosal vessel which was treated with Epinephrine

## 2025-05-27 NOTE — PROGRESS NOTES
[facility] Neurology Progress Note      Patient:   Akhil Alejo  MR#:    [id number]   Room:    [facility]/312-02   YOB: 1955  Date of Progress Note: 5/27/2025  Time of Note                           2:12 PM  Consulting Physician:  Doyle More DO  Attending Physician:  Primitivo Archibald MD      INTERVAL HISTORY:  More responsive, following commands, speech continues to improve.     REVIEW OF SYSTEMS:    Constitutional:  Denies fever or chills   Eyes:  Denies change in visual acuity   Neurologic:  Denies headache, focal weakness or sensory changes       PHYSICAL EXAM:    Constitutional -   BP (!) 146/78   Pulse 84   Temp 97.2 °F (36.2 °C) (Oral)   Resp 16   Ht 1.803 m (5' 10.98\")   Wt 83 kg (182 lb 15.7 oz)   SpO2 95%   BMI 25.53 kg/m²   General appearance: Extubated, more responsive   EYES -   Conjunctiva normal  Pupillary exam as below, see CN exam in the neurologic exam  ENT-    No scars, masses, or lesions over external nose or ears  Musculoskeletal -   No significant wasting of muscles noted  Gait as below, see gait exam in the neurologic exam  Muscle strength, tone, stability as below see the motor exam in the neurologic exam.   No bony deformities  Skin -   Warm, dry, and intact to inspection and palpation.    No rash, erythema, or pallor        NEUROLOGICAL EXAM     Mental status    [] Awake, alert, oriented   [] Affect attention and concentration appear appropriate  [] Recent and remote memory appears unremarkable  [] Speech normal without dysarthria or aphasia, comprehension and repetition intact.   COMMENTS:More responsive, following commands, speech improving.    Cranial Nerves [] No VF deficit to confrontation,  optic discs normal, no papilledema on fundoscopic exam.  [] PERRLA, EOMI, no nystagmus, conjugate eye movements, no ptosis  [] Face symmetric  [] Facial sensation intact  [] Tongue midline no atrophy or fasciculations present  [] Palate midline, hearing to finger rub

## 2025-05-27 NOTE — PROGRESS NOTES
05/27/25 1000   Subjective   Subjective Patien tin bed agrees to therapy.  (Marlee LPN present.  Patient soiled 2 pulling male external catherter off.)   Pain No C/O pain   Cognition   Overall Cognitive Status Exceptions   Following Commands Follows one step commands with increased time;Follows one step commands with repetition   Memory Decreased recall of recent events   Safety Judgement Decreased awareness of need for assistance;Decreased awareness of need for safety   Problem Solving Assistance required to correct errors made   Cognition Comment Great difficulty following commands.   Orientation   Overall Orientation Status WNL   Vitals   O2 Device None (Room air)   Bed Mobility Training   Bed Mobility Training Yes   Supine to Sit Substantial/Maximal assistance   Scooting Partial/Moderate assistance  (To EOB)   Balance   Sitting Intact   Standing With support   Transfer Training   Transfer Training Yes   Sit to Stand Substantial/Maximal assistance   Stand to Sit Substantial/Maximal assistance   Bed to Chair Partial/Moderate assistance;2 Person assistance   Gait Training   Gait Training Yes   Gait   Gait Training Yes   Distance (ft) 3 Feet  (To chair)   Assistive Device Walker, rolling   Speed/Alycia Slow   PT Exercises   A/AROM Exercises BL LE EX in supine X 10 reps   Patient Education   Education Given To Patient   Education Provided Role of Therapy;Plan of Care;Transfer Training;Mobility Training;Fall Prevention Strategies   Education Provided Comments Use of call light and staff assist.   Education Method Demonstration;Verbal   Barriers to Learning Cognition   Education Outcome Verbalized understanding;Demonstrated understanding;Continued education needed   Other Specialty Interventions   Other Treatments/Modalities In recliner call light all needs in reach personal alarm attached.   PT Plan of Care   Tuesday X       Electronically signed by Geovanny Claros PTA on 5/27/2025 at 10:28 AM

## 2025-05-27 NOTE — PROGRESS NOTES
Infectious Diseases Progress Note    Patient:  Akhil Alejo  YOB: 1955  MRN: [id number]   Admit date: 5/21/2025   Admitting Physician: Primitivo Archibald MD  Primary Care Physician: Lennox Waddell MD    Chief Complaint: Here for follow-up of recent aspiration event and treatment for aspiration pneumonia.    Interval History: He appears to be stable off antibiotic treatment.  His wife is at bedside.  She feels he is doing better.  He is more verbal with me today.  He is not coughing.  He does not appear dyspneic.  Wife indicates he has not been coughing as well.  He has remained without fever.  He is on room air with excellent oxygen saturation-98%.    In/Out    Intake/Output Summary (Last 24 hours) at 5/27/2025 1632  Last data filed at 5/27/2025 1534  Gross per 24 hour   Intake 936 ml   Output 2200 ml   Net -1264 ml     Allergies:   Allergies   Allergen Reactions    Penicillins Hives     Current Meds: guaiFENesin (ROBITUSSIN) 100 MG/5ML liquid 200 mg, TID PRN  insulin lispro (HUMALOG,ADMELOG) injection vial 0-4 Units, 4x Daily AC & HS  metoprolol tartrate (LOPRESSOR) tablet 50 mg, BID  ipratropium 0.5 mg-albuterol 2.5 mg (DUONEB) nebulizer solution 1 Dose, Q4H WA RT  aspirin chewable tablet 81 mg, Daily  atorvastatin (LIPITOR) tablet 20 mg, Nightly  bisacodyl (DULCOLAX) suppository 10 mg, Daily PRN  insulin lispro (HUMALOG,ADMELOG) injection vial 10 Units, TID WC  levothyroxine (SYNTHROID) tablet 50 mcg, Daily  Benzocaine-Menthol (CEPACOL) 1 lozenge, Q2H PRN  cholestyramine light packet 4 g, Daily  cetirizine (ZYRTEC) tablet 10 mg, Daily  nitroGLYCERIN (NITROSTAT) SL tablet 0.4 mg, Q5 Min PRN  magnesium hydroxide (MILK OF MAGNESIA) 400 MG/5ML suspension 30 mL, Daily PRN  melatonin disintegrating tablet 5 mg, Nightly  gemfibrozil (LOPID) tablet 600 mg, BID  clopidogrel (PLAVIX) tablet 75 mg, Daily  Loperamide HCl (IMODIUM) 1 MG/7.5ML solution 2 mg, 4x Daily PRN  sodium chloride flush 0.9 % injection  5-40 mL, 2 times per day  sodium chloride flush 0.9 % injection 5-40 mL, PRN  0.9 % sodium chloride infusion, PRN  ondansetron (ZOFRAN-ODT) disintegrating tablet 4 mg, Q8H PRN   Or  ondansetron (ZOFRAN) injection 4 mg, Q6H PRN  acetaminophen (TYLENOL) tablet 650 mg, Q6H PRN   Or  acetaminophen (TYLENOL) suppository 650 mg, Q6H PRN  pantoprazole (PROTONIX) 40 mg in sodium chloride (PF) 0.9 % 10 mL injection, Q12H  fentaNYL (SUBLIMAZE) injection 100 mcg, Once  insulin glargine (LANTUS) injection vial 25 Units, BID      Review of Systems see HPI    VitalSigns:  /66   Pulse 81   Temp 97.2 °F (36.2 °C) (Temporal)   Resp 16   Ht 1.803 m (5' 10.98\")   Wt 83 kg (182 lb 15.7 oz)   SpO2 98%   BMI 25.53 kg/m²     Physical Exam  Line/IV site: No erythema, warmth, induration, or tenderness.  Alert, pleasant, comfortable appearing, and in no distress  He seems to be moving air well  He was not coughing and did not appear dyspneic  Lungs are very clear to auscultation without crackles or wheezes    Lab Results:  CBC:   Recent Labs     05/25/25  0308 05/26/25  0239 05/27/25  0322   WBC 14.0* 12.7* 11.6*   HGB 9.4* 9.5* 10.8*    242 274     BMP:  Recent Labs     05/25/25  0308 05/26/25  0239 05/27/25  0322    137 137   K 4.2 4.2 4.2    103 104   CO2 24 24 22   BUN 15 15 18   CREATININE 0.7 0.7 0.7   GLUCOSE 126* 131* 146*     Culture Results:  No new results    Radiology: None    Additional Studies Reviewed:  None    Impression:  Aspiration event with large amount of blood and secretions in right mainstem.  He had undergone bronchoscopy for suctioning.  He completed an empiric course of broad antimicrobial treatment for aspiration pneumonia.  He tolerated the antibiotic without side effects.  He appears to be stable off antibiotic treatment.    Recommendations:  Continue off antibiotic therapy  Would be happy to reassess if fever, worsening leukocytosis, increasing sputum production or cough, or other

## 2025-05-28 ENCOUNTER — HOSPITAL ENCOUNTER (INPATIENT)
Age: 70
LOS: 23 days | Discharge: HOME HEALTH | End: 2025-06-20
Attending: PSYCHIATRY & NEUROLOGY | Admitting: PSYCHIATRY & NEUROLOGY
Payer: MEDICARE

## 2025-05-28 VITALS
OXYGEN SATURATION: 99 % | HEIGHT: 71 IN | HEART RATE: 86 BPM | TEMPERATURE: 98.1 F | BODY MASS INDEX: 25.62 KG/M2 | SYSTOLIC BLOOD PRESSURE: 117 MMHG | RESPIRATION RATE: 18 BRPM | DIASTOLIC BLOOD PRESSURE: 58 MMHG | WEIGHT: 182.98 LBS

## 2025-05-28 DIAGNOSIS — I63.9 ACUTE ISCHEMIC STROKE (HCC): Primary | ICD-10-CM

## 2025-05-28 PROBLEM — F02.B0 MODERATE LATE ONSET ALZHEIMER'S DEMENTIA WITHOUT BEHAVIORAL DISTURBANCE, PSYCHOTIC DISTURBANCE, MOOD DISTURBANCE, OR ANXIETY (HCC): Status: RESOLVED | Noted: 2025-05-24 | Resolved: 2025-05-28

## 2025-05-28 PROBLEM — J69.0 ASPIRATION PNEUMONIA DUE TO GASTRIC SECRETIONS (HCC): Status: RESOLVED | Noted: 2025-05-24 | Resolved: 2025-05-28

## 2025-05-28 PROBLEM — G30.1 MODERATE LATE ONSET ALZHEIMER'S DEMENTIA WITHOUT BEHAVIORAL DISTURBANCE, PSYCHOTIC DISTURBANCE, MOOD DISTURBANCE, OR ANXIETY (HCC): Status: RESOLVED | Noted: 2025-05-24 | Resolved: 2025-05-28

## 2025-05-28 PROBLEM — I25.10 CAD IN NATIVE ARTERY: Status: RESOLVED | Noted: 2025-04-25 | Resolved: 2025-05-28

## 2025-05-28 PROBLEM — Z93.1 PEG (PERCUTANEOUS ENDOSCOPIC GASTROSTOMY) STATUS (HCC): Status: RESOLVED | Noted: 2025-05-10 | Resolved: 2025-05-28

## 2025-05-28 PROBLEM — Z95.1 S/P CABG (CORONARY ARTERY BYPASS GRAFT): Status: ACTIVE | Noted: 2025-05-28

## 2025-05-28 PROBLEM — E03.9 HYPOTHYROIDISM: Status: RESOLVED | Noted: 2025-04-26 | Resolved: 2025-05-28

## 2025-05-28 PROBLEM — E11.9 DIABETES (HCC): Status: RESOLVED | Noted: 2025-04-25 | Resolved: 2025-05-28

## 2025-05-28 PROBLEM — K92.2 GI BLEED: Status: RESOLVED | Noted: 2025-05-21 | Resolved: 2025-05-28

## 2025-05-28 PROBLEM — G93.41 METABOLIC ENCEPHALOPATHY: Status: RESOLVED | Noted: 2025-05-24 | Resolved: 2025-05-28

## 2025-05-28 LAB
ANION GAP SERPL CALCULATED.3IONS-SCNC: 12 MMOL/L (ref 8–16)
BASOPHILS # BLD: 0.1 K/UL (ref 0–0.2)
BASOPHILS NFR BLD: 1 % (ref 0–1)
BUN SERPL-MCNC: 18 MG/DL (ref 8–23)
CALCIUM SERPL-MCNC: 8.6 MG/DL (ref 8.8–10.2)
CHLORIDE SERPL-SCNC: 104 MMOL/L (ref 98–107)
CO2 SERPL-SCNC: 20 MMOL/L (ref 22–29)
CREAT SERPL-MCNC: 0.7 MG/DL (ref 0.7–1.2)
EOSINOPHIL # BLD: 1 K/UL (ref 0–0.6)
EOSINOPHIL NFR BLD: 8.7 % (ref 0–5)
ERYTHROCYTE [DISTWIDTH] IN BLOOD BY AUTOMATED COUNT: 18.2 % (ref 11.5–14.5)
GLUCOSE BLD-MCNC: 108 MG/DL (ref 70–99)
GLUCOSE BLD-MCNC: 113 MG/DL (ref 70–99)
GLUCOSE BLD-MCNC: 113 MG/DL (ref 70–99)
GLUCOSE SERPL-MCNC: 152 MG/DL (ref 70–99)
HCT VFR BLD AUTO: 34.3 % (ref 42–52)
HGB BLD-MCNC: 10.8 G/DL (ref 14–18)
IMM GRANULOCYTES # BLD: 0.7 K/UL
LYMPHOCYTES # BLD: 3.2 K/UL (ref 1.1–4.5)
LYMPHOCYTES NFR BLD: 28.2 % (ref 20–40)
MCH RBC QN AUTO: 29.3 PG (ref 27–31)
MCHC RBC AUTO-ENTMCNC: 31.5 G/DL (ref 33–37)
MCV RBC AUTO: 93 FL (ref 80–94)
MONOCYTES # BLD: 0.8 K/UL (ref 0–0.9)
MONOCYTES NFR BLD: 6.7 % (ref 0–10)
NEUTROPHILS # BLD: 5.6 K/UL (ref 1.5–7.5)
NEUTS SEG NFR BLD: 49.1 % (ref 50–65)
PERFORMED ON: ABNORMAL
PLATELET # BLD AUTO: 304 K/UL (ref 130–400)
PMV BLD AUTO: 9.8 FL (ref 9.4–12.4)
POTASSIUM SERPL-SCNC: 4.5 MMOL/L (ref 3.5–5)
RBC # BLD AUTO: 3.69 M/UL (ref 4.7–6.1)
SODIUM SERPL-SCNC: 136 MMOL/L (ref 136–145)
WBC # BLD AUTO: 11.4 K/UL (ref 4.8–10.8)

## 2025-05-28 PROCEDURE — 6370000000 HC RX 637 (ALT 250 FOR IP): Performed by: NURSE PRACTITIONER

## 2025-05-28 PROCEDURE — 2500000003 HC RX 250 WO HCPCS: Performed by: NURSE PRACTITIONER

## 2025-05-28 PROCEDURE — 99232 SBSQ HOSP IP/OBS MODERATE 35: CPT | Performed by: PSYCHIATRY & NEUROLOGY

## 2025-05-28 PROCEDURE — 97129 THER IVNTJ 1ST 15 MIN: CPT

## 2025-05-28 PROCEDURE — 97535 SELF CARE MNGMENT TRAINING: CPT

## 2025-05-28 PROCEDURE — 97110 THERAPEUTIC EXERCISES: CPT

## 2025-05-28 PROCEDURE — 6370000000 HC RX 637 (ALT 250 FOR IP): Performed by: INTERNAL MEDICINE

## 2025-05-28 PROCEDURE — 80048 BASIC METABOLIC PNL TOTAL CA: CPT

## 2025-05-28 PROCEDURE — 6370000000 HC RX 637 (ALT 250 FOR IP): Performed by: STUDENT IN AN ORGANIZED HEALTH CARE EDUCATION/TRAINING PROGRAM

## 2025-05-28 PROCEDURE — 92526 ORAL FUNCTION THERAPY: CPT

## 2025-05-28 PROCEDURE — 94150 VITAL CAPACITY TEST: CPT

## 2025-05-28 PROCEDURE — 82962 GLUCOSE BLOOD TEST: CPT

## 2025-05-28 PROCEDURE — 2580000003 HC RX 258: Performed by: NURSE PRACTITIONER

## 2025-05-28 PROCEDURE — 94760 N-INVAS EAR/PLS OXIMETRY 1: CPT

## 2025-05-28 PROCEDURE — 97530 THERAPEUTIC ACTIVITIES: CPT

## 2025-05-28 PROCEDURE — 6360000002 HC RX W HCPCS: Performed by: NURSE PRACTITIONER

## 2025-05-28 PROCEDURE — 97116 GAIT TRAINING THERAPY: CPT

## 2025-05-28 PROCEDURE — 36415 COLL VENOUS BLD VENIPUNCTURE: CPT

## 2025-05-28 PROCEDURE — 85025 COMPLETE CBC W/AUTO DIFF WBC: CPT

## 2025-05-28 PROCEDURE — 1180000000 HC REHAB R&B

## 2025-05-28 PROCEDURE — 94640 AIRWAY INHALATION TREATMENT: CPT

## 2025-05-28 PROCEDURE — 6360000002 HC RX W HCPCS: Performed by: PSYCHIATRY & NEUROLOGY

## 2025-05-28 RX ORDER — INSULIN LISPRO 100 [IU]/ML
10 INJECTION, SOLUTION INTRAVENOUS; SUBCUTANEOUS
Status: CANCELLED | OUTPATIENT
Start: 2025-05-29

## 2025-05-28 RX ORDER — CETIRIZINE HYDROCHLORIDE 10 MG/1
10 TABLET ORAL DAILY
Status: CANCELLED | OUTPATIENT
Start: 2025-05-29

## 2025-05-28 RX ORDER — GUAIFENESIN 200 MG/10ML
200 LIQUID ORAL 3 TIMES DAILY PRN
Status: CANCELLED | OUTPATIENT
Start: 2025-05-28

## 2025-05-28 RX ORDER — NITROGLYCERIN 0.4 MG/1
0.4 TABLET SUBLINGUAL EVERY 5 MIN PRN
Status: DISCONTINUED | OUTPATIENT
Start: 2025-05-28 | End: 2025-06-20 | Stop reason: HOSPADM

## 2025-05-28 RX ORDER — SODIUM CHLORIDE 9 MG/ML
INJECTION, SOLUTION INTRAVENOUS PRN
Status: CANCELLED | OUTPATIENT
Start: 2025-05-28

## 2025-05-28 RX ORDER — IPRATROPIUM BROMIDE AND ALBUTEROL SULFATE 2.5; .5 MG/3ML; MG/3ML
1 SOLUTION RESPIRATORY (INHALATION)
Status: DISCONTINUED | OUTPATIENT
Start: 2025-05-29 | End: 2025-06-20 | Stop reason: HOSPADM

## 2025-05-28 RX ORDER — POLYETHYLENE GLYCOL 3350 17 G/17G
17 POWDER, FOR SOLUTION ORAL DAILY PRN
Status: DISCONTINUED | OUTPATIENT
Start: 2025-05-28 | End: 2025-06-20 | Stop reason: HOSPADM

## 2025-05-28 RX ORDER — CLOPIDOGREL BISULFATE 75 MG/1
75 TABLET ORAL DAILY
Status: CANCELLED | OUTPATIENT
Start: 2025-05-29

## 2025-05-28 RX ORDER — LEVOTHYROXINE SODIUM 50 UG/1
50 TABLET ORAL DAILY
Status: CANCELLED | OUTPATIENT
Start: 2025-05-29

## 2025-05-28 RX ORDER — IPRATROPIUM BROMIDE AND ALBUTEROL SULFATE 2.5; .5 MG/3ML; MG/3ML
1 SOLUTION RESPIRATORY (INHALATION)
Status: CANCELLED | OUTPATIENT
Start: 2025-05-28

## 2025-05-28 RX ORDER — MECOBALAMIN 5000 MCG
5 TABLET,DISINTEGRATING ORAL NIGHTLY
Status: DISCONTINUED | OUTPATIENT
Start: 2025-05-28 | End: 2025-06-20 | Stop reason: HOSPADM

## 2025-05-28 RX ORDER — NITROGLYCERIN 0.4 MG/1
0.4 TABLET SUBLINGUAL EVERY 5 MIN PRN
Status: CANCELLED | OUTPATIENT
Start: 2025-05-28

## 2025-05-28 RX ORDER — SODIUM CHLORIDE 0.9 % (FLUSH) 0.9 %
5-40 SYRINGE (ML) INJECTION PRN
Status: CANCELLED | OUTPATIENT
Start: 2025-05-28

## 2025-05-28 RX ORDER — SODIUM CHLORIDE 0.9 % (FLUSH) 0.9 %
5-40 SYRINGE (ML) INJECTION PRN
Status: DISCONTINUED | OUTPATIENT
Start: 2025-05-28 | End: 2025-06-20 | Stop reason: HOSPADM

## 2025-05-28 RX ORDER — ACETAMINOPHEN 650 MG/1
650 SUPPOSITORY RECTAL EVERY 6 HOURS PRN
Status: DISCONTINUED | OUTPATIENT
Start: 2025-05-28 | End: 2025-06-20 | Stop reason: HOSPADM

## 2025-05-28 RX ORDER — ACETAMINOPHEN 325 MG/1
650 TABLET ORAL EVERY 4 HOURS PRN
Status: DISCONTINUED | OUTPATIENT
Start: 2025-05-28 | End: 2025-05-28 | Stop reason: SDUPTHER

## 2025-05-28 RX ORDER — LOPERAMIDE HCL 1 MG/7.5ML
2 SOLUTION ORAL 4 TIMES DAILY PRN
Status: DISCONTINUED | OUTPATIENT
Start: 2025-05-28 | End: 2025-05-28 | Stop reason: SDUPTHER

## 2025-05-28 RX ORDER — ONDANSETRON 2 MG/ML
4 INJECTION INTRAMUSCULAR; INTRAVENOUS EVERY 6 HOURS PRN
Status: DISCONTINUED | OUTPATIENT
Start: 2025-05-28 | End: 2025-06-20 | Stop reason: HOSPADM

## 2025-05-28 RX ORDER — CHOLESTYRAMINE LIGHT 4 G/5.7G
4 POWDER, FOR SUSPENSION ORAL DAILY
Status: DISCONTINUED | OUTPATIENT
Start: 2025-05-29 | End: 2025-06-20 | Stop reason: HOSPADM

## 2025-05-28 RX ORDER — INSULIN GLARGINE 100 [IU]/ML
25 INJECTION, SOLUTION SUBCUTANEOUS 2 TIMES DAILY
Status: CANCELLED | OUTPATIENT
Start: 2025-05-28

## 2025-05-28 RX ORDER — ATORVASTATIN CALCIUM 20 MG/1
20 TABLET, FILM COATED ORAL NIGHTLY
Status: DISCONTINUED | OUTPATIENT
Start: 2025-05-28 | End: 2025-06-20 | Stop reason: HOSPADM

## 2025-05-28 RX ORDER — SODIUM CHLORIDE 9 MG/ML
INJECTION, SOLUTION INTRAVENOUS PRN
Status: DISCONTINUED | OUTPATIENT
Start: 2025-05-28 | End: 2025-06-20 | Stop reason: HOSPADM

## 2025-05-28 RX ORDER — GEMFIBROZIL 600 MG/1
600 TABLET, FILM COATED ORAL 2 TIMES DAILY
Status: DISCONTINUED | OUTPATIENT
Start: 2025-05-28 | End: 2025-06-20 | Stop reason: HOSPADM

## 2025-05-28 RX ORDER — BISACODYL 10 MG
10 SUPPOSITORY, RECTAL RECTAL DAILY PRN
Status: CANCELLED | OUTPATIENT
Start: 2025-05-28

## 2025-05-28 RX ORDER — INSULIN GLARGINE 100 [IU]/ML
25 INJECTION, SOLUTION SUBCUTANEOUS 2 TIMES DAILY
Status: DISCONTINUED | OUTPATIENT
Start: 2025-05-28 | End: 2025-06-02

## 2025-05-28 RX ORDER — ONDANSETRON 2 MG/ML
4 INJECTION INTRAMUSCULAR; INTRAVENOUS EVERY 6 HOURS PRN
Status: CANCELLED | OUTPATIENT
Start: 2025-05-28

## 2025-05-28 RX ORDER — INSULIN LISPRO 100 [IU]/ML
10 INJECTION, SOLUTION INTRAVENOUS; SUBCUTANEOUS
Status: DISCONTINUED | OUTPATIENT
Start: 2025-05-29 | End: 2025-06-20 | Stop reason: HOSPADM

## 2025-05-28 RX ORDER — ASPIRIN 81 MG/1
81 TABLET, CHEWABLE ORAL DAILY
Status: DISCONTINUED | OUTPATIENT
Start: 2025-05-29 | End: 2025-06-20 | Stop reason: HOSPADM

## 2025-05-28 RX ORDER — ATORVASTATIN CALCIUM 20 MG/1
20 TABLET, FILM COATED ORAL NIGHTLY
Status: CANCELLED | OUTPATIENT
Start: 2025-05-28

## 2025-05-28 RX ORDER — GEMFIBROZIL 600 MG/1
600 TABLET, FILM COATED ORAL 2 TIMES DAILY
Status: CANCELLED | OUTPATIENT
Start: 2025-05-28

## 2025-05-28 RX ORDER — ENOXAPARIN SODIUM 100 MG/ML
40 INJECTION SUBCUTANEOUS DAILY
Status: DISCONTINUED | OUTPATIENT
Start: 2025-05-28 | End: 2025-06-20 | Stop reason: HOSPADM

## 2025-05-28 RX ORDER — ASPIRIN 81 MG/1
81 TABLET, CHEWABLE ORAL DAILY
Status: CANCELLED | OUTPATIENT
Start: 2025-05-29

## 2025-05-28 RX ORDER — GUAIFENESIN 200 MG/10ML
200 LIQUID ORAL 3 TIMES DAILY PRN
Status: DISCONTINUED | OUTPATIENT
Start: 2025-05-28 | End: 2025-06-20 | Stop reason: HOSPADM

## 2025-05-28 RX ORDER — CHOLESTYRAMINE LIGHT 4 G/5.7G
4 POWDER, FOR SUSPENSION ORAL DAILY
Status: CANCELLED | OUTPATIENT
Start: 2025-05-29

## 2025-05-28 RX ORDER — LOPERAMIDE HCL 1 MG/7.5ML
2 SOLUTION ORAL 4 TIMES DAILY PRN
Status: CANCELLED | OUTPATIENT
Start: 2025-05-28

## 2025-05-28 RX ORDER — LOPERAMIDE HCL 1 MG/7.5ML
2 SOLUTION ORAL 4 TIMES DAILY PRN
Status: DISCONTINUED | OUTPATIENT
Start: 2025-05-28 | End: 2025-06-20 | Stop reason: HOSPADM

## 2025-05-28 RX ORDER — CETIRIZINE HYDROCHLORIDE 10 MG/1
10 TABLET ORAL DAILY
Status: DISCONTINUED | OUTPATIENT
Start: 2025-05-29 | End: 2025-06-20 | Stop reason: HOSPADM

## 2025-05-28 RX ORDER — ACETAMINOPHEN 325 MG/1
650 TABLET ORAL EVERY 6 HOURS PRN
Status: CANCELLED | OUTPATIENT
Start: 2025-05-28

## 2025-05-28 RX ORDER — BISACODYL 10 MG
10 SUPPOSITORY, RECTAL RECTAL DAILY PRN
Status: DISCONTINUED | OUTPATIENT
Start: 2025-05-28 | End: 2025-06-20 | Stop reason: HOSPADM

## 2025-05-28 RX ORDER — ONDANSETRON 4 MG/1
4 TABLET, ORALLY DISINTEGRATING ORAL EVERY 8 HOURS PRN
Status: CANCELLED | OUTPATIENT
Start: 2025-05-28

## 2025-05-28 RX ORDER — INSULIN LISPRO 100 [IU]/ML
0-4 INJECTION, SOLUTION INTRAVENOUS; SUBCUTANEOUS
Status: DISCONTINUED | OUTPATIENT
Start: 2025-05-28 | End: 2025-06-20 | Stop reason: HOSPADM

## 2025-05-28 RX ORDER — INSULIN LISPRO 100 [IU]/ML
0-4 INJECTION, SOLUTION INTRAVENOUS; SUBCUTANEOUS
Status: CANCELLED | OUTPATIENT
Start: 2025-05-28

## 2025-05-28 RX ORDER — SODIUM CHLORIDE 0.9 % (FLUSH) 0.9 %
5-40 SYRINGE (ML) INJECTION EVERY 12 HOURS SCHEDULED
Status: DISCONTINUED | OUTPATIENT
Start: 2025-05-28 | End: 2025-05-31

## 2025-05-28 RX ORDER — CLOPIDOGREL BISULFATE 75 MG/1
75 TABLET ORAL DAILY
Status: DISCONTINUED | OUTPATIENT
Start: 2025-05-29 | End: 2025-06-20 | Stop reason: HOSPADM

## 2025-05-28 RX ORDER — ACETAMINOPHEN 325 MG/1
650 TABLET ORAL EVERY 6 HOURS PRN
Status: DISCONTINUED | OUTPATIENT
Start: 2025-05-28 | End: 2025-06-20 | Stop reason: HOSPADM

## 2025-05-28 RX ORDER — ACETAMINOPHEN 650 MG/1
650 SUPPOSITORY RECTAL EVERY 6 HOURS PRN
Status: CANCELLED | OUTPATIENT
Start: 2025-05-28

## 2025-05-28 RX ORDER — LEVOTHYROXINE SODIUM 50 UG/1
50 TABLET ORAL DAILY
Status: DISCONTINUED | OUTPATIENT
Start: 2025-05-29 | End: 2025-06-20 | Stop reason: HOSPADM

## 2025-05-28 RX ORDER — MECOBALAMIN 5000 MCG
5 TABLET,DISINTEGRATING ORAL NIGHTLY
Status: CANCELLED | OUTPATIENT
Start: 2025-05-28

## 2025-05-28 RX ORDER — SODIUM CHLORIDE 0.9 % (FLUSH) 0.9 %
5-40 SYRINGE (ML) INJECTION EVERY 12 HOURS SCHEDULED
Status: CANCELLED | OUTPATIENT
Start: 2025-05-28

## 2025-05-28 RX ORDER — ONDANSETRON 4 MG/1
4 TABLET, ORALLY DISINTEGRATING ORAL EVERY 8 HOURS PRN
Status: DISCONTINUED | OUTPATIENT
Start: 2025-05-28 | End: 2025-06-20 | Stop reason: HOSPADM

## 2025-05-28 RX ADMIN — ENOXAPARIN SODIUM 40 MG: 100 INJECTION SUBCUTANEOUS at 21:56

## 2025-05-28 RX ADMIN — IPRATROPIUM BROMIDE AND ALBUTEROL SULFATE 1 DOSE: 2.5; .5 SOLUTION RESPIRATORY (INHALATION) at 06:45

## 2025-05-28 RX ADMIN — GEMFIBROZIL 600 MG: 600 TABLET ORAL at 08:52

## 2025-05-28 RX ADMIN — METOPROLOL TARTRATE 75 MG: 50 TABLET, FILM COATED ORAL at 21:57

## 2025-05-28 RX ADMIN — IPRATROPIUM BROMIDE AND ALBUTEROL SULFATE 1 DOSE: 2.5; .5 SOLUTION RESPIRATORY (INHALATION) at 10:15

## 2025-05-28 RX ADMIN — INSULIN GLARGINE 25 UNITS: 100 INJECTION, SOLUTION SUBCUTANEOUS at 08:53

## 2025-05-28 RX ADMIN — LEVOTHYROXINE SODIUM 50 MCG: 0.05 TABLET ORAL at 05:37

## 2025-05-28 RX ADMIN — Medication 5 MG: at 21:57

## 2025-05-28 RX ADMIN — ATORVASTATIN CALCIUM 20 MG: 20 TABLET, FILM COATED ORAL at 21:57

## 2025-05-28 RX ADMIN — ASPIRIN 81 MG: 81 TABLET, CHEWABLE ORAL at 08:52

## 2025-05-28 RX ADMIN — IPRATROPIUM BROMIDE AND ALBUTEROL SULFATE 1 DOSE: 2.5; .5 SOLUTION RESPIRATORY (INHALATION) at 19:12

## 2025-05-28 RX ADMIN — CHOLESTYRAMINE 4 G: 4 POWDER, FOR SUSPENSION ORAL at 08:52

## 2025-05-28 RX ADMIN — CETIRIZINE HYDROCHLORIDE 10 MG: 10 TABLET ORAL at 08:52

## 2025-05-28 RX ADMIN — SODIUM CHLORIDE, PRESERVATIVE FREE 40 MG: 5 INJECTION INTRAVENOUS at 00:13

## 2025-05-28 RX ADMIN — SODIUM CHLORIDE, PRESERVATIVE FREE 10 ML: 5 INJECTION INTRAVENOUS at 08:54

## 2025-05-28 RX ADMIN — SODIUM CHLORIDE, PRESERVATIVE FREE 40 MG: 5 INJECTION INTRAVENOUS at 12:14

## 2025-05-28 RX ADMIN — CLOPIDOGREL BISULFATE 75 MG: 75 TABLET, FILM COATED ORAL at 08:52

## 2025-05-28 RX ADMIN — GEMFIBROZIL 600 MG: 600 TABLET ORAL at 21:57

## 2025-05-28 RX ADMIN — METOPROLOL TARTRATE 50 MG: 50 TABLET, FILM COATED ORAL at 08:52

## 2025-05-28 RX ADMIN — ACETAMINOPHEN 650 MG: 325 TABLET ORAL at 16:19

## 2025-05-28 RX ADMIN — IPRATROPIUM BROMIDE AND ALBUTEROL SULFATE 1 DOSE: 2.5; .5 SOLUTION RESPIRATORY (INHALATION) at 15:05

## 2025-05-28 RX ADMIN — INSULIN LISPRO 10 UNITS: 100 INJECTION, SOLUTION INTRAVENOUS; SUBCUTANEOUS at 08:53

## 2025-05-28 RX ADMIN — INSULIN GLARGINE 25 UNITS: 100 INJECTION, SOLUTION SUBCUTANEOUS at 21:57

## 2025-05-28 ASSESSMENT — ENCOUNTER SYMPTOMS
DIARRHEA: 0
NAUSEA: 0
VOICE CHANGE: 0
VOMITING: 0
CONSTIPATION: 0
BACK PAIN: 0
SHORTNESS OF BREATH: 0
COLOR CHANGE: 0

## 2025-05-28 ASSESSMENT — PAIN SCALES - GENERAL: PAINLEVEL_OUTOF10: 6

## 2025-05-28 ASSESSMENT — PAIN DESCRIPTION - ORIENTATION: ORIENTATION: RIGHT

## 2025-05-28 ASSESSMENT — PAIN DESCRIPTION - LOCATION: LOCATION: FOOT

## 2025-05-28 ASSESSMENT — PAIN DESCRIPTION - DESCRIPTORS: DESCRIPTORS: ACHING
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
## 2025-05-28 NOTE — PLAN OF CARE
Problem: Chronic Conditions and Co-morbidities  Goal: Patient's chronic conditions and co-morbidity symptoms are monitored and maintained or improved  5/28/2025 1816 by Alberta Barboza RN  Outcome: Adequate for Discharge  5/28/2025 1815 by Alberta Barboza RN  Outcome: Progressing     Problem: Discharge Planning  Goal: Discharge to home or other facility with appropriate resources  5/28/2025 1816 by Alberta Barboza RN  Outcome: Adequate for Discharge  5/28/2025 1815 by Alberta Barboza RN  Outcome: Progressing     Problem: Safety - Adult  Goal: Free from fall injury  5/28/2025 1816 by Alberta Barboza RN  Outcome: Adequate for Discharge  5/28/2025 1815 by Alberta Barboza RN  Outcome: Progressing     Problem: Nutrition Deficit:  Goal: Optimize nutritional status  5/28/2025 1816 by Alberta Barboza RN  Outcome: Adequate for Discharge  5/28/2025 1815 by Alberta Barboza RN  Outcome: Progressing     Problem: Skin/Tissue Integrity  Goal: Skin integrity remains intact  Description: 1.  Monitor for areas of redness and/or skin breakdown2.  Assess vascular access sites hourly3.  Every 4-6 hours minimum:  Change oxygen saturation probe site4.  Every 4-6 hours:  If on nasal continuous positive airway pressure, respiratory therapy assess nares and determine need for appliance change or resting period  5/28/2025 1816 by Alberta Barboza RN  Outcome: Adequate for Discharge  5/28/2025 1815 by Alberta Barboza RN  Outcome: Progressing     Problem: Pain  Goal: Verbalizes/displays adequate comfort level or baseline comfort level  5/28/2025 1816 by Alberta Barboza RN  Outcome: Adequate for Discharge  5/28/2025 1815 by Alberta Barboza RN  Outcome: Progressing     Problem: Neurosensory - Adult  Goal: Achieves stable or improved neurological status  5/28/2025 1816 by Alberta Barboza RN  Outcome: Adequate for Discharge  5/28/2025 1815 by Alberta Barboza RN  Outcome: Progressing  Goal: Absence of seizures  5/28/2025 1816 by Alberta Barboza RN  Outcome: Adequate for Discharge  5/28/2025  Injury Assessment  Goal: Absence of physical injury  5/28/2025 1816 by Alberta Barboza RN  Outcome: Adequate for Discharge  5/28/2025 1815 by Alberta Barboza RN  Outcome: Progressing

## 2025-05-28 NOTE — CARE COORDINATION
[facility]  Notification of Admission Decision      [x] Patient has been [facility] : [facility] Room 815      Please write discharge orders and summary prior to discharge.    [] Patient acceptance to Rehab pending the following :    [] Eval in progress       [] Patient determined to be ineligible for services at [facility] because :       We recommend you consider        Thank you for your referral, we appreciate you. If you have any questions, please feel   free to contact me at [phone].  Electronically Signed by Annmarie Cortes, Admissions Coordinator 5/28/2025 2:39 PM

## 2025-05-28 NOTE — PROGRESS NOTES
This  visited with pt and his wife to follow up with pt and provide spiritual care. Pt was resting but also engaged in the visit. Pt's wife says they are Christians and attend a Religious Islam. She also says their lisa is important to them. This  provided spiritual care with sustaining presence, support, and prayer. Pt and his wife expressed gratitude for spiritual care.       Spiritual Health History and Assessment/Progress Note  [facility]    Spiritual/Emotional Needs, Rapid Response, Follow up, Adjustment to illness,      Name: Akhil Alejo MRN: [id number]    Age: 69 y.o.     Sex: male   Language: English   Episcopal: Non-Synagogue   GI bleed     Date: 5/28/2025            Total Time Calculated: 10 min              Spiritual Assessment continued in [facility] 3 JAMES/VAS/MED        Referral/Consult From: Palliative Care   Encounter Overview/Reason: Spiritual/Emotional Needs  Service Provided For: Patient, Family    Lisa, Belief, Meaning:   Patient identifies as spiritual and is connected with a lisa tradition or spiritual practice  Family/Friends identify as spiritual and are connected with a lisa tradition or spiritual practice      Importance and Influence:  Patient has spiritual/personal beliefs that influence decisions regarding their health  Family/Friends have spiritual/personal beliefs that influence decisions regarding the patient's health    Community:  Patient is connected with a spiritual community  Family/Friends are connected with a spiritual community:    Assessment and Plan of Care:     Patient Interventions include: Affirmed coping skills/support systems and Provided sacramental/Anabaptist ritual  Family/Friends Interventions include: Affirmed coping skills/support systems and Provided sacramental/Anabaptist ritual    Patient Plan of Care: Spiritual Care available upon further referral  Family/Friends Plan of Care: Spiritual Care available upon further

## 2025-05-28 NOTE — DISCHARGE SUMMARY
Discharge Summary    Akhil Alejo  :  1955  MRN:  [id number]    Admit date:  2025  Discharge date: 2025     Admitting Physician:  No admitting provider for patient encounter.    Advance Directive: Full Code    Consults: ID, neurology, and CT surgery    Primary Care Physician:  Lennox Waddell MD    Discharge Diagnoses:  Principal Problem (Resolved):    GI bleed  Active Problems:    * No active hospital problems. *  Resolved Problems:    CAD in native artery    Diabetes (HCC)    Hypothyroidism    PEG (percutaneous endoscopic gastrostomy) status ([facility])    Cerebrovascular accident (CVA) ([facility])    Metabolic encephalopathy    Moderate late onset Alzheimer's dementia without behavioral disturbance, psychotic disturbance, mood disturbance, or anxiety ([facility])    Aspiration pneumonia due to gastric secretions ([facility])    Stroke determined by clinical assessment ([facility])      Significant Diagnostic Studies:   CT HEAD WO CONTRAST  Result Date: 2025  EXAMINATION: CT HEAD WO CONTRAST  HISTORY: change in mental status  TECHNIQUE: Noncontrast CT of the brain was performed with images acquired from skull base to vertex.  2-D coronal and sagittal reformatted images were obtained from the axial source images. Contrast Dose: None. CT Dose Reduction Techniques Performed: Yes.  COMPARISON: None.  FINDINGS: There is mild prominence of the ventricles and sulci consistent with chronic cerebral atrophy Mild low attenuation is seen in the white matter of both cerebral hemispheres, most likely reflecting chronic microvascular ischemia Coarse calcifications are again noted in the bilateral cerebellar hemispheres and in the bilateral basal ganglia No focus of low attenuation seen within the brain to suggest recent stroke No abnormal intracranial fluid collection is seen to suggest recent intracranial hemorrhage No abnormal intracranial mass, mass effect, brain edema, or jono brain herniation is identified        Chronic

## 2025-05-28 NOTE — PROGRESS NOTES
Hospitalist Progress Note    Patient:  Akhil Alejo  YOB: 1955  Date of Service: 5/28/2025  MRN: [id number]   Acct: [id number]   Primary Care Physician: Lennox Waddell MD  Advance Directive: Full Code  Admit Date: 5/21/2025       Hospital Day: 7  Referring Provider: Ben Schultz DO    Patient Seen, Chart, Consults, Notes, Labs, Radiology studies reviewed.    Subjective:  Akhil Alejo is a 69 y.o. male  whom we are following for GI bleed, recent CABG, hypoxemic respiratory failure.  He was transferred from ICU.  He looks much better since the last time I saw him.  He is tolerating his tube feeds.  Hemodynamics are stable.  GFR is stable.  We are awaiting evaluation from inpatient rehab.  No new events.  H&H is also stable.    Allergies:  Penicillins    Medicines:  Current Facility-Administered Medications   Medication Dose Route Frequency Provider Last Rate Last Admin    guaiFENesin (ROBITUSSIN) 100 MG/5ML liquid 200 mg  200 mg Oral TID PRN Darrian Ruano MD   200 mg at 05/26/25 2111    insulin lispro (HUMALOG,ADMELOG) injection vial 0-4 Units  0-4 Units SubCUTAneous 4x Daily AC & HS Primitivo Archibald MD   1 Units at 05/26/25 1747    metoprolol tartrate (LOPRESSOR) tablet 50 mg  50 mg Oral BID Ben Schultz DO   50 mg at 05/28/25 0852    ipratropium 0.5 mg-albuterol 2.5 mg (DUONEB) nebulizer solution 1 Dose  1 Dose Inhalation Q4H WA RT Ben Schultz DO   1 Dose at 05/28/25 0645    aspirin chewable tablet 81 mg  81 mg Oral Daily Nelsy Mir MD   81 mg at 05/28/25 0852    atorvastatin (LIPITOR) tablet 20 mg  20 mg Oral Nightly Chasity Thomas APRN - CNP   20 mg at 05/27/25 2040    bisacodyl (DULCOLAX) suppository 10 mg  10 mg Rectal Daily PRN Chasity Thomas APRN - CNP        insulin lispro (HUMALOG,ADMELOG) injection vial 10 Units  10 Units SubCUTAneous TID WC Thomas, Chasity S, APRN - CNP   10 Units at 05/28/25 0879    levothyroxine (SYNTHROID) tablet 50 mcg  \"HCO3\", \"O2SAT\" in the last 72 hours.  CRP:  No results for input(s): \"CRP\" in the last 72 hours.  Sed Rate:  No results for input(s): \"SEDRATE\" in the last 72 hours.    Cultures:   No results for input(s): \"CULTURE\" in the last 72 hours.  No results for input(s): \"BC\", \"BLOODCULT2\" in the last 72 hours.  No results for input(s): \"CXSURG\" in the last 72 hours.    Radiology reports as per the Radiologist  Radiology: CT HEAD WO CONTRAST  Result Date: 5/22/2025  EXAMINATION: CT HEAD WO CONTRAST  HISTORY: change in mental status  TECHNIQUE: Noncontrast CT of the brain was performed with images acquired from skull base to vertex.  2-D coronal and sagittal reformatted images were obtained from the axial source images. Contrast Dose: None. CT Dose Reduction Techniques Performed: Yes.  COMPARISON: None.  FINDINGS: There is mild prominence of the ventricles and sulci consistent with chronic cerebral atrophy Mild low attenuation is seen in the white matter of both cerebral hemispheres, most likely reflecting chronic microvascular ischemia Coarse calcifications are again noted in the bilateral cerebellar hemispheres and in the bilateral basal ganglia No focus of low attenuation seen within the brain to suggest recent stroke No abnormal intracranial fluid collection is seen to suggest recent intracranial hemorrhage No abnormal intracranial mass, mass effect, brain edema, or jono brain herniation is identified        Chronic atrophy Chronic microvascular ischemia  All CT scans are performed using dose optimization techniques as appropriate to the performed exam and include at least one of the following: Automated exposure control, adjustment of the mA and/or kV according to size, and the use of iterative reconstruction technique.  ______________________________________ Electronically signed by: CRISTOBAL RIVERA M.D. Date:     05/22/2025 Time:    07:15     XR CHEST PORTABLE  Result Date: 5/22/2025    EXAM:  SINGLE FRONTAL VIEW OF

## 2025-05-28 NOTE — PROGRESS NOTES
05/28/25 0943   Subjective   Subjective Patient in bed agrees to therapy.   Pain No C/O pain   Cognition   Overall Cognitive Status Exceptions   Arousal/Alertness Impaired   Following Commands Follows one step commands with increased time;Follows one step commands with repetition   Attention Span Difficulty attending to directions   Memory Decreased recall of recent events   Safety Judgement Decreased awareness of need for assistance;Decreased awareness of need for safety   Problem Solving Assistance required to correct errors made   Insights Decreased awareness of deficits   Initiation Requires cues for some   Sequencing Requires cues for some   Cognition Comment Great difficulty following commands.   Orientation   Overall Orientation Status X   Orientation Level Oriented to person;Oriented to place;Disoriented to time   Vitals   O2 Device None (Room air)   Bed Mobility Training   Bed Mobility Training Yes   Rolling Substantial/Maximal assistance   Supine to Sit Substantial/Maximal assistance;2 Person assistance   Scooting Partial/Moderate assistance  (To EOB)   Balance   Sitting Intact   Standing With support  (RW)   Transfer Training   Transfer Training Yes   Sit to Stand Substantial/Maximal assistance   Stand to Sit Partial/Moderate assistance;2 Person assistance   Bed to Chair Partial/Moderate assistance;Substantial/Maximal assistance;2 Person assistance   Gait Training   Gait Training Yes   Gait   Gait Training Yes   Overall Level of Assistance Partial/Moderate assistance;Substantial/Maximal assistance;2 Person assistance   Distance (ft) 3 Feet  (To chair)   Assistive Device Walker, rolling   Interventions Demonstration;Manual cues;Safety awareness training;Tactile cues;Verbal cues   Speed/Alycia Slow   PT Exercises   A/AROM Exercises BL LE Ex sitting EOBX 10 reps   Patient Education   Education Given To Patient   Education Provided Role of Therapy;Plan of Care;Transfer Training;Mobility Training;Fall

## 2025-05-28 NOTE — PROGRESS NOTES
Occupational Therapy  Facility/Department: [facility] 3 JAMES/VAS/MED  Occupational Therapy Daily Treatment Note    Name: Akhil Alejo  : 1955  MRN: [id number]  Date of Service: 2025    Discharge Recommendations:  Continue to assess pending progress, 24 hour supervision or assist, Patient would benefit from continued therapy after discharge          Patient Diagnosis(es): The encounter diagnosis was Stroke determined by clinical assessment ([facility]).  Past Medical History:  has a past medical history of Arm fracture, Dementia ([facility]), Depression, Diabetes mellitus ([facility]), Hypertension, Kidney stones, Neuropathy, Palliative care patient, and Restless leg syndrome.  Past Surgical History:  has a past surgical history that includes Cholecystectomy; Lithotripsy; shoulder surgery (Left); bone marrow biopsy (Right, 2020); Colonoscopy (2020); Upper gastrointestinal endoscopy (2017); Cardiac procedure (N/A, 2025); Coronary artery bypass graft (N/A, 2025); Gastrostomy tube placement (2025); Upper gastrointestinal endoscopy (N/A, 2025); Upper gastrointestinal endoscopy (N/A, 2025); and Upper gastrointestinal endoscopy (2025).    Treatment Diagnosis: GI bleed, CABG on 25      Assessment  Performance deficits / Impairments: Decreased functional mobility ;Decreased ADL status;Decreased ROM;Decreased strength;Decreased balance;Decreased posture;Decreased endurance  Assessment: Tx focused on bed mobility task and functional mobility task. Pt demonstrated increased confusion this date. Pt oriented to person but disoriented to time and situation. Pt has difficulty dividing attention. Pt continues to benefit from skilled occupational therapy services to address safety and functional independence.  Treatment Diagnosis: GI bleed, CABG on 25  Activity Tolerance  Activity Tolerance: Treatment limited secondary to decreased cognition;Patient Tolerated treatment well

## 2025-05-28 NOTE — PROGRESS NOTES
[facility] Neurology Progress Note      Patient:   Akhil Alejo  MR#:    [id number]   Room:    [facility]/312-02   YOB: 1955  Date of Progress Note: 5/28/2025  Time of Note                           2:06 PM  Consulting Physician:  Doyle More DO  Attending Physician:  Ben Schultz DO      INTERVAL HISTORY:  More responsive, following commands, speech improving.     REVIEW OF SYSTEMS:    Constitutional:  Denies fever or chills   Eyes:  Denies change in visual acuity   Neurologic:  Denies headache, focal weakness or sensory changes       PHYSICAL EXAM:    Constitutional -   BP (!) 143/61   Pulse 81   Temp 97 °F (36.1 °C) (Temporal)   Resp 16   Ht 1.803 m (5' 10.98\")   Wt 83 kg (182 lb 15.7 oz)   SpO2 95%   BMI 25.53 kg/m²   General appearance: Extubated, more responsive   EYES -   Conjunctiva normal  Pupillary exam as below, see CN exam in the neurologic exam  ENT-    No scars, masses, or lesions over external nose or ears  Musculoskeletal -   No significant wasting of muscles noted  Gait as below, see gait exam in the neurologic exam  Muscle strength, tone, stability as below see the motor exam in the neurologic exam.   No bony deformities  Skin -   Warm, dry, and intact to inspection and palpation.    No rash, erythema, or pallor        NEUROLOGICAL EXAM     Mental status    [] Awake, alert, oriented   [] Affect attention and concentration appear appropriate  [] Recent and remote memory appears unremarkable  [] Speech normal without dysarthria or aphasia, comprehension and repetition intact.   COMMENTS:More responsive, following commands, speech improving.    Cranial Nerves [] No VF deficit to confrontation,  optic discs normal, no papilledema on fundoscopic exam.  [] PERRLA, EOMI, no nystagmus, conjugate eye movements, no ptosis  [] Face symmetric  [] Facial sensation intact  [] Tongue midline no atrophy or fasciculations present  [] Palate midline, hearing to finger rub normal  []

## 2025-05-29 PROBLEM — E44.0 MODERATE MALNUTRITION: Status: ACTIVE | Noted: 2025-05-29

## 2025-05-29 LAB
ALBUMIN SERPL-MCNC: 3.4 G/DL (ref 3.5–5.2)
ALP SERPL-CCNC: 123 U/L (ref 40–129)
ALT SERPL-CCNC: 15 U/L (ref 10–50)
ANION GAP SERPL CALCULATED.3IONS-SCNC: 12 MMOL/L (ref 8–16)
AST SERPL-CCNC: 30 U/L (ref 10–50)
BASOPHILS # BLD: 0.1 K/UL (ref 0–0.2)
BASOPHILS NFR BLD: 0.6 % (ref 0–1)
BILIRUB SERPL-MCNC: 0.3 MG/DL (ref 0.2–1.2)
BUN SERPL-MCNC: 21 MG/DL (ref 8–23)
CALCIUM SERPL-MCNC: 9.3 MG/DL (ref 8.8–10.2)
CHLORIDE SERPL-SCNC: 104 MMOL/L (ref 98–107)
CO2 SERPL-SCNC: 22 MMOL/L (ref 22–29)
CREAT SERPL-MCNC: 0.7 MG/DL (ref 0.7–1.2)
EOSINOPHIL # BLD: 0.8 K/UL (ref 0–0.6)
EOSINOPHIL NFR BLD: 8.5 % (ref 0–5)
ERYTHROCYTE [DISTWIDTH] IN BLOOD BY AUTOMATED COUNT: 18.3 % (ref 11.5–14.5)
GLUCOSE BLD-MCNC: 105 MG/DL (ref 70–99)
GLUCOSE BLD-MCNC: 114 MG/DL (ref 70–99)
GLUCOSE BLD-MCNC: 98 MG/DL (ref 70–99)
GLUCOSE SERPL-MCNC: 123 MG/DL (ref 70–99)
HCT VFR BLD AUTO: 36.5 % (ref 42–52)
HGB BLD-MCNC: 11.1 G/DL (ref 14–18)
IMM GRANULOCYTES # BLD: 0.6 K/UL
LYMPHOCYTES # BLD: 3.1 K/UL (ref 1.1–4.5)
LYMPHOCYTES NFR BLD: 31.5 % (ref 20–40)
MCH RBC QN AUTO: 28.8 PG (ref 27–31)
MCHC RBC AUTO-ENTMCNC: 30.4 G/DL (ref 33–37)
MCV RBC AUTO: 94.6 FL (ref 80–94)
MONOCYTES # BLD: 0.7 K/UL (ref 0–0.9)
MONOCYTES NFR BLD: 7 % (ref 0–10)
NEUTROPHILS # BLD: 4.6 K/UL (ref 1.5–7.5)
NEUTS SEG NFR BLD: 46.6 % (ref 50–65)
PERFORMED ON: ABNORMAL
PERFORMED ON: ABNORMAL
PERFORMED ON: NORMAL
PLATELET # BLD AUTO: 296 K/UL (ref 130–400)
PMV BLD AUTO: 10.2 FL (ref 9.4–12.4)
POTASSIUM SERPL-SCNC: 4.2 MMOL/L (ref 3.5–5)
PREALB SERPL-MCNC: 22 MG/DL (ref 20–40)
PROT SERPL-MCNC: 7.6 G/DL (ref 6.4–8.3)
RBC # BLD AUTO: 3.86 M/UL (ref 4.7–6.1)
SODIUM SERPL-SCNC: 138 MMOL/L (ref 136–145)
T4 FREE SERPL-MCNC: 0.96 NG/DL (ref 0.93–1.7)
TSH SERPL DL<=0.005 MIU/L-ACNC: 5.34 UIU/ML (ref 0.27–4.2)
VIT B12 SERPL-MCNC: 588 PG/ML (ref 232–1245)
WBC # BLD AUTO: 9.9 K/UL (ref 4.8–10.8)

## 2025-05-29 PROCEDURE — 6360000002 HC RX W HCPCS: Performed by: PSYCHIATRY & NEUROLOGY

## 2025-05-29 PROCEDURE — 1180000000 HC REHAB R&B

## 2025-05-29 PROCEDURE — 82607 VITAMIN B-12: CPT

## 2025-05-29 PROCEDURE — 80053 COMPREHEN METABOLIC PANEL: CPT

## 2025-05-29 PROCEDURE — 84439 ASSAY OF FREE THYROXINE: CPT

## 2025-05-29 PROCEDURE — 94760 N-INVAS EAR/PLS OXIMETRY 1: CPT

## 2025-05-29 PROCEDURE — 85025 COMPLETE CBC W/AUTO DIFF WBC: CPT

## 2025-05-29 PROCEDURE — 92610 EVALUATE SWALLOWING FUNCTION: CPT

## 2025-05-29 PROCEDURE — 82962 GLUCOSE BLOOD TEST: CPT

## 2025-05-29 PROCEDURE — 84443 ASSAY THYROID STIM HORMONE: CPT

## 2025-05-29 PROCEDURE — 97165 OT EVAL LOW COMPLEX 30 MIN: CPT

## 2025-05-29 PROCEDURE — 97535 SELF CARE MNGMENT TRAINING: CPT

## 2025-05-29 PROCEDURE — 6370000000 HC RX 637 (ALT 250 FOR IP): Performed by: INTERNAL MEDICINE

## 2025-05-29 PROCEDURE — 2580000003 HC RX 258: Performed by: INTERNAL MEDICINE

## 2025-05-29 PROCEDURE — 2500000003 HC RX 250 WO HCPCS: Performed by: INTERNAL MEDICINE

## 2025-05-29 PROCEDURE — 92522 EVALUATE SPEECH PRODUCTION: CPT

## 2025-05-29 PROCEDURE — 97161 PT EVAL LOW COMPLEX 20 MIN: CPT

## 2025-05-29 PROCEDURE — 84134 ASSAY OF PREALBUMIN: CPT

## 2025-05-29 PROCEDURE — 97116 GAIT TRAINING THERAPY: CPT

## 2025-05-29 PROCEDURE — 97110 THERAPEUTIC EXERCISES: CPT

## 2025-05-29 PROCEDURE — 36415 COLL VENOUS BLD VENIPUNCTURE: CPT

## 2025-05-29 PROCEDURE — 94640 AIRWAY INHALATION TREATMENT: CPT

## 2025-05-29 PROCEDURE — 6360000002 HC RX W HCPCS: Performed by: INTERNAL MEDICINE

## 2025-05-29 PROCEDURE — 99222 1ST HOSP IP/OBS MODERATE 55: CPT | Performed by: PSYCHIATRY & NEUROLOGY

## 2025-05-29 RX ORDER — HYDROCODONE BITARTRATE AND ACETAMINOPHEN 5; 325 MG/1; MG/1
1 TABLET ORAL EVERY 6 HOURS PRN
Status: DISCONTINUED | OUTPATIENT
Start: 2025-05-29 | End: 2025-06-20 | Stop reason: HOSPADM

## 2025-05-29 RX ADMIN — SODIUM CHLORIDE, PRESERVATIVE FREE 40 MG: 5 INJECTION INTRAVENOUS at 12:26

## 2025-05-29 RX ADMIN — IPRATROPIUM BROMIDE AND ALBUTEROL SULFATE 1 DOSE: 2.5; .5 SOLUTION RESPIRATORY (INHALATION) at 06:47

## 2025-05-29 RX ADMIN — INSULIN GLARGINE 25 UNITS: 100 INJECTION, SOLUTION SUBCUTANEOUS at 21:02

## 2025-05-29 RX ADMIN — INSULIN GLARGINE 25 UNITS: 100 INJECTION, SOLUTION SUBCUTANEOUS at 07:55

## 2025-05-29 RX ADMIN — SODIUM CHLORIDE, PRESERVATIVE FREE 40 MG: 5 INJECTION INTRAVENOUS at 23:39

## 2025-05-29 RX ADMIN — ACETAMINOPHEN 650 MG: 325 TABLET ORAL at 19:21

## 2025-05-29 RX ADMIN — IPRATROPIUM BROMIDE AND ALBUTEROL SULFATE 1 DOSE: 2.5; .5 SOLUTION RESPIRATORY (INHALATION) at 10:15

## 2025-05-29 RX ADMIN — SODIUM CHLORIDE, PRESERVATIVE FREE 10 ML: 5 INJECTION INTRAVENOUS at 21:00

## 2025-05-29 RX ADMIN — LEVOTHYROXINE SODIUM 50 MCG: 0.05 TABLET ORAL at 05:49

## 2025-05-29 RX ADMIN — GEMFIBROZIL 600 MG: 600 TABLET ORAL at 07:56

## 2025-05-29 RX ADMIN — IPRATROPIUM BROMIDE AND ALBUTEROL SULFATE 1 DOSE: 2.5; .5 SOLUTION RESPIRATORY (INHALATION) at 14:28

## 2025-05-29 RX ADMIN — SODIUM CHLORIDE, PRESERVATIVE FREE 40 MG: 5 INJECTION INTRAVENOUS at 01:30

## 2025-05-29 RX ADMIN — CHOLESTYRAMINE 4 G: 4 POWDER, FOR SUSPENSION ORAL at 07:56

## 2025-05-29 RX ADMIN — ENOXAPARIN SODIUM 40 MG: 100 INJECTION SUBCUTANEOUS at 21:01

## 2025-05-29 RX ADMIN — CLOPIDOGREL BISULFATE 75 MG: 75 TABLET, FILM COATED ORAL at 07:56

## 2025-05-29 RX ADMIN — Medication 5 MG: at 21:01

## 2025-05-29 RX ADMIN — SODIUM CHLORIDE, PRESERVATIVE FREE 10 ML: 5 INJECTION INTRAVENOUS at 07:56

## 2025-05-29 RX ADMIN — IPRATROPIUM BROMIDE AND ALBUTEROL SULFATE 1 DOSE: 2.5; .5 SOLUTION RESPIRATORY (INHALATION) at 19:39

## 2025-05-29 RX ADMIN — METOPROLOL TARTRATE 75 MG: 50 TABLET, FILM COATED ORAL at 21:01

## 2025-05-29 RX ADMIN — CETIRIZINE HYDROCHLORIDE 10 MG: 10 TABLET ORAL at 07:56

## 2025-05-29 RX ADMIN — ATORVASTATIN CALCIUM 20 MG: 20 TABLET, FILM COATED ORAL at 21:01

## 2025-05-29 RX ADMIN — ASPIRIN 81 MG: 81 TABLET, CHEWABLE ORAL at 07:56

## 2025-05-29 RX ADMIN — METOPROLOL TARTRATE 75 MG: 50 TABLET, FILM COATED ORAL at 07:56

## 2025-05-29 RX ADMIN — GEMFIBROZIL 600 MG: 600 TABLET ORAL at 21:01

## 2025-05-29 ASSESSMENT — PAIN DESCRIPTION - ORIENTATION: ORIENTATION: RIGHT

## 2025-05-29 ASSESSMENT — PAIN DESCRIPTION - LOCATION: LOCATION: FLANK

## 2025-05-29 ASSESSMENT — PAIN DESCRIPTION - PAIN TYPE: TYPE: ACUTE PAIN

## 2025-05-29 ASSESSMENT — PAIN SCALES - GENERAL
PAINLEVEL_OUTOF10: 0
PAINLEVEL_OUTOF10: 6
PAINLEVEL_OUTOF10: 0

## 2025-05-29 ASSESSMENT — PAIN DESCRIPTION - ONSET: ONSET: ON-GOING

## 2025-05-29 ASSESSMENT — PAIN - FUNCTIONAL ASSESSMENT: PAIN_FUNCTIONAL_ASSESSMENT: ACTIVITIES ARE NOT PREVENTED

## 2025-05-29 ASSESSMENT — PAIN DESCRIPTION - FREQUENCY: FREQUENCY: INTERMITTENT

## 2025-05-29 ASSESSMENT — PAIN DESCRIPTION - DESCRIPTORS: DESCRIPTORS: DULL;ACHING

## 2025-05-29 NOTE — THERAPY EVALUATION
Physical Therapy EVALUATION Note  DATE:  2025  NAME:  Akhil Alejo  :  1955  (69 y.o.,male)  MRN:  [id number]    HEIGHT:  Height: 180.3 cm (5' 10.98\")  WEIGHT:  Weight - Scale: 83.1 kg (183 lb 3.2 oz)    PATIENT DIAGNOSIS(ES):    Diagnosis: CABGX3 ; NEW ONSET BIHEMISPHERIC DEEP WHITE MATTER CVA WITH RIGHT WEAKNESS    Additional Pertinent Hx: DEPRESSION, DM, HTN  RESTRICTIONS/PRECAUTIONS:    Restrictions/Precautions  Restrictions/Precautions: Weight Bearing, Aspiration Risk, Fall Risk, NPO, Surgical Protocols  Position Activity Restriction  Sternal Precautions: No Pushing, No Pulling, 5# Lifting Restrictions  Other Position/Activity Restrictions: PEG TUBE, HOB 30 DEG FOR FEED  OVERALL  ORIENTATION STATUS:     PAIN:  Pain Level: 0                    GROSS ASSESSMENT        POSTURE/BALANCE          ACTIVITY TOLERANCE         BED MOBILITY  Bed mobility  Rolling to Left: Substantial/Maximal assistance (with rail for brief change)  Rolling to Right: Substantial/Maximal assistance (with rail for brief change)  Supine to Sit: Substantial/Maximal assistance (scoot turn to left.  instructed patient to reach with right hand for opposite bed rail.  when guiding hand patient grabbed onto therapist and pulled self up)  Sit to Supine: Substantial/Maximal assistance        TRANSFERS  Transfers  Sit to Stand: Substantial/Maximal assistance, Partial/Moderate assistance (lack of anterior weight shift prior to standing)  Stand to Sit: Substantial/Maximal assistance, Partial/Moderate assistance (uncontrolled descent rw to wc)  Bed to Chair: Substantial/Maximal assistance, Partial/Moderate assistance (stand pivot to left with rw.  post weight displacmenet)  Comment: LAST STAND PIVOT TF PATIENT BEGAN SITTING/FALLING BACKWARDS INTO CHAIR PRIOR TO BEING IN POSITION.       AMBULATION 1  Ambulation  Device: Rolling Walker  Other Apparatus: Wheelchair follow (iv pole)  Assistance: Substantial/Maximal assistance,
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
## 2025-05-29 NOTE — PROGRESS NOTES
Patient has admitted back to [facility]. MSW notified Yaneli Blade with [facility] and Citlaly You with [facility]. Will follow progress to make definite dc planning arrangements.     Electronically signed by JARROD Rivas on 5/29/2025 at 8:19 AM

## 2025-05-29 NOTE — PROGRESS NOTES
Occupational Therapy  Facility/Department: [facility] REHAB UNIT  Occupational Therapy Initial Assessment    Name: Akhil Alejo  : 1955  MRN: [id number]  Date of Service: 2025    Discharge Recommendations:  Patient would benefit from continued therapy after discharge, Continue to assess pending progress          Patient Diagnosis(es): There were no encounter diagnoses.  Past Medical History:  has a past medical history of Arm fracture, Dementia (HCC), Depression, Diabetes mellitus (HCC), Hypertension, Kidney stones, Neuropathy, Palliative care patient, and Restless leg syndrome.  Past Surgical History:  has a past surgical history that includes Cholecystectomy; Lithotripsy; shoulder surgery (Left); bone marrow biopsy (Right, 2020); Colonoscopy (2020); Upper gastrointestinal endoscopy (2017); Cardiac procedure (N/A, 2025); Coronary artery bypass graft (N/A, 2025); Gastrostomy tube placement (2025); Upper gastrointestinal endoscopy (N/A, 2025); Upper gastrointestinal endoscopy (N/A, 2025); and Upper gastrointestinal endoscopy (2025).    Treatment Diagnosis: Acute ischemic stroke      Assessment  Performance deficits / Impairments: Decreased functional mobility ;Decreased ADL status;Decreased strength;Decreased balance;Decreased posture;Decreased endurance;Decreased safe awareness;Decreased high-level IADLs;Decreased cognition  Assessment: Evaluation completed. Pt has limited endurance, balance, strength, cognition, and safety awareness. These factors limit his participation in ADL, IADL, and other functional tasks. Pt would benefit from further skilled occupational therapy services to upgrade safety and functional independence.  Treatment Diagnosis: Acute ischemic stroke  History: CABG on 25; recent GI bleed  Activity Tolerance  Activity Tolerance: Treatment limited secondary to decreased cognition;Patient limited by fatigue  Activity Tolerance Comments:

## 2025-05-29 NOTE — PROGRESS NOTES
4 Eyes Skin Assessment     NAME:  Akhil Alejo  YOB: 1955  MEDICAL RECORD NUMBER:  [id number]    The patient is being assessed for  Admission    I agree that at least one RN has performed a thorough Head to Toe Skin Assessment on the patient. ALL assessment sites listed below have been assessed.      Areas assessed by both nurses:    Head, Face, Ears, Shoulders, Back, Chest, Arms, Elbows, Hands, Sacrum. Buttock, Coccyx, Ischium, Legs. Feet and Heels, and Under Medical Devices         Does the Patient have a Wound? Yes wound(s) were present on assessment. LDA wound assessment was Initiated and completed by RN       Donte Prevention initiated by RN: Yes  Wound Care Orders initiated by RN: No    Pressure Injury (Stage 3,4, Unstageable, DTI, NWPT, and Complex wounds) if present, place Wound referral order by RN under : No    New Ostomies, if present place, Ostomy referral order under : Yes     Nurse 1 eSignature: Electronically signed by Mecca Rodriguez RN on 5/28/25 at 10:45 PM CDT    **SHARE this note so that the co-signing nurse can place an eSignature**    Nurse 2 eSignature:   Electronically signed by Huong Orellana RN on 5/28/25 at 10:46 PM CDT
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
## 2025-05-29 NOTE — PLAN OF CARE
Problem: Chronic Conditions and Co-morbidities  Goal: Patient's chronic conditions and co-morbidity symptoms are monitored and maintained or improved  5/29/2025 1145 by Korina Salinas LPN  Outcome: Progressing  Flowsheets (Taken 5/29/2025 0758)  Care Plan - Patient's Chronic Conditions and Co-Morbidity Symptoms are Monitored and Maintained or Improved: Monitor and assess patient's chronic conditions and comorbid symptoms for stability, deterioration, or improvement  5/28/2025 2239 by Mecca Rodriguez RN  Outcome: Progressing     Problem: Discharge Planning  Goal: Discharge to home or other facility with appropriate resources  5/29/2025 1145 by Korina Salinas LPN  Outcome: Progressing  Flowsheets (Taken 5/29/2025 0758)  Discharge to home or other facility with appropriate resources: Refer to discharge planning if patient needs post-hospital services based on physician order or complex needs related to functional status, cognitive ability or social support system  5/28/2025 2239 by Mecca Rodriguez RN  Outcome: Progressing     Problem: Skin/Tissue Integrity  Goal: Skin integrity remains intact  Description: 1.  Monitor for areas of redness and/or skin breakdown2.  Assess vascular access sites hourly3.  Every 4-6 hours minimum:  Change oxygen saturation probe site4.  Every 4-6 hours:  If on nasal continuous positive airway pressure, respiratory therapy assess nares and determine need for appliance change or resting period  Outcome: Progressing  Flowsheets (Taken 5/29/2025 1106)  Skin Integrity Remains Intact: Monitor for areas of redness and/or skin breakdown     Problem: Safety - Adult  Goal: Free from fall injury  Outcome: Progressing     Problem: ABCDS Injury Assessment  Goal: Absence of physical injury  Outcome: Progressing     Problem: Neurosensory - Adult  Goal: Achieves stable or improved neurological status  Outcome: Progressing  Flowsheets (Taken 5/29/2025 0758)  Achieves stable or improved

## 2025-05-29 NOTE — PROGRESS NOTES
Akhil Alejo arrived to room # 815.   Presented with: CVA  Mental Status: Patient is disoriented.   Vitals:    05/28/25 2101   BP: 122/75   Pulse: 86   Resp: 14   Temp: 98.4 °F (36.9 °C)   SpO2: 99%     Patient safety contract and falls prevention contract reviewed with patient Yes.  Oriented Patient to room.  Call light within reach. Yes.  Needs, issues or concerns expressed at this time: no.      Electronically signed by Mecca Rodriguez RN on 5/28/2025 at 10:42 PM

## 2025-05-29 NOTE — H&P
[facility]   History and Physical        Patient:   Akhil Alejo  MR#:    [id number]  Account Number:                   [id number]      Room:    [facility]-   YOB: 1955  Date of Progress Note: 5/29/2025  Time of Note                           10:34 AM  Attending Physician:  Leo Vaughn MD        Admitting diagnosis:Cerebral infarction, unspecified    Secondary diagnoses:Dysphagia, oropharyngeal phase,Gastrostomy status,Gastro-esophageal reflux disease without esophagitis,Type 2 diabetes mellitus with hyperglycemia,Mixed hyperlipidemia,Essential (primary) hypertension,Hypothyroidism, unspecified,Restless legs syndrome,Atherosclerotic heart disease of native coronary artery without angina pectoris,Acute posthemorrhagic anemia,Cognitive communication deficit,Acute respiratory failure with hypoxia    CHIEF COMPLAINT: Acute ischemic stroke      HISTORY OF PRESENT ILLNESS:   This 69 y.o. male with history of depression, DM, HTN, neuropathy and RLS. Patient was seen by Dr. Kitchen as outpatient for abnormal ECG and shortness of breath. He underwent a stress test on 4/25/25 which anterior wall ischemia, EF 44%, ECG abnormal as before. He was sent to [facility] for further evaluation. He was seen by cardiologist Dr. Cortes and underwent a heart catheterization which revealed multivessel CAD. Cardiothoracic surgery was consulted. He was seen by Dr. Lara, who recommended CABG x 3 to the LAD, D1 and OM1. Patient was in agreement. The patient had been on Plavix and aspirin. Therefore Plavix was placed on hold. P2Y12 test done on 4/28/25 was 156 , which was felt sufficient recovery of plt function to proceed with CABG on 4/29/25. Patient tolerated the procedure well. Patient had post-op delirium causing some worsening of his dementia. Patient was evaluated by SPT for cognitive and swallow. Patient was found have severe cognitive impairment and oropharyngeal dysphagia. He made NPO. Initially NGT was placed for  alert, fluent slowed oriented x 2 difficulty following complex commands  Attention and concentration mildly impaired  Recent and remote memory appears impaired  Speech with dysarthria  No clear issues with language of fund of knowledge    Cranial Nerve Exam   CN II- Visual fields grossly unremarkable  CN III, IV,VI-EOMI, No nystagmus, conjugate eye movements, no ptosis  CN V-sensation intact to LT over face  CN VII-no facial assymetry  CN VIII-Hearing intact to finger rub  CN IX and X- Palate not tested  CN XI-not test shoulder shrug  CN XII-Tongue midline with no fasciculations or fibrillations    Motor Exam  Antigravity throughout upper and lower extremities bilaterally, no cogwheeling, normal tone  Drift on the right    Sensory Exam  Sensation unreliable    Reflexes   Not tested    Tremors- no tremors in hands or head noted    Gait  Not tested    Coordination  Finger to nose-unable to perform complex        CBC:   Recent Labs     05/27/25  0322 05/28/25  0431 05/29/25  0642   WBC 11.6* 11.4* 9.9   HGB 10.8* 10.8* 11.1*    304 296       BMP:    Recent Labs     05/27/25  0322 05/28/25  0431 05/29/25  0642    136 138   K 4.2 4.5 4.2    104 104   CO2 22 20* 22   BUN 18 18 21   CREATININE 0.7 0.7 0.7   GLUCOSE 146* 152* 123*       Hepatic:   Recent Labs     05/29/25  0642   AST 30   ALT 15   BILITOT 0.3   ALKPHOS 123       Lipids: No results for input(s): \"CHOL\", \"HDL\" in the last 72 hours.    Invalid input(s): \"LDLCALCU\"    INR: No results for input(s): \"INR\" in the last 72 hours.        Assessment and Plan     1.  Multifocal strokes/recent CABG-on aspirin/Plavix/statin  2.  GI bleed-monitor  3.  Hyperlipidemia-on statin/gemfibrozil  4.  GI-bowel regimen/PPI/tube feeds  5.  DVT prophylaxis-Lovenox  6.  Diabetes-on insulin monitor blood sugar  7.  Acute respiratory failure-DuoNebs  8.  Hypothyroidism-Synthroid  9.  Insomnia-on meds monitor  10.  Hypertension-on meds monitor  11.
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
## 2025-05-29 NOTE — PROGRESS NOTES
Comprehensive Nutrition Assessment    Type and Reason for Visit:  Initial    Nutrition Recommendations/Plan:   Continue current interventions  Add moderate malnutrition to problem list     Malnutrition Assessment:  Malnutrition Status:  Moderate malnutrition (05/29/25 0934)    Context:  Acute Illness     Findings of the 6 clinical characteristics of malnutrition:  Energy Intake:  Mild decrease in energy intake  Weight Loss:  Greater than 5% over 1 month     Body Fat Loss:  No body fat loss     Muscle Mass Loss:  Mild muscle mass loss Temples (temporalis), Clavicles (pectoralis & deltoids), Calf (gastrocnemius)  Fluid Accumulation:  No fluid accumulation     Strength:  Not Performed    Nutrition Assessment:    Pt evaluated for readmission to rehab and +NS for home tube feedings. Pt is tolerating current TF with no residuals noted. Pt noted to have significant weight loss of 6.2% in past 30 days although appears to be trending back up. Pt remains NPO at this time.    Nutrition Related Findings:    Glu 108-152, BM 5-27 Wound Type: Multiple, Surgical Incision       Current Nutrition Intake & Therapies:    Average Meal Intake: NPO  Average Supplements Intake: NPO  Diet NPO Exceptions are: Ice Chips  ADULT TUBE FEEDING; PEG; Diabetic; Continuous; 15; Yes; 5; Q 6 hours; 50; 35; Q 1 hour    Anthropometric Measures:  Height: 180.3 cm (5' 10.98\")  Ideal Body Weight (IBW): 172 lbs (78 kg)    Admission Body Weight: 80 kg (176 lb 5.9 oz)  Current Body Weight: 83.1 kg (183 lb 3.2 oz), 106.5 % IBW. Weight Source: Bed scale  Current BMI (kg/m2): 25.6  Usual Body Weight: 88.6 kg (195 lb 5.2 oz) (4-29-25)   % Weight Change (Calculated): -6.2  Weight Adjustment For: No Adjustment   BMI Categories: Overweight (BMI 25.0-29.9)    Estimated Daily Nutrient Needs:  Energy Requirements Based On: Kcal/kg  Weight Used for Energy Requirements: Current  Energy (kcal/day): 4667-3019 (20-25/kg)  Weight Used for Protein Requirements:

## 2025-05-30 LAB
ECHO BSA: 2.02 M2
GLUCOSE BLD-MCNC: 127 MG/DL (ref 70–99)
GLUCOSE BLD-MCNC: 128 MG/DL (ref 70–99)
GLUCOSE BLD-MCNC: 137 MG/DL (ref 70–99)
GLUCOSE BLD-MCNC: 152 MG/DL (ref 70–99)
PERFORMED ON: ABNORMAL
VAS LEFT CCA MID EDV: 9.02 CM/S
VAS LEFT CCA MID PSV: 59.2 CM/S
VAS LEFT CCA PROX EDV: 9.2 CM/S
VAS LEFT CCA PROX PSV: 59.3 CM/S
VAS LEFT ECA EDV: 13.3 CM/S
VAS LEFT ECA PSV: 104 CM/S
VAS LEFT ICA DIST EDV: 12.6 CM/S
VAS LEFT ICA DIST PSV: 65.2 CM/S
VAS LEFT ICA MID EDV: 13.6 CM/S
VAS LEFT ICA MID PSV: 55.3 CM/S
VAS LEFT ICA PROX EDV: 10.7 CM/S
VAS LEFT ICA PROX PSV: 50.9 CM/S
VAS LEFT VERTEBRAL EDV: 7.88 CM/S
VAS LEFT VERTEBRAL PSV: 21 CM/S
VAS RIGHT CCA MID EDV: 9.11 CM/S
VAS RIGHT CCA MID PSV: 48.3 CM/S
VAS RIGHT CCA PROX EDV: 11.4 CM/S
VAS RIGHT CCA PROX PSV: 54.4 CM/S
VAS RIGHT ECA EDV: 10.3 CM/S
VAS RIGHT ECA PSV: 74.2 CM/S
VAS RIGHT ICA DIST EDV: 21.7 CM/S
VAS RIGHT ICA DIST PSV: 57.1 CM/S
VAS RIGHT ICA MID EDV: 21.7 CM/S
VAS RIGHT ICA MID PSV: 56.7 CM/S
VAS RIGHT ICA PROX EDV: 12.2 CM/S
VAS RIGHT ICA PROX PSV: 35.1 CM/S
VAS RIGHT VERTEBRAL EDV: 16.2 CM/S
VAS RIGHT VERTEBRAL PSV: 47 CM/S

## 2025-05-30 PROCEDURE — 97116 GAIT TRAINING THERAPY: CPT

## 2025-05-30 PROCEDURE — 6370000000 HC RX 637 (ALT 250 FOR IP): Performed by: INTERNAL MEDICINE

## 2025-05-30 PROCEDURE — 94760 N-INVAS EAR/PLS OXIMETRY 1: CPT

## 2025-05-30 PROCEDURE — 97129 THER IVNTJ 1ST 15 MIN: CPT

## 2025-05-30 PROCEDURE — 1180000000 HC REHAB R&B

## 2025-05-30 PROCEDURE — 99232 SBSQ HOSP IP/OBS MODERATE 35: CPT | Performed by: PSYCHIATRY & NEUROLOGY

## 2025-05-30 PROCEDURE — 92526 ORAL FUNCTION THERAPY: CPT

## 2025-05-30 PROCEDURE — 82962 GLUCOSE BLOOD TEST: CPT

## 2025-05-30 PROCEDURE — 6360000002 HC RX W HCPCS: Performed by: INTERNAL MEDICINE

## 2025-05-30 PROCEDURE — 97530 THERAPEUTIC ACTIVITIES: CPT

## 2025-05-30 PROCEDURE — 6370000000 HC RX 637 (ALT 250 FOR IP): Performed by: PSYCHIATRY & NEUROLOGY

## 2025-05-30 PROCEDURE — 2500000003 HC RX 250 WO HCPCS: Performed by: INTERNAL MEDICINE

## 2025-05-30 PROCEDURE — 94150 VITAL CAPACITY TEST: CPT

## 2025-05-30 PROCEDURE — 6360000002 HC RX W HCPCS: Performed by: PSYCHIATRY & NEUROLOGY

## 2025-05-30 PROCEDURE — 94640 AIRWAY INHALATION TREATMENT: CPT

## 2025-05-30 PROCEDURE — 97130 THER IVNTJ EA ADDL 15 MIN: CPT

## 2025-05-30 PROCEDURE — 97535 SELF CARE MNGMENT TRAINING: CPT

## 2025-05-30 PROCEDURE — 94669 MECHANICAL CHEST WALL OSCILL: CPT

## 2025-05-30 PROCEDURE — 93880 EXTRACRANIAL BILAT STUDY: CPT | Performed by: SURGERY

## 2025-05-30 PROCEDURE — 97110 THERAPEUTIC EXERCISES: CPT

## 2025-05-30 PROCEDURE — 2580000003 HC RX 258: Performed by: INTERNAL MEDICINE

## 2025-05-30 RX ADMIN — CHOLESTYRAMINE 4 G: 4 POWDER, FOR SUSPENSION ORAL at 09:36

## 2025-05-30 RX ADMIN — GEMFIBROZIL 600 MG: 600 TABLET ORAL at 20:50

## 2025-05-30 RX ADMIN — HYDROCODONE BITARTRATE AND ACETAMINOPHEN 1 TABLET: 5; 325 TABLET ORAL at 13:37

## 2025-05-30 RX ADMIN — LEVOTHYROXINE SODIUM 50 MCG: 0.05 TABLET ORAL at 05:47

## 2025-05-30 RX ADMIN — HYDROCODONE BITARTRATE AND ACETAMINOPHEN 1 TABLET: 5; 325 TABLET ORAL at 04:13

## 2025-05-30 RX ADMIN — ATORVASTATIN CALCIUM 20 MG: 20 TABLET, FILM COATED ORAL at 20:50

## 2025-05-30 RX ADMIN — IPRATROPIUM BROMIDE AND ALBUTEROL SULFATE 1 DOSE: 2.5; .5 SOLUTION RESPIRATORY (INHALATION) at 18:48

## 2025-05-30 RX ADMIN — INSULIN GLARGINE 25 UNITS: 100 INJECTION, SOLUTION SUBCUTANEOUS at 20:50

## 2025-05-30 RX ADMIN — INSULIN GLARGINE 25 UNITS: 100 INJECTION, SOLUTION SUBCUTANEOUS at 09:35

## 2025-05-30 RX ADMIN — GEMFIBROZIL 600 MG: 600 TABLET ORAL at 09:36

## 2025-05-30 RX ADMIN — IPRATROPIUM BROMIDE AND ALBUTEROL SULFATE 1 DOSE: 2.5; .5 SOLUTION RESPIRATORY (INHALATION) at 14:13

## 2025-05-30 RX ADMIN — ENOXAPARIN SODIUM 40 MG: 100 INJECTION SUBCUTANEOUS at 20:50

## 2025-05-30 RX ADMIN — SODIUM CHLORIDE, PRESERVATIVE FREE 40 MG: 5 INJECTION INTRAVENOUS at 12:21

## 2025-05-30 RX ADMIN — IPRATROPIUM BROMIDE AND ALBUTEROL SULFATE 1 DOSE: 2.5; .5 SOLUTION RESPIRATORY (INHALATION) at 10:58

## 2025-05-30 RX ADMIN — SODIUM CHLORIDE, PRESERVATIVE FREE 10 ML: 5 INJECTION INTRAVENOUS at 09:36

## 2025-05-30 RX ADMIN — Medication: at 17:54

## 2025-05-30 RX ADMIN — ASPIRIN 81 MG: 81 TABLET, CHEWABLE ORAL at 09:36

## 2025-05-30 RX ADMIN — Medication 5 MG: at 20:50

## 2025-05-30 RX ADMIN — DICLOFENAC SODIUM 4 G: 10 GEL TOPICAL at 04:13

## 2025-05-30 RX ADMIN — SODIUM CHLORIDE, PRESERVATIVE FREE 10 ML: 5 INJECTION INTRAVENOUS at 20:51

## 2025-05-30 RX ADMIN — CETIRIZINE HYDROCHLORIDE 10 MG: 10 TABLET ORAL at 09:36

## 2025-05-30 RX ADMIN — METOPROLOL TARTRATE 75 MG: 50 TABLET, FILM COATED ORAL at 20:50

## 2025-05-30 RX ADMIN — METOPROLOL TARTRATE 75 MG: 50 TABLET, FILM COATED ORAL at 09:36

## 2025-05-30 RX ADMIN — IPRATROPIUM BROMIDE AND ALBUTEROL SULFATE 1 DOSE: 2.5; .5 SOLUTION RESPIRATORY (INHALATION) at 07:06

## 2025-05-30 RX ADMIN — CLOPIDOGREL BISULFATE 75 MG: 75 TABLET, FILM COATED ORAL at 09:36

## 2025-05-30 ASSESSMENT — PAIN SCALES - GENERAL
PAINLEVEL_OUTOF10: 0
PAINLEVEL_OUTOF10: 7
PAINLEVEL_OUTOF10: 8
PAINLEVEL_OUTOF10: 5

## 2025-05-30 ASSESSMENT — PAIN DESCRIPTION - LOCATION
LOCATION: KNEE
LOCATION: ABDOMEN

## 2025-05-30 ASSESSMENT — PAIN DESCRIPTION - ORIENTATION
ORIENTATION: LEFT
ORIENTATION: LEFT

## 2025-05-30 ASSESSMENT — PAIN DESCRIPTION - DESCRIPTORS: DESCRIPTORS: ACHING

## 2025-05-30 ASSESSMENT — PAIN - FUNCTIONAL ASSESSMENT: PAIN_FUNCTIONAL_ASSESSMENT: ACTIVITIES ARE NOT PREVENTED

## 2025-05-30 NOTE — PROGRESS NOTES
to need to return to Rehab for continued medical management and therapy to work towards his goal of returning home with his wife.  No new complaints this morning.    REVIEW OF SYSTEMS:  Constitutional: No fevers No chills  Neck:No stiffness  Respiratory: No shortness of breath  Cardiovascular: No chest pain No palpitations  Gastrointestinal: No abdominal pain    Genitourinary: No Dysuria  Neurological: No headache, + confusion    Past Medical History:      Diagnosis Date    Arm fracture 07/2016    left    Dementia (HCC)     per wife he needs be supervised at home    Depression     Diabetes mellitus (HCC)     Hypertension     Kidney stones     Neuropathy     Palliative care patient 05/22/2025    Restless leg syndrome        Past Surgical History:      Procedure Laterality Date    BONE MARROW BIOPSY Right 12/09/2020    BONE MARROW ASPIRATION BIOPSY performed by ALISA Rabago at [facility] OR    CARDIAC PROCEDURE N/A 04/25/2025    Left heart cath / coronary angiography performed by Chayo Cortes MD at [facility] CARDIAC CATH LAB    CHOLECYSTECTOMY      COLONOSCOPY  05/27/2020    Dr Patiño   AP    CORONARY ARTERY BYPASS GRAFT N/A 04/29/2025    CORONARY ARTERY BYPASS GRAFT X3, MINIMALLY INVASIVE robotic, pump assisted via femoral access, BILATERAL INTERNAL MAMMARY ARTERIES, ENDOSCOPIC VEIN HARVESTING, TRANSESOPHAGEAL ECHOCARDIOGRAM performed by Akhil Lara MD at [facility] OR    GASTROSTOMY TUBE PLACEMENT  05/09/2025    Dr Taylor-Multiple duodenal ulcers, successful placement of 20 Lao PEG tube    LITHOTRIPSY      SHOULDER SURGERY Left     Frozen shoulder surgery    UPPER GASTROINTESTINAL ENDOSCOPY  04/04/2017    Dr Patiño- normal    UPPER GASTROINTESTINAL ENDOSCOPY N/A 05/09/2025    Dr Taylor-w/PEG placement    UPPER GASTROINTESTINAL ENDOSCOPY N/A 05/21/2025    Dr Mir-Superficial actively bleeding submucosal vessel at the PEG tube bolster site, treated successfully w/epinephrine and gold probe, PEG tube secured  at 3 cm to the level of the skin    UPPER GASTROINTESTINAL ENDOSCOPY  05/21/2025    Dr Mir-Superficial actively bleeding submucosal vessel at the PEG tube bolster site, treated successfully w/epinephrine and gold probe, PEG tube secured at 3 cm to the level of the skin       Medications in Hospital:      Current Facility-Administered Medications:     HYDROcodone-acetaminophen (NORCO) 5-325 MG per tablet 1 tablet, 1 tablet, Oral, Q6H PRN, Leo Vaughn MD, 1 tablet at 05/30/25 0413    diclofenac sodium (VOLTAREN) 1 % gel 4 g, 4 g, Topical, TID PRN, Leo Vaughn MD, 4 g at 05/30/25 0413    atorvastatin (LIPITOR) tablet 20 mg, 20 mg, Oral, Nightly, Ben Schultz DO, 20 mg at 05/29/25 2101    bisacodyl (DULCOLAX) suppository 10 mg, 10 mg, Rectal, Daily PRN, Ben Schultz DO    insulin lispro (HUMALOG,ADMELOG) injection vial 10 Units, 10 Units, SubCUTAneous, TID WC, Ben Schultz DO    levothyroxine (SYNTHROID) tablet 50 mcg, 50 mcg, Oral, Daily, Ben Schultz DO, 50 mcg at 05/30/25 0547    Benzocaine-Menthol (CEPACOL) 1 lozenge, 1 lozenge, Oral, Q2H PRN, Ben Schultz DO    cholestyramine light packet 4 g, 4 g, Oral, Daily, Ben Schultz DO, 4 g at 05/29/25 0756    cetirizine (ZYRTEC) tablet 10 mg, 10 mg, Oral, Daily, Ben Schultz DO, 10 mg at 05/29/25 0756    nitroGLYCERIN (NITROSTAT) SL tablet 0.4 mg, 0.4 mg, SubLINGual, Q5 Min PRN, Ben Schultz DO    magnesium hydroxide (MILK OF MAGNESIA) 400 MG/5ML suspension 30 mL, 30 mL, Oral, Daily PRN, Ben Schultz DO    melatonin disintegrating tablet 5 mg, 5 mg, Oral, Nightly, Ben Schultz DO, 5 mg at 05/29/25 2101    gemfibrozil (LOPID) tablet 600 mg, 600 mg, Oral, BID, Ben Schultz DO, 600 mg at 05/29/25 2101    clopidogrel (PLAVIX) tablet 75 mg, 75 mg, Oral, Daily, Ben Schultz DO, 75 mg at 05/29/25 0756    sodium chloride flush 0.9 % injection 5-40 mL, 5-40 mL,

## 2025-05-30 NOTE — PLAN OF CARE
Problem: Chronic Conditions and Co-morbidities  Goal: Patient's chronic conditions and co-morbidity symptoms are monitored and maintained or improved  Outcome: Progressing     Problem: Discharge Planning  Goal: Discharge to home or other facility with appropriate resources  Outcome: Progressing     Problem: Skin/Tissue Integrity  Goal: Skin integrity remains intact  Description: 1.  Monitor for areas of redness and/or skin breakdown2.  Assess vascular access sites hourly3.  Every 4-6 hours minimum:  Change oxygen saturation probe site4.  Every 4-6 hours:  If on nasal continuous positive airway pressure, respiratory therapy assess nares and determine need for appliance change or resting period  Outcome: Progressing     Problem: Safety - Adult  Goal: Free from fall injury  Outcome: Progressing     Problem: ABCDS Injury Assessment  Goal: Absence of physical injury  Outcome: Progressing     Problem: Nutrition Deficit:  Goal: Optimize nutritional status  Outcome: Progressing     Problem: Neurosensory - Adult  Goal: Achieves stable or improved neurological status  Outcome: Progressing  Goal: Achieves maximal functionality and self care  Outcome: Progressing     Problem: Skin/Tissue Integrity - Adult  Goal: Skin integrity remains intact  Description: 1.  Monitor for areas of redness and/or skin breakdown2.  Assess vascular access sites hourly3.  Every 4-6 hours minimum:  Change oxygen saturation probe site4.  Every 4-6 hours:  If on nasal continuous positive airway pressure, respiratory therapy assess nares and determine need for appliance change or resting period  Outcome: Progressing  Goal: Incisions, wounds, or drain sites healing without S/S of infection  Outcome: Progressing     Problem: Musculoskeletal - Adult  Goal: Return mobility to safest level of function  Outcome: Progressing  Goal: Return ADL status to a safe level of function  Outcome: Progressing     Problem: Gastrointestinal - Adult  Goal: Maintains or

## 2025-05-30 NOTE — PROGRESS NOTES
[facility]  INPATIENT SPEECH THERAPY  [facility] 8 REHAB UNIT      TIME  1330  1430  60 MINUTES    [x]Daily Note  []Progress Note    Date: 2025  Patient Name: Akhil Alejo        MRN: [id number]    Account #: [id number]  : 1955  (69 y.o.)  Gender: male   Primary Provider: Leo Vaughn MD  Swallowing Status/Diet:    Subjective: Patient alert and upright in chair upon entry. Patient became drowsy and was transferred to the bed mid session.    Objective:     Memory functioning task completed. Patient given 3-4 words and required to immediately repeat them back. Patient able to repeat 3 words back with 100% accuracy independently. Patient able to repeat 4 words back with 50% accuracy independently. Given maximum verbal cues, patient able to increase accuracy to 100%.     Reasoning task completed. Patient given a concrete category and required to list 3-5 items in each. Patient able to complete task with 50% accuracy independently. Given maximum verbal and semantic cues, patient able to increase accuracy to 70%.     Education provided to patient and spouse on pharyngeal strengthening exercises. Patient educated on the Effortful Swallow to improve hyolaryngeal elevation, tongue base retraction, and vocal fold closure. Additionally, patient educated on the Radha Manuevor to improve hyolaryngeal elevation and clear vallecular residue. Modeling provided for demonstrations pharyngeal strengthening exercises.    SLP will continue to follow and treat.    Diet Solids Recommendation:     Diet Liquid Recommendation:     Compensatory Swallowing Strategies:       SHORT TERM GOAL #1:  Goal 1: Re-assessment of speech/language/cognitive functioning.    SHORT TERM GOAL #2:  Goal 2: Patient will complete attention and processing tasks with provision of mod cues/prompts.    SHORT TERM GOAL #3:  Goal 3: Patient will complete comprehension and word finding tasks with provision of mod cues/prompts.    SHORT TERM GOAL #4:  Goal 4:

## 2025-05-30 NOTE — PLAN OF CARE
Problem: Safety - Adult  Goal: Free from fall injury  5/29/2025 1934 by Benjamín Rosen RN  Outcome: Progressing  5/29/2025 1145 by Korina Salinas LPN  Outcome: Progressing

## 2025-05-30 NOTE — PROGRESS NOTES
05/30/25 1100   Restrictions/Precautions   Restrictions/Precautions Weight Bearing;Aspiration Risk;Fall Risk;NPO;Surgical Protocols   Activity Level Up with Assist   Lower Extremity Weight Bearing Restrictions   Right Lower Extremity Weight Bearing Weight Bearing As Tolerated   Left Lower Extremity Weight Bearing Weight Bearing As Tolerated   Position Activity Restriction   Sternal Precautions   (.)   Other Position/Activity Restrictions PEG TUBE, HOB 30 DEG FOR FEED   General   Additional Pertinent Hx DEPRESSION, DM, HTN   Diagnosis CABGX3 4/29; NEW ONSET BIHEMISPHERIC DEEP WHITE MATTER CVA WITH RIGHT WEAKNESS   Strength RLE   Comment grossly 3 to 3+/5   Strength LLE   Comment grossly 3  to 3+/5   Transfers   Sit to Stand Partial/Moderate assistance  (in parallel bars)   Stand to Sit Partial/Moderate assistance  (in parallel bars)   Bed to Chair Substantial/Maximal assistance   Ambulation   Surface Level tile   Device Parallel Bars   Other Apparatus Wheelchair follow   Assistance Minimal assistance   Quality of Gait mild posterior lean , short steps intermittent knee buckle without collase   Distance 10, 10,10   Ambulation 2   Surface - 2 level tile   Device 2 Rolling Walker   Assistance 2 Partial/Moderate assistance   Quality of Gait 2 scissoring gait, forward flex head down posture   Distance 5   Comments from wheelchair to recliner   Assessment   Assessment pt able to participate with encouragement, very short attention span, continues to demponstrate posterior lean in standing and lack of forward flexion to allow for sit to stand wiout therapist assist.   Safety Devices   Type of Devices All fall risk precautions in place;Call light within reach;Chair alarm in place;Heels elevated for pressure relief;Left in chair;Nurse notified   PT Individual Minutes   Time In 1100   Time Out 1200   Minutes 60

## 2025-05-30 NOTE — PROGRESS NOTES
Occupational Therapy  Facility/Department: [facility] 8 REHAB UNIT  Rehabilitation Occupational Therapy Daily Treatment Note    Date: 25  Patient Name: Akhil Alejo       Room: 0815/815-02  MRN: [id number]  Account: [id number]   : 1955  (69 y.o.) Gender: male                    Past Medical History:  has a past medical history of Arm fracture, Dementia (HCC), Depression, Diabetes mellitus (HCC), Hypertension, Kidney stones, Neuropathy, Palliative care patient, and Restless leg syndrome.  Past Surgical History:   has a past surgical history that includes Cholecystectomy; Lithotripsy; shoulder surgery (Left); bone marrow biopsy (Right, 2020); Colonoscopy (2020); Upper gastrointestinal endoscopy (2017); Cardiac procedure (N/A, 2025); Coronary artery bypass graft (N/A, 2025); Gastrostomy tube placement (2025); Upper gastrointestinal endoscopy (N/A, 2025); Upper gastrointestinal endoscopy (N/A, 2025); and Upper gastrointestinal endoscopy (2025).    Restrictions  Restrictions/Precautions: Weight Bearing, Aspiration Risk, Fall Risk, NPO, Surgical Protocols  Other Position/Activity Restrictions: PEG TUBE, HOB 30 DEG FOR FEED  Right Lower Extremity Weight Bearing: Weight Bearing As Tolerated  Left Lower Extremity Weight Bearing: Weight Bearing As Tolerated      Plan  Occupational Therapy Plan  Current Treatment Recommendations: Strengthening;Balance training;ROM;Functional mobility training;Endurance training;Patient/Caregiver education & training;Safety education & training;Cognitive reorientation;Equipment evaluation, education, & procurement;Self-Care / ADL;Home management training;Positioning;Cognitive/Perceptual training     25 09   Pain   Pre-Pain 3   Post-Pain 0   Pain Location   (faces, PEG site)   Pain Interventions Repositioning         25 09   General   Family / Caregiver Present No   ADL   Grooming Supervision;Verbal cueing   Balance

## 2025-05-31 LAB
GLUCOSE BLD-MCNC: 126 MG/DL (ref 70–99)
GLUCOSE BLD-MCNC: 127 MG/DL (ref 70–99)
GLUCOSE BLD-MCNC: 83 MG/DL (ref 70–99)
GLUCOSE BLD-MCNC: 90 MG/DL (ref 70–99)
PERFORMED ON: ABNORMAL
PERFORMED ON: ABNORMAL
PERFORMED ON: NORMAL
PERFORMED ON: NORMAL

## 2025-05-31 PROCEDURE — 97110 THERAPEUTIC EXERCISES: CPT

## 2025-05-31 PROCEDURE — 6370000000 HC RX 637 (ALT 250 FOR IP): Performed by: INTERNAL MEDICINE

## 2025-05-31 PROCEDURE — 94640 AIRWAY INHALATION TREATMENT: CPT

## 2025-05-31 PROCEDURE — 94150 VITAL CAPACITY TEST: CPT

## 2025-05-31 PROCEDURE — 82962 GLUCOSE BLOOD TEST: CPT

## 2025-05-31 PROCEDURE — 2500000003 HC RX 250 WO HCPCS: Performed by: INTERNAL MEDICINE

## 2025-05-31 PROCEDURE — 92526 ORAL FUNCTION THERAPY: CPT

## 2025-05-31 PROCEDURE — 6360000002 HC RX W HCPCS: Performed by: INTERNAL MEDICINE

## 2025-05-31 PROCEDURE — 6360000002 HC RX W HCPCS: Performed by: PSYCHIATRY & NEUROLOGY

## 2025-05-31 PROCEDURE — 6370000000 HC RX 637 (ALT 250 FOR IP): Performed by: PSYCHIATRY & NEUROLOGY

## 2025-05-31 PROCEDURE — 97130 THER IVNTJ EA ADDL 15 MIN: CPT

## 2025-05-31 PROCEDURE — 1180000000 HC REHAB R&B

## 2025-05-31 PROCEDURE — 2580000003 HC RX 258: Performed by: INTERNAL MEDICINE

## 2025-05-31 PROCEDURE — 94760 N-INVAS EAR/PLS OXIMETRY 1: CPT

## 2025-05-31 PROCEDURE — 97530 THERAPEUTIC ACTIVITIES: CPT

## 2025-05-31 PROCEDURE — 97129 THER IVNTJ 1ST 15 MIN: CPT

## 2025-05-31 RX ORDER — LANSOPRAZOLE 30 MG/1
30 TABLET, ORALLY DISINTEGRATING, DELAYED RELEASE ORAL 2 TIMES DAILY
Status: DISCONTINUED | OUTPATIENT
Start: 2025-05-31 | End: 2025-06-20 | Stop reason: HOSPADM

## 2025-05-31 RX ADMIN — Medication: at 17:30

## 2025-05-31 RX ADMIN — ENOXAPARIN SODIUM 40 MG: 100 INJECTION SUBCUTANEOUS at 20:42

## 2025-05-31 RX ADMIN — IPRATROPIUM BROMIDE AND ALBUTEROL SULFATE 1 DOSE: 2.5; .5 SOLUTION RESPIRATORY (INHALATION) at 19:04

## 2025-05-31 RX ADMIN — METOPROLOL TARTRATE 75 MG: 50 TABLET, FILM COATED ORAL at 20:42

## 2025-05-31 RX ADMIN — INSULIN GLARGINE 25 UNITS: 100 INJECTION, SOLUTION SUBCUTANEOUS at 20:42

## 2025-05-31 RX ADMIN — Medication 5 MG: at 20:42

## 2025-05-31 RX ADMIN — GEMFIBROZIL 600 MG: 600 TABLET ORAL at 09:41

## 2025-05-31 RX ADMIN — CHOLESTYRAMINE 4 G: 4 POWDER, FOR SUSPENSION ORAL at 09:41

## 2025-05-31 RX ADMIN — IPRATROPIUM BROMIDE AND ALBUTEROL SULFATE 1 DOSE: 2.5; .5 SOLUTION RESPIRATORY (INHALATION) at 14:37

## 2025-05-31 RX ADMIN — SODIUM CHLORIDE, PRESERVATIVE FREE 10 ML: 5 INJECTION INTRAVENOUS at 09:42

## 2025-05-31 RX ADMIN — LANSOPRAZOLE 30 MG: 30 TABLET, ORALLY DISINTEGRATING, DELAYED RELEASE ORAL at 20:42

## 2025-05-31 RX ADMIN — ASPIRIN 81 MG: 81 TABLET, CHEWABLE ORAL at 09:41

## 2025-05-31 RX ADMIN — CETIRIZINE HYDROCHLORIDE 10 MG: 10 TABLET ORAL at 09:41

## 2025-05-31 RX ADMIN — IPRATROPIUM BROMIDE AND ALBUTEROL SULFATE 1 DOSE: 2.5; .5 SOLUTION RESPIRATORY (INHALATION) at 10:29

## 2025-05-31 RX ADMIN — SODIUM CHLORIDE, PRESERVATIVE FREE 40 MG: 5 INJECTION INTRAVENOUS at 01:31

## 2025-05-31 RX ADMIN — LEVOTHYROXINE SODIUM 50 MCG: 0.05 TABLET ORAL at 05:41

## 2025-05-31 RX ADMIN — GEMFIBROZIL 600 MG: 600 TABLET ORAL at 20:42

## 2025-05-31 RX ADMIN — ATORVASTATIN CALCIUM 20 MG: 20 TABLET, FILM COATED ORAL at 20:42

## 2025-05-31 RX ADMIN — CLOPIDOGREL BISULFATE 75 MG: 75 TABLET, FILM COATED ORAL at 09:41

## 2025-05-31 RX ADMIN — IPRATROPIUM BROMIDE AND ALBUTEROL SULFATE 1 DOSE: 2.5; .5 SOLUTION RESPIRATORY (INHALATION) at 06:20

## 2025-05-31 RX ADMIN — METOPROLOL TARTRATE 75 MG: 50 TABLET, FILM COATED ORAL at 09:41

## 2025-05-31 RX ADMIN — INSULIN GLARGINE 25 UNITS: 100 INJECTION, SOLUTION SUBCUTANEOUS at 09:41

## 2025-05-31 NOTE — PLAN OF CARE
Problem: Safety - Adult  Goal: Free from fall injury  5/30/2025 1923 by Benjamín Rosen, RN  Outcome: Progressing  5/30/2025 1352 by Flaca Mendez, RN  Outcome: Progressing

## 2025-05-31 NOTE — PROGRESS NOTES
Occupational Therapy  Facility/Department: [facility] 8 REHAB UNIT  Daily Treatment Note  NAME: Akhil Alejo  : 1955  MRN: [id number]    Date of Service: 2025    Discharge Recommendations:  Patient would benefit from continued therapy after discharge, Continue to assess pending progress       Assessment   Performance deficits / Impairments: Decreased functional mobility ;Decreased ADL status;Decreased strength;Decreased balance;Decreased posture;Decreased endurance;Decreased safe awareness;Decreased high-level IADLs;Decreased cognition  Assessment: Pt participated with therapy with encouragement and VC's.  Pt continues to benefit from skilled OT services.  Treatment Diagnosis: Acute ischemic stroke  Prognosis: Fair  Activity Tolerance  Activity Tolerance: Treatment limited secondary to decreased cognition;Patient limited by fatigue  Activity Tolerance Comments: Pt constantly asked for ice chips.         Patient Diagnosis(es):    has a past medical history of Arm fracture, Dementia (HCC), Depression, Diabetes mellitus (HCC), Hypertension, Kidney stones, Neuropathy, Palliative care patient, and Restless leg syndrome.   has a past surgical history that includes Cholecystectomy; Lithotripsy; shoulder surgery (Left); bone marrow biopsy (Right, 2020); Colonoscopy (2020); Upper gastrointestinal endoscopy (2017); Cardiac procedure (N/A, 2025); Coronary artery bypass graft (N/A, 2025); Gastrostomy tube placement (2025); Upper gastrointestinal endoscopy (N/A, 2025); Upper gastrointestinal endoscopy (N/A, 2025); and Upper gastrointestinal endoscopy (2025).    Restrictions  Restrictions/Precautions  Restrictions/Precautions: Weight Bearing, Aspiration Risk, Fall Risk, NPO, Surgical Protocols  Activity Level: Up with Assist  Lower Extremity Weight Bearing Restrictions  Right Lower Extremity Weight Bearing: Weight Bearing As Tolerated  Left Lower Extremity Weight Bearing:  Weight Bearing As Tolerated  Position Activity Restriction  Sternal Precautions:  (.)  Other Position/Activity Restrictions: PEG TUBE, HOB 30 DEG FOR FEED  Subjective   General  Chart Reviewed: Yes  Patient assessed for rehabilitation services?: Yes  Additional Pertinent Hx: CABG on 4/29  Response to previous treatment: Patient with no complaints from previous session  Family / Caregiver Present: Yes (spouse and sister)  Referring Practitioner: Dr. Lara  Diagnosis: Acute ischemic stroke  Subjective  Subjective: Pt sleeping in bed upon arrival and agreeable to participate in therapy with cues and encouragement      Orientation     Objective    ADL  Putting On/Taking Off Footwear: Moderate assistance;Maximum assistance           Bed mobility  Supine to Sit: Contact guard assistance;Minimal assistance  Sit to Supine: Minimal assistance  Scooting: Stand by assistance (VC's and encouragement)  Bed Mobility Comments: side rails used during bed mob  Transfers  Stand Pivot Transfers: Minimal assistance;Moderate assistance  Sit to stand: Minimal assistance  Stand to sit: Minimal assistance  Transfer Comments: with RW      OT Exercises:      cane/wand--2#, 2 sets, 10reps, encouragement to continue exercises and to complete full range, green digigrip 10 reps each hand.                                                Plan   Occupational Therapy Plan  Specific Instructions for Next Treatment: functional mobility  Current Treatment Recommendations: Strengthening, Balance training, ROM, Functional mobility training, Endurance training, Patient/Caregiver education & training, Safety education & training, Cognitive reorientation, Equipment evaluation, education, & procurement, Self-Care / ADL, Home management training, Positioning, Cognitive/Perceptual training  Goals  Short Term Goals  Time Frame for Short Term Goals: 1 week  Short Term Goal 1: Pt will perform oral hygiene with min A  Short Term Goal 2: Pt will perform upper body

## 2025-05-31 NOTE — PROGRESS NOTES
[facility]  INPATIENT SPEECH THERAPY  [facility] 8 REHAB UNIT    TIME   Time In: 10:05  Time Out: 11:05  Minutes: 60     [x]Daily Note  []Progress Note     Date: 2025  Patient Name:  Akhil Alejo      MRN: [id number]  Account #: [id number]  : 1955  Gender: Male  Primary Provider: Dr. Vaughn  Precautions: Fall Precautions  Swallowing Status/Diet: NPO - tube feeding     Subjective:  Pt seated upright in bed for duration of session. Pt complained of pain, stated that his pain level was an 8/10. Nurse was notified. Pt complained of discomfort in the bed. Pt requested readjustment, PCA helped readjust pt appropriately.     Objective:   Targeted immediate memory, pt had 100% accuracy with no cues on immediate recall of 3 unrelated words. Targeted delayed recall of 3 unrelated words with a 15 second delay. Pt had 0% accuracy with no cues, with max cues pt was able to recall all 3 unrelated words.     Targeted listing 3-5 category members with max cues. Pt was able to list 2 colors, 4 fruits, and 5 vegetables.     Targeted some pharyngeal exercises via tongue exercises. Pt did not want to participate and stated he was tired.     Pt requested ice chips, after 3 trials of ice chips, pt began coughing. Pt required max cues to complete strong swallows and to complete Radha maneuver when completing swallowing trials.     ASSESSMENT:  Assessment: [x]Progressing towards goals           []Not Progressing towards goals     Patient Tolerance of Treatment:       []Tolerated well          []Tolerated fair           [x]Required rest breaks          [x]Fatigued     Education:  Learner:  [x]Patient          []Significant Other          [x]Other  Education provided regarding:  [x]Goals and POC                               []Diet and swallowing precautions                  []Home Exercise Program                 []Progress and/or discharge information  Method of Education:  [x]Discussion          []Demonstration          []Handout

## 2025-05-31 NOTE — PROGRESS NOTES
Name: Akhil Alejo  MRN: [id number]  Date of Service:  5/31/2025 05/31/25 1105   Restrictions/Precautions   Restrictions/Precautions Weight Bearing;Aspiration Risk;Fall Risk;NPO;Surgical Protocols   Lower Extremity Weight Bearing Restrictions   Right Lower Extremity Weight Bearing Weight Bearing As Tolerated   Left Lower Extremity Weight Bearing Weight Bearing As Tolerated   Position Activity Restriction   Other Position/Activity Restrictions PEG TUBE, HOB 30 DEG FOR FEED   General   Chart Reviewed Yes   Patient assessed for rehabilitation services? 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   Pain   Pre-Pain 3   Post-Pain 6   Pain Location Abdomen;Right;Leg;Back  (Faces: PEG site, back, and R leg w/ ther-ex)   Bed mobility   Rolling to Left Minimal assistance   Supine to Sit Contact guard assistance;Minimal assistance   Sit to Supine Minimal assistance   Scooting Stand by assistance  (On EOB and up in bed (v/c's for bending B knees and use of BUE on overhead rail))   Bed Mobility Comments On L side of bed- use of bedrail  (Extended time, encouragement, and bedrails for all bed mobility)   Transfers   Sit to Stand Minimal Assistance;2 Person Assistance   Stand to Sit Minimal Assistance;Partial/Moderate assistance   Stand Pivot Transfers Partial/Moderate assistance;2 Person Assistance  (EOB<>w/c from pt R/L: 1X w/ RW and 1X w/o AD (v/c's for use of bedrail))   PT Exercises   Exercise Treatment Sitting in w/c BLE ther-ex: PF X 10 AROM and w/ Blegs propped on PTA leg DF/PF X 20 AAROM/PROM   Activity Tolerance   Activity Tolerance Treatment limited secondary to decreased cognition;Other (comment);Patient limited by endurance;Patient tolerated treatment well  (Some encouragement to participate)

## 2025-06-01 LAB
GLUCOSE BLD-MCNC: 74 MG/DL (ref 70–99)
GLUCOSE BLD-MCNC: 78 MG/DL (ref 70–99)
GLUCOSE BLD-MCNC: 84 MG/DL (ref 70–99)
GLUCOSE BLD-MCNC: 97 MG/DL (ref 70–99)
PERFORMED ON: NORMAL

## 2025-06-01 PROCEDURE — 94760 N-INVAS EAR/PLS OXIMETRY 1: CPT

## 2025-06-01 PROCEDURE — 6360000002 HC RX W HCPCS: Performed by: PSYCHIATRY & NEUROLOGY

## 2025-06-01 PROCEDURE — 6370000000 HC RX 637 (ALT 250 FOR IP): Performed by: PSYCHIATRY & NEUROLOGY

## 2025-06-01 PROCEDURE — 6370000000 HC RX 637 (ALT 250 FOR IP): Performed by: INTERNAL MEDICINE

## 2025-06-01 PROCEDURE — 1180000000 HC REHAB R&B

## 2025-06-01 PROCEDURE — 82962 GLUCOSE BLOOD TEST: CPT

## 2025-06-01 PROCEDURE — 94640 AIRWAY INHALATION TREATMENT: CPT

## 2025-06-01 PROCEDURE — 94150 VITAL CAPACITY TEST: CPT

## 2025-06-01 RX ADMIN — Medication 5 MG: at 20:23

## 2025-06-01 RX ADMIN — IPRATROPIUM BROMIDE AND ALBUTEROL SULFATE 1 DOSE: 2.5; .5 SOLUTION RESPIRATORY (INHALATION) at 18:31

## 2025-06-01 RX ADMIN — METOPROLOL TARTRATE 75 MG: 50 TABLET, FILM COATED ORAL at 20:23

## 2025-06-01 RX ADMIN — ATORVASTATIN CALCIUM 20 MG: 20 TABLET, FILM COATED ORAL at 20:24

## 2025-06-01 RX ADMIN — METOPROLOL TARTRATE 75 MG: 50 TABLET, FILM COATED ORAL at 09:15

## 2025-06-01 RX ADMIN — CHOLESTYRAMINE 4 G: 4 POWDER, FOR SUSPENSION ORAL at 09:15

## 2025-06-01 RX ADMIN — LEVOTHYROXINE SODIUM 50 MCG: 0.05 TABLET ORAL at 05:46

## 2025-06-01 RX ADMIN — LANSOPRAZOLE 30 MG: 30 TABLET, ORALLY DISINTEGRATING, DELAYED RELEASE ORAL at 20:23

## 2025-06-01 RX ADMIN — LANSOPRAZOLE 30 MG: 30 TABLET, ORALLY DISINTEGRATING, DELAYED RELEASE ORAL at 09:15

## 2025-06-01 RX ADMIN — IPRATROPIUM BROMIDE AND ALBUTEROL SULFATE 1 DOSE: 2.5; .5 SOLUTION RESPIRATORY (INHALATION) at 07:29

## 2025-06-01 RX ADMIN — IPRATROPIUM BROMIDE AND ALBUTEROL SULFATE 1 DOSE: 2.5; .5 SOLUTION RESPIRATORY (INHALATION) at 14:00

## 2025-06-01 RX ADMIN — GEMFIBROZIL 600 MG: 600 TABLET ORAL at 20:23

## 2025-06-01 RX ADMIN — CETIRIZINE HYDROCHLORIDE 10 MG: 10 TABLET ORAL at 09:15

## 2025-06-01 RX ADMIN — ASPIRIN 81 MG: 81 TABLET, CHEWABLE ORAL at 09:20

## 2025-06-01 RX ADMIN — ENOXAPARIN SODIUM 40 MG: 100 INJECTION SUBCUTANEOUS at 20:23

## 2025-06-01 RX ADMIN — INSULIN GLARGINE 25 UNITS: 100 INJECTION, SOLUTION SUBCUTANEOUS at 09:16

## 2025-06-01 RX ADMIN — INSULIN GLARGINE 25 UNITS: 100 INJECTION, SOLUTION SUBCUTANEOUS at 20:24

## 2025-06-01 RX ADMIN — GEMFIBROZIL 600 MG: 600 TABLET ORAL at 09:15

## 2025-06-01 RX ADMIN — IPRATROPIUM BROMIDE AND ALBUTEROL SULFATE 1 DOSE: 2.5; .5 SOLUTION RESPIRATORY (INHALATION) at 11:04

## 2025-06-01 RX ADMIN — CLOPIDOGREL BISULFATE 75 MG: 75 TABLET, FILM COATED ORAL at 09:16

## 2025-06-01 NOTE — PLAN OF CARE
Problem: Chronic Conditions and Co-morbidities  Goal: Patient's chronic conditions and co-morbidity symptoms are monitored and maintained or improved  Outcome: Progressing     Problem: Discharge Planning  Goal: Discharge to home or other facility with appropriate resources  Outcome: Progressing     Problem: Skin/Tissue Integrity  Goal: Skin integrity remains intact  Description: 1.  Monitor for areas of redness and/or skin breakdown2.  Assess vascular access sites hourly3.  Every 4-6 hours minimum:  Change oxygen saturation probe site4.  Every 4-6 hours:  If on nasal continuous positive airway pressure, respiratory therapy assess nares and determine need for appliance change or resting period  Outcome: Progressing  Flowsheets (Taken 6/1/2025 1333)  Skin Integrity Remains Intact: Monitor for areas of redness and/or skin breakdown     Problem: Safety - Adult  Goal: Free from fall injury  Outcome: Progressing     Problem: ABCDS Injury Assessment  Goal: Absence of physical injury  Outcome: Progressing     Problem: Nutrition Deficit:  Goal: Optimize nutritional status  Outcome: Progressing     Problem: Neurosensory - Adult  Goal: Achieves stable or improved neurological status  Outcome: Progressing  Goal: Achieves maximal functionality and self care  Outcome: Progressing     Problem: Skin/Tissue Integrity - Adult  Goal: Skin integrity remains intact  Description: 1.  Monitor for areas of redness and/or skin breakdown2.  Assess vascular access sites hourly3.  Every 4-6 hours minimum:  Change oxygen saturation probe site4.  Every 4-6 hours:  If on nasal continuous positive airway pressure, respiratory therapy assess nares and determine need for appliance change or resting period  Outcome: Progressing  Flowsheets (Taken 6/1/2025 133)  Skin Integrity Remains Intact: Monitor for areas of redness and/or skin breakdown  Goal: Incisions, wounds, or drain sites healing without S/S of infection  Outcome: Progressing  Flowsheets

## 2025-06-01 NOTE — PLAN OF CARE
Problem: Safety - Adult  Goal: Free from fall injury  5/31/2025 1913 by Benjamín Rosen, RN  Outcome: Progressing  5/31/2025 1515 by Flaca Mendez, RN  Outcome: Progressing

## 2025-06-02 LAB
ALBUMIN SERPL-MCNC: 3.5 G/DL (ref 3.5–5.2)
ALP SERPL-CCNC: 141 U/L (ref 40–129)
ALT SERPL-CCNC: 14 U/L (ref 10–50)
ANION GAP SERPL CALCULATED.3IONS-SCNC: 11 MMOL/L (ref 8–16)
AST SERPL-CCNC: 31 U/L (ref 10–50)
BASOPHILS # BLD: 0.1 K/UL (ref 0–0.2)
BASOPHILS NFR BLD: 0.6 % (ref 0–1)
BILIRUB SERPL-MCNC: 0.4 MG/DL (ref 0.2–1.2)
BUN SERPL-MCNC: 25 MG/DL (ref 8–23)
CALCIUM SERPL-MCNC: 9.6 MG/DL (ref 8.8–10.2)
CHLORIDE SERPL-SCNC: 104 MMOL/L (ref 98–107)
CO2 SERPL-SCNC: 24 MMOL/L (ref 22–29)
CREAT SERPL-MCNC: 0.8 MG/DL (ref 0.7–1.2)
EOSINOPHIL # BLD: 0.5 K/UL (ref 0–0.6)
EOSINOPHIL NFR BLD: 6.5 % (ref 0–5)
ERYTHROCYTE [DISTWIDTH] IN BLOOD BY AUTOMATED COUNT: 17.7 % (ref 11.5–14.5)
GLUCOSE BLD-MCNC: 106 MG/DL (ref 70–99)
GLUCOSE BLD-MCNC: 108 MG/DL (ref 70–99)
GLUCOSE BLD-MCNC: 66 MG/DL (ref 70–99)
GLUCOSE BLD-MCNC: 72 MG/DL (ref 70–99)
GLUCOSE BLD-MCNC: 94 MG/DL (ref 70–99)
GLUCOSE SERPL-MCNC: 85 MG/DL (ref 70–99)
HCT VFR BLD AUTO: 35.7 % (ref 42–52)
HCT VFR BLD AUTO: 38.7 % (ref 42–52)
HGB BLD-MCNC: 11.2 G/DL (ref 14–18)
HGB BLD-MCNC: 12.2 G/DL (ref 14–18)
IMM GRANULOCYTES # BLD: 0.3 K/UL
LYMPHOCYTES # BLD: 3 K/UL (ref 1.1–4.5)
LYMPHOCYTES NFR BLD: 36.7 % (ref 20–40)
MCH RBC QN AUTO: 29.2 PG (ref 27–31)
MCHC RBC AUTO-ENTMCNC: 31.4 G/DL (ref 33–37)
MCV RBC AUTO: 93 FL (ref 80–94)
MONOCYTES # BLD: 0.8 K/UL (ref 0–0.9)
MONOCYTES NFR BLD: 9.6 % (ref 0–10)
NEUTROPHILS # BLD: 3.5 K/UL (ref 1.5–7.5)
NEUTS SEG NFR BLD: 43.5 % (ref 50–65)
PERFORMED ON: ABNORMAL
PERFORMED ON: NORMAL
PERFORMED ON: NORMAL
PLATELET # BLD AUTO: 304 K/UL (ref 130–400)
PMV BLD AUTO: 10 FL (ref 9.4–12.4)
POTASSIUM SERPL-SCNC: 4.6 MMOL/L (ref 3.5–5)
PROT SERPL-MCNC: 7.8 G/DL (ref 6.4–8.3)
RBC # BLD AUTO: 3.84 M/UL (ref 4.7–6.1)
SODIUM SERPL-SCNC: 139 MMOL/L (ref 136–145)
WBC # BLD AUTO: 8.1 K/UL (ref 4.8–10.8)

## 2025-06-02 PROCEDURE — 80053 COMPREHEN METABOLIC PANEL: CPT

## 2025-06-02 PROCEDURE — 99232 SBSQ HOSP IP/OBS MODERATE 35: CPT | Performed by: PSYCHIATRY & NEUROLOGY

## 2025-06-02 PROCEDURE — 82962 GLUCOSE BLOOD TEST: CPT

## 2025-06-02 PROCEDURE — 97110 THERAPEUTIC EXERCISES: CPT

## 2025-06-02 PROCEDURE — 97130 THER IVNTJ EA ADDL 15 MIN: CPT

## 2025-06-02 PROCEDURE — 94760 N-INVAS EAR/PLS OXIMETRY 1: CPT

## 2025-06-02 PROCEDURE — 85025 COMPLETE CBC W/AUTO DIFF WBC: CPT

## 2025-06-02 PROCEDURE — 85014 HEMATOCRIT: CPT

## 2025-06-02 PROCEDURE — 97530 THERAPEUTIC ACTIVITIES: CPT

## 2025-06-02 PROCEDURE — 6360000002 HC RX W HCPCS

## 2025-06-02 PROCEDURE — 6360000002 HC RX W HCPCS: Performed by: PSYCHIATRY & NEUROLOGY

## 2025-06-02 PROCEDURE — 6370000000 HC RX 637 (ALT 250 FOR IP): Performed by: INTERNAL MEDICINE

## 2025-06-02 PROCEDURE — 1180000000 HC REHAB R&B

## 2025-06-02 PROCEDURE — 97116 GAIT TRAINING THERAPY: CPT

## 2025-06-02 PROCEDURE — 36415 COLL VENOUS BLD VENIPUNCTURE: CPT

## 2025-06-02 PROCEDURE — 6370000000 HC RX 637 (ALT 250 FOR IP): Performed by: PSYCHIATRY & NEUROLOGY

## 2025-06-02 PROCEDURE — 92526 ORAL FUNCTION THERAPY: CPT

## 2025-06-02 PROCEDURE — 94150 VITAL CAPACITY TEST: CPT

## 2025-06-02 PROCEDURE — 97535 SELF CARE MNGMENT TRAINING: CPT

## 2025-06-02 PROCEDURE — 94640 AIRWAY INHALATION TREATMENT: CPT

## 2025-06-02 PROCEDURE — 85018 HEMOGLOBIN: CPT

## 2025-06-02 PROCEDURE — 97129 THER IVNTJ 1ST 15 MIN: CPT

## 2025-06-02 RX ORDER — GLUCAGON 1 MG/ML
KIT INJECTION
Status: COMPLETED
Start: 2025-06-02 | End: 2025-06-02

## 2025-06-02 RX ORDER — DEXTROSE MONOHYDRATE 100 MG/ML
INJECTION, SOLUTION INTRAVENOUS CONTINUOUS PRN
Status: DISCONTINUED | OUTPATIENT
Start: 2025-06-02 | End: 2025-06-20 | Stop reason: HOSPADM

## 2025-06-02 RX ORDER — GLUCAGON 1 MG/ML
1 KIT INJECTION PRN
Status: DISCONTINUED | OUTPATIENT
Start: 2025-06-02 | End: 2025-06-20 | Stop reason: HOSPADM

## 2025-06-02 RX ORDER — INSULIN GLARGINE 100 [IU]/ML
15 INJECTION, SOLUTION SUBCUTANEOUS 2 TIMES DAILY
Status: DISCONTINUED | OUTPATIENT
Start: 2025-06-02 | End: 2025-06-20 | Stop reason: HOSPADM

## 2025-06-02 RX ADMIN — CETIRIZINE HYDROCHLORIDE 10 MG: 10 TABLET ORAL at 09:29

## 2025-06-02 RX ADMIN — ENOXAPARIN SODIUM 40 MG: 100 INJECTION SUBCUTANEOUS at 20:47

## 2025-06-02 RX ADMIN — IPRATROPIUM BROMIDE AND ALBUTEROL SULFATE 1 DOSE: 2.5; .5 SOLUTION RESPIRATORY (INHALATION) at 18:09

## 2025-06-02 RX ADMIN — LANSOPRAZOLE 30 MG: 30 TABLET, ORALLY DISINTEGRATING, DELAYED RELEASE ORAL at 20:47

## 2025-06-02 RX ADMIN — INSULIN GLARGINE 15 UNITS: 100 INJECTION, SOLUTION SUBCUTANEOUS at 20:47

## 2025-06-02 RX ADMIN — ATORVASTATIN CALCIUM 20 MG: 20 TABLET, FILM COATED ORAL at 20:47

## 2025-06-02 RX ADMIN — LANSOPRAZOLE 30 MG: 30 TABLET, ORALLY DISINTEGRATING, DELAYED RELEASE ORAL at 09:53

## 2025-06-02 RX ADMIN — GLUCAGON 1 MG: KIT at 13:25

## 2025-06-02 RX ADMIN — ACETAMINOPHEN 650 MG: 325 TABLET ORAL at 17:08

## 2025-06-02 RX ADMIN — LEVOTHYROXINE SODIUM 50 MCG: 0.05 TABLET ORAL at 05:41

## 2025-06-02 RX ADMIN — GEMFIBROZIL 600 MG: 600 TABLET ORAL at 09:29

## 2025-06-02 RX ADMIN — CLOPIDOGREL BISULFATE 75 MG: 75 TABLET, FILM COATED ORAL at 09:29

## 2025-06-02 RX ADMIN — IPRATROPIUM BROMIDE AND ALBUTEROL SULFATE 1 DOSE: 2.5; .5 SOLUTION RESPIRATORY (INHALATION) at 14:19

## 2025-06-02 RX ADMIN — METOPROLOL TARTRATE 75 MG: 50 TABLET, FILM COATED ORAL at 09:30

## 2025-06-02 RX ADMIN — GEMFIBROZIL 600 MG: 600 TABLET ORAL at 20:47

## 2025-06-02 RX ADMIN — INSULIN GLARGINE 25 UNITS: 100 INJECTION, SOLUTION SUBCUTANEOUS at 09:13

## 2025-06-02 RX ADMIN — MAGNESIUM HYDROXIDE 30 ML: 400 SUSPENSION ORAL at 05:41

## 2025-06-02 RX ADMIN — IPRATROPIUM BROMIDE AND ALBUTEROL SULFATE 1 DOSE: 2.5; .5 SOLUTION RESPIRATORY (INHALATION) at 06:57

## 2025-06-02 RX ADMIN — ASPIRIN 81 MG: 81 TABLET, CHEWABLE ORAL at 09:29

## 2025-06-02 RX ADMIN — Medication 5 MG: at 20:47

## 2025-06-02 RX ADMIN — METOPROLOL TARTRATE 75 MG: 50 TABLET, FILM COATED ORAL at 20:47

## 2025-06-02 RX ADMIN — CHOLESTYRAMINE 4 G: 4 POWDER, FOR SUSPENSION ORAL at 09:29

## 2025-06-02 RX ADMIN — Medication: at 13:18

## 2025-06-02 RX ADMIN — POLYETHYLENE GLYCOL 3350 17 G: 17 POWDER, FOR SOLUTION ORAL at 09:37

## 2025-06-02 ASSESSMENT — PAIN SCALES - GENERAL: PAINLEVEL_OUTOF10: 8

## 2025-06-02 NOTE — PROGRESS NOTES
Physician Progress Note      PATIENT:               AKHIL RAUSCH  CSN #:                  [id number]  :                       1955  ADMIT DATE:       2025 11:25 AM  DISCH DATE:        2025 8:24 PM  RESPONDING  PROVIDER #:        Akhil Lara MD          QUERY TEXT:    Anemia is documented in the medical record in CTS consult . Please specify   the type:    The clinical indicators include:  --Hct 23.2-21.5  --Hgb6.7-7.5  --Massive GI bleeding - required 5 u PRBC so far. Most recent Hct is 24. Will   continue transfusions to meet a transfusion target of 30. No signs of   coagulopathy - plt count >300, INR 1.2. There does not appear to be any   ongoing bleeding - no melena, no bloody NGT output. Continue to hold plavix   and lovenox. Hold tube feeds until cleared by GI consult. (PN -Marco)  Options provided:  -- Related to acute blood loss  -- Related to acute on chronic blood loss  -- Other - I will add my own diagnosis  -- Disagree - Not applicable / Not valid  -- Disagree - Clinically unable to determine / Unknown  -- Refer to Clinical Documentation Reviewer    PROVIDER RESPONSE TEXT:    The patient's anemia is related to acute blood loss.    Query created by: Cas Nelson on 2025 12:07 PM      Electronically signed by:  Akhil Lara MD 2025 11:01 AM

## 2025-06-02 NOTE — PROGRESS NOTES
Spoke to patient in room to inform of CMG date (6/20) and to discuss dc planning. Patient plans to return home at discharge with home health and home tube feedings. [facility] has been arranged and a referral pending with [facility] for tube feedings, will just need to add final orders for tube feedings closer to dc date.     Electronically signed by JARROD Rivas on 6/2/2025 at 12:51 PM

## 2025-06-02 NOTE — PROGRESS NOTES
Nutrition Assessment     Type and Reason for Visit: Reassess    Nutrition Recommendations/Plan:   Continue current interventions     Malnutrition Assessment:  Malnutrition Status: Moderate malnutrition    Nutrition Assessment:  Pt continues to tolerate TF at goal rate. No residuals noted. Weight appears to be stabilizing. Remains NPO. No BM noted since 5-27, sent perfect serve to nurse for follow up.    Estimated Daily Nutrient Needs:  Energy (kcal):  8989-3208 (20-25/kg) Weight Used for Energy Requirements: Current     Protein (g):  101-156 (1.3-2/kg) Weight Used for Protein Requirements: Ideal        Fluid (ml/day):  8693-5979 (20-25/kg) Method Used for Fluid Requirements: 1 ml/kcal    Nutrition Related Findings:   BM 5-27 Wound Type: Multiple, Surgical Incision    Current Nutrition Therapies:    Diet NPO Exceptions are: Ice Chips  ADULT TUBE FEEDING; PEG; Diabetic; Continuous; 15; Yes; 5; Q 6 hours; 50; 35; Q 1 hour    Anthropometric Measures:  Height: 180.3 cm (5' 10.98\")  Current Body Wt: 83.2 kg (183 lb 6.8 oz)   BMI: 25.6      Nutrition Diagnosis:   Moderate malnutrition related to inadequate protein-energy intake as evidenced by criteria as identified in malnutrition assessment    Nutrition Interventions:   Food and/or Nutrient Delivery: Continue NPO, Continue Current Tube Feeding  Nutrition Education/Counseling: No recommendation at this time  Coordination of Nutrition Care: Continue to monitor while inpatient  Plan of Care discussed with: Pt    Goals:  Goals: Meet at least 75% of estimated needs, Tolerate nutrition support at goal rate  Type of Goal: Continue current goal  Previous Goal Met: Goal(s) Achieved    Nutrition Monitoring and Evaluation:   Behavioral-Environmental Outcomes: None Identified  Food/Nutrient Intake Outcomes: Enteral Nutrition Intake/Tolerance  Physical Signs/Symptoms Outcomes: Biochemical Data, Chewing or Swallowing, Constipation, Fluid Status or Edema, Skin, Weight    Discharge

## 2025-06-02 NOTE — PROGRESS NOTES
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   Social/Functional History   Lives With Spouse   Type of Home House   Home Layout One level   Home Access Stairs to enter with rails   Entrance Stairs - Number of Steps 3   Transfers   Sit to Stand Partial/Moderate assistance   Stand to Sit Partial/Moderate assistance   Bed to Chair Partial/Moderate assistance   Ambulation   Surface Level tile   Device Rolling Walker   Other Apparatus Wheelchair follow   Assistance Partial/Moderate assistance;2 Person assistance   Quality of Gait posterior lean attempting to lift rw from floor , therapist required to anchor the rw and correct pt's posterior lean, pt tend to overstride resulting in tendency to near scissor.   Distance 5,   Balance   Comments ABLE TO SIT EOB AND MAINTAIN BALANCE IWTH MIN/MOD A. HELD BALANCE FOR GROSSLY 5 SEC BEFORE LEANING TO THE L ELBOW   PT Exercises   Static Standing Balance Exercises 3-4 sit stands using sanket stedy for support from w/c to toilet, sit stand for cleaning, sit stand for dressing , sit stand to return to w/c   Assessment   Assessment pt demonstrates very little initiative toward therapy, considerabel encouragement required for pt to activly participate. continues with posterior lean in standing activities   PT Individual Minutes   Time In 1100   Time Out 1200   Minutes 60

## 2025-06-02 NOTE — PLAN OF CARE
Problem: Safety - Adult  Goal: Free from fall injury  6/1/2025 1902 by Benjamín Rosen, RN  Outcome: Progressing  6/1/2025 1340 by Paola Womack, RN  Outcome: Progressing

## 2025-06-02 NOTE — PATIENT CARE CONFERENCE
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      PHYSICAL THERAPY  GROSS ASSESSMENT       BED MOBILITY  Bed mobility  Rolling to Left: Minimal assistance  Rolling to Right: Substantial/Maximal assistance (with rail for brief change)  Supine to Sit: Contact guard assistance, Minimal assistance  Sit to Supine: Minimal assistance  Scooting: Stand by assistance (On EOB and up in bed (v/c's for bending B knees and use of BUE on overhead rail))  Bed Mobility Comments: On L side of bed- use of bedrail (Extended time, encouragement, and bedrails for all bed mobility)       TRANSFERS  Transfers  Sit to Stand: Partial/Moderate assistance  Stand to Sit: Partial/Moderate assistance  Bed to Chair: Partial/Moderate assistance  Stand Pivot Transfers: Partial/Moderate assistance, 2 Person Assistance (EOB<>w/c from pt R/L: 1X w/ RW and 1X w/o AD (v/c's for use of bedrail))  Comment: LAST STAND PIVOT TF PATIENT BEGAN SITTING/FALLING BACKWARDS INTO CHAIR PRIOR TO BEING IN POSITION.  WHEELCHAIR PROPULSION     AMBULATION  Ambulation  Surface: Level tile  Device: Rolling Walker  Other Apparatus: Wheelchair follow  Assistance: Partial/Moderate assistance, 2 Person assistance  Quality of Gait: posterior lean attempting to lift rw from floor , therapist required to anchor the rw and correct pt's posterior lean, pt tend to overstride resulting in tendency to near scissor.  Distance: 5,  STAIRS     GOALS:  Short Term Goals  Time Frame for Short Term Goals: 2 WEEKS  Short Term Goal 1: BED MOBILITY MOD ASSIST WITH RAILS  Short Term Goal 2: TF MOD A SURFACE TO SURFACE WITH A.D.  Short Term Goal 3: AMB 10 FT WITH RW MOD A  Short Term Goal 4: PROPEL WC 25 FT MOD A  Short Term Goal 5: CAR TF MAX A WITH A.D.    Long Term

## 2025-06-02 NOTE — PROGRESS NOTES
Occupational Therapy  Facility/Department: [facility] 8 REHAB UNIT  Rehabilitation Occupational Therapy Daily Treatment Note    Date: 25  Patient Name: Akhil Alejo       Room: 0815/815-02  MRN: [id number]  Account: [id number]   : 1955  (69 y.o.) Gender: male                    Past Medical History:  has a past medical history of Arm fracture, Dementia (HCC), Depression, Diabetes mellitus (HCC), Hypertension, Kidney stones, Neuropathy, Palliative care patient, and Restless leg syndrome.  Past Surgical History:   has a past surgical history that includes Cholecystectomy; Lithotripsy; shoulder surgery (Left); bone marrow biopsy (Right, 2020); Colonoscopy (2020); Upper gastrointestinal endoscopy (2017); Cardiac procedure (N/A, 2025); Coronary artery bypass graft (N/A, 2025); Gastrostomy tube placement (2025); Upper gastrointestinal endoscopy (N/A, 2025); Upper gastrointestinal endoscopy (N/A, 2025); and Upper gastrointestinal endoscopy (2025).    Restrictions  Restrictions/Precautions: Weight Bearing, Aspiration Risk, Fall Risk, NPO, Surgical Protocols  Other Position/Activity Restrictions: PEG TUBE, HOB 30 DEG FOR FEED  Right Lower Extremity Weight Bearing: Weight Bearing As Tolerated  Left Lower Extremity Weight Bearing: Weight Bearing As Tolerated       25 1000   ADL   UE Bathing Minimal assistance   UE Bathing Skilled Clinical Factors bed bath, cues for thoroughness and initition   Transfers   Sit to stand Minimal assistance  (with RW cues for hand placement)   Stand to sit Minimal assistance   OT Exercises   Exercise Treatment quirinorai 15 reps 2 sets 13#, cues for encouragement to continue exercise and complete full range.   Activity Tolerance   Activity Tolerance Treatment limited secondary to decreased cognition;Patient limited by fatigue   Assessment   Performance deficits / Impairments Decreased functional mobility ;Decreased ADL status;Decreased

## 2025-06-02 NOTE — PROGRESS NOTES
Patient ID: Angelic is a 74 year old female.    Chief Complaint   Patient presents with   • Follow-up     HPI  Internal medicine chronic care follow-up.  Patient is here for follow-up of chronic medical conditions including hypertension hyperlipidemia low B12 and pedal edema    Hypertension: Denies chest pain increased pedal edema on amlodipine benazepril and hydrochlorothiazide  Leg edema.  Denies dyspnea PND orthopnea.  Has seen cardiology recently.  On hydrochlorothiazide  Low B12 has been on oral B12.  Denies fatigue  Hyperlipidemia no chest pain dyspnea.  Low vitamin D has been on oral vitamin D  Renal stones: Denies any flank pain hematuria increased frequency of urination  Past Medical History:   Diagnosis Date   • Abnormal ECG    • Abnormal mammogram     left breast   • Asymptomatic postmenopausal state    • B12 deficiency    • Carotid bruit    • Chronic diastolic (congestive) heart failure (CMS/HCC)    • Diastolic dysfunction    • Encounter for screening mammogram for breast cancer    • Essential hypertension    • Hypocitraturia    • LBBB (left bundle branch block)    • Left nephrolithiasis    • Low vitamin D level    • LVH (left ventricular hypertrophy)    • Mixed hyperlipidemia    • Obesity    • Osteopenia    • Primary osteoarthritis of right knee    • SOB (shortness of breath)       has Abnormal mammogram of left breast; B12 deficiency; Chronic diastolic (congestive) heart failure (CMS/HCC); Essential hypertension; Hyperlipemia; Obesity; Primary osteoarthritis of right knee; Osteopenia; Mixed hyperlipidemia; LVH (left ventricular hypertrophy); Low vitamin D level; Left nephrolithiasis; LBBB (left bundle branch block); Hypocitraturia; Encounter for screening mammogram for breast cancer; Diastolic dysfunction; Carotid bruit; Asymptomatic postmenopausal state; Abnormal mammogram; and Abnormal ECG on their problem list.  Past Surgical History:   Procedure Laterality Date   • Incision procedure     • Knee  Patient:   Akhil Alejo  MR#:    [id number]   Room:    0815/815-02   YOB: 1955  Date of Progress Note: 6/2/2025  Time of Note                           8:06 AM  Consulting Physician:   Leo Vaughn M.D.  Attending Physician:  Leo Vaughn MD     Chief complaint Acute ischemic stroke     S:This 69 y.o. male  with history of depression, DM, HTN, neuropathy and RLS. Patient was seen by Dr. Kitchen as outpatient for abnormal ECG and shortness of breath. He underwent a stress test on 4/25/25 which anterior wall ischemia, EF 44%, ECG abnormal as before. He was sent to [facility] for further evaluation. He was seen by cardiologist Dr. Cortes and underwent a heart catheterization which revealed multivessel CAD. Cardiothoracic surgery was consulted. He was seen by Dr. Lara, who recommended CABG x 3 to the LAD, D1 and OM1. Patient was in agreement. The patient had been on Plavix and aspirin. Therefore Plavix was placed on hold. P2Y12 test done on 4/28/25 was 156 , which was felt sufficient recovery of plt function to proceed with CABG on 4/29/25. Patient tolerated the procedure well. Patient had post-op delirium causing some worsening of his dementia. Patient was evaluated by SPT for cognitive and swallow. Patient was found have severe cognitive impairment and oropharyngeal dysphagia. He made NPO. Initially NGT was placed for feedings. Unfortunately, patient continue to have oropharyngeal phase dysphagia and ultimately patient required placement of G-Tube on 5/9/25. Patient had been tolerating tube feedings fairly well. He continued to have cognitive deficits with Avasys camera in place. Patient remained weak overall and was felt to need a stay on Rehab. He was admitted to Rehab on 5/13/25. Patient was participating in PT/OT/SPT. On 5/21/25 nursing reported hematemesis and blood from G-Tube overnight. Hospitalist was consulted.  Pt was found to be tachypneic and pale, Pulse ox was 95 .Bp 103/59 hr 94.  Pt  surgery          Family History   Problem Relation Age of Onset   • Heart disease Mother    • Cancer Father    • Cancer, Colon Son    • Sleep Apnea Daughter      \  Social History     Socioeconomic History   • Marital status:      Spouse name: None   • Number of children: None   • Years of education: None   • Highest education level: None   Social Needs   • Financial resource strain: None   • Food insecurity - worry: None   • Food insecurity - inability: None   • Transportation needs - medical: None   • Transportation needs - non-medical: None   Occupational History   • None   Tobacco Use   • Smoking status: Former Smoker   • Smokeless tobacco: Never Used   Substance and Sexual Activity   • Alcohol use: Yes   • Drug use: None   • Sexual activity: None   Other Topics Concern   • None   Social History Narrative   • None        Current Outpatient Medications   Medication Sig Dispense Refill   • benazepril (LOTENSIN) 40 MG tablet Take 1 tablet by mouth daily. 90 tablet 1   • glucosamine-chondroitin (OSTEO BI-FLEX REGULAR STRENGTH) 250-200 MG tablet      • hydrochlorothiazide (MICROZIDE) 12.5 MG capsule Take 1 capsule by mouth daily. (Patient taking differently: Take 25 mg by mouth daily. ) 90 capsule 3   • amLODIPine (NORVASC) 5 MG tablet Take 5 mg by mouth.     • aspirin 81 MG tablet Take by mouth daily.     • Multiple Vitamin (MULTIVITAMINS PO)      • Cod Liver Oil 1000 MG Cap Take 1 capsule by mouth daily.     • POTASSIUM CITRATE ER PO 1 CAPSULE (99MG) ONE ORAL ONCE A DAY DR. MARSHALL     • vitamin B-12 (CYANOCOBALAMIN) 1000 MCG tablet Take 1 tablet by mouth daily. 100 tablet 1     No current facility-administered medications for this visit.      ALLERGIES:  No Known Allergies    Review of Systems   Constitutional: Negative for chills, fatigue and fever.   HENT: Negative for congestion, ear pain, mouth sores, rhinorrhea and sore throat.    Eyes: Negative for pain, discharge and redness.   Respiratory: Negative  for cough, shortness of breath and wheezing.    Cardiovascular: Negative for chest pain and palpitations.   Gastrointestinal: Negative for abdominal distention, abdominal pain, diarrhea, nausea and vomiting.   Endocrine: Negative for cold intolerance, heat intolerance, polydipsia and polyphagia.   Genitourinary: Negative for difficulty urinating, dysuria, flank pain and frequency.   Musculoskeletal: Negative for arthralgias, back pain and gait problem.   Skin: Negative for rash.   Allergic/Immunologic: Negative for immunocompromised state.   Neurological: Negative for dizziness, weakness, numbness and headaches.   Hematological: Negative for adenopathy. Does not bruise/bleed easily.   Psychiatric/Behavioral: Negative for confusion, dysphoric mood and hallucinations. The patient is not nervous/anxious.      Review  Past medical history, problem list, family medical history, surgical history and social history reviewed.   /78 (BP Location: [facility] [place], Patient Position: Sitting, Cuff Size: Large Adult)   Pulse 80   Temp 98.2 °F (36.8 °C) (Oral)   Resp 16   Ht 5' 7\" (1.702 m)   Wt 86.6 kg (191 lb)   BMI 29.91 kg/m²   BSA 1.98 m²   Physical Exam   Constitutional: She is oriented to person, place, and time. She appears well-developed and well-nourished. No distress.   HENT:   Head: Normocephalic and atraumatic.   Mouth/Throat: Oropharynx is clear and moist.   Eyes: Conjunctivae are normal. Pupils are equal, round, and reactive to light.   Neck: Normal range of motion. Neck supple. No thyromegaly present.   Cardiovascular: Normal rate, regular rhythm and normal heart sounds.   No murmur heard.  Pulmonary/Chest: Effort normal and breath sounds normal. No respiratory distress.   Abdominal: Soft. Bowel sounds are normal. She exhibits no distension. There is no tenderness.   Musculoskeletal: Normal range of motion.   Neurological: She is alert and oriented to person, place, and time.   Skin: Skin is warm and dry.    Psychiatric: She has a normal mood and affect. Her behavior is normal.     Legs pedal edema  ASSESSMENT AND PLAN  Problem List Items Addressed This Visit        Circulatory    Essential hypertension - Primary    Relevant Medications    benazepril (LOTENSIN) 40 MG tablet       Digestive    B12 deficiency    Relevant Medications    vitamin B-12 (CYANOCOBALAMIN) 1000 MCG tablet    Obesity       Musculoskeletal    Osteopenia       Endocrine    Hyperlipemia    Relevant Medications    benazepril (LOTENSIN) 40 MG tablet       Other    Low vitamin D level      Assessment and plan/discussion    Lab results reviewed and discussed with patient  Hypertension advised low-salt diet and to lose weight.  Continue amlodipine benazepril and hydrochlorothiazide  Pedal edema advised low-salt diet.  Support stockings.  On hydrochlorothiazide  Low B12.  B12 level normal 729.  Continue oral B12.  B12 injections have been stopped  Low B vitamin D.  Vitamin D level normal 50.6.  Stop vitamin D and  RTC 2 months for annual wellness visit.  RTC 4 months for regular follow-up    Shruthi Singh MD

## 2025-06-02 NOTE — PLAN OF CARE
Problem: Chronic Conditions and Co-morbidities  Goal: Patient's chronic conditions and co-morbidity symptoms are monitored and maintained or improved  Outcome: Progressing  Flowsheets (Taken 6/2/2025 0710)  Care Plan - Patient's Chronic Conditions and Co-Morbidity Symptoms are Monitored and Maintained or Improved:   Collaborate with multidisciplinary team to address chronic and comorbid conditions and prevent exacerbation or deterioration   Update acute care plan with appropriate goals if chronic or comorbid symptoms are exacerbated and prevent overall improvement and discharge     Problem: Discharge Planning  Goal: Discharge to home or other facility with appropriate resources  Outcome: Progressing  Flowsheets (Taken 6/2/2025 0710)  Discharge to home or other facility with appropriate resources:   Identify barriers to discharge with patient and caregiver   Arrange for needed discharge resources and transportation as appropriate   Identify discharge learning needs (meds, wound care, etc)   Refer to discharge planning if patient needs post-hospital services based on physician order or complex needs related to functional status, cognitive ability or social support system     Problem: Skin/Tissue Integrity  Goal: Skin integrity remains intact  Description: 1.  Monitor for areas of redness and/or skin breakdown2.  Assess vascular access sites hourly3.  Every 4-6 hours minimum:  Change oxygen saturation probe site4.  Every 4-6 hours:  If on nasal continuous positive airway pressure, respiratory therapy assess nares and determine need for appliance change or resting period  Outcome: Progressing  Flowsheets (Taken 6/2/2025 0710)  Skin Integrity Remains Intact:   Monitor for areas of redness and/or skin breakdown   Turn and reposition as indicated   Positioning devices     Problem: Safety - Adult  Goal: Free from fall injury  Outcome: Progressing     Problem: ABCDS Injury Assessment  Goal: Absence of physical

## 2025-06-02 NOTE — PROGRESS NOTES
[facility]ECH [facility]  [facility] 8 REHAB UNIT      TIME  1300  1400  60 MINUTES    [x]Daily Note  []Progress Note    Date: 2025  Patient Name: Akhil Alejo        MRN: [id number]    Account #: [id number]  : 1955  (69 y.o.)  Gender: male   Primary Provider: Leo Vaughn MD  Swallowing Status/Diet:          Subjective: Patient alert and upright in bed during treatment session. No family present during session.    Objective:     Orientation task completed. Patient able to verbalize name, address, and city independently. Patient able to verbalize birthday and hospital given maximum verbal cues. Patient did not identify age, location, or floor number.    Memory task completed. Patient able to recall 3 words immediately with 100% accuracy. Patient able to recall 3 words given a 10 second time delay with 22% accuracy independently. Given maximum semantic cues, patient able to increase accuracy to 44%.     Word finding task completed. Patient given a description and required to identify the item being described. Patient able to complete task with 56% accuracy independently. Given maximum semantic and verbal cues, patient able to increase accuracy to 80%.    Pharyngeal strengthening exercises completed. Patient completed 1 set of 5 reps of the Effortful Swallow to improve hyolaryngeal elevation, tongue base retraction, and vocal fold closure. Patient unable to complete the Radha Manuevor to improve hyolaryngeal elevation and clear vallecular residue this date. Verbal cues and modeling provided throughout pharyngeal strengthening exercises.    SLP will continue to follow and treat.    Diet Solids Recommendation:     Diet Liquid Recommendation:     Compensatory Swallowing Strategies:       SHORT TERM GOAL #1:  Goal 1: Re-assessment of speech/language/cognitive functioning.    SHORT TERM GOAL #2:  Goal 2: Patient will complete attention and processing tasks with provision of mod cues/prompts.    SHORT

## 2025-06-03 LAB
BASOPHILS # BLD: 0 K/UL (ref 0–0.2)
BASOPHILS NFR BLD: 0.5 % (ref 0–1)
EOSINOPHIL # BLD: 0.5 K/UL (ref 0–0.6)
EOSINOPHIL NFR BLD: 6.9 % (ref 0–5)
ERYTHROCYTE [DISTWIDTH] IN BLOOD BY AUTOMATED COUNT: 17.4 % (ref 11.5–14.5)
GLUCOSE BLD-MCNC: 108 MG/DL (ref 70–99)
GLUCOSE BLD-MCNC: 110 MG/DL (ref 70–99)
GLUCOSE BLD-MCNC: 127 MG/DL (ref 70–99)
GLUCOSE BLD-MCNC: 129 MG/DL (ref 70–99)
HCT VFR BLD AUTO: 35.3 % (ref 42–52)
HGB BLD-MCNC: 11.2 G/DL (ref 14–18)
IMM GRANULOCYTES # BLD: 0.2 K/UL
LYMPHOCYTES # BLD: 2.8 K/UL (ref 1.1–4.5)
LYMPHOCYTES NFR BLD: 37.7 % (ref 20–40)
MCH RBC QN AUTO: 28.7 PG (ref 27–31)
MCHC RBC AUTO-ENTMCNC: 31.7 G/DL (ref 33–37)
MCV RBC AUTO: 90.5 FL (ref 80–94)
MONOCYTES # BLD: 0.7 K/UL (ref 0–0.9)
MONOCYTES NFR BLD: 10 % (ref 0–10)
NEUTROPHILS # BLD: 3.1 K/UL (ref 1.5–7.5)
NEUTS SEG NFR BLD: 42.8 % (ref 50–65)
PERFORMED ON: ABNORMAL
PLATELET # BLD AUTO: 277 K/UL (ref 130–400)
PMV BLD AUTO: 9.9 FL (ref 9.4–12.4)
RBC # BLD AUTO: 3.9 M/UL (ref 4.7–6.1)
WBC # BLD AUTO: 7.3 K/UL (ref 4.8–10.8)

## 2025-06-03 PROCEDURE — 97129 THER IVNTJ 1ST 15 MIN: CPT

## 2025-06-03 PROCEDURE — 36415 COLL VENOUS BLD VENIPUNCTURE: CPT

## 2025-06-03 PROCEDURE — 6370000000 HC RX 637 (ALT 250 FOR IP): Performed by: INTERNAL MEDICINE

## 2025-06-03 PROCEDURE — 94760 N-INVAS EAR/PLS OXIMETRY 1: CPT

## 2025-06-03 PROCEDURE — 97530 THERAPEUTIC ACTIVITIES: CPT

## 2025-06-03 PROCEDURE — 94150 VITAL CAPACITY TEST: CPT

## 2025-06-03 PROCEDURE — 6360000002 HC RX W HCPCS: Performed by: PSYCHIATRY & NEUROLOGY

## 2025-06-03 PROCEDURE — 97130 THER IVNTJ EA ADDL 15 MIN: CPT

## 2025-06-03 PROCEDURE — 97110 THERAPEUTIC EXERCISES: CPT

## 2025-06-03 PROCEDURE — 82962 GLUCOSE BLOOD TEST: CPT

## 2025-06-03 PROCEDURE — 85025 COMPLETE CBC W/AUTO DIFF WBC: CPT

## 2025-06-03 PROCEDURE — 92526 ORAL FUNCTION THERAPY: CPT

## 2025-06-03 PROCEDURE — 97116 GAIT TRAINING THERAPY: CPT

## 2025-06-03 PROCEDURE — 1180000000 HC REHAB R&B

## 2025-06-03 PROCEDURE — 94640 AIRWAY INHALATION TREATMENT: CPT

## 2025-06-03 PROCEDURE — 99232 SBSQ HOSP IP/OBS MODERATE 35: CPT | Performed by: PSYCHIATRY & NEUROLOGY

## 2025-06-03 PROCEDURE — 6370000000 HC RX 637 (ALT 250 FOR IP): Performed by: PSYCHIATRY & NEUROLOGY

## 2025-06-03 RX ADMIN — METOPROLOL TARTRATE 75 MG: 50 TABLET, FILM COATED ORAL at 20:37

## 2025-06-03 RX ADMIN — IPRATROPIUM BROMIDE AND ALBUTEROL SULFATE 1 DOSE: 2.5; .5 SOLUTION RESPIRATORY (INHALATION) at 19:19

## 2025-06-03 RX ADMIN — IPRATROPIUM BROMIDE AND ALBUTEROL SULFATE 1 DOSE: 2.5; .5 SOLUTION RESPIRATORY (INHALATION) at 15:17

## 2025-06-03 RX ADMIN — IPRATROPIUM BROMIDE AND ALBUTEROL SULFATE 1 DOSE: 2.5; .5 SOLUTION RESPIRATORY (INHALATION) at 10:35

## 2025-06-03 RX ADMIN — METOPROLOL TARTRATE 75 MG: 50 TABLET, FILM COATED ORAL at 09:48

## 2025-06-03 RX ADMIN — INSULIN GLARGINE 15 UNITS: 100 INJECTION, SOLUTION SUBCUTANEOUS at 09:48

## 2025-06-03 RX ADMIN — CLOPIDOGREL BISULFATE 75 MG: 75 TABLET, FILM COATED ORAL at 09:48

## 2025-06-03 RX ADMIN — GEMFIBROZIL 600 MG: 600 TABLET ORAL at 20:37

## 2025-06-03 RX ADMIN — IPRATROPIUM BROMIDE AND ALBUTEROL SULFATE 1 DOSE: 2.5; .5 SOLUTION RESPIRATORY (INHALATION) at 07:06

## 2025-06-03 RX ADMIN — ASPIRIN 81 MG: 81 TABLET, CHEWABLE ORAL at 09:48

## 2025-06-03 RX ADMIN — LEVOTHYROXINE SODIUM 50 MCG: 0.05 TABLET ORAL at 05:56

## 2025-06-03 RX ADMIN — Medication 5 MG: at 20:37

## 2025-06-03 RX ADMIN — ENOXAPARIN SODIUM 40 MG: 100 INJECTION SUBCUTANEOUS at 20:37

## 2025-06-03 RX ADMIN — LANSOPRAZOLE 30 MG: 30 TABLET, ORALLY DISINTEGRATING, DELAYED RELEASE ORAL at 09:48

## 2025-06-03 RX ADMIN — LANSOPRAZOLE 30 MG: 30 TABLET, ORALLY DISINTEGRATING, DELAYED RELEASE ORAL at 20:37

## 2025-06-03 RX ADMIN — ACETAMINOPHEN 650 MG: 325 TABLET ORAL at 17:10

## 2025-06-03 RX ADMIN — ATORVASTATIN CALCIUM 20 MG: 20 TABLET, FILM COATED ORAL at 20:37

## 2025-06-03 RX ADMIN — CETIRIZINE HYDROCHLORIDE 10 MG: 10 TABLET ORAL at 09:48

## 2025-06-03 RX ADMIN — GEMFIBROZIL 600 MG: 600 TABLET ORAL at 09:47

## 2025-06-03 RX ADMIN — CHOLESTYRAMINE 4 G: 4 POWDER, FOR SUSPENSION ORAL at 09:48

## 2025-06-03 RX ADMIN — INSULIN GLARGINE 15 UNITS: 100 INJECTION, SOLUTION SUBCUTANEOUS at 20:37

## 2025-06-03 ASSESSMENT — PAIN DESCRIPTION - LOCATION: LOCATION: LEG;HIP

## 2025-06-03 ASSESSMENT — PAIN - FUNCTIONAL ASSESSMENT: PAIN_FUNCTIONAL_ASSESSMENT: ACTIVITIES ARE NOT PREVENTED

## 2025-06-03 ASSESSMENT — PAIN SCALES - GENERAL
PAINLEVEL_OUTOF10: 8
PAINLEVEL_OUTOF10: 5

## 2025-06-03 ASSESSMENT — PAIN DESCRIPTION - ORIENTATION: ORIENTATION: LEFT

## 2025-06-03 ASSESSMENT — PAIN DESCRIPTION - DESCRIPTORS: DESCRIPTORS: ACHING

## 2025-06-03 NOTE — PROGRESS NOTES
Name: Akhil Alejo  MRN: [id number]  Date of Service:  6/3/2025       06/03/25 1105   Restrictions/Precautions   Restrictions/Precautions Weight Bearing;Aspiration Risk;Fall Risk;NPO;Surgical Protocols   Lower Extremity Weight Bearing Restrictions   Right Lower Extremity Weight Bearing Weight Bearing As Tolerated   Left Lower Extremity Weight Bearing Weight Bearing As Tolerated   Position Activity Restriction   Other Position/Activity Restrictions PEG TUBE, HOB 30 DEG FOR FEED   General   Chart Reviewed Yes   Patient assessed for rehabilitation services? 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   Pain   Pre-Pain 0   Post-Pain 6  (Pt reports no pain initially, intermittent PEG site pain (sensitive))   Pain Location Abdomen   Bed mobility   Supine to Sit Contact guard assistance;Minimal assistance   Sit to Supine Stand by assistance;Contact guard assistance   Scooting Substantial/Maximal assistance;2 Person assistance  (For up in bed- max v/c's and assistance, HOB had to be raised to 30 degrees due to tube feeding)   Bed Mobility Comments On L side of bed- use of bedrail  (Extended time, encouragement, and bedrails utilized)   Transfers   Sit to Stand Minimal Assistance;2 Person Assistance   Stand to Sit Minimal Assistance;2 Person Assistance   Bed to Chair Minimal assistance;2 Person Assistance   Ambulation   Surface Level tile   Device Parallel Bars   Other Apparatus Wheelchair follow   Assistance Minimal assistance;Contact guard assistance;2 Person assistance   Quality of Gait FFP, recpirocal   Gait Deviations Slow Alycia;Decreased step length;Decreased step height;Decreased head and trunk rotation   Distance 8' X 2   Comments Straight path   PT Exercises   Exercise Treatment Sitting in w/c BLE

## 2025-06-03 NOTE — PROGRESS NOTES
[facility]  INPATIENT SPEECH THER[facility]  [facility] 8 REHAB UNIT      TIME  0900  0930  1400  1430  60 MINUTES    [x]Daily Note  []Progress Note    Date: 6/3/2025  Patient Name: Akhil Alejo        MRN: [id number]    Account #: [id number]  : 1955  (69 y.o.)  Gender: male   Primary Provider: Leo Vaughn MD  Swallowing Status/Diet: NPO    Subjective: Patient alert and upright in bed during treatment session. No family present.     Objective:     Memory functioning task completed. Patient given a description of an animal and required to identify it. Patient able to complete task with 40% accuracy independently. Given maximum semantic and phonemic cues, patient able to increase accuracy to 90%.    Expressive language task completed. Patient required to provide the opposite of a given word. Patient able to complete task with 70% accuracy independently. Given moderate verbal cues, patient able to increase accuracy to 80%.     Pharyngeal strengthening exercises completed. Patient completed 8 sets of 2 reps of the Effortful Swallow to improve hyolaryngeal elevation, tongue base retraction, and vocal fold closure. Patient unable to complete the Radha Manuevor to improve hyolaryngeal elevation and clear vallecular residue this date. Maximum verbal cues and modeling provided throughout pharyngeal strengthening exercises.     SLP will continue to follow and treat.    Diet Solids Recommendation: NPO    Diet Liquid Recommendation: NPO      SHORT TERM GOAL #1:  Goal 1: Re-assessment of speech/language/cognitive functioning.    SHORT TERM GOAL #2:  Goal 2: Patient will complete attention and processing tasks with provision of mod cues/prompts.    SHORT TERM GOAL #3:  Goal 3: Patient will complete comprehension and word finding tasks with provision of mod cues/prompts.    SHORT TERM GOAL #4:  Goal 4: Patient will complete memory functioning tasks with provision of mod cues/prompts.    SHORT TERM GOAL #5:  Goal 5: Patient will

## 2025-06-03 NOTE — PROGRESS NOTES
Patient:   Akhil Alejo  MR#:    [id number]   Room:    0815/815-02   YOB: 1955  Date of Progress Note: 6/3/2025  Time of Note                           8:05 AM  Consulting Physician:   Leo Vaughn M.D.  Attending Physician:  Leo Vaughn MD     Chief complaint Acute ischemic stroke     S:This 69 y.o. male  with history of depression, DM, HTN, neuropathy and RLS. Patient was seen by Dr. Kitchen as outpatient for abnormal ECG and shortness of breath. He underwent a stress test on 4/25/25 which anterior wall ischemia, EF 44%, ECG abnormal as before. He was sent to [facility] for further evaluation. He was seen by cardiologist Dr. Cortes and underwent a heart catheterization which revealed multivessel CAD. Cardiothoracic surgery was consulted. He was seen by Dr. Lara, who recommended CABG x 3 to the LAD, D1 and OM1. Patient was in agreement. The patient had been on Plavix and aspirin. Therefore Plavix was placed on hold. P2Y12 test done on 4/28/25 was 156 , which was felt sufficient recovery of plt function to proceed with CABG on 4/29/25. Patient tolerated the procedure well. Patient had post-op delirium causing some worsening of his dementia. Patient was evaluated by SPT for cognitive and swallow. Patient was found have severe cognitive impairment and oropharyngeal dysphagia. He made NPO. Initially NGT was placed for feedings. Unfortunately, patient continue to have oropharyngeal phase dysphagia and ultimately patient required placement of G-Tube on 5/9/25. Patient had been tolerating tube feedings fairly well. He continued to have cognitive deficits with Avasys camera in place. Patient remained weak overall and was felt to need a stay on Rehab. He was admitted to Rehab on 5/13/25. Patient was participating in PT/OT/SPT. On 5/21/25 nursing reported hematemesis and blood from G-Tube overnight. Hospitalist was consulted.  Pt was found to be tachypneic and pale, Pulse ox was 95 .Bp 103/59 hr 94.  Pt  secured at 3 cm to the level of the skin    UPPER GASTROINTESTINAL ENDOSCOPY  05/21/2025    Dr Mir-Superficial actively bleeding submucosal vessel at the PEG tube bolster site, treated successfully w/epinephrine and gold probe, PEG tube secured at 3 cm to the level of the skin       Medications in Hospital:      Current Facility-Administered Medications:     glucose chewable tablet 16 g, 4 tablet, Oral, PRN, Leo Vaughn MD    dextrose bolus 10% 125 mL, 125 mL, IntraVENous, PRN **OR** dextrose bolus 10% 250 mL, 250 mL, IntraVENous, PRN, Leo Vaughn MD    glucagon injection 1 mg, 1 mg, SubCUTAneous, PRN, Leo Vaughn MD    dextrose 10 % infusion, , IntraVENous, Continuous PRN, Leo Vaughn MD    insulin glargine (LANTUS) injection vial 15 Units, 15 Units, SubCUTAneous, BID, Leo Vaughn MD, 15 Units at 06/02/25 2047    lansoprazole (PREVACID SOLUTAB) disintegrating tablet 30 mg, 30 mg, Oral, BID, Leo Vaughn MD, 30 mg at 06/02/25 2047    magic butt cream, , Topical, Q4H PRN, Leo Vaughn MD, Given at 06/02/25 1318    HYDROcodone-acetaminophen (NORCO) 5-325 MG per tablet 1 tablet, 1 tablet, Oral, Q6H PRN, Leo Vaughn MD, 1 tablet at 05/30/25 1337    diclofenac sodium (VOLTAREN) 1 % gel 4 g, 4 g, Topical, TID PRN, Leo Vaughn MD, 4 g at 05/30/25 0413    atorvastatin (LIPITOR) tablet 20 mg, 20 mg, Oral, Nightly, Ben Schultz DO, 20 mg at 06/02/25 2047    bisacodyl (DULCOLAX) suppository 10 mg, 10 mg, Rectal, Daily PRN, Ben Schultz DO    insulin lispro (HUMALOG,ADMELOG) injection vial 10 Units, 10 Units, SubCUTAneous, TID WC, Ben Schultz DO    levothyroxine (SYNTHROID) tablet 50 mcg, 50 mcg, Oral, Daily, Ben Schultz DO, 50 mcg at 06/03/25 0556    Benzocaine-Menthol (CEPACOL) 1 lozenge, 1 lozenge, Oral, Q2H PRN, Ben Schultz DO    cholestyramine light packet 4 g, 4 g, Oral, Daily, Ben Schultz DO, 4 g at 06/02/25 0951

## 2025-06-03 NOTE — PROGRESS NOTES
Facility/Department: [facility] 8 REHAB UNIT  Occupational Therapy Treatment Note    Name: Akhil Alejo  : 1955  MRN: [id number]  Date of Service: 6/3/2025    Discharge Recommendations:  Patient would benefit from continued therapy after discharge, Continue to assess pending progress        Past Medical History:  has a past medical history of Arm fracture, Dementia (HCC), Depression, Diabetes mellitus (HCC), Hypertension, Kidney stones, Neuropathy, Palliative care patient, and Restless leg syndrome.  Past Surgical History:  has a past surgical history that includes Cholecystectomy; Lithotripsy; shoulder surgery (Left); bone marrow biopsy (Right, 2020); Colonoscopy (2020); Upper gastrointestinal endoscopy (2017); Cardiac procedure (N/A, 2025); Coronary artery bypass graft (N/A, 2025); Gastrostomy tube placement (2025); Upper gastrointestinal endoscopy (N/A, 2025); Upper gastrointestinal endoscopy (N/A, 2025); and Upper gastrointestinal endoscopy (2025).    Treatment Diagnosis: Acute ischemic stroke    Assessment   Performance deficits / Impairments: Decreased functional mobility ;Decreased ADL status;Decreased strength;Decreased balance;Decreased posture;Decreased endurance;Decreased safe awareness;Decreased high-level IADLs;Decreased cognition  Treatment Diagnosis: Acute ischemic stroke  Activity Tolerance  Activity Tolerance: Patient Tolerated treatment well              Plan   Occupational Therapy Plan  Specific Instructions for Next Treatment: functional mobility  Current Treatment Recommendations: Strengthening, Balance training, ROM, Functional mobility training, Endurance training, Patient/Caregiver education & training, Safety education & training, Cognitive reorientation, Equipment evaluation, education, & procurement, Self-Care / ADL, Home management training, Positioning, Cognitive/Perceptual training

## 2025-06-03 NOTE — PLAN OF CARE
Problem: Safety - Adult  Goal: Free from fall injury  6/2/2025 1922 by Benjamín Rosen RN  Outcome: Progressing  6/2/2025 1437 by Ja Feliciano LPN  Outcome: Progressing

## 2025-06-04 LAB
BASOPHILS # BLD: 0 K/UL (ref 0–0.2)
BASOPHILS NFR BLD: 0.6 % (ref 0–1)
EOSINOPHIL # BLD: 0.5 K/UL (ref 0–0.6)
EOSINOPHIL NFR BLD: 7.2 % (ref 0–5)
ERYTHROCYTE [DISTWIDTH] IN BLOOD BY AUTOMATED COUNT: 17.2 % (ref 11.5–14.5)
GLUCOSE BLD-MCNC: 109 MG/DL (ref 70–99)
GLUCOSE BLD-MCNC: 125 MG/DL (ref 70–99)
GLUCOSE BLD-MCNC: 130 MG/DL (ref 70–99)
GLUCOSE BLD-MCNC: 142 MG/DL (ref 70–99)
HCT VFR BLD AUTO: 34.9 % (ref 42–52)
HGB BLD-MCNC: 11.1 G/DL (ref 14–18)
IMM GRANULOCYTES # BLD: 0.1 K/UL
LYMPHOCYTES # BLD: 2.8 K/UL (ref 1.1–4.5)
LYMPHOCYTES NFR BLD: 43.6 % (ref 20–40)
MCH RBC QN AUTO: 28.8 PG (ref 27–31)
MCHC RBC AUTO-ENTMCNC: 31.8 G/DL (ref 33–37)
MCV RBC AUTO: 90.6 FL (ref 80–94)
MONOCYTES # BLD: 0.7 K/UL (ref 0–0.9)
MONOCYTES NFR BLD: 10 % (ref 0–10)
NEUTROPHILS # BLD: 2.4 K/UL (ref 1.5–7.5)
NEUTS SEG NFR BLD: 37.2 % (ref 50–65)
PERFORMED ON: ABNORMAL
PLATELET # BLD AUTO: 289 K/UL (ref 130–400)
PMV BLD AUTO: 10.1 FL (ref 9.4–12.4)
RBC # BLD AUTO: 3.85 M/UL (ref 4.7–6.1)
WBC # BLD AUTO: 6.5 K/UL (ref 4.8–10.8)

## 2025-06-04 PROCEDURE — 36415 COLL VENOUS BLD VENIPUNCTURE: CPT

## 2025-06-04 PROCEDURE — 6370000000 HC RX 637 (ALT 250 FOR IP): Performed by: INTERNAL MEDICINE

## 2025-06-04 PROCEDURE — 97130 THER IVNTJ EA ADDL 15 MIN: CPT

## 2025-06-04 PROCEDURE — 97129 THER IVNTJ 1ST 15 MIN: CPT

## 2025-06-04 PROCEDURE — 6360000002 HC RX W HCPCS: Performed by: PSYCHIATRY & NEUROLOGY

## 2025-06-04 PROCEDURE — 85025 COMPLETE CBC W/AUTO DIFF WBC: CPT

## 2025-06-04 PROCEDURE — 97535 SELF CARE MNGMENT TRAINING: CPT

## 2025-06-04 PROCEDURE — 94150 VITAL CAPACITY TEST: CPT

## 2025-06-04 PROCEDURE — 1180000000 HC REHAB R&B

## 2025-06-04 PROCEDURE — 92526 ORAL FUNCTION THERAPY: CPT

## 2025-06-04 PROCEDURE — 94760 N-INVAS EAR/PLS OXIMETRY 1: CPT

## 2025-06-04 PROCEDURE — 99232 SBSQ HOSP IP/OBS MODERATE 35: CPT | Performed by: PSYCHIATRY & NEUROLOGY

## 2025-06-04 PROCEDURE — 94640 AIRWAY INHALATION TREATMENT: CPT

## 2025-06-04 PROCEDURE — 6370000000 HC RX 637 (ALT 250 FOR IP): Performed by: PSYCHIATRY & NEUROLOGY

## 2025-06-04 PROCEDURE — 97116 GAIT TRAINING THERAPY: CPT

## 2025-06-04 PROCEDURE — 97110 THERAPEUTIC EXERCISES: CPT

## 2025-06-04 PROCEDURE — 82962 GLUCOSE BLOOD TEST: CPT

## 2025-06-04 PROCEDURE — 97530 THERAPEUTIC ACTIVITIES: CPT

## 2025-06-04 RX ADMIN — LEVOTHYROXINE SODIUM 50 MCG: 0.05 TABLET ORAL at 05:33

## 2025-06-04 RX ADMIN — METOPROLOL TARTRATE 75 MG: 50 TABLET, FILM COATED ORAL at 19:50

## 2025-06-04 RX ADMIN — INSULIN GLARGINE 15 UNITS: 100 INJECTION, SOLUTION SUBCUTANEOUS at 19:50

## 2025-06-04 RX ADMIN — ENOXAPARIN SODIUM 40 MG: 100 INJECTION SUBCUTANEOUS at 19:50

## 2025-06-04 RX ADMIN — CLOPIDOGREL BISULFATE 75 MG: 75 TABLET, FILM COATED ORAL at 08:13

## 2025-06-04 RX ADMIN — ASPIRIN 81 MG: 81 TABLET, CHEWABLE ORAL at 08:13

## 2025-06-04 RX ADMIN — CHOLESTYRAMINE 4 G: 4 POWDER, FOR SUSPENSION ORAL at 08:13

## 2025-06-04 RX ADMIN — GEMFIBROZIL 600 MG: 600 TABLET ORAL at 19:50

## 2025-06-04 RX ADMIN — GEMFIBROZIL 600 MG: 600 TABLET ORAL at 08:13

## 2025-06-04 RX ADMIN — LANSOPRAZOLE 30 MG: 30 TABLET, ORALLY DISINTEGRATING, DELAYED RELEASE ORAL at 08:13

## 2025-06-04 RX ADMIN — IPRATROPIUM BROMIDE AND ALBUTEROL SULFATE 1 DOSE: 2.5; .5 SOLUTION RESPIRATORY (INHALATION) at 06:04

## 2025-06-04 RX ADMIN — Medication 5 MG: at 19:50

## 2025-06-04 RX ADMIN — IPRATROPIUM BROMIDE AND ALBUTEROL SULFATE 1 DOSE: 2.5; .5 SOLUTION RESPIRATORY (INHALATION) at 18:11

## 2025-06-04 RX ADMIN — IPRATROPIUM BROMIDE AND ALBUTEROL SULFATE 1 DOSE: 2.5; .5 SOLUTION RESPIRATORY (INHALATION) at 10:01

## 2025-06-04 RX ADMIN — ATORVASTATIN CALCIUM 20 MG: 20 TABLET, FILM COATED ORAL at 19:50

## 2025-06-04 RX ADMIN — INSULIN GLARGINE 15 UNITS: 100 INJECTION, SOLUTION SUBCUTANEOUS at 08:14

## 2025-06-04 RX ADMIN — LANSOPRAZOLE 30 MG: 30 TABLET, ORALLY DISINTEGRATING, DELAYED RELEASE ORAL at 19:50

## 2025-06-04 RX ADMIN — Medication: at 08:20

## 2025-06-04 RX ADMIN — METOPROLOL TARTRATE 75 MG: 50 TABLET, FILM COATED ORAL at 08:13

## 2025-06-04 RX ADMIN — IPRATROPIUM BROMIDE AND ALBUTEROL SULFATE 1 DOSE: 2.5; .5 SOLUTION RESPIRATORY (INHALATION) at 15:07

## 2025-06-04 RX ADMIN — CETIRIZINE HYDROCHLORIDE 10 MG: 10 TABLET ORAL at 08:13

## 2025-06-04 NOTE — PROGRESS NOTES
Patient:   Akhil Alejo  MR#:    [id number]   Room:    0811/811-02   YOB: 1955  Date of Progress Note: 6/4/2025  Time of Note                           7:01 AM  Consulting Physician:   Leo Vaughn M.D.  Attending Physician:  Leo Vaughn MD     Chief complaint Acute ischemic stroke     S:This 69 y.o. male  with history of depression, DM, HTN, neuropathy and RLS. Patient was seen by Dr. Kitchen as outpatient for abnormal ECG and shortness of breath. He underwent a stress test on 4/25/25 which anterior wall ischemia, EF 44%, ECG abnormal as before. He was sent to [facility] for further evaluation. He was seen by cardiologist Dr. Cortes and underwent a heart catheterization which revealed multivessel CAD. Cardiothoracic surgery was consulted. He was seen by Dr. Lara, who recommended CABG x 3 to the LAD, D1 and OM1. Patient was in agreement. The patient had been on Plavix and aspirin. Therefore Plavix was placed on hold. P2Y12 test done on 4/28/25 was 156 , which was felt sufficient recovery of plt function to proceed with CABG on 4/29/25. Patient tolerated the procedure well. Patient had post-op delirium causing some worsening of his dementia. Patient was evaluated by SPT for cognitive and swallow. Patient was found have severe cognitive impairment and oropharyngeal dysphagia. He made NPO. Initially NGT was placed for feedings. Unfortunately, patient continue to have oropharyngeal phase dysphagia and ultimately patient required placement of G-Tube on 5/9/25. Patient had been tolerating tube feedings fairly well. He continued to have cognitive deficits with Avasys camera in place. Patient remained weak overall and was felt to need a stay on Rehab. He was admitted to Rehab on 5/13/25. Patient was participating in PT/OT/SPT. On 5/21/25 nursing reported hematemesis and blood from G-Tube overnight. Hospitalist was consulted.  Pt was found to be tachypneic and pale, Pulse ox was 95 .Bp 103/59 hr 94.  Pt

## 2025-06-04 NOTE — PROGRESS NOTES
Occupational Therapy  Facility/Department: [facility] 8 REHAB UNIT  Rehabilitation Occupational Therapy Daily Treatment Note    Date: 25  Patient Name: Akhil Alejo       Room: 0811/811-02  MRN: [id number]  Account: [id number]   : 1955  (69 y.o.) Gender: male     Referring Practitioner: (P) Dr. Lara  Diagnosis: (P) Acute ischemic stroke  Additional Pertinent Hx: (P) CABG on         Past Medical History:  has a past medical history of Arm fracture, Dementia (HCC), Depression, Diabetes mellitus (HCC), Hypertension, Kidney stones, Neuropathy, Palliative care patient, and Restless leg syndrome.  Past Surgical History:   has a past surgical history that includes Cholecystectomy; Lithotripsy; shoulder surgery (Left); bone marrow biopsy (Right, 2020); Colonoscopy (2020); Upper gastrointestinal endoscopy (2017); Cardiac procedure (N/A, 2025); Coronary artery bypass graft (N/A, 2025); Gastrostomy tube placement (2025); Upper gastrointestinal endoscopy (N/A, 2025); Upper gastrointestinal endoscopy (N/A, 2025); and Upper gastrointestinal endoscopy (2025).     25 1000   Restrictions/Precautions   Restrictions/Precautions Weight Bearing;Aspiration Risk;Fall Risk;NPO;Surgical Protocols   Position Activity Restriction   Other Position/Activity Restrictions PEG TUBE, HOB 30 DEG FOR FEED   General   Additional Pertinent Hx CABG on    Referring Practitioner Dr. Lara   Diagnosis Acute ischemic stroke   ADL   Toileting Maximum assistance   Toileting Skilled Clinical Factors bladder incontinence, brief was too baggy and clothing/floor/LEs required cleanup  (pt placed in a blue (medium) vs yellow (large) brief at end of session)   Functional Mobility   Functional - Mobility Device   (// bars)   Assist Level Minimal assistance   Functional Mobility Comments CGA x 1 and SBA x 1   Bed mobility   Supine to Sit Contact guard assistance;Minimal assistance   Sit to

## 2025-06-04 NOTE — PROGRESS NOTES
Name: Akhil Alejo  MRN: [id number]  Date of Service:  6/4/2025 06/04/25 1005   Restrictions/Precautions   Restrictions/Precautions Weight Bearing;Aspiration Risk;Fall Risk;NPO;Surgical Protocols   Lower Extremity Weight Bearing Restrictions   Right Lower Extremity Weight Bearing Weight Bearing As Tolerated   Left Lower Extremity Weight Bearing Weight Bearing As Tolerated   Position Activity Restriction   Other Position/Activity Restrictions PEG TUBE, HOB 30 DEG FOR FEED   General   Chart Reviewed Yes   Patient assessed for rehabilitation services? Yes   Additional Pertinent Hx DEPRESSION, DM, HTN   Diagnosis CABGX3 4/29; NEW ONSET BIHEMISPHERIC DEEP WHITE MATTER CVA WITH RIGHT WEAKNESS   General   General Comments Co-Tx w/ OT  (Pt required Max A for personal hygiene and doffing/donning clothes/brief in BR)   Subjective   Subjective Pt laying in bed doing breathing tx, agrees to participate in therapy once finished.  (Misc. info: PTA mentioned pt birthday soon and asked how old he would be, pt responded he will be 28)   Pain   Pre-Pain 5   Post-Pain 5   Pain Location Back   Bed mobility   Supine to Sit Contact guard assistance;Minimal assistance   Sit to Supine Stand by assistance;Contact guard assistance   Scooting Substantial/Maximal assistance;2 Person assistance  (HOB falt for a second then raised back to 30 degrees)   Bed Mobility Comments On L side of bed- use of bedrail   Transfers   Sit to Stand Minimal Assistance;2 Person Assistance   Stand to Sit Minimal Assistance;2 Person Assistance   Bed to Chair Minimal assistance;2 Person Assistance;Partial/Moderate assistance  ((Stand pivot)- Min A X 2 or Mod X 1: EOB<>w/c from R/L w/o AD and w/c<>toilet from R/L)   Ambulation   Surface Level tile   Device Parallel Bars   Other Apparatus Wheelchair follow   Assistance Contact guard assistance;2 Person assistance   Quality of Gait FFP, recpirocal   Gait Deviations Slow Alycia;Decreased step length;Decreased

## 2025-06-04 NOTE — PLAN OF CARE
Problem: Safety - Adult  Goal: Free from fall injury  6/3/2025 1902 by Benjamín Rosen, RN  Outcome: Progressing  6/3/2025 1059 by Flaca Mendez, RN  Outcome: Progressing

## 2025-06-04 NOTE — PLAN OF CARE
Problem: Chronic Conditions and Co-morbidities  Goal: Patient's chronic conditions and co-morbidity symptoms are monitored and maintained or improved  Outcome: Progressing     Problem: Discharge Planning  Goal: Discharge to home or other facility with appropriate resources  Outcome: Progressing     Problem: Skin/Tissue Integrity  Goal: Skin integrity remains intact  Description: 1.  Monitor for areas of redness and/or skin breakdown2.  Assess vascular access sites hourly3.  Every 4-6 hours minimum:  Change oxygen saturation probe site4.  Every 4-6 hours:  If on nasal continuous positive airway pressure, respiratory therapy assess nares and determine need for appliance change or resting period  Outcome: Progressing  Flowsheets (Taken 6/4/2025 1033)  Skin Integrity Remains Intact: Monitor for areas of redness and/or skin breakdown     Problem: Safety - Adult  Goal: Free from fall injury  Outcome: Progressing     Problem: ABCDS Injury Assessment  Goal: Absence of physical injury  Outcome: Progressing     Problem: Nutrition Deficit:  Goal: Optimize nutritional status  Outcome: Progressing     Problem: Neurosensory - Adult  Goal: Achieves stable or improved neurological status  Outcome: Progressing  Goal: Achieves maximal functionality and self care  Outcome: Progressing     Problem: Skin/Tissue Integrity - Adult  Goal: Skin integrity remains intact  Description: 1.  Monitor for areas of redness and/or skin breakdown2.  Assess vascular access sites hourly3.  Every 4-6 hours minimum:  Change oxygen saturation probe site4.  Every 4-6 hours:  If on nasal continuous positive airway pressure, respiratory therapy assess nares and determine need for appliance change or resting period  Outcome: Progressing  Flowsheets (Taken 6/4/2025 1033)  Skin Integrity Remains Intact: Monitor for areas of redness and/or skin breakdown  Goal: Incisions, wounds, or drain sites healing without S/S of infection  Outcome: Progressing  Flowsheets

## 2025-06-04 NOTE — PROGRESS NOTES
[facility]  INPATIENT SPEECH THERAPY  [facility] 8 REHAB UNIT    TIME   Time In: 07:00  Time Out: 08:00  Minutes: 60     [x]Daily Note  []Progress Note    Date: 25  Patient Name: Akhil Alejo        MRN:  [id number]    Account #: [id number]  : 1955  (69 y.o.)  Gender: Male   Primary Provider: Roderick GONSALEZ  Diet Consistency Recommendations: NPO     Subjective:   Patient in bed upon entry. No family member present. No pain reported. Patient did appear lethargic during treatment session and required moderate degree of stimulation to maintain alertness.      Objective:   Targeted auditory comprehension. In structured activity, patient was 60% answering general yes/no questions at independent level; patient did not increase accuracy with provision of repetition of questions.     Targeted word finding/expression. In structured activity, patient was 50% verbalizing 2 items per concrete divergent category with no time constraints present independently.     Targeted memory functioning. In structured activity, patient was 0% recall of national holidays, based upon auditory descriptions, at independent level; patient did not increase accuracy with provision of mod cues/prompts.    Utilized spaced retrieval and patient recalled 1 piece of information (without distractions) for up to 20 seconds. Break down occurred at 30 second marker.     Targeted swallowing. With ice chip trials presented by SLP, patient exhibited primarily vertical jaw movement at the front of the mouth during oral prep. Oral transit of ice chip trials primarily varied from 1-3 seconds in length. Laryngeal elevation during swallow initiation was considered to be sluggish and mild-moderately decreased for swallow airway protection. Patient produced inconsistently audible swallows. Patient exhibited degree of airway detection with frequent delayed coughs/delayed throat clears noted following probable penetration/aspiration of PO trials.    At this time, do

## 2025-06-05 ENCOUNTER — APPOINTMENT (OUTPATIENT)
Dept: GENERAL RADIOLOGY | Age: 70
End: 2025-06-05
Attending: PSYCHIATRY & NEUROLOGY
Payer: MEDICARE

## 2025-06-05 PROBLEM — E44.0 MODERATE MALNUTRITION: Status: RESOLVED | Noted: 2025-05-29 | Resolved: 2025-06-05

## 2025-06-05 LAB
ALBUMIN SERPL-MCNC: 3.5 G/DL (ref 3.5–5.2)
ALP SERPL-CCNC: 151 U/L (ref 40–129)
ALT SERPL-CCNC: 13 U/L (ref 10–50)
ANION GAP SERPL CALCULATED.3IONS-SCNC: 11 MMOL/L (ref 8–16)
AST SERPL-CCNC: 25 U/L (ref 10–50)
BASOPHILS # BLD: 0 K/UL (ref 0–0.2)
BASOPHILS NFR BLD: 0.5 % (ref 0–1)
BILIRUB SERPL-MCNC: 0.4 MG/DL (ref 0.2–1.2)
BUN SERPL-MCNC: 25 MG/DL (ref 8–23)
CALCIUM SERPL-MCNC: 9.5 MG/DL (ref 8.8–10.2)
CHLORIDE SERPL-SCNC: 104 MMOL/L (ref 98–107)
CO2 SERPL-SCNC: 23 MMOL/L (ref 22–29)
CREAT SERPL-MCNC: 0.8 MG/DL (ref 0.7–1.2)
EOSINOPHIL # BLD: 0.4 K/UL (ref 0–0.6)
EOSINOPHIL NFR BLD: 6.3 % (ref 0–5)
ERYTHROCYTE [DISTWIDTH] IN BLOOD BY AUTOMATED COUNT: 17.2 % (ref 11.5–14.5)
GLUCOSE BLD-MCNC: 115 MG/DL (ref 70–99)
GLUCOSE BLD-MCNC: 134 MG/DL (ref 70–99)
GLUCOSE BLD-MCNC: 137 MG/DL (ref 70–99)
GLUCOSE BLD-MCNC: 166 MG/DL (ref 70–99)
GLUCOSE SERPL-MCNC: 109 MG/DL (ref 70–99)
HCT VFR BLD AUTO: 36.2 % (ref 42–52)
HEMOCCULT SP1 STL QL: ABNORMAL
HGB BLD-MCNC: 11.4 G/DL (ref 14–18)
IMM GRANULOCYTES # BLD: 0.1 K/UL
LYMPHOCYTES # BLD: 3 K/UL (ref 1.1–4.5)
LYMPHOCYTES NFR BLD: 46.8 % (ref 20–40)
MCH RBC QN AUTO: 29 PG (ref 27–31)
MCHC RBC AUTO-ENTMCNC: 31.5 G/DL (ref 33–37)
MCV RBC AUTO: 92.1 FL (ref 80–94)
MONOCYTES # BLD: 0.6 K/UL (ref 0–0.9)
MONOCYTES NFR BLD: 9.6 % (ref 0–10)
NEUTROPHILS # BLD: 2.3 K/UL (ref 1.5–7.5)
NEUTS SEG NFR BLD: 35.6 % (ref 50–65)
PERFORMED ON: ABNORMAL
PLATELET # BLD AUTO: 292 K/UL (ref 130–400)
PMV BLD AUTO: 10.3 FL (ref 9.4–12.4)
POTASSIUM SERPL-SCNC: 4.2 MMOL/L (ref 3.5–5)
PROT SERPL-MCNC: 7.9 G/DL (ref 6.4–8.3)
RBC # BLD AUTO: 3.93 M/UL (ref 4.7–6.1)
SODIUM SERPL-SCNC: 138 MMOL/L (ref 136–145)
WBC # BLD AUTO: 6.5 K/UL (ref 4.8–10.8)

## 2025-06-05 PROCEDURE — 80053 COMPREHEN METABOLIC PANEL: CPT

## 2025-06-05 PROCEDURE — 92611 MOTION FLUOROSCOPY/SWALLOW: CPT

## 2025-06-05 PROCEDURE — 6370000000 HC RX 637 (ALT 250 FOR IP): Performed by: INTERNAL MEDICINE

## 2025-06-05 PROCEDURE — 97110 THERAPEUTIC EXERCISES: CPT

## 2025-06-05 PROCEDURE — 74230 X-RAY XM SWLNG FUNCJ C+: CPT

## 2025-06-05 PROCEDURE — 36415 COLL VENOUS BLD VENIPUNCTURE: CPT

## 2025-06-05 PROCEDURE — 6370000000 HC RX 637 (ALT 250 FOR IP): Performed by: PSYCHIATRY & NEUROLOGY

## 2025-06-05 PROCEDURE — 85025 COMPLETE CBC W/AUTO DIFF WBC: CPT

## 2025-06-05 PROCEDURE — 99232 SBSQ HOSP IP/OBS MODERATE 35: CPT | Performed by: PSYCHIATRY & NEUROLOGY

## 2025-06-05 PROCEDURE — 97530 THERAPEUTIC ACTIVITIES: CPT

## 2025-06-05 PROCEDURE — 97535 SELF CARE MNGMENT TRAINING: CPT

## 2025-06-05 PROCEDURE — 82270 OCCULT BLOOD FECES: CPT

## 2025-06-05 PROCEDURE — 82962 GLUCOSE BLOOD TEST: CPT

## 2025-06-05 PROCEDURE — 1180000000 HC REHAB R&B

## 2025-06-05 PROCEDURE — 94150 VITAL CAPACITY TEST: CPT

## 2025-06-05 PROCEDURE — 94760 N-INVAS EAR/PLS OXIMETRY 1: CPT

## 2025-06-05 PROCEDURE — 6360000002 HC RX W HCPCS: Performed by: PSYCHIATRY & NEUROLOGY

## 2025-06-05 PROCEDURE — 94640 AIRWAY INHALATION TREATMENT: CPT

## 2025-06-05 PROCEDURE — 97116 GAIT TRAINING THERAPY: CPT

## 2025-06-05 RX ADMIN — ENOXAPARIN SODIUM 40 MG: 100 INJECTION SUBCUTANEOUS at 21:09

## 2025-06-05 RX ADMIN — GEMFIBROZIL 600 MG: 600 TABLET ORAL at 21:09

## 2025-06-05 RX ADMIN — LEVOTHYROXINE SODIUM 50 MCG: 0.05 TABLET ORAL at 05:18

## 2025-06-05 RX ADMIN — ASPIRIN 81 MG: 81 TABLET, CHEWABLE ORAL at 09:00

## 2025-06-05 RX ADMIN — CETIRIZINE HYDROCHLORIDE 10 MG: 10 TABLET ORAL at 09:09

## 2025-06-05 RX ADMIN — ATORVASTATIN CALCIUM 20 MG: 20 TABLET, FILM COATED ORAL at 21:09

## 2025-06-05 RX ADMIN — INSULIN GLARGINE 15 UNITS: 100 INJECTION, SOLUTION SUBCUTANEOUS at 09:00

## 2025-06-05 RX ADMIN — METOPROLOL TARTRATE 75 MG: 50 TABLET, FILM COATED ORAL at 09:00

## 2025-06-05 RX ADMIN — GEMFIBROZIL 600 MG: 600 TABLET ORAL at 09:00

## 2025-06-05 RX ADMIN — IPRATROPIUM BROMIDE AND ALBUTEROL SULFATE 1 DOSE: 2.5; .5 SOLUTION RESPIRATORY (INHALATION) at 15:38

## 2025-06-05 RX ADMIN — LANSOPRAZOLE 30 MG: 30 TABLET, ORALLY DISINTEGRATING, DELAYED RELEASE ORAL at 09:00

## 2025-06-05 RX ADMIN — CHOLESTYRAMINE 4 G: 4 POWDER, FOR SUSPENSION ORAL at 09:00

## 2025-06-05 RX ADMIN — CLOPIDOGREL BISULFATE 75 MG: 75 TABLET, FILM COATED ORAL at 09:00

## 2025-06-05 RX ADMIN — IPRATROPIUM BROMIDE AND ALBUTEROL SULFATE 1 DOSE: 2.5; .5 SOLUTION RESPIRATORY (INHALATION) at 18:39

## 2025-06-05 RX ADMIN — INSULIN GLARGINE 15 UNITS: 100 INJECTION, SOLUTION SUBCUTANEOUS at 21:10

## 2025-06-05 RX ADMIN — IPRATROPIUM BROMIDE AND ALBUTEROL SULFATE 1 DOSE: 2.5; .5 SOLUTION RESPIRATORY (INHALATION) at 06:05

## 2025-06-05 RX ADMIN — LANSOPRAZOLE 30 MG: 30 TABLET, ORALLY DISINTEGRATING, DELAYED RELEASE ORAL at 21:09

## 2025-06-05 RX ADMIN — Medication 5 MG: at 21:09

## 2025-06-05 RX ADMIN — METOPROLOL TARTRATE 75 MG: 50 TABLET, FILM COATED ORAL at 21:09

## 2025-06-05 NOTE — PLAN OF CARE
Problem: Chronic Conditions and Co-morbidities  Goal: Patient's chronic conditions and co-morbidity symptoms are monitored and maintained or improved  6/5/2025 1119 by Paola Womack RN  Outcome: Progressing  6/4/2025 2138 by Mecca Rodriguez RN  Outcome: Progressing     Problem: Discharge Planning  Goal: Discharge to home or other facility with appropriate resources  6/5/2025 1119 by Paola Womack RN  Outcome: Progressing  6/4/2025 2138 by Mecca Rodriguez RN  Outcome: Progressing     Problem: Skin/Tissue Integrity  Goal: Skin integrity remains intact  Description: 1.  Monitor for areas of redness and/or skin breakdown2.  Assess vascular access sites hourly3.  Every 4-6 hours minimum:  Change oxygen saturation probe site4.  Every 4-6 hours:  If on nasal continuous positive airway pressure, respiratory therapy assess nares and determine need for appliance change or resting period  6/5/2025 1119 by Paola Womack RN  Outcome: Progressing  Flowsheets (Taken 6/5/2025 1118)  Skin Integrity Remains Intact: Monitor for areas of redness and/or skin breakdown  6/4/2025 2138 by Mecca Rodriguez RN  Outcome: Progressing     Problem: Safety - Adult  Goal: Free from fall injury  6/5/2025 1119 by Paola Womack RN  Outcome: Progressing  6/4/2025 2138 by Mecca Rodriguez RN  Outcome: Progressing     Problem: ABCDS Injury Assessment  Goal: Absence of physical injury  6/5/2025 1119 by Paola Womack RN  Outcome: Progressing  6/4/2025 2138 by Mecca Rodriguez RN  Outcome: Progressing     Problem: Nutrition Deficit:  Goal: Optimize nutritional status  6/5/2025 1119 by Paola Womack RN  Outcome: Progressing  6/4/2025 2138 by Mecca Rodriguez RN  Outcome: Progressing     Problem: Neurosensory - Adult  Goal: Achieves stable or improved neurological status  6/5/2025 1119 by Paola Womack, RN  Outcome: Progressing  6/4/2025 2138 by Mecca Rodriguez RN  Outcome: Progressing  Goal:

## 2025-06-05 NOTE — PLAN OF CARE
Problem: Chronic Conditions and Co-morbidities  Goal: Patient's chronic conditions and co-morbidity symptoms are monitored and maintained or improved  6/4/2025 2138 by Mecca Rodriguez RN  Outcome: Progressing  6/4/2025 1033 by Paola Womack RN  Outcome: Progressing     Problem: Discharge Planning  Goal: Discharge to home or other facility with appropriate resources  6/4/2025 2138 by Mecca Rodriguez RN  Outcome: Progressing  6/4/2025 1033 by Paola Womack RN  Outcome: Progressing     Problem: Skin/Tissue Integrity  Goal: Skin integrity remains intact  Description: 1.  Monitor for areas of redness and/or skin breakdown2.  Assess vascular access sites hourly3.  Every 4-6 hours minimum:  Change oxygen saturation probe site4.  Every 4-6 hours:  If on nasal continuous positive airway pressure, respiratory therapy assess nares and determine need for appliance change or resting period  6/4/2025 2138 by Mecca Rodriguez RN  Outcome: Progressing  6/4/2025 1033 by Paola Womack RN  Outcome: Progressing  Flowsheets (Taken 6/4/2025 1033)  Skin Integrity Remains Intact: Monitor for areas of redness and/or skin breakdown     Problem: Safety - Adult  Goal: Free from fall injury  6/4/2025 2138 by Mecca Rodriguez, RN  Outcome: Progressing  6/4/2025 1033 by Paola Womack RN  Outcome: Progressing

## 2025-06-05 NOTE — PROGRESS NOTES
Name: Akhil Alejo  MRN: [id number]  Date of Service:  6/5/2025 06/05/25 1005   Restrictions/Precautions   Restrictions/Precautions Weight Bearing;Aspiration Risk;Fall Risk;NPO;Surgical Protocols   Lower Extremity Weight Bearing Restrictions   Right Lower Extremity Weight Bearing Weight Bearing As Tolerated   Left Lower Extremity Weight Bearing Weight Bearing As Tolerated   Position Activity Restriction   Other Position/Activity Restrictions PEG TUBE, HOB 30 DEG FOR FEED   General   General Comments Co-Tx w/ OT  (Pt requires Max A for personal hygiene in bed, able to somewhat dress himself independently- requires some assistance and set up)   Subjective   Subjective Pt laying in bed, agrees to participate in therapy.   Pain   Pre-Pain 0  (At rest)   Post-Pain 2  (General: denies pain this morning, still presents w/ intermittent facial grimacing)   Bed mobility   Supine to Sit Contact guard assistance   Sit to Supine Stand by assistance;Contact guard assistance   Scooting Substantial/Maximal assistance;2 Person assistance  (HOB flat for just a second, then raised to 30 degrees again)   Bed Mobility Comments On L side of bed- use of bedrail   Transfers   Sit to Stand Minimal Assistance;2 Person Assistance   Stand to Sit Minimal Assistance;2 Person Assistance   Comment Pt sits too prematurely w/o backing up all the way w/ w/c or reaching back   Ambulation   Surface Level tile   Device Rolling Walker   Assistance Minimal assistance;2 Person assistance   Quality of Gait FFP, recpirocal, increased proximity from RW   Gait Deviations Slow Alycia;Decreased step length;Decreased step height;Decreased head and trunk rotation   Distance 8' X 2   Comments 1 L/1 R turn, w/ non-slip socks donned   PT Exercises   Exercise Treatment Stting in recliner: B DF/PF AAROM X 20 w/ pt leg propped on PTA legs  (Pt unable to perform AROM DF/PF or hip abd w/ pillow correctly)   Activity Tolerance   Activity Tolerance Treatment limited

## 2025-06-05 NOTE — PROGRESS NOTES
Stool specimen sent to lab for occult blood testing, per order.     Electronically signed by RAYMOND SalgadoN, RN on 6/5/2025 at 4:09 PM

## 2025-06-05 NOTE — PROGRESS NOTES
Comprehensive Nutrition Assessment    Type and Reason for Visit:  Reassess    Nutrition Recommendations/Plan:   Increase current tf rate to 57ml/hr Glucerna 1.5 and increase free water flush to 42ml hourly     Malnutrition Assessment:  Malnutrition Status:  At risk for malnutrition (06/05/25 1304)    Context:  Acute Illness     Findings of the 6 clinical characteristics of malnutrition:  Energy Intake:  Mild decrease in energy intake  Weight Loss:  Greater than 2% over 1 week     Body Fat Loss:  No body fat loss     Muscle Mass Loss:  Mild muscle mass loss Temples (temporalis), Clavicles (pectoralis & deltoids)  Fluid Accumulation:  No fluid accumulation     Strength:  Not Performed    Nutrition Assessment:    Spoke with SW re: wife being upset that  is c/o being hungry--crying.  Aware a small weight loss has occurred.  Will increase feeding to 57ml/hr with free water increased to 42ml hourly.  Aware pt to have video fluoroscopic swallow tomorrow.  Called wife and explained what is changing.  Receptive to increased tf rate.  But did keep repeating that \"Akhil will eat once he comes home.\"  She understands as of now what the results might be if he does take food orally.  But continues to state he will eat food when he comes home.  Will follow for video results.    Nutrition Related Findings:    BM 6/2 Wound Type: Multiple, Surgical Incision       Current Nutrition Intake & Therapies:    Average Meal Intake: NPO  Average Supplements Intake: NPO  Current Tube Feeding (TF) Orders:  Feeding Route: PEG  Formula: Diabetic  Schedule: Continuous  Feeding Regimen: Glucerna 1.5@ 57ml/hr with 42ml free water flush hourly  Additives/Modulars: None  Water Flushes: 42ml hourly =1008 ml  Current TF Provides: Glucerna 1.5 @ 57ml/hr with 42ml free water flush hourly = 2052 kcals with 113g protein, 181g CHO and 1038ml free water from formula and 1008ml free water from flush    Anthropometric Measures:  Height: 180.3 cm (5'

## 2025-06-05 NOTE — PLAN OF CARE
Problem: Nutrition Deficit:  Goal: Optimize nutritional status  Recent Flowsheet Documentation  Taken 6/5/2025 1258 by Majo Hess, MS, RD, LD  Nutrient intake appropriate for improving, restoring, or maintaining nutritional needs:   Assess nutritional status and recommend course of action   Recommend, monitor, and adjust tube feedings and TPN/PPN based on assessed needs  6/5/2025 1119 by Paola Womack, RN  Outcome: Progressing

## 2025-06-05 NOTE — PROGRESS NOTES
Received call from Mr Sapna' wife, asking for update on his progress as she reports he is in tears, telling her he is hungry! I have contacted dietitian's office, asking them to reach out/communicate and see if this can be resolved.  Will also share with speech therapist working on his swallowing dysfunction.

## 2025-06-05 NOTE — PROGRESS NOTES
Patient:   Akhil Alejo  MR#:    [id number]   Room:    0811/811-02   YOB: 1955  Date of Progress Note: 6/5/2025  Time of Note                           7:16 AM  Consulting Physician:   Leo Vaughn M.D.  Attending Physician:  Leo Vaughn MD     Chief complaint Acute ischemic stroke     S:This 69 y.o. male  with history of depression, DM, HTN, neuropathy and RLS. Patient was seen by Dr. Kitchen as outpatient for abnormal ECG and shortness of breath. He underwent a stress test on 4/25/25 which anterior wall ischemia, EF 44%, ECG abnormal as before. He was sent to [facility] for further evaluation. He was seen by cardiologist Dr. Cortes and underwent a heart catheterization which revealed multivessel CAD. Cardiothoracic surgery was consulted. He was seen by Dr. Lara, who recommended CABG x 3 to the LAD, D1 and OM1. Patient was in agreement. The patient had been on Plavix and aspirin. Therefore Plavix was placed on hold. P2Y12 test done on 4/28/25 was 156 , which was felt sufficient recovery of plt function to proceed with CABG on 4/29/25. Patient tolerated the procedure well. Patient had post-op delirium causing some worsening of his dementia. Patient was evaluated by SPT for cognitive and swallow. Patient was found have severe cognitive impairment and oropharyngeal dysphagia. He made NPO. Initially NGT was placed for feedings. Unfortunately, patient continue to have oropharyngeal phase dysphagia and ultimately patient required placement of G-Tube on 5/9/25. Patient had been tolerating tube feedings fairly well. He continued to have cognitive deficits with Avasys camera in place. Patient remained weak overall and was felt to need a stay on Rehab. He was admitted to Rehab on 5/13/25. Patient was participating in PT/OT/SPT. On 5/21/25 nursing reported hematemesis and blood from G-Tube overnight. Hospitalist was consulted.  Pt was found to be tachypneic and pale, Pulse ox was 95 .Bp 103/59 hr 94.  Pt

## 2025-06-05 NOTE — PROCEDURES
INSTRUMENTAL SWALLOW REPORT  MODIFIED BARIUM SWALLOW    NAME: Akhil Alejo   : 53  MRN: [id number]       Date of Eval: 25       Referring Diagnosis(es): Dysphagia     Past Medical History:  has a past medical history of Arm fracture, Dementia (HCC), Depression, Diabetes mellitus (HCC), Hypertension, Kidney stones, Neuropathy, Palliative care patient, and Restless leg syndrome.    Past Surgical History:  has a past surgical history that includes Cholecystectomy; Lithotripsy; shoulder surgery (Left); bone marrow biopsy (Right, 2020); Colonoscopy (2020); Upper gastrointestinal endoscopy (2017); Cardiac procedure (N/A, 2025); Coronary artery bypass graft (N/A, 2025); Gastrostomy tube placement (2025); Upper gastrointestinal endoscopy (N/A, 2025); Upper gastrointestinal endoscopy (N/A, 2025); and Upper gastrointestinal endoscopy (2025).    Patient Complaints/Reason for Referral:  Akhil Alejo was referred for a MBS to assess the efficiency of his/her swallow function, assess for aspiration, and to make recommendations regarding safe dietary consistencies, effective compensatory strategies, and safe eating environment.    Impressions:  Completed assessment. Patient exhibited slow oral prep of minced and moist consistency and regular solid consistency. Patient exhibited   premature spillage and subsequent swallow delay with every consistency (swallow delay increased as time progressed). Epiglottic inversion during swallow initiation was considered to be delayed and decreased but over horizontal in positioning. No penetration/aspiration was noted with any food/drink consistency. With every consistency, patient exhibited consistent oral cavity residue and consistent pharyngeal residue post swallows. All residue cleared with additional dry swallows.     At this time, do anticipate delayed epiglottic inversion and quick onset fatigue. Would recommend

## 2025-06-05 NOTE — PROGRESS NOTES
Facility/Department: [facility] 8 REHAB UNIT  Occupational Therapy Treatment Note    Name: Akhil Alejo  : 1955  MRN: [id number]  Date of Service: 2025    Discharge Recommendations:  Patient would benefit from continued therapy after discharge, Continue to assess pending progress        Past Medical History:  has a past medical history of Arm fracture, Dementia (HCC), Depression, Diabetes mellitus (HCC), Hypertension, Kidney stones, Neuropathy, Palliative care patient, and Restless leg syndrome.  Past Surgical History:  has a past surgical history that includes Cholecystectomy; Lithotripsy; shoulder surgery (Left); bone marrow biopsy (Right, 2020); Colonoscopy (2020); Upper gastrointestinal endoscopy (2017); Cardiac procedure (N/A, 2025); Coronary artery bypass graft (N/A, 2025); Gastrostomy tube placement (2025); Upper gastrointestinal endoscopy (N/A, 2025); Upper gastrointestinal endoscopy (N/A, 2025); and Upper gastrointestinal endoscopy (2025).    Treatment Diagnosis: Acute ischemic stroke    Assessment   Performance deficits / Impairments: Decreased functional mobility ;Decreased ADL status;Decreased strength;Decreased balance;Decreased posture;Decreased endurance;Decreased safe awareness;Decreased high-level IADLs;Decreased cognition  Treatment Diagnosis: Acute ischemic stroke                 Plan   Occupational Therapy Plan  Specific Instructions for Next Treatment: functional mobility  Current Treatment Recommendations: Strengthening, Balance training, ROM, Functional mobility training, Endurance training, Patient/Caregiver education & training, Safety education & training, Cognitive reorientation, Equipment evaluation, education, & procurement, Self-Care / ADL, Home management training, Positioning, Cognitive/Perceptual training     Restrictions  Restrictions/Precautions  Restrictions/Precautions: Weight Bearing, Aspiration Risk, Fall Risk, NPO,

## 2025-06-06 LAB
GLUCOSE BLD-MCNC: 119 MG/DL (ref 70–99)
GLUCOSE BLD-MCNC: 156 MG/DL (ref 70–99)
GLUCOSE BLD-MCNC: 162 MG/DL (ref 70–99)
GLUCOSE BLD-MCNC: 162 MG/DL (ref 70–99)
PERFORMED ON: ABNORMAL

## 2025-06-06 PROCEDURE — 97110 THERAPEUTIC EXERCISES: CPT

## 2025-06-06 PROCEDURE — 94150 VITAL CAPACITY TEST: CPT

## 2025-06-06 PROCEDURE — 99232 SBSQ HOSP IP/OBS MODERATE 35: CPT | Performed by: PSYCHIATRY & NEUROLOGY

## 2025-06-06 PROCEDURE — 92526 ORAL FUNCTION THERAPY: CPT

## 2025-06-06 PROCEDURE — 1180000000 HC REHAB R&B

## 2025-06-06 PROCEDURE — 6370000000 HC RX 637 (ALT 250 FOR IP): Performed by: INTERNAL MEDICINE

## 2025-06-06 PROCEDURE — 94640 AIRWAY INHALATION TREATMENT: CPT

## 2025-06-06 PROCEDURE — 6360000002 HC RX W HCPCS: Performed by: PSYCHIATRY & NEUROLOGY

## 2025-06-06 PROCEDURE — 97130 THER IVNTJ EA ADDL 15 MIN: CPT

## 2025-06-06 PROCEDURE — 6370000000 HC RX 637 (ALT 250 FOR IP): Performed by: PSYCHIATRY & NEUROLOGY

## 2025-06-06 PROCEDURE — 97530 THERAPEUTIC ACTIVITIES: CPT

## 2025-06-06 PROCEDURE — 97129 THER IVNTJ 1ST 15 MIN: CPT

## 2025-06-06 PROCEDURE — 94760 N-INVAS EAR/PLS OXIMETRY 1: CPT

## 2025-06-06 PROCEDURE — 82962 GLUCOSE BLOOD TEST: CPT

## 2025-06-06 PROCEDURE — 97116 GAIT TRAINING THERAPY: CPT

## 2025-06-06 RX ADMIN — IPRATROPIUM BROMIDE AND ALBUTEROL SULFATE 1 DOSE: 2.5; .5 SOLUTION RESPIRATORY (INHALATION) at 18:50

## 2025-06-06 RX ADMIN — GEMFIBROZIL 600 MG: 600 TABLET ORAL at 21:22

## 2025-06-06 RX ADMIN — METOPROLOL TARTRATE 75 MG: 50 TABLET, FILM COATED ORAL at 10:05

## 2025-06-06 RX ADMIN — LANSOPRAZOLE 30 MG: 30 TABLET, ORALLY DISINTEGRATING, DELAYED RELEASE ORAL at 21:22

## 2025-06-06 RX ADMIN — INSULIN GLARGINE 15 UNITS: 100 INJECTION, SOLUTION SUBCUTANEOUS at 10:06

## 2025-06-06 RX ADMIN — LANSOPRAZOLE 30 MG: 30 TABLET, ORALLY DISINTEGRATING, DELAYED RELEASE ORAL at 10:06

## 2025-06-06 RX ADMIN — CHOLESTYRAMINE 4 G: 4 POWDER, FOR SUSPENSION ORAL at 10:06

## 2025-06-06 RX ADMIN — INSULIN GLARGINE 15 UNITS: 100 INJECTION, SOLUTION SUBCUTANEOUS at 21:22

## 2025-06-06 RX ADMIN — METOPROLOL TARTRATE 75 MG: 50 TABLET, FILM COATED ORAL at 21:21

## 2025-06-06 RX ADMIN — GEMFIBROZIL 600 MG: 600 TABLET ORAL at 10:06

## 2025-06-06 RX ADMIN — Medication 5 MG: at 21:22

## 2025-06-06 RX ADMIN — ATORVASTATIN CALCIUM 20 MG: 20 TABLET, FILM COATED ORAL at 21:22

## 2025-06-06 RX ADMIN — CETIRIZINE HYDROCHLORIDE 10 MG: 10 TABLET ORAL at 10:05

## 2025-06-06 RX ADMIN — ASPIRIN 81 MG: 81 TABLET, CHEWABLE ORAL at 10:07

## 2025-06-06 RX ADMIN — IPRATROPIUM BROMIDE AND ALBUTEROL SULFATE 1 DOSE: 2.5; .5 SOLUTION RESPIRATORY (INHALATION) at 10:28

## 2025-06-06 RX ADMIN — IPRATROPIUM BROMIDE AND ALBUTEROL SULFATE 1 DOSE: 2.5; .5 SOLUTION RESPIRATORY (INHALATION) at 06:23

## 2025-06-06 RX ADMIN — ENOXAPARIN SODIUM 40 MG: 100 INJECTION SUBCUTANEOUS at 21:22

## 2025-06-06 RX ADMIN — IPRATROPIUM BROMIDE AND ALBUTEROL SULFATE 1 DOSE: 2.5; .5 SOLUTION RESPIRATORY (INHALATION) at 14:06

## 2025-06-06 RX ADMIN — LEVOTHYROXINE SODIUM 50 MCG: 0.05 TABLET ORAL at 05:38

## 2025-06-06 RX ADMIN — CLOPIDOGREL BISULFATE 75 MG: 75 TABLET, FILM COATED ORAL at 10:06

## 2025-06-06 ASSESSMENT — PAIN SCALES - GENERAL: PAINLEVEL_OUTOF10: 0

## 2025-06-06 NOTE — PROGRESS NOTES
[facility]  INPATIENT SPEECH THERAPY  [facility] 8 REHAB UNIT      TIME  1100  1200  60 MINUTES    [x]Daily Note  []Progress Note    Date: 2025  Patient Name: Akhil Alejo        MRN: [id number]    Account #: [id number]  : 1955  (69 y.o.)  Gender: male   Primary Provider: Leo Vaughn MD  Swallowing Status/Diet:    Subjective: Patient lethargic and in bed during treatment session. Patient required maximum cues to participate in session. Patient gained more alertness when sat up completely in bed to eat.     Objective:     Reasoning task completed. Patient given an abstract category (cold, sharp, green, etc.) and required to name 3 items in each.     Patient asked by SLP about dinner yesterday and breakfast this morning since trialing puree with honey thick liquids. Patient states he \"is just not hungry\" and hasn't been eating much.    Swallowing:   Swallow reassessment completed on this date. Oral prep revealed decreased vertical mastication with ice chips. Oral transit timing of ice chips, puree consistency, and honey thick liquids was observed to be 1-2 seconds in length. Laryngeal elevation was observed to be sluggish and mildly decreased for swallow airway protection. 1x mild throat clear observed following trials of puree consistency. No s/s of penetration/aspiration observed with ice chips or honey thick liquids.    Patient fatigued easily and repeatedly stated he was not hungry while eating. He took ~8 bites of pureed food.     Recommend utilization of PEG for supplemental nutrition and for medications. Okay to continue trials of safest, most restrictive diet of puree consistency with moderately thick/honey thick liquids. STAFF FEED. Okay for ice chips IN BETWEEN MEALS for comfort.     SLP will continue to follow and treat.    Diet Solids Recommendation:     Diet Liquid Recommendation:     Compensatory Swallowing Strategies:       SHORT TERM GOAL #1:  Goal 1: Re-assessment of

## 2025-06-06 NOTE — PROGRESS NOTES
Patient:   Akhil Alejo  MR#:    [id number]   Room:    0811/811-02   YOB: 1955  Date of Progress Note: 6/6/2025  Time of Note                           7:30 AM  Consulting Physician:   Leo Vaughn M.D.  Attending Physician:  Leo Vaughn MD     Chief complaint Acute ischemic stroke     S:This 69 y.o. male  with history of depression, DM, HTN, neuropathy and RLS. Patient was seen by Dr. Kitchen as outpatient for abnormal ECG and shortness of breath. He underwent a stress test on 4/25/25 which anterior wall ischemia, EF 44%, ECG abnormal as before. He was sent to [facility] for further evaluation. He was seen by cardiologist Dr. Cortes and underwent a heart catheterization which revealed multivessel CAD. Cardiothoracic surgery was consulted. He was seen by Dr. Lara, who recommended CABG x 3 to the LAD, D1 and OM1. Patient was in agreement. The patient had been on Plavix and aspirin. Therefore Plavix was placed on hold. P2Y12 test done on 4/28/25 was 156 , which was felt sufficient recovery of plt function to proceed with CABG on 4/29/25. Patient tolerated the procedure well. Patient had post-op delirium causing some worsening of his dementia. Patient was evaluated by SPT for cognitive and swallow. Patient was found have severe cognitive impairment and oropharyngeal dysphagia. He made NPO. Initially NGT was placed for feedings. Unfortunately, patient continue to have oropharyngeal phase dysphagia and ultimately patient required placement of G-Tube on 5/9/25. Patient had been tolerating tube feedings fairly well. He continued to have cognitive deficits with Avasys camera in place. Patient remained weak overall and was felt to need a stay on Rehab. He was admitted to Rehab on 5/13/25. Patient was participating in PT/OT/SPT. On 5/21/25 nursing reported hematemesis and blood from G-Tube overnight. Hospitalist was consulted.  Pt was found to be tachypneic and pale, Pulse ox was 95 .Bp 103/59 hr 94.  Pt

## 2025-06-06 NOTE — PLAN OF CARE
Problem: Chronic Conditions and Co-morbidities  Goal: Patient's chronic conditions and co-morbidity symptoms are monitored and maintained or improved  6/5/2025 2221 by Mecca Rodriguez RN  Outcome: Progressing  6/5/2025 1119 by Paola Womack RN  Outcome: Progressing     Problem: Discharge Planning  Goal: Discharge to home or other facility with appropriate resources  6/5/2025 2221 by Mecca Rodriguez RN  Outcome: Progressing  6/5/2025 1119 by Paola Womack RN  Outcome: Progressing     Problem: Skin/Tissue Integrity  Goal: Skin integrity remains intact  Description: 1.  Monitor for areas of redness and/or skin breakdown2.  Assess vascular access sites hourly3.  Every 4-6 hours minimum:  Change oxygen saturation probe site4.  Every 4-6 hours:  If on nasal continuous positive airway pressure, respiratory therapy assess nares and determine need for appliance change or resting period  6/5/2025 2221 by Mecca Rodriguez RN  Outcome: Progressing  6/5/2025 1119 by Paola Womack RN  Outcome: Progressing  Flowsheets (Taken 6/5/2025 1118)  Skin Integrity Remains Intact: Monitor for areas of redness and/or skin breakdown     Problem: Safety - Adult  Goal: Free from fall injury  6/5/2025 2221 by Mecca Rodriguez, RN  Outcome: Progressing  6/5/2025 1119 by Paola Womack RN  Outcome: Progressing

## 2025-06-06 NOTE — PROGRESS NOTES
Nutrition Assessment     Type and Reason for Visit: Reassess    Nutrition Recommendations/Plan:   Continue current TF and diet     Malnutrition Assessment:  Malnutrition Status: At risk for malnutrition    Nutrition Assessment:  Pt noted to be tolerating increase in TF. Residuals 0-10 mLs. Pt was evaluated by SLP and has started on dysphagia puree texture with moderately (honey) thick liquids. Pt noted to be eating very little, 1-25% and tube feeding is needed to meet estimated needs. Pt to be fed by staff and for pleasure at this time.    Estimated Daily Nutrient Needs:  Energy (kcal):  2010-1064 kcals (25-30 kcals) Weight Used for Energy Requirements: Current     Protein (g):  80-160g Weight Used for Protein Requirements: Current        Fluid (ml/day):  2003-2403 ml Method Used for Fluid Requirements: 1 ml/kcal    Nutrition Related Findings:   BUN 25, BM 6-5 Wound Type: Surgical Incision    Current Nutrition Therapies:    ADULT DIET; Dysphagia - Pureed; Moderately Thick (Honey)  ADULT TUBE FEEDING; PEG; Diabetic; Continuous; 57; No; 42; Q 1 hour    Anthropometric Measures:  Height: 180.3 cm (5' 10.98\")  Current Body Wt: 80.5 kg (177 lb 7.5 oz)   BMI: 24.8      Nutrition Diagnosis:   Moderate malnutrition related to inadequate protein-energy intake as evidenced by criteria as identified in malnutrition assessment    Nutrition Interventions:   Food and/or Nutrient Delivery: Continue Current Diet, Continue Current Tube Feeding  Nutrition Education/Counseling: No recommendation at this time  Coordination of Nutrition Care: Continue to monitor while inpatient  Plan of Care discussed with: wife and sw    Goals:  Goals: Meet at least 75% of estimated needs, PO intake 50% or greater  Type of Goal: New goal  Previous Goal Met: New Goal    Nutrition Monitoring and Evaluation:   Behavioral-Environmental Outcomes: None Identified  Food/Nutrient Intake Outcomes: Food and Nutrient Intake, Enteral Nutrition Intake/Tolerance,  Diet Advancement/Tolerance  Physical Signs/Symptoms Outcomes: Biochemical Data, Chewing or Swallowing, Fluid Status or Edema, Skin, Weight    Discharge Planning:    Too soon to determine     Cristel Hopkins RD  Contact: [phone]

## 2025-06-06 NOTE — PLAN OF CARE
Problem: Chronic Conditions and Co-morbidities  Goal: Patient's chronic conditions and co-morbidity symptoms are monitored and maintained or improved  6/6/2025 1024 by Harini Villarreal RN  Outcome: Progressing  Flowsheets (Taken 6/6/2025 1019)  Care Plan - Patient's Chronic Conditions and Co-Morbidity Symptoms are Monitored and Maintained or Improved: Monitor and assess patient's chronic conditions and comorbid symptoms for stability, deterioration, or improvement  6/5/2025 2221 by Mecca Rodriguez RN  Outcome: Progressing     Problem: Discharge Planning  Goal: Discharge to home or other facility with appropriate resources  6/6/2025 1024 by Harini Villarreal RN  Outcome: Progressing  Flowsheets (Taken 6/6/2025 1019)  Discharge to home or other facility with appropriate resources: Refer to discharge planning if patient needs post-hospital services based on physician order or complex needs related to functional status, cognitive ability or social support system  6/5/2025 2221 by Mecca Rodriguez RN  Outcome: Progressing     Problem: Skin/Tissue Integrity  Goal: Skin integrity remains intact  Description: 1.  Monitor for areas of redness and/or skin breakdown2.  Assess vascular access sites hourly3.  Every 4-6 hours minimum:  Change oxygen saturation probe site4.  Every 4-6 hours:  If on nasal continuous positive airway pressure, respiratory therapy assess nares and determine need for appliance change or resting period  6/6/2025 1024 by Harini Villarreal RN  Outcome: Progressing  Flowsheets (Taken 6/6/2025 1019)  Skin Integrity Remains Intact: Monitor for areas of redness and/or skin breakdown  6/5/2025 2221 by Mecca Rodriguez, RN  Outcome: Progressing     Problem: Safety - Adult  Goal: Free from fall injury  6/6/2025 1024 by Harini Villarreal RN  Outcome: Progressing  6/5/2025 2221 by Mecca Rodriguez RN  Outcome: Progressing     Problem: ABCDS Injury Assessment  Goal: Absence of physical injury  6/6/2025

## 2025-06-06 NOTE — PROGRESS NOTES
Name: Akhil Alejo  MRN: [id number]  Date of Service:  6/6/2025 06/06/25 0905   Restrictions/Precautions   Restrictions/Precautions Weight Bearing;Aspiration Risk;Fall Risk;NPO;Surgical Protocols   Lower Extremity Weight Bearing Restrictions   Right Lower Extremity Weight Bearing Weight Bearing As Tolerated   Left Lower Extremity Weight Bearing Weight Bearing As Tolerated   Position Activity Restriction   Other Position/Activity Restrictions PEG TUBE, HOB 30 DEG FOR FEED   General   Chart Reviewed Yes   Patient assessed for rehabilitation services? Yes   Additional Pertinent Hx DEPRESSION, DM, HTN   General   General Comments Co-Tx w/ OT   Subjective   Subjective Pt laying in bed, agrees to participate in therapy.   Vitals   Pulse 89   SpO2 99 %   O2 Device None (Room air)   Blood Pressure Sitting 104/71   Pain   Pre-Pain 0   Post-Pain 0   Bed mobility   Rolling to Left Stand by assistance   Supine to Sit Contact guard assistance   Scooting Contact guard assistance  (On EOB)   Bed Mobility Comments On L side of bed- use of bedrail   Transfers   Sit to Stand Contact guard assistance;Minimal Assistance;2 Person Assistance   Stand to Sit Contact guard assistance;Minimal Assistance;2 Person Assistance   Ambulation   Surface Level tile   Device Rolling Walker   Assistance Minimal assistance;2 Person assistance   Quality of Gait FFP, recpirocal, increased proximity from RW   Gait Deviations Slow Alycia;Decreased step length;Decreased step height;Decreased head and trunk rotation   Distance 8', 10'   Comments Multiple L/R turns, w/ non- slip socks donned   PT Exercises   Dynamic Sitting Balance Exercises 2X STS from w/c<> raised table, performing clip/board OT activity, for ~1 min. and 1 min. 20 sec.   Activity Tolerance   Activity Tolerance Treatment limited secondary to decreased cognition;Patient tolerated treatment well;Patient limited by endurance   Assessment   Assessment Pt continues to require intermittent

## 2025-06-06 NOTE — PROGRESS NOTES
Facility/Department: [facility] 8 REHAB UNIT  Occupational Therapy Treatment Note    Name: Akhil Alejo  : 1955  MRN: [id number]  Date of Service: 2025    Discharge Recommendations:  Patient would benefit from continued therapy after discharge, Continue to assess pending progress        Past Medical History:  has a past medical history of Arm fracture, Dementia (HCC), Depression, Diabetes mellitus (HCC), Hypertension, Kidney stones, Neuropathy, Palliative care patient, and Restless leg syndrome.  Past Surgical History:  has a past surgical history that includes Cholecystectomy; Lithotripsy; shoulder surgery (Left); bone marrow biopsy (Right, 2020); Colonoscopy (2020); Upper gastrointestinal endoscopy (2017); Cardiac procedure (N/A, 2025); Coronary artery bypass graft (N/A, 2025); Gastrostomy tube placement (2025); Upper gastrointestinal endoscopy (N/A, 2025); Upper gastrointestinal endoscopy (N/A, 2025); and Upper gastrointestinal endoscopy (2025).    Treatment Diagnosis: Acute ischemic stroke    Assessment   Performance deficits / Impairments: Decreased functional mobility ;Decreased ADL status;Decreased strength;Decreased balance;Decreased posture;Decreased endurance;Decreased safe awareness;Decreased high-level IADLs;Decreased cognition  Treatment Diagnosis: Acute ischemic stroke  Activity Tolerance  Activity Tolerance: Patient Tolerated treatment well              Plan   Occupational Therapy Plan  Specific Instructions for Next Treatment: functional mobility  Current Treatment Recommendations: Strengthening, Balance training, ROM, Functional mobility training, Endurance training, Patient/Caregiver education & training, Safety education & training, Cognitive reorientation, Equipment evaluation, education, & procurement, Self-Care / ADL, Home management training, Positioning, Cognitive/Perceptual training

## 2025-06-07 LAB
GLUCOSE BLD-MCNC: 134 MG/DL (ref 70–99)
GLUCOSE BLD-MCNC: 158 MG/DL (ref 70–99)
GLUCOSE BLD-MCNC: 161 MG/DL (ref 70–99)
GLUCOSE BLD-MCNC: 181 MG/DL (ref 70–99)
PERFORMED ON: ABNORMAL

## 2025-06-07 PROCEDURE — 6370000000 HC RX 637 (ALT 250 FOR IP): Performed by: PSYCHIATRY & NEUROLOGY

## 2025-06-07 PROCEDURE — 99232 SBSQ HOSP IP/OBS MODERATE 35: CPT | Performed by: PSYCHIATRY & NEUROLOGY

## 2025-06-07 PROCEDURE — 94640 AIRWAY INHALATION TREATMENT: CPT

## 2025-06-07 PROCEDURE — 82962 GLUCOSE BLOOD TEST: CPT

## 2025-06-07 PROCEDURE — 6360000002 HC RX W HCPCS: Performed by: PSYCHIATRY & NEUROLOGY

## 2025-06-07 PROCEDURE — 6370000000 HC RX 637 (ALT 250 FOR IP): Performed by: INTERNAL MEDICINE

## 2025-06-07 PROCEDURE — 94150 VITAL CAPACITY TEST: CPT

## 2025-06-07 PROCEDURE — 1180000000 HC REHAB R&B

## 2025-06-07 PROCEDURE — 94760 N-INVAS EAR/PLS OXIMETRY 1: CPT

## 2025-06-07 RX ADMIN — CHOLESTYRAMINE 4 G: 4 POWDER, FOR SUSPENSION ORAL at 09:45

## 2025-06-07 RX ADMIN — CETIRIZINE HYDROCHLORIDE 10 MG: 10 TABLET ORAL at 09:45

## 2025-06-07 RX ADMIN — METOPROLOL TARTRATE 75 MG: 50 TABLET, FILM COATED ORAL at 09:45

## 2025-06-07 RX ADMIN — LEVOTHYROXINE SODIUM 50 MCG: 0.05 TABLET ORAL at 05:20

## 2025-06-07 RX ADMIN — ATORVASTATIN CALCIUM 20 MG: 20 TABLET, FILM COATED ORAL at 20:34

## 2025-06-07 RX ADMIN — LANSOPRAZOLE 30 MG: 30 TABLET, ORALLY DISINTEGRATING, DELAYED RELEASE ORAL at 20:34

## 2025-06-07 RX ADMIN — GEMFIBROZIL 600 MG: 600 TABLET ORAL at 20:34

## 2025-06-07 RX ADMIN — INSULIN LISPRO 1 UNITS: 100 INJECTION, SOLUTION INTRAVENOUS; SUBCUTANEOUS at 17:14

## 2025-06-07 RX ADMIN — IPRATROPIUM BROMIDE AND ALBUTEROL SULFATE 1 DOSE: 2.5; .5 SOLUTION RESPIRATORY (INHALATION) at 10:40

## 2025-06-07 RX ADMIN — ENOXAPARIN SODIUM 40 MG: 100 INJECTION SUBCUTANEOUS at 20:35

## 2025-06-07 RX ADMIN — CLOPIDOGREL BISULFATE 75 MG: 75 TABLET, FILM COATED ORAL at 09:45

## 2025-06-07 RX ADMIN — IPRATROPIUM BROMIDE AND ALBUTEROL SULFATE 1 DOSE: 2.5; .5 SOLUTION RESPIRATORY (INHALATION) at 14:31

## 2025-06-07 RX ADMIN — Medication 5 MG: at 20:33

## 2025-06-07 RX ADMIN — IPRATROPIUM BROMIDE AND ALBUTEROL SULFATE 1 DOSE: 2.5; .5 SOLUTION RESPIRATORY (INHALATION) at 06:37

## 2025-06-07 RX ADMIN — METOPROLOL TARTRATE 75 MG: 50 TABLET, FILM COATED ORAL at 20:33

## 2025-06-07 RX ADMIN — LANSOPRAZOLE 30 MG: 30 TABLET, ORALLY DISINTEGRATING, DELAYED RELEASE ORAL at 09:45

## 2025-06-07 RX ADMIN — ASPIRIN 81 MG: 81 TABLET, CHEWABLE ORAL at 09:45

## 2025-06-07 RX ADMIN — INSULIN GLARGINE 15 UNITS: 100 INJECTION, SOLUTION SUBCUTANEOUS at 20:34

## 2025-06-07 RX ADMIN — GEMFIBROZIL 600 MG: 600 TABLET ORAL at 09:45

## 2025-06-07 RX ADMIN — INSULIN GLARGINE 15 UNITS: 100 INJECTION, SOLUTION SUBCUTANEOUS at 09:45

## 2025-06-07 RX ADMIN — IPRATROPIUM BROMIDE AND ALBUTEROL SULFATE 1 DOSE: 2.5; .5 SOLUTION RESPIRATORY (INHALATION) at 19:07

## 2025-06-07 NOTE — PROGRESS NOTES
Patient:   Akhil Alejo  MR#:    [id number]   Room:    0811/811-02   YOB: 1955  Date of Progress Note: 6/7/2025  Time of Note                           8:33 AM  Consulting Physician:   Leo Vaughn M.D.  Attending Physician:  Leo Vaughn MD     Chief complaint Acute ischemic stroke     S:This 69 y.o. male  with history of depression, DM, HTN, neuropathy and RLS. Patient was seen by Dr. Kitchen as outpatient for abnormal ECG and shortness of breath. He underwent a stress test on 4/25/25 which anterior wall ischemia, EF 44%, ECG abnormal as before. He was sent to [facility] for further evaluation. He was seen by cardiologist Dr. Cortes and underwent a heart catheterization which revealed multivessel CAD. Cardiothoracic surgery was consulted. He was seen by Dr. Lara, who recommended CABG x 3 to the LAD, D1 and OM1. Patient was in agreement. The patient had been on Plavix and aspirin. Therefore Plavix was placed on hold. P2Y12 test done on 4/28/25 was 156 , which was felt sufficient recovery of plt function to proceed with CABG on 4/29/25. Patient tolerated the procedure well. Patient had post-op delirium causing some worsening of his dementia. Patient was evaluated by SPT for cognitive and swallow. Patient was found have severe cognitive impairment and oropharyngeal dysphagia. He made NPO. Initially NGT was placed for feedings. Unfortunately, patient continue to have oropharyngeal phase dysphagia and ultimately patient required placement of G-Tube on 5/9/25. Patient had been tolerating tube feedings fairly well. He continued to have cognitive deficits with Avasys camera in place. Patient remained weak overall and was felt to need a stay on Rehab. He was admitted to Rehab on 5/13/25. Patient was participating in PT/OT/SPT. On 5/21/25 nursing reported hematemesis and blood from G-Tube overnight. Hospitalist was consulted.  Pt was found to be tachypneic and pale, Pulse ox was 95 .Bp 103/59 hr 94.  Pt  cetirizine (ZYRTEC) tablet 10 mg, 10 mg, Oral, Daily, Ben Schultz DO, 10 mg at 06/06/25 1005    nitroGLYCERIN (NITROSTAT) SL tablet 0.4 mg, 0.4 mg, SubLINGual, Q5 Min PRN, Ben Schultz DO    magnesium hydroxide (MILK OF MAGNESIA) 400 MG/5ML suspension 30 mL, 30 mL, Oral, Daily PRN, Ben Schultz DO, 30 mL at 06/02/25 0541    melatonin disintegrating tablet 5 mg, 5 mg, Oral, Nightly, Ben Schultz DO, 5 mg at 06/06/25 2122    gemfibrozil (LOPID) tablet 600 mg, 600 mg, Oral, BID, Ben Schultz DO, 600 mg at 06/06/25 2122    clopidogrel (PLAVIX) tablet 75 mg, 75 mg, Oral, Daily, Ben Schultz DO, 75 mg at 06/06/25 1006    sodium chloride flush 0.9 % injection 5-40 mL, 5-40 mL, IntraVENous, PRN, Ben Schultz DO    0.9 % sodium chloride infusion, , IntraVENous, PRN, Ben Schultz DO    ondansetron (ZOFRAN-ODT) disintegrating tablet 4 mg, 4 mg, Oral, Q8H PRN **OR** ondansetron (ZOFRAN) injection 4 mg, 4 mg, IntraVENous, Q6H PRN, Ben Schultz DO    acetaminophen (TYLENOL) tablet 650 mg, 650 mg, Oral, Q6H PRN, 650 mg at 06/03/25 1710 **OR** acetaminophen (TYLENOL) suppository 650 mg, 650 mg, Rectal, Q6H PRN, Ben Schultz DO    aspirin chewable tablet 81 mg, 81 mg, Oral, Daily, Ben Schultz DO, 81 mg at 06/06/25 1007    ipratropium 0.5 mg-albuterol 2.5 mg (DUONEB) nebulizer solution 1 Dose, 1 Dose, Inhalation, Q4H WA RT, Ben Schultz DO, 1 Dose at 06/07/25 0637    guaiFENesin (ROBITUSSIN) 100 MG/5ML liquid 200 mg, 200 mg, Oral, TID PRN, Ben Schultz DO    insulin lispro (HUMALOG,ADMELOG) injection vial 0-4 Units, 0-4 Units, SubCUTAneous, 4x Daily AC & HS, Ben Schultz DO    metoprolol tartrate (LOPRESSOR) tablet 75 mg, 75 mg, Oral, BID, Ben Schultz DO, 75 mg at 06/06/25 2121    enoxaparin (LOVENOX) injection 40 mg, 40 mg, SubCUTAneous, Daily, Leo Vaughn MD, 40 mg at 06/06/25 2122

## 2025-06-07 NOTE — PLAN OF CARE
Problem: Chronic Conditions and Co-morbidities  Goal: Patient's chronic conditions and co-morbidity symptoms are monitored and maintained or improved  6/6/2025 2226 by Mecca Rodriguez RN  Outcome: Progressing  6/6/2025 1024 by Harini Villarreal RN  Outcome: Progressing  Flowsheets (Taken 6/6/2025 1019)  Care Plan - Patient's Chronic Conditions and Co-Morbidity Symptoms are Monitored and Maintained or Improved: Monitor and assess patient's chronic conditions and comorbid symptoms for stability, deterioration, or improvement     Problem: Discharge Planning  Goal: Discharge to home or other facility with appropriate resources  6/6/2025 2226 by Mecca Rodriguez RN  Outcome: Progressing  6/6/2025 1024 by Harini Villarreal RN  Outcome: Progressing  Flowsheets (Taken 6/6/2025 1019)  Discharge to home or other facility with appropriate resources: Refer to discharge planning if patient needs post-hospital services based on physician order or complex needs related to functional status, cognitive ability or social support system     Problem: Skin/Tissue Integrity  Goal: Skin integrity remains intact  Description: 1.  Monitor for areas of redness and/or skin breakdown2.  Assess vascular access sites hourly3.  Every 4-6 hours minimum:  Change oxygen saturation probe site4.  Every 4-6 hours:  If on nasal continuous positive airway pressure, respiratory therapy assess nares and determine need for appliance change or resting period  6/6/2025 2226 by Mecca Rodriguez RN  Outcome: Progressing  6/6/2025 1024 by Harini Villarreal RN  Outcome: Progressing  Flowsheets (Taken 6/6/2025 1019)  Skin Integrity Remains Intact: Monitor for areas of redness and/or skin breakdown     Problem: Safety - Adult  Goal: Free from fall injury  6/6/2025 2226 by Mecca Rodriguez RN  Outcome: Progressing  6/6/2025 1024 by Harini Villarreal RN  Outcome: Progressing

## 2025-06-07 NOTE — PLAN OF CARE
Problem: Chronic Conditions and Co-morbidities  Goal: Patient's chronic conditions and co-morbidity symptoms are monitored and maintained or improved  6/7/2025 1220 by Flaca Mendez RN  Outcome: Progressing  6/6/2025 2226 by Mecca Rodriguez RN  Outcome: Progressing     Problem: Discharge Planning  Goal: Discharge to home or other facility with appropriate resources  6/7/2025 1220 by Flaca Mendez RN  Outcome: Progressing  6/6/2025 2226 by Mecca Rodriguez RN  Outcome: Progressing     Problem: Skin/Tissue Integrity  Goal: Skin integrity remains intact  Description: 1.  Monitor for areas of redness and/or skin breakdown2.  Assess vascular access sites hourly3.  Every 4-6 hours minimum:  Change oxygen saturation probe site4.  Every 4-6 hours:  If on nasal continuous positive airway pressure, respiratory therapy assess nares and determine need for appliance change or resting period  6/7/2025 1220 by Flaca Mendez RN  Outcome: Progressing  6/6/2025 2226 by Mecca Rodriguez RN  Outcome: Progressing     Problem: Safety - Adult  Goal: Free from fall injury  6/7/2025 1220 by Flaca Mendez RN  Outcome: Progressing  6/6/2025 2226 by Mecca Rodriguez RN  Outcome: Progressing     Problem: ABCDS Injury Assessment  Goal: Absence of physical injury  6/7/2025 1220 by Flaca Mendez RN  Outcome: Progressing  6/6/2025 2226 by Mecca Rodriguez RN  Outcome: Progressing     Problem: Nutrition Deficit:  Goal: Optimize nutritional status  6/7/2025 1220 by Flaca Mendez RN  Outcome: Progressing  6/6/2025 2226 by Mecca Rodriguez RN  Outcome: Progressing  Flowsheets (Taken 6/6/2025 1430 by Cristel Hopkins RD)  Nutrient intake appropriate for improving, restoring, or maintaining nutritional needs:   Monitor oral intake, labs, and treatment plans   Recommend, monitor, and adjust tube feedings and TPN/PPN based on assessed needs     Problem: Neurosensory - Adult  Goal: Achieves stable or improved

## 2025-06-08 LAB
GLUCOSE BLD-MCNC: 138 MG/DL (ref 70–99)
GLUCOSE BLD-MCNC: 147 MG/DL (ref 70–99)
GLUCOSE BLD-MCNC: 151 MG/DL (ref 70–99)
GLUCOSE BLD-MCNC: 175 MG/DL (ref 70–99)
PERFORMED ON: ABNORMAL

## 2025-06-08 PROCEDURE — 1180000000 HC REHAB R&B

## 2025-06-08 PROCEDURE — 94760 N-INVAS EAR/PLS OXIMETRY 1: CPT

## 2025-06-08 PROCEDURE — 82962 GLUCOSE BLOOD TEST: CPT

## 2025-06-08 PROCEDURE — 94150 VITAL CAPACITY TEST: CPT

## 2025-06-08 PROCEDURE — 99232 SBSQ HOSP IP/OBS MODERATE 35: CPT | Performed by: PSYCHIATRY & NEUROLOGY

## 2025-06-08 PROCEDURE — 6370000000 HC RX 637 (ALT 250 FOR IP): Performed by: INTERNAL MEDICINE

## 2025-06-08 PROCEDURE — 94640 AIRWAY INHALATION TREATMENT: CPT

## 2025-06-08 PROCEDURE — 6370000000 HC RX 637 (ALT 250 FOR IP): Performed by: PSYCHIATRY & NEUROLOGY

## 2025-06-08 PROCEDURE — 6360000002 HC RX W HCPCS: Performed by: PSYCHIATRY & NEUROLOGY

## 2025-06-08 RX ADMIN — Medication 5 MG: at 20:37

## 2025-06-08 RX ADMIN — LANSOPRAZOLE 30 MG: 30 TABLET, ORALLY DISINTEGRATING, DELAYED RELEASE ORAL at 09:22

## 2025-06-08 RX ADMIN — IPRATROPIUM BROMIDE AND ALBUTEROL SULFATE 1 DOSE: 2.5; .5 SOLUTION RESPIRATORY (INHALATION) at 06:40

## 2025-06-08 RX ADMIN — ATORVASTATIN CALCIUM 20 MG: 20 TABLET, FILM COATED ORAL at 20:37

## 2025-06-08 RX ADMIN — LANSOPRAZOLE 30 MG: 30 TABLET, ORALLY DISINTEGRATING, DELAYED RELEASE ORAL at 20:37

## 2025-06-08 RX ADMIN — GEMFIBROZIL 600 MG: 600 TABLET ORAL at 09:22

## 2025-06-08 RX ADMIN — INSULIN GLARGINE 15 UNITS: 100 INJECTION, SOLUTION SUBCUTANEOUS at 09:21

## 2025-06-08 RX ADMIN — GEMFIBROZIL 600 MG: 600 TABLET ORAL at 20:37

## 2025-06-08 RX ADMIN — CLOPIDOGREL BISULFATE 75 MG: 75 TABLET, FILM COATED ORAL at 09:22

## 2025-06-08 RX ADMIN — IPRATROPIUM BROMIDE AND ALBUTEROL SULFATE 1 DOSE: 2.5; .5 SOLUTION RESPIRATORY (INHALATION) at 10:38

## 2025-06-08 RX ADMIN — LEVOTHYROXINE SODIUM 50 MCG: 0.05 TABLET ORAL at 05:39

## 2025-06-08 RX ADMIN — CETIRIZINE HYDROCHLORIDE 10 MG: 10 TABLET ORAL at 09:22

## 2025-06-08 RX ADMIN — IPRATROPIUM BROMIDE AND ALBUTEROL SULFATE 1 DOSE: 2.5; .5 SOLUTION RESPIRATORY (INHALATION) at 14:44

## 2025-06-08 RX ADMIN — CHOLESTYRAMINE 4 G: 4 POWDER, FOR SUSPENSION ORAL at 09:21

## 2025-06-08 RX ADMIN — IPRATROPIUM BROMIDE AND ALBUTEROL SULFATE 1 DOSE: 2.5; .5 SOLUTION RESPIRATORY (INHALATION) at 18:51

## 2025-06-08 RX ADMIN — ENOXAPARIN SODIUM 40 MG: 100 INJECTION SUBCUTANEOUS at 20:37

## 2025-06-08 RX ADMIN — METOPROLOL TARTRATE 75 MG: 50 TABLET, FILM COATED ORAL at 20:37

## 2025-06-08 RX ADMIN — METOPROLOL TARTRATE 75 MG: 50 TABLET, FILM COATED ORAL at 09:22

## 2025-06-08 RX ADMIN — ASPIRIN 81 MG: 81 TABLET, CHEWABLE ORAL at 09:22

## 2025-06-08 RX ADMIN — Medication: at 14:31

## 2025-06-08 NOTE — PLAN OF CARE
Problem: Chronic Conditions and Co-morbidities  Goal: Patient's chronic conditions and co-morbidity symptoms are monitored and maintained or improved  6/8/2025 1005 by Flaca Mendez RN  Outcome: Progressing  6/7/2025 2203 by Mecca Rodriguez RN  Outcome: Progressing     Problem: Discharge Planning  Goal: Discharge to home or other facility with appropriate resources  6/8/2025 1005 by Flaca Mendez RN  Outcome: Progressing  6/7/2025 2203 by Mecca Rodriguez RN  Outcome: Progressing     Problem: Skin/Tissue Integrity  Goal: Skin integrity remains intact  Description: 1.  Monitor for areas of redness and/or skin breakdown2.  Assess vascular access sites hourly3.  Every 4-6 hours minimum:  Change oxygen saturation probe site4.  Every 4-6 hours:  If on nasal continuous positive airway pressure, respiratory therapy assess nares and determine need for appliance change or resting period  6/8/2025 1005 by Flaca Mendez RN  Outcome: Progressing  6/7/2025 2203 by Mecca Rodriguez RN  Outcome: Progressing     Problem: Safety - Adult  Goal: Free from fall injury  6/8/2025 1005 by Flaca Mendez RN  Outcome: Progressing  6/7/2025 2203 by Mecca Rodriguez RN  Outcome: Progressing     Problem: ABCDS Injury Assessment  Goal: Absence of physical injury  6/8/2025 1005 by Flaca Mendez RN  Outcome: Progressing  6/7/2025 2203 by Mecca Rodriguez RN  Outcome: Progressing     Problem: Nutrition Deficit:  Goal: Optimize nutritional status  6/8/2025 1005 by Flaca Mendez RN  Outcome: Progressing  6/7/2025 2203 by Mecca Rodriguez RN  Outcome: Progressing     Problem: Neurosensory - Adult  Goal: Achieves stable or improved neurological status  6/8/2025 1005 by Flaca Mendez RN  Outcome: Progressing  6/7/2025 2203 by Mecca Rodriguez RN  Outcome: Progressing  Goal: Achieves maximal functionality and self care  6/8/2025 1005 by Flaca Mendez RN  Outcome: Progressing  6/7/2025 2203 by Michael

## 2025-06-08 NOTE — PLAN OF CARE
Problem: Chronic Conditions and Co-morbidities  Goal: Patient's chronic conditions and co-morbidity symptoms are monitored and maintained or improved  6/7/2025 2203 by Mecca Rodriguez RN  Outcome: Progressing  6/7/2025 1220 by Flaac Mendez RN  Outcome: Progressing     Problem: Discharge Planning  Goal: Discharge to home or other facility with appropriate resources  6/7/2025 2203 by Mecca Rodriguez RN  Outcome: Progressing  6/7/2025 1220 by Flaca Mendez RN  Outcome: Progressing     Problem: Skin/Tissue Integrity  Goal: Skin integrity remains intact  Description: 1.  Monitor for areas of redness and/or skin breakdown2.  Assess vascular access sites hourly3.  Every 4-6 hours minimum:  Change oxygen saturation probe site4.  Every 4-6 hours:  If on nasal continuous positive airway pressure, respiratory therapy assess nares and determine need for appliance change or resting period  6/7/2025 2203 by Mecca Rodriguez RN  Outcome: Progressing  6/7/2025 1220 by Flaca Mendez RN  Outcome: Progressing     Problem: Safety - Adult  Goal: Free from fall injury  6/7/2025 2203 by Mecca Rodriguez RN  Outcome: Progressing  6/7/2025 1220 by Flaca Mendez RN  Outcome: Progressing

## 2025-06-08 NOTE — PROGRESS NOTES
Patient:   Akhil Alejo  MR#:    [id number]   Room:    0811/811-02   YOB: 1955  Date of Progress Note: 6/8/2025  Time of Note                           7:54 AM  Consulting Physician:   Leo Vaughn M.D.  Attending Physician:  Leo Vaughn MD     Chief complaint Acute ischemic stroke     S:This 69 y.o. male  with history of depression, DM, HTN, neuropathy and RLS. Patient was seen by Dr. Kitchen as outpatient for abnormal ECG and shortness of breath. He underwent a stress test on 4/25/25 which anterior wall ischemia, EF 44%, ECG abnormal as before. He was sent to [facility] for further evaluation. He was seen by cardiologist Dr. Cortes and underwent a heart catheterization which revealed multivessel CAD. Cardiothoracic surgery was consulted. He was seen by Dr. Lara, who recommended CABG x 3 to the LAD, D1 and OM1. Patient was in agreement. The patient had been on Plavix and aspirin. Therefore Plavix was placed on hold. P2Y12 test done on 4/28/25 was 156 , which was felt sufficient recovery of plt function to proceed with CABG on 4/29/25. Patient tolerated the procedure well. Patient had post-op delirium causing some worsening of his dementia. Patient was evaluated by SPT for cognitive and swallow. Patient was found have severe cognitive impairment and oropharyngeal dysphagia. He made NPO. Initially NGT was placed for feedings. Unfortunately, patient continue to have oropharyngeal phase dysphagia and ultimately patient required placement of G-Tube on 5/9/25. Patient had been tolerating tube feedings fairly well. He continued to have cognitive deficits with Avasys camera in place. Patient remained weak overall and was felt to need a stay on Rehab. He was admitted to Rehab on 5/13/25. Patient was participating in PT/OT/SPT. On 5/21/25 nursing reported hematemesis and blood from G-Tube overnight. Hospitalist was consulted.  Pt was found to be tachypneic and pale, Pulse ox was 95 .Bp 103/59 hr 94.  Pt                 RECORD REVIEW: Previous medical records, medications were reviewed at today's visit    IMPRESSION:   1.  Multifocal strokes/recent CABG-on aspirin/Plavix/statin  2.  GI bleed-monitor  3.  Hyperlipidemia-on statin/gemfibrozil  4.  GI-bowel regimen/PPI/tube feeds  5.  DVT prophylaxis-Lovenox  6.  Diabetes-on insulin monitor blood sugar  7.  Acute respiratory failure-DuoNebs  8.  Hypothyroidism-Synthroid  9.  Insomnia-on meds monitor  10.  Hypertension-on meds monitor  11.  PT/OT/speech    Continue present care as noted     Stool positive for occult blood      ELOS June 20 th    Expected duration and frequency therapy: 180 minutes per day, 5 days per week    CALL WITH ANY QUESTIONS  [phone] CELL  Dr Leo Vaughn

## 2025-06-09 LAB
ALBUMIN SERPL-MCNC: 3.5 G/DL (ref 3.5–5.2)
ALP SERPL-CCNC: 171 U/L (ref 40–129)
ALT SERPL-CCNC: 15 U/L (ref 10–50)
ANION GAP SERPL CALCULATED.3IONS-SCNC: 12 MMOL/L (ref 8–16)
AST SERPL-CCNC: 29 U/L (ref 10–50)
BASOPHILS # BLD: 0 K/UL (ref 0–0.2)
BASOPHILS NFR BLD: 0.6 % (ref 0–1)
BILIRUB SERPL-MCNC: 0.3 MG/DL (ref 0.2–1.2)
BUN SERPL-MCNC: 23 MG/DL (ref 8–23)
CALCIUM SERPL-MCNC: 9.5 MG/DL (ref 8.8–10.2)
CHLORIDE SERPL-SCNC: 104 MMOL/L (ref 98–107)
CO2 SERPL-SCNC: 22 MMOL/L (ref 22–29)
CREAT SERPL-MCNC: 0.7 MG/DL (ref 0.7–1.2)
EOSINOPHIL # BLD: 0.5 K/UL (ref 0–0.6)
EOSINOPHIL NFR BLD: 7.3 % (ref 0–5)
ERYTHROCYTE [DISTWIDTH] IN BLOOD BY AUTOMATED COUNT: 16.9 % (ref 11.5–14.5)
GLUCOSE BLD-MCNC: 153 MG/DL (ref 70–99)
GLUCOSE BLD-MCNC: 159 MG/DL (ref 70–99)
GLUCOSE BLD-MCNC: 172 MG/DL (ref 70–99)
GLUCOSE BLD-MCNC: 206 MG/DL (ref 70–99)
GLUCOSE SERPL-MCNC: 150 MG/DL (ref 70–99)
HCT VFR BLD AUTO: 37.5 % (ref 42–52)
HGB BLD-MCNC: 11.7 G/DL (ref 14–18)
IMM GRANULOCYTES # BLD: 0.1 K/UL
LYMPHOCYTES # BLD: 3 K/UL (ref 1.1–4.5)
LYMPHOCYTES NFR BLD: 48.3 % (ref 20–40)
MCH RBC QN AUTO: 29 PG (ref 27–31)
MCHC RBC AUTO-ENTMCNC: 31.2 G/DL (ref 33–37)
MCV RBC AUTO: 92.8 FL (ref 80–94)
MONOCYTES # BLD: 0.6 K/UL (ref 0–0.9)
MONOCYTES NFR BLD: 9.9 % (ref 0–10)
NEUTROPHILS # BLD: 2 K/UL (ref 1.5–7.5)
NEUTS SEG NFR BLD: 32.9 % (ref 50–65)
PERFORMED ON: ABNORMAL
PLATELET # BLD AUTO: 270 K/UL (ref 130–400)
PMV BLD AUTO: 10.8 FL (ref 9.4–12.4)
POTASSIUM SERPL-SCNC: 4.1 MMOL/L (ref 3.5–5)
PROT SERPL-MCNC: 7.8 G/DL (ref 6.4–8.3)
RBC # BLD AUTO: 4.04 M/UL (ref 4.7–6.1)
SODIUM SERPL-SCNC: 138 MMOL/L (ref 136–145)
WBC # BLD AUTO: 6.2 K/UL (ref 4.8–10.8)

## 2025-06-09 PROCEDURE — 82962 GLUCOSE BLOOD TEST: CPT

## 2025-06-09 PROCEDURE — 6370000000 HC RX 637 (ALT 250 FOR IP): Performed by: INTERNAL MEDICINE

## 2025-06-09 PROCEDURE — 80053 COMPREHEN METABOLIC PANEL: CPT

## 2025-06-09 PROCEDURE — 94640 AIRWAY INHALATION TREATMENT: CPT

## 2025-06-09 PROCEDURE — 97530 THERAPEUTIC ACTIVITIES: CPT

## 2025-06-09 PROCEDURE — 36415 COLL VENOUS BLD VENIPUNCTURE: CPT

## 2025-06-09 PROCEDURE — 94150 VITAL CAPACITY TEST: CPT

## 2025-06-09 PROCEDURE — 94760 N-INVAS EAR/PLS OXIMETRY 1: CPT

## 2025-06-09 PROCEDURE — 85025 COMPLETE CBC W/AUTO DIFF WBC: CPT

## 2025-06-09 PROCEDURE — 1180000000 HC REHAB R&B

## 2025-06-09 PROCEDURE — 97129 THER IVNTJ 1ST 15 MIN: CPT

## 2025-06-09 PROCEDURE — 99232 SBSQ HOSP IP/OBS MODERATE 35: CPT | Performed by: PSYCHIATRY & NEUROLOGY

## 2025-06-09 PROCEDURE — 6370000000 HC RX 637 (ALT 250 FOR IP): Performed by: PSYCHIATRY & NEUROLOGY

## 2025-06-09 PROCEDURE — 97110 THERAPEUTIC EXERCISES: CPT

## 2025-06-09 PROCEDURE — 92526 ORAL FUNCTION THERAPY: CPT

## 2025-06-09 PROCEDURE — 2500000003 HC RX 250 WO HCPCS: Performed by: INTERNAL MEDICINE

## 2025-06-09 PROCEDURE — 97116 GAIT TRAINING THERAPY: CPT

## 2025-06-09 PROCEDURE — 6360000002 HC RX W HCPCS: Performed by: PSYCHIATRY & NEUROLOGY

## 2025-06-09 RX ADMIN — LEVOTHYROXINE SODIUM 50 MCG: 0.05 TABLET ORAL at 06:03

## 2025-06-09 RX ADMIN — CLOPIDOGREL BISULFATE 75 MG: 75 TABLET, FILM COATED ORAL at 07:27

## 2025-06-09 RX ADMIN — CETIRIZINE HYDROCHLORIDE 10 MG: 10 TABLET ORAL at 07:27

## 2025-06-09 RX ADMIN — GEMFIBROZIL 600 MG: 600 TABLET ORAL at 20:55

## 2025-06-09 RX ADMIN — ATORVASTATIN CALCIUM 20 MG: 20 TABLET, FILM COATED ORAL at 20:55

## 2025-06-09 RX ADMIN — METOPROLOL TARTRATE 75 MG: 50 TABLET, FILM COATED ORAL at 11:32

## 2025-06-09 RX ADMIN — IPRATROPIUM BROMIDE AND ALBUTEROL SULFATE 1 DOSE: 2.5; .5 SOLUTION RESPIRATORY (INHALATION) at 14:33

## 2025-06-09 RX ADMIN — GUAIFENESIN 200 MG: 100 SOLUTION ORAL at 20:54

## 2025-06-09 RX ADMIN — METOPROLOL TARTRATE 75 MG: 50 TABLET, FILM COATED ORAL at 20:55

## 2025-06-09 RX ADMIN — LANSOPRAZOLE 30 MG: 30 TABLET, ORALLY DISINTEGRATING, DELAYED RELEASE ORAL at 07:27

## 2025-06-09 RX ADMIN — LANSOPRAZOLE 30 MG: 30 TABLET, ORALLY DISINTEGRATING, DELAYED RELEASE ORAL at 20:55

## 2025-06-09 RX ADMIN — INSULIN GLARGINE 15 UNITS: 100 INJECTION, SOLUTION SUBCUTANEOUS at 20:54

## 2025-06-09 RX ADMIN — GEMFIBROZIL 600 MG: 600 TABLET ORAL at 07:27

## 2025-06-09 RX ADMIN — IPRATROPIUM BROMIDE AND ALBUTEROL SULFATE 1 DOSE: 2.5; .5 SOLUTION RESPIRATORY (INHALATION) at 19:14

## 2025-06-09 RX ADMIN — IPRATROPIUM BROMIDE AND ALBUTEROL SULFATE 1 DOSE: 2.5; .5 SOLUTION RESPIRATORY (INHALATION) at 07:19

## 2025-06-09 RX ADMIN — ASPIRIN 81 MG: 81 TABLET, CHEWABLE ORAL at 07:27

## 2025-06-09 RX ADMIN — ENOXAPARIN SODIUM 40 MG: 100 INJECTION SUBCUTANEOUS at 20:54

## 2025-06-09 RX ADMIN — CHOLESTYRAMINE 4 G: 4 POWDER, FOR SUSPENSION ORAL at 07:27

## 2025-06-09 RX ADMIN — Medication 5 MG: at 20:55

## 2025-06-09 NOTE — PROGRESS NOTES
Occupational Therapy  Facility/Department: [facility] 8 REHAB UNIT  Rehabilitation Occupational Therapy Daily Treatment Note    Date: 25  Patient Name: Akhil Alejo       Room: 0811/811-02  MRN: [id number]  Account: [id number]   : 1955  (69 y.o.) Gender: male     Referring Practitioner: (P) Dr. Lara  Diagnosis: (P) Acute ischemic stroke  Additional Pertinent Hx: (P) CABG on     Treatment Diagnosis: (P) Acute ischemic stroke   Past Medical History:  has a past medical history of Arm fracture, Dementia (HCC), Depression, Diabetes mellitus (HCC), Hypertension, Kidney stones, Neuropathy, Palliative care patient, and Restless leg syndrome.  Past Surgical History:   has a past surgical history that includes Cholecystectomy; Lithotripsy; shoulder surgery (Left); bone marrow biopsy (Right, 2020); Colonoscopy (2020); Upper gastrointestinal endoscopy (2017); Cardiac procedure (N/A, 2025); Coronary artery bypass graft (N/A, 2025); Gastrostomy tube placement (2025); Upper gastrointestinal endoscopy (N/A, 2025); Upper gastrointestinal endoscopy (N/A, 2025); and Upper gastrointestinal endoscopy (2025).     25 0905   Position Activity Restriction   Other Position/Activity Restrictions PEG TUBE, HOB 30 DEG FOR FEED   General   Additional Pertinent Hx CABG on    Referring Practitioner Dr. Lara   Diagnosis Acute ischemic stroke   Balance   Standing Balance Contact guard assistance   Functional Mobility   Functional - Mobility Device Rolling Walker   Assist Level Contact guard assistance  (and cues to stay in walker, not crowd R environment)   Functional Mobility Comments with w/c follow for safety   Bed mobility   Sit to Supine Stand by assistance   Transfers   Sit to stand Contact guard assistance   Stand to sit Contact guard assistance   Transfer Comments does not control return to sit, max cues for hand placement during transitions   OT Exercises

## 2025-06-09 NOTE — PATIENT CARE CONFERENCE
[facility] ACUTE INPATIENT REHABILITATION  TEAM CONFERENCE NOTE    Date: 2025  Patient Name: Akhil Alejo        MRN: [id number]    : 1955  (69 y.o.)  Gender: male      Diagnosis: CABGX3 ; NEW ONSET BIHEMISPHERIC DEEP WHITE MATTER CVA WITH RIGHT WEAKNESS      PHYSICAL THERAPY  GROSS ASSESSMENT       BED MOBILITY  Bed mobility  Rolling to Left: Modified independent  Rolling to Right: Substantial/Maximal assistance (with rail for brief change)  Supine to Sit: Minimal assistance, Contact guard assistance  Sit to Supine: Stand by assistance  Scooting: Contact guard assistance (On EOB)  Bed Mobility Comments: oob to L side;delayed initiation       TRANSFERS  Transfers  Sit to Stand: Contact guard assistance, Minimal Assistance  Stand to Sit: Contact guard assistance, Minimal Assistance  Bed to Chair: Minimal assistance, Partial/Moderate assistance (partial stand pivot bed>WC)  Stand Pivot Transfers: Partial/Moderate assistance, 2 Person Assistance (EOB<>w/c from pt R/L: 1X w/ RW and 1X w/o AD (v/c's for use of bedrail))  Lateral Transfers:  (.)  Comment: vc's for hand position  with poor carryover  WHEELCHAIR PROPULSION     AMBULATION  Ambulation  Surface: Level tile  Device: Rolling Walker  Other Apparatus: Wheelchair follow  Assistance: Minimal assistance  Quality of Gait: poor proximity to walker, FFP, deviates toward wall, lateral lurch B (hip abd weakness?)  Gait Deviations: Slow Alycia, Decreased step length, Decreased step height, Decreased head and trunk rotation  Distance: 50' x 2 and 40'  Comments: seated rest between distances  STAIRS     GOALS:  Short Term Goals  Time Frame for Short Term Goals: 2 WEEKS  Short Term Goal 1: BED MOBILITY MOD ASSIST WITH RAILS  Short Term Goal 2: TF MOD A SURFACE TO SURFACE WITH A.D.  Short Term Goal 3: AMB 10 FT WITH RW MOD A  Short Term Goal 4: PROPEL WC 25 FT MOD A  Short Term Goal 5: CAR TF MAX A WITH A.D.    Long Term Goals  Time Frame for Long Term  goals:  Goal 1: The patient will develop functional, cognitive-linguistic-based skills and utilize compensatory strategies to communicate wants and needs effectively, maintain safety during ADL’s and participate socially in functional living environment   Goal 2: The patient will tolerate the safest least restrictive diet with minimal overt signs and symptoms of aspiration.      Short term goals:  Goal 1: Re-assessment of speech/language/cognitive functioning.  Goal 2: Patient will complete attention and processing tasks with provision of mod cues/prompts.  Goal 3: Patient will complete comprehension and word finding tasks with provision of mod cues/prompts.  Goal 4: Patient will complete memory functioning tasks with provision of mod cues/prompts.  Goal 5: Patient will complete reasoning/problem solving tasks with provision of mod cues/prompts.   Goal 6: Patient will complete orientation tasks with provision of mod cues/prompts.     Swallowing Short Term Goals  Short-term Goals  Goal 1: Patient NPO. DISCONTINUE. Patient will tolerate pureed diet with moderately thick/honey thick liquids without s/s of penetration/aspiration.   Goal 2: Patient staff will follow swallow safety recommendations.  Goal 3: Patient will receive daily oral care to decrease bacteria from the oral cavity.  Goal 4: Re-assessment of swallow function for potential PO intake.  Goal 5: Patient will complete breath support, oral motor, lingual, and pharyngeal exercises with provision of mod cues/prompts.     Long term goals met: 0  Short term goals met: 0    OCCUPATIONAL THERAPY  Cognitive Patterns:     Cognitive Assessment Method (CAM):  Confusion Assessment Method (CAM)  Is there evidence of an acute change in mental status from the patient's baseline?: Yes  Inattention: Behavior continuously present, does not fluctuate  Disorganized thinking: Behavior continuously present, does not fluctuate  Altered level of consciousness: Behavior not

## 2025-06-09 NOTE — PROGRESS NOTES
Physical Therapy  Name: Akhil Alejo  MRN:  [id number]  Date of service:  6/9/2025 06/09/25 0900   Restrictions/Precautions   Restrictions/Precautions Weight Bearing;Aspiration Risk;Fall Risk;NPO;Surgical Protocols   Lower Extremity Weight Bearing Restrictions   Right Lower Extremity Weight Bearing Weight Bearing As Tolerated   Left Lower Extremity Weight Bearing Weight Bearing As Tolerated   Position Activity Restriction   Other Position/Activity Restrictions PEG TUBE, HOB 30 DEG FOR FEED   General   Diagnosis CABGX3 4/29; NEW ONSET BIHEMISPHERIC DEEP WHITE MATTER CVA WITH RIGHT WEAKNESS   General   General Comments co tx with OT; PCA finishing dressing pt   Subjective   Subjective Pt agreeable. No new. PCA reports he hasn't eaten breakfast yet due to late arrival (feeding tube cont)   Bed mobility   Rolling to Left Modified independent   Supine to Sit Minimal assistance;Contact guard assistance   Bed Mobility Comments oob to L side;delayed initiation   Transfers   Sit to Stand Contact guard assistance;Minimal Assistance   Stand to Sit Contact guard assistance;Minimal Assistance   Bed to Chair Minimal assistance;Partial/Moderate assistance  (partial stand pivot bed>WC)   Comment vc's for hand position  with poor carryover   Ambulation   Surface Level tile   Device Rolling Walker   Other Apparatus Wheelchair follow   Assistance Minimal assistance   Quality of Gait poor proximity to walker, FFP, deviates toward wall, lateral lurch B (hip abd weakness?)   Gait Deviations Slow Alycia;Decreased step length;Decreased step height;Decreased head and trunk rotation   Distance 50' x 2 and 40'   Comments seated rest between distances   PT Exercises   A/AROM Exercises x 10 B LAQ with max vc's and hand positioned as target   Resistive Exercises B hip abd/add with man resist x 15   Static Sitting Balance Exercises seated posture edge of wc without back support w/o UE support encouraging retraction of shoulders and extending

## 2025-06-09 NOTE — PROGRESS NOTES
Patient:   Akhil Alejo  MR#:    [id number]   Room:    0811/811-02   YOB: 1955  Date of Progress Note: 6/9/2025  Time of Note                           7:11 AM  Consulting Physician:   Leo Vaughn M.D.  Attending Physician:  Leo Vaughn MD     Chief complaint Acute ischemic stroke     S:This 69 y.o. male  with history of depression, DM, HTN, neuropathy and RLS. Patient was seen by Dr. Kitchen as outpatient for abnormal ECG and shortness of breath. He underwent a stress test on 4/25/25 which anterior wall ischemia, EF 44%, ECG abnormal as before. He was sent to [facility] for further evaluation. He was seen by cardiologist Dr. Cortes and underwent a heart catheterization which revealed multivessel CAD. Cardiothoracic surgery was consulted. He was seen by Dr. Lara, who recommended CABG x 3 to the LAD, D1 and OM1. Patient was in agreement. The patient had been on Plavix and aspirin. Therefore Plavix was placed on hold. P2Y12 test done on 4/28/25 was 156 , which was felt sufficient recovery of plt function to proceed with CABG on 4/29/25. Patient tolerated the procedure well. Patient had post-op delirium causing some worsening of his dementia. Patient was evaluated by SPT for cognitive and swallow. Patient was found have severe cognitive impairment and oropharyngeal dysphagia. He made NPO. Initially NGT was placed for feedings. Unfortunately, patient continue to have oropharyngeal phase dysphagia and ultimately patient required placement of G-Tube on 5/9/25. Patient had been tolerating tube feedings fairly well. He continued to have cognitive deficits with Avasys camera in place. Patient remained weak overall and was felt to need a stay on Rehab. He was admitted to Rehab on 5/13/25. Patient was participating in PT/OT/SPT. On 5/21/25 nursing reported hematemesis and blood from G-Tube overnight. Hospitalist was consulted.  Pt was found to be tachypneic and pale, Pulse ox was 95 .Bp 103/59 hr 94.  Pt  complaining of abd pain.  Hgb resulted at 7.8.  Pt sent for emergent ct.  Pt vomited large amount of clots during scan.  A rapid response called. GI notified Pt was moved to ICU. Patient had a mental status change from 8th floor to ICU, subsequently had an aspiration event with worsening confusion and unresponsiveness noted. He had a right gaze preference and possible right sided weakness. Stat CT of head was ordered, results were negative.   Ultimately required intubation by the intensivist, who noted evidence of bloody gastric contents at the vocal cords. The patient therefore was indicated for bronchoscopy to further diagnose the severity of the aspiration and help clear out the secretions to improve oxygenation, which was completed by Dr. Lara.  Patient had large amount of blood clots vomited attempting to get NGT placed. Repeat hemoglobin was 6.7. He was seen by GI Dr. Mir. He was taken to endo and found to have Superficial actively bleeding submucosal vessel at the PEG tube bolster site, which was treated successfully with epinephrine and gold probe. Patient was transfused a total of 4 units RBCs. Hemoglobin currently stable at 10.8. Patient was able to be extubated on 5/22/25. Patient had MRI brain done on 5/26/25 that showed small scattered acute to subacute bi-hemispheric deep white matter infarcts, possibly embolic. Consult for neurology. He was seen by Dr. Reji More. EEG with slowing nothing epileptiform. Recent ECHO, CD, ELYSE negative. Patient ASA, Plavix, statin, no clear indication for anticoagulation identified as of yet. Dr. More recommended Zio Patch at discharge. Patient was re-evaluated by SPT for cognition and swallow. Patient continues to have severe cognitive-linguistic impairment scoring 2/30 on mini-mental exam. Patient also failed repeat bedside swallow eval, recommendation for continued NPO with G-tube feeding. He remains weak overall and is participating in both PT/OT. He is felt

## 2025-06-09 NOTE — PROGRESS NOTES
Nutrition Assessment     Type and Reason for Visit: Reassess    Nutrition Recommendations/Plan:   Modify Glucerna 1.5 to run 18 hours daily from noon until 6 am to allow for improved po intake/ continue flush of 42 mLs/hr while TF is running     Malnutrition Assessment:  Malnutrition Status: At risk for malnutrition    Nutrition Assessment:  Pt continues on TF continuously. SLP reports poor intake related to pt not being hungry. Intakes noted to range from 0-25%. Discussed adjusting TF hours to help allow for increased appetite and improved po intake. SLP states possibly being able to increase diet texture which may also help improve intake.  Weight is trending back up. Recommend Glucerna 1.5 @ 57 mLs/hr x 18 hours daily and continue flush of 42 mLs/hr while tube feeding is running. Turn TF off at 6 am to noon daily to allow for improved po intake.  This will provide 1539 kcals, 85 gms pro, 1535 mLs free H2O and 1782 mLs total volume. Will monitor intake and for possible weaning from TF as able with improved intake.    Estimated Daily Nutrient Needs:  Energy (kcal):  9338-7909 kcals (25-30 kcals) Weight Used for Energy Requirements: Current     Protein (g):  80-160g Weight Used for Protein Requirements: Current        Fluid (ml/day):  9369-6568 ml Method Used for Fluid Requirements: 1 ml/kcal    Nutrition Related Findings:   Glu 147-159 Wound Type: Surgical Incision    Current Nutrition Therapies:    ADULT DIET; Dysphagia - Pureed; Moderately Thick (Honey)  ADULT TUBE FEEDING; PEG; Diabetic; Continuous; 57; No; 42; Q 1 hour    Anthropometric Measures:  Height: 180.3 cm (5' 10.98\")  Current Body Wt: 82.5 kg (181 lb 14.1 oz)   BMI: 25.4      Nutrition Diagnosis:   Moderate malnutrition related to inadequate protein-energy intake as evidenced by criteria as identified in malnutrition assessment    Nutrition Interventions:   Food and/or Nutrient Delivery: Continue Current Diet, Modify Tube Feeding  Nutrition

## 2025-06-09 NOTE — PROGRESS NOTES
[facility]  INPATIENT SPEECH THERAPY  [facility] 8 REHAB UNIT      TIME  1000  1100  60 MINUTES    [x]Daily Note  []Progress Note    Date: 2025  Patient Name: Akhil Alejo        MRN: [id number]    Account #: [id number]  : 1955  (69 y.o.)  Gender: male   Primary Provider: Leo Vaughn MD  Swallowing Status/Diet:          Subjective: Patient alert and in bed upon entry. PT assisted in scooting patient up in bed for oral intake.     Objective:     Reasoning task completed. Patient given an abstract category (cold, sharp, green, etc.) and required to name 3 items in each. Patient able to complete task with 48% accuracy given maximum verbal and semantic cues.     Swallowing:   Swallow reassessment completed on this date. Oral prep revealed decreased vertical mastication with ice chips. Oral transit timing of ice chips and puree consistency was observed to be 1-2 seconds in length. Laryngeal elevation was observed to be sluggish and mildly decreased for swallow airway protection. 1x mild throat clear observed following trials of puree consistency. No s/s of penetration/aspiration observed with ice chips.     Discussed adjusting TF hours with dietitian to allow for increased appetite. Tube feeding will be turned off from 6am-noon to allow for improved PO intake. Will monitor for potential diet upgrade to improve PO intake as well.     Patient will frequently scoot himself down in bed and attempt to eat ice chips, pudding, etc. Patient needs to be sitting upright in bed or chair for any PO intake.      Recommend utilization of PEG for supplemental nutrition and for medications. Okay to continue trials of safest, most restrictive diet of puree consistency with moderately thick/honey thick liquids. STAFF FEED. Okay for ice chips IN BETWEEN MEALS for comfort.     SLP will continue to follow and treat.    Diet Solids Recommendation:     Diet Liquid Recommendation:     Compensatory Swallowing Strategies:       SHORT  TERM GOAL #1:  Goal 1: Re-assessment of speech/language/cognitive functioning.    SHORT TERM GOAL #2:  Goal 2: Patient will complete attention and processing tasks with provision of mod cues/prompts.    SHORT TERM GOAL #3:  Goal 3: Patient will complete comprehension and word finding tasks with provision of mod cues/prompts.    SHORT TERM GOAL #4:  Goal 4: Patient will complete memory functioning tasks with provision of mod cues/prompts.    SHORT TERM GOAL #5:  Goal 5: Patient will complete reasoning/problem solving tasks with provision of mod cues/prompts. Goal 6: Patient will complete orientation tasks with provision of mod cues/prompts.    Swallowing Short Term Goals  Short-term Goals  Goal 1: Patient NPO.  Goal 2: Patient staff will follow swallow safety recommendations.  Goal 3: Patient will receive daily oral care to decrease bacteria from the oral cavity.  Goal 4: Re-assessment of swallow function for potential PO intake.  Goal 5: Patient will complete breath support, oral motor, lingual, and pharyngeal exercises with provision of mod cues/prompts. .         ASSESSMENT:  Assessment: [x]Progressing towards goals          []Not Progressing towards goals    Patient Tolerance of Treatment:   []Tolerated well [x]Tolerated fair []Required rest breaks []Fatigued    Education:  Learner:  [x]Patient          []Significant Other          []Other  Education provided regarding:  [x]Goals and POC   []Diet and swallowing precautions    []Home Exercise Program  []Progress and/or discharge information  Method of Education:  [x]Discussion          [x]Demonstration          []Handout          []Other  Evaluation of Education:   []Verbalized understanding   []Demonstrates without assistance  []Demonstrates with assistance  [x]Needs further instruction     []No evidence of learning                  []No family present      Plan: [x]Continue with current plan of care    []Modify current plan of care as follows:    []Discharge

## 2025-06-10 LAB
GLUCOSE BLD-MCNC: 121 MG/DL (ref 70–99)
GLUCOSE BLD-MCNC: 138 MG/DL (ref 70–99)
GLUCOSE BLD-MCNC: 166 MG/DL (ref 70–99)
GLUCOSE BLD-MCNC: 196 MG/DL (ref 70–99)
PERFORMED ON: ABNORMAL

## 2025-06-10 PROCEDURE — 1180000000 HC REHAB R&B

## 2025-06-10 PROCEDURE — 94640 AIRWAY INHALATION TREATMENT: CPT

## 2025-06-10 PROCEDURE — 97110 THERAPEUTIC EXERCISES: CPT

## 2025-06-10 PROCEDURE — 6370000000 HC RX 637 (ALT 250 FOR IP): Performed by: INTERNAL MEDICINE

## 2025-06-10 PROCEDURE — 6360000002 HC RX W HCPCS: Performed by: PSYCHIATRY & NEUROLOGY

## 2025-06-10 PROCEDURE — 97530 THERAPEUTIC ACTIVITIES: CPT

## 2025-06-10 PROCEDURE — 82962 GLUCOSE BLOOD TEST: CPT

## 2025-06-10 PROCEDURE — 94760 N-INVAS EAR/PLS OXIMETRY 1: CPT

## 2025-06-10 PROCEDURE — 97116 GAIT TRAINING THERAPY: CPT

## 2025-06-10 PROCEDURE — 99232 SBSQ HOSP IP/OBS MODERATE 35: CPT | Performed by: PSYCHIATRY & NEUROLOGY

## 2025-06-10 PROCEDURE — 6370000000 HC RX 637 (ALT 250 FOR IP): Performed by: PSYCHIATRY & NEUROLOGY

## 2025-06-10 PROCEDURE — 97130 THER IVNTJ EA ADDL 15 MIN: CPT

## 2025-06-10 PROCEDURE — 97535 SELF CARE MNGMENT TRAINING: CPT

## 2025-06-10 PROCEDURE — 92526 ORAL FUNCTION THERAPY: CPT

## 2025-06-10 PROCEDURE — 2500000003 HC RX 250 WO HCPCS: Performed by: INTERNAL MEDICINE

## 2025-06-10 PROCEDURE — 97129 THER IVNTJ 1ST 15 MIN: CPT

## 2025-06-10 RX ADMIN — INSULIN GLARGINE 15 UNITS: 100 INJECTION, SOLUTION SUBCUTANEOUS at 21:40

## 2025-06-10 RX ADMIN — INSULIN GLARGINE 15 UNITS: 100 INJECTION, SOLUTION SUBCUTANEOUS at 10:02

## 2025-06-10 RX ADMIN — GUAIFENESIN 200 MG: 100 SOLUTION ORAL at 21:39

## 2025-06-10 RX ADMIN — METOPROLOL TARTRATE 75 MG: 50 TABLET, FILM COATED ORAL at 21:40

## 2025-06-10 RX ADMIN — GEMFIBROZIL 600 MG: 600 TABLET ORAL at 10:03

## 2025-06-10 RX ADMIN — Medication 5 MG: at 21:40

## 2025-06-10 RX ADMIN — IPRATROPIUM BROMIDE AND ALBUTEROL SULFATE 1 DOSE: 2.5; .5 SOLUTION RESPIRATORY (INHALATION) at 18:58

## 2025-06-10 RX ADMIN — CHOLESTYRAMINE 4 G: 4 POWDER, FOR SUSPENSION ORAL at 10:02

## 2025-06-10 RX ADMIN — INSULIN LISPRO 10 UNITS: 100 INJECTION, SOLUTION INTRAVENOUS; SUBCUTANEOUS at 10:02

## 2025-06-10 RX ADMIN — LANSOPRAZOLE 30 MG: 30 TABLET, ORALLY DISINTEGRATING, DELAYED RELEASE ORAL at 21:39

## 2025-06-10 RX ADMIN — IPRATROPIUM BROMIDE AND ALBUTEROL SULFATE 1 DOSE: 2.5; .5 SOLUTION RESPIRATORY (INHALATION) at 11:13

## 2025-06-10 RX ADMIN — METOPROLOL TARTRATE 75 MG: 50 TABLET, FILM COATED ORAL at 10:03

## 2025-06-10 RX ADMIN — CETIRIZINE HYDROCHLORIDE 10 MG: 10 TABLET ORAL at 11:35

## 2025-06-10 RX ADMIN — ASPIRIN 81 MG: 81 TABLET, CHEWABLE ORAL at 10:03

## 2025-06-10 RX ADMIN — IPRATROPIUM BROMIDE AND ALBUTEROL SULFATE 1 DOSE: 2.5; .5 SOLUTION RESPIRATORY (INHALATION) at 13:57

## 2025-06-10 RX ADMIN — IPRATROPIUM BROMIDE AND ALBUTEROL SULFATE 1 DOSE: 2.5; .5 SOLUTION RESPIRATORY (INHALATION) at 06:49

## 2025-06-10 RX ADMIN — LANSOPRAZOLE 30 MG: 30 TABLET, ORALLY DISINTEGRATING, DELAYED RELEASE ORAL at 13:31

## 2025-06-10 RX ADMIN — CLOPIDOGREL BISULFATE 75 MG: 75 TABLET, FILM COATED ORAL at 10:03

## 2025-06-10 RX ADMIN — LEVOTHYROXINE SODIUM 50 MCG: 0.05 TABLET ORAL at 05:53

## 2025-06-10 RX ADMIN — ENOXAPARIN SODIUM 40 MG: 100 INJECTION SUBCUTANEOUS at 21:39

## 2025-06-10 RX ADMIN — GEMFIBROZIL 600 MG: 600 TABLET ORAL at 21:40

## 2025-06-10 RX ADMIN — ATORVASTATIN CALCIUM 20 MG: 20 TABLET, FILM COATED ORAL at 21:40

## 2025-06-10 NOTE — PROGRESS NOTES
Physical Therapy  Name: Akhil Alejo  MRN:  [id number]  Date of service:  6/10/2025       06/10/25 0900   Restrictions/Precautions   Restrictions/Precautions Weight Bearing;Aspiration Risk;Fall Risk;NPO;Surgical Protocols   Activity Level Up with Assist   Lower Extremity Weight Bearing Restrictions   Right Lower Extremity Weight Bearing Weight Bearing As Tolerated   Left Lower Extremity Weight Bearing Weight Bearing As Tolerated   Position Activity Restriction   Other Position/Activity Restrictions PEG TUBE, HOB 30 DEG FOR FEED   General   Additional Pertinent Hx DEPRESSION, DM, HTN   Diagnosis CABGX3 4/29; NEW ONSET BIHEMISPHERIC DEEP WHITE MATTER CVA WITH RIGHT WEAKNESS   General   General Comments co tx with OT, pt in bed, awake but contorted position   Subjective   Subjective Requires encouragement due to reports need to rest frequently.   Bed mobility   Bed Mobility Comments LEYVA assisted with bed mob   Transfers   Sit to Stand Contact guard assistance;Minimal Assistance   Stand to Sit Contact guard assistance;Minimal Assistance   Comment vc's for hand position with poor carryover   Ambulation   Surface Level tile   Device Rolling Walker   Other Apparatus Wheelchair follow   Assistance Minimal assistance   Quality of Gait poor proximity to walker, FFP, deviates toward wall, lateral lurch B (hip abd weakness?)   Gait Deviations Slow Alycia;Decreased step length;Decreased step height;Decreased head and trunk rotation   Distance 50' x 2   Ambulation 2   Surface - 2 level tile   Device 2 Rolling Walker   Other Apparatus 2 Wheelchair follow   Assistance 2 Minimal assistance   Quality of Gait 2 as above   Gait Deviations Slow Alycia;Decreased step length;Decreased step height;Decreased head and trunk rotation   Distance 75'   Ambulation 3   Surface - 3 level tile   Device 3 Rolling Walker   Other Apparatus 3 Wheelchair follow   Assistance 3 Minimal assistance;Partial/Moderate assistance;Substantial/Maximal assistance

## 2025-06-10 NOTE — PROGRESS NOTES
This  visited with pt and his wife to follow up and provide spiritual care. Pt's wife says pt is due to discharge in about eight days. Pt says he is a Oriental orthodox and his lisa is important to him. This  provided spiritual care with sustaining presence, support, and prayer. Pt's wife expressed gratitude for spiritual care.       Spiritual Health History and Assessment/Progress Note  [facility]    Spiritual/Emotional Needs,  , Life Adjustments, Adjustment to illness,      Name: Akhil Alejo MRN: [id number]    Age: 69 y.o.     Sex: male   Language: English   Tenriism: Non-Evangelical   Acute ischemic stroke (HCC)     Date: 6/10/2025            Total Time Calculated: 10 min              Spiritual Assessment continued in [facility] 8 REHAB UNIT        Referral/Consult From: Palliative Care   Encounter Overview/Reason: Spiritual/Emotional Needs  Service Provided For: Patient, Family    Lisa, Belief, Meaning:   Patient identifies as spiritual and is connected with a lisa tradition or spiritual practice  Family/Friends identify as spiritual and are connected with a lisa tradition or spiritual practice      Importance and Influence:  Patient has spiritual/personal beliefs that influence decisions regarding their health  Family/Friends have spiritual/personal beliefs that influence decisions regarding the patient's health    Community:  Patient is connected with a spiritual community  Family/Friends are connected with a spiritual community:    Assessment and Plan of Care:     Patient Interventions include: Provided sacramental/Scientology ritual  Family/Friends Interventions include: Provided sacramental/Scientology ritual    Patient Plan of Care: Spiritual Care available upon further referral  Family/Friends Plan of Care: Spiritual Care available upon further referral    Electronically signed by Mary Behrens, Chaplain on 6/10/2025 at 3:29 PM

## 2025-06-10 NOTE — PROGRESS NOTES
Occupational Therapy  Facility/Department: [facility] 8 REHAB UNIT  Rehabilitation Occupational Therapy Daily Treatment Note    Date: 6/10/25  Patient Name: Akhil Alejo       Room: 0811/811-02  MRN: [id number]  Account: [id number]   : 1955  (69 y.o.) Gender: male     Referring Practitioner: (P) Dr. Lara  Diagnosis: (P) Acute ischemic stroke  Additional Pertinent Hx: (P) CABG on     Treatment Diagnosis: (P) Acute ischemic stroke   Past Medical History:  has a past medical history of Arm fracture, Dementia (HCC), Depression, Diabetes mellitus (HCC), Hypertension, Kidney stones, Neuropathy, Palliative care patient, and Restless leg syndrome.  Past Surgical History:   has a past surgical history that includes Cholecystectomy; Lithotripsy; shoulder surgery (Left); bone marrow biopsy (Right, 2020); Colonoscopy (2020); Upper gastrointestinal endoscopy (2017); Cardiac procedure (N/A, 2025); Coronary artery bypass graft (N/A, 2025); Gastrostomy tube placement (2025); Upper gastrointestinal endoscopy (N/A, 2025); Upper gastrointestinal endoscopy (N/A, 2025); and Upper gastrointestinal endoscopy (2025).     06/10/25 0905   Restrictions/Precautions   Restrictions/Precautions Weight Bearing;Aspiration Risk;Fall Risk;NPO;Surgical Protocols   Position Activity Restriction   Other Position/Activity Restrictions PEG TUBE, HOB 30 DEG FOR FEED   General   Additional Pertinent Hx CABG on    Referring Practitioner Dr. Lara   Diagnosis Acute ischemic stroke   ADL   Grooming Skilled Clinical Factors assist with shaving d/t safety and cognitive concerns   Balance   Sitting Balance Supervision   Standing Balance Contact guard assistance   Standing Balance   Time briefly   Activity at sink   Functional Mobility   Functional - Mobility Device Rolling Walker   Assist Level Contact guard assistance   Functional Mobility Comments x 1 SBA for w/c follow d/t frequently attempting  to sit down   Transfers   Sit to stand Contact guard assistance   Stand to sit Contact guard assistance   Transfer Comments does better with hand sequencing when RW not in line of sight   Short Term Goals   Short Term Goal 1 MET   Short Term Goal 2 MET   Short Term Goal 3 MET   Short Term Goal 4 MET   Short Term Goal 5 MET   Long Term Goals   Long Term Goal 1 MET   Activity Tolerance   Activity Tolerance Patient Tolerated treatment well;Patient limited by fatigue   Activity Tolerance Comments requested BTB frequently but completed all tasks, participated well in ADLs   Assessment   Performance deficits / Impairments Decreased functional mobility ;Decreased ADL status;Decreased strength;Decreased balance;Decreased posture;Decreased endurance;Decreased safe awareness;Decreased high-level IADLs;Decreased cognition   Assessment improving with t/fs/mobility   Treatment Diagnosis Acute ischemic stroke   History CABG on 4/29/25; recent GI bleed        06/10/25 0900   Eating   Assistance Needed Partial/moderate assistance   Comment supervision d/t swallowing precautions and assist when fatigued   CARE Score 3   Oral Hygiene   Assistance Needed Supervision or touching assistance   Comment supervision d/t swallowing precautions and cognition   CARE Score 4   Shower/Bathe Self   Assistance Needed Partial/moderate assistance   Comment max cues for sequencing and initiation   CARE Score 3   Upper Body Dressing   Assistance Needed Supervision or touching assistance   CARE Score 4   Lower Body Dressing   Assistance Needed Partial/moderate assistance   Comment two attempts made and both times donned BLEs in the same leg hole, assist to correct   CARE Score 3   Putting On/Taking Off Footwear   Assistance Needed Partial/moderate assistance   Comment assist with L   CARE Score 3   Toileting Hygiene   Assistance needed Partial/moderate assistance   CARE Score 3   Toilet Transfer   Assistance needed Supervision or touching assistance

## 2025-06-10 NOTE — PLAN OF CARE
Problem: Chronic Conditions and Co-morbidities  Goal: Patient's chronic conditions and co-morbidity symptoms are monitored and maintained or improved  Outcome: Progressing     Problem: Discharge Planning  Goal: Discharge to home or other facility with appropriate resources  Outcome: Progressing     Problem: Skin/Tissue Integrity  Goal: Skin integrity remains intact  Description: 1.  Monitor for areas of redness and/or skin breakdown2.  Assess vascular access sites hourly3.  Every 4-6 hours minimum:  Change oxygen saturation probe site4.  Every 4-6 hours:  If on nasal continuous positive airway pressure, respiratory therapy assess nares and determine need for appliance change or resting period  Outcome: Progressing     Problem: Safety - Adult  Goal: Free from fall injury  Outcome: Progressing     Problem: ABCDS Injury Assessment  Goal: Absence of physical injury  Outcome: Progressing     Problem: Nutrition Deficit:  Goal: Optimize nutritional status  Outcome: Progressing  Flowsheets (Taken 6/9/2025 1154 by Cristel Hopkins RD)  Nutrient intake appropriate for improving, restoring, or maintaining nutritional needs:   Monitor oral intake, labs, and treatment plans   Assess nutritional status and recommend course of action   Recommend, monitor, and adjust tube feedings and TPN/PPN based on assessed needs     Problem: Neurosensory - Adult  Goal: Achieves stable or improved neurological status  Outcome: Progressing  Goal: Achieves maximal functionality and self care  Outcome: Progressing     Problem: Skin/Tissue Integrity - Adult  Goal: Skin integrity remains intact  Description: 1.  Monitor for areas of redness and/or skin breakdown2.  Assess vascular access sites hourly3.  Every 4-6 hours minimum:  Change oxygen saturation probe site4.  Every 4-6 hours:  If on nasal continuous positive airway pressure, respiratory therapy assess nares and determine need for appliance change or resting period  Outcome: Progressing  Goal:

## 2025-06-10 NOTE — PROGRESS NOTES
Patient:   Akhil Alejo  MR#:    [id number]   Room:    0811/811-02   YOB: 1955  Date of Progress Note: 6/10/2025  Time of Note                           8:03 AM  Consulting Physician:   Leo Vaughn M.D.  Attending Physician:  Leo Vaughn MD     Chief complaint Acute ischemic stroke     S:This 69 y.o. male  with history of depression, DM, HTN, neuropathy and RLS. Patient was seen by Dr. Kitchen as outpatient for abnormal ECG and shortness of breath. He underwent a stress test on 4/25/25 which anterior wall ischemia, EF 44%, ECG abnormal as before. He was sent to [facility] for further evaluation. He was seen by cardiologist Dr. Cortes and underwent a heart catheterization which revealed multivessel CAD. Cardiothoracic surgery was consulted. He was seen by Dr. Lara, who recommended CABG x 3 to the LAD, D1 and OM1. Patient was in agreement. The patient had been on Plavix and aspirin. Therefore Plavix was placed on hold. P2Y12 test done on 4/28/25 was 156 , which was felt sufficient recovery of plt function to proceed with CABG on 4/29/25. Patient tolerated the procedure well. Patient had post-op delirium causing some worsening of his dementia. Patient was evaluated by SPT for cognitive and swallow. Patient was found have severe cognitive impairment and oropharyngeal dysphagia. He made NPO. Initially NGT was placed for feedings. Unfortunately, patient continue to have oropharyngeal phase dysphagia and ultimately patient required placement of G-Tube on 5/9/25. Patient had been tolerating tube feedings fairly well. He continued to have cognitive deficits with Avasys camera in place. Patient remained weak overall and was felt to need a stay on Rehab. He was admitted to Rehab on 5/13/25. Patient was participating in PT/OT/SPT. On 5/21/25 nursing reported hematemesis and blood from G-Tube overnight. Hospitalist was consulted.  Pt was found to be tachypneic and pale, Pulse ox was 95 .Bp 103/59 hr 94.  Pt

## 2025-06-10 NOTE — PROGRESS NOTES
[facility]  INPATIENT SPEECH THERAPY  [facility] 8 REHAB UNIT     TIME   Time In: 07:00  Time Out: 08:00  Minutes: 60     [x]Daily Note  []Progress Note     Date: 06/10/25  Patient Name: Akhil Alejo        MRN: [id number]    Account #: [id number]  : 1955  (69 y.o.)  Gender: Male   Primary Provider: Roderick GONSALEZ  Diet Consistency Recommendations: PEG; puree consistency with moderately thick/honey thick liquids     Subjective:   Patient in bed upon entry. No family member present. No pain reported. Patient appeared lethargic during treatment session and required moderate stimulation to maintain alertness. When patient displayed periods of increased alertness, patient fixated on \"drink\" -referring to ice.    Objective:   Targeted auditory comprehension. In structured activity, patient responded, \"yes,\" to all yes/no comparison questions involving arrays of 2 common objects at independent level; patient did not increase accuracy with provision of mod cue/prompts.    Targeted word finding/expression. In structured activity, patient was 0% verbalizing complex opposites independently; patient did not increase accuracy with provision of mod cues/prompts.    Targeted reasoning. In structured activity, patient was 0% verbalizing single similarities involving arrays of 2 common objects at independent level; patient did not increase accuracy with provision of mod cues/prompts.      Targeted swallowing. With ice chip trials presented by SLP, patient exhibited primarily vertical jaw movement at the front of the mouth during oral prep. Oral transit of ice chip trials primarily varied from 1-3 seconds in length. Oral transit of puree consistency presentations, administered by SLP and independently, primarily varied from 2-6 seconds in length and min oral cavity residue was noted post swallows; residue cleared from the mouth with additional dry swallows. Oral transit of single honey thick liquid presentation, administered    Education:  Learner:  [x]Patient          []Significant Other          []Other  Education provided regarding:  [x]Goals and POC                               []Diet and swallowing precautions                  []Home Exercise Program                 []Progress and/or discharge information  Method of Education:  [x]Discussion          [x]Demonstration          []Handout          []Other  Evaluation of Education:   []Verbalized understanding                           []Demonstrates without assistance  []Demonstrates with assistance                    [x]Needs further instruction                               []No evidence of learning                              [x]No family present                    Plan:   [x]Continue with current plan of care                []Modify current plan of care as follows:               []Discharge patient                          Discharge Location:                          Services/Supervision Recommended:      Electronically signed by MATIAS Poole on 6/10/2025 at 7:51 AM

## 2025-06-10 NOTE — PLAN OF CARE
Problem: Chronic Conditions and Co-morbidities  Goal: Patient's chronic conditions and co-morbidity symptoms are monitored and maintained or improved  6/10/2025 1145 by Paola Womack RN  Outcome: Progressing  6/9/2025 2318 by Sharon Gillis LPN  Outcome: Progressing     Problem: Discharge Planning  Goal: Discharge to home or other facility with appropriate resources  6/10/2025 1145 by Paola Womack, RN  Outcome: Progressing  6/9/2025 2318 by Sharon Gillis LPN  Outcome: Progressing     Problem: Skin/Tissue Integrity  Goal: Skin integrity remains intact  Description: 1.  Monitor for areas of redness and/or skin breakdown2.  Assess vascular access sites hourly3.  Every 4-6 hours minimum:  Change oxygen saturation probe site4.  Every 4-6 hours:  If on nasal continuous positive airway pressure, respiratory therapy assess nares and determine need for appliance change or resting period  6/10/2025 1145 by Paola Womack RN  Outcome: Progressing  Flowsheets (Taken 6/10/2025 1145)  Skin Integrity Remains Intact: Monitor for areas of redness and/or skin breakdown  6/9/2025 2318 by Sharon Gillis LPN  Outcome: Progressing     Problem: Safety - Adult  Goal: Free from fall injury  6/10/2025 1145 by Paola Womack RN  Outcome: Progressing  6/9/2025 2318 by Sharon Gillis LPN  Outcome: Progressing     Problem: ABCDS Injury Assessment  Goal: Absence of physical injury  6/10/2025 1145 by Paola Womack, RN  Outcome: Progressing  6/9/2025 2318 by Sharon Gillis LPN  Outcome: Progressing     Problem: Neurosensory - Adult  Goal: Achieves stable or improved neurological status  6/10/2025 1145 by Paola Womack, RN  Outcome: Progressing  6/9/2025 2318 by Sharon Gillis LPN  Outcome: Progressing  Goal: Achieves maximal functionality and self care  6/10/2025 1145 by Paola Womack, RN  Outcome: Progressing  6/9/2025 2318 by Sharon Gillis LPN  Outcome: Progressing     Problem: Skin/Tissue Integrity - Adult  Goal: Skin

## 2025-06-11 LAB
GLUCOSE BLD-MCNC: 151 MG/DL (ref 70–99)
GLUCOSE BLD-MCNC: 202 MG/DL (ref 70–99)
GLUCOSE BLD-MCNC: 97 MG/DL (ref 70–99)
GLUCOSE BLD-MCNC: 99 MG/DL (ref 70–99)
PERFORMED ON: ABNORMAL
PERFORMED ON: ABNORMAL
PERFORMED ON: NORMAL
PERFORMED ON: NORMAL

## 2025-06-11 PROCEDURE — 6370000000 HC RX 637 (ALT 250 FOR IP): Performed by: PSYCHIATRY & NEUROLOGY

## 2025-06-11 PROCEDURE — 94640 AIRWAY INHALATION TREATMENT: CPT

## 2025-06-11 PROCEDURE — 97530 THERAPEUTIC ACTIVITIES: CPT

## 2025-06-11 PROCEDURE — 6370000000 HC RX 637 (ALT 250 FOR IP): Performed by: INTERNAL MEDICINE

## 2025-06-11 PROCEDURE — 97130 THER IVNTJ EA ADDL 15 MIN: CPT

## 2025-06-11 PROCEDURE — 92526 ORAL FUNCTION THERAPY: CPT

## 2025-06-11 PROCEDURE — 97129 THER IVNTJ 1ST 15 MIN: CPT

## 2025-06-11 PROCEDURE — 94150 VITAL CAPACITY TEST: CPT

## 2025-06-11 PROCEDURE — 97535 SELF CARE MNGMENT TRAINING: CPT

## 2025-06-11 PROCEDURE — 97116 GAIT TRAINING THERAPY: CPT

## 2025-06-11 PROCEDURE — 94760 N-INVAS EAR/PLS OXIMETRY 1: CPT

## 2025-06-11 PROCEDURE — 82962 GLUCOSE BLOOD TEST: CPT

## 2025-06-11 PROCEDURE — 6360000002 HC RX W HCPCS: Performed by: PSYCHIATRY & NEUROLOGY

## 2025-06-11 PROCEDURE — 97110 THERAPEUTIC EXERCISES: CPT

## 2025-06-11 PROCEDURE — 1180000000 HC REHAB R&B

## 2025-06-11 RX ADMIN — LANSOPRAZOLE 30 MG: 30 TABLET, ORALLY DISINTEGRATING, DELAYED RELEASE ORAL at 21:19

## 2025-06-11 RX ADMIN — Medication: at 08:28

## 2025-06-11 RX ADMIN — LEVOTHYROXINE SODIUM 50 MCG: 0.05 TABLET ORAL at 05:46

## 2025-06-11 RX ADMIN — GEMFIBROZIL 600 MG: 600 TABLET ORAL at 08:28

## 2025-06-11 RX ADMIN — METOPROLOL TARTRATE 75 MG: 50 TABLET, FILM COATED ORAL at 08:28

## 2025-06-11 RX ADMIN — IPRATROPIUM BROMIDE AND ALBUTEROL SULFATE 1 DOSE: 2.5; .5 SOLUTION RESPIRATORY (INHALATION) at 13:53

## 2025-06-11 RX ADMIN — ENOXAPARIN SODIUM 40 MG: 100 INJECTION SUBCUTANEOUS at 21:18

## 2025-06-11 RX ADMIN — INSULIN GLARGINE 15 UNITS: 100 INJECTION, SOLUTION SUBCUTANEOUS at 21:19

## 2025-06-11 RX ADMIN — IPRATROPIUM BROMIDE AND ALBUTEROL SULFATE 1 DOSE: 2.5; .5 SOLUTION RESPIRATORY (INHALATION) at 11:04

## 2025-06-11 RX ADMIN — CHOLESTYRAMINE 4 G: 4 POWDER, FOR SUSPENSION ORAL at 08:28

## 2025-06-11 RX ADMIN — IPRATROPIUM BROMIDE AND ALBUTEROL SULFATE 1 DOSE: 2.5; .5 SOLUTION RESPIRATORY (INHALATION) at 06:35

## 2025-06-11 RX ADMIN — ATORVASTATIN CALCIUM 20 MG: 20 TABLET, FILM COATED ORAL at 21:19

## 2025-06-11 RX ADMIN — LANSOPRAZOLE 30 MG: 30 TABLET, ORALLY DISINTEGRATING, DELAYED RELEASE ORAL at 08:28

## 2025-06-11 RX ADMIN — ASPIRIN 81 MG: 81 TABLET, CHEWABLE ORAL at 08:28

## 2025-06-11 RX ADMIN — Medication 5 MG: at 21:19

## 2025-06-11 RX ADMIN — CETIRIZINE HYDROCHLORIDE 10 MG: 10 TABLET ORAL at 08:28

## 2025-06-11 RX ADMIN — INSULIN LISPRO 1 UNITS: 100 INJECTION, SOLUTION INTRAVENOUS; SUBCUTANEOUS at 21:20

## 2025-06-11 RX ADMIN — INSULIN GLARGINE 15 UNITS: 100 INJECTION, SOLUTION SUBCUTANEOUS at 08:27

## 2025-06-11 RX ADMIN — CLOPIDOGREL BISULFATE 75 MG: 75 TABLET, FILM COATED ORAL at 08:28

## 2025-06-11 RX ADMIN — METOPROLOL TARTRATE 75 MG: 50 TABLET, FILM COATED ORAL at 21:19

## 2025-06-11 RX ADMIN — INSULIN LISPRO 10 UNITS: 100 INJECTION, SOLUTION INTRAVENOUS; SUBCUTANEOUS at 08:27

## 2025-06-11 RX ADMIN — IPRATROPIUM BROMIDE AND ALBUTEROL SULFATE 1 DOSE: 2.5; .5 SOLUTION RESPIRATORY (INHALATION) at 17:59

## 2025-06-11 RX ADMIN — GEMFIBROZIL 600 MG: 600 TABLET ORAL at 21:19

## 2025-06-11 NOTE — PROGRESS NOTES
[facility]  INPATIENT SPEECH THERAPY  [facility] 8 REHAB UNIT      TIME  0830  0900  30 MINUTES    [x]Daily Note  []Progress Note    Date: 2025  Patient Name: Akhil Alejo        MRN: [id number]    Account #: [id number]  : 1955  (69 y.o.)  Gender: male   Primary Provider: Leo Vaughn MD  Swallowing Status/Diet:          Subjective: Patient alert and upright in bed during treatment session.     Objective:     Pharyngeal strengthening exercises completed. Patient completed 1 set of 5 reps of the Effortful Swallow to improve hyolaryngeal elevation, tongue base retraction, and vocal fold closure. Patient unable to complete the Radha Manuevor to improve hyolaryngeal elevation and clear vallecular residue this date. Maximum verbal cues and modeling provided throughout pharyngeal strengthening exercises.     Swallowing:   Swallow reassessment completed on this date. Oral prep revealed decreased vertical mastication with ice chips. Oral transit timing of ice chips and puree consistency was observed to be 1-2 seconds in length. Laryngeal elevation was observed to be sluggish and mildly decreased for swallow airway protection. 1x delayed cough observed following trials of ice chips. No s/s of penetration/aspiration observed with puree.    Patient will frequently scoot himself down in bed and attempt to eat ice chips, pudding, etc. Patient needs to be sitting upright in bed or chair for any PO intake.      Recommend utilization of PEG for supplemental nutrition and for medications. Okay to continue trials of safest, most restrictive diet of puree consistency with moderately thick/honey thick liquids. STAFF FEED. Okay for ice chips IN BETWEEN MEALS for comfort.     Reasoning task completed. Patient given 3 words from a concrete category and required to add an additional item. Patient able to complete task with 10% accuracy independently. Given maximum verbal and semantic cues, patient able to increase accuracy to

## 2025-06-11 NOTE — PLAN OF CARE
Problem: Chronic Conditions and Co-morbidities  Goal: Patient's chronic conditions and co-morbidity symptoms are monitored and maintained or improved  6/10/2025 2340 by Sharon Gillis LPN  Outcome: Progressing  6/10/2025 1145 by Paola Womack RN  Outcome: Progressing     Problem: Discharge Planning  Goal: Discharge to home or other facility with appropriate resources  6/10/2025 2340 by Sharon Gillis LPN  Outcome: Progressing  6/10/2025 1145 by Paola Womack RN  Outcome: Progressing     Problem: Skin/Tissue Integrity  Goal: Skin integrity remains intact  Description: 1.  Monitor for areas of redness and/or skin breakdown2.  Assess vascular access sites hourly3.  Every 4-6 hours minimum:  Change oxygen saturation probe site4.  Every 4-6 hours:  If on nasal continuous positive airway pressure, respiratory therapy assess nares and determine need for appliance change or resting period  6/10/2025 2340 by Sharon Gillis LPN  Outcome: Progressing  Flowsheets (Taken 6/10/2025 2332)  Skin Integrity Remains Intact: Monitor for areas of redness and/or skin breakdown  6/10/2025 1145 by Paola Womack, RN  Outcome: Progressing  Flowsheets (Taken 6/10/2025 1145)  Skin Integrity Remains Intact: Monitor for areas of redness and/or skin breakdown     Problem: Safety - Adult  Goal: Free from fall injury  6/10/2025 2340 by Sharon Gillis LPN  Outcome: Progressing  6/10/2025 1145 by Paola Womack, RN  Outcome: Progressing     Problem: ABCDS Injury Assessment  Goal: Absence of physical injury  6/10/2025 2340 by Sharon Gillis LPN  Outcome: Progressing  6/10/2025 1145 by Paola Womack, RN  Outcome: Progressing     Problem: Nutrition Deficit:  Goal: Optimize nutritional status  6/10/2025 2340 by Sharon Gillis LPN  Outcome: Progressing  6/10/2025 1145 by Paola Womack, RN  Outcome: Progressing     Problem: Neurosensory - Adult  Goal: Achieves stable or improved neurological status  6/10/2025 2340 by Sharon Gillis  LPN  Outcome: Progressing  6/10/2025 1145 by Paola Womack RN  Outcome: Progressing  Goal: Achieves maximal functionality and self care  6/10/2025 2340 by Sharon Gillis LPN  Outcome: Progressing  6/10/2025 1145 by Paola Womack, RN  Outcome: Progressing     Problem: Skin/Tissue Integrity - Adult  Goal: Skin integrity remains intact  Description: 1.  Monitor for areas of redness and/or skin breakdown2.  Assess vascular access sites hourly3.  Every 4-6 hours minimum:  Change oxygen saturation probe site4.  Every 4-6 hours:  If on nasal continuous positive airway pressure, respiratory therapy assess nares and determine need for appliance change or resting period  6/10/2025 2340 by Sharon Gillis LPN  Outcome: Progressing  Flowsheets (Taken 6/10/2025 2332)  Skin Integrity Remains Intact: Monitor for areas of redness and/or skin breakdown  6/10/2025 1145 by Paola Womack, RN  Outcome: Progressing  Flowsheets (Taken 6/10/2025 1145)  Skin Integrity Remains Intact: Monitor for areas of redness and/or skin breakdown  Goal: Incisions, wounds, or drain sites healing without S/S of infection  6/10/2025 2340 by Sharon Gillis LPN  Outcome: Progressing  Flowsheets (Taken 6/10/2025 2332)  Incisions, Wounds, or Drain Sites Healing Without Sign and Symptoms of Infection: TWICE DAILY: Assess and document skin integrity  6/10/2025 1145 by Paola Womack, RN  Outcome: Progressing  Flowsheets (Taken 6/10/2025 1145)  Incisions, Wounds, or Drain Sites Healing Without Sign and Symptoms of Infection: TWICE DAILY: Assess and document skin integrity     Problem: Musculoskeletal - Adult  Goal: Return mobility to safest level of function  6/10/2025 2340 by Sharon Gillis LPN  Outcome: Progressing  6/10/2025 1145 by Paola Womack, RN  Outcome: Progressing  Goal: Return ADL status to a safe level of function  6/10/2025 2340 by Sharon Gillis LPN  Outcome: Progressing  6/10/2025 1145 by Paola Womack, RN  Outcome: Progressing

## 2025-06-11 NOTE — PLAN OF CARE
Problem: Chronic Conditions and Co-morbidities  Goal: Patient's chronic conditions and co-morbidity symptoms are monitored and maintained or improved  Outcome: Progressing     Problem: Discharge Planning  Goal: Discharge to home or other facility with appropriate resources  Outcome: Progressing     Problem: Skin/Tissue Integrity  Goal: Skin integrity remains intact  Description: 1.  Monitor for areas of redness and/or skin breakdown2.  Assess vascular access sites hourly3.  Every 4-6 hours minimum:  Change oxygen saturation probe site4.  Every 4-6 hours:  If on nasal continuous positive airway pressure, respiratory therapy assess nares and determine need for appliance change or resting period  Outcome: Progressing  Flowsheets (Taken 6/11/2025 0828)  Skin Integrity Remains Intact: Monitor for areas of redness and/or skin breakdown     Problem: Safety - Adult  Goal: Free from fall injury  Outcome: Progressing     Problem: ABCDS Injury Assessment  Goal: Absence of physical injury  Outcome: Progressing     Problem: Nutrition Deficit:  Goal: Optimize nutritional status  Outcome: Progressing     Problem: Neurosensory - Adult  Goal: Achieves stable or improved neurological status  Outcome: Progressing  Flowsheets (Taken 6/11/2025 0828)  Achieves stable or improved neurological status: Assess for and report changes in neurological status  Goal: Achieves maximal functionality and self care  Outcome: Progressing  Flowsheets (Taken 6/11/2025 0828)  Achieves maximal functionality and self care: Monitor swallowing and airway patency with patient fatigue and changes in neurological status     Problem: Neurosensory - Adult  Goal: Achieves maximal functionality and self care  Outcome: Progressing  Flowsheets (Taken 6/11/2025 0828)  Achieves maximal functionality and self care: Monitor swallowing and airway patency with patient fatigue and changes in neurological status     Problem: Gastrointestinal - Adult  Goal: Maintains or

## 2025-06-11 NOTE — PROGRESS NOTES
Physical Therapy  Name: Akhil Alejo  MRN:  [id number]  Date of service:  6/11/2025 06/11/25 1000   Restrictions/Precautions   Restrictions/Precautions Weight Bearing;Aspiration Risk;Fall Risk;NPO;Surgical Protocols   Activity Level Up with Assist   Lower Extremity Weight Bearing Restrictions   Right Lower Extremity Weight Bearing Weight Bearing As Tolerated   Left Lower Extremity Weight Bearing Weight Bearing As Tolerated   Position Activity Restriction   Other Position/Activity Restrictions PEG TUBE, HOB 30 DEG FOR FEED   General   Diagnosis CABGX3 4/29; NEW ONSET BIHEMISPHERIC DEEP WHITE MATTER CVA WITH RIGHT WEAKNESS   General   General Comments co tx with OT, pt in bed, awake but contorted position   Subjective   Subjective Requires encouragement due to reports need to rest frequently. Seemingly less on task today than yesterday.   Bed mobility   Supine to Sit Minimal assistance;Contact guard assistance   Transfers   Sit to Stand Contact guard assistance;Minimal Assistance   Stand to Sit Contact guard assistance;Minimal Assistance   Comment vc's for hand position with poor carryover, fails to reach back prior to sitting 100%   Ambulation   Surface Level tile   Device Rolling Walker   Assistance Minimal assistance   Quality of Gait poor proximity to walker, FFP, deviates toward wall, lateral lurch B (hip abd weakness?)   Gait Deviations Slow Alycia;Decreased step length;Decreased step height;Decreased head and trunk rotation   Distance 15' x 2   Comments amb from BR to WC and toilet to bed, impulsive and quality diminishes at conclusion of distance   Ambulation 2   Surface - 2 level tile   Device 2 Rolling Walker   Other Apparatus 2 Wheelchair follow   Assistance 2 Minimal assistance   Quality of Gait 2 as above   Gait Deviations Slow Alycia;Decreased step length;Decreased step height;Decreased head and trunk rotation   Distance 50' x 3   Comments max encouragement to cont as he quickly requests chair and

## 2025-06-12 ENCOUNTER — APPOINTMENT (OUTPATIENT)
Dept: CT IMAGING | Age: 70
End: 2025-06-12
Attending: PSYCHIATRY & NEUROLOGY
Payer: MEDICARE

## 2025-06-12 LAB
ALBUMIN SERPL-MCNC: 3.5 G/DL (ref 3.5–5.2)
ALP SERPL-CCNC: 187 U/L (ref 40–129)
ALT SERPL-CCNC: 19 U/L (ref 10–50)
ANION GAP SERPL CALCULATED.3IONS-SCNC: 11 MMOL/L (ref 8–16)
AST SERPL-CCNC: 30 U/L (ref 10–50)
BASOPHILS # BLD: 0 K/UL (ref 0–0.2)
BASOPHILS NFR BLD: 0.6 % (ref 0–1)
BILIRUB SERPL-MCNC: 0.3 MG/DL (ref 0.2–1.2)
BUN SERPL-MCNC: 20 MG/DL (ref 8–23)
CALCIUM SERPL-MCNC: 9.1 MG/DL (ref 8.8–10.2)
CHLORIDE SERPL-SCNC: 103 MMOL/L (ref 98–107)
CO2 SERPL-SCNC: 23 MMOL/L (ref 22–29)
CREAT SERPL-MCNC: 0.7 MG/DL (ref 0.7–1.2)
EOSINOPHIL # BLD: 0.5 K/UL (ref 0–0.6)
EOSINOPHIL NFR BLD: 7.9 % (ref 0–5)
ERYTHROCYTE [DISTWIDTH] IN BLOOD BY AUTOMATED COUNT: 16.4 % (ref 11.5–14.5)
GLUCOSE BLD-MCNC: 103 MG/DL (ref 70–99)
GLUCOSE BLD-MCNC: 150 MG/DL (ref 70–99)
GLUCOSE BLD-MCNC: 155 MG/DL (ref 70–99)
GLUCOSE BLD-MCNC: 189 MG/DL (ref 70–99)
GLUCOSE SERPL-MCNC: 151 MG/DL (ref 70–99)
HCT VFR BLD AUTO: 37.2 % (ref 42–52)
HGB BLD-MCNC: 11.7 G/DL (ref 14–18)
IMM GRANULOCYTES # BLD: 0.1 K/UL
LYMPHOCYTES # BLD: 3.2 K/UL (ref 1.1–4.5)
LYMPHOCYTES NFR BLD: 48.4 % (ref 20–40)
MCH RBC QN AUTO: 29 PG (ref 27–31)
MCHC RBC AUTO-ENTMCNC: 31.5 G/DL (ref 33–37)
MCV RBC AUTO: 92.3 FL (ref 80–94)
MONOCYTES # BLD: 0.7 K/UL (ref 0–0.9)
MONOCYTES NFR BLD: 9.9 % (ref 0–10)
NEUTROPHILS # BLD: 2.1 K/UL (ref 1.5–7.5)
NEUTS SEG NFR BLD: 32 % (ref 50–65)
PERFORMED ON: ABNORMAL
PLATELET # BLD AUTO: 260 K/UL (ref 130–400)
PMV BLD AUTO: 10.8 FL (ref 9.4–12.4)
POTASSIUM SERPL-SCNC: 4 MMOL/L (ref 3.5–5)
PROT SERPL-MCNC: 7.8 G/DL (ref 6.4–8.3)
RBC # BLD AUTO: 4.03 M/UL (ref 4.7–6.1)
SODIUM SERPL-SCNC: 137 MMOL/L (ref 136–145)
WBC # BLD AUTO: 6.6 K/UL (ref 4.8–10.8)

## 2025-06-12 PROCEDURE — 94150 VITAL CAPACITY TEST: CPT

## 2025-06-12 PROCEDURE — 85025 COMPLETE CBC W/AUTO DIFF WBC: CPT

## 2025-06-12 PROCEDURE — 6370000000 HC RX 637 (ALT 250 FOR IP): Performed by: PSYCHIATRY & NEUROLOGY

## 2025-06-12 PROCEDURE — 6360000004 HC RX CONTRAST MEDICATION: Performed by: INTERNAL MEDICINE

## 2025-06-12 PROCEDURE — 97116 GAIT TRAINING THERAPY: CPT

## 2025-06-12 PROCEDURE — 74160 CT ABDOMEN W/CONTRAST: CPT

## 2025-06-12 PROCEDURE — 94760 N-INVAS EAR/PLS OXIMETRY 1: CPT

## 2025-06-12 PROCEDURE — 97535 SELF CARE MNGMENT TRAINING: CPT

## 2025-06-12 PROCEDURE — 97530 THERAPEUTIC ACTIVITIES: CPT

## 2025-06-12 PROCEDURE — 1180000000 HC REHAB R&B

## 2025-06-12 PROCEDURE — 99222 1ST HOSP IP/OBS MODERATE 55: CPT | Performed by: INTERNAL MEDICINE

## 2025-06-12 PROCEDURE — 6360000002 HC RX W HCPCS: Performed by: PSYCHIATRY & NEUROLOGY

## 2025-06-12 PROCEDURE — 82962 GLUCOSE BLOOD TEST: CPT

## 2025-06-12 PROCEDURE — 94640 AIRWAY INHALATION TREATMENT: CPT

## 2025-06-12 PROCEDURE — 36415 COLL VENOUS BLD VENIPUNCTURE: CPT

## 2025-06-12 PROCEDURE — 6370000000 HC RX 637 (ALT 250 FOR IP): Performed by: INTERNAL MEDICINE

## 2025-06-12 PROCEDURE — 99232 SBSQ HOSP IP/OBS MODERATE 35: CPT | Performed by: PSYCHIATRY & NEUROLOGY

## 2025-06-12 PROCEDURE — 2500000003 HC RX 250 WO HCPCS: Performed by: PSYCHIATRY & NEUROLOGY

## 2025-06-12 PROCEDURE — 92526 ORAL FUNCTION THERAPY: CPT

## 2025-06-12 PROCEDURE — 80053 COMPREHEN METABOLIC PANEL: CPT

## 2025-06-12 PROCEDURE — 97110 THERAPEUTIC EXERCISES: CPT

## 2025-06-12 RX ORDER — SODIUM CHLORIDE 0.9 % (FLUSH) 0.9 %
5-40 SYRINGE (ML) INJECTION 2 TIMES DAILY
Status: DISCONTINUED | OUTPATIENT
Start: 2025-06-12 | End: 2025-06-20 | Stop reason: HOSPADM

## 2025-06-12 RX ORDER — IOPAMIDOL 755 MG/ML
70 INJECTION, SOLUTION INTRAVASCULAR
Status: COMPLETED | OUTPATIENT
Start: 2025-06-12 | End: 2025-06-12

## 2025-06-12 RX ADMIN — CETIRIZINE HYDROCHLORIDE 10 MG: 10 TABLET ORAL at 09:50

## 2025-06-12 RX ADMIN — ASPIRIN 81 MG: 81 TABLET, CHEWABLE ORAL at 09:50

## 2025-06-12 RX ADMIN — INSULIN GLARGINE 15 UNITS: 100 INJECTION, SOLUTION SUBCUTANEOUS at 09:50

## 2025-06-12 RX ADMIN — IPRATROPIUM BROMIDE AND ALBUTEROL SULFATE 1 DOSE: 2.5; .5 SOLUTION RESPIRATORY (INHALATION) at 09:38

## 2025-06-12 RX ADMIN — LANSOPRAZOLE 30 MG: 30 TABLET, ORALLY DISINTEGRATING, DELAYED RELEASE ORAL at 09:50

## 2025-06-12 RX ADMIN — HYDROCODONE BITARTRATE AND ACETAMINOPHEN 1 TABLET: 5; 325 TABLET ORAL at 20:37

## 2025-06-12 RX ADMIN — ATORVASTATIN CALCIUM 20 MG: 20 TABLET, FILM COATED ORAL at 20:37

## 2025-06-12 RX ADMIN — CLOPIDOGREL BISULFATE 75 MG: 75 TABLET, FILM COATED ORAL at 09:50

## 2025-06-12 RX ADMIN — METOPROLOL TARTRATE 75 MG: 50 TABLET, FILM COATED ORAL at 20:38

## 2025-06-12 RX ADMIN — IPRATROPIUM BROMIDE AND ALBUTEROL SULFATE 1 DOSE: 2.5; .5 SOLUTION RESPIRATORY (INHALATION) at 18:46

## 2025-06-12 RX ADMIN — IPRATROPIUM BROMIDE AND ALBUTEROL SULFATE 1 DOSE: 2.5; .5 SOLUTION RESPIRATORY (INHALATION) at 13:53

## 2025-06-12 RX ADMIN — ENOXAPARIN SODIUM 40 MG: 100 INJECTION SUBCUTANEOUS at 20:37

## 2025-06-12 RX ADMIN — INSULIN LISPRO 1 UNITS: 100 INJECTION, SOLUTION INTRAVENOUS; SUBCUTANEOUS at 17:16

## 2025-06-12 RX ADMIN — Medication 5 MG: at 20:38

## 2025-06-12 RX ADMIN — INSULIN GLARGINE 15 UNITS: 100 INJECTION, SOLUTION SUBCUTANEOUS at 20:37

## 2025-06-12 RX ADMIN — IOPAMIDOL 70 ML: 755 INJECTION, SOLUTION INTRAVENOUS at 20:19

## 2025-06-12 RX ADMIN — GEMFIBROZIL 600 MG: 600 TABLET ORAL at 09:50

## 2025-06-12 RX ADMIN — METOPROLOL TARTRATE 75 MG: 50 TABLET, FILM COATED ORAL at 09:50

## 2025-06-12 RX ADMIN — CHOLESTYRAMINE 4 G: 4 POWDER, FOR SUSPENSION ORAL at 09:50

## 2025-06-12 RX ADMIN — LEVOTHYROXINE SODIUM 50 MCG: 0.05 TABLET ORAL at 06:00

## 2025-06-12 RX ADMIN — LANSOPRAZOLE 30 MG: 30 TABLET, ORALLY DISINTEGRATING, DELAYED RELEASE ORAL at 20:37

## 2025-06-12 RX ADMIN — INSULIN LISPRO 10 UNITS: 100 INJECTION, SOLUTION INTRAVENOUS; SUBCUTANEOUS at 17:16

## 2025-06-12 RX ADMIN — GEMFIBROZIL 600 MG: 600 TABLET ORAL at 20:37

## 2025-06-12 RX ADMIN — SODIUM CHLORIDE, PRESERVATIVE FREE 10 ML: 5 INJECTION INTRAVENOUS at 20:39

## 2025-06-12 RX ADMIN — IPRATROPIUM BROMIDE AND ALBUTEROL SULFATE 1 DOSE: 2.5; .5 SOLUTION RESPIRATORY (INHALATION) at 06:07

## 2025-06-12 ASSESSMENT — PAIN SCALES - GENERAL
PAINLEVEL_OUTOF10: 0
PAINLEVEL_OUTOF10: 8

## 2025-06-12 ASSESSMENT — PAIN DESCRIPTION - DESCRIPTORS: DESCRIPTORS: ACHING

## 2025-06-12 ASSESSMENT — PAIN - FUNCTIONAL ASSESSMENT: PAIN_FUNCTIONAL_ASSESSMENT: ACTIVITIES ARE NOT PREVENTED

## 2025-06-12 ASSESSMENT — PAIN DESCRIPTION - ORIENTATION: ORIENTATION: ANTERIOR

## 2025-06-12 ASSESSMENT — PAIN DESCRIPTION - LOCATION: LOCATION: HEAD

## 2025-06-12 ASSESSMENT — PAIN DESCRIPTION - ONSET: ONSET: ON-GOING

## 2025-06-12 ASSESSMENT — PAIN DESCRIPTION - PAIN TYPE: TYPE: ACUTE PAIN

## 2025-06-12 ASSESSMENT — PAIN DESCRIPTION - FREQUENCY: FREQUENCY: INTERMITTENT

## 2025-06-12 NOTE — PROGRESS NOTES
[facility]  [facility]  [facility] 8 REHAB UNIT      TIME  0730  0800  1400  1430  60 MINUTES    [x]Daily Note  []Progress Note    Date: 2025  Patient Name: Akhil Alejo        MRN: [id number]    Account #: [id number]  : 1955  (69 y.o.)  Gender: male   Primary Provider: Leo Vaughn MD    Subjective: Patient alert and upright in bed. No family present during session.    Objective:     Swallowing:   Swallow reassessment completed on this date. Pt alert, cooperative, and upright in bed. No family present during evaluation. Oral prep revealed decreased vertical jaw movement with puree. Oral transit timing of puree consistency and honey thick liquids was observed to be 1-2 seconds in length. Laryngeal elevation was observed to be sluggish and mildly decreased for swallow airway protection. Even so, no overt s/s of penetration/aspiration observed with any consistency.     Recommend utilization of PEG for supplemental nutrition and for medications. Okay to trial minced and moist consistency with moderately thick/honey thick liquids. STAFF FEED. Okay for ice chips IN BETWEEN MEALS for comfort. Please monitor/notify SLP for the following: changes in lung sounds, spikes in temperature, and/or difficulties swallowing.     Pharyngeal strengthening exercises completed. Patient completed 2 sets of 5 reps of the Effortful Swallow to improve hyolaryngeal elevation, tongue base retraction, and vocal fold closure. Patient unable to complete the Radha Manuevor to improve hyolaryngeal elevation and clear vallecular residue this date. Maximum verbal cues and modeling provided throughout pharyngeal strengthening exercises.     Patient reports he ate some lunch this afternoon since being upgraded to minced and moist consistency.     SLP will continue to follow and treat.    Diet Solids Recommendation:  Diet Solids Recommendation: Minced & Moist  Diet Liquid Recommendation:  Liquid Consistency Recommendation:  information  Method of Education:  [x]Discussion          [x]Demonstration          []Handout          []Other  Evaluation of Education:   []Verbalized understanding   []Demonstrates without assistance  [x]Demonstrates with assistance  [x]Needs further instruction     []No evidence of learning                  []No family present      Plan: [x]Continue with current plan of care    []Modify current plan of care as follows:    []Discharge patient    Discharge Location:    Services/Supervision Recommended:      [x]Patient continues to require treatment by a licensed therapist to address functional deficits as outlined in the established plan of care.    Electronically signed by MATIAS Dash on 6/12/2025 at 2:23 PM     No

## 2025-06-12 NOTE — CONSULTS
light packet 4 g, Daily  cetirizine (ZYRTEC) tablet 10 mg, Daily  nitroGLYCERIN (NITROSTAT) SL tablet 0.4 mg, Q5 Min PRN  magnesium hydroxide (MILK OF MAGNESIA) 400 MG/5ML suspension 30 mL, Daily PRN  melatonin disintegrating tablet 5 mg, Nightly  gemfibrozil (LOPID) tablet 600 mg, BID  clopidogrel (PLAVIX) tablet 75 mg, Daily  sodium chloride flush 0.9 % injection 5-40 mL, PRN  0.9 % sodium chloride infusion, PRN  ondansetron (ZOFRAN-ODT) disintegrating tablet 4 mg, Q8H PRN   Or  ondansetron (ZOFRAN) injection 4 mg, Q6H PRN  acetaminophen (TYLENOL) tablet 650 mg, Q6H PRN   Or  acetaminophen (TYLENOL) suppository 650 mg, Q6H PRN  aspirin chewable tablet 81 mg, Daily  ipratropium 0.5 mg-albuterol 2.5 mg (DUONEB) nebulizer solution 1 Dose, Q4H WA RT  guaiFENesin (ROBITUSSIN) 100 MG/5ML liquid 200 mg, TID PRN  insulin lispro (HUMALOG,ADMELOG) injection vial 0-4 Units, 4x Daily AC & HS  metoprolol tartrate (LOPRESSOR) tablet 75 mg, BID  enoxaparin (LOVENOX) injection 40 mg, Daily  polyethylene glycol (GLYCOLAX) packet 17 g, Daily PRN  Loperamide HCl (IMODIUM) 1 MG/7.5ML solution 2 mg, 4x Daily PRN          Allergies   Penicillins    Social History     Pertinent history mentioned above.     Family History     Family History   Problem Relation Age of Onset    Hypertension Mother     Hypertension Father     Colon Cancer Neg Hx     Colon Polyps Neg Hx     Esophageal Cancer Neg Hx     Liver Cancer Neg Hx     Liver Disease Neg Hx     Stomach Cancer Neg Hx     Rectal Cancer Neg Hx        Review of Systems   - CONSTITUTIONAL: Denies weight loss, fever and chills.    - HEENT: Denies changes in vision and hearing.    - RESPIRATORY: Denies SOB and cough.    - CV: Denies palpitations and CP.    - GI: Reports pain around the PEG tube site.  No nausea or vomiting.    - : Denies dysuria and urinary frequency.    - MSK: Denies myalgia and joint pain.    - SKIN: Denies rash and pruritus.    - NEUROLOGICAL: Denies headache and  syncope.    - PSYCHIATRIC: Denies recent changes in mood. Denies anxiety and depression.    - SKIN: No jaundice or skin lesions.    -RECTAL: No pain or tenderness.     Physical Exam   BP (!) 140/76   Pulse 74   Temp 97.3 °F (36.3 °C) (Temporal)   Resp 16   Ht 1.803 m (5' 10.98\")   Wt 82.6 kg (182 lb 1.6 oz)   SpO2 96%   BMI 25.41 kg/m²      - GENERAL: Alert and oriented x 3. No acute distress. Well-nourished.    - EYES: EOMI. Anicteric.    - HENT: Moist mucous membranes. No cervical lymphadenopathy.    - LUNGS: Clear to auscultation bilaterally. No accessory muscle use.    - CARDIOVASCULAR: Regular rate and rhythm. No murmur. No JVD.    - ABDOMEN: Soft, non-tender and non-distended. No palpable masses.    - EXTREMITIES: No edema. Non-tender.?    - NEUROLOGIC: No focal neurological deficits. CN II-XII grossly intact, but not individually tested.    - PSYCHIATRIC: Cooperative. Appropriate mood and affect.    - SKIN: No rashes, sores, or lesions.  PEG tube site in place with mild erythema at the level of the skin underneath the bolster.  There is a minimal amount of purulent drainage.  There is pain on palpation of the tube at the level of the skin.  The tube is able to turn 360 degrees without problem.    - RECTAL: Deferred.     Labs    CBC:  Recent Labs     06/12/25  0513   WBC 6.6   RBC 4.03*   HGB 11.7*   HCT 37.2*   MCV 92.3   RDW 16.4*        CHEMISTRIES:  Recent Labs     06/12/25  0513      K 4.0      CO2 23   BUN 20   CREATININE 0.7   GLUCOSE 151*     PT/INR:No results for input(s): \"PROTIME\", \"INR\" in the last 72 hours.  APTT:No results for input(s): \"APTT\" in the last 72 hours.  LIVER PROFILE:  Recent Labs     06/12/25  0513   AST 30   ALT 19   BILITOT 0.3   ALKPHOS 187*       Imaging/Diagnostics   Pertinent studies mentioned above.    Assessment & Plan:   This is a 69-year-old male with a recent CABG (4/29/2025) with a history of RLS, dementia, and diabetes mellitus who was in the

## 2025-06-12 NOTE — PROGRESS NOTES
Occupational Therapy  Facility/Department: [facility] 8 REHAB UNIT  Rehabilitation Occupational Therapy Daily Treatment Note    Date: 25  Patient Name: Akhil Alejo       Room: 0811/811-02  MRN: [id number]  Account: [id number]   : 1955  (69 y.o.) Gender: male        Diagnosis: (P) Acute ischemic stroke  Additional Pertinent Hx: (P) CABG on         Past Medical History:  has a past medical history of Arm fracture, Dementia (HCC), Depression, Diabetes mellitus (HCC), Hypertension, Kidney stones, Neuropathy, Palliative care patient, and Restless leg syndrome.  Past Surgical History:   has a past surgical history that includes Cholecystectomy; Lithotripsy; shoulder surgery (Left); bone marrow biopsy (Right, 2020); Colonoscopy (2020); Upper gastrointestinal endoscopy (2017); Cardiac procedure (N/A, 2025); Coronary artery bypass graft (N/A, 2025); Gastrostomy tube placement (2025); Upper gastrointestinal endoscopy (N/A, 2025); Upper gastrointestinal endoscopy (N/A, 2025); and Upper gastrointestinal endoscopy (2025).     25 1000   Restrictions/Precautions   Restrictions/Precautions Weight Bearing;Aspiration Risk;Fall Risk;NPO;Surgical Protocols   Position Activity Restriction   Other Position/Activity Restrictions PEG TUBE, HOB 30 DEG FOR FEED   General   Additional Pertinent Hx CABG on    Diagnosis Acute ischemic stroke   Subjective   Subjective \"Do I have to get out of bed?\"   ADL   Additional Comments see CARE scores   Balance   Standing Balance Contact guard assistance   Functional Mobility   Functional - Mobility Device Rolling Walker   Assist Level Contact guard assistance   Functional Mobility Comments SBA and w/c follow d/t cognition   Transfers   Sit to stand Contact guard assistance   Stand to sit Contact guard assistance  (did better reaching back this date)   Transfer Comments completed with SBA at times when first tactile cued for hand

## 2025-06-12 NOTE — PLAN OF CARE
Problem: Chronic Conditions and Co-morbidities  Goal: Patient's chronic conditions and co-morbidity symptoms are monitored and maintained or improved  6/12/2025 1054 by Flaca Mendez RN  Outcome: Progressing  6/11/2025 2345 by Sharon Gillis LPN  Outcome: Progressing     Problem: Discharge Planning  Goal: Discharge to home or other facility with appropriate resources  6/12/2025 1054 by Flaca Mendez RN  Outcome: Progressing  6/11/2025 2345 by Sharon Gillis LPN  Outcome: Progressing     Problem: Skin/Tissue Integrity  Goal: Skin integrity remains intact  Description: 1.  Monitor for areas of redness and/or skin breakdown2.  Assess vascular access sites hourly3.  Every 4-6 hours minimum:  Change oxygen saturation probe site4.  Every 4-6 hours:  If on nasal continuous positive airway pressure, respiratory therapy assess nares and determine need for appliance change or resting period  6/12/2025 1054 by Flaca Mendez RN  Outcome: Progressing  6/11/2025 2345 by Sharon Gillis LPN  Outcome: Progressing  Flowsheets (Taken 6/11/2025 2336)  Skin Integrity Remains Intact: Monitor for areas of redness and/or skin breakdown     Problem: Safety - Adult  Goal: Free from fall injury  6/12/2025 1054 by Flaca Mendez RN  Outcome: Progressing  6/11/2025 2345 by Sharon Gillis LPN  Outcome: Progressing     Problem: ABCDS Injury Assessment  Goal: Absence of physical injury  6/12/2025 1054 by Flaca Mendez RN  Outcome: Progressing  6/11/2025 2345 by Sharon Gillis LPN  Outcome: Progressing     Problem: Nutrition Deficit:  Goal: Optimize nutritional status  6/12/2025 1054 by Flaca Mendez RN  Outcome: Progressing  Flowsheets (Taken 6/12/2025 0950 by Cristel Hopkins RD)  Nutrient intake appropriate for improving, restoring, or maintaining nutritional needs:   Monitor oral intake, labs, and treatment plans   Recommend, monitor, and adjust tube feedings and TPN/PPN based on assessed needs   Assess nutritional status and  PIPO Thayer  Outcome: Progressing  6/11/2025 2345 by Sharon Gillis LPN  Outcome: Progressing  Flowsheets (Taken 6/11/2025 2336)  Return Mobility to Safest Level of Function: Assess patient stability and activity tolerance for standing, transferring and ambulating with or without assistive devices  Goal: Return ADL status to a safe level of function  6/12/2025 1054 by Flaca Mendez RN  Outcome: Progressing  6/11/2025 2345 by Sharon Gillis LPN  Outcome: Progressing  Flowsheets (Taken 6/11/2025 2336)  Return ADL Status to a Safe Level of Function: Administer medication as ordered     Problem: Gastrointestinal - Adult  Goal: Maintains or returns to baseline bowel function  6/12/2025 1054 by Flaca Mendez RN  Outcome: Progressing  6/11/2025 2345 by Sharon Gillis LPN  Outcome: Progressing  Flowsheets (Taken 6/11/2025 2336)  Maintains or returns to baseline bowel function: Assess bowel function  Goal: Maintains adequate nutritional intake  6/12/2025 1054 by Flaca Mendez RN  Outcome: Progressing  6/11/2025 2345 by Sharon Gillis LPN  Outcome: Progressing  Flowsheets (Taken 6/11/2025 2336)  Maintains adequate nutritional intake: Monitor percentage of each meal consumed     Problem: Genitourinary - Adult  Goal: Absence of urinary retention  6/12/2025 1054 by Flaca Mendez RN  Outcome: Progressing  6/11/2025 2345 by Sharon Gillis LPN  Outcome: Progressing     Problem: Metabolic/Fluid and Electrolytes - Adult  Goal: Electrolytes maintained within normal limits  6/12/2025 1054 by Flaca Mendez RN  Outcome: Progressing  6/11/2025 2345 by Sharon Gillis LPN  Outcome: Progressing  Goal: Hemodynamic stability and optimal renal function maintained  6/12/2025 1054 by Flaca Mendez RN  Outcome: Progressing  6/11/2025 2345 by Sharon Gillis LPN  Outcome: Progressing  Goal: Glucose maintained within prescribed range  6/12/2025 1054 by Flaca Mendez RN  Outcome: Progressing  6/11/2025 2345 by Sharon Gillis

## 2025-06-12 NOTE — PLAN OF CARE
Problem: Gastrointestinal - Adult  Goal: Maintains or returns to baseline bowel function  6/11/2025 2345 by Sharon Gillis LPN  Outcome: Progressing  Flowsheets (Taken 6/11/2025 2336)  Maintains or returns to baseline bowel function: Assess bowel function  6/11/2025 1830 by Madeline Camacho, RN  Outcome: Not Progressing  Flowsheets (Taken 6/11/2025 0828)  Maintains or returns to baseline bowel function: Assess bowel function  Goal: Maintains adequate nutritional intake  6/11/2025 2345 by Sharon Gillis LPN  Outcome: Progressing  Flowsheets (Taken 6/11/2025 2336)  Maintains adequate nutritional intake: Monitor percentage of each meal consumed  6/11/2025 1830 by Madeline Camacho, RN  Outcome: Not Progressing  Flowsheets (Taken 6/11/2025 0828)  Maintains adequate nutritional intake: Monitor percentage of each meal consumed

## 2025-06-12 NOTE — PROGRESS NOTES
Name: Akhil Alejo  MRN: [id number]  Date of Service:  6/12/2025 06/12/25 1005   Restrictions/Precautions   Restrictions/Precautions Weight Bearing;Aspiration Risk;Fall Risk;NPO;Surgical Protocols   Lower Extremity Weight Bearing Restrictions   Right Lower Extremity Weight Bearing Weight Bearing As Tolerated   Left Lower Extremity Weight Bearing Weight Bearing As Tolerated   Position Activity Restriction   Other Position/Activity Restrictions PEG TUBE, HOB 30 DEG FOR FEED   General   Chart Reviewed Yes   Patient assessed for rehabilitation services? Yes   Additional Pertinent Hx DEPRESSION, DM, HTN   Diagnosis CABGX3 4/29; NEW ONSET BIHEMISPHERIC DEEP WHITE MATTER CVA WITH RIGHT WEAKNESS   General   General Comments Co-Tx w/ OT.   Subjective   Subjective Pt beginning to get OOB w/ OT, agrees to participate in therapy.   Pain   Pre-Pain 0   Post-Pain 0   Bed mobility   Supine to Sit Minimal assistance;Contact guard assistance   Sit to Supine Supervision;Modified independent   Scooting Stand by assistance;Contact guard assistance  (On EOB)   Bed Mobility Comments On R side of bed- use of bedrail   Transfers   Sit to Stand Contact guard assistance;Minimal Assistance   Stand to Sit Contact guard assistance;Minimal Assistance   Car Transfer Minimal Assistance;Contact guard assistance  (W/ RW: Somewhat raised height)   Ambulation   Surface Level tile   Device Rolling Walker   Other Apparatus Wheelchair follow   Assistance Minimal assistance   Quality of Gait reciprocal, small steps + increased distance from RW, FFP + intermittent downward gaze   Gait Deviations Slow Alycia;Decreased step length;Decreased step height;Decreased head and trunk rotation   Distance 80', 60', 10' X 3  (Intermittent sitting rests in between)   Comments Multiple L/R turns, w/ socks donned   PT Exercises   Exercise Treatment BLE omnicycle on neuro setting aganist 0 bergeron of resistance X 3 min.   Activity Tolerance   Activity Tolerance

## 2025-06-12 NOTE — PROGRESS NOTES
Nutrition Assessment     Type and Reason for Visit: Reassess    Nutrition Recommendations/Plan:   Continue current interventions; nutrition/hydration is dependent on feeding tube     Malnutrition Assessment:  Malnutrition Status: At risk for malnutrition    Nutrition Assessment:  PO intakes remain poor at this time despite decreasing TF to help increase appetite to encourage po intake. Intakes at meals noted to be a few bites, 1-25%. PO intake at this time is basically for pleasure. Pt nutrition/hydration needs remain dependent on feeding tube. No problems noted tolerating current tube feeding. Weight appears to be stabilizing.    Estimated Daily Nutrient Needs:  Energy (kcal):  2003-2403 kcals (25-30 kcals) Weight Used for Energy Requirements: Current     Protein (g):  80-160g Weight Used for Protein Requirements: Current        Fluid (ml/day):  2003-2403 ml Method Used for Fluid Requirements: 1 ml/kcal    Nutrition Related Findings:   Glu , BM 6-11 Wound Type: Surgical Incision    Current Nutrition Therapies:    ADULT TUBE FEEDING; PEG; Diabetic; Cyclic; 57; 12:00 PM; 6:00 AM; 42; Q 1 hour  ADULT DIET; Dysphagia - Minced and Moist; Moderately Thick (Honey)    Anthropometric Measures:  Height: 180.3 cm (5' 10.98\")  Current Body Wt: 82.6 kg (182 lb 1.6 oz)   BMI: 25.4      Nutrition Diagnosis:   Moderate malnutrition related to inadequate protein-energy intake as evidenced by criteria as identified in malnutrition assessment    Nutrition Interventions:   Food and/or Nutrient Delivery: Continue Current Diet, Continue Current Tube Feeding  Nutrition Education/Counseling: No recommendation at this time  Coordination of Nutrition Care: Continue to monitor while inpatient  Plan of Care discussed with: Pt    Goals:  Goals: Meet at least 75% of estimated needs, PO intake 50% or greater, Tolerate nutrition support at goal rate  Type of Goal: Continue current goal  Previous Goal Met: Progressing toward

## 2025-06-12 NOTE — PROGRESS NOTES
Patient:   Akhil Alejo  MR#:    [id number]   Room:    0811/811-02   YOB: 1955  Date of Progress Note: 6/12/2025  Time of Note                           11:03 AM  Consulting Physician:   Leo Vaughn M.D.  Attending Physician:  Leo Vaughn MD     Chief complaint Acute ischemic stroke     S:This 69 y.o. male  with history of depression, DM, HTN, neuropathy and RLS. Patient was seen by Dr. Kitchen as outpatient for abnormal ECG and shortness of breath. He underwent a stress test on 4/25/25 which anterior wall ischemia, EF 44%, ECG abnormal as before. He was sent to [facility] for further evaluation. He was seen by cardiologist Dr. Cortes and underwent a heart catheterization which revealed multivessel CAD. Cardiothoracic surgery was consulted. He was seen by Dr. Lara, who recommended CABG x 3 to the LAD, D1 and OM1. Patient was in agreement. The patient had been on Plavix and aspirin. Therefore Plavix was placed on hold. P2Y12 test done on 4/28/25 was 156 , which was felt sufficient recovery of plt function to proceed with CABG on 4/29/25. Patient tolerated the procedure well. Patient had post-op delirium causing some worsening of his dementia. Patient was evaluated by SPT for cognitive and swallow. Patient was found have severe cognitive impairment and oropharyngeal dysphagia. He made NPO. Initially NGT was placed for feedings. Unfortunately, patient continue to have oropharyngeal phase dysphagia and ultimately patient required placement of G-Tube on 5/9/25. Patient had been tolerating tube feedings fairly well. He continued to have cognitive deficits with Avasys camera in place. Patient remained weak overall and was felt to need a stay on Rehab. He was admitted to Rehab on 5/13/25. Patient was participating in PT/OT/SPT. On 5/21/25 nursing reported hematemesis and blood from G-Tube overnight. Hospitalist was consulted.  Pt was found to be tachypneic and pale, Pulse ox was 95 .Bp 103/59 hr 94.  Pt

## 2025-06-13 PROBLEM — S30.851A: Status: ACTIVE | Noted: 2025-06-13

## 2025-06-13 PROBLEM — L08.9: Status: ACTIVE | Noted: 2025-06-13

## 2025-06-13 LAB
GLUCOSE BLD-MCNC: 116 MG/DL (ref 70–99)
GLUCOSE BLD-MCNC: 130 MG/DL (ref 70–99)
GLUCOSE BLD-MCNC: 160 MG/DL (ref 70–99)
GLUCOSE BLD-MCNC: 186 MG/DL (ref 70–99)
PERFORMED ON: ABNORMAL

## 2025-06-13 PROCEDURE — 94640 AIRWAY INHALATION TREATMENT: CPT

## 2025-06-13 PROCEDURE — 92507 TX SP LANG VOICE COMM INDIV: CPT

## 2025-06-13 PROCEDURE — 2500000003 HC RX 250 WO HCPCS: Performed by: PSYCHIATRY & NEUROLOGY

## 2025-06-13 PROCEDURE — 82962 GLUCOSE BLOOD TEST: CPT

## 2025-06-13 PROCEDURE — 99232 SBSQ HOSP IP/OBS MODERATE 35: CPT | Performed by: PSYCHIATRY & NEUROLOGY

## 2025-06-13 PROCEDURE — 6360000002 HC RX W HCPCS: Performed by: PSYCHIATRY & NEUROLOGY

## 2025-06-13 PROCEDURE — 6370000000 HC RX 637 (ALT 250 FOR IP): Performed by: INTERNAL MEDICINE

## 2025-06-13 PROCEDURE — 99231 SBSQ HOSP IP/OBS SF/LOW 25: CPT | Performed by: INTERNAL MEDICINE

## 2025-06-13 PROCEDURE — 1180000000 HC REHAB R&B

## 2025-06-13 PROCEDURE — 97530 THERAPEUTIC ACTIVITIES: CPT

## 2025-06-13 PROCEDURE — 97535 SELF CARE MNGMENT TRAINING: CPT

## 2025-06-13 PROCEDURE — 92526 ORAL FUNCTION THERAPY: CPT

## 2025-06-13 PROCEDURE — 6370000000 HC RX 637 (ALT 250 FOR IP): Performed by: PSYCHIATRY & NEUROLOGY

## 2025-06-13 PROCEDURE — 94760 N-INVAS EAR/PLS OXIMETRY 1: CPT

## 2025-06-13 PROCEDURE — 94150 VITAL CAPACITY TEST: CPT

## 2025-06-13 PROCEDURE — 97110 THERAPEUTIC EXERCISES: CPT

## 2025-06-13 RX ORDER — AZITHROMYCIN 250 MG/1
250 TABLET, FILM COATED ORAL DAILY
Status: COMPLETED | OUTPATIENT
Start: 2025-06-13 | End: 2025-06-17

## 2025-06-13 RX ORDER — METOPROLOL TARTRATE 50 MG
50 TABLET ORAL 2 TIMES DAILY
Status: DISCONTINUED | OUTPATIENT
Start: 2025-06-13 | End: 2025-06-20 | Stop reason: HOSPADM

## 2025-06-13 RX ADMIN — Medication 5 MG: at 20:01

## 2025-06-13 RX ADMIN — INSULIN LISPRO 1 UNITS: 100 INJECTION, SOLUTION INTRAVENOUS; SUBCUTANEOUS at 20:06

## 2025-06-13 RX ADMIN — INSULIN LISPRO 10 UNITS: 100 INJECTION, SOLUTION INTRAVENOUS; SUBCUTANEOUS at 17:25

## 2025-06-13 RX ADMIN — CETIRIZINE HYDROCHLORIDE 10 MG: 10 TABLET ORAL at 08:13

## 2025-06-13 RX ADMIN — ATORVASTATIN CALCIUM 20 MG: 20 TABLET, FILM COATED ORAL at 20:01

## 2025-06-13 RX ADMIN — IPRATROPIUM BROMIDE AND ALBUTEROL SULFATE 1 DOSE: 2.5; .5 SOLUTION RESPIRATORY (INHALATION) at 18:42

## 2025-06-13 RX ADMIN — AZITHROMYCIN DIHYDRATE 250 MG: 250 TABLET, FILM COATED ORAL at 13:18

## 2025-06-13 RX ADMIN — IPRATROPIUM BROMIDE AND ALBUTEROL SULFATE 1 DOSE: 2.5; .5 SOLUTION RESPIRATORY (INHALATION) at 06:47

## 2025-06-13 RX ADMIN — INSULIN GLARGINE 15 UNITS: 100 INJECTION, SOLUTION SUBCUTANEOUS at 08:13

## 2025-06-13 RX ADMIN — IPRATROPIUM BROMIDE AND ALBUTEROL SULFATE 1 DOSE: 2.5; .5 SOLUTION RESPIRATORY (INHALATION) at 10:34

## 2025-06-13 RX ADMIN — INSULIN GLARGINE 15 UNITS: 100 INJECTION, SOLUTION SUBCUTANEOUS at 20:02

## 2025-06-13 RX ADMIN — IPRATROPIUM BROMIDE AND ALBUTEROL SULFATE 1 DOSE: 2.5; .5 SOLUTION RESPIRATORY (INHALATION) at 14:58

## 2025-06-13 RX ADMIN — SODIUM CHLORIDE, PRESERVATIVE FREE 10 ML: 5 INJECTION INTRAVENOUS at 20:02

## 2025-06-13 RX ADMIN — ASPIRIN 81 MG: 81 TABLET, CHEWABLE ORAL at 08:13

## 2025-06-13 RX ADMIN — LANSOPRAZOLE 30 MG: 30 TABLET, ORALLY DISINTEGRATING, DELAYED RELEASE ORAL at 08:13

## 2025-06-13 RX ADMIN — CLOPIDOGREL BISULFATE 75 MG: 75 TABLET, FILM COATED ORAL at 08:13

## 2025-06-13 RX ADMIN — SODIUM CHLORIDE, PRESERVATIVE FREE 10 ML: 5 INJECTION INTRAVENOUS at 08:24

## 2025-06-13 RX ADMIN — ENOXAPARIN SODIUM 40 MG: 100 INJECTION SUBCUTANEOUS at 20:01

## 2025-06-13 RX ADMIN — GEMFIBROZIL 600 MG: 600 TABLET ORAL at 20:01

## 2025-06-13 RX ADMIN — METOPROLOL TARTRATE 75 MG: 50 TABLET, FILM COATED ORAL at 08:13

## 2025-06-13 RX ADMIN — CHOLESTYRAMINE 4 G: 4 POWDER, FOR SUSPENSION ORAL at 08:13

## 2025-06-13 RX ADMIN — LANSOPRAZOLE 30 MG: 30 TABLET, ORALLY DISINTEGRATING, DELAYED RELEASE ORAL at 20:01

## 2025-06-13 RX ADMIN — LEVOTHYROXINE SODIUM 50 MCG: 0.05 TABLET ORAL at 05:58

## 2025-06-13 RX ADMIN — METOPROLOL TARTRATE 50 MG: 50 TABLET, FILM COATED ORAL at 20:01

## 2025-06-13 RX ADMIN — GEMFIBROZIL 600 MG: 600 TABLET ORAL at 08:13

## 2025-06-13 NOTE — PALLIATIVE CARE
Pt is laying down in bed. Pt requested ice chips. SN spoke with staff nurse who states pt is allowed to have ice chips. Pt has no additional stated needs at this time. Pt was alert, pleasant but was not conversational.

## 2025-06-13 NOTE — PLAN OF CARE
Problem: Safety - Adult  Goal: Free from fall injury  6/12/2025 1916 by Benjamín Rosen, RN  Outcome: Progressing  6/12/2025 1054 by Flaca Mendez, RN  Outcome: Progressing

## 2025-06-13 NOTE — PROGRESS NOTES
Pain and purulence around PEG site but no abdominal wall abscess or other complication on CT so started on Azithromycin orally for 5 days and local skin care with soap and water. Re-consult GI as needed.

## 2025-06-13 NOTE — PROGRESS NOTES
Name: Akhil Alejo  MRN: [id number]  Date of Service:  6/13/2025 06/13/25 0905   Restrictions/Precautions   Restrictions/Precautions Weight Bearing;Aspiration Risk;Fall Risk;NPO;Surgical Protocols   Lower Extremity Weight Bearing Restrictions   Right Lower Extremity Weight Bearing Weight Bearing As Tolerated   Left Lower Extremity Weight Bearing Weight Bearing As Tolerated   Position Activity Restriction   Other Position/Activity Restrictions PEG TUBE, HOB 30 DEG FOR FEED   General   Chart Reviewed Yes   Patient assessed for rehabilitation services? Yes   Additional Pertinent Hx DEPRESSION, DM, HTN   Diagnosis CABGX3 4/29; NEW ONSET BIHEMISPHERIC DEEP WHITE MATTER CVA WITH RIGHT WEAKNESS   General   General Comments Co-Tx w/ OT.   Subjective   Subjective Pt laying in bed, requires some encouragement to get OOB- agrees to try to participate in therapy.   Vitals   Pulse 93  (67-93)   BP Location Right upper arm   BP Method Automatic   Blood Pressure Sitting 95/66   Blood Pressure Standing 71/52   Pain   Pre-Pain 0   Post-Pain 1   Pain Location   (Itermittently winces, does not verbalize exact pain)   Bed mobility   Supine to Sit Minimal assistance;Partial/Moderate assistance  (Due to need for encouragement)   Sit to Supine Stand by assistance   Scooting Stand by assistance;Contact guard assistance  (On EOB)   Bed Mobility Comments On R side of bed- use of bedrail   Transfers   Sit to Stand Minimal Assistance;Partial/Moderate assistance;Contact guard assistance  (Min/Mod upon first getting EOB then CGA)   Stand to Sit Minimal Assistance;Partial/Moderate assistance;Contact guard assistance  (Min/Mod initially then CGA)   Bed to Chair Partial/Moderate assistance;Minimal assistance  (Mod A initially then Min A)   PT Exercises   Exercise Treatment Sittting in w/c BLE ther-ex: active DF/PF (minimal- oneat at atime) X 10, PROM/AAROM ankle DF/PF/IV/EV X 20   Activity Tolerance   Activity Tolerance Treatment limited  secondary to decreased cognition;Other (comment);Patient tolerated treatment well  (Low BP)   Assessment   Assessment Beginning of tx focused on pt getting dressed w/ OT, pt then performed some sitting BLE ther-ex. Pt presents w/ difficulty performing ther-ex correctly most of the time and performing very quickly/w/ little ROM: requires some AAROM most of the time for correct technique/pace/full ROM. Pt BP assessed in sitting and standing at RW (as ahown above), pt assisted back to bed and nursing notified due to pt low BP.   Safety Devices   Type of Devices Patient at risk for falls;Gait belt;Left in bed;Bed alarm in place;Call light within reach             Electronically signed by Cristina Perdue PTA on 6/13/2025 at 2:59 PM

## 2025-06-13 NOTE — PLAN OF CARE
Problem: Chronic Conditions and Co-morbidities  Goal: Patient's chronic conditions and co-morbidity symptoms are monitored and maintained or improved  Outcome: Progressing     Problem: Discharge Planning  Goal: Discharge to home or other facility with appropriate resources  Outcome: Progressing     Problem: Skin/Tissue Integrity  Goal: Skin integrity remains intact  Description: 1.  Monitor for areas of redness and/or skin breakdown2.  Assess vascular access sites hourly3.  Every 4-6 hours minimum:  Change oxygen saturation probe site4.  Every 4-6 hours:  If on nasal continuous positive airway pressure, respiratory therapy assess nares and determine need for appliance change or resting period  Outcome: Progressing  Flowsheets (Taken 6/13/2025 1214)  Skin Integrity Remains Intact: Monitor for areas of redness and/or skin breakdown     Problem: Safety - Adult  Goal: Free from fall injury  Outcome: Progressing     Problem: ABCDS Injury Assessment  Goal: Absence of physical injury  Outcome: Progressing     Problem: Nutrition Deficit:  Goal: Optimize nutritional status  Outcome: Progressing     Problem: Neurosensory - Adult  Goal: Achieves stable or improved neurological status  Outcome: Progressing  Goal: Achieves maximal functionality and self care  Outcome: Progressing     Problem: Skin/Tissue Integrity - Adult  Goal: Skin integrity remains intact  Description: 1.  Monitor for areas of redness and/or skin breakdown2.  Assess vascular access sites hourly3.  Every 4-6 hours minimum:  Change oxygen saturation probe site4.  Every 4-6 hours:  If on nasal continuous positive airway pressure, respiratory therapy assess nares and determine need for appliance change or resting period  Outcome: Progressing  Flowsheets (Taken 6/13/2025 1214)  Skin Integrity Remains Intact: Monitor for areas of redness and/or skin breakdown  Goal: Incisions, wounds, or drain sites healing without S/S of infection  Outcome:  Progressing  Flowsheets (Taken 6/13/2025 1214)  Incisions, Wounds, or Drain Sites Healing Without Sign and Symptoms of Infection: TWICE DAILY: Assess and document skin integrity     Problem: Musculoskeletal - Adult  Goal: Return mobility to safest level of function  Outcome: Progressing  Goal: Return ADL status to a safe level of function  Outcome: Progressing     Problem: Gastrointestinal - Adult  Goal: Maintains or returns to baseline bowel function  Outcome: Progressing  Goal: Maintains adequate nutritional intake  Outcome: Progressing     Problem: Genitourinary - Adult  Goal: Absence of urinary retention  Outcome: Progressing     Problem: Metabolic/Fluid and Electrolytes - Adult  Goal: Electrolytes maintained within normal limits  Outcome: Progressing  Goal: Hemodynamic stability and optimal renal function maintained  Outcome: Progressing  Goal: Glucose maintained within prescribed range  Outcome: Progressing     Problem: Pain  Goal: Verbalizes/displays adequate comfort level or baseline comfort level  Outcome: Progressing

## 2025-06-13 NOTE — PROGRESS NOTES
Occupational Therapy  Facility/Department: [facility] 8 REHAB UNIT  Rehabilitation Occupational Therapy Daily Treatment Note    Date: 25  Patient Name: Akhil Alejo       Room: 0811/811-02  MRN: [id number]  Account: [id number]   : 1955  (69 y.o.) Gender: male        Diagnosis: (P) Acute ischemic stroke  Additional Pertinent Hx: (P) CABG on     Treatment Diagnosis: (P) Acute ischemic stroke   Past Medical History:  has a past medical history of Arm fracture, Dementia (HCC), Depression, Diabetes mellitus (HCC), Hypertension, Kidney stones, Neuropathy, Palliative care patient, and Restless leg syndrome.  Past Surgical History:   has a past surgical history that includes Cholecystectomy; Lithotripsy; shoulder surgery (Left); bone marrow biopsy (Right, 2020); Colonoscopy (2020); Upper gastrointestinal endoscopy (2017); Cardiac procedure (N/A, 2025); Coronary artery bypass graft (N/A, 2025); Gastrostomy tube placement (2025); Upper gastrointestinal endoscopy (N/A, 2025); Upper gastrointestinal endoscopy (N/A, 2025); and Upper gastrointestinal endoscopy (2025).     25 0900   Restrictions/Precautions   Restrictions/Precautions Weight Bearing;Aspiration Risk;Fall Risk;NPO;Surgical Protocols   Position Activity Restriction   Other Position/Activity Restrictions PEG TUBE, HOB 30 DEG FOR FEED   General   Additional Pertinent Hx CABG on    Diagnosis Acute ischemic stroke   Subjective   Subjective Pt not feeling well today. required increased assist with t/fs and self care. Orthostatic BP.   Vitals   BP (!) 71/52  (standing)   MAP (Calculated) 58   ADL   UE Bathing Minimal assistance   LE Bathing Moderate assistance   UE Dressing Moderate assistance   LE Dressing Moderate assistance   LE Dressing Skilled Clinical Factors assist over feet, pulled up with CGA   Balance   Standing Balance Contact guard assistance   Standing Balance   Time 1-2 mins   Activity  at sink. at RW   Bed mobility   Sit to Supine Stand by assistance   Transfers   Sit to stand Contact guard assistance   Stand to sit Contact guard assistance   Wheelchair Bed Transfers   Wheelchair/Bed - Technique Stand step   Equipment Used Bed;Wheelchair   Level of Asssistance Moderate assistance   Wheelchair Transfers Comments increased assist when first up   Activity Tolerance   Activity Tolerance Patient limited by fatigue;Treatment limited secondary to medical complications (free text)   Activity Tolerance Comments low BP   Assessment   Performance deficits / Impairments Decreased functional mobility ;Decreased ADL status;Decreased strength;Decreased balance;Decreased posture;Decreased endurance;Decreased safe awareness;Decreased high-level IADLs;Decreased cognition   Treatment Diagnosis Acute ischemic stroke   History CABG on 4/29/25; recent GI bleed   Time Code Minutes    Timed Code Treatment Minutes 60 Minutes   OT Co-Treatment Minutes   Time In 0900   Time Out 1000   Minutes 60

## 2025-06-13 NOTE — PROGRESS NOTES
[facility]  INPATIENT SPEECH THERAPY  [facility] 8 REHAB UNIT      TIME  1100  1200  60 MINUTES    [x]Daily Note  []Progress Note    Date: 2025  Patient Name: Akhil Alejo        MRN: [id number]    Account #: [id number]  : 1955  (69 y.o.)  Gender: male   Primary Provider: Leo Vaugnh MD  Swallowing Status/Diet:  Liquid Consistency Recommendation: Moderately Thick (Honey)  Diet Solids Recommendation: Minced & Moist    Subjective: Patient alert and upright in bed during treatment session. No family present during session. Assistance from aid to pull patient up in the bed 2x during session. Patient has a tendency to scoot himself down.    Objective:     Swallowing:   Swallow reassessment completed on this date. Pt alert, cooperative, and upright in bed. No family present during evaluation. Oral prep revealed decreased vertical jaw movement with puree. Oral transit timing of puree consistency and honey thick liquids was observed to be 1-2 seconds in length. Laryngeal elevation was observed to be sluggish and mildly decreased for swallow airway protection. Even so, no overt s/s of penetration/aspiration observed with any consistency.      Recommend utilization of PEG for supplemental nutrition and for medications. Okay to trial minced and moist consistency with moderately thick/honey thick liquids. STAFF FEED. Okay for ice chips IN BETWEEN MEALS for comfort. Please monitor/notify SLP for the following: changes in lung sounds, spikes in temperature, and/or difficulties swallowing.     Orientation task completed. Patient 20% oriented. Patient able to state his name, city, and hospital. Patient did not state his birthday, age, floor number, day of the week, month, date, or year.     SLP will continue to follow and treat.    Diet Solids Recommendation:  Diet Solids Recommendation: Minced & Moist  Diet Liquid Recommendation:  Liquid Consistency Recommendation: Moderately Thick (Honey)  Compensatory Swallowing

## 2025-06-13 NOTE — PROGRESS NOTES
Patient:   Akhil Alejo  MR#:    [id number]   Room:    0811/811-02   YOB: 1955  Date of Progress Note: 6/13/2025  Time of Note                           8:12 AM  Consulting Physician:   Leo Vaughn M.D.  Attending Physician:  Leo Vaughn MD     Chief complaint Acute ischemic stroke     S:This 69 y.o. male  with history of depression, DM, HTN, neuropathy and RLS. Patient was seen by Dr. Kitchen as outpatient for abnormal ECG and shortness of breath. He underwent a stress test on 4/25/25 which anterior wall ischemia, EF 44%, ECG abnormal as before. He was sent to [facility] for further evaluation. He was seen by cardiologist Dr. Cortes and underwent a heart catheterization which revealed multivessel CAD. Cardiothoracic surgery was consulted. He was seen by Dr. Lara, who recommended CABG x 3 to the LAD, D1 and OM1. Patient was in agreement. The patient had been on Plavix and aspirin. Therefore Plavix was placed on hold. P2Y12 test done on 4/28/25 was 156 , which was felt sufficient recovery of plt function to proceed with CABG on 4/29/25. Patient tolerated the procedure well. Patient had post-op delirium causing some worsening of his dementia. Patient was evaluated by SPT for cognitive and swallow. Patient was found have severe cognitive impairment and oropharyngeal dysphagia. He made NPO. Initially NGT was placed for feedings. Unfortunately, patient continue to have oropharyngeal phase dysphagia and ultimately patient required placement of G-Tube on 5/9/25. Patient had been tolerating tube feedings fairly well. He continued to have cognitive deficits with Avasys camera in place. Patient remained weak overall and was felt to need a stay on Rehab. He was admitted to Rehab on 5/13/25. Patient was participating in PT/OT/SPT. On 5/21/25 nursing reported hematemesis and blood from G-Tube overnight. Hospitalist was consulted.  Pt was found to be tachypneic and pale, Pulse ox was 95 .Bp 103/59 hr 94.  Pt  around PEG site.  5.  DVT prophylaxis-Lovenox  6.  Diabetes-on insulin monitor blood sugar  7.  Acute respiratory failure-DuoNebs  8.  Hypothyroidism-Synthroid  9.  Insomnia-on meds monitor  10.  Hypertension-on meds monitor  11.  PT/OT/speech    Continue current care    Stool positive for occult blood  Continue current treatment    MEGAN June 20 th

## 2025-06-13 NOTE — PLAN OF CARE
Problem: Safety - Adult  Goal: Free from fall injury  6/13/2025 1852 by Benjamín Rosen, RN  Outcome: Progressing  6/13/2025 1214 by Paola Womack, RN  Outcome: Progressing

## 2025-06-14 LAB
GLUCOSE BLD-MCNC: 153 MG/DL (ref 70–99)
GLUCOSE BLD-MCNC: 164 MG/DL (ref 70–99)
GLUCOSE BLD-MCNC: 164 MG/DL (ref 70–99)
GLUCOSE BLD-MCNC: 177 MG/DL (ref 70–99)
PERFORMED ON: ABNORMAL

## 2025-06-14 PROCEDURE — 82962 GLUCOSE BLOOD TEST: CPT

## 2025-06-14 PROCEDURE — 94640 AIRWAY INHALATION TREATMENT: CPT

## 2025-06-14 PROCEDURE — 94150 VITAL CAPACITY TEST: CPT

## 2025-06-14 PROCEDURE — 6370000000 HC RX 637 (ALT 250 FOR IP): Performed by: INTERNAL MEDICINE

## 2025-06-14 PROCEDURE — 6370000000 HC RX 637 (ALT 250 FOR IP): Performed by: PSYCHIATRY & NEUROLOGY

## 2025-06-14 PROCEDURE — 6360000002 HC RX W HCPCS: Performed by: PSYCHIATRY & NEUROLOGY

## 2025-06-14 PROCEDURE — 94760 N-INVAS EAR/PLS OXIMETRY 1: CPT

## 2025-06-14 PROCEDURE — 99232 SBSQ HOSP IP/OBS MODERATE 35: CPT | Performed by: PSYCHIATRY & NEUROLOGY

## 2025-06-14 PROCEDURE — 2500000003 HC RX 250 WO HCPCS: Performed by: PSYCHIATRY & NEUROLOGY

## 2025-06-14 PROCEDURE — 1180000000 HC REHAB R&B

## 2025-06-14 RX ADMIN — INSULIN GLARGINE 15 UNITS: 100 INJECTION, SOLUTION SUBCUTANEOUS at 20:55

## 2025-06-14 RX ADMIN — CLOPIDOGREL BISULFATE 75 MG: 75 TABLET, FILM COATED ORAL at 10:09

## 2025-06-14 RX ADMIN — Medication 5 MG: at 20:55

## 2025-06-14 RX ADMIN — INSULIN GLARGINE 15 UNITS: 100 INJECTION, SOLUTION SUBCUTANEOUS at 10:10

## 2025-06-14 RX ADMIN — METOPROLOL TARTRATE 50 MG: 50 TABLET, FILM COATED ORAL at 10:09

## 2025-06-14 RX ADMIN — GEMFIBROZIL 600 MG: 600 TABLET ORAL at 20:55

## 2025-06-14 RX ADMIN — ENOXAPARIN SODIUM 40 MG: 100 INJECTION SUBCUTANEOUS at 20:55

## 2025-06-14 RX ADMIN — METOPROLOL TARTRATE 50 MG: 50 TABLET, FILM COATED ORAL at 20:55

## 2025-06-14 RX ADMIN — IPRATROPIUM BROMIDE AND ALBUTEROL SULFATE 1 DOSE: 2.5; .5 SOLUTION RESPIRATORY (INHALATION) at 19:14

## 2025-06-14 RX ADMIN — GEMFIBROZIL 600 MG: 600 TABLET ORAL at 10:09

## 2025-06-14 RX ADMIN — LEVOTHYROXINE SODIUM 50 MCG: 0.05 TABLET ORAL at 06:17

## 2025-06-14 RX ADMIN — SODIUM CHLORIDE, PRESERVATIVE FREE 10 ML: 5 INJECTION INTRAVENOUS at 20:56

## 2025-06-14 RX ADMIN — IPRATROPIUM BROMIDE AND ALBUTEROL SULFATE 1 DOSE: 2.5; .5 SOLUTION RESPIRATORY (INHALATION) at 10:10

## 2025-06-14 RX ADMIN — LANSOPRAZOLE 30 MG: 30 TABLET, ORALLY DISINTEGRATING, DELAYED RELEASE ORAL at 10:09

## 2025-06-14 RX ADMIN — ASPIRIN 81 MG: 81 TABLET, CHEWABLE ORAL at 10:10

## 2025-06-14 RX ADMIN — AZITHROMYCIN DIHYDRATE 250 MG: 250 TABLET, FILM COATED ORAL at 10:09

## 2025-06-14 RX ADMIN — IPRATROPIUM BROMIDE AND ALBUTEROL SULFATE 1 DOSE: 2.5; .5 SOLUTION RESPIRATORY (INHALATION) at 06:32

## 2025-06-14 RX ADMIN — CETIRIZINE HYDROCHLORIDE 10 MG: 10 TABLET ORAL at 10:10

## 2025-06-14 RX ADMIN — ATORVASTATIN CALCIUM 20 MG: 20 TABLET, FILM COATED ORAL at 20:55

## 2025-06-14 RX ADMIN — IPRATROPIUM BROMIDE AND ALBUTEROL SULFATE 1 DOSE: 2.5; .5 SOLUTION RESPIRATORY (INHALATION) at 15:12

## 2025-06-14 RX ADMIN — LANSOPRAZOLE 30 MG: 30 TABLET, ORALLY DISINTEGRATING, DELAYED RELEASE ORAL at 20:55

## 2025-06-14 RX ADMIN — CHOLESTYRAMINE 4 G: 4 POWDER, FOR SUSPENSION ORAL at 10:10

## 2025-06-14 NOTE — PROGRESS NOTES
Activity Restriction   Other Position/Activity Restrictions PEG TUBE, HOB 30 DEG FOR FEED   General   Additional Pertinent Hx CABG on 4/29   Diagnosis Acute ischemic stroke   Subjective   Subjective Pt not feeling well today. required increased assist with t/fs and self care. Orthostatic BP.   Vitals   BP (!) 71/52  (standing)   MAP (Calculated) 58   ADL   UE Bathing Minimal assistance   LE Bathing Moderate assistance   UE Dressing Moderate assistance   LE Dressing Moderate assistance   LE Dressing Skilled Clinical Factors assist over feet, pulled up with CGA   Balance   Standing Balance Contact guard assistance   Standing Balance   Time 1-2 mins   Activity at sink. at RW   Bed mobility   Sit to Supine Stand by assistance   Transfers   Sit to stand Contact guard assistance   Stand to sit Contact guard assistance   Wheelchair Bed Transfers   Wheelchair/Bed - Technique Stand step   Equipment Used Bed;Wheelchair   Level of Asssistance Moderate assistance   Wheelchair Transfers Comments increased assist when first up   Activity Tolerance   Activity Tolerance Patient limited by fatigue;Treatment limited secondary to medical complications (free text)   Activity Tolerance Comments low BP   Assessment   Performance deficits / Impairments Decreased functional mobility ;Decreased ADL status;Decreased strength;Decreased balance;Decreased posture;Decreased endurance;Decreased safe awareness;Decreased high-level IADLs;Decreased cognition   Treatment Diagnosis Acute ischemic stroke   History CABG on 4/29/25; recent GI bleed   Time Code Minutes    Timed Code Treatment Minutes 60 Minutes   OT Co-Treatment Minutes   Time In 0900   Time Out 1000   Minutes 60       RECORD REVIEW: Previous medical records, medications were reviewed at today's visit    IMPRESSION:   1.  Multifocal strokes/recent CABG-on aspirin/Plavix/statin  2.  GI bleed-monitor  3.  Hyperlipidemia-on statin/gemfibrozil  4.  GI-bowel regimen/PPI/tube feeds.  Appreciate

## 2025-06-15 LAB
GLUCOSE BLD-MCNC: 122 MG/DL (ref 70–99)
GLUCOSE BLD-MCNC: 140 MG/DL (ref 70–99)
GLUCOSE BLD-MCNC: 154 MG/DL (ref 70–99)
GLUCOSE BLD-MCNC: 200 MG/DL (ref 70–99)
PERFORMED ON: ABNORMAL

## 2025-06-15 PROCEDURE — 94640 AIRWAY INHALATION TREATMENT: CPT

## 2025-06-15 PROCEDURE — 94760 N-INVAS EAR/PLS OXIMETRY 1: CPT

## 2025-06-15 PROCEDURE — 6370000000 HC RX 637 (ALT 250 FOR IP): Performed by: INTERNAL MEDICINE

## 2025-06-15 PROCEDURE — 6370000000 HC RX 637 (ALT 250 FOR IP): Performed by: PSYCHIATRY & NEUROLOGY

## 2025-06-15 PROCEDURE — 82962 GLUCOSE BLOOD TEST: CPT

## 2025-06-15 PROCEDURE — 2500000003 HC RX 250 WO HCPCS: Performed by: PSYCHIATRY & NEUROLOGY

## 2025-06-15 PROCEDURE — 6360000002 HC RX W HCPCS: Performed by: PSYCHIATRY & NEUROLOGY

## 2025-06-15 PROCEDURE — 1180000000 HC REHAB R&B

## 2025-06-15 RX ADMIN — GEMFIBROZIL 600 MG: 600 TABLET ORAL at 20:17

## 2025-06-15 RX ADMIN — CLOPIDOGREL BISULFATE 75 MG: 75 TABLET, FILM COATED ORAL at 09:21

## 2025-06-15 RX ADMIN — ATORVASTATIN CALCIUM 20 MG: 20 TABLET, FILM COATED ORAL at 20:17

## 2025-06-15 RX ADMIN — INSULIN LISPRO 10 UNITS: 100 INJECTION, SOLUTION INTRAVENOUS; SUBCUTANEOUS at 09:21

## 2025-06-15 RX ADMIN — LANSOPRAZOLE 30 MG: 30 TABLET, ORALLY DISINTEGRATING, DELAYED RELEASE ORAL at 20:17

## 2025-06-15 RX ADMIN — IPRATROPIUM BROMIDE AND ALBUTEROL SULFATE 1 DOSE: 2.5; .5 SOLUTION RESPIRATORY (INHALATION) at 14:23

## 2025-06-15 RX ADMIN — IPRATROPIUM BROMIDE AND ALBUTEROL SULFATE 1 DOSE: 2.5; .5 SOLUTION RESPIRATORY (INHALATION) at 10:17

## 2025-06-15 RX ADMIN — LANSOPRAZOLE 30 MG: 30 TABLET, ORALLY DISINTEGRATING, DELAYED RELEASE ORAL at 09:21

## 2025-06-15 RX ADMIN — CETIRIZINE HYDROCHLORIDE 10 MG: 10 TABLET ORAL at 09:21

## 2025-06-15 RX ADMIN — SODIUM CHLORIDE, PRESERVATIVE FREE 10 ML: 5 INJECTION INTRAVENOUS at 09:25

## 2025-06-15 RX ADMIN — INSULIN GLARGINE 15 UNITS: 100 INJECTION, SOLUTION SUBCUTANEOUS at 20:17

## 2025-06-15 RX ADMIN — IPRATROPIUM BROMIDE AND ALBUTEROL SULFATE 1 DOSE: 2.5; .5 SOLUTION RESPIRATORY (INHALATION) at 18:14

## 2025-06-15 RX ADMIN — INSULIN GLARGINE 15 UNITS: 100 INJECTION, SOLUTION SUBCUTANEOUS at 09:22

## 2025-06-15 RX ADMIN — SODIUM CHLORIDE, PRESERVATIVE FREE 10 ML: 5 INJECTION INTRAVENOUS at 20:32

## 2025-06-15 RX ADMIN — METOPROLOL TARTRATE 50 MG: 50 TABLET, FILM COATED ORAL at 20:17

## 2025-06-15 RX ADMIN — INSULIN LISPRO 1 UNITS: 100 INJECTION, SOLUTION INTRAVENOUS; SUBCUTANEOUS at 20:16

## 2025-06-15 RX ADMIN — METOPROLOL TARTRATE 50 MG: 50 TABLET, FILM COATED ORAL at 09:21

## 2025-06-15 RX ADMIN — AZITHROMYCIN DIHYDRATE 250 MG: 250 TABLET, FILM COATED ORAL at 09:21

## 2025-06-15 RX ADMIN — ENOXAPARIN SODIUM 40 MG: 100 INJECTION SUBCUTANEOUS at 20:16

## 2025-06-15 RX ADMIN — LEVOTHYROXINE SODIUM 50 MCG: 0.05 TABLET ORAL at 06:02

## 2025-06-15 RX ADMIN — IPRATROPIUM BROMIDE AND ALBUTEROL SULFATE 1 DOSE: 2.5; .5 SOLUTION RESPIRATORY (INHALATION) at 06:25

## 2025-06-15 RX ADMIN — Medication 5 MG: at 20:17

## 2025-06-15 RX ADMIN — ASPIRIN 81 MG: 81 TABLET, CHEWABLE ORAL at 09:21

## 2025-06-15 RX ADMIN — GEMFIBROZIL 600 MG: 600 TABLET ORAL at 09:21

## 2025-06-15 RX ADMIN — CHOLESTYRAMINE 4 G: 4 POWDER, FOR SUSPENSION ORAL at 09:21

## 2025-06-15 NOTE — PLAN OF CARE
Problem: Chronic Conditions and Co-morbidities  Goal: Patient's chronic conditions and co-morbidity symptoms are monitored and maintained or improved  Outcome: Progressing     Problem: Discharge Planning  Goal: Discharge to home or other facility with appropriate resources  Outcome: Progressing     Problem: Skin/Tissue Integrity  Goal: Skin integrity remains intact  Description: 1.  Monitor for areas of redness and/or skin breakdown2.  Assess vascular access sites hourly3.  Every 4-6 hours minimum:  Change oxygen saturation probe site4.  Every 4-6 hours:  If on nasal continuous positive airway pressure, respiratory therapy assess nares and determine need for appliance change or resting period  Outcome: Progressing  Flowsheets (Taken 6/15/2025 1136)  Skin Integrity Remains Intact: Monitor for areas of redness and/or skin breakdown     Problem: Safety - Adult  Goal: Free from fall injury  Outcome: Progressing     Problem: ABCDS Injury Assessment  Goal: Absence of physical injury  Outcome: Progressing     Problem: Nutrition Deficit:  Goal: Optimize nutritional status  Outcome: Progressing     Problem: Neurosensory - Adult  Goal: Achieves stable or improved neurological status  Outcome: Progressing  Goal: Achieves maximal functionality and self care  Outcome: Progressing     Problem: Skin/Tissue Integrity - Adult  Goal: Skin integrity remains intact  Description: 1.  Monitor for areas of redness and/or skin breakdown2.  Assess vascular access sites hourly3.  Every 4-6 hours minimum:  Change oxygen saturation probe site4.  Every 4-6 hours:  If on nasal continuous positive airway pressure, respiratory therapy assess nares and determine need for appliance change or resting period  Outcome: Progressing  Flowsheets (Taken 6/15/2025 1136)  Skin Integrity Remains Intact: Monitor for areas of redness and/or skin breakdown  Goal: Incisions, wounds, or drain sites healing without S/S of infection  Outcome:

## 2025-06-15 NOTE — PLAN OF CARE
Problem: Safety - Adult  Goal: Free from fall injury  6/15/2025 1853 by Benjamín Rosen, RN  Outcome: Progressing  6/15/2025 1136 by Paola Womack, RN  Outcome: Progressing

## 2025-06-16 ENCOUNTER — TELEPHONE (OUTPATIENT)
Dept: NEUROLOGY | Age: 70
End: 2025-06-16

## 2025-06-16 LAB
ALBUMIN SERPL-MCNC: 3.5 G/DL (ref 3.5–5.2)
ALP SERPL-CCNC: 192 U/L (ref 40–129)
ALT SERPL-CCNC: 18 U/L (ref 10–50)
ANION GAP SERPL CALCULATED.3IONS-SCNC: 11 MMOL/L (ref 8–16)
AST SERPL-CCNC: 31 U/L (ref 10–50)
BASOPHILS # BLD: 0 K/UL (ref 0–0.2)
BASOPHILS NFR BLD: 0.5 % (ref 0–1)
BILIRUB SERPL-MCNC: 0.3 MG/DL (ref 0.2–1.2)
BUN SERPL-MCNC: 17 MG/DL (ref 8–23)
CALCIUM SERPL-MCNC: 9.6 MG/DL (ref 8.8–10.2)
CHLORIDE SERPL-SCNC: 104 MMOL/L (ref 98–107)
CO2 SERPL-SCNC: 22 MMOL/L (ref 22–29)
CREAT SERPL-MCNC: 0.7 MG/DL (ref 0.7–1.2)
EOSINOPHIL # BLD: 0.6 K/UL (ref 0–0.6)
EOSINOPHIL NFR BLD: 8.4 % (ref 0–5)
ERYTHROCYTE [DISTWIDTH] IN BLOOD BY AUTOMATED COUNT: 15.8 % (ref 11.5–14.5)
GLUCOSE BLD-MCNC: 137 MG/DL (ref 70–99)
GLUCOSE BLD-MCNC: 152 MG/DL (ref 70–99)
GLUCOSE BLD-MCNC: 159 MG/DL (ref 70–99)
GLUCOSE BLD-MCNC: 167 MG/DL (ref 70–99)
GLUCOSE SERPL-MCNC: 138 MG/DL (ref 70–99)
HCT VFR BLD AUTO: 39.1 % (ref 42–52)
HGB BLD-MCNC: 12.5 G/DL (ref 14–18)
IMM GRANULOCYTES # BLD: 0.1 K/UL
LYMPHOCYTES # BLD: 3.6 K/UL (ref 1.1–4.5)
LYMPHOCYTES NFR BLD: 48.1 % (ref 20–40)
MCH RBC QN AUTO: 29.2 PG (ref 27–31)
MCHC RBC AUTO-ENTMCNC: 32 G/DL (ref 33–37)
MCV RBC AUTO: 91.4 FL (ref 80–94)
MONOCYTES # BLD: 0.7 K/UL (ref 0–0.9)
MONOCYTES NFR BLD: 9.8 % (ref 0–10)
NEUTROPHILS # BLD: 2.4 K/UL (ref 1.5–7.5)
NEUTS SEG NFR BLD: 32.1 % (ref 50–65)
PERFORMED ON: ABNORMAL
PLATELET # BLD AUTO: 241 K/UL (ref 130–400)
PMV BLD AUTO: 10.2 FL (ref 9.4–12.4)
POTASSIUM SERPL-SCNC: 4.4 MMOL/L (ref 3.5–5)
PROT SERPL-MCNC: 7.6 G/DL (ref 6.4–8.3)
RBC # BLD AUTO: 4.28 M/UL (ref 4.7–6.1)
SODIUM SERPL-SCNC: 137 MMOL/L (ref 136–145)
WBC # BLD AUTO: 7.5 K/UL (ref 4.8–10.8)

## 2025-06-16 PROCEDURE — 94640 AIRWAY INHALATION TREATMENT: CPT

## 2025-06-16 PROCEDURE — 6370000000 HC RX 637 (ALT 250 FOR IP): Performed by: INTERNAL MEDICINE

## 2025-06-16 PROCEDURE — 85025 COMPLETE CBC W/AUTO DIFF WBC: CPT

## 2025-06-16 PROCEDURE — 92526 ORAL FUNCTION THERAPY: CPT

## 2025-06-16 PROCEDURE — 99232 SBSQ HOSP IP/OBS MODERATE 35: CPT | Performed by: PSYCHIATRY & NEUROLOGY

## 2025-06-16 PROCEDURE — 36415 COLL VENOUS BLD VENIPUNCTURE: CPT

## 2025-06-16 PROCEDURE — 97535 SELF CARE MNGMENT TRAINING: CPT

## 2025-06-16 PROCEDURE — 97530 THERAPEUTIC ACTIVITIES: CPT

## 2025-06-16 PROCEDURE — 6370000000 HC RX 637 (ALT 250 FOR IP): Performed by: PSYCHIATRY & NEUROLOGY

## 2025-06-16 PROCEDURE — 6360000002 HC RX W HCPCS: Performed by: PSYCHIATRY & NEUROLOGY

## 2025-06-16 PROCEDURE — 97130 THER IVNTJ EA ADDL 15 MIN: CPT

## 2025-06-16 PROCEDURE — 1180000000 HC REHAB R&B

## 2025-06-16 PROCEDURE — 2500000003 HC RX 250 WO HCPCS: Performed by: PSYCHIATRY & NEUROLOGY

## 2025-06-16 PROCEDURE — 97110 THERAPEUTIC EXERCISES: CPT

## 2025-06-16 PROCEDURE — 97129 THER IVNTJ 1ST 15 MIN: CPT

## 2025-06-16 PROCEDURE — 80053 COMPREHEN METABOLIC PANEL: CPT

## 2025-06-16 PROCEDURE — 94760 N-INVAS EAR/PLS OXIMETRY 1: CPT

## 2025-06-16 PROCEDURE — 97116 GAIT TRAINING THERAPY: CPT

## 2025-06-16 PROCEDURE — 82962 GLUCOSE BLOOD TEST: CPT

## 2025-06-16 RX ADMIN — SODIUM CHLORIDE, PRESERVATIVE FREE 10 ML: 5 INJECTION INTRAVENOUS at 20:28

## 2025-06-16 RX ADMIN — CLOPIDOGREL BISULFATE 75 MG: 75 TABLET, FILM COATED ORAL at 09:09

## 2025-06-16 RX ADMIN — ASPIRIN 81 MG: 81 TABLET, CHEWABLE ORAL at 09:09

## 2025-06-16 RX ADMIN — INSULIN GLARGINE 15 UNITS: 100 INJECTION, SOLUTION SUBCUTANEOUS at 09:09

## 2025-06-16 RX ADMIN — ENOXAPARIN SODIUM 40 MG: 100 INJECTION SUBCUTANEOUS at 20:27

## 2025-06-16 RX ADMIN — INSULIN GLARGINE 15 UNITS: 100 INJECTION, SOLUTION SUBCUTANEOUS at 20:27

## 2025-06-16 RX ADMIN — GEMFIBROZIL 600 MG: 600 TABLET ORAL at 20:26

## 2025-06-16 RX ADMIN — ATORVASTATIN CALCIUM 20 MG: 20 TABLET, FILM COATED ORAL at 20:26

## 2025-06-16 RX ADMIN — CHOLESTYRAMINE 4 G: 4 POWDER, FOR SUSPENSION ORAL at 09:24

## 2025-06-16 RX ADMIN — METOPROLOL TARTRATE 50 MG: 50 TABLET, FILM COATED ORAL at 09:09

## 2025-06-16 RX ADMIN — SODIUM CHLORIDE, PRESERVATIVE FREE 10 ML: 5 INJECTION INTRAVENOUS at 09:11

## 2025-06-16 RX ADMIN — IPRATROPIUM BROMIDE AND ALBUTEROL SULFATE 1 DOSE: 2.5; .5 SOLUTION RESPIRATORY (INHALATION) at 18:11

## 2025-06-16 RX ADMIN — GEMFIBROZIL 600 MG: 600 TABLET ORAL at 09:09

## 2025-06-16 RX ADMIN — AZITHROMYCIN DIHYDRATE 250 MG: 250 TABLET, FILM COATED ORAL at 09:08

## 2025-06-16 RX ADMIN — IPRATROPIUM BROMIDE AND ALBUTEROL SULFATE 1 DOSE: 2.5; .5 SOLUTION RESPIRATORY (INHALATION) at 15:15

## 2025-06-16 RX ADMIN — Medication 5 MG: at 20:26

## 2025-06-16 RX ADMIN — LANSOPRAZOLE 30 MG: 30 TABLET, ORALLY DISINTEGRATING, DELAYED RELEASE ORAL at 20:27

## 2025-06-16 RX ADMIN — METOPROLOL TARTRATE 50 MG: 50 TABLET, FILM COATED ORAL at 20:26

## 2025-06-16 RX ADMIN — CETIRIZINE HYDROCHLORIDE 10 MG: 10 TABLET ORAL at 09:08

## 2025-06-16 RX ADMIN — IPRATROPIUM BROMIDE AND ALBUTEROL SULFATE 1 DOSE: 2.5; .5 SOLUTION RESPIRATORY (INHALATION) at 07:21

## 2025-06-16 RX ADMIN — LANSOPRAZOLE 30 MG: 30 TABLET, ORALLY DISINTEGRATING, DELAYED RELEASE ORAL at 09:09

## 2025-06-16 RX ADMIN — LEVOTHYROXINE SODIUM 50 MCG: 0.05 TABLET ORAL at 05:30

## 2025-06-16 ASSESSMENT — PAIN SCALES - GENERAL: PAINLEVEL_OUTOF10: 0

## 2025-06-16 NOTE — PROGRESS NOTES
Patient:   Akhil Alejo  MR#:    [id number]   Room:    0811/811-02   YOB: 1955  Date of Progress Note: 6/16/2025  Time of Note                           6:33 AM  Consulting Physician:   Leo Vaughn M.D.  Attending Physician:  Leo Vaughn MD     Chief complaint Acute ischemic stroke     S:This 69 y.o. male  with history of depression, DM, HTN, neuropathy and RLS. Patient was seen by Dr. Kitchen as outpatient for abnormal ECG and shortness of breath. He underwent a stress test on 4/25/25 which anterior wall ischemia, EF 44%, ECG abnormal as before. He was sent to [facility] for further evaluation. He was seen by cardiologist Dr. Cortes and underwent a heart catheterization which revealed multivessel CAD. Cardiothoracic surgery was consulted. He was seen by Dr. Lara, who recommended CABG x 3 to the LAD, D1 and OM1. Patient was in agreement. The patient had been on Plavix and aspirin. Therefore Plavix was placed on hold. P2Y12 test done on 4/28/25 was 156 , which was felt sufficient recovery of plt function to proceed with CABG on 4/29/25. Patient tolerated the procedure well. Patient had post-op delirium causing some worsening of his dementia. Patient was evaluated by SPT for cognitive and swallow. Patient was found have severe cognitive impairment and oropharyngeal dysphagia. He made NPO. Initially NGT was placed for feedings. Unfortunately, patient continue to have oropharyngeal phase dysphagia and ultimately patient required placement of G-Tube on 5/9/25. Patient had been tolerating tube feedings fairly well. He continued to have cognitive deficits with Avasys camera in place. Patient remained weak overall and was felt to need a stay on Rehab. He was admitted to Rehab on 5/13/25. Patient was participating in PT/OT/SPT. On 5/21/25 nursing reported hematemesis and blood from G-Tube overnight. Hospitalist was consulted.  Pt was found to be tachypneic and pale, Pulse ox was 95 .Bp 103/59 hr 94.  Pt  ptosis    CN VII-no facial assymetry       Motor Exam  antigravity throughout upper extremities bilaterally      Tremors- no tremors in hands or head noted     Gait  Not tested     Nursing/pcp notes, imaging,labs and vitals reviewed.     PT,OT and/or speech notes reviewed    Lab Results   Component Value Date    WBC 7.5 06/16/2025    HGB 12.5 (L) 06/16/2025    HCT 39.1 (L) 06/16/2025    MCV 91.4 06/16/2025     06/16/2025     Lab Results   Component Value Date     06/16/2025    K 4.4 06/16/2025     06/16/2025    CO2 22 06/16/2025    BUN 17 06/16/2025    CREATININE 0.7 06/16/2025    GLUCOSE 138 (H) 06/16/2025    CALCIUM 9.6 06/16/2025    BILITOT 0.3 06/16/2025    ALKPHOS 192 (H) 06/16/2025    AST 31 06/16/2025    ALT 18 06/16/2025    LABGLOM >90 06/16/2025    GFRAA >59 06/23/2021    AGRATIO 0.9 04/21/2021    GLOB 4.6 05/24/2023     Lab Results   Component Value Date    INR 1.29 (H) 05/22/2025    INR 1.26 (H) 05/01/2025    INR 1.14 04/30/2025    PROTIME 16.0 (H) 05/22/2025    PROTIME 15.7 (H) 05/01/2025    PROTIME 14.5 04/30/2025     PT/OT/SLP:      06/13/25 0905   Restrictions/Precautions   Restrictions/Precautions Weight Bearing;Aspiration Risk;Fall Risk;NPO;Surgical Protocols   Lower Extremity Weight Bearing Restrictions   Right Lower Extremity Weight Bearing Weight Bearing As Tolerated   Left Lower Extremity Weight Bearing Weight Bearing As Tolerated   Position Activity Restriction   Other Position/Activity Restrictions PEG TUBE, HOB 30 DEG FOR FEED   General   Chart Reviewed Yes   Patient assessed for rehabilitation services? Yes   Additional Pertinent Hx DEPRESSION, DM, HTN   Diagnosis CABGX3 4/29; NEW ONSET BIHEMISPHERIC DEEP WHITE MATTER CVA WITH RIGHT WEAKNESS   General   General Comments Co-Tx w/ OT.   Subjective   Subjective Pt laying in bed, requires some encouragement to get OOB- agrees to try to participate in therapy.   Vitals   Pulse 93  (67-93)   BP Location Right upper arm   BP

## 2025-06-16 NOTE — PLAN OF CARE
Problem: Chronic Conditions and Co-morbidities  Goal: Patient's chronic conditions and co-morbidity symptoms are monitored and maintained or improved  Outcome: Progressing     Problem: Discharge Planning  Goal: Discharge to home or other facility with appropriate resources  Outcome: Progressing     Problem: Skin/Tissue Integrity  Goal: Skin integrity remains intact  Description: 1.  Monitor for areas of redness and/or skin breakdown2.  Assess vascular access sites hourly3.  Every 4-6 hours minimum:  Change oxygen saturation probe site4.  Every 4-6 hours:  If on nasal continuous positive airway pressure, respiratory therapy assess nares and determine need for appliance change or resting period  Outcome: Progressing     Problem: Safety - Adult  Goal: Free from fall injury  6/16/2025 0506 by Dorcas Nichols, RN  Outcome: Progressing  6/15/2025 6453 by Benjamín Rosen, RN  Outcome: Progressing

## 2025-06-16 NOTE — PLAN OF CARE
Problem: Chronic Conditions and Co-morbidities  Goal: Patient's chronic conditions and co-morbidity symptoms are monitored and maintained or improved  6/16/2025 1021 by Korina Salinas LPN  Outcome: Progressing  Flowsheets (Taken 6/16/2025 0922)  Care Plan - Patient's Chronic Conditions and Co-Morbidity Symptoms are Monitored and Maintained or Improved: Monitor and assess patient's chronic conditions and comorbid symptoms for stability, deterioration, or improvement  6/16/2025 0506 by Dorcas Nichols RN  Outcome: Progressing     Problem: Discharge Planning  Goal: Discharge to home or other facility with appropriate resources  6/16/2025 1021 by Korina Salinas LPN  Outcome: Progressing  Flowsheets (Taken 6/16/2025 0922)  Discharge to home or other facility with appropriate resources: Refer to discharge planning if patient needs post-hospital services based on physician order or complex needs related to functional status, cognitive ability or social support system  6/16/2025 0506 by Dorcas Nichols RN  Outcome: Progressing     Problem: Skin/Tissue Integrity  Goal: Skin integrity remains intact  Description: 1.  Monitor for areas of redness and/or skin breakdown2.  Assess vascular access sites hourly3.  Every 4-6 hours minimum:  Change oxygen saturation probe site4.  Every 4-6 hours:  If on nasal continuous positive airway pressure, respiratory therapy assess nares and determine need for appliance change or resting period  6/16/2025 1021 by Korina Salinas LPN  Outcome: Progressing  Flowsheets (Taken 6/16/2025 0713)  Skin Integrity Remains Intact: Monitor for areas of redness and/or skin breakdown  6/16/2025 0506 by Dorcas Nichols, RN  Outcome: Progressing     Problem: Safety - Adult  Goal: Free from fall injury  6/16/2025 1021 by Korina Salinas LPN  Outcome: Progressing  6/16/2025 0506 by Dorcas Nichols, RN  Outcome: Progressing     Problem: ABCDS Injury Assessment  Goal: Absence of physical

## 2025-06-16 NOTE — PROGRESS NOTES
[facility]  INPATIENT SPEECH THERAPY  [facility] 8 REHAB UNIT      TIME  1100  1200  60 MINUTES    [x]Daily Note  []Progress Note    Date: 2025  Patient Name: Akhil Alejo        MRN: [id number]    Account #: [id number]  : 1955  (69 y.o.)  Gender: male   Primary Provider: Leo Vaughn MD  Swallowing Status/Diet:  Liquid Consistency Recommendation: Moderately Thick (Honey)  Diet Solids Recommendation: Minced & Moist    Subjective: Patient alert and upright in bed. No family present during treatment session.     Objective:     Memory functioning task completed. Patient given a list of 3 words and required to immediately recall them. Patient able to complete task with 50% accuracy independently. Given moderate-maximum verbal cues and repetitions, patient able to increase accuracy to 100%.     Word finding task completed. Patient given a word and required to identify the opposite. Patient able to complete task with 50% accuracy independently. Given moderate-maximum verbal and semantic cues, patient able to increase accuracy to 100%.    Swallowing:   Swallow reassessment completed on this date. Pt alert, cooperative, and upright in bed. No family present during evaluation. Oral prep revealed decreased vertical jaw movement with puree and minced and moist consistency. Oral transit timing of puree consistency and minced and moist was observed to be 1-2 seconds in length. Laryngeal elevation was observed to be sluggish and mildly decreased for swallow airway protection. Even so, no overt s/s of penetration/aspiration observed with any consistency.      Recommend utilization of PEG for primary source of nutrition and for medications. Okay to continue minced and moist consistency with moderately thick/honey thick liquids. STAFF FEED. Okay for ice chips IN BETWEEN MEALS for comfort. Amount of PO intake is poor and regarded as pleasure only. Please monitor/notify SLP for the following: changes in lung sounds, spikes  breaks []Fatigued    Education:  Learner:  [x]Patient          []Significant Other          []Other  Education provided regarding:  [x]Goals and POC   [x]Diet and swallowing precautions    []Home Exercise Program  []Progress and/or discharge information  Method of Education:  [x]Discussion          [x]Demonstration          []Handout          []Other  Evaluation of Education:   []Verbalized understanding   []Demonstrates without assistance  []Demonstrates with assistance  [x]Needs further instruction     []No evidence of learning                  [x]No family present      Plan: [x]Continue with current plan of care    []Modify current plan of care as follows:    []Discharge patient    Discharge Location:    Services/Supervision Recommended:      [x]Patient continues to require treatment by a licensed therapist to address functional deficits as outlined in the established plan of care.    Electronically signed by MATIAS Dash on 6/16/2025 at 11:55 AM

## 2025-06-16 NOTE — PATIENT CARE CONFERENCE
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 WEAKNESS      PHYSICAL THERAPY  GROSS ASSESSMENT       BED MOBILITY  Bed mobility  Rolling to Left: Modified independent  Rolling to Right: Substantial/Maximal assistance (with rail for brief change)  Supine to Sit: Minimal assistance, Partial/Moderate assistance (Due to need for encouragement)  Sit to Supine: Stand by assistance  Scooting: Stand by assistance, Contact guard assistance (On EOB)  Bed Mobility Comments: On R side of bed- use of bedrail       TRANSFERS  Transfers  Sit to Stand: Minimal Assistance, Partial/Moderate assistance, Contact guard assistance (Min/Mod upon first getting EOB then CGA)  Stand to Sit: Minimal Assistance, Partial/Moderate assistance, Contact guard assistance (Min/Mod initially then CGA)  Bed to Chair: Partial/Moderate assistance, Minimal assistance (Mod A initially then Min A)  Stand Pivot Transfers: Partial/Moderate assistance, 2 Person Assistance (EOB<>w/c from pt R/L: 1X w/ RW and 1X w/o AD (v/c's for use of bedrail))  Lateral Transfers:  (.)  Car Transfer: Minimal Assistance, Contact guard assistance (W/ RW: Somewhat raised height)  Comment: vc's for hand position with poor carryover, fails to reach back prior to sitting 100%  WHEELCHAIR PROPULSION     AMBULATION  Ambulation  Surface: Level tile  Device: Rolling Walker  Other Apparatus: Wheelchair follow  Assistance: Minimal assistance  Quality of Gait: reciprocal, small steps + increased distance from RW, FFP + intermittent downward gaze  Gait Deviations: Slow Alycia, Decreased step length, Decreased step height, Decreased head and trunk rotation  Distance: 80', 60', 10' X 3 (Intermittent sitting rests in between)  Comments: Multiple L/R turns, w/ socks  within the medical record of Akhil Alejo.

## 2025-06-16 NOTE — CARE COORDINATION
Sent updates to Yaneli Murguia with [facility]. Discharge still planned for Friday, 6/20.     Will need final tube feeding order with length of need documented (must be >90 days) and will need to state feedings are sole source of nutrition.     Will keep chart updated re dc planning.     Electronically signed by JARROD Rivas on 6/16/2025 at 9:47 AM    regular

## 2025-06-16 NOTE — PROGRESS NOTES
Physical Therapy  Name: Akhil Alejo  MRN:  [id number]  Date of service:  6/16/2025 06/16/25 1000   Restrictions/Precautions   Restrictions/Precautions Weight Bearing;Aspiration Risk;Fall Risk;NPO;Surgical Protocols   Activity Level Up with Assist   Lower Extremity Weight Bearing Restrictions   Right Lower Extremity Weight Bearing Weight Bearing As Tolerated   Left Lower Extremity Weight Bearing Weight Bearing As Tolerated   Position Activity Restriction   Other Position/Activity Restrictions PEG TUBE, HOB 30 DEG FOR FEED   General   Additional Pertinent Hx DEPRESSION, DM, HTN   Diagnosis CABGX3 4/29; NEW ONSET BIHEMISPHERIC DEEP WHITE MATTER CVA WITH RIGHT WEAKNESS   General   General Comments Co-Tx w/ OT.   Subjective   Subjective Pt awake but in bed.   Pain   Pre-Pain 0   Bed mobility   Supine to Sit Minimal assistance  (oob to R side)   Bed Mobility Comments use of rails and extended time   Transfers   Sit to Stand Minimal Assistance;Contact guard assistance   Stand to Sit Minimal Assistance;Contact guard assistance   Comment pt following directions to reach back for chair despite instinctually wanting to sit w/o reaching; pushing up with L UE and R forearm with some interruption at 1/2 way   Ambulation   Surface Level tile   Device Rolling Walker   Other Apparatus Wheelchair follow   Assistance Minimal assistance;Contact guard assistance   Quality of Gait reciprocal pattern with R duarte lurch and dec clearance with R foot advancement (uncompensated trendelenberg), poor proximity to walker and downward gaze   Gait Deviations Slow Alycia;Decreased step height;Decreased head and trunk rotation   Distance 110'   Ambulation 2   Surface - 2 level tile   Device 2 Rolling Walker   Other Apparatus 2 Wheelchair follow   Assistance 2 Minimal assistance;Contact guard assistance   Quality of Gait 2 as above   Gait Deviations Slow Alycia;Decreased step height;Decreased head and trunk rotation   Distance 150'

## 2025-06-16 NOTE — PROGRESS NOTES
Nutrition Assessment     Type and Reason for Visit: Reassess    Nutrition Recommendations/Plan:   Continue current TF as sole source of nutrition  PO intake as tolerated for pleasure     Malnutrition Assessment:  Malnutrition Status: At risk for malnutrition    Nutrition Assessment:  Pt continues with poor po intake, only a few bites at times despite working w/SLP for dysphagia. PO intake regarded as pleasure only. TF is sole source of nutrition and expected to be needed for >90 days based on current state. Pt noted to have hx of weight loss and weight is stabilizing. No problems noted with tolerating current tube feeding.    Estimated Daily Nutrient Needs:  Energy (kcal):  2265-9332 kcals (25-30 kcals) Weight Used for Energy Requirements: Current     Protein (g):  80-160g Weight Used for Protein Requirements: Current        Fluid (ml/day):  2003-2403 ml Method Used for Fluid Requirements: 1 ml/kcal    Nutrition Related Findings:   Glu 122-200, BM 6-15 Wound Type: Surgical Incision    Current Nutrition Therapies:    ADULT TUBE FEEDING; PEG; Diabetic; Cyclic; 57; 12:00 PM; 6:00 AM; 42; Q 1 hour  ADULT DIET; Dysphagia - Minced and Moist; Moderately Thick (Honey)    Anthropometric Measures:  Height: 180.3 cm (5' 10.98\")  Current Body Wt: 82 kg (180 lb 12.4 oz)   BMI: 25.2      Nutrition Diagnosis:   Moderate malnutrition related to inadequate protein-energy intake as evidenced by criteria as identified in malnutrition assessment    Nutrition Interventions:   Food and/or Nutrient Delivery: Continue Current Diet, Continue Current Tube Feeding  Nutrition Education/Counseling: No recommendation at this time  Coordination of Nutrition Care: Continue to monitor while inpatient  Plan of Care discussed with: Pt/SLP    Goals:  Goals: Meet at least 75% of estimated needs, Tolerate nutrition support at goal rate  Type of Goal: Continue current goal  Previous Goal Met: Progressing toward Goal(s)    Nutrition Monitoring and

## 2025-06-17 LAB
GLUCOSE BLD-MCNC: 123 MG/DL (ref 70–99)
GLUCOSE BLD-MCNC: 167 MG/DL (ref 70–99)
GLUCOSE BLD-MCNC: 186 MG/DL (ref 70–99)
GLUCOSE BLD-MCNC: 199 MG/DL (ref 70–99)
PERFORMED ON: ABNORMAL

## 2025-06-17 PROCEDURE — 97116 GAIT TRAINING THERAPY: CPT

## 2025-06-17 PROCEDURE — 94760 N-INVAS EAR/PLS OXIMETRY 1: CPT

## 2025-06-17 PROCEDURE — 6370000000 HC RX 637 (ALT 250 FOR IP): Performed by: PSYCHIATRY & NEUROLOGY

## 2025-06-17 PROCEDURE — 97130 THER IVNTJ EA ADDL 15 MIN: CPT

## 2025-06-17 PROCEDURE — 6370000000 HC RX 637 (ALT 250 FOR IP): Performed by: INTERNAL MEDICINE

## 2025-06-17 PROCEDURE — 92526 ORAL FUNCTION THERAPY: CPT

## 2025-06-17 PROCEDURE — 82962 GLUCOSE BLOOD TEST: CPT

## 2025-06-17 PROCEDURE — 97129 THER IVNTJ 1ST 15 MIN: CPT

## 2025-06-17 PROCEDURE — 97110 THERAPEUTIC EXERCISES: CPT

## 2025-06-17 PROCEDURE — 99233 SBSQ HOSP IP/OBS HIGH 50: CPT | Performed by: PSYCHIATRY & NEUROLOGY

## 2025-06-17 PROCEDURE — 94150 VITAL CAPACITY TEST: CPT

## 2025-06-17 PROCEDURE — 6360000002 HC RX W HCPCS: Performed by: PSYCHIATRY & NEUROLOGY

## 2025-06-17 PROCEDURE — 97530 THERAPEUTIC ACTIVITIES: CPT

## 2025-06-17 PROCEDURE — 2500000003 HC RX 250 WO HCPCS: Performed by: PSYCHIATRY & NEUROLOGY

## 2025-06-17 PROCEDURE — 1180000000 HC REHAB R&B

## 2025-06-17 PROCEDURE — 97535 SELF CARE MNGMENT TRAINING: CPT

## 2025-06-17 PROCEDURE — 94640 AIRWAY INHALATION TREATMENT: CPT

## 2025-06-17 RX ADMIN — IPRATROPIUM BROMIDE AND ALBUTEROL SULFATE 1 DOSE: 2.5; .5 SOLUTION RESPIRATORY (INHALATION) at 07:23

## 2025-06-17 RX ADMIN — INSULIN GLARGINE 15 UNITS: 100 INJECTION, SOLUTION SUBCUTANEOUS at 20:29

## 2025-06-17 RX ADMIN — ACETAMINOPHEN 650 MG: 325 TABLET ORAL at 20:31

## 2025-06-17 RX ADMIN — IPRATROPIUM BROMIDE AND ALBUTEROL SULFATE 1 DOSE: 2.5; .5 SOLUTION RESPIRATORY (INHALATION) at 13:58

## 2025-06-17 RX ADMIN — LANSOPRAZOLE 30 MG: 30 TABLET, ORALLY DISINTEGRATING, DELAYED RELEASE ORAL at 20:28

## 2025-06-17 RX ADMIN — AZITHROMYCIN DIHYDRATE 250 MG: 250 TABLET, FILM COATED ORAL at 08:36

## 2025-06-17 RX ADMIN — METOPROLOL TARTRATE 50 MG: 50 TABLET, FILM COATED ORAL at 08:37

## 2025-06-17 RX ADMIN — METOPROLOL TARTRATE 50 MG: 50 TABLET, FILM COATED ORAL at 20:29

## 2025-06-17 RX ADMIN — GEMFIBROZIL 600 MG: 600 TABLET ORAL at 20:29

## 2025-06-17 RX ADMIN — Medication 5 MG: at 20:29

## 2025-06-17 RX ADMIN — CETIRIZINE HYDROCHLORIDE 10 MG: 10 TABLET ORAL at 08:36

## 2025-06-17 RX ADMIN — CLOPIDOGREL BISULFATE 75 MG: 75 TABLET, FILM COATED ORAL at 08:37

## 2025-06-17 RX ADMIN — GEMFIBROZIL 600 MG: 600 TABLET ORAL at 08:37

## 2025-06-17 RX ADMIN — CHOLESTYRAMINE 4 G: 4 POWDER, FOR SUSPENSION ORAL at 08:36

## 2025-06-17 RX ADMIN — SODIUM CHLORIDE, PRESERVATIVE FREE 10 ML: 5 INJECTION INTRAVENOUS at 08:49

## 2025-06-17 RX ADMIN — SODIUM CHLORIDE, PRESERVATIVE FREE 10 ML: 5 INJECTION INTRAVENOUS at 20:34

## 2025-06-17 RX ADMIN — LANSOPRAZOLE 30 MG: 30 TABLET, ORALLY DISINTEGRATING, DELAYED RELEASE ORAL at 08:37

## 2025-06-17 RX ADMIN — IPRATROPIUM BROMIDE AND ALBUTEROL SULFATE 1 DOSE: 2.5; .5 SOLUTION RESPIRATORY (INHALATION) at 11:12

## 2025-06-17 RX ADMIN — ASPIRIN 81 MG: 81 TABLET, CHEWABLE ORAL at 08:37

## 2025-06-17 RX ADMIN — ENOXAPARIN SODIUM 40 MG: 100 INJECTION SUBCUTANEOUS at 20:28

## 2025-06-17 RX ADMIN — ATORVASTATIN CALCIUM 20 MG: 20 TABLET, FILM COATED ORAL at 20:29

## 2025-06-17 RX ADMIN — INSULIN LISPRO 1 UNITS: 100 INJECTION, SOLUTION INTRAVENOUS; SUBCUTANEOUS at 20:38

## 2025-06-17 RX ADMIN — IPRATROPIUM BROMIDE AND ALBUTEROL SULFATE 1 DOSE: 2.5; .5 SOLUTION RESPIRATORY (INHALATION) at 19:42

## 2025-06-17 RX ADMIN — INSULIN GLARGINE 15 UNITS: 100 INJECTION, SOLUTION SUBCUTANEOUS at 08:37

## 2025-06-17 ASSESSMENT — PAIN SCALES - GENERAL: PAINLEVEL_OUTOF10: 0

## 2025-06-17 NOTE — PROGRESS NOTES
Nutrition Assessment     Type and Reason for Visit: Reassess    Nutrition Recommendations/Plan:   Increase Glucerna 1.5 to 67 mLs/hr x 18 hours daily     Malnutrition Assessment:  Malnutrition Status: At risk for malnutrition    Nutrition Assessment:  Pt noted to have continued weight decline since decreasing tube feeding to help po intake. Due to weight loss and continued inadequate po intake, will increase Glucerna 1.5 to 67 mLs x 18 hours daily. This will provide 1809 kcals, 99 gms pro, 1671 mLs free H2O and 1962 mLs total volume. Pt has been tolerating current TF well with no residuals.    Estimated Daily Nutrient Needs:  Energy (kcal):  2003-2403 kcals (25-30 kcals) Weight Used for Energy Requirements: Current     Protein (g):  80-160g Weight Used for Protein Requirements: Current        Fluid (ml/day):  2003-2403 ml Method Used for Fluid Requirements: 1 ml/kcal    Nutrition Related Findings:   Glu 122-200, BM 6-15 Wound Type: Surgical Incision    Current Nutrition Therapies:    ADULT TUBE FEEDING; PEG; Diabetic; Cyclic; 57; 12:00 PM; 6:00 AM; 42; Q 1 hour  ADULT DIET; Dysphagia - Minced and Moist; Moderately Thick (Honey)    Anthropometric Measures:  Height: 180.3 cm (5' 10.98\")  Current Body Wt: 80.4 kg (177 lb 4 oz)   BMI: 24.7      Nutrition Diagnosis:   Moderate malnutrition related to inadequate protein-energy intake as evidenced by criteria as identified in malnutrition assessment    Nutrition Interventions:   Food and/or Nutrient Delivery: Continue Current Diet, Modify Tube Feeding  Nutrition Education/Counseling: No recommendation at this time  Coordination of Nutrition Care: Continue to monitor while inpatient  Plan of Care discussed with: Pt/SLP    Goals:  Goals: Meet at least 75% of estimated needs, Tolerate nutrition support at goal rate  Type of Goal: Continue current goal  Previous Goal Met: Progressing toward Goal(s)    Nutrition Monitoring and Evaluation:   Behavioral-Environmental Outcomes:

## 2025-06-17 NOTE — PROGRESS NOTES
[facility]  INPATIENT SPEECH THERAPY  [facility] 8 REHAB UNIT      TIME  0900  1000  60 MINUTES    [x]Daily Note  []Progress Note    Date: 2025  Patient Name: Akhil Alejo        MRN: [id number]    Account #: [id number]  : 1955  (69 y.o.)  Gender: male   Primary Provider: Leo Vaughn MD  Swallowing Status/Diet:  Liquid Consistency Recommendation: Moderately Thick (Honey)  Diet Solids Recommendation: Minced & Moist    Subjective: Patient alert and upright in bed. No family present during treatment session.     Objective:     Reasoning task completed. Patient given a category and required to list 3 items in each. Patient able to complete task with 30% accuracy independently. Given maximum visual, semantic, and phonemic cues, patient able to increase accuracy to 70%.     Word finding task completed. Patient given a description and required to name the item being described. Patient able to complete task with 58% accuracy independently. Given maximum semantic and phonemic cues, patient able to increase accuracy to 100%.    Orientation task completed. Patient 40% oriented this AM. Patient able to recall name, age, location, and address. Patient did not recall birthday, phone number, TERRENCE, date, month, or year.     Pharyngeal strengthening exercises completed. Patient completed 5 sets of 2 reps for a total of 10 of the Effortful Swallow to improve hyolaryngeal elevation, tongue base retraction, and vocal fold closure. Patient unable to complete the Radha Manuevor to improve hyolaryngeal elevation and clear vallecular residue this date. Maximum verbal cues and modeling provided throughout pharyngeal strengthening exercises.     SLP will continue to follow and treat.    Diet Solids Recommendation:  Diet Solids Recommendation: Minced & Moist  Diet Liquid Recommendation:  Liquid Consistency Recommendation: Moderately Thick (Honey)  Compensatory Swallowing Strategies:  Compensatory Swallowing Strategies : Alternate

## 2025-06-17 NOTE — PROGRESS NOTES
Home tube feedings are approved. Citlaly with [facility] plans to call patient's wife today to schedule teaching session and tube feedings will be delivered prior to dc on Friday, 6/20.     Electronically signed by JARROD Rivas on 6/17/2025 at 9:29 AM

## 2025-06-17 NOTE — PLAN OF CARE
Problem: Chronic Conditions and Co-morbidities  Goal: Patient's chronic conditions and co-morbidity symptoms are monitored and maintained or improved  6/16/2025 2159 by Sharon Gillis LPN  Outcome: Progressing  Flowsheets (Taken 6/16/2025 2152)  Care Plan - Patient's Chronic Conditions and Co-Morbidity Symptoms are Monitored and Maintained or Improved: Monitor and assess patient's chronic conditions and comorbid symptoms for stability, deterioration, or improvement  6/16/2025 1021 by Korina Salinas LPN  Outcome: Progressing  Flowsheets (Taken 6/16/2025 0922)  Care Plan - Patient's Chronic Conditions and Co-Morbidity Symptoms are Monitored and Maintained or Improved: Monitor and assess patient's chronic conditions and comorbid symptoms for stability, deterioration, or improvement     Problem: Discharge Planning  Goal: Discharge to home or other facility with appropriate resources  6/16/2025 2159 by Sharon Gillis LPN  Outcome: Progressing  Flowsheets (Taken 6/16/2025 2152)  Discharge to home or other facility with appropriate resources: Refer to discharge planning if patient needs post-hospital services based on physician order or complex needs related to functional status, cognitive ability or social support system  6/16/2025 1021 by Korina Salinas LPN  Outcome: Progressing  Flowsheets (Taken 6/16/2025 0922)  Discharge to home or other facility with appropriate resources: Refer to discharge planning if patient needs post-hospital services based on physician order or complex needs related to functional status, cognitive ability or social support system     Problem: Skin/Tissue Integrity  Goal: Skin integrity remains intact  Description: 1.  Monitor for areas of redness and/or skin breakdown2.  Assess vascular access sites hourly3.  Every 4-6 hours minimum:  Change oxygen saturation probe site4.  Every 4-6 hours:  If on nasal continuous positive airway pressure, respiratory therapy assess nares and

## 2025-06-17 NOTE — PROGRESS NOTES
Patient:   Akhil Alejo  MR#:    [id number]   Room:    0811/811-02   YOB: 1955  Date of Progress Note: 6/17/2025  Time of Note                           8:03 AM  Consulting Physician:   Leo Vaughn M.D.  Attending Physician:  Leo Vaughn MD     Chief complaint Acute ischemic stroke     S:This 69 y.o. male  with history of depression, DM, HTN, neuropathy and RLS. Patient was seen by Dr. Kitchen as outpatient for abnormal ECG and shortness of breath. He underwent a stress test on 4/25/25 which anterior wall ischemia, EF 44%, ECG abnormal as before. He was sent to [facility] for further evaluation. He was seen by cardiologist Dr. Cortes and underwent a heart catheterization which revealed multivessel CAD. Cardiothoracic surgery was consulted. He was seen by Dr. Lara, who recommended CABG x 3 to the LAD, D1 and OM1. Patient was in agreement. The patient had been on Plavix and aspirin. Therefore Plavix was placed on hold. P2Y12 test done on 4/28/25 was 156 , which was felt sufficient recovery of plt function to proceed with CABG on 4/29/25. Patient tolerated the procedure well. Patient had post-op delirium causing some worsening of his dementia. Patient was evaluated by SPT for cognitive and swallow. Patient was found have severe cognitive impairment and oropharyngeal dysphagia. He made NPO. Initially NGT was placed for feedings. Unfortunately, patient continue to have oropharyngeal phase dysphagia and ultimately patient required placement of G-Tube on 5/9/25. Patient had been tolerating tube feedings fairly well. He continued to have cognitive deficits with Avasys camera in place. Patient remained weak overall and was felt to need a stay on Rehab. He was admitted to Rehab on 5/13/25. Patient was participating in PT/OT/SPT. On 5/21/25 nursing reported hematemesis and blood from G-Tube overnight. Hospitalist was consulted.  Pt was found to be tachypneic and pale, Pulse ox was 95 .Bp 103/59 hr 94.  Pt  moist diet with moderately thick/honey thick liquids without s/s of penetration/aspiration.   Goal 2: Patient staff will follow swallow safety recommendations.  Goal 3: Patient will receive daily oral care to decrease bacteria from the oral cavity.  Goal 4: Re-assessment of swallow function for potential PO intake.  Goal 5: Patient will complete breath support, oral motor, lingual, and pharyngeal exercises with provision of mod cues/prompts.     Long term goals met: 0  Short term goals met: 0     OCCUPATIONAL THERAPY  Cognitive Patterns:  Cognitive Assessment Method (CAM):  Confusion Assessment Method (CAM)  Is there evidence of an acute change in mental status from the patient's baseline?: Yes  Inattention: Behavior continuously present, does not fluctuate  Disorganized thinking: Behavior continuously present, does not fluctuate  Altered level of consciousness: Behavior not present  Health Literacy:  How often do you need to have someone help you when you read instructions, pamphlets, or other written material from your doctor or pharmacy?: Patient unable to respond           CURRENT IRF-MONICA SCORES  Eating: CARE Score: 3  Comment: supervision d/t swallowing precautions and assist when fatigued     Oral Hygiene: CARE Score: 4  Comment: supervision d/t swallowing precautions and cognition      Toileting: CARE Score: 3         Shower/Bathe: CARE Score: 3  Comment: increased initiation but requires assist for thoroughness        Upper Body Dressing: CARE Score: 4  Comment: CGA for verbal cues where to pull to fully don       Lower Body Dressing: CARE Score: 4  Comment: CGA for cues, did better with pants vs shorts for maintaining LEs in correct leg hole        Footwear: CARE Score: 3  Comment: assist over toes and then pt pulled up with max encouragement        Toilet Transfers: CARE Score: 4  Comment: CGA        Picking Up Object:  CARE Score: 1           UE Functioning:  BUE AROM WFL      Pain Assessment:   0/10 pain

## 2025-06-17 NOTE — PLAN OF CARE
Problem: Chronic Conditions and Co-morbidities  Goal: Patient's chronic conditions and co-morbidity symptoms are monitored and maintained or improved  6/17/2025 1028 by Korina Salinas LPN  Outcome: Progressing  Flowsheets (Taken 6/17/2025 0853)  Care Plan - Patient's Chronic Conditions and Co-Morbidity Symptoms are Monitored and Maintained or Improved: Monitor and assess patient's chronic conditions and comorbid symptoms for stability, deterioration, or improvement  6/16/2025 2159 by Sharon Gillis LPN  Outcome: Progressing  Flowsheets (Taken 6/16/2025 2152)  Care Plan - Patient's Chronic Conditions and Co-Morbidity Symptoms are Monitored and Maintained or Improved: Monitor and assess patient's chronic conditions and comorbid symptoms for stability, deterioration, or improvement     Problem: Discharge Planning  Goal: Discharge to home or other facility with appropriate resources  6/17/2025 1028 by Korina Salinas LPN  Outcome: Progressing  Flowsheets (Taken 6/17/2025 0853)  Discharge to home or other facility with appropriate resources: Refer to discharge planning if patient needs post-hospital services based on physician order or complex needs related to functional status, cognitive ability or social support system  6/16/2025 2159 by Sharon Gillis LPN  Outcome: Progressing  Flowsheets (Taken 6/16/2025 2152)  Discharge to home or other facility with appropriate resources: Refer to discharge planning if patient needs post-hospital services based on physician order or complex needs related to functional status, cognitive ability or social support system     Problem: Skin/Tissue Integrity  Goal: Skin integrity remains intact  Description: 1.  Monitor for areas of redness and/or skin breakdown2.  Assess vascular access sites hourly3.  Every 4-6 hours minimum:  Change oxygen saturation probe site4.  Every 4-6 hours:  If on nasal continuous positive airway pressure, respiratory therapy assess nares and

## 2025-06-17 NOTE — PROGRESS NOTES
Occupational Therapy  Facility/Department: [facility] 8 REHAB UNIT  Rehabilitation Occupational Therapy Daily Treatment Note    Date: 25  Patient Name: Akhil Alejo       Room: 0811/811-02  MRN: [id number]  Account: [id number]   : 1955  (69 y.o.) Gender: male     Referring Practitioner: (P) Dr. Lara  Diagnosis: (P) Acute ischemic stroke  Additional Pertinent Hx: (P) CABG on     Treatment Diagnosis: (P) Acute ischemic stroke   Past Medical History:  has a past medical history of Arm fracture, Dementia (HCC), Depression, Diabetes mellitus (HCC), Hypertension, Kidney stones, Neuropathy, Palliative care patient, and Restless leg syndrome.  Past Surgical History:   has a past surgical history that includes Cholecystectomy; Lithotripsy; shoulder surgery (Left); bone marrow biopsy (Right, 2020); Colonoscopy (2020); Upper gastrointestinal endoscopy (2017); Cardiac procedure (N/A, 2025); Coronary artery bypass graft (N/A, 2025); Gastrostomy tube placement (2025); Upper gastrointestinal endoscopy (N/A, 2025); Upper gastrointestinal endoscopy (N/A, 2025); and Upper gastrointestinal endoscopy (2025).     25 1005   Restrictions/Precautions   Activity Level Up with Assist   Position Activity Restriction   Other Position/Activity Restrictions PEG TUBE, HOB 30 DEG FOR FEED   General   Additional Pertinent Hx CABG on    Referring Practitioner Dr. Lara   Diagnosis Acute ischemic stroke   ADL   Grooming Supervision;Setup   UE Bathing Stand by assistance;Verbal cueing   LE Bathing Minimal assistance   UE Dressing Minimal assistance   LE Dressing Minimal assistance   Putting On/Taking Off Footwear Minimal assistance   Additional Comments fluctuates day to day   Balance   Sitting Balance Supervision   Standing Balance Stand by assistance   Standing Balance   Activity ADLs   Functional Mobility   Functional - Mobility Device Rolling Walker   Assist Level Contact

## 2025-06-17 NOTE — PROGRESS NOTES
Physical Therapy  Name: Akhil Alejo  MRN:  [id number]  Date of service:  6/17/2025 06/17/25 1000   Restrictions/Precautions   Restrictions/Precautions Weight Bearing;Aspiration Risk;Fall Risk;NPO;Surgical Protocols   Activity Level Up with Assist   Lower Extremity Weight Bearing Restrictions   Right Lower Extremity Weight Bearing Weight Bearing As Tolerated   Left Lower Extremity Weight Bearing Weight Bearing As Tolerated   Position Activity Restriction   Other Position/Activity Restrictions PEG TUBE, HOB 30 DEG FOR FEED   General   Additional Pertinent Hx DEPRESSION, DM, HTN   Diagnosis CABGX3 4/29; NEW ONSET BIHEMISPHERIC DEEP WHITE MATTER CVA WITH RIGHT WEAKNESS   General   General Comments Co-Tx w/ OT.   Subjective   Subjective Pt awake but in bed.   Bed mobility   Supine to Sit Modified independent   Sit to Supine Modified independent   Bed Mobility Comments OOB TO R AND BTB TO L   Transfers   Sit to Stand Contact guard assistance   Stand to Sit Contact guard assistance   Bed to Chair Minimal assistance;Contact guard assistance   Comment SAFETY CONCERNS AND NEED FOR CUES; INCONSISTENCIES   Ambulation   Surface Level tile   Device Rolling Walker   Other Apparatus Wheelchair follow   Assistance Minimal assistance;Contact guard assistance   Quality of Gait reciprocal pattern with R duarte lurch and dec clearance with R foot advancement (uncompensated trendelenberg), poor proximity to walker and downward gaze   Gait Deviations Slow Alycia;Decreased step height;Decreased head and trunk rotation   Distance 110'   Ambulation 2   Surface - 2 level tile   Device 2 Rolling Walker   Other Apparatus 2 Wheelchair follow   Assistance 2 Minimal assistance;Contact guard assistance   Quality of Gait 2 as above   Gait Deviations Slow Alycia;Decreased step height;Decreased head and trunk rotation   Distance 200'   Comments .   Ambulation 3   Surface - 3 level tile   Device 3 Rolling Walker   Other Apparatus 3 Wheelchair  follow   Assistance 3 Minimal assistance;Contact guard assistance   Quality of Gait 3 as above   Gait Deviations Slow Alycia;Decreased step height;Decreased head and trunk rotation   Distance 175'   PT Exercises   Exercise Treatment bp checked sitting due to questionable low bp: 108/71 and HR 69 (checked standing below)   Static Standing Balance Exercises standing at sink and eob for clothing managment by therapist cg@ [facility] B UE support  (2nd standing bout checked BP at 95/66 with HR 73)   Assessment   Assessment Pt with more consistent acceptance/application of cues for technique throughout session. Increased amb distance though proximity to AD less than optimal and pt seems to present with L hip abd weakness, allowing R hip to drop during swing phase and interfering with R LE advancement. Less inquiries made to return to bed or rest. Initially concerned that BP too low and did find to have lower standing pressure but not significantly.   Safety Devices   Type of Devices Bed alarm in place;Call light within reach;Left in bed;Telesitter in use   PT Co-Treatment Minutes   Time In 1000   Time Out 1100   Minutes 60         Electronically signed by Chana Chavez PTA on 6/17/2025 at 2:43 PM

## 2025-06-18 LAB
GLUCOSE BLD-MCNC: 111 MG/DL (ref 70–99)
GLUCOSE BLD-MCNC: 156 MG/DL (ref 70–99)
GLUCOSE BLD-MCNC: 162 MG/DL (ref 70–99)
GLUCOSE BLD-MCNC: 189 MG/DL (ref 70–99)
PERFORMED ON: ABNORMAL

## 2025-06-18 PROCEDURE — 6370000000 HC RX 637 (ALT 250 FOR IP): Performed by: PSYCHIATRY & NEUROLOGY

## 2025-06-18 PROCEDURE — 6370000000 HC RX 637 (ALT 250 FOR IP): Performed by: INTERNAL MEDICINE

## 2025-06-18 PROCEDURE — 2500000003 HC RX 250 WO HCPCS: Performed by: PSYCHIATRY & NEUROLOGY

## 2025-06-18 PROCEDURE — 97116 GAIT TRAINING THERAPY: CPT

## 2025-06-18 PROCEDURE — 94760 N-INVAS EAR/PLS OXIMETRY 1: CPT

## 2025-06-18 PROCEDURE — 97530 THERAPEUTIC ACTIVITIES: CPT

## 2025-06-18 PROCEDURE — 1180000000 HC REHAB R&B

## 2025-06-18 PROCEDURE — 94150 VITAL CAPACITY TEST: CPT

## 2025-06-18 PROCEDURE — 82962 GLUCOSE BLOOD TEST: CPT

## 2025-06-18 PROCEDURE — 97110 THERAPEUTIC EXERCISES: CPT

## 2025-06-18 PROCEDURE — 94640 AIRWAY INHALATION TREATMENT: CPT

## 2025-06-18 PROCEDURE — 6360000002 HC RX W HCPCS: Performed by: PSYCHIATRY & NEUROLOGY

## 2025-06-18 PROCEDURE — 99232 SBSQ HOSP IP/OBS MODERATE 35: CPT | Performed by: PSYCHIATRY & NEUROLOGY

## 2025-06-18 PROCEDURE — 92526 ORAL FUNCTION THERAPY: CPT

## 2025-06-18 RX ADMIN — LANSOPRAZOLE 30 MG: 30 TABLET, ORALLY DISINTEGRATING, DELAYED RELEASE ORAL at 09:48

## 2025-06-18 RX ADMIN — CLOPIDOGREL BISULFATE 75 MG: 75 TABLET, FILM COATED ORAL at 09:48

## 2025-06-18 RX ADMIN — SODIUM CHLORIDE, PRESERVATIVE FREE 10 ML: 5 INJECTION INTRAVENOUS at 20:50

## 2025-06-18 RX ADMIN — INSULIN LISPRO 10 UNITS: 100 INJECTION, SOLUTION INTRAVENOUS; SUBCUTANEOUS at 17:32

## 2025-06-18 RX ADMIN — GEMFIBROZIL 600 MG: 600 TABLET ORAL at 09:48

## 2025-06-18 RX ADMIN — ASPIRIN 81 MG: 81 TABLET, CHEWABLE ORAL at 09:48

## 2025-06-18 RX ADMIN — LEVOTHYROXINE SODIUM 50 MCG: 0.05 TABLET ORAL at 05:57

## 2025-06-18 RX ADMIN — IPRATROPIUM BROMIDE AND ALBUTEROL SULFATE 1 DOSE: 2.5; .5 SOLUTION RESPIRATORY (INHALATION) at 10:55

## 2025-06-18 RX ADMIN — SODIUM CHLORIDE, PRESERVATIVE FREE 10 ML: 5 INJECTION INTRAVENOUS at 09:49

## 2025-06-18 RX ADMIN — GEMFIBROZIL 600 MG: 600 TABLET ORAL at 20:49

## 2025-06-18 RX ADMIN — ENOXAPARIN SODIUM 40 MG: 100 INJECTION SUBCUTANEOUS at 20:49

## 2025-06-18 RX ADMIN — IPRATROPIUM BROMIDE AND ALBUTEROL SULFATE 1 DOSE: 2.5; .5 SOLUTION RESPIRATORY (INHALATION) at 14:05

## 2025-06-18 RX ADMIN — INSULIN GLARGINE 15 UNITS: 100 INJECTION, SOLUTION SUBCUTANEOUS at 09:48

## 2025-06-18 RX ADMIN — CHOLESTYRAMINE 4 G: 4 POWDER, FOR SUSPENSION ORAL at 09:48

## 2025-06-18 RX ADMIN — INSULIN LISPRO 1 UNITS: 100 INJECTION, SOLUTION INTRAVENOUS; SUBCUTANEOUS at 17:33

## 2025-06-18 RX ADMIN — METOPROLOL TARTRATE 50 MG: 50 TABLET, FILM COATED ORAL at 09:48

## 2025-06-18 RX ADMIN — CETIRIZINE HYDROCHLORIDE 10 MG: 10 TABLET ORAL at 09:48

## 2025-06-18 RX ADMIN — IPRATROPIUM BROMIDE AND ALBUTEROL SULFATE 1 DOSE: 2.5; .5 SOLUTION RESPIRATORY (INHALATION) at 18:46

## 2025-06-18 RX ADMIN — Medication 5 MG: at 20:49

## 2025-06-18 RX ADMIN — IPRATROPIUM BROMIDE AND ALBUTEROL SULFATE 1 DOSE: 2.5; .5 SOLUTION RESPIRATORY (INHALATION) at 06:15

## 2025-06-18 RX ADMIN — ATORVASTATIN CALCIUM 20 MG: 20 TABLET, FILM COATED ORAL at 20:49

## 2025-06-18 RX ADMIN — METOPROLOL TARTRATE 50 MG: 50 TABLET, FILM COATED ORAL at 20:49

## 2025-06-18 RX ADMIN — INSULIN GLARGINE 15 UNITS: 100 INJECTION, SOLUTION SUBCUTANEOUS at 20:48

## 2025-06-18 RX ADMIN — LANSOPRAZOLE 30 MG: 30 TABLET, ORALLY DISINTEGRATING, DELAYED RELEASE ORAL at 20:49

## 2025-06-18 NOTE — PROGRESS NOTES
06/18/25 1100   Bed mobility   Rolling to Left Stand by assistance   Rolling to Right Stand by assistance   Supine to Sit Stand by assistance   Sit to Supine Stand by assistance   Bed Mobility Comments BED MOBILITY LOG ROLL TO/FROM LEFT WITH RAIL   Transfers   Sit to Stand Contact guard assistance;Stand by assistance  (VCS FOR HAND PLACEMENT)   Stand to Sit Contact guard assistance;Stand by assistance  (VCS TO REACH BACK FOR EAST)   Bed to Chair Minimal assistance;Contact guard assistance  (VCS TO STAY WITHIN RW DURING TURNS)   Car Transfer Minimal Assistance;Contact guard assistance  (PERFORMED WITH AMBULATING UP TO CAR AS WELL AS STAND PIVOT WITHOUT RW)   Ambulation   Device Rolling Walker   Assistance Minimal assistance;Contact guard assistance   Quality of Gait FFP. DOWNWARD GAZE. RECIPROCAL PATTERN.  RW EXCESSIVLEY ANTERIOR TO SELF.  MAINTAINED KNEE FLEXION THROUGHOUT EXHIBITING DECREASED RIGHT KNEE STABILITY.   Distance 150 FT; 50 FT; 25 FT   Comments FIRST WALK WITH THERAPIST.  2ND WITH SPOUSE.  3RD WITH SPOUSE WITH TURN TO SIT   Stairs/Curb   Stairs? Yes   Stairs   # Steps  4   Rails Right ascending   Assistance Minimal assistance;Contact guard assistance   Comment PERFORMED SIDESTEPPING.  DESPITE CUEING, DID NOT STEP DEEPLY ENOUGH ONTO STEP TO ALLOW ROOM FOR OTHER FOOT.  CUES FOR SPEED  (DEMONSTRATED TO SPOUSE; SPOUSE SAID SHE HAD TWO PEOPLE TO HELP PERFORM THIS. PLACEMENT OF RAMP PENDING.)   Propulsion 1   Method RUE;LUE;RLE;LLE   Level of Assistance Minimal assistance;Contact guard assistance   Distance 150FT   Assessment   Assessment COTX WITH OT.  FTD WITH SPOUSE.  HANDS ON PERFORMANCE BY SPOUSE WITH EVERYTHING EXCEPT FOR STEPS.  PRACTICED TUB BENCH TRANSFER WITH OT. ADVISED WIFE TO PLACE ONE HAND ON BELT, OTHER ON RW TO ASSIST WITH KEEPING IT CLOSE TO PATIENT DURING WALKING AND TURN TO SIT.  ADVISED NOT TO LEAVE PATIENT ALONE AT ANY POINT DUE TO SAFETY ISSUES AND ELEVATED FALL RISK.

## 2025-06-18 NOTE — PLAN OF CARE
Problem: Chronic Conditions and Co-morbidities  Goal: Patient's chronic conditions and co-morbidity symptoms are monitored and maintained or improved  6/18/2025 1126 by Paola Womack RN  Outcome: Progressing  6/17/2025 2317 by Sharon Gillis LPN  Outcome: Progressing     Problem: Discharge Planning  Goal: Discharge to home or other facility with appropriate resources  6/18/2025 1126 by Paola Womack, RN  Outcome: Progressing  6/17/2025 2317 by Sharon Gillis LPN  Outcome: Progressing     Problem: Skin/Tissue Integrity  Goal: Skin integrity remains intact  Description: 1.  Monitor for areas of redness and/or skin breakdown2.  Assess vascular access sites hourly3.  Every 4-6 hours minimum:  Change oxygen saturation probe site4.  Every 4-6 hours:  If on nasal continuous positive airway pressure, respiratory therapy assess nares and determine need for appliance change or resting period  6/18/2025 1126 by Paola Womack RN  Outcome: Progressing  Flowsheets (Taken 6/18/2025 1124)  Skin Integrity Remains Intact: Monitor for areas of redness and/or skin breakdown  6/17/2025 2317 by Sharon Gillis LPN  Outcome: Progressing     Problem: Safety - Adult  Goal: Free from fall injury  6/18/2025 1126 by Paola Womack RN  Outcome: Progressing  6/17/2025 2317 by Sharon Gillis LPN  Outcome: Progressing     Problem: ABCDS Injury Assessment  Goal: Absence of physical injury  6/18/2025 1126 by Paola Womack, RN  Outcome: Progressing  6/17/2025 2317 by Sharon Gillis LPN  Outcome: Progressing     Problem: Nutrition Deficit:  Goal: Optimize nutritional status  6/18/2025 1126 by Paola Womack, RN  Outcome: Progressing  6/17/2025 2317 by Sharon Gillis LPN  Outcome: Progressing     Problem: Neurosensory - Adult  Goal: Achieves stable or improved neurological status  6/18/2025 1126 by Paola Womack, RN  Outcome: Progressing  6/17/2025 2317 by Sharon Gillis LPN  Outcome: Progressing  Flowsheets (Taken 6/17/2025

## 2025-06-18 NOTE — PROGRESS NOTES
Durable Medical Equipment   Physician Order     Patient Name Akhil Alejo                Phone:  please call spouse's cell  phone  Meaghan Alejo Spouse  Home [phone] cell [phone]       Patient Address: [address] [place] [id number]     Patient Height Height: 180.3 cm (5' 10.98\")  Patient Weight 83.2 kg (183 lb 6.4 oz)   1955       1. MEDICARE/MEDICARE PART A AND B    F/O Payor/Plan Precert #   MEDICARE/MEDICARE PART A AND B    Subscriber Subscriber Akhil Edwards [id number]   Address [place] [address] [address] [phone]     2. GENERIC COMMERCIAL/GENERIC COMMERCIAL    F/O Payor/Plan Precert #   GENERIC COMMERCIAL/GENERIC COMMERCIAL    Subscriber Subscriber Akhil Edwards [id number]   Address Phone   [address] [phone]  [place] [address] [phone]     DME needed:    [facility]    DIAGNOSIS:   Acute ischemic stroke (HCC) I63.9   [facility]   History and Physical           Patient:                                    Akhil Alejo  MR#:                                        [id number]  Account Number:                   [id number]              Room:                                      [address] [place], [place] [address]-      YOB: 1955  Date of Progress Note:           2025  Time of Note                           10:34 AM  Attending Physician:               Leo Vaughn MD           Admitting diagnosis:Cerebral infarction, unspecified     Secondary diagnoses:Dysphagia, oropharyngeal phase,Gastrostomy status,Gastro-esophageal reflux disease without esophagitis,Type 2 diabetes mellitus with hyperglycemia,Mixed hyperlipidemia,Essential (primary) hypertension,Hypothyroidism, unspecified,Restless legs syndrome,Atherosclerotic heart disease of native coronary artery without angina pectoris,Acute posthemorrhagic anemia,Cognitive communication deficit,Acute respiratory failure with hypoxia    fatigue.  HENT -  No tinnitus or significant hearing loss.  Eyes - no sudden vision change or eye pain  Respiratory - no significant shortness of breath or cough  Cardiovascular - no chest pain No palpitations or significant leg swelling  Gastrointestinal - no abdominal swelling or pain.    Genitourinary - No difficulty urinating, dysuria  Musculoskeletal - no back pain or myalgia.  Skin - no color change or rash  Neurologic - No seizures.  No lateralizing weakness.  Hematologic - no easy bruising or excessive bleeding.  Psychiatric - no severe anxiety or nervousness.   All other review of systems are negative.        Past Medical History:  Past Medical History            Diagnosis Date    Arm fracture 07/2016     left    Dementia (HCC)       per wife he needs be supervised at home    Depression      Diabetes mellitus (HCC)      Hypertension      Kidney stones      Neuropathy      Palliative care patient 05/22/2025    Restless leg syndrome              Past Surgical History:  Past Surgical History             Procedure Laterality Date    BONE MARROW BIOPSY Right 12/09/2020     BONE MARROW ASPIRATION BIOPSY performed by ALISA Rabago at [facility] ASC OR    CARDIAC PROCEDURE N/A 04/25/2025     Left heart cath / coronary angiography performed by Chayo Cortes MD at [facility] CARDIAC CATH LAB    CHOLECYSTECTOMY        COLONOSCOPY   05/27/2020     Dr Patiño   AP    CORONARY ARTERY BYPASS GRAFT N/A 04/29/2025     CORONARY ARTERY BYPASS GRAFT X3, MINIMALLY INVASIVE robotic, pump assisted via femoral access, BILATERAL INTERNAL MAMMARY ARTERIES, ENDOSCOPIC VEIN HARVESTING, TRANSESOPHAGEAL ECHOCARDIOGRAM performed by Akhil Lara MD at [facility] OR    GASTROSTOMY TUBE PLACEMENT   05/09/2025     Dr Taylor-Multiple duodenal ulcers, successful placement of 20 Mongolian PEG tube    LITHOTRIPSY        SHOULDER SURGERY Left       Frozen shoulder surgery    UPPER GASTROINTESTINAL ENDOSCOPY   04/04/2017     Dr Patiño- normal    UPPER

## 2025-06-18 NOTE — PROGRESS NOTES
Durable Medical Equipment   Physician Order      Patient Name Akhil Alejo                Phone:  please call spouse's cell  phone  Meaghan Alejo Spouse  Home [phone] cell [phone]         Patient Address: [address] [place] [id number]      Patient Height Height: 180.3 cm (5' 10.98\")   Patient Weight 83.2 kg (183 lb 6.4 oz)             1955         1. MEDICARE/MEDICARE PART A AND B          F/O Payor/Plan Precert #   MEDICARE/MEDICARE PART A AND B     Subscriber Subscriber Akhil Edwards [id number]   Address [place] [address] [address] [phone]      2. GENERIC COMMERCIAL/GENERIC COMMERCIAL          F/O Payor/Plan Precert #   GENERIC COMMERCIAL/GENERIC COMMERCIAL     Subscriber Subscriber Akhil Edwards [id number]   Address Phone   [address] [phone]  [place] [address] [phone]      DME needed:     Transport wheelchair     DIAGNOSIS:   Acute ischemic stroke (HCC) I63.9   [facility]   History and Physical           Patient:                                    Akhil Alejo  MR#:                                        [id number]  Account Number:                   [id number]              Room:                                      [address] [place] [address]      YOB: 1955  Date of Progress Note:           2025  Time of Note                           10:34 AM  Attending Physician:               Leo Vaughn MD           Admitting diagnosis:Cerebral infarction, unspecified     Secondary diagnoses:Dysphagia, oropharyngeal phase,Gastrostomy status,Gastro-esophageal reflux disease without esophagitis,Type 2 diabetes mellitus with hyperglycemia,Mixed hyperlipidemia,Essential (primary) hypertension,Hypothyroidism, unspecified,Restless legs syndrome,Atherosclerotic heart disease of native coronary artery without angina pectoris,Acute posthemorrhagic anemia,Cognitive communication deficit,Acute respiratory failure with hypoxia     CHIEF

## 2025-06-18 NOTE — PLAN OF CARE
Problem: Chronic Conditions and Co-morbidities  Goal: Patient's chronic conditions and co-morbidity symptoms are monitored and maintained or improved  6/17/2025 2317 by Sharon Gillis LPN  Outcome: Progressing  6/17/2025 1028 by Korina Salinas LPN  Outcome: Progressing  Flowsheets (Taken 6/17/2025 0853)  Care Plan - Patient's Chronic Conditions and Co-Morbidity Symptoms are Monitored and Maintained or Improved: Monitor and assess patient's chronic conditions and comorbid symptoms for stability, deterioration, or improvement     Problem: Discharge Planning  Goal: Discharge to home or other facility with appropriate resources  6/17/2025 2317 by Sharon Gillis LPN  Outcome: Progressing  6/17/2025 1028 by Korina Salinas LPN  Outcome: Progressing  Flowsheets (Taken 6/17/2025 0853)  Discharge to home or other facility with appropriate resources: Refer to discharge planning if patient needs post-hospital services based on physician order or complex needs related to functional status, cognitive ability or social support system     Problem: Skin/Tissue Integrity  Goal: Skin integrity remains intact  Description: 1.  Monitor for areas of redness and/or skin breakdown2.  Assess vascular access sites hourly3.  Every 4-6 hours minimum:  Change oxygen saturation probe site4.  Every 4-6 hours:  If on nasal continuous positive airway pressure, respiratory therapy assess nares and determine need for appliance change or resting period  6/17/2025 2317 by Sharon Gillis LPN  Outcome: Progressing  6/17/2025 1028 by Korina Salinas LPN  Outcome: Progressing  Flowsheets (Taken 6/17/2025 0705)  Skin Integrity Remains Intact: Monitor for areas of redness and/or skin breakdown     Problem: Safety - Adult  Goal: Free from fall injury  6/17/2025 2317 by Sharno Gillis LPN  Outcome: Progressing  6/17/2025 1028 by Korina Salinas LPN  Outcome: Progressing     Problem: ABCDS Injury Assessment  Goal: Absence of physical

## 2025-06-18 NOTE — PROGRESS NOTES
at the PEG tube bolster site, treated successfully w/epinephrine and gold probe, PEG tube secured at 3 cm to the level of the skin       Medications in Hospital:      Current Facility-Administered Medications:     metoprolol tartrate (LOPRESSOR) tablet 50 mg, 50 mg, Oral, BID, Prateek Pennington MD, 50 mg at 06/17/25 2029    sodium chloride flush 0.9 % injection 5-40 mL, 5-40 mL, IntraVENous, BID, Prateek Pennington MD, 10 mL at 06/17/25 2034    glucose chewable tablet 16 g, 4 tablet, Oral, PRN, Leo Vaughn MD    dextrose bolus 10% 125 mL, 125 mL, IntraVENous, PRN **OR** dextrose bolus 10% 250 mL, 250 mL, IntraVENous, PRN, Leo Vaughn MD    glucagon injection 1 mg, 1 mg, SubCUTAneous, PRN, Leo Vaughn MD    dextrose 10 % infusion, , IntraVENous, Continuous PRN, Leo Vaughn MD    insulin glargine (LANTUS) injection vial 15 Units, 15 Units, SubCUTAneous, BID, Leo Vaughn MD, 15 Units at 06/17/25 2029    lansoprazole (PREVACID SOLUTAB) disintegrating tablet 30 mg, 30 mg, Oral, BID, Leo Vaughn MD, 30 mg at 06/17/25 2028    magic butt cream, , Topical, Q4H PRN, Leo Vaughn MD, Given at 06/11/25 0828    HYDROcodone-acetaminophen (NORCO) 5-325 MG per tablet 1 tablet, 1 tablet, Oral, Q6H PRN, Leo Vaughn MD, 1 tablet at 06/12/25 2037    diclofenac sodium (VOLTAREN) 1 % gel 4 g, 4 g, Topical, TID PRN, Leo Vaughn MD, 4 g at 05/30/25 0413    atorvastatin (LIPITOR) tablet 20 mg, 20 mg, Oral, Nightly, Ben Schultz DO, 20 mg at 06/17/25 2029    bisacodyl (DULCOLAX) suppository 10 mg, 10 mg, Rectal, Daily PRN, Schultz, Ben J, DO    insulin lispro (HUMALOG,ADMELOG) injection vial 10 Units, 10 Units, SubCUTAneous, TID WC, Ben Schultz DO, 10 Units at 06/15/25 0921    levothyroxine (SYNTHROID) tablet 50 mcg, 50 mcg, Oral, Daily, Ben Schultz DO, 50 mcg at 06/18/25 0557    Benzocaine-Menthol (CEPACOL) 1 lozenge, 1 lozenge, Oral, Q2H PRN, Ben Schultz DO     noted     Gait  Not tested     Nursing/pcp notes, imaging,labs and vitals reviewed.     PT,OT and/or speech notes reviewed    Lab Results   Component Value Date    WBC 7.5 06/16/2025    HGB 12.5 (L) 06/16/2025    HCT 39.1 (L) 06/16/2025    MCV 91.4 06/16/2025     06/16/2025     Lab Results   Component Value Date     06/16/2025    K 4.4 06/16/2025     06/16/2025    CO2 22 06/16/2025    BUN 17 06/16/2025    CREATININE 0.7 06/16/2025    GLUCOSE 138 (H) 06/16/2025    CALCIUM 9.6 06/16/2025    BILITOT 0.3 06/16/2025    ALKPHOS 192 (H) 06/16/2025    AST 31 06/16/2025    ALT 18 06/16/2025    LABGLOM >90 06/16/2025    GFRAA >59 06/23/2021    AGRATIO 0.9 04/21/2021    GLOB 4.6 05/24/2023     Lab Results   Component Value Date    INR 1.29 (H) 05/22/2025    INR 1.26 (H) 05/01/2025    INR 1.14 04/30/2025    PROTIME 16.0 (H) 05/22/2025    PROTIME 15.7 (H) 05/01/2025    PROTIME 14.5 04/30/2025     PT/OT/SLP:    PHYSICAL THERAPY  GROSS ASSESSMENT       BED MOBILITY  Bed mobility  Rolling to Left: Modified independent  Rolling to Right: Substantial/Maximal assistance (with rail for brief change)  Supine to Sit: Minimal assistance, Partial/Moderate assistance (Due to need for encouragement)  Sit to Supine: Stand by assistance  Scooting: Stand by assistance, Contact guard assistance (On EOB)  Bed Mobility Comments: On R side of bed- use of bedrail       TRANSFERS  Transfers  Sit to Stand: Minimal Assistance, Partial/Moderate assistance, Contact guard assistance (Min/Mod upon first getting EOB then CGA)  Stand to Sit: Minimal Assistance, Partial/Moderate assistance, Contact guard assistance (Min/Mod initially then CGA)  Bed to Chair: Partial/Moderate assistance, Minimal assistance (Mod A initially then Min A)  Stand Pivot Transfers: Partial/Moderate assistance, 2 Person Assistance (EOB<>w/c from pt R/L: 1X w/ RW and 1X w/o AD (v/c's for use of bedrail))  Lateral Transfers:  (.)  Car Transfer: Minimal Assistance,

## 2025-06-18 NOTE — PROGRESS NOTES
Occupational Therapy  Facility/Department: [facility] 8 REHAB UNIT  Rehabilitation Occupational Therapy Daily Treatment Note    Date: 25  Patient Name: Akhil Alejo       Room: 0811/811-02  MRN: [id number]  Account: [id number]   : 1955  (69 y.o.) Gender: male     Referring Practitioner: (P) Dr. Lara  Diagnosis: (P) Acute ischemic stroke  Additional Pertinent Hx: (P) CABG on     Treatment Diagnosis: (P) Acute ischemic stroke   Past Medical History:  has a past medical history of Arm fracture, Dementia (HCC), Depression, Diabetes mellitus (HCC), Hypertension, Kidney stones, Neuropathy, Palliative care patient, and Restless leg syndrome.  Past Surgical History:   has a past surgical history that includes Cholecystectomy; Lithotripsy; shoulder surgery (Left); bone marrow biopsy (Right, 2020); Colonoscopy (2020); Upper gastrointestinal endoscopy (2017); Cardiac procedure (N/A, 2025); Coronary artery bypass graft (N/A, 2025); Gastrostomy tube placement (2025); Upper gastrointestinal endoscopy (N/A, 2025); Upper gastrointestinal endoscopy (N/A, 2025); and Upper gastrointestinal endoscopy (2025).     25 1000   Restrictions/Precautions   Activity Level Up with Assist   Position Activity Restriction   Other Position/Activity Restrictions PEG TUBE, HOB 30 DEG FOR FEED   General   Additional Pertinent Hx CABG on    Referring Practitioner Dr. Lara   Diagnosis Acute ischemic stroke   ADL   Additional Comments reviewed techniques and when to cue verbally vs  physically   Balance   Standing Balance Contact guard assistance   Standing Balance   Activity ADLs   Functional Mobility   Functional - Mobility Device Rolling Walker   Assist Level Contact guard assistance  (cues to stay with walker at times)   Functional Mobility Comments spouse  also completed   Transfers   Sit to stand Stand by assistance   Stand to sit Stand by assistance   Transfer Comments

## 2025-06-18 NOTE — PROGRESS NOTES
[facility]  INPATIENT SPEECH THERAPY  [facility] 8 REHAB UNIT      TIME  1100  1200  60 MINUTES    [x]Daily Note  []Progress Note    Date: 2025  Patient Name: Akhil Alejo        MRN: [id number]    Account #: [id number]  : 1955  (69 y.o.)  Gender: male   Primary Provider: Leo Vaughn MD  Swallowing Status/Diet:  Liquid Consistency Recommendation: Moderately Thick (Honey)  Diet Solids Recommendation: Minced & Moist    Subjective: Patient alert and upright in bed. 1x family member present during session for family therapy day.     Objective:     Swallowing:   Swallow reassessment completed on this date. Pt alert, cooperative, and upright in bed. 1x family present during re-evaluation. Oral prep revealed decreased vertical jaw movement with puree and minced and moist consistency. Oral transit timing of puree consistency, minced and moist, honey thick liquids, and nectar thick liquids was observed to be 1-2 seconds in length. Oral transit timing of thin liquids was observed to be inconsistently fast. Laryngeal elevation was observed to be sluggish and mildly decreased for swallow airway protection. Immediate cough observed with thin liquids. No overt s/s of penetration/aspiration observed with any other consistency presented.      Recommend utilization of PEG for primary source of nutrition and for medications. Okay to continue minced and moist consistency. Recommend trial mildy thick/nectar thick liquids. STAFF FEED. Okay for ice chips IN BETWEEN MEALS for comfort. Amount of PO intake is poor and regarded as pleasure only. Please monitor/notify SLP for the following: changes in lung sounds, spikes in temperature, and/or difficulties swallowing.     Education provided to wife on appropriate foods and thickened liquids. Wife is worried about patients compliance at home. Several menu options discussed that would be appropriate but also desired by patient. Wife reports she is feeling better about patients

## 2025-06-19 LAB
ALBUMIN SERPL-MCNC: 3.6 G/DL (ref 3.5–5.2)
ALP SERPL-CCNC: 187 U/L (ref 40–129)
ALT SERPL-CCNC: 17 U/L (ref 10–50)
ANION GAP SERPL CALCULATED.3IONS-SCNC: 10 MMOL/L (ref 8–16)
AST SERPL-CCNC: 29 U/L (ref 10–50)
BASOPHILS # BLD: 0 K/UL (ref 0–0.2)
BASOPHILS NFR BLD: 0.4 % (ref 0–1)
BILIRUB SERPL-MCNC: 0.3 MG/DL (ref 0.2–1.2)
BUN SERPL-MCNC: 18 MG/DL (ref 8–23)
CALCIUM SERPL-MCNC: 9.6 MG/DL (ref 8.8–10.2)
CHLORIDE SERPL-SCNC: 103 MMOL/L (ref 98–107)
CO2 SERPL-SCNC: 24 MMOL/L (ref 22–29)
CREAT SERPL-MCNC: 0.8 MG/DL (ref 0.7–1.2)
EOSINOPHIL # BLD: 0.6 K/UL (ref 0–0.6)
EOSINOPHIL NFR BLD: 8 % (ref 0–5)
ERYTHROCYTE [DISTWIDTH] IN BLOOD BY AUTOMATED COUNT: 15.9 % (ref 11.5–14.5)
GLUCOSE BLD-MCNC: 151 MG/DL (ref 70–99)
GLUCOSE BLD-MCNC: 160 MG/DL (ref 70–99)
GLUCOSE BLD-MCNC: 165 MG/DL (ref 70–99)
GLUCOSE BLD-MCNC: 199 MG/DL (ref 70–99)
GLUCOSE SERPL-MCNC: 164 MG/DL (ref 70–99)
HCT VFR BLD AUTO: 38.5 % (ref 42–52)
HGB BLD-MCNC: 12.1 G/DL (ref 14–18)
IMM GRANULOCYTES # BLD: 0.1 K/UL
LYMPHOCYTES # BLD: 3.5 K/UL (ref 1.1–4.5)
LYMPHOCYTES NFR BLD: 45.9 % (ref 20–40)
MCH RBC QN AUTO: 29.2 PG (ref 27–31)
MCHC RBC AUTO-ENTMCNC: 31.4 G/DL (ref 33–37)
MCV RBC AUTO: 92.8 FL (ref 80–94)
MONOCYTES # BLD: 0.8 K/UL (ref 0–0.9)
MONOCYTES NFR BLD: 10 % (ref 0–10)
NEUTROPHILS # BLD: 2.6 K/UL (ref 1.5–7.5)
NEUTS SEG NFR BLD: 34.6 % (ref 50–65)
PERFORMED ON: ABNORMAL
PLATELET # BLD AUTO: 236 K/UL (ref 130–400)
PMV BLD AUTO: 10.4 FL (ref 9.4–12.4)
POTASSIUM SERPL-SCNC: 4.5 MMOL/L (ref 3.5–5)
PROT SERPL-MCNC: 7.6 G/DL (ref 6.4–8.3)
RBC # BLD AUTO: 4.15 M/UL (ref 4.7–6.1)
SODIUM SERPL-SCNC: 137 MMOL/L (ref 136–145)
WBC # BLD AUTO: 7.5 K/UL (ref 4.8–10.8)

## 2025-06-19 PROCEDURE — 6370000000 HC RX 637 (ALT 250 FOR IP): Performed by: INTERNAL MEDICINE

## 2025-06-19 PROCEDURE — 92526 ORAL FUNCTION THERAPY: CPT

## 2025-06-19 PROCEDURE — 97535 SELF CARE MNGMENT TRAINING: CPT

## 2025-06-19 PROCEDURE — 94760 N-INVAS EAR/PLS OXIMETRY 1: CPT

## 2025-06-19 PROCEDURE — 94150 VITAL CAPACITY TEST: CPT

## 2025-06-19 PROCEDURE — 6370000000 HC RX 637 (ALT 250 FOR IP): Performed by: PSYCHIATRY & NEUROLOGY

## 2025-06-19 PROCEDURE — 36415 COLL VENOUS BLD VENIPUNCTURE: CPT

## 2025-06-19 PROCEDURE — 6360000002 HC RX W HCPCS: Performed by: PSYCHIATRY & NEUROLOGY

## 2025-06-19 PROCEDURE — 97530 THERAPEUTIC ACTIVITIES: CPT

## 2025-06-19 PROCEDURE — 85025 COMPLETE CBC W/AUTO DIFF WBC: CPT

## 2025-06-19 PROCEDURE — 2500000003 HC RX 250 WO HCPCS: Performed by: PSYCHIATRY & NEUROLOGY

## 2025-06-19 PROCEDURE — 94640 AIRWAY INHALATION TREATMENT: CPT

## 2025-06-19 PROCEDURE — 97110 THERAPEUTIC EXERCISES: CPT

## 2025-06-19 PROCEDURE — 97116 GAIT TRAINING THERAPY: CPT

## 2025-06-19 PROCEDURE — 99232 SBSQ HOSP IP/OBS MODERATE 35: CPT | Performed by: PSYCHIATRY & NEUROLOGY

## 2025-06-19 PROCEDURE — 97129 THER IVNTJ 1ST 15 MIN: CPT

## 2025-06-19 PROCEDURE — 1180000000 HC REHAB R&B

## 2025-06-19 PROCEDURE — 82962 GLUCOSE BLOOD TEST: CPT

## 2025-06-19 PROCEDURE — 80053 COMPREHEN METABOLIC PANEL: CPT

## 2025-06-19 RX ADMIN — IPRATROPIUM BROMIDE AND ALBUTEROL SULFATE 1 DOSE: 2.5; .5 SOLUTION RESPIRATORY (INHALATION) at 07:57

## 2025-06-19 RX ADMIN — ASPIRIN 81 MG: 81 TABLET, CHEWABLE ORAL at 09:39

## 2025-06-19 RX ADMIN — SODIUM CHLORIDE, PRESERVATIVE FREE 10 ML: 5 INJECTION INTRAVENOUS at 21:01

## 2025-06-19 RX ADMIN — INSULIN LISPRO 5 UNITS: 100 INJECTION, SOLUTION INTRAVENOUS; SUBCUTANEOUS at 17:07

## 2025-06-19 RX ADMIN — LEVOTHYROXINE SODIUM 50 MCG: 0.05 TABLET ORAL at 05:58

## 2025-06-19 RX ADMIN — GEMFIBROZIL 600 MG: 600 TABLET ORAL at 09:38

## 2025-06-19 RX ADMIN — ENOXAPARIN SODIUM 40 MG: 100 INJECTION SUBCUTANEOUS at 20:57

## 2025-06-19 RX ADMIN — LANSOPRAZOLE 30 MG: 30 TABLET, ORALLY DISINTEGRATING, DELAYED RELEASE ORAL at 09:38

## 2025-06-19 RX ADMIN — INSULIN LISPRO 1 UNITS: 100 INJECTION, SOLUTION INTRAVENOUS; SUBCUTANEOUS at 20:58

## 2025-06-19 RX ADMIN — Medication 5 MG: at 20:57

## 2025-06-19 RX ADMIN — IPRATROPIUM BROMIDE AND ALBUTEROL SULFATE 1 DOSE: 2.5; .5 SOLUTION RESPIRATORY (INHALATION) at 15:26

## 2025-06-19 RX ADMIN — CLOPIDOGREL BISULFATE 75 MG: 75 TABLET, FILM COATED ORAL at 09:38

## 2025-06-19 RX ADMIN — CETIRIZINE HYDROCHLORIDE 10 MG: 10 TABLET ORAL at 09:38

## 2025-06-19 RX ADMIN — ATORVASTATIN CALCIUM 20 MG: 20 TABLET, FILM COATED ORAL at 20:58

## 2025-06-19 RX ADMIN — METOPROLOL TARTRATE 50 MG: 50 TABLET, FILM COATED ORAL at 09:38

## 2025-06-19 RX ADMIN — GEMFIBROZIL 600 MG: 600 TABLET ORAL at 20:58

## 2025-06-19 RX ADMIN — LANSOPRAZOLE 30 MG: 30 TABLET, ORALLY DISINTEGRATING, DELAYED RELEASE ORAL at 20:57

## 2025-06-19 RX ADMIN — INSULIN GLARGINE 15 UNITS: 100 INJECTION, SOLUTION SUBCUTANEOUS at 20:59

## 2025-06-19 RX ADMIN — SODIUM CHLORIDE, PRESERVATIVE FREE 10 ML: 5 INJECTION INTRAVENOUS at 09:39

## 2025-06-19 RX ADMIN — IPRATROPIUM BROMIDE AND ALBUTEROL SULFATE 1 DOSE: 2.5; .5 SOLUTION RESPIRATORY (INHALATION) at 19:07

## 2025-06-19 RX ADMIN — INSULIN GLARGINE 15 UNITS: 100 INJECTION, SOLUTION SUBCUTANEOUS at 09:39

## 2025-06-19 RX ADMIN — CHOLESTYRAMINE 4 G: 4 POWDER, FOR SUSPENSION ORAL at 09:40

## 2025-06-19 RX ADMIN — METOPROLOL TARTRATE 50 MG: 50 TABLET, FILM COATED ORAL at 20:58

## 2025-06-19 NOTE — PROGRESS NOTES
[facility]  INPATIENT SPEECH THERAPY  [facility] 8 REHAB UNIT      TIME  1100  1200  60 MINUTES    [x]Daily Note  []Progress Note    Date: 2025  Patient Name: Akhil Alejo        MRN: [id number]    Account #: [id number]  : 1955  (69 y.o.)  Gender: male   Primary Provider: Leo Vaughn MD  Swallowing Status/Diet:  Liquid Consistency Recommendation: Moderately Thick (Honey)  Diet Solids Recommendation: Minced & Moist    Subjective: Patient alert and upright in bed during treatment session. No family present during session.     Objective:     Memory functioning task completed. Patient given 4 words and required to recall them immediately. Patient able to complete task with 60% accuracy independently. Given maximum verbal and semantic cues, patient able to increase accuracy to 70%.    Swallowing:   Swallow reassessment completed on this date. Pt alert, cooperative, and upright in bed. No family present during re-evaluation. Oral prep revealed decreased vertical jaw movement with puree and minced and moist consistency. Oral transit timing of puree consistency and minced and moist was observed to be 1-2 seconds in length. Laryngeal elevation was observed to be sluggish and mildly decreased for swallow airway protection. No overt s/s of penetration/aspiration observed with any consistency presented.      Recommend utilization of PEG for primary source of nutrition and for medications. Okay to continue minced and moist consistency with mildy thick/nectar thick liquids. STAFF FEED. Okay for ice chips IN BETWEEN MEALS for comfort. Amount of PO intake is poor and regarded as pleasure only. Please monitor/notify SLP for the following: changes in lung sounds, spikes in temperature, and/or difficulties swallowing.     SLP will continue to follow and treat.    Diet Solids Recommendation:  Diet Solids Recommendation: Minced & Moist  Diet Liquid Recommendation:  Liquid Consistency Recommendation: Moderately Thick

## 2025-06-19 NOTE — PLAN OF CARE
Problem: Safety - Adult  Goal: Free from fall injury  6/18/2025 1908 by Benjamín Rosen, RN  Outcome: Progressing  6/18/2025 1126 by Paola Womack, RN  Outcome: Progressing

## 2025-06-19 NOTE — PROGRESS NOTES
[facility] with [facility] to do teaching session with patient's wife today (tube feedings).   Discharge is planned for tomorrow, 6/20.     Electronically signed by JARROD Rivas on 6/19/2025 at 2:40 PM

## 2025-06-19 NOTE — PROGRESS NOTES
Name: Akhil Alejo  MRN: [id number]  Date of Service:  6/19/2025 06/19/25 1005   Restrictions/Precautions   Restrictions/Precautions Weight Bearing;Aspiration Risk;Fall Risk;NPO;Surgical Protocols   Lower Extremity Weight Bearing Restrictions   Right Lower Extremity Weight Bearing Weight Bearing As Tolerated   Left Lower Extremity Weight Bearing Weight Bearing As Tolerated   Position Activity Restriction   Other Position/Activity Restrictions PEG TUBE, HOB 30 DEG FOR FEED   General   Chart Reviewed Yes   Patient assessed for rehabilitation services? Yes   Additional Pertinent Hx DEPRESSION, DM, HTN   Diagnosis CABGX3 4/29; NEW ONSET BIHEMISPHERIC DEEP WHITE MATTER CVA WITH RIGHT WEAKNESS   General   General Comments Co-Tx w/ OT.  (Pt performed various ADLs w/ OT in sitting and standing: (BR hygiene, dressing, shaving, etc))   Subjective   Subjective Pt laying in bed, agrees to participate in therapy.   Pain   Pre-Pain 0   Post-Pain 0   Bed mobility   Supine to Sit Stand by assistance   Sit to Supine Stand by assistance   Scooting Stand by assistance  (On EOB)   Bed Mobility Comments On R/L side of bed- intermittent use of bedrail   Transfers   Sit to Stand Contact guard assistance;Stand by assistance   Stand to Sit Contact guard assistance;Stand by assistance   Ambulation   Surface Level tile   Device Rolling Walker   Assistance Minimal assistance;Partial/Moderate assistance  (Mostly Min A, 1-2X Mod A due to very NBOS + BLE scissoring)   Quality of Gait FFP. DOWNWARD GAZE. RECIPROCAL PATTERN.  RW EXCESSIVLEY ANTERIOR TO SELF.  MAINTAINED KNEE FLEXION THROUGHOUT EXHIBITING DECREASED RIGHT KNEE STABILITY.   Gait Deviations Slow Alycia;Decreased step height;Decreased head and trunk rotation   Distance 150' X 2   Comments Multiple L/R turns, w/ socks donned   Activity Tolerance   Activity Tolerance Treatment limited secondary to decreased cognition;Patient tolerated treatment well   Assessment   Assessment Co-Tx.

## 2025-06-19 NOTE — PROGRESS NOTES
Patient:   Akhil Alejo  MR#:    [id number]   Room:    0811/811-02   YOB: 1955  Date of Progress Note: 6/19/2025  Time of Note                           7:33 AM  Consulting Physician:   Leo Vaughn M.D.  Attending Physician:  Leo Vaughn MD     Chief complaint Acute ischemic stroke     S:This 69 y.o. male  with history of depression, DM, HTN, neuropathy and RLS. Patient was seen by Dr. Kitchen as outpatient for abnormal ECG and shortness of breath. He underwent a stress test on 4/25/25 which anterior wall ischemia, EF 44%, ECG abnormal as before. He was sent to [facility] for further evaluation. He was seen by cardiologist Dr. Cortes and underwent a heart catheterization which revealed multivessel CAD. Cardiothoracic surgery was consulted. He was seen by Dr. Lara, who recommended CABG x 3 to the LAD, D1 and OM1. Patient was in agreement. The patient had been on Plavix and aspirin. Therefore Plavix was placed on hold. P2Y12 test done on 4/28/25 was 156 , which was felt sufficient recovery of plt function to proceed with CABG on 4/29/25. Patient tolerated the procedure well. Patient had post-op delirium causing some worsening of his dementia. Patient was evaluated by SPT for cognitive and swallow. Patient was found have severe cognitive impairment and oropharyngeal dysphagia. He made NPO. Initially NGT was placed for feedings. Unfortunately, patient continue to have oropharyngeal phase dysphagia and ultimately patient required placement of G-Tube on 5/9/25. Patient had been tolerating tube feedings fairly well. He continued to have cognitive deficits with Avasys camera in place. Patient remained weak overall and was felt to need a stay on Rehab. He was admitted to Rehab on 5/13/25. Patient was participating in PT/OT/SPT. On 5/21/25 nursing reported hematemesis and blood from G-Tube overnight. Hospitalist was consulted.  Pt was found to be tachypneic and pale, Pulse ox was 95 .Bp 103/59 hr 94.  Pt  Comments rec tub vs shower at this time   Shower Transfers   Shower Transfers Comments did not attempt stepover d/t excessive fatigue after doing steps and car t/f- educated that they could use TTB  for shower if they remove the doors   Wheelchair Bed Transfers   Wheelchair/Bed - Technique Stand step   Equipment Used Bed;Wheelchair   Level of Asssistance Stand by assistance   Wheelchair Transfers Comments cues   Long Term Goals   Long Term Goal 7 MET   Activity Tolerance   Activity Tolerance Patient Tolerated treatment well   Assessment   Performance deficits / Impairments Decreased functional mobility ;Decreased ADL status;Decreased strength;Decreased balance;Decreased posture;Decreased endurance;Decreased safe awareness;Decreased high-level IADLs;Decreased cognition   Treatment Diagnosis Acute ischemic stroke   OT Equipment Recommendations   Equipment Needed Yes   Mobility Devices Walker;Wheelchair;Hospital Bed;ADL Assistive Devices   Walker Rolling   Wheelchair Transport Chair   Hospital Bed  Electric - Semi (Head of Bed)   ADL Assistive Devices Toileting - 3-in-1 Commode   Other spouse will arrange TTB   Education   Education Given To Patient;Family   Education Provided Home Exercise Program   Education Method Demonstration;Verbal;Teach Back   Barriers to Learning Cognition  (patient)   Education Outcome Verbalized understanding;Demonstrated understanding;Continued education needed  (spouse verbalized and demonstrated understanding)   Occupational Therapy Plan   Current Treatment Recommendations Strengthening;Balance training;ROM;Functional mobility training;Endurance training;Patient/Caregiver education & training;Safety education & training;Cognitive reorientation;Equipment evaluation, education, & procurement;Self-Care / ADL;Home management training;Positioning;Cognitive/Perceptual training   Time Code Minutes    Timed Code Treatment Minutes 60 Minutes   OT Co-Treatment Minutes   Time In 1000  (with PT for

## 2025-06-19 NOTE — PROGRESS NOTES
Pt was sleepy this AM , but now woke easily to name for breakfast. Pt oriented to self , hospital, year, not month. Pt follows commands, has good strength in all extremities and denies any numbness or tingling. Pt speech is mostly clear, but occasionally delayed thoughts. Pt has PEG tube to abdomen clamped at present. Male external catheter to suction with clear yellow urine.Electronically signed by Cecelia Izaguirre RN on 6/19/2025 at 2:15 PM

## 2025-06-19 NOTE — PROGRESS NOTES
Occupational Therapy  Facility/Department: [facility] 8 REHAB UNIT  Rehabilitation Occupational Therapy Daily Treatment Note    Date: 25  Patient Name: Akhil Alejo       Room: 0811/811-02  MRN: [id number]  Account: [id number]   : 1955  (69 y.o.) Gender: male                Treatment Diagnosis: (P) Acute ischemic stroke   Past Medical History:  has a past medical history of Arm fracture, Dementia (HCC), Depression, Diabetes mellitus (HCC), Hypertension, Kidney stones, Neuropathy, Palliative care patient, and Restless leg syndrome.  Past Surgical History:   has a past surgical history that includes Cholecystectomy; Lithotripsy; shoulder surgery (Left); bone marrow biopsy (Right, 2020); Colonoscopy (2020); Upper gastrointestinal endoscopy (2017); Cardiac procedure (N/A, 2025); Coronary artery bypass graft (N/A, 2025); Gastrostomy tube placement (2025); Upper gastrointestinal endoscopy (N/A, 2025); Upper gastrointestinal endoscopy (N/A, 2025); and Upper gastrointestinal endoscopy (2025).     25 1000   Restrictions/Precautions   Restrictions/Precautions Weight Bearing;Aspiration Risk;Fall Risk;NPO;Surgical Protocols   Position Activity Restriction   Other Position/Activity Restrictions PEG TUBE, HOB 30 DEG FOR FEED   Subjective   Subjective Peg tube incision site bleeding- reported to nursing (Janey).   ADL   Feeding Supervision   Grooming Supervision;Setup   UE Bathing Stand by assistance;Verbal cueing   LE Bathing Minimal assistance   UE Dressing Stand by assistance   LE Dressing Minimal assistance   Putting On/Taking Off Footwear Minimal assistance   Toileting Moderate assistance   Toileting Skilled Clinical Factors able to complete pants up/down   Functional Mobility Contact guard assistance   Balance   Sitting Balance Independent   Standing Balance Stand by assistance   Functional Mobility   Functional - Mobility Device Rolling Walker   Assist  Level Contact guard assistance   Functional Mobility Comments pt room <-> OT bathroom   Transfers   Sit to stand Supervision;Stand by assistance   Stand to sit Supervision;Stand by assistance   Transfer Comments reached back without cues   Toilet Transfers   Toilet - Technique Ambulating   Toilet Transfer Contact guard assistance   Long Term Goals   Long Term Goal 6 MET   Activity Tolerance   Activity Tolerance Patient Tolerated treatment well   Assessment   Performance deficits / Impairments Decreased functional mobility ;Decreased ADL status;Decreased strength;Decreased balance;Decreased posture;Decreased endurance;Decreased safe awareness;Decreased high-level IADLs;Decreased cognition   Assessment plan to discharge tomorrow. DME arranged.   Treatment Diagnosis Acute ischemic stroke   History CABG on 4/29/25; recent GI bleed   Time Code Minutes    Timed Code Treatment Minutes 60 Minutes   OT Co-Treatment Minutes   Time In 1000   Time Out 1100   Minutes 60

## 2025-06-20 ENCOUNTER — TELEPHONE (OUTPATIENT)
Dept: FAMILY MEDICINE CLINIC | Facility: CLINIC | Age: 70
End: 2025-06-20
Payer: MEDICARE

## 2025-06-20 ENCOUNTER — READMISSION MANAGEMENT (OUTPATIENT)
Dept: CALL CENTER | Facility: HOSPITAL | Age: 70
End: 2025-06-20
Payer: MEDICARE

## 2025-06-20 VITALS
TEMPERATURE: 98.1 F | HEIGHT: 71 IN | BODY MASS INDEX: 24.08 KG/M2 | HEART RATE: 77 BPM | RESPIRATION RATE: 16 BRPM | OXYGEN SATURATION: 98 % | WEIGHT: 172 LBS | SYSTOLIC BLOOD PRESSURE: 130 MMHG | DIASTOLIC BLOOD PRESSURE: 68 MMHG

## 2025-06-20 LAB
GLUCOSE BLD-MCNC: 161 MG/DL (ref 70–99)
GLUCOSE BLD-MCNC: 190 MG/DL (ref 70–99)
PERFORMED ON: ABNORMAL
PERFORMED ON: ABNORMAL

## 2025-06-20 PROCEDURE — 2500000003 HC RX 250 WO HCPCS: Performed by: PSYCHIATRY & NEUROLOGY

## 2025-06-20 PROCEDURE — 94640 AIRWAY INHALATION TREATMENT: CPT

## 2025-06-20 PROCEDURE — 94150 VITAL CAPACITY TEST: CPT

## 2025-06-20 PROCEDURE — 6370000000 HC RX 637 (ALT 250 FOR IP): Performed by: INTERNAL MEDICINE

## 2025-06-20 PROCEDURE — 94760 N-INVAS EAR/PLS OXIMETRY 1: CPT

## 2025-06-20 PROCEDURE — 99239 HOSP IP/OBS DSCHRG MGMT >30: CPT | Performed by: PSYCHIATRY & NEUROLOGY

## 2025-06-20 PROCEDURE — 82962 GLUCOSE BLOOD TEST: CPT

## 2025-06-20 PROCEDURE — 6370000000 HC RX 637 (ALT 250 FOR IP): Performed by: PSYCHIATRY & NEUROLOGY

## 2025-06-20 RX ORDER — CHOLESTYRAMINE LIGHT 4 G/5.7G
4 POWDER, FOR SUSPENSION ORAL DAILY
Qty: 60 PACKET | Refills: 3 | Status: SHIPPED | OUTPATIENT
Start: 2025-06-20

## 2025-06-20 RX ORDER — INSULIN GLARGINE 100 [IU]/ML
15 INJECTION, SOLUTION SUBCUTANEOUS 2 TIMES DAILY
Qty: 10 ML | Refills: 3 | Status: SHIPPED | OUTPATIENT
Start: 2025-06-20

## 2025-06-20 RX ORDER — LANSOPRAZOLE 30 MG/1
30 TABLET, ORALLY DISINTEGRATING, DELAYED RELEASE ORAL 2 TIMES DAILY
Qty: 30 TABLET | Refills: 0 | Status: SHIPPED | OUTPATIENT
Start: 2025-06-20

## 2025-06-20 RX ORDER — INSULIN LISPRO 100 [IU]/ML
0-4 INJECTION, SOLUTION INTRAVENOUS; SUBCUTANEOUS
Qty: 1 EACH | Refills: 0 | Status: SHIPPED | OUTPATIENT
Start: 2025-06-20

## 2025-06-20 RX ORDER — METOPROLOL TARTRATE 50 MG
50 TABLET ORAL 2 TIMES DAILY
Qty: 60 TABLET | Refills: 3 | Status: SHIPPED | OUTPATIENT
Start: 2025-06-20

## 2025-06-20 RX ADMIN — IPRATROPIUM BROMIDE AND ALBUTEROL SULFATE 1 DOSE: 2.5; .5 SOLUTION RESPIRATORY (INHALATION) at 05:56

## 2025-06-20 RX ADMIN — IPRATROPIUM BROMIDE AND ALBUTEROL SULFATE 1 DOSE: 2.5; .5 SOLUTION RESPIRATORY (INHALATION) at 09:39

## 2025-06-20 RX ADMIN — INSULIN GLARGINE 15 UNITS: 100 INJECTION, SOLUTION SUBCUTANEOUS at 09:06

## 2025-06-20 RX ADMIN — GEMFIBROZIL 600 MG: 600 TABLET ORAL at 09:07

## 2025-06-20 RX ADMIN — METOPROLOL TARTRATE 50 MG: 50 TABLET, FILM COATED ORAL at 09:07

## 2025-06-20 RX ADMIN — CHOLESTYRAMINE 4 G: 4 POWDER, FOR SUSPENSION ORAL at 09:07

## 2025-06-20 RX ADMIN — ASPIRIN 81 MG: 81 TABLET, CHEWABLE ORAL at 09:07

## 2025-06-20 RX ADMIN — LEVOTHYROXINE SODIUM 50 MCG: 0.05 TABLET ORAL at 06:11

## 2025-06-20 RX ADMIN — LANSOPRAZOLE 30 MG: 30 TABLET, ORALLY DISINTEGRATING, DELAYED RELEASE ORAL at 09:07

## 2025-06-20 RX ADMIN — CETIRIZINE HYDROCHLORIDE 10 MG: 10 TABLET ORAL at 09:07

## 2025-06-20 RX ADMIN — CLOPIDOGREL BISULFATE 75 MG: 75 TABLET, FILM COATED ORAL at 09:07

## 2025-06-20 RX ADMIN — INSULIN LISPRO 1 UNITS: 100 INJECTION, SOLUTION INTRAVENOUS; SUBCUTANEOUS at 09:15

## 2025-06-20 RX ADMIN — INSULIN LISPRO 10 UNITS: 100 INJECTION, SOLUTION INTRAVENOUS; SUBCUTANEOUS at 09:15

## 2025-06-20 RX ADMIN — SODIUM CHLORIDE, PRESERVATIVE FREE 10 ML: 5 INJECTION INTRAVENOUS at 09:08

## 2025-06-20 NOTE — DISCHARGE SUMMARY
Neurology Discharge Summary     Patient Identification:  Akhil Alejo is a 69 y.o. male.  :  1955  Admit Date:  2025  Discharge date : 25   Attending Provider: Leo Vaughn MD     Account Number: [id number]                                   Admission Diagnoses:   Acute ischemic stroke (HCC) [I63.9]    Discharge Diagnoses:  Principal Problem:    Acute ischemic stroke (HCC)  Active Problems:    Neuropathy    HTN (hypertension)    Diabetic polyneuropathy associated with type 2 diabetes mellitus (HCC)    GERD (gastroesophageal reflux disease)    Mixed hyperlipidemia    Restless legs syndrome (RLS)    Essential hypertension    Dysphagia    Feeding difficulty in adult    Gastrointestinal hemorrhage with hematemesis    S/P CABG (coronary artery bypass graft)    Superficial foreign body of abdominal wall without major open wound but with infection  Resolved Problems:    Moderate malnutrition      Discharge Medications:    Current Discharge Medication List             Details   insulin glargine (LANTUS) 100 UNIT/ML injection vial Inject 15 Units into the skin 2 times daily  Qty: 10 mL, Refills: 3      insulin lispro (HUMALOG,ADMELOG) 100 UNIT/ML SOLN injection vial Inject 0-4 Units into the skin 4 times daily (before meals and nightly)  Qty: 1 each, Refills: 0      cholestyramine light 4 g packet Take 1 packet by mouth daily  Qty: 60 packet, Refills: 3      lansoprazole (PREVACID SOLUTAB) 30 MG disintegrating tablet Take 1 tablet by mouth in the morning and at bedtime  Qty: 30 tablet, Refills: 0                Details   metoprolol tartrate (LOPRESSOR) 50 MG tablet Take 1 tablet by mouth 2 times daily  Qty: 60 tablet, Refills: 3                Details   aspirin 81 MG chewable tablet Take 1 tablet by mouth daily      nitroGLYCERIN (NITROSTAT) 0.4 MG SL tablet Place 1 tablet under the tongue every 5 minutes as needed for Chest pain  Qty: 25 tablet, Refills: 3      clopidogrel (PLAVIX) 75 MG tablet Take

## 2025-06-20 NOTE — DISCHARGE SUMMARY
Occupational Therapy Discharge Summary         Date: 2025  Patient Name: Akhil Alejo        MRN: [id number]    : 1955  (69 y.o.)  Gender: male      Diagnosis: CABGX3 ; NEW ONSET BIHEMISPHERIC DEEP WHITE MATTER CVA WITH RIGHT WEAKNESS  Restrictions/Precautions  Restrictions/Precautions: Weight Bearing, Aspiration Risk, Fall Risk, NPO, Surgical Protocols  Activity Level: Up with Assist      Discharge Date:25      UE Functioning:  B UE AROM WFLs    Home Management:  Functional Mobility  Functional - Mobility Device: Rolling Walker  Activity: Other  Assist Level: Contact guard assistance  Functional Mobility Comments: pt room <-> OT bathroom    Adaptive Equipment/DME Status:  Bathroom Equipment: Shower chair  Home Equipment: None  Order placed for w/c, walker hospital bed, transport chair, BSC..spouse has arranged to get TTB    Pain Assessment:  No reported pain    Remaining Problems:    Decreased functional mobility ; Decreased ADL status; Decreased strength; Decreased balance; Decreased posture; Decreased endurance; Decreased safe awareness; Decreased high-level IADLs; Decreased cognition   STGs:  Short Term Goals  Time Frame for Short Term Goals: 1 week  Short Term Goal 1: Pt will perform oral hygiene with min A  Short Term Goal 2: Pt will perform upper body dressing with min A  Short Term Goal 3: Pt will perform LB dressing with mod A  Short Term Goal 4: Pt will perform all bathing with mod A  Short Term Goal 5: Pt will perform all toileting with mod A  Short Term Goal 6: Pt will complete dynamic EOB/EOM sitting task x 5 occasions with SBA in order to improve balance for ADLs  Short Term Goal 7: Pt will perform standing level task for at least 1 minute with min A    LTGs:  Long Term Goals  Time Frame for Long Term Goals : 2 weeks  Long Term Goal 1: Pt will perform oral hygiene with supervision  Long Term Goal 2: Pt will perform all dressing with supervision  Long Term Goal 3: Pt will

## 2025-06-20 NOTE — PLAN OF CARE
Problem: Chronic Conditions and Co-morbidities  Goal: Patient's chronic conditions and co-morbidity symptoms are monitored and maintained or improved  Outcome: Adequate for Discharge     Problem: Discharge Planning  Goal: Discharge to home or other facility with appropriate resources  Outcome: Adequate for Discharge     Problem: Skin/Tissue Integrity  Goal: Skin integrity remains intact  Description: 1.  Monitor for areas of redness and/or skin breakdown2.  Assess vascular access sites hourly3.  Every 4-6 hours minimum:  Change oxygen saturation probe site4.  Every 4-6 hours:  If on nasal continuous positive airway pressure, respiratory therapy assess nares and determine need for appliance change or resting period  Outcome: Adequate for Discharge     Problem: Safety - Adult  Goal: Free from fall injury  Outcome: Adequate for Discharge     Problem: ABCDS Injury Assessment  Goal: Absence of physical injury  Outcome: Adequate for Discharge     Problem: Nutrition Deficit:  Goal: Optimize nutritional status  Outcome: Adequate for Discharge     Problem: Neurosensory - Adult  Goal: Achieves stable or improved neurological status  Outcome: Adequate for Discharge  Goal: Achieves maximal functionality and self care  Outcome: Adequate for Discharge     Problem: Skin/Tissue Integrity - Adult  Goal: Skin integrity remains intact  Description: 1.  Monitor for areas of redness and/or skin breakdown2.  Assess vascular access sites hourly3.  Every 4-6 hours minimum:  Change oxygen saturation probe site4.  Every 4-6 hours:  If on nasal continuous positive airway pressure, respiratory therapy assess nares and determine need for appliance change or resting period  Outcome: Adequate for Discharge  Goal: Incisions, wounds, or drain sites healing without S/S of infection  Outcome: Adequate for Discharge     Problem: Musculoskeletal - Adult  Goal: Return mobility to safest level of function  Outcome: Adequate for Discharge  Goal: Return  team  7. Initiate Consults from Ethics, Palliative Care or initiate Family Care Conference as is appropriate  Outcome: Adequate for Discharge

## 2025-06-20 NOTE — TELEPHONE ENCOUNTER
Gave verbal orders that PCP will follow home health. Patient has a hospital follow up on June 06/26/2025. VINCENZO

## 2025-06-20 NOTE — DISCHARGE SUMMARY
Physical Therapy DISCHARGE Note  DATE:  2025  NAME:  Akhil Alejo  :  1955  (69 y.o.,male)  MRN:  [id number]    HEIGHT:  Height: 180.3 cm (5' 10.98\")  WEIGHT:  Weight - Scale: 78 kg (172 lb)    PATIENT DIAGNOSIS(ES):    Diagnosis: CABGX3 ; NEW ONSET BIHEMISPHERIC DEEP WHITE MATTER CVA WITH RIGHT WEAKNESS    Additional Pertinent Hx: DEPRESSION, DM, HTN  RESTRICTIONS/PRECAUTIONS:    Restrictions/Precautions  Restrictions/Precautions: Weight Bearing, Aspiration Risk, Fall Risk, NPO, Surgical Protocols  Activity Level: Up with Assist  Position Activity Restriction  Sternal Precautions:  (.)  Other Position/Activity Restrictions: PEG TUBE, HOB 30 DEG FOR FEED  OVERALL  ORIENTATION STATUS:     PAIN:  Pain Level: 0  Pain Type: Acute pain    Pain Location: Head     Pain Orientation: Anterior      GROSS ASSESSMENT        POSTURE/BALANCE  Balance  Comments: ABLE TO SIT EOB AND MAINTAIN BALANCE IWTH MIN/MOD A. HELD BALANCE FOR GROSSLY 5 SEC BEFORE LEANING TO THE L ELBOW       ACTIVITY TOLERANCE  Activity Tolerance  Activity Tolerance: Treatment limited secondary to decreased cognition, Patient tolerated treatment well      BED MOBILITY  Bed mobility  Rolling to Left: Stand by assistance  Rolling to Right: Stand by assistance  Supine to Sit: Stand by assistance  Sit to Supine: Stand by assistance  Scooting: Stand by assistance (On EOB)  Bed Mobility Comments: On R/L side of bed- intermittent use of bedrail        TRANSFERS  Transfers  Sit to Stand: Contact guard assistance, Stand by assistance  Stand to Sit: Contact guard assistance, Stand by assistance  Bed to Chair: Minimal assistance, Contact guard assistance (VCS TO STAY WITHIN RW DURING TURNS)  Stand Pivot Transfers: Partial/Moderate assistance, 2 Person Assistance (EOB<>w/c from pt R/L: 1X w/ RW and 1X w/o AD (v/c's for use of bedrail))  Lateral Transfers:  (.)  Car Transfer: Minimal Assistance, Contact guard assistance (PERFORMED WITH AMBULATING UP TO

## 2025-06-20 NOTE — OUTREACH NOTE
Prep Survey      Flowsheet Row Responses   Scient[facility] facility patient discharged from? Non-[facility]   Is LACE score < 7 ? Non- Discharge   Eligibility [facility]   Date of Admission 05/28/25   Date of Discharge 06/20/25   Discharge diagnosis Acute ischemic stroke   Does the patient have one of the following disease processes/diagnoses(primary or secondary)? Stroke   Prep survey completed? Yes            Nancy COX - Registered Nurse

## 2025-06-20 NOTE — PROGRESS NOTES
Patient is discharged home today. MSW sent discharge summary and home health orders to Yaneli Murguia with [facility]. Patient will be sent home with two days worth of tube feeding to bridge between dc and delivery of tube feeds at patient's home. Citlaly with [facility] has been in contact with patient's wife.       Electronically signed by JARROD Rivas on 6/20/2025 at 8:18 AM

## 2025-06-20 NOTE — TELEPHONE ENCOUNTER
Kate with [facility] wants to know if Dr. Montes will follow patient. Her phone number is [phone].

## 2025-06-23 ENCOUNTER — TRANSITIONAL CARE MANAGEMENT TELEPHONE ENCOUNTER (OUTPATIENT)
Dept: CALL CENTER | Facility: HOSPITAL | Age: 70
End: 2025-06-23
Payer: MEDICARE

## 2025-06-23 NOTE — OUTREACH NOTE
Call Center TCM Note      Flowsheet Row Responses   [facility] patient discharged from? Non-   Does the patient have one of the following disease processes/diagnoses(primary or secondary)? Stroke   TCM attempt successful? Yes   Call start time 1049   Call end time 1059   Discharge diagnosis Acute ischemic stroke   Person spoke with today (if not patient) and relationship spouse   Meds reviewed with patient/caregiver? Yes   Is the patient having any side effects they believe may be caused by any medication additions or changes? No   Does the patient have all medications ordered at discharge? Yes   Is the patient taking all medications as directed (includes completed medication regime)? Yes   Comments Neurology 7/7/25   Does the patient have an appointment with their PCP within 7-14 days of discharge? Yes  [6/26/2025  8:45 AM]   Psychosocial issues? No   Did the patient receive a copy of their discharge instructions? Yes   Nursing interventions Reviewed instructions with patient   What is the patient's perception of their health status since discharge? Improving   Is the patient/caregiver able to teach back signs and symptoms related to disease process for when to call PCP? Yes   Is the patient/caregiver able to teach back signs and symptoms related to disease process for when to call 911? Yes   Is the patient/caregiver able to teach back the hierarchy of who to call/visit for symptoms/problems? PCP, Specialist, Home health nurse, Urgent Care, ED, 911 Yes   If the patient is a current smoker, are they able to teach back resources for cessation? Not a smoker   TCM call completed? Yes   Wrap up additional comments Spouse states pt is doing better, and denies and residual except weakness from stroke. Reviewed meds with spouse. Spouse verified PCP, Neurology fu appts   Call end time 1059   Would this patient benefit from a Referral to [facility] Social Work? No   Is the patient interested in additional calls from an  ambulatory ? Chrystal COSTA - Registered Nurse    6/23/2025, 11:04 CDT

## 2025-06-23 NOTE — CONSULTS
Post-op Cardiac Rehab education packet was sent to the patient's address on record.  Handouts included were titled; \"Home Instructions Following Heart Surgery\", \"Cardiac Home Exercise Program - Phase I\", \"A Healthy Heart: Care Instructions\", \"Cardiac Diet/Low Cholesterol\" and \"Cardiac Rehabilitation - An Individualized Supervised Program For You\".  Patient was instructed to contact [facility] or the hospital nearest their residence in order to enroll in Phase II Outpatient Cardiac Rehab.

## 2025-06-24 NOTE — DISCHARGE SUMMARY
[facility]                      Inpatient Speech Therapy                Discharge Summary      Patient Identification:  Akhil Alejo is a 69 y.o. male.  :  1955  Admit Date:  2025  Discharge date : 25   Attending Provider: Leo Vaughn MD     Account Number: [id number]                                    Admission Diagnoses:   Acute ischemic stroke (HCC) [I63.9]     Discharge Diagnoses:  Principal Problem:    Acute ischemic stroke (HCC)  Active Problems:    Neuropathy    HTN (hypertension)    Diabetic polyneuropathy associated with type 2 diabetes mellitus (HCC)    GERD (gastroesophageal reflux disease)    Mixed hyperlipidemia    Restless legs syndrome (RLS)    Essential hypertension    Dysphagia    Feeding difficulty in adult    Gastrointestinal hemorrhage with hematemesis    S/P CABG (coronary artery bypass graft)    Superficial foreign body of abdominal wall without major open wound but with infection  Resolved Problems:    Moderate malnutrition    Recommend utilization of PEG for primary source of nutrition and for medications. Okay to continue minced and moist consistency with mildy thick/nectar thick liquids. Amount of PO intake is poor and regarded as pleasure only.      Education provided to wife on appropriate foods and thickened liquids. Wife is worried about patients compliance at home. Several menu options discussed that would be appropriate but also desired by patient. Wife reports she is feeling better about patients food/drink options following treatment session.       Long term goals:  Goal 1: The patient will develop functional, cognitive-linguistic-based skills and utilize compensatory strategies to communicate wants and needs effectively, maintain safety during ADL’s and participate socially in functional living environment   Goal 2: The patient will tolerate the safest least restrictive diet with minimal overt signs and symptoms of aspiration.      Short term

## 2025-07-03 ENCOUNTER — OFFICE VISIT (OUTPATIENT)
Dept: CARDIOTHORACIC SURGERY | Age: 70
End: 2025-07-03

## 2025-07-03 VITALS
WEIGHT: 179 LBS | BODY MASS INDEX: 25.06 KG/M2 | HEART RATE: 72 BPM | DIASTOLIC BLOOD PRESSURE: 71 MMHG | SYSTOLIC BLOOD PRESSURE: 126 MMHG | HEIGHT: 71 IN

## 2025-07-03 DIAGNOSIS — Z95.1 S/P CABG (CORONARY ARTERY BYPASS GRAFT): Primary | ICD-10-CM

## 2025-07-03 PROCEDURE — 99024 POSTOP FOLLOW-UP VISIT: CPT | Performed by: SURGERY

## 2025-07-03 ASSESSMENT — ENCOUNTER SYMPTOMS
DIARRHEA: 0
NAUSEA: 0
COUGH: 0
CHEST TIGHTNESS: 0
CONSTIPATION: 0
SHORTNESS OF BREATH: 0
VOMITING: 0
BACK PAIN: 0
ABDOMINAL PAIN: 0

## 2025-07-03 NOTE — PROGRESS NOTES
No focal deficit present.      Mental Status: He is alert and oriented to person, place, and time.   Psychiatric:         Behavior: Behavior normal.     PEG tube removed in the office. No significant bleeding noted.       Assessment   Slow but steady improvement after a difficult operative course.      Plan   No further CT fu required        Akhil Lara MD

## 2025-07-07 ENCOUNTER — OFFICE VISIT (OUTPATIENT)
Dept: NEUROLOGY | Facility: CLINIC | Age: 70
End: 2025-07-07
Payer: MEDICARE

## 2025-07-07 VITALS
HEIGHT: 71 IN | DIASTOLIC BLOOD PRESSURE: 64 MMHG | WEIGHT: 175.6 LBS | OXYGEN SATURATION: 98 % | SYSTOLIC BLOOD PRESSURE: 98 MMHG | HEART RATE: 66 BPM | BODY MASS INDEX: 24.58 KG/M2

## 2025-07-07 DIAGNOSIS — G31.84 MCI (MILD COGNITIVE IMPAIRMENT) WITH MEMORY LOSS: Primary | ICD-10-CM

## 2025-07-07 DIAGNOSIS — E11.42 DIABETIC POLYNEUROPATHY ASSOCIATED WITH TYPE 2 DIABETES MELLITUS: ICD-10-CM

## 2025-07-07 NOTE — PROGRESS NOTES
Subjective   Supa Mcclelland is a 70 y.o. male is here today for follow-up MCI, painful diabetic neuropathy.    History of Present Illness  Significant medical and surgical events since last seen.  The patient had a fairly urgent open heart procedure at [facility].  The patient had some postoperative complications which apparently included bilateral embolic strokes.  He had some issues with dysphagia and weakness.  He was admitted to rehabilitation.  He apparently had a feeding tube placed and had subsequent hemorrhage into his abdomen which required urgent treatment as well as surgical intervention.    The patient has been discharged to home and is currently participating in home therapy.  He is also participating in occupational therapy and speech therapy.  His feeding tube has been removed.    Images revealing strokes are not available for review, reports are available and reviewed with the patient and his wife today.       The following portions of the patient's history were reviewed and updated as appropriate: allergies, current medications, past family history, past medical history, past social history, past surgical history and problem list.    Review of Systems   Constitutional:  Positive for activity change and fatigue.   HENT:  Positive for congestion, drooling, hearing loss and trouble swallowing.    Eyes:  Positive for visual disturbance.   Respiratory:  Positive for cough and shortness of breath.    Cardiovascular:  Positive for chest pain and leg swelling.   Gastrointestinal:  Positive for GERD.   Genitourinary: Negative.    Musculoskeletal:  Positive for arthralgias, back pain and gait problem.   Allergic/Immunologic: Positive for environmental allergies.   Neurological:  Positive for tremors, weakness, numbness, headache, memory problem and confusion.   Psychiatric/Behavioral:  Positive for decreased concentration, dysphoric mood and sleep disturbance.          Current Outpatient Medications:      albuterol sulfate  (90 Base) MCG/ACT inhaler, Inhale 2 puffs Every 4 (Four) Hours As Needed for Wheezing., Disp: 8 g, Rfl: 0    Aspirin Low Dose 81 MG EC tablet, Take 1 tablet by mouth once daily, Disp: 30 tablet, Rfl: 0    brompheniramine-pseudoephedrine-DM 30-2-10 MG/5ML syrup, Take 10 mL by mouth 4 (Four) Times a Day As Needed for Allergies, Cough or Congestion., Disp: 473 mL, Rfl: 0    citalopram (CeleXA) 20 MG tablet, Take 1 tablet by mouth every night at bedtime., Disp: 90 tablet, Rfl: 3    clopidogrel (PLAVIX) 75 MG tablet, Take 1 tablet by mouth Daily., Disp: , Rfl:     cyclobenzaprine (FLEXERIL) 5 MG tablet, Take 1 tablet by mouth 3 (Three) Times a Day As Needed for Muscle Spasms., Disp: 90 tablet, Rfl: 0    famotidine (PEPCID) 20 MG tablet, Take 1 tablet by mouth 2 (Two) Times a Day., Disp: , Rfl:     fexofenadine (ALLEGRA) 180 MG tablet, Take 1 tablet by mouth As Needed., Disp: , Rfl:     fluticasone (FLONASE) 50 MCG/ACT nasal spray, 1 spray each nostril twice a day for three days, then daily., Disp: 1 bottle, Rfl: 0    gabapentin (NEURONTIN) 800 MG tablet, TAKE 1 TABLET BY MOUTH THREE TIMES DAILY, Disp: 270 tablet, Rfl: 0    Glucagon (Gvoke HypoPen 2-Pack) 1 MG/0.2ML solution auto-injector, Inject 1 mg under the skin into the appropriate area as directed 1 (One) Time As Needed (hypoglycemia) for up to 1 dose., Disp: 0.4 mL, Rfl: 1    guaiFENesin-dextromethorphan (ROBITUSSIN DM) 100-10 MG/5ML syrup, Take 5 mL by mouth 3 (Three) Times a Day As Needed for Cough., Disp: 237 mL, Rfl: 0    hydroCHLOROthiazide 12.5 MG tablet, Take 1 tablet by mouth once daily, Disp: 90 tablet, Rfl: 0    HYDROcodone-acetaminophen (NORCO) 7.5-325 MG per tablet, Take 1 tablet by mouth Every 4 (Four) Hours As Needed for Moderate Pain., Disp: 10 tablet, Rfl: 0    hydrOXYzine pamoate (Vistaril) 25 MG capsule, Take 1 capsule by mouth 3 (Three) Times a Day As Needed for Itching or Allergies., Disp: 90 capsule, Rfl: 2    insulin  degludec (TRESIBA FLEXTOUCH) 100 UNIT/ML solution pen-injector injection, Inject 30 Units under the skin into the appropriate area as directed Daily. 30 units - may titrate up to 50 units daily, Disp: , Rfl:     Insulin Lispro (HumaLOG KwikPen) 200 UNIT/ML solution pen-injector, Inject 22 Units under the skin into the appropriate area as directed 3 (Three) Times a Day With Meals., Disp: 18 mL, Rfl: 11    Insulin Pen Needle (Pen Needles) 32G X 4 MM misc, 1 each 4 (Four) Times a Day. Use 4 x daily, Dx code E11.65, Disp: 120 each, Rfl: 11    isosorbide mononitrate (IMDUR) 30 MG 24 hr tablet, Take 1 tablet by mouth., Disp: , Rfl:     Jardiance 25 MG tablet tablet, Take 1 tablet by mouth Daily., Disp: , Rfl:     levothyroxine (SYNTHROID, LEVOTHROID) 50 MCG tablet, Take 1 tablet by mouth Daily., Disp: 90 tablet, Rfl: 1    losartan (COZAAR) 25 MG tablet, Take 1 tablet by mouth Every Morning., Disp: , Rfl:     meloxicam (MOBIC) 15 MG tablet, Take 1 tablet by mouth Daily., Disp: , Rfl:     metoprolol tartrate (LOPRESSOR) 50 MG tablet, Take 1 tablet by mouth 2 (Two) Times a Day., Disp: , Rfl:     nitroglycerin (NITROSTAT) 0.4 MG SL tablet, Place 1 tablet under the tongue., Disp: , Rfl:     RELION PEN NEEDLE 31G/8MM 31G X 8 MM misc, Daily. use as directed, Disp: , Rfl:     rosuvastatin (CRESTOR) 20 MG tablet, Take 1 tablet by mouth Daily., Disp: 30 tablet, Rfl: 2    terbinafine (LamISIL AT) 1 % cream, Apply 1 Application topically to the appropriate area as directed 2 (Two) Times a Day., Disp: 15 g, Rfl: 1    metoprolol succinate XL (TOPROL-XL) 25 MG 24 hr tablet, Take 1 tablet by mouth Daily. (Patient not taking: Reported on 7/7/2025), Disp: , Rfl:     potassium citrate (UROCIT-K) 10 MEQ (1080 MG) CR tablet, Take 1 tablet by mouth 3 (Three) Times a Day With Meals. (Patient not taking: Reported on 7/7/2025), Disp: 90 tablet, Rfl: 11    rOPINIRole (REQUIP) 2 MG tablet, Take 1 tablet by mouth Every Night. Take 1 hour before  bedtime. (Patient not taking: Reported on 7/7/2025), Disp: 90 tablet, Rfl: 3    sulfamethoxazole-trimethoprim (BACTRIM DS,SEPTRA DS) 800-160 MG per tablet, Take 1 tablet by mouth 2 (Two) Times a Day. (Patient not taking: Reported on 7/7/2025), Disp: 14 tablet, Rfl: 0     Objective   Physical Exam  Vitals and nursing note reviewed.   Eyes:      General: Vision grossly intact. Gaze aligned appropriately.   Cardiovascular:      Rate and Rhythm: Normal rate.   Pulmonary:      Effort: Pulmonary effort is normal.   Neurological:      Mental Status: He is alert and oriented to person, place, and time.      Cranial Nerves: Cranial nerve deficit present.      Sensory: Sensory deficit present.      Motor: Weakness and tremor present.      Coordination: Heel to Shin Test abnormal. Impaired rapid alternating movements.      Gait: Gait abnormal.      Deep Tendon Reflexes:      Reflex Scores:       Bicep reflexes are 1+ on the right side and 1+ on the left side.       Brachioradialis reflexes are 1+ on the right side and 1+ on the left side.       Patellar reflexes are 1+ on the right side and 1+ on the left side.       Achilles reflexes are 1+ on the right side and 1+ on the left side.  Psychiatric:         Attention and Perception: Attention normal.         Speech: Speech is delayed and tangential.         Behavior: Behavior is slowed.         Cognition and Memory: Cognition normal.           Assessment & Plan   Diagnoses and all orders for this visit:    1. MCI (mild cognitive impairment) with memory loss (Primary)    2. Diabetic polyneuropathy associated with type 2 diabetes mellitus      The patient has had fairly dramatic events medically.  He appears to be recovering as would expected given his overall previous condition.  I discussed with the patient and his wife medications that may be utilized in mild cognitive impairment, however at this point introduction of further medication would likely not make an acute difference  and may place more of a burden on them.  I would advise and leave the medication as is we will plan on seeing him back in 6 months to  his recovery.  I have requested imaging from [facility].  We will review these images with regard to the reports of stroke.    He is presently maintained on Plavix, low-dose aspirin (also taking meloxicam), Crestor as well as a significant regimen for diabetes and his other underlying conditions.    If there are neurologic changes in the interim they will contact us.               Dictated utilizing Dragon dictation.

## 2025-07-08 DIAGNOSIS — F41.9 ANXIETY: ICD-10-CM

## 2025-07-08 PROBLEM — G31.84 MCI (MILD COGNITIVE IMPAIRMENT) WITH MEMORY LOSS: Status: ACTIVE | Noted: 2025-07-08

## 2025-07-08 NOTE — TELEPHONE ENCOUNTER
Caller: Rossana Mcclelland    Relationship: Emergency Contact    Best call back number: [phone]     Requested Prescriptions:   Requested Prescriptions     Pending Prescriptions Disp Refills    citalopram (CeleXA) 20 MG tablet 90 tablet 3     Sig: Take 1 tablet by mouth every night at bedtime.        Pharmacy where request should be sent: [facility] [address] [place] [address] - [phone] [facility] [phone] FX     Last office visit with prescribing clinician: 7/2/2025   Last telemedicine visit with prescribing clinician: Visit date not found   Next office visit with prescribing clinician: 8/5/2025     Additional details provided by patient: PATIENT'S WIFE WOULD LIKE TO INCREASE HIS MG TO 40 MG TAB UNTIL HE GETS THROUGH HIS DEPRESSION AFTER SURGERY    Does the patient have less than a 3 day supply:  [x] Yes  [] No    Would you like a call back once the refill request has been completed: [] Yes [] No    If the office needs to give you a call back, can they leave a voicemail: [] Yes [x] No    Abel Kaplan   07/08/25 14:46 CDT

## 2025-07-11 RX ORDER — CITALOPRAM HYDROBROMIDE 20 MG/1
20 TABLET ORAL
Qty: 90 TABLET | Refills: 3 | Status: SHIPPED | OUTPATIENT
Start: 2025-07-11

## 2025-07-11 NOTE — TELEPHONE ENCOUNTER
Rx Refill Note  Requested Prescriptions     Pending Prescriptions Disp Refills    citalopram (CeleXA) 20 MG tablet 90 tablet 3     Sig: Take 1 tablet by mouth every night at bedtime.      Last office visit with prescribing clinician: 7/2/2025   Next office visit with prescribing clinician: 8/5/2025     Justine Qiu MA  07/11/25, 10:33 CDT

## 2025-08-08 ENCOUNTER — TELEPHONE (OUTPATIENT)
Dept: HEMATOLOGY | Age: 70
End: 2025-08-08

## 2025-08-13 ENCOUNTER — HOSPITAL ENCOUNTER (OUTPATIENT)
Dept: INFUSION THERAPY | Age: 70
Discharge: HOME | End: 2025-08-13
Payer: MEDICARE

## 2025-08-13 ENCOUNTER — OFFICE VISIT (OUTPATIENT)
Dept: HEMATOLOGY | Age: 70
End: 2025-08-13
Payer: MEDICARE

## 2025-08-13 VITALS
SYSTOLIC BLOOD PRESSURE: 132 MMHG | WEIGHT: 174.9 LBS | DIASTOLIC BLOOD PRESSURE: 68 MMHG | HEIGHT: 71 IN | BODY MASS INDEX: 24.48 KG/M2 | OXYGEN SATURATION: 98 % | TEMPERATURE: 97.2 F | HEART RATE: 60 BPM

## 2025-08-13 DIAGNOSIS — Z71.89 CARE PLAN DISCUSSED WITH PATIENT: ICD-10-CM

## 2025-08-13 DIAGNOSIS — I25.10 CORONARY ARTERY DISEASE INVOLVING NATIVE CORONARY ARTERY OF NATIVE HEART WITHOUT ANGINA PECTORIS: ICD-10-CM

## 2025-08-13 DIAGNOSIS — D47.2 MGUS (MONOCLONAL GAMMOPATHY OF UNKNOWN SIGNIFICANCE): ICD-10-CM

## 2025-08-13 DIAGNOSIS — I63.9 CEREBROVASCULAR ACCIDENT (CVA), UNSPECIFIED MECHANISM (HCC): ICD-10-CM

## 2025-08-13 DIAGNOSIS — D47.2 MGUS (MONOCLONAL GAMMOPATHY OF UNKNOWN SIGNIFICANCE): Primary | ICD-10-CM

## 2025-08-13 DIAGNOSIS — Z87.19 HISTORY OF GI BLEED: ICD-10-CM

## 2025-08-13 LAB
BASOPHILS # BLD: 0.03 K/UL (ref 0–0.2)
BASOPHILS NFR BLD: 0.4 % (ref 0–1)
EOSINOPHIL # BLD: 0.35 K/UL (ref 0–0.6)
EOSINOPHIL NFR BLD: 4.3 % (ref 0–5)
ERYTHROCYTE [DISTWIDTH] IN BLOOD BY AUTOMATED COUNT: 14.2 % (ref 11.5–14.5)
HCT VFR BLD AUTO: 46.5 % (ref 42–52)
HGB BLD-MCNC: 15.5 G/DL (ref 14–18)
LYMPHOCYTES # BLD: 3.81 K/UL (ref 1.1–4.5)
LYMPHOCYTES NFR BLD: 46.5 % (ref 20–40)
MCH RBC QN AUTO: 28.4 PG (ref 27–31)
MCHC RBC AUTO-ENTMCNC: 33.3 G/DL (ref 33–37)
MCV RBC AUTO: 85.3 FL (ref 80–94)
MONOCYTES # BLD: 0.71 K/UL (ref 0–0.9)
MONOCYTES NFR BLD: 8.7 % (ref 1–10)
NEUTROPHILS # BLD: 3.24 K/UL (ref 1.5–7.5)
NEUTS SEG NFR BLD: 39.4 % (ref 50–65)
PLATELET # BLD AUTO: 285 K/UL (ref 130–400)
PMV BLD AUTO: 9.8 FL (ref 9.4–12.4)
RBC # BLD AUTO: 5.45 M/UL (ref 4.7–6.1)
WBC # BLD AUTO: 8.2 K/UL (ref 4.8–10.8)

## 2025-08-13 PROCEDURE — G8420 CALC BMI NORM PARAMETERS: HCPCS | Performed by: NURSE PRACTITIONER

## 2025-08-13 PROCEDURE — G8427 DOCREV CUR MEDS BY ELIG CLIN: HCPCS | Performed by: NURSE PRACTITIONER

## 2025-08-13 PROCEDURE — 3078F DIAST BP <80 MM HG: CPT | Performed by: NURSE PRACTITIONER

## 2025-08-13 PROCEDURE — 85025 COMPLETE CBC W/AUTO DIFF WBC: CPT

## 2025-08-13 PROCEDURE — 36415 COLL VENOUS BLD VENIPUNCTURE: CPT

## 2025-08-13 PROCEDURE — 84165 PROTEIN E-PHORESIS SERUM: CPT

## 2025-08-13 PROCEDURE — 83883 ASSAY NEPHELOMETRY NOT SPEC: CPT

## 2025-08-13 PROCEDURE — 1036F TOBACCO NON-USER: CPT | Performed by: NURSE PRACTITIONER

## 2025-08-13 PROCEDURE — 1126F AMNT PAIN NOTED NONE PRSNT: CPT | Performed by: NURSE PRACTITIONER

## 2025-08-13 PROCEDURE — 1159F MED LIST DOCD IN RCRD: CPT | Performed by: NURSE PRACTITIONER

## 2025-08-13 PROCEDURE — 83521 IG LIGHT CHAINS FREE EACH: CPT

## 2025-08-13 PROCEDURE — 3017F COLORECTAL CA SCREEN DOC REV: CPT | Performed by: NURSE PRACTITIONER

## 2025-08-13 PROCEDURE — 99213 OFFICE O/P EST LOW 20 MIN: CPT

## 2025-08-13 PROCEDURE — 99214 OFFICE O/P EST MOD 30 MIN: CPT | Performed by: NURSE PRACTITIONER

## 2025-08-13 PROCEDURE — 82784 ASSAY IGA/IGD/IGG/IGM EACH: CPT

## 2025-08-13 PROCEDURE — 3075F SYST BP GE 130 - 139MM HG: CPT | Performed by: NURSE PRACTITIONER

## 2025-08-13 PROCEDURE — 86334 IMMUNOFIX E-PHORESIS SERUM: CPT

## 2025-08-13 PROCEDURE — 1123F ACP DISCUSS/DSCN MKR DOCD: CPT | Performed by: NURSE PRACTITIONER

## 2025-08-13 PROCEDURE — 84155 ASSAY OF PROTEIN SERUM: CPT

## 2025-08-17 LAB
ALBUMIN SERPL-MCNC: 3.97 G/DL (ref 3.75–5.01)
ALPHA1 GLOB SERPL ELPH-MCNC: 0.34 G/DL (ref 0.19–0.46)
ALPHA2 GLOB SERPL ELPH-MCNC: 1.01 G/DL (ref 0.48–1.05)
B-GLOBULIN SERPL ELPH-MCNC: 1.19 G/DL (ref 0.48–1.1)
DEPRECATED KAPPA LC FREE/LAMBDA SER: 2.21 {RATIO} (ref 0.26–1.65)
EER MONOCLONAL PROTEIN AND FLC, SERUM: ABNORMAL
GAMMA GLOB SERPL ELPH-MCNC: 1.59 G/DL (ref 0.62–1.51)
IGA SERPL-MCNC: 432 MG/DL (ref 68–408)
IGG SERPL-MCNC: 1610 MG/DL (ref 768–1632)
IGM SERPL-MCNC: 102 MG/DL (ref 35–263)
INTERPRETATION SERPL IFE-IMP: ABNORMAL
INTERPRETATION SERPL IFE-IMP: ABNORMAL
KAPPA LC FREE SER-MCNC: 85.46 MG/L (ref 3.3–19.4)
LAMBDA LC FREE SERPL-MCNC: 38.73 MG/L (ref 5.71–26.3)
MONOCLONAL PROTEIN, SERUM: ABNORMAL G/DL
PROT SERPL-MCNC: 8.1 G/DL (ref 6.3–8.2)

## 2025-08-20 ASSESSMENT — ENCOUNTER SYMPTOMS
WHEEZING: 0
ABDOMINAL PAIN: 0
VOMITING: 0
NAUSEA: 0
SHORTNESS OF BREATH: 0
COUGH: 0
TROUBLE SWALLOWING: 0
VOICE CHANGE: 1
CONSTIPATION: 0
SORE THROAT: 0
EYE DISCHARGE: 0
DIARRHEA: 0
BLOOD IN STOOL: 0
EYE ITCHING: 0

## 2025-08-21 DIAGNOSIS — E03.9 ACQUIRED HYPOTHYROIDISM: ICD-10-CM

## 2025-08-22 RX ORDER — LEVOTHYROXINE SODIUM 50 UG/1
50 TABLET ORAL DAILY
Qty: 90 TABLET | Refills: 0 | Status: SHIPPED | OUTPATIENT
Start: 2025-08-22

## 2025-08-26 ENCOUNTER — OFFICE VISIT (OUTPATIENT)
Dept: CARDIOLOGY CLINIC | Age: 70
End: 2025-08-26
Payer: MEDICARE

## 2025-08-26 VITALS
OXYGEN SATURATION: 96 % | SYSTOLIC BLOOD PRESSURE: 120 MMHG | DIASTOLIC BLOOD PRESSURE: 70 MMHG | HEIGHT: 71 IN | BODY MASS INDEX: 24.78 KG/M2 | WEIGHT: 177 LBS | HEART RATE: 64 BPM

## 2025-08-26 DIAGNOSIS — I42.9 CARDIOMYOPATHY, UNSPECIFIED TYPE (HCC): ICD-10-CM

## 2025-08-26 DIAGNOSIS — I20.1 ANGINA PECTORIS, VARIANT: ICD-10-CM

## 2025-08-26 DIAGNOSIS — I25.119 CORONARY ARTERY DISEASE INVOLVING NATIVE HEART WITH ANGINA PECTORIS, UNSPECIFIED VESSEL OR LESION TYPE: Primary | ICD-10-CM

## 2025-08-26 PROCEDURE — 3074F SYST BP LT 130 MM HG: CPT | Performed by: INTERNAL MEDICINE

## 2025-08-26 PROCEDURE — 1036F TOBACCO NON-USER: CPT | Performed by: INTERNAL MEDICINE

## 2025-08-26 PROCEDURE — 3078F DIAST BP <80 MM HG: CPT | Performed by: INTERNAL MEDICINE

## 2025-08-26 PROCEDURE — G8420 CALC BMI NORM PARAMETERS: HCPCS | Performed by: INTERNAL MEDICINE

## 2025-08-26 PROCEDURE — 99214 OFFICE O/P EST MOD 30 MIN: CPT | Performed by: INTERNAL MEDICINE

## 2025-08-26 PROCEDURE — 1123F ACP DISCUSS/DSCN MKR DOCD: CPT | Performed by: INTERNAL MEDICINE

## 2025-08-26 PROCEDURE — 3017F COLORECTAL CA SCREEN DOC REV: CPT | Performed by: INTERNAL MEDICINE

## 2025-08-26 PROCEDURE — G8427 DOCREV CUR MEDS BY ELIG CLIN: HCPCS | Performed by: INTERNAL MEDICINE

## 2025-08-26 PROCEDURE — 1159F MED LIST DOCD IN RCRD: CPT | Performed by: INTERNAL MEDICINE

## 2025-08-26 PROCEDURE — 1160F RVW MEDS BY RX/DR IN RCRD: CPT | Performed by: INTERNAL MEDICINE

## 2025-08-26 RX ORDER — ERGOCALCIFEROL 1.25 MG/1
50000 CAPSULE, LIQUID FILLED ORAL WEEKLY
Qty: 12 CAPSULE | Refills: 1 | Status: SHIPPED | OUTPATIENT
Start: 2025-08-26

## 2025-08-27 ENCOUNTER — TELEPHONE (OUTPATIENT)
Dept: CARDIOLOGY CLINIC | Age: 70
End: 2025-08-27

## 2025-09-04 ENCOUNTER — HOSPITAL ENCOUNTER (OUTPATIENT)
Age: 70
Discharge: HOME | End: 2025-09-04
Attending: INTERNAL MEDICINE
Payer: MEDICARE

## 2025-09-04 VITALS
BODY MASS INDEX: 24.78 KG/M2 | HEIGHT: 71 IN | WEIGHT: 177 LBS | SYSTOLIC BLOOD PRESSURE: 120 MMHG | DIASTOLIC BLOOD PRESSURE: 70 MMHG

## 2025-09-04 DIAGNOSIS — I20.1 ANGINA PECTORIS, VARIANT: ICD-10-CM

## 2025-09-04 DIAGNOSIS — I42.9 CARDIOMYOPATHY, UNSPECIFIED TYPE (HCC): ICD-10-CM

## 2025-09-04 LAB
ECHO AO ASC DIAM: 3.5 CM
ECHO AO ASCENDING AORTA INDEX: 1.75 CM/M2
ECHO AO ROOT DIAM: 1.6 CM
ECHO AO ROOT INDEX: 0.8 CM/M2
ECHO AO SINUS VALSALVA DIAM: 2.9 CM
ECHO AO SINUS VALSALVA INDEX: 1.45 CM/M2
ECHO AO ST JNCT DIAM: 2.6 CM
ECHO AV AREA PEAK VELOCITY: 2 CM2
ECHO AV AREA VTI: 2.7 CM2
ECHO AV AREA/BSA PEAK VELOCITY: 1 CM2/M2
ECHO AV AREA/BSA VTI: 1.4 CM2/M2
ECHO AV MEAN GRADIENT: 3 MMHG
ECHO AV MEAN VELOCITY: 0.7 M/S
ECHO AV PEAK GRADIENT: 7 MMHG
ECHO AV PEAK VELOCITY: 1.3 M/S
ECHO AV VELOCITY RATIO: 0.62
ECHO AV VTI: 18.3 CM
ECHO BSA: 2.01 M2
ECHO EST RA PRESSURE: 3 MMHG
ECHO IVC PROX: 1.2 CM
ECHO LA AREA 2C: 14.5 CM2
ECHO LA AREA 4C: 14.2 CM2
ECHO LA DIAMETER INDEX: 1.65 CM/M2
ECHO LA DIAMETER: 3.3 CM
ECHO LA MAJOR AXIS: 4.8 CM
ECHO LA MINOR AXIS: 4.6 CM
ECHO LA TO AORTIC ROOT RATIO: 2.06
ECHO LA VOL BP: 35 ML (ref 18–58)
ECHO LA VOL MOD A2C: 36 ML (ref 18–58)
ECHO LA VOL MOD A4C: 32 ML (ref 18–58)
ECHO LA VOL/BSA BIPLANE: 18 ML/M2 (ref 16–34)
ECHO LA VOLUME INDEX MOD A2C: 18 ML/M2 (ref 16–34)
ECHO LA VOLUME INDEX MOD A4C: 16 ML/M2 (ref 16–34)
ECHO LV E' LATERAL VELOCITY: 7.4 CM/S
ECHO LV E' SEPTAL VELOCITY: 3.59 CM/S
ECHO LV EDV A2C: 41 ML
ECHO LV EDV A4C: 71 ML
ECHO LV EDV INDEX A4C: 36 ML/M2
ECHO LV EDV NDEX A2C: 21 ML/M2
ECHO LV EJECTION FRACTION A2C: 43 %
ECHO LV EJECTION FRACTION A4C: 53 %
ECHO LV EJECTION FRACTION BIPLANE: 51 % (ref 55–100)
ECHO LV ESV A2C: 23 ML
ECHO LV ESV A4C: 33 ML
ECHO LV ESV INDEX A2C: 12 ML/M2
ECHO LV ESV INDEX A4C: 17 ML/M2
ECHO LV FRACTIONAL SHORTENING: 26 % (ref 28–44)
ECHO LV INTERNAL DIMENSION DIASTOLE INDEX: 1.95 CM/M2
ECHO LV INTERNAL DIMENSION DIASTOLIC: 3.9 CM (ref 4.2–5.9)
ECHO LV INTERNAL DIMENSION SYSTOLIC INDEX: 1.45 CM/M2
ECHO LV INTERNAL DIMENSION SYSTOLIC: 2.9 CM
ECHO LV IVSD: 1.5 CM (ref 0.6–1)
ECHO LV MASS 2D: 149.5 G (ref 88–224)
ECHO LV MASS INDEX 2D: 74.8 G/M2 (ref 49–115)
ECHO LV POSTERIOR WALL DIASTOLIC: 0.8 CM (ref 0.6–1)
ECHO LV RELATIVE WALL THICKNESS RATIO: 0.41
ECHO LVOT AREA: 3.1 CM2
ECHO LVOT AV VTI INDEX: 0.85
ECHO LVOT DIAM: 2 CM
ECHO LVOT MEAN GRADIENT: 1 MMHG
ECHO LVOT PEAK GRADIENT: 3 MMHG
ECHO LVOT PEAK VELOCITY: 0.8 M/S
ECHO LVOT STROKE VOLUME INDEX: 24.5 ML/M2
ECHO LVOT SV: 49 ML
ECHO LVOT VTI: 15.6 CM
ECHO MV A VELOCITY: 0.65 M/S
ECHO MV AREA VTI: 2.1 CM2
ECHO MV E DECELERATION TIME (DT): 214 MS
ECHO MV E VELOCITY: 0.53 M/S
ECHO MV E/A RATIO: 0.82
ECHO MV E/E' LATERAL: 7.16
ECHO MV E/E' RATIO (AVERAGED): 10.96
ECHO MV E/E' SEPTAL: 14.76
ECHO MV LVOT VTI INDEX: 1.49
ECHO MV MAX VELOCITY: 0.6 M/S
ECHO MV MEAN GRADIENT: 1 MMHG
ECHO MV MEAN VELOCITY: 0.4 M/S
ECHO MV PEAK GRADIENT: 1 MMHG
ECHO MV VTI: 23.2 CM
ECHO RA AREA 4C: 9.7 CM2
ECHO RA END SYSTOLIC VOLUME APICAL 4 CHAMBER INDEX BSA: 8 ML/M2
ECHO RA VOLUME: 16 ML
ECHO RIGHT VENTRICULAR SYSTOLIC PRESSURE (RVSP): 25 MMHG
ECHO RV BASAL DIMENSION: 2.9 CM
ECHO RV INTERNAL DIMENSION: 2.5 CM
ECHO RV LONGITUDINAL DIMENSION: 6.2 CM
ECHO RV MID DIMENSION: 1.9 CM
ECHO RV TAPSE: 1.1 CM (ref 1.7–?)
ECHO TV REGURGITANT MAX VELOCITY: 2.32 M/S
ECHO TV REGURGITANT PEAK GRADIENT: 22 MMHG

## 2025-09-04 PROCEDURE — 93306 TTE W/DOPPLER COMPLETE: CPT | Performed by: INTERNAL MEDICINE

## 2025-09-04 PROCEDURE — 6360000004 HC RX CONTRAST MEDICATION: Performed by: INTERNAL MEDICINE

## 2025-09-04 PROCEDURE — C8929 TTE W OR WO FOL WCON,DOPPLER: HCPCS

## 2025-09-04 RX ADMIN — SULFUR HEXAFLUORIDE 2 ML: 60.7; .19; .19 INJECTION, POWDER, LYOPHILIZED, FOR SUSPENSION INTRAVENOUS; INTRAVESICAL at 11:52

## (undated) DEVICE — KIT BLWR MISTER 5P 15L W/ TBNG SET IRRIG MIST TO IMPROVE

## (undated) DEVICE — ENDO KIT,LOURDES HOSPITAL: Brand: MEDLINE INDUSTRIES, INC.

## (undated) DEVICE — STABILIZER TISS BEAT HRT OCTPS NUVO

## (undated) DEVICE — SUTURE PERMAHAND SZ 2-0 L30IN NONABSORBABLE BLK SH L26MM C016D

## (undated) DEVICE — POSITIONER HRT NS STARFISH

## (undated) DEVICE — MASK,OXYGEN,MED CONC,ADLT,7' TUB, UC: Brand: PENDING

## (undated) DEVICE — MONOPOLAR CAUTERY CORD

## (undated) DEVICE — TUBE ET 7.5MM NSL ORAL BASIC CUF INTMED MURPHY EYE RADPQ

## (undated) DEVICE — DRAIN SURG SGL COLL PT TB FOR ATS BG OASIS

## (undated) DEVICE — ACCESSORY TOWEL PACK: Brand: MEDLINE INDUSTRIES, INC.

## (undated) DEVICE — OPEN HEART ROBO: Brand: MEDLINE INDUSTRIES, INC.

## (undated) DEVICE — SUTURE VICRYL + SZ 2-0 L36IN ABSRB UD L36MM CT-1 1/2 CIR VCP945H

## (undated) DEVICE — MASK VENTILATOR MED AD SUPERNOVA ET

## (undated) DEVICE — Device: Brand: DEFENDO AIR/WATER/SUCTION AND BIOPSY VALVE

## (undated) DEVICE — TUBE ET 8MM NSL ORAL BASIC CUF INTMED MURPHY EYE RADPQ MRK

## (undated) DEVICE — SUTURE MONOCRYL SZ 4-0 L18IN ABSRB UD L19MM PS-2 3/8 CIR PRIM Y496G

## (undated) DEVICE — PACK,UNIVERSAL,NO GOWNS: Brand: MEDLINE

## (undated) DEVICE — SENSR O2 OXIMAX FNGR A/ 18IN NONSTR

## (undated) DEVICE — GLIDESHEATH SS KIT HYDROPHILIC COATED INTRODUCER SHEATH: Brand: GLIDESHEATH

## (undated) DEVICE — YANKAUER,BULB TIP WITH VENT: Brand: ARGYLE

## (undated) DEVICE — SYSTEM GWIRE L260CM DIA0035IN STD STEER HYDROPHOBIC STR TIP

## (undated) DEVICE — SEAL

## (undated) DEVICE — COLUMN DRAPE

## (undated) DEVICE — TUBING, SUCTION, 1/4" X 20', STRAIGHT: Brand: MEDLINE INDUSTRIES, INC.

## (undated) DEVICE — DRAPE SLUSH DISC W44XL66IN ST FOR RND BSIN HUSH SLUSH SYS

## (undated) DEVICE — KIT ANGIO W/ AT P65 PREM HND CTRL FOR CNTRST DEL ANGIOTOUCH

## (undated) DEVICE — CUFF,BP,DISP,1 TUBE,ADULT,HP: Brand: MEDLINE

## (undated) DEVICE — CATHETER KIT 5 FR 21 GAX7 CM MICROINTRODUCER GUIDEWIRE STIFF

## (undated) DEVICE — SYSTEM ENDOSCP VES HARV W/ TOOL CANN SEAL SHT PRT BLNT TIP

## (undated) DEVICE — BAND COMPR L24CM REG CLR PLAS HEMSTAT EXT HK AND LOOP RETEN

## (undated) DEVICE — TBG SMPL FLTR LINE NASL 02/C02 A/ BX/100

## (undated) DEVICE — VIVASIGHT 2 DLT KIT 41 FR - LEFT: Brand: VIVASIGHT™ 2 DLT KIT 41 FR - LEFT

## (undated) DEVICE — RETRACTOR TISS SFT DISP M

## (undated) DEVICE — COVER,TABLE,44X90,STERILE: Brand: MEDLINE

## (undated) DEVICE — APPLICATOR LAP 35 CM 2 RIGID VISTASEAL

## (undated) DEVICE — KIT MFLD ISOLATN NACL CNTRST PRT TBNG SPIK W/ PRSS TRNSDUC

## (undated) DEVICE — BIPOLAR ELECTROHEMOSTASIS CATHETER: Brand: INJECTION GOLD PROBE

## (undated) DEVICE — Device: Brand: NOMOLINE™ LH ADULT NASAL CO2 CANNULA WITH O2 4M

## (undated) DEVICE — Device

## (undated) DEVICE — PEG KIT STD 20 FR PUSH W/ ENFIT ENDOVIVE

## (undated) DEVICE — COVER TRANSDUCER TELESCOPICALLY FOLDED 3.5X36 IN CIV-FLEX

## (undated) DEVICE — COR-KNOT MICRO™ DEVICE PRE-LOADED WITH (1) TITANIUM COR-KNOT MICRO™ FASTENER: Brand: COR-KNOT MICRO™

## (undated) DEVICE — GOWN, ORBIS, XLNG/XXLARGE, STRL: Brand: MEDLINE

## (undated) DEVICE — CATHETER DIAG 5FR L100CM LUMN ID0.047IN JR4 CRV 0 SIDE H

## (undated) DEVICE — CATHETER DIAG 5FR L100CM LUMN ID0.047IN JL3.5 CRV 0 SIDE H

## (undated) DEVICE — TUBING O2 SM ORG STRL

## (undated) DEVICE — ENDOGATOR AUXILIARY WATER JET CONNECTOR: Brand: ENDOGATOR

## (undated) DEVICE — BLADE LARYNSCP STERILE SZ 4 TI DISP SPECTRM DVM

## (undated) DEVICE — CATHETER GUID 6FR L100CM DIA0.071IN NYL SHFT EBU3.5

## (undated) DEVICE — DRAIN,WOUND,ROUND,24FR,5/16",FULL-FLUTED: Brand: MEDLINE

## (undated) DEVICE — SYSTEM SKIN CLSR 22CM DERMBND PRINEO

## (undated) DEVICE — AIRLIFE™ NASAL OXYGEN CANNULA CURVED, NONFLARED TIP, WITH 7' FEET (2.1 M) CRUSH RESISTANT TUBING, OVER-THE-EAR STYLE: Brand: AIRLIFE™

## (undated) DEVICE — CATHETER DIAG 6FR L100CM LUMN ID0.056IN JL4 CRV 0 SIDE H

## (undated) DEVICE — THE SINGLE USE ETRAP – POLYP TRAP IS USED FOR SUCTION RETRIEVAL OF ENDOSCOPICALLY REMOVED POLYPS.: Brand: ETRAP

## (undated) DEVICE — DRIVER POWER 2 DRIVE DISP

## (undated) DEVICE — THE CHANNEL CLEANING BRUSH IS A NYLON FLEXI BRUSH ATTACHED TO A FLEXIBLE PLASTIC SHEATH DESIGNED TO SAFELY REMOVE DEBRIS FROM FLEXIBLE ENDOSCOPES.

## (undated) DEVICE — CONMED SCOPE SAVER BITE BLOCK, 20X27 MM: Brand: SCOPE SAVER

## (undated) DEVICE — TOWEL,OR,DSP,ST,BLUE,DLX,4/PK,20PK/CS: Brand: MEDLINE

## (undated) DEVICE — GLIDESHEATH BASIC HYDROPHILIC COATED INTRODUCER SHEATH: Brand: GLIDESHEATH

## (undated) DEVICE — GLOVE SURG SZ 65 L12IN FNGR THK79MIL GRN LTX FREE

## (undated) DEVICE — BIPOLAR CAUTERY CORD

## (undated) DEVICE — KIT CATH 18GA L6IN FEM ART LN W/ INTEGR SUT WNG 0.25X17

## (undated) DEVICE — SUTURE PROL SZ 5-0 L36IN NONABSORBABLE BLU L17MM RB-1 1/2 8556H

## (undated) DEVICE — VESSEL SHUNT 1.75MM TAPERED TIP, 12MM SHAFT
Type: IMPLANTABLE DEVICE | Site: HEART | Status: NON-FUNCTIONAL
Brand: SOF-FLO ATRAUMATIC CORONARY ARTERY SHUNT
Removed: 2025-04-29

## (undated) DEVICE — NEEDLE SCLERO 23GA L240CM OD0.64MM ID0.32MM CLR INTERJECT

## (undated) DEVICE — ULTRACLEAN ACCESSORY ELECTRODE 4" (10.16 CM) COATED BLADE WITH EXTENDED INSULATION: Brand: ULTRACLEAN

## (undated) DEVICE — FRCP BX RADJAW4 NDL 2.8 240 STD OG

## (undated) DEVICE — GLOVE SURG SZ 7.5 L11.2IN THK9.8MIL STRW LTX POLYMER BEAD

## (undated) DEVICE — DRAIN SURG L3/8-1/2IN DIA3/16IN SIL CARD CONN 1:1 BLAK

## (undated) DEVICE — SNAR POLYP CAPTIVATOR MICROHEX 13 240CM

## (undated) DEVICE — SHEET,DRAPE,53X77,STERILE: Brand: MEDLINE

## (undated) DEVICE — BLADE LARYNSCP STERILE SZ 3 TI DISP SPECTRM DVM

## (undated) DEVICE — SUTURE NONABSORBABLE MONOFILAMENT 6-0 RB-2 4X30 IN PROLENE M8711

## (undated) DEVICE — KIT SHTH INTRO 9FR L10CM PERC INTEGR HEMSTAS VLV SIDEPRT

## (undated) DEVICE — AGENT HEMOSTATIC SURGIFLOW MATRIX KIT W/THROMBIN

## (undated) DEVICE — SUTURE VICRYL SZ 0 L27IN ABSRB VLT L48MM CTX 1/2 CIR TAPR PNT J364H

## (undated) DEVICE — GUIDEWIRE VASC L260CM DIA0038IN TIP L3MM PTFE J TIP FIX COR

## (undated) DEVICE — TIP COVER ACCESSORY

## (undated) DEVICE — SUTURE PROL SZ 7-0 L24IN NONABSORBABLE BLU L9.3MM BV-1 3/8 M8702

## (undated) DEVICE — PENCIL BTTN S S CAUT TIP W HOLSTER 25 50

## (undated) DEVICE — ARM DRAPE

## (undated) DEVICE — SWAN-GANZ CCOMBO V THERMODILUTION CATHETER: Brand: SWAN-GANZ CCOMBO V